# Patient Record
Sex: FEMALE | Race: WHITE | NOT HISPANIC OR LATINO | Employment: OTHER | URBAN - METROPOLITAN AREA
[De-identification: names, ages, dates, MRNs, and addresses within clinical notes are randomized per-mention and may not be internally consistent; named-entity substitution may affect disease eponyms.]

---

## 2017-01-10 ENCOUNTER — OFFICE VISIT (OUTPATIENT)
Dept: INTERNAL MEDICINE | Facility: CLINIC | Age: 37
End: 2017-01-10

## 2017-01-10 VITALS
BODY MASS INDEX: 32.47 KG/M2 | HEART RATE: 98 BPM | SYSTOLIC BLOOD PRESSURE: 102 MMHG | RESPIRATION RATE: 16 BRPM | DIASTOLIC BLOOD PRESSURE: 76 MMHG | WEIGHT: 172 LBS | HEIGHT: 61 IN

## 2017-01-10 DIAGNOSIS — L25.9 CONTACT DERMATITIS, UNSPECIFIED CONTACT DERMATITIS TYPE, UNSPECIFIED TRIGGER: Primary | ICD-10-CM

## 2017-01-10 PROCEDURE — 96372 THER/PROPH/DIAG INJ SC/IM: CPT | Performed by: PHYSICIAN ASSISTANT

## 2017-01-10 PROCEDURE — 99213 OFFICE O/P EST LOW 20 MIN: CPT | Performed by: PHYSICIAN ASSISTANT

## 2017-01-10 RX ORDER — METHYLPREDNISOLONE 4 MG/1
TABLET ORAL
Qty: 21 TABLET | Refills: 0 | Status: SHIPPED | OUTPATIENT
Start: 2017-01-10 | End: 2017-01-30 | Stop reason: SDUPTHER

## 2017-01-10 RX ORDER — METHYLPREDNISOLONE ACETATE 40 MG/ML
40 INJECTION, SUSPENSION INTRA-ARTICULAR; INTRALESIONAL; INTRAMUSCULAR; SOFT TISSUE ONCE
Status: DISCONTINUED | OUTPATIENT
Start: 2017-01-10 | End: 2017-12-07

## 2017-01-10 RX ORDER — MELOXICAM 7.5 MG/1
7.5 TABLET ORAL DAILY
Qty: 30 TABLET | Refills: 2 | Status: SHIPPED | OUTPATIENT
Start: 2017-01-10 | End: 2017-01-30 | Stop reason: SDUPTHER

## 2017-01-10 NOTE — MR AVS SNAPSHOT
Crystal A Day   1/10/2017 3:45 PM   Office Visit    Dept Phone:  [phone]   Encounter #:  [id number]    Provider:  DANELLE Polanco   Department:  [facility] INTERNAL MEDICINE AND ENDOCRINOLOGY [place]                Your Full Care Plan              Today's Medication Changes          These changes are accurate as of: 1/10/17  4:19 PM.  If you have any questions, ask your nurse or doctor.               New Medication(s)Ordered:     meloxicam 7.5 MG tablet   Commonly known as:  MOBIC   Take 1 tablet by mouth Daily.   Started by:  DANELLE Polanco       MethylPREDNISolone 4 MG tablet   Commonly known as:  MEDROL   follow package directions   Started by:  DANELLE Polanco            Where to Get Your Medications      These medications were sent to [facility] [address] [place] [address] - [facility] [address] AT [facility]  [phone] [facility] [phone] [facility] [address], [facility] [address]     Phone:  [phone]     meloxicam 7.5 MG tablet    MethylPREDNISolone 4 MG tablet                  Your Updated Medication List          This list is accurate as of: 1/10/17  4:19 PM.  Always use your most recent med list.                albuterol 108 (90 BASE) MCG/ACT inhaler   Commonly known as:  PROVENTIL HFA;VENTOLIN HFA       baclofen 10 MG tablet   Commonly known as:  LIORESAL   Take 1 tablet by mouth 3 (Three) Times a Day.       doxycycline 100 MG capsule   Commonly known as:  VIBRAMYCIN   Take 1 capsule by mouth 2 (Two) Times a Day.       DULoxetine 60 MG capsule   Commonly known as:  CYMBALTA       EPIPEN 2-JAREN 0.3 MG/0.3ML solution auto-injector injection   Generic drug:  EPINEPHrine       esomeprazole 40 MG capsule   Commonly known as:  nexIUM       estrogens (conjugated) 1.25 MG tablet   Commonly known as:  PREMARIN   Take 1 tablet by mouth daily. As directed       fexofenadine 180 MG tablet   Commonly known as:  ALLEGRA       fluticasone 50 MCG/ACT  nasal spray   Commonly known as:  FLONASE       ketotifen 0.025 % ophthalmic solution   Commonly known as:  ZADITOR       levoFLOXacin 500 MG tablet   Commonly known as:  LEVAQUIN   Take 1 tablet by mouth Daily.       LORazepam 0.5 MG tablet   Commonly known as:  ATIVAN       meloxicam 7.5 MG tablet   Commonly known as:  MOBIC   Take 1 tablet by mouth Daily.       MethylPREDNISolone 4 MG tablet   Commonly known as:  MEDROL   follow package directions       mometasone-formoterol 200-5 MCG/ACT inhaler   Commonly known as:  DULERA 200       mupirocin 2 % ointment   Commonly known as:  BACTROBAN   Apply  topically 3 (Three) Times a Day.       ondansetron 4 MG tablet   Commonly known as:  ZOFRAN   Take 2 tablets by mouth every 8 (eight) hours as needed for nausea or vomiting.       promethazine 25 MG tablet   Commonly known as:  PHENERGAN   Take 1 tablet by mouth every 6 (six) hours as needed for nausea or vomiting.       Pseudoeph-Chlorphen-DM 15-1-5 MG/5ML liquid   Take 10 mL by mouth 4 (Four) Times a Day.       topiramate 50 MG tablet   Commonly known as:  TOPAMAX       traMADol 50 MG tablet   Commonly known as:  ULTRAM   TAKE 2 TABLETS BY MOUTH TWICE DAILY       traZODone 150 MG tablet   Commonly known as:  DESYREL               You Were Diagnosed With        Codes Comments    Contact dermatitis, unspecified contact dermatitis type, unspecified trigger    -  Primary ICD-10-CM: L25.9  ICD-9-CM: 692.9       Medications to be Given to You by a Medical Professional     Due       Frequency    1/10/2017 methylPREDNISolone acetate (DEPO-medrol) injection 40 mg  Once      Instructions     None    Patient Instructions History      Upcoming Appointments     Visit Type Date Time Department    SAME DAY 1/10/2017  3:45 PM [facility]      MyChart Signup     Our records indicate that you have declined [facility] "[employer]hart signup. If you would like to sign up for [employer], please email [email or web address] or call  "[phone] to obtain an activation code.             Other Info from Your Visit           Allergies     Biaxin [Clarithromycin]  Nausea Only    Codeine      Latex      Penicillins      Sulfa Antibiotics      Sulfate      Ultram [Tramadol Hcl]        Reason for Visit     facial irritation pt c/o of burning, itching, and swelling in the facial area.      Vital Signs     Blood Pressure Pulse Respirations Height Weight Body Mass Index    102/76 98 16 61\" (154.9 cm) 172 lb (78 kg) 32.5 kg/m2    Smoking Status                   Former Smoker           Problems and Diagnoses Noted     Contact dermatitis    -  Primary        "

## 2017-01-10 NOTE — PROGRESS NOTES
Chief Complaint   Patient presents with   • facial irritation     pt c/o of burning, itching, and swelling in the facial area.       Subjective   Megan A Day is a 36 y.o. female.       History of Present Illness     Pt woke up yesterday morning with itching of her palms, then noticed that her face was burning and red. Does have some food allergies but cannot remember eating anything she is allergic to. Took some benadryl and put a wet washcloth on her face. Feels swollen and tingling.     Upon questioning, remembers a new cleaning solution that she is using to clean up dog's urine.    Current Outpatient Prescriptions:   •  albuterol (PROVENTIL HFA;VENTOLIN HFA) 108 (90 BASE) MCG/ACT inhaler, Ventolin  (90 Base) MCG/ACT Inhalation Aerosol Solution; Patient Sig: Ventolin  (90 Base) MCG/ACT Inhalation Aerosol Solution ; 18; 0; 03-Sep-2015; Active, Disp: , Rfl:   •  baclofen (LIORESAL) 10 MG tablet, Take 1 tablet by mouth 3 (Three) Times a Day., Disp: 90 tablet, Rfl: 0  •  doxycycline (VIBRAMYCIN) 100 MG capsule, Take 1 capsule by mouth 2 (Two) Times a Day., Disp: 20 capsule, Rfl: 0  •  DULoxetine (CYMBALTA) 60 MG capsule, Take  by mouth., Disp: , Rfl:   •  EPIPEN 2-JAREN 0.3 MG/0.3ML solution auto-injector injection, U UTD, Disp: , Rfl: 6  •  esomeprazole (NexIUM) 40 MG capsule, Take 1 capsule by mouth daily., Disp: , Rfl:   •  estrogens, conjugated, (PREMARIN) 1.25 MG tablet, Take 1 tablet by mouth daily. As directed, Disp: 30 tablet, Rfl: 5  •  fexofenadine (ALLEGRA) 180 MG tablet, TK 1 T PO D PRN, Disp: , Rfl: 6  •  fluticasone (FLONASE) 50 MCG/ACT nasal spray, 2 sprays into each nostril daily., Disp: , Rfl:   •  ketotifen (ZADITOR) 0.025 % ophthalmic solution, INT 2 GTS IN EACH EYE BID, Disp: , Rfl: 6  •  levoFLOXacin (LEVAQUIN) 500 MG tablet, Take 1 tablet by mouth Daily., Disp: 10 tablet, Rfl: 0  •  LORazepam (ATIVAN) 0.5 MG tablet, Take  by mouth., Disp: , Rfl:   •  mometasone-formoterol (DULERA  200) 200-5 MCG/ACT inhaler, Dulera 200-5 MCG/ACT Inhalation Aerosol; Patient Sig: Dulera 200-5 MCG/ACT Inhalation Aerosol ; 13; 0; 03-Sep-2015; Active, Disp: , Rfl:   •  mupirocin (BACTROBAN) 2 % ointment, Apply  topically 3 (Three) Times a Day., Disp: 30 g, Rfl: 0  •  ondansetron (ZOFRAN) 4 MG tablet, Take 2 tablets by mouth every 8 (eight) hours as needed for nausea or vomiting., Disp: 30 tablet, Rfl: 1  •  promethazine (PHENERGAN) 25 MG tablet, Take 1 tablet by mouth every 6 (six) hours as needed for nausea or vomiting., Disp: 20 tablet, Rfl: 0  •  Pseudoeph-Chlorphen-DM 15-1-5 MG/5ML liquid, Take 10 mL by mouth 4 (Four) Times a Day., Disp: 118 mL, Rfl: 0  •  topiramate (TOPAMAX) 50 MG tablet, Take 1 tablet by mouth daily., Disp: , Rfl:   •  traMADol (ULTRAM) 50 MG tablet, TAKE 2 TABLETS BY MOUTH TWICE DAILY, Disp: 60 tablet, Rfl: 0  •  traZODone (DESYREL) 150 MG tablet, Take 3 tablets by mouth 3 (Three) Times a Day As Needed., Disp: , Rfl: 1  •  meloxicam (MOBIC) 7.5 MG tablet, Take 1 tablet by mouth Daily., Disp: 30 tablet, Rfl: 2  •  MethylPREDNISolone (MEDROL) 4 MG tablet, follow package directions, Disp: 21 tablet, Rfl: 0    Current Facility-Administered Medications:   •  methylPREDNISolone acetate (DEPO-medrol) injection 40 mg, 40 mg, Intramuscular, Once, DANELLE Polanco     [facility]  The following portions of the patient's history were reviewed and updated as appropriate: allergies, current medications, past family history, past medical history, past social history, past surgical history and problem list.    Review of Systems   Constitutional: Negative for activity change, appetite change, fatigue, fever and unexpected weight change.   HENT: Negative for facial swelling, mouth sores and trouble swallowing.    Eyes: Negative for pain, discharge, itching and visual disturbance.   Respiratory: Negative for chest tightness, shortness of breath and wheezing.    Cardiovascular: Negative for chest pain and  "palpitations.   Gastrointestinal: Negative for abdominal pain, diarrhea, nausea and vomiting.   Endocrine: Negative.    Genitourinary: Negative.    Musculoskeletal: Negative for joint swelling.   Skin: Positive for rash.   Allergic/Immunologic: Negative.    Neurological: Negative for seizures and syncope.   Hematological: Does not bruise/bleed easily.   Psychiatric/Behavioral: Negative.        Objective   Visit Vitals   • /76   • Pulse 98   • Resp 16   • Ht 61\" (154.9 cm)   • Wt 172 lb (78 kg)   • BMI 32.5 kg/m2       Physical Exam   Constitutional: She is oriented to person, place, and time. She appears well-developed and well-nourished. No distress.   HENT:   Head: Normocephalic and atraumatic.   Right Ear: External ear normal.   Left Ear: External ear normal.   Eyes: Conjunctivae and EOM are normal. Pupils are equal, round, and reactive to light. Right eye exhibits no discharge. Left eye exhibits no discharge. No scleral icterus.   Neck: Normal range of motion. Neck supple.   Pulmonary/Chest: Effort normal.   Musculoskeletal: Normal range of motion.   Neurological: She is alert and oriented to person, place, and time. She exhibits normal muscle tone.   Skin: Skin is warm and dry. Rash (erythematous, edematous on bilateral cheeks and chin) noted. She is not diaphoretic.   Psychiatric: She has a normal mood and affect. Her behavior is normal. Judgment and thought content normal.   Nursing note and vitals reviewed.           ASSESSMENT/PLAN    Problem List Items Addressed This Visit     None      Visit Diagnoses     Contact dermatitis, unspecified contact dermatitis type, unspecified trigger    -  Primary    Relevant Medications    methylPREDNISolone acetate (DEPO-medrol) injection 40 mg    MethylPREDNISolone (MEDROL) 4 MG tablet    meloxicam (MOBIC) 7.5 MG tablet               Return if symptoms worsen or fail to improve.  "

## 2017-01-30 ENCOUNTER — OFFICE VISIT (OUTPATIENT)
Dept: INTERNAL MEDICINE | Facility: CLINIC | Age: 37
End: 2017-01-30

## 2017-01-30 VITALS
HEIGHT: 62 IN | OXYGEN SATURATION: 98 % | TEMPERATURE: 98.4 F | DIASTOLIC BLOOD PRESSURE: 78 MMHG | SYSTOLIC BLOOD PRESSURE: 122 MMHG | HEART RATE: 74 BPM

## 2017-01-30 DIAGNOSIS — L25.9 CONTACT DERMATITIS, UNSPECIFIED CONTACT DERMATITIS TYPE, UNSPECIFIED TRIGGER: ICD-10-CM

## 2017-01-30 DIAGNOSIS — J01.11 ACUTE RECURRENT FRONTAL SINUSITIS: ICD-10-CM

## 2017-01-30 PROCEDURE — 99213 OFFICE O/P EST LOW 20 MIN: CPT | Performed by: PHYSICIAN ASSISTANT

## 2017-01-30 RX ORDER — METHYLPREDNISOLONE 4 MG/1
TABLET ORAL
Qty: 21 TABLET | Refills: 0 | Status: SHIPPED | OUTPATIENT
Start: 2017-01-30 | End: 2017-02-07

## 2017-01-30 RX ORDER — DOXYCYCLINE HYCLATE 100 MG/1
100 CAPSULE ORAL 2 TIMES DAILY
Qty: 20 CAPSULE | Refills: 0 | Status: SHIPPED | OUTPATIENT
Start: 2017-01-30 | End: 2017-03-22

## 2017-01-30 RX ORDER — MELOXICAM 7.5 MG/1
TABLET ORAL
Qty: 90 TABLET | Refills: 2 | OUTPATIENT
Start: 2017-01-30 | End: 2017-11-03

## 2017-01-30 NOTE — MR AVS SNAPSHOT
Crystal A Day   1/30/2017 1:45 PM   Office Visit    Dept Phone:  [phone]   Encounter #:  [id number]    Provider:  DANELLE Polanco   Department:  [facility] INTERNAL MEDICINE AND ENDOCRINOLOGY [place]                Your Full Care Plan              Today's Medication Changes          These changes are accurate as of: 1/30/17  2:28 PM.  If you have any questions, ask your nurse or doctor.               Stop taking medication(s)listed here:     Pseudoeph-Chlorphen-DM 15-1-5 MG/5ML liquid   Stopped by:  DANELLE Polanco                Where to Get Your Medications      These medications were sent to [facility] [address] [place] [address] - [facility] [address] AT [facility] [address]  [phone] [facility] [phone] [facility] [address], [facility] [address]     Phone:  [phone]     doxycycline 100 MG capsule                  Your Updated Medication List          This list is accurate as of: 1/30/17  2:28 PM.  Always use your most recent med list.                albuterol 108 (90 BASE) MCG/ACT inhaler   Commonly known as:  PROVENTIL HFA;VENTOLIN HFA       baclofen 10 MG tablet   Commonly known as:  LIORESAL   Take 1 tablet by mouth 3 (Three) Times a Day.       doxycycline 100 MG capsule   Commonly known as:  VIBRAMYCIN   Take 1 capsule by mouth 2 (Two) Times a Day.       DULoxetine 60 MG capsule   Commonly known as:  CYMBALTA       EPIPEN 2-JAREN 0.3 MG/0.3ML solution auto-injector injection   Generic drug:  EPINEPHrine       esomeprazole 40 MG capsule   Commonly known as:  nexIUM       estrogens (conjugated) 1.25 MG tablet   Commonly known as:  PREMARIN   Take 1 tablet by mouth daily. As directed       fexofenadine 180 MG tablet   Commonly known as:  ALLEGRA       fluticasone 50 MCG/ACT nasal spray   Commonly known as:  FLONASE       ketotifen 0.025 % ophthalmic solution   Commonly known as:  ZADITOR       levoFLOXacin 500 MG tablet   Commonly known as:  LEVAQUIN      Take 1 tablet by mouth Daily.       LORazepam 0.5 MG tablet   Commonly known as:  ATIVAN       meloxicam 7.5 MG tablet   Commonly known as:  MOBIC   Take 1 tablet by mouth Daily.       MethylPREDNISolone 4 MG tablet   Commonly known as:  MEDROL   follow package directions       mometasone-formoterol 200-5 MCG/ACT inhaler   Commonly known as:  DULERA 200       mupirocin 2 % ointment   Commonly known as:  BACTROBAN   Apply  topically 3 (Three) Times a Day.       ondansetron 4 MG tablet   Commonly known as:  ZOFRAN   Take 2 tablets by mouth every 8 (eight) hours as needed for nausea or vomiting.       promethazine 25 MG tablet   Commonly known as:  PHENERGAN   Take 1 tablet by mouth every 6 (six) hours as needed for nausea or vomiting.       topiramate 50 MG tablet   Commonly known as:  TOPAMAX       traMADol 50 MG tablet   Commonly known as:  ULTRAM   TAKE 2 TABLETS BY MOUTH TWICE DAILY       traZODone 150 MG tablet   Commonly known as:  DESYREL               You Were Diagnosed With        Codes Comments    Acute recurrent frontal sinusitis     ICD-10-CM: J01.11  ICD-9-CM: 461.1       Medications to be Given to You by a Medical Professional     Due       Frequency    1/10/2017 methylPREDNISolone acetate (DEPO-medrol) injection 40 mg  Once      Instructions     None    Patient Instructions History      Upcoming Appointments     Visit Type Date Time Department    SAME DAY 1/30/2017  1:45 PM [facility]      [employer] Signup     Our records indicate that your [facility] account has been deactivated. If you would like to reactivate your account, please email "[email or web address] or call [phone] to talk to our [employer] staff.             Other Info from Your Visit           Allergies     Biaxin [Clarithromycin]  Nausea Only    Codeine      Latex      Penicillins      Sulfa Antibiotics      Sulfate      Ultram [Tramadol Hcl]        Reason for Visit     Fatigue C/o fatigue, sob, chills, tightness  "in chest, frequent headaches and productive cough x 9 days.      Vital Signs     Blood Pressure Pulse Temperature Height Oxygen Saturation Smoking Status    122/78 74 98.4 °F (36.9 °C) 62\" (157.5 cm) 98% Former Smoker      Problems and Diagnoses Noted     Acute frontal sinusitis        "

## 2017-01-30 NOTE — PROGRESS NOTES
Chief Complaint   Patient presents with   • Fatigue     C/o fatigue, sob, chills, tightness in chest, frequent headaches and productive cough x 9 days.       Subjective   Megan A Day is a 36 y.o. female.       History of Present Illness     Pt started with headache, sinus pressure in her frontal area. Has had some congestion. Some chest tightness with breathing. Chest hurts when she breaths- mid sternal. Increased cough at night or with moving around a lot. Cough is productive. Feels like she has mucus stuck in her throat. Tried mucinex. No fever.    Current Outpatient Prescriptions:   •  albuterol (PROVENTIL HFA;VENTOLIN HFA) 108 (90 BASE) MCG/ACT inhaler, Ventolin  (90 Base) MCG/ACT Inhalation Aerosol Solution; Patient Sig: Ventolin  (90 Base) MCG/ACT Inhalation Aerosol Solution ; 18; 0; 03-Sep-2015; Active, Disp: , Rfl:   •  baclofen (LIORESAL) 10 MG tablet, Take 1 tablet by mouth 3 (Three) Times a Day., Disp: 90 tablet, Rfl: 0  •  doxycycline (VIBRAMYCIN) 100 MG capsule, Take 1 capsule by mouth 2 (Two) Times a Day., Disp: 20 capsule, Rfl: 0  •  DULoxetine (CYMBALTA) 60 MG capsule, Take  by mouth., Disp: , Rfl:   •  EPIPEN 2-JAREN 0.3 MG/0.3ML solution auto-injector injection, U UTD, Disp: , Rfl: 6  •  esomeprazole (NexIUM) 40 MG capsule, Take 1 capsule by mouth daily., Disp: , Rfl:   •  estrogens, conjugated, (PREMARIN) 1.25 MG tablet, Take 1 tablet by mouth daily. As directed, Disp: 30 tablet, Rfl: 5  •  fexofenadine (ALLEGRA) 180 MG tablet, TK 1 T PO D PRN, Disp: , Rfl: 6  •  fluticasone (FLONASE) 50 MCG/ACT nasal spray, 2 sprays into each nostril daily., Disp: , Rfl:   •  ketotifen (ZADITOR) 0.025 % ophthalmic solution, INT 2 GTS IN EACH EYE BID, Disp: , Rfl: 6  •  levoFLOXacin (LEVAQUIN) 500 MG tablet, Take 1 tablet by mouth Daily., Disp: 10 tablet, Rfl: 0  •  LORazepam (ATIVAN) 0.5 MG tablet, Take  by mouth., Disp: , Rfl:   •  meloxicam (MOBIC) 7.5 MG tablet, Take 1 tablet by mouth Daily.,  Disp: 30 tablet, Rfl: 2  •  MethylPREDNISolone (MEDROL) 4 MG tablet, follow package directions, Disp: 21 tablet, Rfl: 0  •  mometasone-formoterol (DULERA 200) 200-5 MCG/ACT inhaler, Dulera 200-5 MCG/ACT Inhalation Aerosol; Patient Sig: Dulera 200-5 MCG/ACT Inhalation Aerosol ; 13; 0; 03-Sep-2015; Active, Disp: , Rfl:   •  mupirocin (BACTROBAN) 2 % ointment, Apply  topically 3 (Three) Times a Day., Disp: 30 g, Rfl: 0  •  ondansetron (ZOFRAN) 4 MG tablet, Take 2 tablets by mouth every 8 (eight) hours as needed for nausea or vomiting., Disp: 30 tablet, Rfl: 1  •  promethazine (PHENERGAN) 25 MG tablet, Take 1 tablet by mouth every 6 (six) hours as needed for nausea or vomiting., Disp: 20 tablet, Rfl: 0  •  topiramate (TOPAMAX) 50 MG tablet, Take 1 tablet by mouth daily., Disp: , Rfl:   •  traMADol (ULTRAM) 50 MG tablet, TAKE 2 TABLETS BY MOUTH TWICE DAILY, Disp: 60 tablet, Rfl: 0  •  traZODone (DESYREL) 150 MG tablet, Take 3 tablets by mouth 3 (Three) Times a Day As Needed., Disp: , Rfl: 1    Current Facility-Administered Medications:   •  methylPREDNISolone acetate (DEPO-medrol) injection 40 mg, 40 mg, Intramuscular, Once, DANELLE Polanco     [facility]  The following portions of the patient's history were reviewed and updated as appropriate: allergies, current medications, past family history, past medical history, past social history, past surgical history and problem list.    Review of Systems   Constitutional: Positive for fatigue. Negative for activity change and unexpected weight change.   HENT: Positive for congestion, postnasal drip and sore throat. Negative for ear pain.    Eyes: Negative for pain and discharge.   Respiratory: Positive for cough. Negative for chest tightness, shortness of breath and wheezing.    Cardiovascular: Negative for chest pain and palpitations.   Gastrointestinal: Negative for abdominal pain, diarrhea and vomiting.   Endocrine: Negative.    Genitourinary: Negative.    Musculoskeletal:  "Negative for joint swelling.   Skin: Negative for color change, rash and wound.   Allergic/Immunologic: Negative.    Neurological: Negative for seizures and syncope.   Psychiatric/Behavioral: Negative.        Objective   Visit Vitals   • /78   • Pulse 74   • Temp 98.4 °F (36.9 °C)   • Ht 62\" (157.5 cm)   • SpO2 98%       Physical Exam   Constitutional: She is oriented to person, place, and time. She appears well-developed and well-nourished.  Non-toxic appearance. No distress.   HENT:   Head: Normocephalic and atraumatic. Hair is normal.   Right Ear: External ear normal. No drainage, swelling or tenderness. Tympanic membrane is retracted.   Left Ear: External ear normal. No drainage, swelling or tenderness. Tympanic membrane is retracted.   Nose: Mucosal edema present. No epistaxis.   Mouth/Throat: Uvula is midline and mucous membranes are normal. No oral lesions. No uvula swelling. Posterior oropharyngeal erythema present. No oropharyngeal exudate.   Eyes: Conjunctivae and EOM are normal. Pupils are equal, round, and reactive to light. Right eye exhibits no discharge. Left eye exhibits no discharge. No scleral icterus.   Neck: Normal range of motion. Neck supple.   Cardiovascular: Normal rate, regular rhythm and normal heart sounds.  Exam reveals no gallop.    No murmur heard.  Pulmonary/Chest: Breath sounds normal. No stridor. No respiratory distress. She has no wheezes. She has no rales. She exhibits no tenderness.   Abdominal: Soft. There is no tenderness.   Lymphadenopathy:     She has cervical adenopathy.   Neurological: She is alert and oriented to person, place, and time. She exhibits normal muscle tone.   Skin: Skin is warm and dry. No rash noted. She is not diaphoretic.   Psychiatric: She has a normal mood and affect. Her behavior is normal. Judgment and thought content normal.   Nursing note and vitals reviewed.      Results for orders placed or performed in visit on 10/06/16   CBC Auto Differential "   Result Value Ref Range    WBC 7.16 3.50 - 10.80 10*3/mm3    RBC 4.94 3.89 - 5.14 10*6/mm3    Hemoglobin 13.8 11.5 - 15.5 g/dL    Hematocrit 42.7 34.5 - 44.0 %    MCV 86.4 80.0 - 99.0 fL    MCH 27.9 27.0 - 31.0 pg    MCHC 32.3 32.0 - 36.0 g/dL    RDW 13.2 11.3 - 14.5 %    RDW-SD 41.7 37.0 - 54.0 fl    MPV 10.7 6.0 - 12.0 fL    Platelets 282 150 - 450 10*3/mm3    Neutrophil % 61.0 41.0 - 71.0 %    Lymphocyte % 27.8 24.0 - 44.0 %    Monocyte % 5.6 0.0 - 12.0 %    Eosinophil % 5.4 (H) 0.0 - 3.0 %    Basophil % 0.1 0.0 - 1.0 %    Immature Grans % 0.1 0.0 - 0.6 %    Neutrophils, Absolute 4.36 1.50 - 8.30 10*3/mm3    Lymphocytes, Absolute 1.99 0.60 - 4.80 10*3/mm3    Monocytes, Absolute 0.40 0.00 - 1.00 10*3/mm3    Eosinophils, Absolute 0.39 (H) 0.10 - 0.30 10*3/mm3    Basophils, Absolute 0.01 0.00 - 0.20 10*3/mm3    Immature Grans, Absolute 0.01 0.00 - 0.03 10*3/mm3        ASSESSMENT/PLAN    Problem List Items Addressed This Visit        Respiratory    Acute recurrent frontal sinusitis     Pt has medrol dose pack at home that she can start, along with the doxycycline.         Relevant Medications    doxycycline (VIBRAMYCIN) 100 MG capsule               Return if symptoms worsen or fail to improve.

## 2017-01-30 NOTE — TELEPHONE ENCOUNTER
----- Message from Angely MOY MA sent at 1/30/2017  3:49 PM EST -----  See message below pls advise.     ----- Message -----     From: Shania Ram     Sent: 1/30/2017   3:45 PM       To: [facility] [place] [facility] [place]    THE PATIENT IS CALLING BACK TO LET OSVALDO OROZCO KNOW THAT SHE WAS NOT ABLE TO FIND HER CORTISONE CREAM AND WOULD LIKE FOR OSVALDO TO GO AHEAD AND CALL HER IN SOME MORE. THE PATIENT'S CALL BACK NUMBER [phone] IF THERE IS ANY QUESTIONS OR CONCERN

## 2017-02-07 ENCOUNTER — TELEPHONE (OUTPATIENT)
Dept: INTERNAL MEDICINE | Facility: CLINIC | Age: 37
End: 2017-02-07

## 2017-02-07 ENCOUNTER — HOSPITAL ENCOUNTER (OUTPATIENT)
Dept: GENERAL RADIOLOGY | Facility: HOSPITAL | Age: 37
Discharge: HOME | End: 2017-02-07
Admitting: PHYSICIAN ASSISTANT

## 2017-02-07 ENCOUNTER — OFFICE VISIT (OUTPATIENT)
Dept: INTERNAL MEDICINE | Facility: CLINIC | Age: 37
End: 2017-02-07

## 2017-02-07 VITALS
HEIGHT: 62 IN | TEMPERATURE: 99.5 F | SYSTOLIC BLOOD PRESSURE: 102 MMHG | HEART RATE: 86 BPM | OXYGEN SATURATION: 98 % | DIASTOLIC BLOOD PRESSURE: 64 MMHG

## 2017-02-07 DIAGNOSIS — R68.89 FLU-LIKE SYMPTOMS: ICD-10-CM

## 2017-02-07 DIAGNOSIS — J20.9 ACUTE BRONCHITIS, UNSPECIFIED ORGANISM: ICD-10-CM

## 2017-02-07 DIAGNOSIS — N76.0 ACUTE VAGINITIS: ICD-10-CM

## 2017-02-07 DIAGNOSIS — J20.9 ACUTE BRONCHITIS, UNSPECIFIED ORGANISM: Primary | ICD-10-CM

## 2017-02-07 LAB
EXPIRATION DATE: NORMAL
FLUAV AG NPH QL: NEGATIVE
FLUBV AG NPH QL: NEGATIVE
INTERNAL CONTROL: NORMAL
Lab: NORMAL

## 2017-02-07 PROCEDURE — 71020 HC CHEST PA AND LATERAL: CPT

## 2017-02-07 PROCEDURE — 87804 INFLUENZA ASSAY W/OPTIC: CPT | Performed by: PHYSICIAN ASSISTANT

## 2017-02-07 PROCEDURE — 99213 OFFICE O/P EST LOW 20 MIN: CPT | Performed by: PHYSICIAN ASSISTANT

## 2017-02-07 RX ORDER — LEVOFLOXACIN 500 MG/1
500 TABLET, FILM COATED ORAL DAILY
Qty: 10 TABLET | Refills: 0 | Status: SHIPPED | OUTPATIENT
Start: 2017-02-07 | End: 2017-03-22

## 2017-02-07 RX ORDER — BENZONATATE 200 MG/1
200 CAPSULE ORAL 3 TIMES DAILY PRN
Qty: 30 CAPSULE | Refills: 0 | Status: SHIPPED | OUTPATIENT
Start: 2017-02-07 | End: 2017-02-17

## 2017-02-07 RX ORDER — FLUCONAZOLE 150 MG/1
150 TABLET ORAL ONCE
Qty: 2 TABLET | Refills: 0 | Status: SHIPPED | OUTPATIENT
Start: 2017-02-07 | End: 2017-02-07

## 2017-02-07 NOTE — TELEPHONE ENCOUNTER
----- Message from DANELLE Polanco sent at 2/7/2017  4:05 PM EST -----  Contact: PATIENT   Per Dr Slaughter, please call in phenergan w/ codeine cough syrup, 5 ml po qhs prn #120 ml, 0RF. I sent in the diflucan. Thanks    ----- Message -----     From: Angely MOY MA     Sent: 2/7/2017   3:51 PM       To: DANELLE Polanco    Pls advise.     ----- Message -----     From: Dian Kaufman     Sent: 2/7/2017   3:37 PM       To: Angely MOY MA    PATIENT STATES THAT JJ CALL ARE NOT COVERED BY HER INSURANCE, SHE STATES THAT [facility] WAS NOT CALLED IN.    [facility] ON [address]    CALL BACK #[phone]

## 2017-02-07 NOTE — TELEPHONE ENCOUNTER
Received a call from pt, pt was last seen on 01/30, per pt she has completed her antibiotics that she was given and is still feeling sick, her cough is a lot deeper and pt still has a low grade fever, pt is afraid it might be pneumonia and requested an appt with Christie to be seen again, scheduled pt with Christie today at 01:45pm.

## 2017-02-07 NOTE — PROGRESS NOTES
Chief Complaint   Patient presents with   • Follow-up     ongoing fever, cough, chest pain, burning in chest when coughi LOV 01/30/17       Lizandro Guzman A Day is a 36 y.o. female.       History of Present Illness     Pt has been taking the doxycycline and completed the steroid pack. She is not feeling any better, has chest congestion and chest tightness. Chest hurts in the front. Cough is productive. Feels fatigued and feels like her memory is not as good. Has been feverish, tmax was 102.6 last night. No wheezing. Has deep painful cough. Feels short of breath. Taking some otc nyquil for cough.      Current Outpatient Prescriptions:   •  albuterol (PROVENTIL HFA;VENTOLIN HFA) 108 (90 BASE) MCG/ACT inhaler, Ventolin  (90 Base) MCG/ACT Inhalation Aerosol Solution; Patient Sig: Ventolin  (90 Base) MCG/ACT Inhalation Aerosol Solution ; 18; 0; 03-Sep-2015; Active, Disp: , Rfl:   •  baclofen (LIORESAL) 10 MG tablet, Take 1 tablet by mouth 3 (Three) Times a Day., Disp: 90 tablet, Rfl: 0  •  doxycycline (VIBRAMYCIN) 100 MG capsule, Take 1 capsule by mouth 2 (Two) Times a Day., Disp: 20 capsule, Rfl: 0  •  DULoxetine (CYMBALTA) 60 MG capsule, Take  by mouth., Disp: , Rfl:   •  EPIPEN 2-JAREN 0.3 MG/0.3ML solution auto-injector injection, U UTD, Disp: , Rfl: 6  •  esomeprazole (NexIUM) 40 MG capsule, Take 1 capsule by mouth daily., Disp: , Rfl:   •  estrogens, conjugated, (PREMARIN) 1.25 MG tablet, Take 1 tablet by mouth daily. As directed, Disp: 30 tablet, Rfl: 5  •  fexofenadine (ALLEGRA) 180 MG tablet, TK 1 T PO D PRN, Disp: , Rfl: 6  •  fluticasone (FLONASE) 50 MCG/ACT nasal spray, 2 sprays into each nostril daily., Disp: , Rfl:   •  ketotifen (ZADITOR) 0.025 % ophthalmic solution, INT 2 GTS IN EACH EYE BID, Disp: , Rfl: 6  •  LORazepam (ATIVAN) 0.5 MG tablet, Take  by mouth., Disp: , Rfl:   •  meloxicam (MOBIC) 7.5 MG tablet, TAKE 1 TABLET BY MOUTH DAILY, Disp: 90 tablet, Rfl: 2  •   mometasone-formoterol (DULERA 200) 200-5 MCG/ACT inhaler, Dulera 200-5 MCG/ACT Inhalation Aerosol; Patient Sig: Dulera 200-5 MCG/ACT Inhalation Aerosol ; 13; 0; 03-Sep-2015; Active, Disp: , Rfl:   •  mupirocin (BACTROBAN) 2 % ointment, Apply  topically 3 (Three) Times a Day., Disp: 30 g, Rfl: 0  •  ondansetron (ZOFRAN) 4 MG tablet, Take 2 tablets by mouth every 8 (eight) hours as needed for nausea or vomiting., Disp: 30 tablet, Rfl: 1  •  promethazine (PHENERGAN) 25 MG tablet, Take 1 tablet by mouth every 6 (six) hours as needed for nausea or vomiting., Disp: 20 tablet, Rfl: 0  •  topiramate (TOPAMAX) 50 MG tablet, Take 1 tablet by mouth daily., Disp: , Rfl:   •  traMADol (ULTRAM) 50 MG tablet, TAKE 2 TABLETS BY MOUTH TWICE DAILY, Disp: 60 tablet, Rfl: 0  •  traZODone (DESYREL) 150 MG tablet, Take 3 tablets by mouth 3 (Three) Times a Day As Needed., Disp: , Rfl: 1  •  benzonatate (TESSALON) 200 MG capsule, Take 1 capsule by mouth 3 (Three) Times a Day As Needed for cough for up to 10 days., Disp: 30 capsule, Rfl: 0  •  fluconazole (DIFLUCAN) 150 MG tablet, Take 1 tablet by mouth 1 (One) Time for 1 dose. Repeat in 2 days if still symptomatic, Disp: 2 tablet, Rfl: 0  •  levoFLOXacin (LEVAQUIN) 500 MG tablet, Take 1 tablet by mouth Daily., Disp: 10 tablet, Rfl: 0    Current Facility-Administered Medications:   •  methylPREDNISolone acetate (DEPO-medrol) injection 40 mg, 40 mg, Intramuscular, Once, DANELLE Polanco     [facility]  The following portions of the patient's history were reviewed and updated as appropriate: allergies, current medications, past family history, past medical history, past social history, past surgical history and problem list.    Review of Systems   Constitutional: Positive for fatigue and fever. Negative for activity change and unexpected weight change.   HENT: Positive for congestion, postnasal drip and sore throat. Negative for ear pain.    Eyes: Negative for pain and discharge.   Respiratory:  "Positive for cough. Negative for chest tightness, shortness of breath and wheezing.    Cardiovascular: Negative for chest pain and palpitations.   Gastrointestinal: Negative for abdominal pain, diarrhea and vomiting.   Endocrine: Negative.    Genitourinary: Negative.    Musculoskeletal: Negative for joint swelling.   Skin: Negative for color change, rash and wound.   Allergic/Immunologic: Negative.    Neurological: Positive for dizziness. Negative for seizures and syncope.   Psychiatric/Behavioral: Negative.        Objective   Visit Vitals   • /64   • Pulse 86   • Temp 99.5 °F (37.5 °C)   • Ht 62\" (157.5 cm)   • SpO2 98%       Physical Exam   Constitutional: She appears well-developed and well-nourished.   HENT:   Head: Normocephalic.   Right Ear: Hearing, tympanic membrane, external ear and ear canal normal.   Left Ear: Hearing, tympanic membrane, external ear and ear canal normal.   Nose: Nose normal.   Mouth/Throat: Oropharynx is clear and moist.   Eyes: Conjunctivae are normal. Pupils are equal, round, and reactive to light.   Neck: Normal range of motion.   Cardiovascular: Normal rate, regular rhythm and normal heart sounds.    Pulmonary/Chest: Effort normal and breath sounds normal. She has no decreased breath sounds. She has no wheezes. She has no rhonchi. She has no rales.   Musculoskeletal: Normal range of motion.   Neurological: She is alert.   Skin: Skin is warm and dry.   Psychiatric: She has a normal mood and affect. Her behavior is normal.   Nursing note and vitals reviewed.      Results for orders placed or performed in visit on 02/07/17   POC Influenza A / B   Result Value Ref Range    Rapid Influenza A Ag negative     Rapid Influenza B Ag negative     Internal Control Passed Passed    Lot Number [id number]     Expiration Date 06/01/2018         ASSESSMENT/PLAN    Problem List Items Addressed This Visit     None      Visit Diagnoses     Acute bronchitis, unspecified organism    -  Primary    " Relevant Medications    levoFLOXacin (LEVAQUIN) 500 MG tablet    benzonatate (TESSALON) 200 MG capsule    Other Relevant Orders    XR Chest PA & Lateral    Flu-like symptoms        Relevant Orders    POC Influenza A / B (Completed)    Acute vaginitis        Relevant Medications    fluconazole (DIFLUCAN) 150 MG tablet               Return if symptoms worsen or fail to improve.

## 2017-02-08 ENCOUNTER — TELEPHONE (OUTPATIENT)
Dept: INTERNAL MEDICINE | Facility: CLINIC | Age: 37
End: 2017-02-08

## 2017-02-08 NOTE — TELEPHONE ENCOUNTER
----- Message from Dian Kaufman sent at 2/8/2017  4:12 PM EST -----  Contact: PATIENT   RE: XRAY RESULTS    PATIENT WOULD LIKE A RETURN CALL REGARDING XRAY RESULTS FROM 2/7/2017, SHE STATES SHE IS STILL HAVING PRESSURE IN HER CHEST. C/O DIZZINESS, FEVER, SHORT OF BREATH.    CALL BACK #[phone]

## 2017-02-09 NOTE — TELEPHONE ENCOUNTER
Please let her know that her chest xray was clear. She should go ahead with the Levaquin that was prescribed and let us know if she is not feeling better next week.

## 2017-02-21 ENCOUNTER — OFFICE VISIT (OUTPATIENT)
Dept: INTERNAL MEDICINE | Facility: CLINIC | Age: 37
End: 2017-02-21

## 2017-02-21 VITALS
OXYGEN SATURATION: 99 % | TEMPERATURE: 99.6 F | HEART RATE: 85 BPM | SYSTOLIC BLOOD PRESSURE: 100 MMHG | HEIGHT: 62 IN | DIASTOLIC BLOOD PRESSURE: 68 MMHG

## 2017-02-21 DIAGNOSIS — R68.89 FLU-LIKE SYMPTOMS: ICD-10-CM

## 2017-02-21 DIAGNOSIS — R53.83 FATIGUE, UNSPECIFIED TYPE: Primary | ICD-10-CM

## 2017-02-21 DIAGNOSIS — R10.11 RIGHT UPPER QUADRANT PAIN: ICD-10-CM

## 2017-02-21 LAB
ALBUMIN SERPL-MCNC: 4.6 G/DL (ref 3.2–4.8)
ALBUMIN/GLOB SERPL: 1.6 G/DL (ref 1.5–2.5)
ALP SERPL-CCNC: 77 U/L (ref 25–100)
ALT SERPL W P-5'-P-CCNC: 31 U/L (ref 7–40)
ANION GAP SERPL CALCULATED.3IONS-SCNC: 6 MMOL/L (ref 3–11)
AST SERPL-CCNC: 26 U/L (ref 0–33)
BILIRUB SERPL-MCNC: 0.2 MG/DL (ref 0.3–1.2)
BUN BLD-MCNC: 19 MG/DL (ref 9–23)
BUN/CREAT SERPL: 23.8 (ref 7–25)
CALCIUM SPEC-SCNC: 9.9 MG/DL (ref 8.7–10.4)
CHLORIDE SERPL-SCNC: 108 MMOL/L (ref 99–109)
CO2 SERPL-SCNC: 26 MMOL/L (ref 20–31)
CREAT BLD-MCNC: 0.8 MG/DL (ref 0.6–1.3)
DEPRECATED RDW RBC AUTO: 42.9 FL (ref 37–54)
ERYTHROCYTE [DISTWIDTH] IN BLOOD BY AUTOMATED COUNT: 13.5 % (ref 11.3–14.5)
EXPIRATION DATE: NORMAL
FLUAV AG NPH QL: NEGATIVE
FLUBV AG NPH QL: NEGATIVE
GFR SERPL CREATININE-BSD FRML MDRD: 82 ML/MIN/1.73
GLOBULIN UR ELPH-MCNC: 2.9 GM/DL
GLUCOSE BLD-MCNC: 95 MG/DL (ref 70–100)
HCT VFR BLD AUTO: 41.7 % (ref 34.5–44)
HGB BLD-MCNC: 13.3 G/DL (ref 11.5–15.5)
INTERNAL CONTROL: NORMAL
Lab: NORMAL
MCH RBC QN AUTO: 27.7 PG (ref 27–31)
MCHC RBC AUTO-ENTMCNC: 31.9 G/DL (ref 32–36)
MCV RBC AUTO: 86.7 FL (ref 80–99)
PLATELET # BLD AUTO: 292 10*3/MM3 (ref 150–450)
PMV BLD AUTO: 10.5 FL (ref 6–12)
POTASSIUM BLD-SCNC: 4.3 MMOL/L (ref 3.5–5.5)
PROT SERPL-MCNC: 7.5 G/DL (ref 5.7–8.2)
RBC # BLD AUTO: 4.81 10*6/MM3 (ref 3.89–5.14)
SODIUM BLD-SCNC: 140 MMOL/L (ref 132–146)
TSH SERPL DL<=0.05 MIU/L-ACNC: 1.35 MIU/ML (ref 0.35–5.35)
VIT B12 BLD-MCNC: 308 PG/ML (ref 211–911)
WBC NRBC COR # BLD: 5.84 10*3/MM3 (ref 3.5–10.8)

## 2017-02-21 PROCEDURE — 87804 INFLUENZA ASSAY W/OPTIC: CPT | Performed by: PHYSICIAN ASSISTANT

## 2017-02-21 PROCEDURE — 99213 OFFICE O/P EST LOW 20 MIN: CPT | Performed by: PHYSICIAN ASSISTANT

## 2017-02-21 PROCEDURE — 84443 ASSAY THYROID STIM HORMONE: CPT | Performed by: PHYSICIAN ASSISTANT

## 2017-02-21 PROCEDURE — 80053 COMPREHEN METABOLIC PANEL: CPT | Performed by: PHYSICIAN ASSISTANT

## 2017-02-21 PROCEDURE — 82607 VITAMIN B-12: CPT | Performed by: PHYSICIAN ASSISTANT

## 2017-02-21 PROCEDURE — 85027 COMPLETE CBC AUTOMATED: CPT | Performed by: PHYSICIAN ASSISTANT

## 2017-02-21 RX ORDER — ALBUTEROL SULFATE 90 UG/1
2 AEROSOL, METERED RESPIRATORY (INHALATION) EVERY 6 HOURS PRN
Qty: 1 INHALER | Refills: 5 | Status: SHIPPED | OUTPATIENT
Start: 2017-02-21 | End: 2018-06-05 | Stop reason: ALTCHOICE

## 2017-02-21 RX ORDER — KETOTIFEN FUMARATE 0.35 MG/ML
1 SOLUTION/ DROPS OPHTHALMIC 2 TIMES DAILY
Qty: 10 ML | Refills: 6 | Status: SHIPPED | OUTPATIENT
Start: 2017-02-21 | End: 2017-06-08

## 2017-02-21 NOTE — PROGRESS NOTES
"Chief Complaint   Patient presents with   • Follow-up     Cough, chest pain, weakness       Subjective   Megan A Day is a 36 y.o. female.       History of Present Illness     Pt finished the course of Levaquin. The deepness of her cough has improved but still has cough. Still feels fatigued and has weak voice. Has terrible headaches with fever and chills. Tmax was 102.5, last night. Friend of son was at her house and had the flu 2-3 days ago. Pt did not have flu shot. Fever went away until recently. Still has \"film\" on her throat that has not gone away. Cancelled the allergy appt she had for today.    Just got over gastroenteritis with diarrhea about 3 days ago. Notes that she has had some intermittent pain in her right upper quadrant. Has upcoming appt with GI to discuss whether she needs a biopsy of her liver- not sure why.    Notes history of vitamin B12 deficiency.    Current Outpatient Prescriptions:   •  albuterol (PROVENTIL HFA;VENTOLIN HFA) 108 (90 BASE) MCG/ACT inhaler, Inhale 2 puffs Every 6 (Six) Hours As Needed for wheezing., Disp: 1 inhaler, Rfl: 5  •  baclofen (LIORESAL) 10 MG tablet, Take 1 tablet by mouth 3 (Three) Times a Day., Disp: 90 tablet, Rfl: 0  •  doxycycline (VIBRAMYCIN) 100 MG capsule, Take 1 capsule by mouth 2 (Two) Times a Day., Disp: 20 capsule, Rfl: 0  •  DULoxetine (CYMBALTA) 60 MG capsule, Take  by mouth., Disp: , Rfl:   •  EPIPEN 2-JAREN 0.3 MG/0.3ML solution auto-injector injection, U UTD, Disp: , Rfl: 6  •  esomeprazole (NexIUM) 40 MG capsule, Take 1 capsule by mouth daily., Disp: , Rfl:   •  estrogens, conjugated, (PREMARIN) 1.25 MG tablet, Take 1 tablet by mouth daily. As directed, Disp: 30 tablet, Rfl: 5  •  fexofenadine (ALLEGRA) 180 MG tablet, TK 1 T PO D PRN, Disp: , Rfl: 6  •  fluticasone (FLONASE) 50 MCG/ACT nasal spray, 2 sprays into each nostril daily., Disp: , Rfl:   •  ketotifen (ZADITOR) 0.025 % ophthalmic solution, Administer 1 drop to both eyes 2 (Two) Times a Day., " Disp: 10 mL, Rfl: 6  •  levoFLOXacin (LEVAQUIN) 500 MG tablet, Take 1 tablet by mouth Daily., Disp: 10 tablet, Rfl: 0  •  LORazepam (ATIVAN) 0.5 MG tablet, Take  by mouth., Disp: , Rfl:   •  meloxicam (MOBIC) 7.5 MG tablet, TAKE 1 TABLET BY MOUTH DAILY, Disp: 90 tablet, Rfl: 2  •  mometasone-formoterol (DULERA 200) 200-5 MCG/ACT inhaler, Dulera 200-5 MCG/ACT Inhalation Aerosol; Patient Sig: Dulera 200-5 MCG/ACT Inhalation Aerosol ; 13; 0; 03-Sep-2015; Active, Disp: , Rfl:   •  mupirocin (BACTROBAN) 2 % ointment, Apply  topically 3 (Three) Times a Day., Disp: 30 g, Rfl: 0  •  ondansetron (ZOFRAN) 4 MG tablet, Take 2 tablets by mouth every 8 (eight) hours as needed for nausea or vomiting., Disp: 30 tablet, Rfl: 1  •  promethazine (PHENERGAN) 25 MG tablet, Take 1 tablet by mouth every 6 (six) hours as needed for nausea or vomiting., Disp: 20 tablet, Rfl: 0  •  topiramate (TOPAMAX) 50 MG tablet, Take 1 tablet by mouth daily., Disp: , Rfl:   •  traMADol (ULTRAM) 50 MG tablet, TAKE 2 TABLETS BY MOUTH TWICE DAILY, Disp: 60 tablet, Rfl: 0  •  traZODone (DESYREL) 150 MG tablet, Take 3 tablets by mouth 3 (Three) Times a Day As Needed., Disp: , Rfl: 1    Current Facility-Administered Medications:   •  methylPREDNISolone acetate (DEPO-medrol) injection 40 mg, 40 mg, Intramuscular, Once, DANELLE Polanco     [facility]  The following portions of the patient's history were reviewed and updated as appropriate: allergies, current medications, past family history, past medical history, past social history, past surgical history and problem list.    Review of Systems   Constitutional: Positive for fatigue and fever. Negative for activity change and unexpected weight change.   HENT: Positive for congestion, postnasal drip and sore throat. Negative for ear pain.    Eyes: Negative for pain and discharge.   Respiratory: Positive for cough. Negative for chest tightness, shortness of breath and wheezing.    Cardiovascular: Negative for chest  "pain and palpitations.   Gastrointestinal: Negative for abdominal pain, diarrhea and vomiting.   Endocrine: Negative.    Genitourinary: Negative.    Musculoskeletal: Negative for joint swelling.   Skin: Negative for color change, rash and wound.   Allergic/Immunologic: Negative.    Neurological: Negative for seizures and syncope.   Psychiatric/Behavioral: Negative.        Objective   Visit Vitals   • /68   • Pulse 85   • Temp 99.6 °F (37.6 °C)   • Ht 62\" (157.5 cm)   • SpO2 99%       Physical Exam   Constitutional: She appears well-developed and well-nourished.   HENT:   Head: Normocephalic.   Right Ear: Hearing, tympanic membrane, external ear and ear canal normal.   Left Ear: Hearing, tympanic membrane, external ear and ear canal normal.   Nose: Nose normal.   Mouth/Throat: Oropharynx is clear and moist.   Eyes: Conjunctivae are normal. Pupils are equal, round, and reactive to light.   Neck: Normal range of motion.   Cardiovascular: Normal rate, regular rhythm and normal heart sounds.    Pulmonary/Chest: Effort normal and breath sounds normal. She has no decreased breath sounds. She has no wheezes. She has no rhonchi. She has no rales.   Musculoskeletal: Normal range of motion.   Neurological: She is alert.   Skin: Skin is warm and dry.   Psychiatric: She has a normal mood and affect. Her behavior is normal.   Nursing note and vitals reviewed.      Results for orders placed or performed in visit on 02/21/17   TSH   Result Value Ref Range    TSH 1.348 0.350 - 5.350 mIU/mL   Vitamin B12   Result Value Ref Range    Vitamin B-12 308 211 - 911 pg/mL   CBC (No Diff)   Result Value Ref Range    WBC 5.84 3.50 - 10.80 10*3/mm3    RBC 4.81 3.89 - 5.14 10*6/mm3    Hemoglobin 13.3 11.5 - 15.5 g/dL    Hematocrit 41.7 34.5 - 44.0 %    MCV 86.7 80.0 - 99.0 fL    MCH 27.7 27.0 - 31.0 pg    MCHC 31.9 (L) 32.0 - 36.0 g/dL    RDW 13.5 11.3 - 14.5 %    RDW-SD 42.9 37.0 - 54.0 fl    MPV 10.5 6.0 - 12.0 fL    Platelets 292 150 - " 450 10*3/mm3   Comprehensive Metabolic Panel   Result Value Ref Range    Glucose 95 70 - 100 mg/dL    BUN 19 9 - 23 mg/dL    Creatinine 0.80 0.60 - 1.30 mg/dL    Sodium 140 132 - 146 mmol/L    Potassium 4.3 3.5 - 5.5 mmol/L    Chloride 108 99 - 109 mmol/L    CO2 26.0 20.0 - 31.0 mmol/L    Calcium 9.9 8.7 - 10.4 mg/dL    Total Protein 7.5 5.7 - 8.2 g/dL    Albumin 4.60 3.20 - 4.80 g/dL    ALT (SGPT) 31 7 - 40 U/L    AST (SGOT) 26 0 - 33 U/L    Alkaline Phosphatase 77 25 - 100 U/L    Total Bilirubin 0.2 (L) 0.3 - 1.2 mg/dL    eGFR Non African Amer 82 >60 mL/min/1.73    Globulin 2.9 gm/dL    A/G Ratio 1.6 1.5 - 2.5 g/dL    BUN/Creatinine Ratio 23.8 7.0 - 25.0    Anion Gap 6.0 3.0 - 11.0 mmol/L   POCT Influenza A/B   Result Value Ref Range    Rapid Influenza A Ag negative     Rapid Influenza B Ag negative     Internal Control Passed Passed    Lot Number [id number]     Expiration Date 04/01/2017         ASSESSMENT/PLAN    Problem List Items Addressed This Visit        Nervous and Auditory    Right upper quadrant pain    Relevant Orders    CBC (No Diff) (Completed)    Comprehensive Metabolic Panel (Completed)      Other Visit Diagnoses     Fatigue, unspecified type    -  Primary    Relevant Orders    TSH (Completed)    Vitamin B12 (Completed)    Flu-like symptoms        Likely influenza in spite of negative test. Continue symptomatic care. RTC if worsening or no improvement.    Relevant Orders    POCT Influenza A/B (Completed)               Return if symptoms worsen or fail to improve, for Next scheduled follow up.

## 2017-02-23 DIAGNOSIS — R25.2 SPASM: ICD-10-CM

## 2017-02-23 DIAGNOSIS — L02.211 ABDOMINAL WALL ABSCESS: ICD-10-CM

## 2017-02-23 NOTE — PROGRESS NOTES
Please let her know that her labs were all normal. Her B12 is on the low end of normal so she could start an OTC supplement to boost her level some. She does not need B12 shots at this time.

## 2017-02-24 DIAGNOSIS — J20.9 ACUTE BRONCHITIS, UNSPECIFIED ORGANISM: ICD-10-CM

## 2017-02-24 RX ORDER — LEVOFLOXACIN 500 MG/1
TABLET, FILM COATED ORAL
Qty: 10 TABLET | Refills: 0 | OUTPATIENT
Start: 2017-02-24

## 2017-02-24 RX ORDER — BACLOFEN 10 MG/1
TABLET ORAL
Qty: 90 TABLET | Refills: 0 | OUTPATIENT
Start: 2017-02-24 | End: 2017-04-25

## 2017-03-05 ENCOUNTER — HOSPITAL ENCOUNTER (EMERGENCY)
Facility: HOSPITAL | Age: 37
Discharge: HOME | End: 2017-03-05
Attending: EMERGENCY MEDICINE | Admitting: EMERGENCY MEDICINE

## 2017-03-05 VITALS
HEART RATE: 98 BPM | DIASTOLIC BLOOD PRESSURE: 77 MMHG | OXYGEN SATURATION: 100 % | BODY MASS INDEX: 32.85 KG/M2 | HEIGHT: 61 IN | WEIGHT: 174 LBS | RESPIRATION RATE: 20 BRPM | TEMPERATURE: 98.1 F | SYSTOLIC BLOOD PRESSURE: 127 MMHG

## 2017-03-05 DIAGNOSIS — M54.42 ACUTE LEFT-SIDED LOW BACK PAIN WITH LEFT-SIDED SCIATICA: Primary | ICD-10-CM

## 2017-03-05 PROCEDURE — 99283 EMERGENCY DEPT VISIT LOW MDM: CPT

## 2017-03-05 PROCEDURE — 63710000001 PREDNISONE PER 5 MG: Performed by: EMERGENCY MEDICINE

## 2017-03-05 RX ORDER — PREDNISONE 20 MG/1
40 TABLET ORAL DAILY
Qty: 8 TABLET | Refills: 0 | Status: SHIPPED | OUTPATIENT
Start: 2017-03-05 | End: 2017-03-22 | Stop reason: SDUPTHER

## 2017-03-05 RX ORDER — HYDROCODONE BITARTRATE AND ACETAMINOPHEN 5; 325 MG/1; MG/1
1-2 TABLET ORAL EVERY 6 HOURS PRN
Qty: 15 TABLET | Refills: 0 | Status: SHIPPED | OUTPATIENT
Start: 2017-03-05 | End: 2017-08-19

## 2017-03-05 RX ORDER — PREDNISONE 20 MG/1
60 TABLET ORAL ONCE
Status: COMPLETED | OUTPATIENT
Start: 2017-03-05 | End: 2017-03-05

## 2017-03-05 RX ORDER — CYCLOBENZAPRINE HCL 10 MG
10 TABLET ORAL ONCE
Status: COMPLETED | OUTPATIENT
Start: 2017-03-05 | End: 2017-03-05

## 2017-03-05 RX ORDER — CYCLOBENZAPRINE HCL 10 MG
10 TABLET ORAL 3 TIMES DAILY PRN
Qty: 15 TABLET | Refills: 0 | Status: SHIPPED | OUTPATIENT
Start: 2017-03-05 | End: 2018-03-15

## 2017-03-05 RX ORDER — HYDROCODONE BITARTRATE AND ACETAMINOPHEN 7.5; 325 MG/1; MG/1
1 TABLET ORAL ONCE
Status: COMPLETED | OUTPATIENT
Start: 2017-03-05 | End: 2017-03-05

## 2017-03-05 RX ADMIN — PREDNISONE 60 MG: 20 TABLET ORAL at 02:51

## 2017-03-05 RX ADMIN — HYDROCODONE BITARTRATE AND ACETAMINOPHEN 1 TABLET: 7.5; 325 TABLET ORAL at 02:51

## 2017-03-05 RX ADMIN — CYCLOBENZAPRINE HYDROCHLORIDE 10 MG: 10 TABLET, FILM COATED ORAL at 02:52

## 2017-03-05 NOTE — ED PROVIDER NOTES
Subjective   Patient is a 36 y.o. female presenting with back pain.   Back Pain   Location:  Lumbar spine  Quality:  Burning  Radiates to:  L thigh  Pain severity:  Moderate  Pain is:  Same all the time  Onset quality:  Gradual  Timing:  Constant  Progression:  Waxing and waning  Chronicity:  New  Context: not recent injury    Relieved by:  Being still  Worsened by:  Movement  Associated symptoms: leg pain and tingling    Associated symptoms: no abdominal pain, no bladder incontinence, no bowel incontinence, no perianal numbness and no weakness        Review of Systems   Gastrointestinal: Negative for abdominal pain and bowel incontinence.   Genitourinary: Negative for bladder incontinence.   Musculoskeletal: Positive for back pain.   Neurological: Positive for tingling. Negative for weakness.   All other systems reviewed and are negative.      Past Medical History   Diagnosis Date   • Abdominal pain    • Abnormal breast exam    • Abnormal Pap smear of cervix    • Achilles tendinitis    • Acute sinusitis    • Anxiety    • Arthritis    • Asthma    • Tavera's syndrome    • Bipolar 1 disorder    • Bowel trouble    • Breast cyst    • Colon polyps    • Constipation    • Depression    • Diverticulitis    • Endometriosis    • Eustachian tube dysfunction    • Female pelvic pain    • Gastric ulcer    • H/O bladder infections    • History of rashes as a child    • Low back pain    • Menopausal symptoms    • Muscle weakness    • Ovarian cyst    • PID (pelvic inflammatory disease)    • Recurrent UTI    • Sexual problems        Allergies   Allergen Reactions   • Adhesive Tape    • Biaxin [Clarithromycin] Nausea Only   • Codeine    • Latex    • Penicillins    • Sulfa Antibiotics    • Sulfate    • Ultram [Tramadol Hcl]        Past Surgical History   Procedure Laterality Date   • Breast biopsy       abnormal   • Hysterectomy     • Other surgical history       Laparoscopy (diagnostic) gynecologic with biopsy   • Nasal endoscopy      • Oophorectomy Bilateral    • Shoulder surgery     •  section         Family History   Problem Relation Age of Onset   • Liver disease Mother    • Diabetes Mother    • Hyperlipidemia Mother    • Hypertension Mother    • Thyroid disease Mother    • Arthritis Father    • Mental illness Father    • Migraines Sister    • Stroke Other    • Diabetes Other    • Hyperlipidemia Other    • Hypertension Other    • Mental illness Other    • Migraines Other    • Tuberculosis Other        Social History     Social History   • Marital status: Single     Spouse name: N/A   • Number of children: N/A   • Years of education: N/A     Social History Main Topics   • Smoking status: Former Smoker   • Smokeless tobacco: Never Used   • Alcohol use No   • Drug use: No   • Sexual activity: Defer     Other Topics Concern   • None     Social History Narrative           Objective   Physical Exam   Constitutional: She is oriented to person, place, and time. She appears well-developed and well-nourished.   HENT:   Head: Normocephalic and atraumatic.   Right Ear: External ear normal.   Left Ear: External ear normal.   Nose: Nose normal.   Mouth/Throat: Oropharynx is clear and moist.   Eyes: Conjunctivae and EOM are normal. Pupils are equal, round, and reactive to light.   Neck: Normal range of motion. Neck supple.   Cardiovascular: Normal rate, regular rhythm, normal heart sounds and intact distal pulses.    Pulmonary/Chest: Effort normal and breath sounds normal.   Abdominal: Soft. Bowel sounds are normal.   Musculoskeletal: Normal range of motion.        Lumbar back: She exhibits tenderness, pain and spasm. She exhibits normal range of motion and normal pulse.   Neurological: She is alert and oriented to person, place, and time.   Skin: Skin is warm and dry.   Psychiatric: She has a normal mood and affect. Her behavior is normal. Judgment and thought content normal.       Procedures         ED Course  ED Course   Comment By Time   Well  aware of the ss of worsening condition. All thankful and agreeable. MAGGIE Hamm 03/05 0318                  [facility]    Final diagnoses:   Acute left-sided low back pain with left-sided sciatica            MAGGIE Hamm  03/05/17 0321

## 2017-03-22 ENCOUNTER — OFFICE VISIT (OUTPATIENT)
Dept: INTERNAL MEDICINE | Facility: CLINIC | Age: 37
End: 2017-03-22

## 2017-03-22 VITALS
HEIGHT: 61 IN | SYSTOLIC BLOOD PRESSURE: 112 MMHG | DIASTOLIC BLOOD PRESSURE: 64 MMHG | HEART RATE: 91 BPM | OXYGEN SATURATION: 98 %

## 2017-03-22 DIAGNOSIS — K21.9 GASTROESOPHAGEAL REFLUX DISEASE WITHOUT ESOPHAGITIS: ICD-10-CM

## 2017-03-22 DIAGNOSIS — G89.29 CHRONIC BILATERAL LOW BACK PAIN WITHOUT SCIATICA: ICD-10-CM

## 2017-03-22 DIAGNOSIS — H69.81 ETD (EUSTACHIAN TUBE DYSFUNCTION), RIGHT: Primary | ICD-10-CM

## 2017-03-22 DIAGNOSIS — M54.50 CHRONIC BILATERAL LOW BACK PAIN WITHOUT SCIATICA: ICD-10-CM

## 2017-03-22 PROCEDURE — 99213 OFFICE O/P EST LOW 20 MIN: CPT | Performed by: HOSPITALIST

## 2017-03-22 RX ORDER — PREDNISONE 20 MG/1
20 TABLET ORAL DAILY
Qty: 8 TABLET | Refills: 0 | Status: SHIPPED | OUTPATIENT
Start: 2017-03-22 | End: 2017-06-08

## 2017-03-22 RX ORDER — FLUTICASONE PROPIONATE 0.05 %
CREAM (GRAM) TOPICAL
Refills: 6 | COMMUNITY
Start: 2017-02-24 | End: 2018-02-16

## 2017-03-22 RX ORDER — PSEUDOEPHEDRINE HYDROCHLORIDE 60 MG/1
60 TABLET, FILM COATED ORAL 2 TIMES DAILY
Qty: 20 TABLET | Refills: 1 | Status: SHIPPED | OUTPATIENT
Start: 2017-03-22 | End: 2017-04-28

## 2017-03-22 RX ORDER — ESOMEPRAZOLE MAGNESIUM 40 MG/1
CAPSULE, DELAYED RELEASE ORAL
Qty: 90 CAPSULE | Refills: 1 | Status: SHIPPED | OUTPATIENT
Start: 2017-03-22 | End: 2017-06-08

## 2017-03-22 RX ORDER — OLOPATADINE HCL 0.2 %
DROPS OPHTHALMIC (EYE)
Refills: 3 | COMMUNITY
Start: 2017-03-03 | End: 2020-06-13

## 2017-03-22 RX ORDER — TRAMADOL HYDROCHLORIDE 50 MG/1
50 TABLET ORAL DAILY
Qty: 30 TABLET | Refills: 5 | Status: SHIPPED | OUTPATIENT
Start: 2017-03-22 | End: 2017-07-11 | Stop reason: DRUGHIGH

## 2017-03-22 RX ORDER — CETIRIZINE HYDROCHLORIDE 10 MG/1
1 TABLET ORAL DAILY
Refills: 1 | COMMUNITY
Start: 2017-03-01

## 2017-03-22 RX ORDER — ESOMEPRAZOLE MAGNESIUM 40 MG/1
40 CAPSULE, DELAYED RELEASE ORAL DAILY
Qty: 30 CAPSULE | Refills: 3 | Status: SHIPPED | OUTPATIENT
Start: 2017-03-22 | End: 2017-03-22 | Stop reason: SDUPTHER

## 2017-03-22 RX ORDER — TOPIRAMATE 100 MG/1
1 TABLET, FILM COATED ORAL DAILY
Refills: 3 | COMMUNITY
Start: 2017-03-16 | End: 2019-01-23

## 2017-03-22 NOTE — PROGRESS NOTES
Subjective   Megan A Day is a 37 y.o. female. Earache (right ear)         Earache    There is pain in the right ear. The current episode started more than 1 year ago. The problem has been resolved. Pertinent negatives include no abdominal pain or coughing. She has tried antibiotics for the symptoms.       The following portions of the patient's history were reviewed and updated as appropriate: allergies, current medications, past family history, past medical history, past social history, past surgical history and problem list.    Review of Systems   HENT: Positive for ear pain.    Respiratory: Negative for cough.    Gastrointestinal: Negative for abdominal pain.       Objective   Vitals:    03/22/17 0959   BP: 112/64   Pulse: 91   SpO2: 98%       Physical Exam   Constitutional: She appears well-developed and well-nourished.   HENT:   Head: Normocephalic and atraumatic.   Right Ear: External ear normal.   Left Ear: External ear normal.   Eyes: Conjunctivae and EOM are normal. Pupils are equal, round, and reactive to light.   Neck: Normal range of motion. Neck supple.   Cardiovascular: Normal rate, regular rhythm and normal heart sounds.    Pulmonary/Chest: Effort normal and breath sounds normal.       Assessment/Plan   Megan was seen today for earache.    Diagnoses and all orders for this visit:    ETD (eustachian tube dysfunction), right  -     pseudoephedrine (SUDAFED) 60 MG tablet; Take 1 tablet by mouth 2 (Two) Times a Day.  -     predniSONE (DELTASONE) 20 MG tablet; Take 1 tablet by mouth Daily.    Chronic bilateral low back pain without sciatica  -     traMADol (ULTRAM) 50 MG tablet; Take 1 tablet by mouth Daily.    Gastroesophageal reflux disease without esophagitis  -     Discontinue: esomeprazole (nexIUM) 40 MG capsule; Take 1 capsule by mouth Daily.      oif no improvement with this treatment will do an ENT referral  Results for orders placed or performed in visit on 02/21/17   TSH   Result Value Ref  Range    TSH 1.348 0.350 - 5.350 mIU/mL   Vitamin B12   Result Value Ref Range    Vitamin B-12 308 211 - 911 pg/mL   CBC (No Diff)   Result Value Ref Range    WBC 5.84 3.50 - 10.80 10*3/mm3    RBC 4.81 3.89 - 5.14 10*6/mm3    Hemoglobin 13.3 11.5 - 15.5 g/dL    Hematocrit 41.7 34.5 - 44.0 %    MCV 86.7 80.0 - 99.0 fL    MCH 27.7 27.0 - 31.0 pg    MCHC 31.9 (L) 32.0 - 36.0 g/dL    RDW 13.5 11.3 - 14.5 %    RDW-SD 42.9 37.0 - 54.0 fl    MPV 10.5 6.0 - 12.0 fL    Platelets 292 150 - 450 10*3/mm3   Comprehensive Metabolic Panel   Result Value Ref Range    Glucose 95 70 - 100 mg/dL    BUN 19 9 - 23 mg/dL    Creatinine 0.80 0.60 - 1.30 mg/dL    Sodium 140 132 - 146 mmol/L    Potassium 4.3 3.5 - 5.5 mmol/L    Chloride 108 99 - 109 mmol/L    CO2 26.0 20.0 - 31.0 mmol/L    Calcium 9.9 8.7 - 10.4 mg/dL    Total Protein 7.5 5.7 - 8.2 g/dL    Albumin 4.60 3.20 - 4.80 g/dL    ALT (SGPT) 31 7 - 40 U/L    AST (SGOT) 26 0 - 33 U/L    Alkaline Phosphatase 77 25 - 100 U/L    Total Bilirubin 0.2 (L) 0.3 - 1.2 mg/dL    eGFR Non African Amer 82 >60 mL/min/1.73    Globulin 2.9 gm/dL    A/G Ratio 1.6 1.5 - 2.5 g/dL    BUN/Creatinine Ratio 23.8 7.0 - 25.0    Anion Gap 6.0 3.0 - 11.0 mmol/L   POCT Influenza A/B   Result Value Ref Range    Rapid Influenza A Ag negative     Rapid Influenza B Ag negative     Internal Control Passed Passed    Lot Number [id number]     Expiration Date 04/01/2017

## 2017-03-24 ENCOUNTER — TELEPHONE (OUTPATIENT)
Dept: INTERNAL MEDICINE | Facility: CLINIC | Age: 37
End: 2017-03-24

## 2017-03-24 DIAGNOSIS — H69.83 ETD (EUSTACHIAN TUBE DYSFUNCTION), BILATERAL: Primary | ICD-10-CM

## 2017-03-24 NOTE — TELEPHONE ENCOUNTER
----- Message from Shania Ram sent at 3/24/2017  9:54 AM EDT -----  MELODY ACOSTA FROM [facility] IS WORKING WITH THE PATIENT ON HER SHORT TERM CASE MANAGEMENT AND HE STATES THAT OF DR FELIPE WOULD LIKE FOR THEM TO TOUCH BASE ON THERE END WITH THE PATIENT ABOUT ANYTHING? FEEL FREE TO CALL HIM. HE ALSO WOULD LIKE FOR DR FELIPE TO KNOW THAT SHE HAS REQUESTED A HANDICAP PARKING PASS AND HE HAS BEEN HELPING HER WITH THAT AS WELL. MELODY'S CALL BACK NUMBER [phone]

## 2017-03-29 ENCOUNTER — TELEPHONE (OUTPATIENT)
Dept: INTERNAL MEDICINE | Facility: CLINIC | Age: 37
End: 2017-03-29

## 2017-03-29 NOTE — TELEPHONE ENCOUNTER
----- Message from Aminta Slaughter MD sent at 3/28/2017  6:51 PM EDT -----  Contact: PATIENT  Referral placed  ----- Message -----     From: Sugey Ovalles MA     Sent: 3/23/2017   3:10 PM       To: Aminta Slaughter MD        ----- Message -----     From: Deanna Todd     Sent: 3/23/2017   2:58 PM       To: Nan Parker MA    PATIENT SAW DR. SLAUGHTER YESTERDAY FOR EAR ISSUES. PATIENT SAID DR. SLAUGHTER TOLD HER SHE WAS GOING TO SET UP A REFERRAL FOR PATIENT TO GO TO AN ENT DOCTOR. PATIENT CALLED THIS MORNING STATING THIS INFORMATION ABOUT REFERRAL WE NOTICED THERE IS NO REFERRAL IN HER CHART. [facility] SPEECH PHONE NUMBER [phone] TO SCHEDULE AN APPOINTMENT AND THEY TOLD HER SHE NEEDS A REFERRAL FROM HER PRIMARY CARE DOCTOR. SHE WANTS TO KNOW CAN WE GET HER A REFERRAL TO GO SEE AN ENT. IF [facility] HAS ENT DOCTOR SHE WOULD BE OK TO SEE A [facility] PROVIDER. IF [facility] DOES NOT SHE IS WILLING TO SEE DR. RUEDA. YOU CAN REACH PATIENT BACK [phone]. PATIENT HAS BAD PAIN IN HER EAR MAKING HER NAUSEOUS AND DIZZY AND WANTS THIS APPOINTMENT AS SOON AS POSSIBLE.

## 2017-04-24 ENCOUNTER — HOSPITAL ENCOUNTER (OUTPATIENT)
Dept: GENERAL RADIOLOGY | Facility: HOSPITAL | Age: 37
Discharge: HOME | End: 2017-04-24
Admitting: NURSE PRACTITIONER

## 2017-04-24 ENCOUNTER — TRANSCRIBE ORDERS (OUTPATIENT)
Dept: ADMINISTRATIVE | Facility: HOSPITAL | Age: 37
End: 2017-04-24

## 2017-04-24 DIAGNOSIS — J45.20 MILD INTERMITTENT ASTHMA IN ADULT WITHOUT COMPLICATION: Primary | ICD-10-CM

## 2017-04-24 DIAGNOSIS — R05.9 COUGH: ICD-10-CM

## 2017-04-24 PROCEDURE — 71020 HC CHEST PA AND LATERAL: CPT

## 2017-04-27 ENCOUNTER — TRANSCRIBE ORDERS (OUTPATIENT)
Dept: INTERNAL MEDICINE | Facility: CLINIC | Age: 37
End: 2017-04-27

## 2017-04-28 ENCOUNTER — OFFICE VISIT (OUTPATIENT)
Dept: INTERNAL MEDICINE | Facility: CLINIC | Age: 37
End: 2017-04-28

## 2017-04-28 VITALS
SYSTOLIC BLOOD PRESSURE: 110 MMHG | BODY MASS INDEX: 33.49 KG/M2 | HEIGHT: 61 IN | HEART RATE: 96 BPM | DIASTOLIC BLOOD PRESSURE: 72 MMHG | WEIGHT: 177.4 LBS | OXYGEN SATURATION: 98 %

## 2017-04-28 DIAGNOSIS — N76.0 VULVOVAGINITIS: ICD-10-CM

## 2017-04-28 DIAGNOSIS — N89.8 VAGINAL DISCHARGE: ICD-10-CM

## 2017-04-28 DIAGNOSIS — N89.8 VAGINAL ITCHING: Primary | ICD-10-CM

## 2017-04-28 PROCEDURE — 99213 OFFICE O/P EST LOW 20 MIN: CPT | Performed by: NURSE PRACTITIONER

## 2017-04-28 PROCEDURE — 87798 DETECT AGENT NOS DNA AMP: CPT | Performed by: NURSE PRACTITIONER

## 2017-04-28 PROCEDURE — 87661 TRICHOMONAS VAGINALIS AMPLIF: CPT | Performed by: NURSE PRACTITIONER

## 2017-04-28 PROCEDURE — 87591 N.GONORRHOEAE DNA AMP PROB: CPT | Performed by: NURSE PRACTITIONER

## 2017-04-28 PROCEDURE — 87481 CANDIDA DNA AMP PROBE: CPT | Performed by: NURSE PRACTITIONER

## 2017-04-28 PROCEDURE — 87491 CHLMYD TRACH DNA AMP PROBE: CPT | Performed by: NURSE PRACTITIONER

## 2017-04-28 RX ORDER — DEXAMETHASONE 4 MG/1
TABLET ORAL
Refills: 3 | COMMUNITY
Start: 2017-04-21 | End: 2017-05-01

## 2017-04-28 RX ORDER — IMIPRAMINE HYDROCHLORIDE 25 MG/1
TABLET ORAL
Refills: 1 | COMMUNITY
Start: 2017-04-21 | End: 2018-05-15

## 2017-04-28 RX ORDER — ALBUTEROL SULFATE 2.5 MG/3ML
SOLUTION RESPIRATORY (INHALATION)
Refills: 3 | COMMUNITY
Start: 2017-04-21 | End: 2018-06-29 | Stop reason: SDUPTHER

## 2017-04-28 RX ORDER — LIDOCAINE 50 MG/G
OINTMENT TOPICAL AS NEEDED
COMMUNITY
Start: 2017-03-30 | End: 2020-06-13

## 2017-04-28 RX ORDER — METRONIDAZOLE 7.5 MG/G
GEL VAGINAL
Refills: 0 | COMMUNITY
Start: 2017-04-02 | End: 2017-05-18

## 2017-04-28 NOTE — PROGRESS NOTES
"Subjective  Vaginitis (itching and discharge, has taken Diflucan but still persisting )      Crystal A Day is a 37 y.o. female.   Allergies   Allergen Reactions   • Adhesive Tape    • Biaxin [Clarithromycin] Nausea Only   • Codeine    • Latex    • Penicillins    • Sulfa Antibiotics    • Sulfate    • Ultram [Tramadol Hcl]      History of Present Illness      Discharge from vagina and itching , went to see NP and was given diflucan and told to use otc meds which she did and is no better, has been going on for a month and is getting worse, discharge is clear and more than normal   The following portions of the patient's history were reviewed and updated as appropriate: allergies, current medications, past family history, past medical history, past social history, past surgical history and problem list.    Review of Systems   Genitourinary: Positive for vaginal discharge.        Vaginal itching   All other systems reviewed and are negative.      Objective   Physical Exam   Constitutional: She is oriented to person, place, and time. She appears well-developed and well-nourished.   Genitourinary: Vaginal discharge found.   Genitourinary Comments: Scant amt white thin discharge   Neurological: She is alert and oriented to person, place, and time.   Skin: Skin is warm and dry.   Psychiatric: She has a normal mood and affect. Her behavior is normal. Judgment and thought content normal.   Nursing note and vitals reviewed.    /72  Pulse 96  Ht 61\" (154.9 cm)  Wt 177 lb 6.4 oz (80.5 kg)  SpO2 98%  BMI 33.52 kg/m2    Assessment/Plan     Problem List Items Addressed This Visit     None      Visit Diagnoses     Vaginal itching    -  Primary    Relevant Medications    lidocaine (XYLOCAINE) 5 % ointment    Other Relevant Orders    NuSwab VG+    Vaginal discharge        Relevant Orders    NuSwab VG+    Vulvovaginitis        Relevant Orders    NuSwab VG+          Will send nuswab      "

## 2017-05-01 ENCOUNTER — OFFICE VISIT (OUTPATIENT)
Dept: INTERNAL MEDICINE | Facility: CLINIC | Age: 37
End: 2017-05-01

## 2017-05-01 VITALS
HEART RATE: 112 BPM | SYSTOLIC BLOOD PRESSURE: 122 MMHG | WEIGHT: 177 LBS | OXYGEN SATURATION: 98 % | BODY MASS INDEX: 33.42 KG/M2 | HEIGHT: 61 IN | DIASTOLIC BLOOD PRESSURE: 80 MMHG

## 2017-05-01 DIAGNOSIS — J45.30 MILD PERSISTENT ASTHMA WITHOUT COMPLICATION: Primary | ICD-10-CM

## 2017-05-01 DIAGNOSIS — R05.9 COUGH: ICD-10-CM

## 2017-05-01 PROCEDURE — 99213 OFFICE O/P EST LOW 20 MIN: CPT | Performed by: NURSE PRACTITIONER

## 2017-05-01 RX ORDER — DEXTROMETHORPHAN HYDROBROMIDE AND PROMETHAZINE HYDROCHLORIDE 15; 6.25 MG/5ML; MG/5ML
SYRUP ORAL
Refills: 0 | COMMUNITY
Start: 2017-04-26 | End: 2017-05-18

## 2017-05-01 RX ORDER — AZITHROMYCIN 250 MG/1
TABLET, FILM COATED ORAL
Refills: 0 | COMMUNITY
Start: 2017-04-26 | End: 2017-05-18

## 2017-05-02 LAB
A VAGINAE DNA VAG QL NAA+PROBE: NORMAL SCORE
BVAB2 DNA VAG QL NAA+PROBE: NORMAL SCORE
C ALBICANS DNA VAG QL NAA+PROBE: NEGATIVE
C GLABRATA DNA VAG QL NAA+PROBE: NEGATIVE
C TRACH RRNA SPEC DONR QL NAA+PROBE: NEGATIVE
MEGASPHAERA 1: NORMAL SCORE
N GONORRHOEA DNA SPEC QL NAA+PROBE: NEGATIVE
T VAGINALIS RRNA GENITAL QL PROBE: NEGATIVE

## 2017-05-08 ENCOUNTER — HOSPITAL ENCOUNTER (EMERGENCY)
Facility: HOSPITAL | Age: 37
Discharge: HOME | End: 2017-05-09
Attending: EMERGENCY MEDICINE | Admitting: EMERGENCY MEDICINE

## 2017-05-08 ENCOUNTER — APPOINTMENT (OUTPATIENT)
Dept: GENERAL RADIOLOGY | Facility: HOSPITAL | Age: 37
End: 2017-05-08

## 2017-05-08 ENCOUNTER — APPOINTMENT (OUTPATIENT)
Dept: CT IMAGING | Facility: HOSPITAL | Age: 37
End: 2017-05-08

## 2017-05-08 DIAGNOSIS — R06.02 SHORTNESS OF BREATH: ICD-10-CM

## 2017-05-08 DIAGNOSIS — R07.89 ATYPICAL CHEST PAIN: Primary | ICD-10-CM

## 2017-05-08 LAB
ALBUMIN SERPL-MCNC: 4.4 G/DL (ref 3.2–4.8)
ALBUMIN/GLOB SERPL: 1.5 G/DL (ref 1.5–2.5)
ALP SERPL-CCNC: 84 U/L (ref 25–100)
ALT SERPL W P-5'-P-CCNC: 25 U/L (ref 7–40)
ANION GAP SERPL CALCULATED.3IONS-SCNC: 11 MMOL/L (ref 3–11)
AST SERPL-CCNC: 20 U/L (ref 0–33)
BASOPHILS # BLD AUTO: 0.03 10*3/MM3 (ref 0–0.2)
BASOPHILS NFR BLD AUTO: 0.4 % (ref 0–1)
BILIRUB SERPL-MCNC: 0.2 MG/DL (ref 0.3–1.2)
BNP SERPL-MCNC: <2 PG/ML (ref 0–100)
BUN BLD-MCNC: 12 MG/DL (ref 9–23)
BUN/CREAT SERPL: 15 (ref 7–25)
CALCIUM SPEC-SCNC: 9.8 MG/DL (ref 8.7–10.4)
CHLORIDE SERPL-SCNC: 106 MMOL/L (ref 99–109)
CO2 SERPL-SCNC: 22 MMOL/L (ref 20–31)
CREAT BLD-MCNC: 0.8 MG/DL (ref 0.6–1.3)
DEPRECATED RDW RBC AUTO: 44.5 FL (ref 37–54)
EOSINOPHIL # BLD AUTO: 0.28 10*3/MM3 (ref 0.1–0.3)
EOSINOPHIL NFR BLD AUTO: 3.4 % (ref 0–3)
ERYTHROCYTE [DISTWIDTH] IN BLOOD BY AUTOMATED COUNT: 13.6 % (ref 11.3–14.5)
GFR SERPL CREATININE-BSD FRML MDRD: 82 ML/MIN/1.73
GLOBULIN UR ELPH-MCNC: 3 GM/DL
GLUCOSE BLD-MCNC: 110 MG/DL (ref 70–100)
HCT VFR BLD AUTO: 42.3 % (ref 34.5–44)
HGB BLD-MCNC: 13.3 G/DL (ref 11.5–15.5)
HOLD SPECIMEN: NORMAL
HOLD SPECIMEN: NORMAL
IMM GRANULOCYTES # BLD: 0.01 10*3/MM3 (ref 0–0.03)
IMM GRANULOCYTES NFR BLD: 0.1 % (ref 0–0.6)
LIPASE SERPL-CCNC: 47 U/L (ref 6–51)
LYMPHOCYTES # BLD AUTO: 2.65 10*3/MM3 (ref 0.6–4.8)
LYMPHOCYTES NFR BLD AUTO: 32.1 % (ref 24–44)
MCH RBC QN AUTO: 27.9 PG (ref 27–31)
MCHC RBC AUTO-ENTMCNC: 31.4 G/DL (ref 32–36)
MCV RBC AUTO: 88.9 FL (ref 80–99)
MONOCYTES # BLD AUTO: 0.51 10*3/MM3 (ref 0–1)
MONOCYTES NFR BLD AUTO: 6.2 % (ref 0–12)
NEUTROPHILS # BLD AUTO: 4.78 10*3/MM3 (ref 1.5–8.3)
NEUTROPHILS NFR BLD AUTO: 57.8 % (ref 41–71)
PLATELET # BLD AUTO: 300 10*3/MM3 (ref 150–450)
PMV BLD AUTO: 10 FL (ref 6–12)
POTASSIUM BLD-SCNC: 3.3 MMOL/L (ref 3.5–5.5)
PROT SERPL-MCNC: 7.4 G/DL (ref 5.7–8.2)
RBC # BLD AUTO: 4.76 10*6/MM3 (ref 3.89–5.14)
SODIUM BLD-SCNC: 139 MMOL/L (ref 132–146)
TROPONIN I SERPL-MCNC: 0 NG/ML (ref 0–0.07)
TROPONIN I SERPL-MCNC: 0 NG/ML (ref 0–0.07)
WBC NRBC COR # BLD: 8.26 10*3/MM3 (ref 3.5–10.8)
WHOLE BLOOD HOLD SPECIMEN: NORMAL
WHOLE BLOOD HOLD SPECIMEN: NORMAL

## 2017-05-08 PROCEDURE — 83690 ASSAY OF LIPASE: CPT | Performed by: EMERGENCY MEDICINE

## 2017-05-08 PROCEDURE — 0 IOPAMIDOL PER 1 ML: Performed by: EMERGENCY MEDICINE

## 2017-05-08 PROCEDURE — 84484 ASSAY OF TROPONIN QUANT: CPT

## 2017-05-08 PROCEDURE — 93005 ELECTROCARDIOGRAM TRACING: CPT

## 2017-05-08 PROCEDURE — 80053 COMPREHEN METABOLIC PANEL: CPT | Performed by: EMERGENCY MEDICINE

## 2017-05-08 PROCEDURE — 93005 ELECTROCARDIOGRAM TRACING: CPT | Performed by: EMERGENCY MEDICINE

## 2017-05-08 PROCEDURE — 85025 COMPLETE CBC W/AUTO DIFF WBC: CPT | Performed by: EMERGENCY MEDICINE

## 2017-05-08 PROCEDURE — 71275 CT ANGIOGRAPHY CHEST: CPT

## 2017-05-08 PROCEDURE — 83880 ASSAY OF NATRIURETIC PEPTIDE: CPT | Performed by: EMERGENCY MEDICINE

## 2017-05-08 PROCEDURE — 99284 EMERGENCY DEPT VISIT MOD MDM: CPT

## 2017-05-08 RX ORDER — SODIUM CHLORIDE 0.9 % (FLUSH) 0.9 %
10 SYRINGE (ML) INJECTION AS NEEDED
Status: DISCONTINUED | OUTPATIENT
Start: 2017-05-08 | End: 2017-05-09 | Stop reason: HOSPADM

## 2017-05-08 RX ORDER — ASPIRIN 81 MG/1
324 TABLET, CHEWABLE ORAL ONCE
Status: COMPLETED | OUTPATIENT
Start: 2017-05-08 | End: 2017-05-08

## 2017-05-08 RX ADMIN — ASPIRIN 81 MG CHEWABLE TABLET 324 MG: 81 TABLET CHEWABLE at 22:36

## 2017-05-08 RX ADMIN — IOPAMIDOL 80 ML: 755 INJECTION, SOLUTION INTRAVENOUS at 23:54

## 2017-05-09 VITALS
OXYGEN SATURATION: 98 % | RESPIRATION RATE: 18 BRPM | WEIGHT: 175 LBS | DIASTOLIC BLOOD PRESSURE: 76 MMHG | HEIGHT: 61 IN | BODY MASS INDEX: 33.04 KG/M2 | TEMPERATURE: 98.7 F | SYSTOLIC BLOOD PRESSURE: 112 MMHG | HEART RATE: 108 BPM

## 2017-05-11 DIAGNOSIS — R91.8 ABNORMAL CT LUNG SCREENING: Primary | ICD-10-CM

## 2017-05-18 ENCOUNTER — OFFICE VISIT (OUTPATIENT)
Dept: INTERNAL MEDICINE | Facility: CLINIC | Age: 37
End: 2017-05-18

## 2017-05-18 ENCOUNTER — OFFICE VISIT (OUTPATIENT)
Dept: PULMONOLOGY | Facility: CLINIC | Age: 37
End: 2017-05-18

## 2017-05-18 VITALS
OXYGEN SATURATION: 98 % | WEIGHT: 179.6 LBS | DIASTOLIC BLOOD PRESSURE: 76 MMHG | BODY MASS INDEX: 33.91 KG/M2 | TEMPERATURE: 98.6 F | HEART RATE: 100 BPM | SYSTOLIC BLOOD PRESSURE: 118 MMHG | HEIGHT: 61 IN | RESPIRATION RATE: 18 BRPM

## 2017-05-18 VITALS — SYSTOLIC BLOOD PRESSURE: 115 MMHG | OXYGEN SATURATION: 99 % | DIASTOLIC BLOOD PRESSURE: 70 MMHG | HEART RATE: 102 BPM

## 2017-05-18 DIAGNOSIS — J30.1 SEASONAL ALLERGIC RHINITIS DUE TO POLLEN: ICD-10-CM

## 2017-05-18 DIAGNOSIS — R06.02 SHORTNESS OF BREATH: ICD-10-CM

## 2017-05-18 DIAGNOSIS — J45.909 UNCOMPLICATED ASTHMA, UNSPECIFIED ASTHMA SEVERITY: Primary | ICD-10-CM

## 2017-05-18 DIAGNOSIS — R91.8 LUNG NODULES: ICD-10-CM

## 2017-05-18 DIAGNOSIS — L30.9 DERMATITIS: Primary | ICD-10-CM

## 2017-05-18 DIAGNOSIS — R07.89 ATYPICAL CHEST PAIN: ICD-10-CM

## 2017-05-18 DIAGNOSIS — J98.11 ATELECTASIS: ICD-10-CM

## 2017-05-18 PROCEDURE — 99204 OFFICE O/P NEW MOD 45 MIN: CPT | Performed by: INTERNAL MEDICINE

## 2017-05-18 PROCEDURE — 99213 OFFICE O/P EST LOW 20 MIN: CPT | Performed by: NURSE PRACTITIONER

## 2017-05-18 PROCEDURE — 94726 PLETHYSMOGRAPHY LUNG VOLUMES: CPT | Performed by: INTERNAL MEDICINE

## 2017-05-18 PROCEDURE — 94729 DIFFUSING CAPACITY: CPT | Performed by: INTERNAL MEDICINE

## 2017-05-18 PROCEDURE — 94375 RESPIRATORY FLOW VOLUME LOOP: CPT | Performed by: INTERNAL MEDICINE

## 2017-05-18 RX ORDER — BETAMETHASONE DIPROPIONATE 0.5 MG/G
CREAM TOPICAL
Refills: 0 | COMMUNITY
Start: 2017-05-11 | End: 2018-02-16

## 2017-05-18 RX ORDER — CLOTRIMAZOLE 1 %
CREAM (GRAM) TOPICAL
Refills: 0 | COMMUNITY
Start: 2017-05-11 | End: 2018-02-16

## 2017-05-18 RX ORDER — CEPHALEXIN 500 MG/1
CAPSULE ORAL
Refills: 0 | COMMUNITY
Start: 2017-05-10 | End: 2017-06-08

## 2017-06-08 ENCOUNTER — PROCEDURE VISIT (OUTPATIENT)
Dept: INTERNAL MEDICINE | Facility: CLINIC | Age: 37
End: 2017-06-08

## 2017-06-08 VITALS
DIASTOLIC BLOOD PRESSURE: 76 MMHG | OXYGEN SATURATION: 99 % | WEIGHT: 178 LBS | BODY MASS INDEX: 33.61 KG/M2 | SYSTOLIC BLOOD PRESSURE: 118 MMHG | HEART RATE: 94 BPM | HEIGHT: 61 IN

## 2017-06-08 DIAGNOSIS — E87.6 HYPOKALEMIA: ICD-10-CM

## 2017-06-08 DIAGNOSIS — R73.9 HYPERGLYCEMIA: ICD-10-CM

## 2017-06-08 DIAGNOSIS — K21.9 GASTROESOPHAGEAL REFLUX DISEASE WITHOUT ESOPHAGITIS: Primary | ICD-10-CM

## 2017-06-08 DIAGNOSIS — N39.46 MIXED INCONTINENCE: ICD-10-CM

## 2017-06-08 LAB
ANION GAP SERPL CALCULATED.3IONS-SCNC: 5 MMOL/L (ref 3–11)
BILIRUB BLD-MCNC: NEGATIVE MG/DL
BUN BLD-MCNC: 16 MG/DL (ref 9–23)
BUN/CREAT SERPL: 22.9 (ref 7–25)
CALCIUM SPEC-SCNC: 10.6 MG/DL (ref 8.7–10.4)
CHLORIDE SERPL-SCNC: 106 MMOL/L (ref 99–109)
CLARITY, POC: CLEAR
CO2 SERPL-SCNC: 27 MMOL/L (ref 20–31)
COLOR UR: YELLOW
CREAT BLD-MCNC: 0.7 MG/DL (ref 0.6–1.3)
GFR SERPL CREATININE-BSD FRML MDRD: 95 ML/MIN/1.73
GLUCOSE BLD-MCNC: 81 MG/DL (ref 70–100)
GLUCOSE UR STRIP-MCNC: NEGATIVE MG/DL
HBA1C MFR BLD: 5.5 %
KETONES UR QL: NEGATIVE
LEUKOCYTE EST, POC: NEGATIVE
NITRITE UR-MCNC: NEGATIVE MG/ML
PH UR: 6 [PH] (ref 5–8)
POTASSIUM BLD-SCNC: 4.1 MMOL/L (ref 3.5–5.5)
PROT UR STRIP-MCNC: NEGATIVE MG/DL
RBC # UR STRIP: NEGATIVE /UL
SODIUM BLD-SCNC: 138 MMOL/L (ref 132–146)
SP GR UR: 1.02 (ref 1–1.03)
UROBILINOGEN UR QL: NORMAL

## 2017-06-08 PROCEDURE — 80048 BASIC METABOLIC PNL TOTAL CA: CPT | Performed by: HOSPITALIST

## 2017-06-08 PROCEDURE — 99213 OFFICE O/P EST LOW 20 MIN: CPT | Performed by: HOSPITALIST

## 2017-06-08 PROCEDURE — 99395 PREV VISIT EST AGE 18-39: CPT | Performed by: HOSPITALIST

## 2017-06-08 PROCEDURE — 83036 HEMOGLOBIN GLYCOSYLATED A1C: CPT | Performed by: HOSPITALIST

## 2017-06-08 PROCEDURE — 81003 URINALYSIS AUTO W/O SCOPE: CPT | Performed by: HOSPITALIST

## 2017-06-08 RX ORDER — DEXAMETHASONE 4 MG/1
TABLET ORAL
Refills: 6 | COMMUNITY
Start: 2017-05-25 | End: 2018-02-16 | Stop reason: SDUPTHER

## 2017-06-08 NOTE — PROGRESS NOTES
Lizandro Post is a 37 y.o. female. Annual Exam (physical exam); Gynecologic Exam (pap smear and pelvic exam); Bladder Prolapse; and Urinary Incontinence         HPI Comments: She reports that her generic Nexium is not effective for her GERD symptoms. She had been on Brand name and recently her insurance stopped covering it. Now that she has tried the generic she would like to see if they will cover her brand request. She is having daily heartburn and reflux on generic Nexium. She is having stress incontinence and would like to see about fixing the problem.      The following portions of the patient's history were reviewed and updated as appropriate: allergies, current medications, past family history, past medical history, past social history, past surgical history and problem list.    Review of Systems   Constitutional: Negative for activity change and appetite change.   HENT: Negative for congestion and ear discharge.    Eyes: Negative for discharge and itching.   Respiratory: Negative for apnea and chest tightness.    Cardiovascular: Negative for chest pain and leg swelling.   Endocrine: Negative for cold intolerance and heat intolerance.   Genitourinary: Negative for difficulty urinating and dyspareunia.       Objective   Vitals:    06/08/17 1347   BP: 118/76   Pulse: 94   SpO2: 99%   Body mass index is 33.63 kg/(m^2).    Physical Exam   Constitutional: She is oriented to person, place, and time. She appears well-developed and well-nourished.   HENT:   Head: Normocephalic and atraumatic.   Eyes: EOM are normal. Pupils are equal, round, and reactive to light.   Neck: Normal range of motion. Neck supple.   Cardiovascular: Normal rate and regular rhythm.    Pulmonary/Chest: Effort normal and breath sounds normal.   Abdominal: Soft. Bowel sounds are normal.   Genitourinary: Pelvic exam was performed with patient supine. There is no rash, tenderness or lesion on the right labia. There is no rash,  tenderness or lesion on the left labia. Cervix exhibits no motion tenderness, no discharge and no friability. Right adnexum displays no mass, no tenderness and no fullness. Left adnexum displays no mass, no tenderness and no fullness. No erythema, tenderness or bleeding in the vagina. No foreign body in the vagina. No signs of injury around the vagina. No vaginal discharge found.   Neurological: She is alert and oriented to person, place, and time. She has normal reflexes.   Skin: Skin is warm and dry.   Psychiatric: She has a normal mood and affect. Her behavior is normal. Judgment and thought content normal.   Pap smear obtained    Assessment/Plan   Megan was seen today for annual exam, gynecologic exam, bladder prolapse and urinary incontinence.    Diagnoses and all orders for this visit:    Gastroesophageal reflux disease without esophagitis  -     NEXIUM 40 MG capsule; Take 1 capsule by mouth Every Morning Before Breakfast.    Mixed incontinence  -     Ambulatory Referral to Urology  -     POCT urinalysis dipstick, automated    Hypokalemia  -     Basic metabolic panel    Hyperglycemia  -     POC Glycosylated Hemoglobin (Hb A1C)      She was advised to eat 5 servings of fruits and vegetables daily and to exercise 3-5 times a week. 20 lbs weight loss recommended. Avoid sweets and high fat foods.    Results for orders placed or performed in visit on 06/08/17   Basic metabolic panel   Result Value Ref Range    Glucose 81 70 - 100 mg/dL    BUN 16 9 - 23 mg/dL    Creatinine 0.70 0.60 - 1.30 mg/dL    Sodium 138 132 - 146 mmol/L    Potassium 4.1 3.5 - 5.5 mmol/L    Chloride 106 99 - 109 mmol/L    CO2 27.0 20.0 - 31.0 mmol/L    Calcium 10.6 (H) 8.7 - 10.4 mg/dL    eGFR Non African Amer 95 >60 mL/min/1.73    BUN/Creatinine Ratio 22.9 7.0 - 25.0    Anion Gap 5.0 3.0 - 11.0 mmol/L   POC Glycosylated Hemoglobin (Hb A1C)   Result Value Ref Range    Hemoglobin A1C 5.5 %   POCT urinalysis dipstick, automated   Result Value  Ref Range    Color Yellow Yellow, Straw, Dark Yellow, Janie    Clarity, UA Clear Clear    Glucose, UA Negative Negative, 1000 mg/dL (3+) mg/dL    Bilirubin Negative Negative    Ketones, UA Negative Negative    Specific Gravity  1.025 1.005 - 1.030    Blood, UA Negative Negative    pH, Urine 6.0 5.0 - 8.0    Protein, POC Negative Negative mg/dL    Urobilinogen, UA Normal Normal    Leukocytes Negative Negative    Nitrite, UA Negative Negative

## 2017-06-14 ENCOUNTER — TELEPHONE (OUTPATIENT)
Dept: ENDOCRINOLOGY | Facility: CLINIC | Age: 37
End: 2017-06-14

## 2017-06-14 DIAGNOSIS — E83.52 HYPERCALCEMIA: Primary | ICD-10-CM

## 2017-06-14 NOTE — TELEPHONE ENCOUNTER
Received Pa request for Nexium 40 mg caps, filled out Pa form given to prescriber for signing prior to faxing.     Also received PA request for Dulera, submitted PA via [email or web address], awaiting response.

## 2017-06-15 ENCOUNTER — TELEPHONE (OUTPATIENT)
Dept: INTERNAL MEDICINE | Facility: CLINIC | Age: 37
End: 2017-06-15

## 2017-06-16 ENCOUNTER — TELEPHONE (OUTPATIENT)
Dept: INTERNAL MEDICINE | Facility: CLINIC | Age: 37
End: 2017-06-16

## 2017-07-11 ENCOUNTER — OFFICE VISIT (OUTPATIENT)
Dept: INTERNAL MEDICINE | Facility: CLINIC | Age: 37
End: 2017-07-11

## 2017-07-11 VITALS
SYSTOLIC BLOOD PRESSURE: 110 MMHG | DIASTOLIC BLOOD PRESSURE: 80 MMHG | WEIGHT: 178.8 LBS | BODY MASS INDEX: 33.76 KG/M2 | HEIGHT: 61 IN | HEART RATE: 99 BPM | OXYGEN SATURATION: 99 %

## 2017-07-11 DIAGNOSIS — M51.26 HERNIATED LUMBAR INTERVERTEBRAL DISC: Primary | ICD-10-CM

## 2017-07-11 PROCEDURE — 99213 OFFICE O/P EST LOW 20 MIN: CPT | Performed by: NURSE PRACTITIONER

## 2017-07-11 RX ORDER — TRAMADOL HYDROCHLORIDE 50 MG/1
50 TABLET ORAL EVERY 8 HOURS PRN
Qty: 90 TABLET | Refills: 0 | Status: SHIPPED | OUTPATIENT
Start: 2017-07-11 | End: 2017-08-17 | Stop reason: SDUPTHER

## 2017-07-11 NOTE — PROGRESS NOTES
"Subjective  Back Pain (wanting to go to PT)      Crystal A Day is a 37 y.o. female.   Allergies   Allergen Reactions   • Adhesive Tape Hives   • Biaxin [Clarithromycin] Nausea Only   • Codeine Itching   • Latex Hives   • Penicillins Nausea Only   • Sulfa Antibiotics Hives   • Sulfate Hives     History of Present Illness      Wanting to go to PT for chronic back pain, would like to up her tramadol the 50 mg once a day , not working well on the back pain   The following portions of the patient's history were reviewed and updated as appropriate: allergies, current medications, past family history, past medical history, past social history, past surgical history and problem list.    Review of Systems   Musculoskeletal: Positive for back pain.   All other systems reviewed and are negative.      Objective   Physical Exam   Constitutional: She is oriented to person, place, and time. She appears well-developed and well-nourished.   HENT:   Head: Normocephalic and atraumatic.   Eyes: Conjunctivae are normal.   Cardiovascular: Normal rate.    Pulmonary/Chest: Effort normal.   Musculoskeletal: She exhibits tenderness.        Lumbar back: She exhibits tenderness, bony tenderness and pain.   Neurological: She is alert and oriented to person, place, and time.   Skin: Skin is warm and dry.   Psychiatric: She has a normal mood and affect. Her behavior is normal. Judgment and thought content normal.   Nursing note and vitals reviewed.    /80  Pulse 99  Ht 60.5\" (153.7 cm)  Wt 178 lb 12.8 oz (81.1 kg)  SpO2 99% Comment: ra  BMI 34.34 kg/m2    Assessment/Plan     Problem List Items Addressed This Visit        Musculoskeletal and Integument    Herniated lumbar intervertebral disc - Primary    Relevant Medications    traMADol (ULTRAM) 50 MG tablet          Order for PT given      "

## 2017-07-14 DIAGNOSIS — N95.9 POST MENOPAUSAL PROBLEMS: ICD-10-CM

## 2017-07-14 RX ORDER — ESTROGENS, CONJUGATED 1.25 MG
TABLET ORAL
Qty: 30 TABLET | Refills: 5 | Status: SHIPPED | OUTPATIENT
Start: 2017-07-14 | End: 2018-06-07 | Stop reason: SDUPTHER

## 2017-07-24 ENCOUNTER — OFFICE VISIT (OUTPATIENT)
Dept: INTERNAL MEDICINE | Facility: CLINIC | Age: 37
End: 2017-07-24

## 2017-07-24 VITALS
WEIGHT: 177.2 LBS | HEART RATE: 74 BPM | BODY MASS INDEX: 34.04 KG/M2 | DIASTOLIC BLOOD PRESSURE: 84 MMHG | SYSTOLIC BLOOD PRESSURE: 130 MMHG | OXYGEN SATURATION: 98 %

## 2017-07-24 DIAGNOSIS — R92.8 OTHER ABNORMAL AND INCONCLUSIVE FINDINGS ON DIAGNOSTIC IMAGING OF BREAST: ICD-10-CM

## 2017-07-24 DIAGNOSIS — Z87.898 HISTORY OF ABNORMAL MAMMOGRAM: ICD-10-CM

## 2017-07-24 DIAGNOSIS — R51.9 HEADACHE, UNSPECIFIED HEADACHE TYPE: Primary | ICD-10-CM

## 2017-07-24 PROCEDURE — 99213 OFFICE O/P EST LOW 20 MIN: CPT | Performed by: PHYSICIAN ASSISTANT

## 2017-07-24 PROCEDURE — 96372 THER/PROPH/DIAG INJ SC/IM: CPT | Performed by: PHYSICIAN ASSISTANT

## 2017-07-24 RX ORDER — ZOLPIDEM TARTRATE 5 MG/1
5 TABLET ORAL NIGHTLY PRN
COMMUNITY
End: 2018-02-16

## 2017-07-24 RX ORDER — KETOROLAC TROMETHAMINE 30 MG/ML
60 INJECTION, SOLUTION INTRAMUSCULAR; INTRAVENOUS ONCE
Status: COMPLETED | OUTPATIENT
Start: 2017-07-24 | End: 2017-07-24

## 2017-07-24 RX ADMIN — KETOROLAC TROMETHAMINE 60 MG: 30 INJECTION, SOLUTION INTRAMUSCULAR; INTRAVENOUS at 13:02

## 2017-07-24 NOTE — PROGRESS NOTES
Chief Complaint   Patient presents with   • Headache     for 1 week   • Earache     right    • Fatigue       Subjective   Crystal A Day is a 37 y.o. female.       History of Present Illness     Pt has had headache for about a week, progresses to right sided neck pain, thinks it may be radiating from her ear. She has tried excedrin, ibuprofen, phenergan. Headache came on gradually. No recent nasal congestion or allergy symptoms. Head hurts in the front and back of her head. Varying degrees of intensity, worse when she is up moving around. Has some photosensitivity.    Pt needs referral for diagnostic mammogram, was told she needed follow up for diagnostic mammogram after screening mamm in 2015.      Current Outpatient Prescriptions:   •  albuterol (PROVENTIL HFA;VENTOLIN HFA) 108 (90 BASE) MCG/ACT inhaler, Inhale 2 puffs Every 6 (Six) Hours As Needed for wheezing., Disp: 1 inhaler, Rfl: 5  •  albuterol (PROVENTIL) (2.5 MG/3ML) 0.083% nebulizer solution, USE 1 VIAL PER NEBULIZER Q 4-6 H PRN, Disp: , Rfl: 3  •  betamethasone dipropionate (DIPROLENE) 0.05 % cream, APPLY A THIN FILM AA BID WITH CLOTRIMAZOLE CREAM, Disp: , Rfl: 0  •  cetirizine (zyrTEC) 10 MG tablet, Take 1 tablet by mouth Daily., Disp: , Rfl: 1  •  clotrimazole (LOTRIMIN) 1 % cream, APPLY A THIN FILM AA BID WITH BETAMETHASONE CREAM, Disp: , Rfl: 0  •  cyclobenzaprine (FLEXERIL) 10 MG tablet, Take 1 tablet by mouth 3 (Three) Times a Day As Needed for muscle spasms., Disp: 15 tablet, Rfl: 0  •  DULoxetine (CYMBALTA) 60 MG capsule, Take  by mouth., Disp: , Rfl:   •  EPIPEN 2-JAREN 0.3 MG/0.3ML solution auto-injector injection, U UTD, Disp: , Rfl: 6  •  FLOVENT  MCG/ACT inhaler, INL 2 PUFFS PO BID.  RINSE MOUTH AFTER USE, Disp: , Rfl: 6  •  fluticasone (CUTIVATE) 0.05 % cream, , Disp: , Rfl: 6  •  fluticasone (FLONASE) 50 MCG/ACT nasal spray, 2 sprays into each nostril daily., Disp: , Rfl:   •  HYDROcodone-acetaminophen (NORCO) 5-325 MG per tablet, Take  1-2 tablets by mouth Every 6 (Six) Hours As Needed for severe pain (7-10)., Disp: 15 tablet, Rfl: 0  •  lidocaine (XYLOCAINE) 5 % ointment, , Disp: , Rfl:   •  LORazepam (ATIVAN) 0.5 MG tablet, Take  by mouth., Disp: , Rfl:   •  meloxicam (MOBIC) 7.5 MG tablet, TAKE 1 TABLET BY MOUTH DAILY, Disp: 90 tablet, Rfl: 2  •  mometasone-formoterol (DULERA 200) 200-5 MCG/ACT inhaler, Inhale 1 puff Daily., Disp: 13 g, Rfl: 2  •  mupirocin (BACTROBAN) 2 % ointment, APPLY EXTERNALLY TO THE AFFECTED AREA THREE TIMES DAILY, Disp: 22 g, Rfl: 0  •  NEXIUM 40 MG capsule, Take 1 capsule by mouth Every Morning Before Breakfast., Disp: 30 capsule, Rfl: 5  •  ondansetron (ZOFRAN) 4 MG tablet, Take 2 tablets by mouth every 8 (eight) hours as needed for nausea or vomiting., Disp: 30 tablet, Rfl: 1  •  PATADAY 0.2 % solution ophthalmic solution, USE 1 DROP AEY ONCE DAILY PRN, Disp: , Rfl: 3  •  PREMARIN 1.25 MG tablet, TAKE 1 TABLET BY MOUTH DAILY AS DIRECTED, Disp: 30 tablet, Rfl: 5  •  promethazine (PHENERGAN) 25 MG tablet, Take 1 tablet by mouth every 6 (six) hours as needed for nausea or vomiting., Disp: 20 tablet, Rfl: 0  •  Spacer/Aero-Holding Chambers (AEROCHAMBER PLUS ADIEL-VU) misc, USE WITH INHALER AS DIRECTED, Disp: , Rfl: 1  •  topiramate (TOPAMAX) 100 MG tablet, Take 1 tablet by mouth Daily., Disp: , Rfl: 3  •  traMADol (ULTRAM) 50 MG tablet, Take 1 tablet by mouth Every 8 (Eight) Hours As Needed for Moderate Pain (4-6)., Disp: 90 tablet, Rfl: 0  •  zolpidem (AMBIEN) 5 MG tablet, Take 5 mg by mouth At Night As Needed for Sleep., Disp: , Rfl:     Current Facility-Administered Medications:   •  methylPREDNISolone acetate (DEPO-medrol) injection 40 mg, 40 mg, Intramuscular, Once, DANELLE Polanco     PMFSH  The following portions of the patient's history were reviewed and updated as appropriate: allergies, current medications, past family history, past medical history, past social history, past surgical history and problem  list.    Review of Systems   Constitutional: Negative for activity change, fatigue and unexpected weight change.   HENT: Negative for dental problem, ear pain, nosebleeds and sore throat.    Eyes: Negative for pain and discharge.   Respiratory: Negative for chest tightness, shortness of breath and wheezing.    Gastrointestinal: Negative for abdominal pain and blood in stool.   Endocrine: Negative.    Genitourinary: Negative for difficulty urinating and hematuria.   Musculoskeletal: Negative for joint swelling.   Skin: Negative for color change, pallor, rash and wound.   Allergic/Immunologic: Negative.    Neurological: Positive for headaches. Negative for tremors, seizures, syncope, facial asymmetry, speech difficulty and numbness.   Hematological: Negative for adenopathy.   Psychiatric/Behavioral: Negative for agitation, confusion, sleep disturbance and suicidal ideas.       Objective   /84  Pulse 74  Wt 177 lb 3.2 oz (80.4 kg)  SpO2 98% Comment: ra  BMI 34.04 kg/m2    Physical Exam   Constitutional: She appears well-developed and well-nourished.   HENT:   Head: Normocephalic.   Right Ear: Hearing, tympanic membrane, external ear and ear canal normal.   Left Ear: Hearing, tympanic membrane, external ear and ear canal normal.   Nose: Nose normal.   Mouth/Throat: Oropharynx is clear and moist.   Eyes: Conjunctivae are normal. Pupils are equal, round, and reactive to light.   Neck: Normal range of motion.   Cardiovascular: Normal rate, regular rhythm and normal heart sounds.    Pulmonary/Chest: Effort normal and breath sounds normal. She has no decreased breath sounds. She has no wheezes. She has no rhonchi. She has no rales.   Musculoskeletal: Normal range of motion.   Neurological: She is alert.   Skin: Skin is warm and dry.   Psychiatric: She has a normal mood and affect. Her behavior is normal.   Nursing note and vitals reviewed.      ASSESSMENT/PLAN    Problem List Items Addressed This Visit     None       Visit Diagnoses     Headache, unspecified headache type    -  Primary    Relevant Medications    ketorolac (TORADOL) injection 60 mg (Completed)    History of abnormal mammogram        Relevant Orders    Mammo Diagnostic Bilateral With CAD    Other abnormal and inconclusive findings on diagnostic imaging of breast         Relevant Orders    Mammo Diagnostic Bilateral With CAD               Return if symptoms worsen or fail to improve, for Next scheduled follow up.

## 2017-07-27 ENCOUNTER — TRANSCRIBE ORDERS (OUTPATIENT)
Dept: ADMINISTRATIVE | Facility: HOSPITAL | Age: 37
End: 2017-07-27

## 2017-07-27 ENCOUNTER — TELEPHONE (OUTPATIENT)
Dept: INTERNAL MEDICINE | Facility: CLINIC | Age: 37
End: 2017-07-27

## 2017-07-27 DIAGNOSIS — N63.0 BREAST MASS: Primary | ICD-10-CM

## 2017-07-27 DIAGNOSIS — N63.0 BREAST LUMP: Primary | ICD-10-CM

## 2017-07-27 DIAGNOSIS — N64.4 BREAST PAIN: ICD-10-CM

## 2017-07-27 NOTE — TELEPHONE ENCOUNTER
Received call from Deanna with Mammography department stating that she needs a new order for the mammogram, the one that was ordered on 07/24/17 is not correct, pt informed them that she has a lump in her breast and it has been bothering her therefore an order has to be Diagnostic Bilateral Mammogram specifying size of the lump, which side it is on, o'clock position and distance from the nipple, Deanna isn't sure if Christie has evaluated the lump and if is able to change the order, Christie pls advise.    Deanna's call back number [phone].

## 2017-07-27 NOTE — TELEPHONE ENCOUNTER
Called and informed pt, pt expressed understanding and scheduled her with Christie tomorrow at 1:45pm.

## 2017-07-27 NOTE — TELEPHONE ENCOUNTER
Pls see if she can come back in so I can document the size and location of the lump. She and Dr Slaughter discussed it but I did not evaluate it at her last appointment. Sorry to make her come back in!

## 2017-07-28 ENCOUNTER — OFFICE VISIT (OUTPATIENT)
Dept: INTERNAL MEDICINE | Facility: CLINIC | Age: 37
End: 2017-07-28

## 2017-07-28 VITALS
WEIGHT: 177 LBS | DIASTOLIC BLOOD PRESSURE: 70 MMHG | HEART RATE: 86 BPM | BODY MASS INDEX: 34 KG/M2 | OXYGEN SATURATION: 100 % | SYSTOLIC BLOOD PRESSURE: 112 MMHG

## 2017-07-28 DIAGNOSIS — N64.4 BREAST TENDERNESS: Primary | ICD-10-CM

## 2017-07-28 DIAGNOSIS — Z87.898 HISTORY OF ABNORMAL MAMMOGRAM: ICD-10-CM

## 2017-07-28 PROCEDURE — 99213 OFFICE O/P EST LOW 20 MIN: CPT | Performed by: PHYSICIAN ASSISTANT

## 2017-07-28 RX ORDER — TOPIRAMATE 25 MG/1
2 TABLET ORAL DAILY
Refills: 3 | COMMUNITY
Start: 2017-07-21 | End: 2018-02-16

## 2017-07-28 NOTE — PROGRESS NOTES
Chief Complaint   Patient presents with   • Breast exam for mammogram order       Subjective   Megan A Day is a 37 y.o. female.       History of Present Illness     Pt is here to document breast exam for diagnostic mammogram. She was told by Dr Slaughter that she had a cyst in lower part of left breast. Has tenderness there, also has tenderness in right medial breast.          Current Outpatient Prescriptions:   •  albuterol (PROVENTIL HFA;VENTOLIN HFA) 108 (90 BASE) MCG/ACT inhaler, Inhale 2 puffs Every 6 (Six) Hours As Needed for wheezing., Disp: 1 inhaler, Rfl: 5  •  albuterol (PROVENTIL) (2.5 MG/3ML) 0.083% nebulizer solution, USE 1 VIAL PER NEBULIZER Q 4-6 H PRN, Disp: , Rfl: 3  •  betamethasone dipropionate (DIPROLENE) 0.05 % cream, APPLY A THIN FILM AA BID WITH CLOTRIMAZOLE CREAM, Disp: , Rfl: 0  •  cetirizine (zyrTEC) 10 MG tablet, Take 1 tablet by mouth Daily., Disp: , Rfl: 1  •  clotrimazole (LOTRIMIN) 1 % cream, APPLY A THIN FILM AA BID WITH BETAMETHASONE CREAM, Disp: , Rfl: 0  •  cyclobenzaprine (FLEXERIL) 10 MG tablet, Take 1 tablet by mouth 3 (Three) Times a Day As Needed for muscle spasms., Disp: 15 tablet, Rfl: 0  •  DULoxetine (CYMBALTA) 60 MG capsule, Take  by mouth., Disp: , Rfl:   •  EPIPEN 2-JAREN 0.3 MG/0.3ML solution auto-injector injection, U UTD, Disp: , Rfl: 6  •  FLOVENT  MCG/ACT inhaler, INL 2 PUFFS PO BID.  RINSE MOUTH AFTER USE, Disp: , Rfl: 6  •  fluticasone (CUTIVATE) 0.05 % cream, , Disp: , Rfl: 6  •  fluticasone (FLONASE) 50 MCG/ACT nasal spray, 2 sprays into each nostril daily., Disp: , Rfl:   •  HYDROcodone-acetaminophen (NORCO) 5-325 MG per tablet, Take 1-2 tablets by mouth Every 6 (Six) Hours As Needed for severe pain (7-10)., Disp: 15 tablet, Rfl: 0  •  lidocaine (XYLOCAINE) 5 % ointment, , Disp: , Rfl:   •  LORazepam (ATIVAN) 0.5 MG tablet, Take  by mouth., Disp: , Rfl:   •  meloxicam (MOBIC) 7.5 MG tablet, TAKE 1 TABLET BY MOUTH DAILY, Disp: 90 tablet, Rfl: 2  •   mometasone-formoterol (DULERA 200) 200-5 MCG/ACT inhaler, Inhale 1 puff Daily., Disp: 13 g, Rfl: 2  •  mupirocin (BACTROBAN) 2 % ointment, APPLY EXTERNALLY TO THE AFFECTED AREA THREE TIMES DAILY, Disp: 22 g, Rfl: 0  •  NEXIUM 40 MG capsule, Take 1 capsule by mouth Every Morning Before Breakfast., Disp: 30 capsule, Rfl: 5  •  ondansetron (ZOFRAN) 4 MG tablet, Take 2 tablets by mouth every 8 (eight) hours as needed for nausea or vomiting., Disp: 30 tablet, Rfl: 1  •  PATADAY 0.2 % solution ophthalmic solution, USE 1 DROP AEY ONCE DAILY PRN, Disp: , Rfl: 3  •  PREMARIN 1.25 MG tablet, TAKE 1 TABLET BY MOUTH DAILY AS DIRECTED, Disp: 30 tablet, Rfl: 5  •  promethazine (PHENERGAN) 25 MG tablet, Take 1 tablet by mouth every 6 (six) hours as needed for nausea or vomiting., Disp: 20 tablet, Rfl: 0  •  Spacer/Aero-Holding Chambers (AEROCHAMBER PLUS ADIEL-VU) [facility], USE WITH INHALER AS DIRECTED, Disp: , Rfl: 1  •  topiramate (TOPAMAX) 100 MG tablet, Take 1 tablet by mouth Daily., Disp: , Rfl: 3  •  topiramate (TOPAMAX) 25 MG tablet, Take 2 tablets by mouth Daily., Disp: , Rfl: 3  •  traMADol (ULTRAM) 50 MG tablet, Take 1 tablet by mouth Every 8 (Eight) Hours As Needed for Moderate Pain (4-6)., Disp: 90 tablet, Rfl: 0  •  zolpidem (AMBIEN) 5 MG tablet, Take 5 mg by mouth At Night As Needed for Sleep., Disp: , Rfl:     Current Facility-Administered Medications:   •  methylPREDNISolone acetate (DEPO-medrol) injection 40 mg, 40 mg, Intramuscular, Once, DANELLE Polanco  The following portions of the patient's history were reviewed and updated as appropriate: allergies, current medications, past family history, past medical history, past social history, past surgical history and problem list.    Review of Systems   Constitutional: Negative for activity change, appetite change, fatigue and unexpected weight change.   Respiratory: Negative for chest tightness, shortness of breath and wheezing.    Cardiovascular: Negative for  chest pain and palpitations.   Gastrointestinal: Negative.    Musculoskeletal: Negative for arthralgias and myalgias.   Neurological: Negative for dizziness, weakness and light-headedness.   Psychiatric/Behavioral: Negative.        Objective   /70  Pulse 86  Wt 177 lb (80.3 kg)  SpO2 100%  BMI 34 kg/m2    Physical Exam   Constitutional: She is oriented to person, place, and time. She appears well-developed and well-nourished.   HENT:   Head: Normocephalic and atraumatic.   Right Ear: External ear normal.   Left Ear: External ear normal.   Eyes: Conjunctivae are normal.   Neck: Normal range of motion.   Cardiovascular: Normal rate and regular rhythm.    Pulmonary/Chest: Effort normal. Right breast exhibits tenderness (1 o'clock position; 4 inches from nipple). Right breast exhibits no inverted nipple, no mass, no nipple discharge and no skin change. Left breast exhibits tenderness (7 o'clock position, 4 inches from nipple). Left breast exhibits no inverted nipple, no mass, no nipple discharge and no skin change.   Musculoskeletal: Normal range of motion.   Neurological: She is alert and oriented to person, place, and time.   Skin: Skin is warm and dry.            ASSESSMENT/PLAN    Problem List Items Addressed This Visit     None      Visit Diagnoses     Breast tenderness    -  Primary    Relevant Orders    Mammo Diagnostic Bilateral With CAD    History of abnormal mammogram        Relevant Orders    Mammo Diagnostic Bilateral With CAD               Return if symptoms worsen or fail to improve.

## 2017-08-04 ENCOUNTER — APPOINTMENT (OUTPATIENT)
Dept: MAMMOGRAPHY | Facility: HOSPITAL | Age: 37
End: 2017-08-04

## 2017-08-08 ENCOUNTER — APPOINTMENT (OUTPATIENT)
Dept: MAMMOGRAPHY | Facility: HOSPITAL | Age: 37
End: 2017-08-08

## 2017-08-14 ENCOUNTER — OFFICE VISIT (OUTPATIENT)
Dept: INTERNAL MEDICINE | Facility: CLINIC | Age: 37
End: 2017-08-14

## 2017-08-14 VITALS — HEART RATE: 97 BPM | OXYGEN SATURATION: 99 % | DIASTOLIC BLOOD PRESSURE: 64 MMHG | SYSTOLIC BLOOD PRESSURE: 102 MMHG

## 2017-08-14 DIAGNOSIS — M79.10 MYALGIA: Primary | ICD-10-CM

## 2017-08-14 PROCEDURE — 99213 OFFICE O/P EST LOW 20 MIN: CPT | Performed by: NURSE PRACTITIONER

## 2017-08-14 RX ORDER — DOXYCYCLINE HYCLATE 100 MG
1 TABLET ORAL 2 TIMES DAILY
Refills: 10 | COMMUNITY
Start: 2017-08-08 | End: 2018-01-04

## 2017-08-14 RX ORDER — TRAZODONE HYDROCHLORIDE 150 MG/1
1 TABLET ORAL NIGHTLY
Refills: 3 | COMMUNITY
Start: 2017-07-31

## 2017-08-14 NOTE — PROGRESS NOTES
Subjective  Back and groin pain x4 days      Crystal A Day is a 37 y.o. female.   Allergies   Allergen Reactions   • Adhesive Tape Hives   • Biaxin [Clarithromycin] Nausea Only   • Codeine Itching   • Latex Hives   • Penicillins Nausea Only   • Sulfa Antibiotics Hives   • Sulfate Hives     History of Present Illness      Had dry needling done 5 days ago , now feels electrifying pains in body, pains in groin and legs , different than normal, tightness in back, has used heat/ice/muscle relaxers, tramadol and nothing has helped   The following portions of the patient's history were reviewed and updated as appropriate: allergies, current medications, past family history, past medical history, past social history, past surgical history and problem list.    Review of Systems   Musculoskeletal: Positive for arthralgias, back pain and myalgias.   All other systems reviewed and are negative.      Objective   Physical Exam   Constitutional: She is oriented to person, place, and time. She appears well-developed.   HENT:   Head: Normocephalic.   Eyes: Conjunctivae are normal.   Cardiovascular: Normal rate.    Pulmonary/Chest: Effort normal.   Neurological: She is alert and oriented to person, place, and time.   Skin: Skin is warm and dry.   Psychiatric: She has a normal mood and affect. Her behavior is normal. Judgment and thought content normal.   Nursing note and vitals reviewed.    /64  Pulse 97  SpO2 99%    Assessment/Plan     Problem List Items Addressed This Visit     None      Visit Diagnoses     Myalgia    -  Primary        Ice/heat , watchful waiting      ibuprofen prn

## 2017-08-17 DIAGNOSIS — M51.26 HERNIATED LUMBAR INTERVERTEBRAL DISC: ICD-10-CM

## 2017-08-18 RX ORDER — TRAMADOL HYDROCHLORIDE 50 MG/1
TABLET ORAL
Qty: 90 TABLET | Refills: 0 | Status: SHIPPED | OUTPATIENT
Start: 2017-08-18 | End: 2017-11-02 | Stop reason: SDUPTHER

## 2017-08-18 NOTE — TELEPHONE ENCOUNTER
Rx signed and placed up front for . Pt aware, verbalized understanding, thanked me and we ended the call.

## 2017-08-19 ENCOUNTER — APPOINTMENT (OUTPATIENT)
Dept: MRI IMAGING | Facility: HOSPITAL | Age: 37
End: 2017-08-19

## 2017-08-19 ENCOUNTER — HOSPITAL ENCOUNTER (EMERGENCY)
Facility: HOSPITAL | Age: 37
Discharge: HOME | End: 2017-08-19
Attending: EMERGENCY MEDICINE | Admitting: EMERGENCY MEDICINE

## 2017-08-19 VITALS
WEIGHT: 175 LBS | HEART RATE: 94 BPM | DIASTOLIC BLOOD PRESSURE: 80 MMHG | RESPIRATION RATE: 18 BRPM | SYSTOLIC BLOOD PRESSURE: 122 MMHG | OXYGEN SATURATION: 98 % | HEIGHT: 61 IN | TEMPERATURE: 98.2 F | BODY MASS INDEX: 33.04 KG/M2

## 2017-08-19 DIAGNOSIS — R20.2 PARESTHESIA: ICD-10-CM

## 2017-08-19 DIAGNOSIS — M54.5 LOW BACK PAIN, UNSPECIFIED BACK PAIN LATERALITY, UNSPECIFIED CHRONICITY, WITH SCIATICA PRESENCE UNSPECIFIED: Primary | ICD-10-CM

## 2017-08-19 PROCEDURE — 99283 EMERGENCY DEPT VISIT LOW MDM: CPT

## 2017-08-19 PROCEDURE — 96376 TX/PRO/DX INJ SAME DRUG ADON: CPT

## 2017-08-19 PROCEDURE — 96375 TX/PRO/DX INJ NEW DRUG ADDON: CPT

## 2017-08-19 PROCEDURE — 72148 MRI LUMBAR SPINE W/O DYE: CPT

## 2017-08-19 PROCEDURE — 93005 ELECTROCARDIOGRAM TRACING: CPT | Performed by: EMERGENCY MEDICINE

## 2017-08-19 PROCEDURE — 25010000002 HYDROMORPHONE PER 4 MG: Performed by: EMERGENCY MEDICINE

## 2017-08-19 PROCEDURE — 25010000002 LORAZEPAM PER 2 MG: Performed by: EMERGENCY MEDICINE

## 2017-08-19 PROCEDURE — 25010000002 ONDANSETRON PER 1 MG: Performed by: EMERGENCY MEDICINE

## 2017-08-19 PROCEDURE — 96374 THER/PROPH/DIAG INJ IV PUSH: CPT

## 2017-08-19 RX ORDER — ONDANSETRON 2 MG/ML
4 INJECTION INTRAMUSCULAR; INTRAVENOUS ONCE
Status: COMPLETED | OUTPATIENT
Start: 2017-08-19 | End: 2017-08-19

## 2017-08-19 RX ORDER — LORAZEPAM 2 MG/ML
1 INJECTION INTRAMUSCULAR ONCE
Status: COMPLETED | OUTPATIENT
Start: 2017-08-19 | End: 2017-08-19

## 2017-08-19 RX ORDER — HYDROCODONE BITARTRATE AND ACETAMINOPHEN 5; 325 MG/1; MG/1
1-2 TABLET ORAL EVERY 6 HOURS PRN
Qty: 15 TABLET | Refills: 0 | Status: SHIPPED | OUTPATIENT
Start: 2017-08-19 | End: 2017-09-13

## 2017-08-19 RX ORDER — SODIUM CHLORIDE 0.9 % (FLUSH) 0.9 %
10 SYRINGE (ML) INJECTION AS NEEDED
Status: DISCONTINUED | OUTPATIENT
Start: 2017-08-19 | End: 2017-08-19 | Stop reason: HOSPADM

## 2017-08-19 RX ADMIN — LORAZEPAM 1 MG: 2 INJECTION INTRAMUSCULAR; INTRAVENOUS at 16:53

## 2017-08-19 RX ADMIN — HYDROMORPHONE HYDROCHLORIDE 1 MG: 1 INJECTION, SOLUTION INTRAMUSCULAR; INTRAVENOUS; SUBCUTANEOUS at 16:52

## 2017-08-19 RX ADMIN — ONDANSETRON 4 MG: 2 INJECTION INTRAMUSCULAR; INTRAVENOUS at 16:52

## 2017-08-19 RX ADMIN — HYDROMORPHONE HYDROCHLORIDE 1 MG: 1 INJECTION, SOLUTION INTRAMUSCULAR; INTRAVENOUS; SUBCUTANEOUS at 19:23

## 2017-08-19 NOTE — DISCHARGE INSTRUCTIONS
Immediately return to the ED with any concerns or worsening symptoms.    Follow up with Christopher Viveros on Monday for re-check of rapid heart beat, back pain, and numbness.    CONTROLLED SUBSTANCE(S) EDUCATION  Controlled Substances have been prescribed by your provider to treat your medical condition and associated symptoms. Although Controlled Substances can be effective in relieving your pain or other symptoms, they may also cause serious adverse effects. It is important that you understand how to safely and appropriately take these medications.  Proper Use  1. Carefully following instructions for use, including timing of doses, whether to take the  medication with or without food, and any foods or other medications to avoid while taking the medication;  2. If you have low or impaired vision you should wear glasses when taking the medication and not take the medication in the dark;  3. You should read the prescription container label each time to confirm the dosage;  4. You should never use the medication after the expiration date;  5. You must never share the medication with others;  6. You must not take the medication with alcohol or other sedatives;  7. You should not take the medication to help you sleep;  8. You should never break, crush or chew the medication;  9. If you have been prescribed a skin patch (transdermal), external heat, fever and exertion can increase the absorption of these products, leading to potentially fatal overdose;  10. You should immediately contact the physician?s office to report any adverse reaction and,  11. It is illegal to share, sell or give away Controlled Substances.  Driving and Work Safety  1. Controlled Substances may cause sleepiness, clouded thinking, decreased concentration, slower reflexes, or incoordination, all of which may create a danger to you and others when driving or operating certain type of machinery;  2. Avoid, if possible, driving or engaging in other  potentially dangerous work or other activities, for a specific period of time until the initial effects of the Controlled Substances no longer create such dangers; and,  3. Ingesting other substances, such as alcohol, benzodiazepines or some cold remedies, at the same time you are taking the Controlled Substances prescribed or dispensed may increase cognitive and motor impairment.  Pregnancy  If you are pregnant or nursing a baby, avoid using Controlled Substances, or use them on a minimal basis in strict accordance with your provider?s instructions.  Potential for Overdose and Response  1. The use of Controlled Substances creates a risk of respiratory depression, which may result in serious harm or death. You and others should be watchful for the following warning signs of overmedication:  ? intoxicated behavior, such as confusion, slurred speech, or stumbling;  ? feeling dizzy or faint;  ? acting very drowsy or groggy;  ? unusual snoring, gasping, or snorting during sleep;  ? and/or difficulty waking up from sleep or difficulty in staying awake.  2. Immediately call ?911? or an emergency service upon you or your caregivers observing or experiencing any of the following conditions:  ? you cannot be aroused or waken, or are unable to talk after being awakened;  ? you have shortness of breath, slow or light breathing, or stopped breathing;  ? gurgling noises coming from your mouth or throat;  ? your body is limp, seems lifeless;  ? your face is pale or clammy;  ? your fingernails or lips are turning purple or blue; and/or  ? your heartbeat is slow, unusual or stopped  Safe Storage of Controlled Substances  1. If your Controlled Substances are not stored in a safe manner there is a potential that  partners, family members or others may improperly obtain your Controlled Substances;  2. Always keep the Controlled Substances in the original container;  3. Store Controlled Substances in a locked cabinet or other secure  storage unit, that is cool, dry and out of direct sunlight, such as:  ? an existing safe;  ? a cut-proof travel bag;  ? a portable lock box designed for travel; or,  ? a locking medical box.  4. Do not store Controlled Substances in:  ? an unlocked medicine cabinet;  ? in your car; or,  ? in a refrigerator or freezer unless specifically recommended by the prescriber or  pharmacist; and  5. Immediately notify your provider if any Controlled Substances prescribed or dispensed by the provider are stolen or improperly taken by another individual.  Proper Disposal  1. It is important to safely and appropriately dispose of unused Controlled Substances that had been prescribed or dispensed by your provider;  2. Promptly dispose of unused Controlled Substances after the expiration date of the  prescription or after you no longer require the Controlled Substances to treat your medical condition;  3. In order to safely dispose of Controlled Substances, you should turn in the unused Controlled Substances as part of an approved governmental drug take-back program. The [place] Office of Drug Control Policy has a listing of [place] Permanent Drug Disposal Locations at http://www.odcp.[place].gov - click on the [place] Prescriptions Drug Drop Map and Location on the left side of the page.  4. You should not flush Controlled Substances down the toilet; and,  5. You should personally remove any identifying information, including the prescription number, from an empty Controlled Substance container and then properly dispose of the empty container.  CONSENT FOR TREATMENT WITH CONTROLLED SUBSTANCE(S)  (This is for an initial prescription)  1. Controlled Substances  Controlled Substances are prescribed to treat a variety of conditions, including the relief  of chronic pain, to provide stimulation, promote weight loss, and treat mood disorders.  Pain relief is an important medical reason to take Controlled Substances.  Controlled  Substances are drugs or chemical substances whose possession and use are  regulated under the Controlled Substances Act. The law requires that patient are informed of the risks, benefits, and alternatives of taking Controlled Substances.  2. Adverse Effects  As with any medication, there are risks and adverse effects associated with the use of  Controlled Substances. Common adverse effects of pain medicines could include, but are not limited to: sedation or sleepiness, nausea, vomiting, constipation, pruritus (itching), confusion, respiratory depression, and urinary retention. Some of these effects may make it unsafe for you to drive a vehicle, operate heavy machinery, or perform other tasks that require concentration and coordination. Excessive use of these Controlled Substances can lead to profound sedation, respiratory depression, coma, and/or death. Regarding stimulants, adverse effects could include, but are not limited to: drug dependency, neuropsychiatric symptoms such as psychosis and rosales, weight loss, cardiovascular events such as heart attack and stroke, insomnia, hypertension, and agitation. Any questions you have regarding the Controlled Substance(s) should be discussed with the prescribing provider.  3. Physical Dependence, Tolerance, and Addiction  Although uncommon when used for their clinical indications, both pain relievers and  stimulants can cause physical dependence, tolerance, and/or addiction when used for a  prolonged period. Maintenance therapy with these Controlled Substances can cause  physical dependence. This means that if these medications are abruptly stopped, or  decreased significantly over a short period of time, a patient may experience withdrawal  symptoms such as: nervousness, irritability, insomnia, sweating, abdominal cramping,  nausea, vomiting, and diarrhea. Tolerance occurs when the effects of these Controlled  Substances are decreased over a period of prolonged use making  it necessary to increase the dosage. Physical dependence and tolerance are different than addiction. Addiction is a complex disease characterized by compulsive craving or seeking and use of a substance despite its extreme negatives on a person. The risk of addiction may be increased in a patient with a history of alcoholism or other addiction.  4. Alternatives  Controlled Substances are routinely prescribed to treat moderate to severe pain or other  medical conditions. Other medicines are available to treat these conditions that are not  associated with tolerance or addiction, however, are associated with a lower level of pain  relief or stimulation. It may also be an alternative to not take any medicine to treat these  conditions, or to use alternative modalities, other than medicine to treat these conditions.  I voluntarily consent to the receipt of the above-named Controlled Substance(s) as prescribed by my provider. I have been informed of the benefits, risks, and alternatives to taking these medications. I acknowledge that I have read and understood all of the information above and I have had the opportunity to ask questions and have them answered to my satisfaction.

## 2017-08-19 NOTE — ED PROVIDER NOTES
Subjective   HPI Comments: eMgan Post is a 37 y.o.female who presents to the ED with c/o worsening back pain. She reports history of sciatica and DDD with associated chronic lower back pain radiating into her lower extremities. One month ago, she lifted a woman who is a heavy deadweight. The following day, she developed worsening lower back pain which has remained constant since. She describes the pain as a shooting sensation causing associated numbness and tingling in her lower extremities. She states the pain significantly worsens with movement and ambulation. She has been seen and evaluated by her PCP for this. She has attempted using muscle relaxers, ice, heat, and dry needling sessions with minimal relief. She also c/o a new onset of vaginal numbness which developed today prompting her to the ED for evaluation. She states she has not experienced this during previous flare ups of her back pain. She also c/o loose diarrhea over the course of the last 2 weeks and a HA today but denies bowel/bladder incontinence, abdominal pain, perianal numbness, chest pain, SOA, history of cardiac problems, previous back surgeries, or any other complaints at this time.     Patient is a 37 y.o. female presenting with back pain.   History provided by:  Patient  Back Pain   Location:  Lumbar spine  Quality:  Shooting  Radiates to: BLE.  Pain severity:  Moderate  Pain is:  Same all the time  Onset quality:  Sudden  Duration:  1 month  Timing:  Constant  Progression:  Worsening  Chronicity:  Chronic  Context: lifting heavy objects    Relieved by:  Nothing  Worsened by:  Ambulation and movement  Ineffective treatments:  Cold packs, heating pad and muscle relaxants (dry needling)  Associated symptoms: headaches, leg pain, numbness (vaginal) and tingling    Associated symptoms: no abdominal pain, no bladder incontinence, no bowel incontinence, no chest pain and no perianal numbness        Review of Systems   Respiratory: Negative for  shortness of breath.    Cardiovascular: Negative for chest pain.   Gastrointestinal: Positive for diarrhea. Negative for abdominal pain and bowel incontinence.   Genitourinary: Negative for bladder incontinence.        Negative for incontinence.   Musculoskeletal: Positive for back pain.   Neurological: Positive for tingling, numbness (vaginal) and headaches.   All other systems reviewed and are negative.      Past Medical History:   Diagnosis Date   • Abdominal pain    • Abnormal breast exam    • Abnormal Pap smear of cervix    • Achilles tendinitis    • Acute sinusitis    • Anxiety    • Arthritis    • Asthma    • Tavera's syndrome    • Bipolar 1 disorder    • Bowel trouble    • Breast cyst    • Colon polyps    • Constipation    • Depression    • Diverticulitis    • Endometriosis    • Eustachian tube dysfunction    • Female pelvic pain    • Gastric ulcer    • H/O bladder infections    • History of rashes as a child    • Low back pain    • Menopausal symptoms    • Muscle weakness    • Ovarian cyst    • PID (pelvic inflammatory disease)    • Recurrent UTI    • Sexual problems        Allergies   Allergen Reactions   • Adhesive Tape Hives   • Biaxin [Clarithromycin] Nausea Only   • Codeine Itching   • Latex Hives   • Penicillins Nausea Only   • Sulfa Antibiotics Hives   • Sulfate Hives       Past Surgical History:   Procedure Laterality Date   • BREAST BIOPSY      abnormal   •  SECTION     • HYSTERECTOMY     • NASAL ENDOSCOPY     • OOPHORECTOMY Bilateral    • OTHER SURGICAL HISTORY      Laparoscopy (diagnostic) gynecologic with biopsy   • SHOULDER SURGERY         Family History   Problem Relation Age of Onset   • Liver disease Mother    • Diabetes Mother    • Hyperlipidemia Mother    • Hypertension Mother    • Thyroid disease Mother    • Arthritis Father    • Mental illness Father    • Migraines Sister    • Stroke Other    • Diabetes Other    • Hyperlipidemia Other    • Hypertension Other    • Mental illness  Other    • Migraines Other    • Tuberculosis Other        Social History     Social History   • Marital status: Single     Spouse name: N/A   • Number of children: N/A   • Years of education: N/A     Social History Main Topics   • Smoking status: Former Smoker   • Smokeless tobacco: Never Used   • Alcohol use No   • Drug use: No   • Sexual activity: Defer     Other Topics Concern   • None     Social History Narrative         Objective   Physical Exam   Constitutional: She is oriented to person, place, and time. She appears well-developed and well-nourished. No distress.   HENT:   Head: Normocephalic and atraumatic.   Nose: Nose normal.   Airway patent.   Eyes: Conjunctivae are normal. No scleral icterus.   Neck: Normal range of motion. Neck supple.   Cardiovascular: Regular rhythm, normal heart sounds and intact distal pulses.    No murmur heard.  Tachycardic.    Pulmonary/Chest: Effort normal and breath sounds normal. No respiratory distress.   Musculoskeletal: Normal range of motion. She exhibits no edema.   No bruising, swelling, or signs of infections on her back. Posterior buttock tenderness bilaterally (right greater than left).   Neurological: She is alert and oriented to person, place, and time.   Knee DTRs are 2+, 1+ right achilles, 2+ left achilles. Negative straight leg raise bilaterally.   Skin: Skin is warm and dry.   Psychiatric: She has a normal mood and affect. Her behavior is normal.   Nursing note and vitals reviewed.      Procedures         ED Course  ED Course   Comment By Time   Patient refused rectal exam. Yady Gregory 08/19 1640   JOSELYN query unsuccessful secondary to prolonged retrieval time/inoperable JOSELYN system. Tato Tinajero MD 08/19 1949     No results found for this or any previous visit (from the past 24 hour(s)).  Note: In addition to lab results from this visit, the labs listed above may include labs taken at another facility or during a different encounter within the last  "24 hours. Please correlate lab times with ED admission and discharge times for further clarification of the services performed during this visit.    MRI Lumbar Spine Without Contrast   Final Result   Abnormal     Small central disc bulge L5-S1 without significant impingement on dural sac    or nerve roots. No interval change.      THIS DOCUMENT HAS BEEN ELECTRONICALLY SIGNED BY EDELMIRA LUCIANO MD        Vitals:    08/19/17 1549 08/19/17 2005   BP: 124/82 122/80   BP Location:  Right arm   Patient Position:  Lying   Pulse: (!) 142 94   Resp: 18 18   Temp: 98.3 °F (36.8 °C) 98.2 °F (36.8 °C)   TempSrc: Oral Oral   SpO2: 98% 98%   Weight: 175 lb (79.4 kg)    Height: 61\" (154.9 cm)      Medications   sodium chloride 0.9 % flush 10 mL (not administered)   HYDROmorphone (DILAUDID) injection 1 mg (1 mg Intravenous Given 8/19/17 1652)   ondansetron (ZOFRAN) injection 4 mg (4 mg Intravenous Given 8/19/17 1652)   LORazepam (ATIVAN) injection 1 mg (1 mg Intravenous Given 8/19/17 1653)   HYDROmorphone (DILAUDID) injection 1 mg (1 mg Intravenous Given 8/19/17 1923)     ECG/EMG Results (last 24 hours)     Procedure Component Value Units Date/Time    ECG 12 Lead [[id number]] Collected:  08/19/17 1825     Updated:  08/19/17 1836    Narrative:       Test Reason : tachycardia  Blood Pressure : **/** mmHG  Vent. Rate : 118 BPM     Atrial Rate : 118 BPM     P-R Int : 138 ms          QRS Dur : 074 ms      QT Int : 286 ms       P-R-T Axes : 037 020 097 degrees     QTc Int : 400 ms    Sinus tachycardia  Nonspecific T wave abnormality  Abnormal ECG  When compared with ECG of 08-MAY-2017 23:29,  Nonspecific T wave abnormality, worse in Lateral leads  Confirmed by SILVIA COSTA MD ([id number]) on 8/19/2017 6:36:26 PM    Referred By:  IGOR           Confirmed By:SILVIA COSTA MD                        [facility]    Final diagnoses:   Low back pain, unspecified back pain laterality, unspecified chronicity, with sciatica presence unspecified   Paresthesia "       Documentation assistance provided by yrn Gregory.  Information recorded by the yrn was done at my direction and has been verified and validated by me.     Yady Gregory  08/19/17 1725       Yady Gregory  08/19/17 1726       Yady Gregory  08/19/17 1928       Yady Gregory  08/19/17 1949       Yady Gregory  08/19/17 1952       Tato Tinajero MD  08/19/17 2112

## 2017-08-21 ENCOUNTER — APPOINTMENT (OUTPATIENT)
Dept: MAMMOGRAPHY | Facility: HOSPITAL | Age: 37
End: 2017-08-21

## 2017-09-13 ENCOUNTER — OFFICE VISIT (OUTPATIENT)
Dept: INTERNAL MEDICINE | Facility: CLINIC | Age: 37
End: 2017-09-13

## 2017-09-13 DIAGNOSIS — Z87.442 HISTORY OF KIDNEY STONES: Primary | ICD-10-CM

## 2017-09-13 DIAGNOSIS — R11.0 NAUSEA: ICD-10-CM

## 2017-09-13 PROCEDURE — 99213 OFFICE O/P EST LOW 20 MIN: CPT | Performed by: NURSE PRACTITIONER

## 2017-09-13 PROCEDURE — 81003 URINALYSIS AUTO W/O SCOPE: CPT | Performed by: NURSE PRACTITIONER

## 2017-09-13 RX ORDER — HYOSCYAMINE SULFATE 0.125 MG
TABLET ORAL
Qty: 360 TABLET | Refills: 0 | OUTPATIENT
Start: 2017-09-13

## 2017-09-13 RX ORDER — HYOSCYAMINE SULFATE 0.125 MG
0.12 TABLET ORAL EVERY 6 HOURS
Qty: 24 TABLET | Refills: 0 | Status: SHIPPED | OUTPATIENT
Start: 2017-09-13 | End: 2018-02-16

## 2017-09-13 RX ORDER — PROMETHAZINE HYDROCHLORIDE 25 MG/1
25 TABLET ORAL EVERY 6 HOURS PRN
Qty: 20 TABLET | Refills: 0 | Status: SHIPPED | OUTPATIENT
Start: 2017-09-13 | End: 2018-02-16

## 2017-09-14 VITALS — DIASTOLIC BLOOD PRESSURE: 86 MMHG | SYSTOLIC BLOOD PRESSURE: 128 MMHG | HEART RATE: 76 BPM | RESPIRATION RATE: 16 BRPM

## 2017-09-14 LAB
BILIRUB BLD-MCNC: NEGATIVE MG/DL
CLARITY, POC: CLEAR
COLOR UR: YELLOW
GLUCOSE UR STRIP-MCNC: NEGATIVE MG/DL
KETONES UR QL: NEGATIVE
LEUKOCYTE EST, POC: NEGATIVE
NITRITE UR-MCNC: NEGATIVE MG/ML
PH UR: 7 [PH] (ref 5–8)
PROT UR STRIP-MCNC: NEGATIVE MG/DL
RBC # UR STRIP: NEGATIVE /UL
SP GR UR: 1.01 (ref 1–1.03)
UROBILINOGEN UR QL: NORMAL

## 2017-09-14 NOTE — PROGRESS NOTES
Chief Complaint   Patient presents with   • Blood in Urine       HPI  Crystal A Day is a 37 y.o. female who presents for follow up about results of CT scan done by urologist that showed kidney stones. When they called her said it was all clear, but then when she read report had small stones and she sometimes sees blood in urine.      [facility]  The following portions of the patient's history were reviewed and updated as appropriate: allergies, current medications, past family history, past medical history, past social history, past surgical history and problem list.    Past Medical History:   Diagnosis Date   • Abdominal pain    • Abnormal breast exam    • Abnormal Pap smear of cervix    • Achilles tendinitis    • Acute sinusitis    • Anxiety    • Arthritis    • Asthma    • Tavera's syndrome    • Bipolar 1 disorder    • Bowel trouble    • Breast cyst    • Colon polyps    • Constipation    • Depression    • Diverticulitis    • Endometriosis    • Eustachian tube dysfunction    • Female pelvic pain    • Gastric ulcer    • H/O bladder infections    • History of rashes as a child    • Low back pain    • Menopausal symptoms    • Muscle weakness    • Ovarian cyst    • PID (pelvic inflammatory disease)    • Recurrent UTI    • Sexual problems      Past Surgical History:   Procedure Laterality Date   • BREAST BIOPSY      abnormal   •  SECTION     • HYSTERECTOMY     • NASAL ENDOSCOPY     • OOPHORECTOMY Bilateral    • OTHER SURGICAL HISTORY      Laparoscopy (diagnostic) gynecologic with biopsy   • SHOULDER SURGERY       Patient Active Problem List    Diagnosis   • Hypercalcemia [E83.52]   • Uncomplicated asthma [J45.909]   • Shortness of breath [R06.02]   • Atypical chest pain [R07.89]   • Lung nodules - calcified granulomas [R91.8]   • Atelectasis [J98.11]   • Right upper quadrant pain [R10.11]     Overview Note:     Impression: 2016 - May be secondary to constipation. Pt will try stool softener and probiotic to  help regulate her BM. 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   Gastrointestinal: Positive for nausea. Negative for constipation, diarrhea and vomiting.   Genitourinary: Positive for flank pain and hematuria.           Objective   Blood pressure 128/86, pulse 76, resp. rate 16, not currently breastfeeding.  There is no height or weight on file to calculate BMI.      Physical Exam   Constitutional: She is oriented to person, place, and time. She appears well-developed and well-nourished. She does not appear ill.   Eyes: Conjunctivae are normal.   Cardiovascular: Normal rate and regular rhythm.    Pulmonary/Chest: Effort normal.   Abdominal: Soft. Bowel sounds are normal. There is no CVA tenderness.   Neurological: She is alert and oriented to person, place, and time.   Skin: Skin is warm and dry.   Psychiatric: She has a normal mood and affect. Her behavior is normal.   Vitals reviewed.      Results for orders placed or performed in visit on 09/13/17   POC Urinalysis Dipstick, Automated   Result Value Ref Range    Color Yellow Yellow, Straw, Dark Yellow, Janie    Clarity, UA Clear Clear    Glucose, UA Negative Negative, 1000 mg/dL (3+) mg/dL    Bilirubin Negative Negative    Ketones, UA Negative Negative    Specific Gravity  1.010 1.005 - 1.030    Blood, UA Negative Negative    pH,  Urine 7.0 5.0 - 8.0    Protein, POC Negative Negative mg/dL    Urobilinogen, UA Normal Normal    Leukocytes Negative Negative    Nitrite, UA Negative Negative         ASSESSMENT/PLAN  1. Nausea    - promethazine (PHENERGAN) 25 MG tablet; Take 1 tablet by mouth Every 6 (Six) Hours As Needed for Nausea or Vomiting.  Dispense: 20 tablet; Refill: 0    2. History of kidney stones    - POC Urinalysis Dipstick, Automated  Discussed she may pass the stones unnoticed, if pain worsens or hematuria returns to follow up with urology.      Plan of care reviewed with patient at the conclusion of today's visit. Education was provided in regards to diagnosis, management and any prescribed or recommended OTC medications.  Patient verbalizes Understanding of and agreement with management plan.      FOLLOW-UP  Return if symptoms worsen or fail to improve.        Electronically signed by:  MAGGIE Brock  09/13/2017

## 2017-09-15 NOTE — TELEPHONE ENCOUNTER
THIS MEDICATION IS NOT COVERED BY HER INSURANCE HYOSCYAMINE  CAN WE CALL SOMETHING ELSE IN FOR HER   [address]

## 2017-09-15 NOTE — TELEPHONE ENCOUNTER
I sent a message to you earlier asking diagnosis code to submit the PA, would you rather change the med or continue with the PA?

## 2017-09-16 ENCOUNTER — TELEPHONE (OUTPATIENT)
Dept: INTERNAL MEDICINE | Facility: CLINIC | Age: 37
End: 2017-09-16

## 2017-09-16 RX ORDER — DICYCLOMINE HCL 20 MG
20 TABLET ORAL 4 TIMES DAILY
Qty: 28 TABLET | Refills: 1 | Status: SHIPPED | OUTPATIENT
Start: 2017-09-16 | End: 2018-02-16

## 2017-09-16 NOTE — TELEPHONE ENCOUNTER
Pt called stating that her medication needed a PA which I started 9/15 but needed the diagnosis code so it was not finished.   Talked to Jessica and she asked me to call in Dicyclomine 20 mg 4 timed daily quantity of 28 with 0 refills.   Med called In, per pharmacy this is covered by insurance. and patient notified.

## 2017-09-19 RX ORDER — HYOSCYAMINE SULFATE 0.125 MG
0.12 TABLET ORAL EVERY 6 HOURS
Qty: 24 TABLET | Refills: 0 | OUTPATIENT
Start: 2017-09-19

## 2017-10-04 ENCOUNTER — HOSPITAL ENCOUNTER (EMERGENCY)
Facility: HOSPITAL | Age: 37
Discharge: HOME | End: 2017-10-05
Attending: EMERGENCY MEDICINE | Admitting: EMERGENCY MEDICINE

## 2017-10-04 DIAGNOSIS — M94.0 COSTOCHONDRITIS: Primary | ICD-10-CM

## 2017-10-04 PROCEDURE — 36415 COLL VENOUS BLD VENIPUNCTURE: CPT

## 2017-10-04 PROCEDURE — 99284 EMERGENCY DEPT VISIT MOD MDM: CPT

## 2017-10-04 RX ORDER — SODIUM CHLORIDE 0.9 % (FLUSH) 0.9 %
10 SYRINGE (ML) INJECTION AS NEEDED
Status: DISCONTINUED | OUTPATIENT
Start: 2017-10-04 | End: 2017-10-05 | Stop reason: HOSPADM

## 2017-10-05 VITALS
TEMPERATURE: 98.5 F | OXYGEN SATURATION: 98 % | WEIGHT: 172 LBS | BODY MASS INDEX: 32.47 KG/M2 | SYSTOLIC BLOOD PRESSURE: 112 MMHG | HEART RATE: 92 BPM | DIASTOLIC BLOOD PRESSURE: 78 MMHG | RESPIRATION RATE: 18 BRPM | HEIGHT: 61 IN

## 2017-10-05 LAB
ALBUMIN SERPL-MCNC: 4.3 G/DL (ref 3.2–4.8)
ALBUMIN/GLOB SERPL: 1.5 G/DL (ref 1.5–2.5)
ALP SERPL-CCNC: 80 U/L (ref 25–100)
ALT SERPL W P-5'-P-CCNC: 37 U/L (ref 7–40)
ANION GAP SERPL CALCULATED.3IONS-SCNC: 11 MMOL/L (ref 3–11)
AST SERPL-CCNC: 19 U/L (ref 0–33)
BACTERIA UR QL AUTO: ABNORMAL /HPF
BASOPHILS # BLD AUTO: 0.04 10*3/MM3 (ref 0–0.2)
BASOPHILS NFR BLD AUTO: 0.4 % (ref 0–1)
BILIRUB SERPL-MCNC: 0.2 MG/DL (ref 0.3–1.2)
BILIRUB UR QL STRIP: NEGATIVE
BUN BLD-MCNC: 18 MG/DL (ref 9–23)
BUN/CREAT SERPL: 22.5 (ref 7–25)
CALCIUM SPEC-SCNC: 9.5 MG/DL (ref 8.7–10.4)
CHLORIDE SERPL-SCNC: 103 MMOL/L (ref 99–109)
CLARITY UR: ABNORMAL
CO2 SERPL-SCNC: 24 MMOL/L (ref 20–31)
COLOR UR: YELLOW
CREAT BLD-MCNC: 0.8 MG/DL (ref 0.6–1.3)
DEPRECATED RDW RBC AUTO: 42.7 FL (ref 37–54)
EOSINOPHIL # BLD AUTO: 0.33 10*3/MM3 (ref 0–0.3)
EOSINOPHIL NFR BLD AUTO: 3.7 % (ref 0–3)
ERYTHROCYTE [DISTWIDTH] IN BLOOD BY AUTOMATED COUNT: 13.7 % (ref 11.3–14.5)
GFR SERPL CREATININE-BSD FRML MDRD: 82 ML/MIN/1.73
GLOBULIN UR ELPH-MCNC: 2.9 GM/DL
GLUCOSE BLD-MCNC: 112 MG/DL (ref 70–100)
GLUCOSE UR STRIP-MCNC: NEGATIVE MG/DL
HCT VFR BLD AUTO: 39.9 % (ref 34.5–44)
HGB BLD-MCNC: 13 G/DL (ref 11.5–15.5)
HGB UR QL STRIP.AUTO: NEGATIVE
HOLD SPECIMEN: NORMAL
HOLD SPECIMEN: NORMAL
HYALINE CASTS UR QL AUTO: ABNORMAL /LPF
IMM GRANULOCYTES # BLD: 0.03 10*3/MM3 (ref 0–0.03)
IMM GRANULOCYTES NFR BLD: 0.3 % (ref 0–0.6)
KETONES UR QL STRIP: NEGATIVE
LEUKOCYTE ESTERASE UR QL STRIP.AUTO: NEGATIVE
LIPASE SERPL-CCNC: 40 U/L (ref 6–51)
LYMPHOCYTES # BLD AUTO: 2.57 10*3/MM3 (ref 0.6–4.8)
LYMPHOCYTES NFR BLD AUTO: 28.5 % (ref 24–44)
MCH RBC QN AUTO: 27.9 PG (ref 27–31)
MCHC RBC AUTO-ENTMCNC: 32.6 G/DL (ref 32–36)
MCV RBC AUTO: 85.6 FL (ref 80–99)
MONOCYTES # BLD AUTO: 0.54 10*3/MM3 (ref 0–1)
MONOCYTES NFR BLD AUTO: 6 % (ref 0–12)
NEUTROPHILS # BLD AUTO: 5.52 10*3/MM3 (ref 1.5–8.3)
NEUTROPHILS NFR BLD AUTO: 61.1 % (ref 41–71)
NITRITE UR QL STRIP: NEGATIVE
PH UR STRIP.AUTO: 7 [PH] (ref 5–8)
PLATELET # BLD AUTO: 314 10*3/MM3 (ref 150–450)
PMV BLD AUTO: 10.2 FL (ref 6–12)
POTASSIUM BLD-SCNC: 3.4 MMOL/L (ref 3.5–5.5)
PROT SERPL-MCNC: 7.2 G/DL (ref 5.7–8.2)
PROT UR QL STRIP: NEGATIVE
RBC # BLD AUTO: 4.66 10*6/MM3 (ref 3.89–5.14)
RBC # UR: ABNORMAL /HPF
REF LAB TEST METHOD: ABNORMAL
SODIUM BLD-SCNC: 138 MMOL/L (ref 132–146)
SP GR UR STRIP: 1.02 (ref 1–1.03)
SQUAMOUS #/AREA URNS HPF: ABNORMAL /HPF
UROBILINOGEN UR QL STRIP: ABNORMAL
WBC NRBC COR # BLD: 9.03 10*3/MM3 (ref 3.5–10.8)
WBC UR QL AUTO: ABNORMAL /HPF
WHOLE BLOOD HOLD SPECIMEN: NORMAL
WHOLE BLOOD HOLD SPECIMEN: NORMAL

## 2017-10-05 PROCEDURE — 36415 COLL VENOUS BLD VENIPUNCTURE: CPT

## 2017-10-05 PROCEDURE — 85025 COMPLETE CBC W/AUTO DIFF WBC: CPT | Performed by: EMERGENCY MEDICINE

## 2017-10-05 PROCEDURE — 81001 URINALYSIS AUTO W/SCOPE: CPT | Performed by: EMERGENCY MEDICINE

## 2017-10-05 PROCEDURE — 83690 ASSAY OF LIPASE: CPT | Performed by: EMERGENCY MEDICINE

## 2017-10-05 PROCEDURE — 80053 COMPREHEN METABOLIC PANEL: CPT | Performed by: EMERGENCY MEDICINE

## 2017-10-05 RX ORDER — INDOMETHACIN 25 MG/1
50 CAPSULE ORAL ONCE
Status: COMPLETED | OUTPATIENT
Start: 2017-10-05 | End: 2017-10-05

## 2017-10-05 RX ORDER — INDOMETHACIN 50 MG/1
50 CAPSULE ORAL
Qty: 15 CAPSULE | Refills: 0 | Status: SHIPPED | OUTPATIENT
Start: 2017-10-05 | End: 2017-10-10

## 2017-10-05 RX ADMIN — INDOMETHACIN 50 MG: 25 CAPSULE ORAL at 01:04

## 2017-10-05 NOTE — ED PROVIDER NOTES
Subjective   HPI Comments: Ms. Megan Post is a 37 y.o. female who presents to the ED with c/o abdominal pain. She locates intermittent RUQ pressure recurrently but for the past week her pain has worsened and is constant. She has associated nausea, and chills with this pain. She denies any fever, vomiting, diarrhea, blood in her stool, dysuria, atypical coughs, congestions, rashes, or any other acute sx at this time. She has a hx of fatty liver disease. She has a surgical history of cholecystectomy, hysterectomy, and .     Patient is a 37 y.o. female presenting with abdominal pain.   History provided by:  Patient  Abdominal Pain   Pain location:  RUQ  Pain quality: pressure    Pain radiates to:  Does not radiate  Pain severity:  Moderate  Onset quality:  Sudden  Duration:  1 week  Timing:  Constant  Progression:  Worsening  Chronicity:  Recurrent  Relieved by:  None tried  Worsened by:  Nothing  Ineffective treatments:  None tried  Associated symptoms: chills and nausea    Associated symptoms: no constipation, no cough, no diarrhea, no dysuria, no fever and no vomiting    Risk factors: multiple surgeries    Risk factors: not elderly        Review of Systems   Constitutional: Positive for chills. Negative for fever.   HENT: Negative for congestion.    Respiratory: Negative for cough.    Gastrointestinal: Positive for abdominal pain and nausea. Negative for blood in stool, constipation, diarrhea and vomiting.   Genitourinary: Negative for difficulty urinating and dysuria.   Skin: Negative for rash.   All other systems reviewed and are negative.      Past Medical History:   Diagnosis Date   • Abdominal pain    • Abnormal breast exam    • Abnormal Pap smear of cervix    • Achilles tendinitis    • Acute sinusitis    • Anxiety    • Arthritis    • Asthma    • Tavera's syndrome    • Bipolar 1 disorder    • Bowel trouble    • Breast cyst    • Colon polyps    • Constipation    • Depression    • Diverticulitis    •  Endometriosis    • Eustachian tube dysfunction    • Female pelvic pain    • Gastric ulcer    • H/O bladder infections    • History of rashes as a child    • Low back pain    • Menopausal symptoms    • Muscle weakness    • Ovarian cyst    • PID (pelvic inflammatory disease)    • Recurrent UTI    • Sexual problems        Allergies   Allergen Reactions   • Adhesive Tape Hives   • Biaxin [Clarithromycin] Nausea Only   • Codeine Itching   • Latex Hives   • Penicillins Nausea Only   • Sulfa Antibiotics Hives   • Sulfate Hives       Past Surgical History:   Procedure Laterality Date   • BREAST BIOPSY      abnormal   •  SECTION     • HYSTERECTOMY     • NASAL ENDOSCOPY     • OOPHORECTOMY Bilateral    • OTHER SURGICAL HISTORY      Laparoscopy (diagnostic) gynecologic with biopsy   • SHOULDER SURGERY         Family History   Problem Relation Age of Onset   • Liver disease Mother    • Diabetes Mother    • Hyperlipidemia Mother    • Hypertension Mother    • Thyroid disease Mother    • Arthritis Father    • Mental illness Father    • Migraines Sister    • Stroke Other    • Diabetes Other    • Hyperlipidemia Other    • Hypertension Other    • Mental illness Other    • Migraines Other    • Tuberculosis Other        Social History     Social History   • Marital status: Single     Spouse name: N/A   • Number of children: N/A   • Years of education: N/A     Social History Main Topics   • Smoking status: Current Some Day Smoker     Types: Cigarettes   • Smokeless tobacco: Never Used      Comment: social smoking   • Alcohol use No   • Drug use: No   • Sexual activity: Defer     Other Topics Concern   • Not on file     Social History Narrative   • No narrative on file         Objective   Physical Exam   Constitutional: She is oriented to person, place, and time. She appears well-developed and well-nourished. No distress.   HENT:   Head: Normocephalic and atraumatic.   Nose: Nose normal.   Eyes: Conjunctivae are normal. No scleral  icterus.   Neck: Normal range of motion. Neck supple.   Cardiovascular: Normal rate, regular rhythm, normal heart sounds and intact distal pulses.    No murmur heard.  Pulmonary/Chest: Effort normal and breath sounds normal. No respiratory distress. She exhibits tenderness.   Reproducible TTP over right anterior lower ribcage, no swelling, rash, or redness present there.   Abdominal: Soft. Bowel sounds are normal. There is no tenderness (RUQ nontender).   Musculoskeletal: Normal range of motion.   Neurological: She is alert and oriented to person, place, and time.   Skin: Skin is warm and dry. She is not diaphoretic.   Psychiatric: She has a normal mood and affect. Her behavior is normal.   Nursing note and vitals reviewed.      Procedures         ED Course  ED Course     Recent Results (from the past 24 hour(s))   Comprehensive Metabolic Panel    Collection Time: 10/05/17 12:07 AM   Result Value Ref Range    Glucose 112 (H) 70 - 100 mg/dL    BUN 18 9 - 23 mg/dL    Creatinine 0.80 0.60 - 1.30 mg/dL    Sodium 138 132 - 146 mmol/L    Potassium 3.4 (L) 3.5 - 5.5 mmol/L    Chloride 103 99 - 109 mmol/L    CO2 24.0 20.0 - 31.0 mmol/L    Calcium 9.5 8.7 - 10.4 mg/dL    Total Protein 7.2 5.7 - 8.2 g/dL    Albumin 4.30 3.20 - 4.80 g/dL    ALT (SGPT) 37 7 - 40 U/L    AST (SGOT) 19 0 - 33 U/L    Alkaline Phosphatase 80 25 - 100 U/L    Total Bilirubin 0.2 (L) 0.3 - 1.2 mg/dL    eGFR Non African Amer 82 >60 mL/min/1.73    Globulin 2.9 gm/dL    A/G Ratio 1.5 1.5 - 2.5 g/dL    BUN/Creatinine Ratio 22.5 7.0 - 25.0    Anion Gap 11.0 3.0 - 11.0 mmol/L   Lipase    Collection Time: 10/05/17 12:07 AM   Result Value Ref Range    Lipase 40 6 - 51 U/L   Light Blue Top    Collection Time: 10/05/17 12:07 AM   Result Value Ref Range    Extra Tube hold for add-on    Green Top (Gel)    Collection Time: 10/05/17 12:07 AM   Result Value Ref Range    Extra Tube Hold for add-ons.    Lavender Top    Collection Time: 10/05/17 12:07 AM   Result Value  Ref Range    Extra Tube hold for add-on    Gold Top - SST    Collection Time: 10/05/17 12:07 AM   Result Value Ref Range    Extra Tube Hold for add-ons.    CBC Auto Differential    Collection Time: 10/05/17 12:07 AM   Result Value Ref Range    WBC 9.03 3.50 - 10.80 10*3/mm3    RBC 4.66 3.89 - 5.14 10*6/mm3    Hemoglobin 13.0 11.5 - 15.5 g/dL    Hematocrit 39.9 34.5 - 44.0 %    MCV 85.6 80.0 - 99.0 fL    MCH 27.9 27.0 - 31.0 pg    MCHC 32.6 32.0 - 36.0 g/dL    RDW 13.7 11.3 - 14.5 %    RDW-SD 42.7 37.0 - 54.0 fl    MPV 10.2 6.0 - 12.0 fL    Platelets 314 150 - 450 10*3/mm3    Neutrophil % 61.1 41.0 - 71.0 %    Lymphocyte % 28.5 24.0 - 44.0 %    Monocyte % 6.0 0.0 - 12.0 %    Eosinophil % 3.7 (H) 0.0 - 3.0 %    Basophil % 0.4 0.0 - 1.0 %    Immature Grans % 0.3 0.0 - 0.6 %    Neutrophils, Absolute 5.52 1.50 - 8.30 10*3/mm3    Lymphocytes, Absolute 2.57 0.60 - 4.80 10*3/mm3    Monocytes, Absolute 0.54 0.00 - 1.00 10*3/mm3    Eosinophils, Absolute 0.33 (H) 0.00 - 0.30 10*3/mm3    Basophils, Absolute 0.04 0.00 - 0.20 10*3/mm3    Immature Grans, Absolute 0.03 0.00 - 0.03 10*3/mm3   Urinalysis With / Culture If Indicated - Urine, Clean Catch    Collection Time: 10/05/17 12:20 AM   Result Value Ref Range    Color, UA Yellow Yellow, Straw    Appearance, UA Cloudy (A) Clear    pH, UA 7.0 5.0 - 8.0    Specific Gravity, UA 1.022 1.001 - 1.030    Glucose, UA Negative Negative    Ketones, UA Negative Negative    Bilirubin, UA Negative Negative    Blood, UA Negative Negative    Protein, UA Negative Negative    Leuk Esterase, UA Negative Negative    Nitrite, UA Negative Negative    Urobilinogen, UA 0.2 E.U./dL 0.2 - 1.0 E.U./dL   Urinalysis, Microscopic Only - Urine, Clean Catch    Collection Time: 10/05/17 12:20 AM   Result Value Ref Range    RBC, UA 0-2 None Seen, 0-2 /HPF    WBC, UA 0-2 (A) None Seen /HPF    Bacteria, UA None Seen None Seen, Trace /HPF    Squamous Epithelial Cells, UA 3-6 (A) None Seen, 0-2 /HPF    Hyaline  "MAUREEN Kidd 13-20 0 - 6 /LPF    Methodology Manual Light Microscopy      Note: In addition to lab results from this visit, the labs listed above may include labs taken at another facility or during a different encounter within the last 24 hours. Please correlate lab times with ED admission and discharge times for further clarification of the services performed during this visit.    No orders to display     Vitals:    10/04/17 2341 10/05/17 0001   BP: 135/92 109/81   BP Location: Left arm    Patient Position: Sitting    Pulse: 107    Resp: 18    Temp: 98.5 °F (36.9 °C)    TempSrc: Oral    SpO2: 98% 97%   Weight: 172 lb (78 kg)    Height: 61\" (154.9 cm)      Medications   sodium chloride 0.9 % flush 10 mL (not administered)   indomethacin (INDOCIN) capsule 50 mg (50 mg Oral Given 10/5/17 0104)     ECG/EMG Results (last 24 hours)     ** No results found for the last 24 hours. **                          MDM  Number of Diagnoses or Management Options  Costochondritis: new and requires workup  Diagnosis management comments: No acute lab abnormalities.    Exam is consistent with costochondritis.    We'll give course of indomethacin.    Follow-up primary care physician for repeat evaluation in 1 week.       Amount and/or Complexity of Data Reviewed  Clinical lab tests: ordered and reviewed  Review and summarize past medical records: yes  Independent visualization of images, tracings, or specimens: yes    Patient Progress  Patient progress: stable      Final diagnoses:   Costochondritis       Documentation assistance provided by yrn CREWS.  Information recorded by the yrn was done at my direction and has been verified and validated by me.     Belinda Crews  10/05/17 0030       Bhakti Pandya MD  10/05/17 0142    "

## 2017-11-02 ENCOUNTER — HOSPITAL ENCOUNTER (OUTPATIENT)
Dept: GENERAL RADIOLOGY | Facility: HOSPITAL | Age: 37
Discharge: HOME | End: 2017-11-02
Admitting: NURSE PRACTITIONER

## 2017-11-02 ENCOUNTER — OFFICE VISIT (OUTPATIENT)
Dept: INTERNAL MEDICINE | Facility: CLINIC | Age: 37
End: 2017-11-02

## 2017-11-02 VITALS
WEIGHT: 181 LBS | HEART RATE: 108 BPM | SYSTOLIC BLOOD PRESSURE: 118 MMHG | OXYGEN SATURATION: 99 % | BODY MASS INDEX: 34.2 KG/M2 | DIASTOLIC BLOOD PRESSURE: 68 MMHG

## 2017-11-02 DIAGNOSIS — R07.81 RIB PAIN ON RIGHT SIDE: ICD-10-CM

## 2017-11-02 DIAGNOSIS — M51.26 HERNIATED LUMBAR INTERVERTEBRAL DISC: ICD-10-CM

## 2017-11-02 DIAGNOSIS — R07.81 RIB PAIN ON RIGHT SIDE: Primary | ICD-10-CM

## 2017-11-02 PROCEDURE — 99213 OFFICE O/P EST LOW 20 MIN: CPT | Performed by: NURSE PRACTITIONER

## 2017-11-02 PROCEDURE — 90686 IIV4 VACC NO PRSV 0.5 ML IM: CPT | Performed by: NURSE PRACTITIONER

## 2017-11-02 PROCEDURE — G0008 ADMIN INFLUENZA VIRUS VAC: HCPCS | Performed by: NURSE PRACTITIONER

## 2017-11-02 PROCEDURE — 71100 X-RAY EXAM RIBS UNI 2 VIEWS: CPT

## 2017-11-02 RX ORDER — LORATADINE AND PSEUDOEPHEDRINE SULFATE 5; 120 MG/1; MG/1
1 TABLET, EXTENDED RELEASE ORAL AS NEEDED
Refills: 3 | COMMUNITY
Start: 2017-10-17 | End: 2018-05-15

## 2017-11-02 RX ORDER — TRAMADOL HYDROCHLORIDE 50 MG/1
50 TABLET ORAL EVERY 8 HOURS PRN
Qty: 90 TABLET | Refills: 0 | Status: SHIPPED | OUTPATIENT
Start: 2017-11-02 | End: 2018-02-16 | Stop reason: SDUPTHER

## 2017-11-02 NOTE — PROGRESS NOTES
Subjective  Right side ribs pain      Crystal A Day is a 37 y.o. female.   Allergies   Allergen Reactions   • Adhesive Tape Hives   • Biaxin [Clarithromycin] Nausea Only   • Codeine Itching   • Latex Hives   • Penicillins Nausea Only   • Sulfa Antibiotics Hives   • Sulfate Hives     History of Present Illness      Went to [facility] ED for right side rib pain on 10/4/17 was told had costochondritis , still having pain in this same area, has tried tylenol, ibuprofen with minimal relief , the pain came on suddenly  The following portions of the patient's history were reviewed and updated as appropriate: allergies, past social history and problem list.    Review of Systems   Musculoskeletal: Positive for arthralgias.   All other systems reviewed and are negative.      Objective   Physical Exam   Constitutional: She is oriented to person, place, and time. She appears well-developed and well-nourished.   HENT:   Head: Normocephalic and atraumatic.   Eyes: Conjunctivae are normal.   Cardiovascular: Normal rate.    Pulmonary/Chest: Effort normal.   Musculoskeletal:        Arms:  Neurological: She is alert and oriented to person, place, and time.   Skin: Skin is warm and dry.   Psychiatric: She has a normal mood and affect. Her behavior is normal. Judgment and thought content normal.   Nursing note and vitals reviewed.    /68  Pulse 108  Wt 181 lb (82.1 kg)  SpO2 99%  BMI 34.2 kg/m2    Assessment/Plan     Problem List Items Addressed This Visit        Musculoskeletal and Integument    Herniated lumbar intervertebral disc    Relevant Medications    traMADol (ULTRAM) 50 MG tablet      Other Visit Diagnoses     Rib pain on right side    -  Primary    Relevant Orders    XR ribs 2 vw right          Pt to get xray, I will call her with results and poc

## 2017-11-03 ENCOUNTER — TELEPHONE (OUTPATIENT)
Dept: INTERNAL MEDICINE | Facility: CLINIC | Age: 37
End: 2017-11-03

## 2017-11-12 ENCOUNTER — APPOINTMENT (OUTPATIENT)
Dept: CT IMAGING | Facility: HOSPITAL | Age: 37
End: 2017-11-12

## 2017-11-12 ENCOUNTER — APPOINTMENT (OUTPATIENT)
Dept: GENERAL RADIOLOGY | Facility: HOSPITAL | Age: 37
End: 2017-11-12

## 2017-11-12 ENCOUNTER — HOSPITAL ENCOUNTER (EMERGENCY)
Facility: HOSPITAL | Age: 37
Discharge: HOME | End: 2017-11-12
Attending: EMERGENCY MEDICINE | Admitting: EMERGENCY MEDICINE

## 2017-11-12 VITALS
HEART RATE: 114 BPM | SYSTOLIC BLOOD PRESSURE: 105 MMHG | WEIGHT: 176 LBS | DIASTOLIC BLOOD PRESSURE: 76 MMHG | BODY MASS INDEX: 33.23 KG/M2 | HEIGHT: 61 IN | RESPIRATION RATE: 20 BRPM | TEMPERATURE: 98.5 F | OXYGEN SATURATION: 91 %

## 2017-11-12 DIAGNOSIS — R00.0 TACHYCARDIA: ICD-10-CM

## 2017-11-12 DIAGNOSIS — R07.89 RIGHT-SIDED CHEST WALL PAIN: Primary | ICD-10-CM

## 2017-11-12 DIAGNOSIS — H53.143 PHOTOPHOBIA OF BOTH EYES: ICD-10-CM

## 2017-11-12 DIAGNOSIS — R51.9 ACUTE NONINTRACTABLE HEADACHE, UNSPECIFIED HEADACHE TYPE: ICD-10-CM

## 2017-11-12 LAB
ALBUMIN SERPL-MCNC: 4.5 G/DL (ref 3.2–4.8)
ALBUMIN/GLOB SERPL: 1.6 G/DL (ref 1.5–2.5)
ALP SERPL-CCNC: 80 U/L (ref 25–100)
ALT SERPL W P-5'-P-CCNC: 25 U/L (ref 7–40)
AMPHET+METHAMPHET UR QL: NEGATIVE
AMPHETAMINES UR QL: NEGATIVE
ANION GAP SERPL CALCULATED.3IONS-SCNC: 6 MMOL/L (ref 3–11)
AST SERPL-CCNC: 16 U/L (ref 0–33)
BARBITURATES UR QL SCN: NEGATIVE
BASOPHILS # BLD AUTO: 0.03 10*3/MM3 (ref 0–0.2)
BASOPHILS NFR BLD AUTO: 0.4 % (ref 0–1)
BENZODIAZ UR QL SCN: NEGATIVE
BILIRUB SERPL-MCNC: 0.2 MG/DL (ref 0.3–1.2)
BILIRUB UR QL STRIP: NEGATIVE
BUN BLD-MCNC: 13 MG/DL (ref 9–23)
BUN/CREAT SERPL: 18.6 (ref 7–25)
BUPRENORPHINE SERPL-MCNC: NEGATIVE NG/ML
CALCIUM SPEC-SCNC: 8.9 MG/DL (ref 8.7–10.4)
CANNABINOIDS SERPL QL: NEGATIVE
CHLORIDE SERPL-SCNC: 110 MMOL/L (ref 99–109)
CLARITY UR: CLEAR
CO2 SERPL-SCNC: 22 MMOL/L (ref 20–31)
COCAINE UR QL: NEGATIVE
COLOR UR: YELLOW
CREAT BLD-MCNC: 0.7 MG/DL (ref 0.6–1.3)
D DIMER PPP FEU-MCNC: 0.39 MG/L (FEU) (ref 0–0.5)
DEPRECATED RDW RBC AUTO: 42.3 FL (ref 37–54)
EOSINOPHIL # BLD AUTO: 0.26 10*3/MM3 (ref 0–0.3)
EOSINOPHIL NFR BLD AUTO: 3.1 % (ref 0–3)
ERYTHROCYTE [DISTWIDTH] IN BLOOD BY AUTOMATED COUNT: 13.6 % (ref 11.3–14.5)
GFR SERPL CREATININE-BSD FRML MDRD: 95 ML/MIN/1.73
GLOBULIN UR ELPH-MCNC: 2.8 GM/DL
GLUCOSE BLD-MCNC: 133 MG/DL (ref 70–100)
GLUCOSE UR STRIP-MCNC: NEGATIVE MG/DL
HCT VFR BLD AUTO: 41.9 % (ref 34.5–44)
HGB BLD-MCNC: 14.3 G/DL (ref 11.5–15.5)
HGB UR QL STRIP.AUTO: NEGATIVE
HOLD SPECIMEN: NORMAL
HOLD SPECIMEN: NORMAL
IMM GRANULOCYTES # BLD: 0.02 10*3/MM3 (ref 0–0.03)
IMM GRANULOCYTES NFR BLD: 0.2 % (ref 0–0.6)
INR PPP: 0.96
KETONES UR QL STRIP: NEGATIVE
LEUKOCYTE ESTERASE UR QL STRIP.AUTO: NEGATIVE
LYMPHOCYTES # BLD AUTO: 2.75 10*3/MM3 (ref 0.6–4.8)
LYMPHOCYTES NFR BLD AUTO: 33.3 % (ref 24–44)
MCH RBC QN AUTO: 29.2 PG (ref 27–31)
MCHC RBC AUTO-ENTMCNC: 34.1 G/DL (ref 32–36)
MCV RBC AUTO: 85.7 FL (ref 80–99)
METHADONE UR QL SCN: NEGATIVE
MONOCYTES # BLD AUTO: 0.39 10*3/MM3 (ref 0–1)
MONOCYTES NFR BLD AUTO: 4.7 % (ref 0–12)
NEUTROPHILS # BLD AUTO: 4.82 10*3/MM3 (ref 1.5–8.3)
NEUTROPHILS NFR BLD AUTO: 58.3 % (ref 41–71)
NITRITE UR QL STRIP: NEGATIVE
OPIATES UR QL: NEGATIVE
OXYCODONE UR QL SCN: NEGATIVE
PCP UR QL SCN: NEGATIVE
PH UR STRIP.AUTO: 6 [PH] (ref 5–8)
PLATELET # BLD AUTO: 355 10*3/MM3 (ref 150–450)
PMV BLD AUTO: 10.1 FL (ref 6–12)
POTASSIUM BLD-SCNC: 3.7 MMOL/L (ref 3.5–5.5)
PROPOXYPH UR QL: NEGATIVE
PROT SERPL-MCNC: 7.3 G/DL (ref 5.7–8.2)
PROT UR QL STRIP: NEGATIVE
PROTHROMBIN TIME: 10.5 SECONDS (ref 9.6–11.5)
RBC # BLD AUTO: 4.89 10*6/MM3 (ref 3.89–5.14)
SODIUM BLD-SCNC: 138 MMOL/L (ref 132–146)
SP GR UR STRIP: 1.02 (ref 1–1.03)
TRICYCLICS UR QL SCN: NEGATIVE
TROPONIN I SERPL-MCNC: 0 NG/ML (ref 0–0.07)
UROBILINOGEN UR QL STRIP: NORMAL
WBC NRBC COR # BLD: 8.27 10*3/MM3 (ref 3.5–10.8)
WHOLE BLOOD HOLD SPECIMEN: NORMAL
WHOLE BLOOD HOLD SPECIMEN: NORMAL

## 2017-11-12 PROCEDURE — 25010000002 DIPHENHYDRAMINE PER 50 MG: Performed by: PHYSICIAN ASSISTANT

## 2017-11-12 PROCEDURE — 81003 URINALYSIS AUTO W/O SCOPE: CPT | Performed by: PHYSICIAN ASSISTANT

## 2017-11-12 PROCEDURE — 85379 FIBRIN DEGRADATION QUANT: CPT | Performed by: PHYSICIAN ASSISTANT

## 2017-11-12 PROCEDURE — 96374 THER/PROPH/DIAG INJ IV PUSH: CPT

## 2017-11-12 PROCEDURE — 84484 ASSAY OF TROPONIN QUANT: CPT

## 2017-11-12 PROCEDURE — 85610 PROTHROMBIN TIME: CPT | Performed by: PHYSICIAN ASSISTANT

## 2017-11-12 PROCEDURE — 96361 HYDRATE IV INFUSION ADD-ON: CPT

## 2017-11-12 PROCEDURE — 25010000002 METOCLOPRAMIDE PER 10 MG: Performed by: PHYSICIAN ASSISTANT

## 2017-11-12 PROCEDURE — 25010000002 KETOROLAC TROMETHAMINE PER 15 MG: Performed by: PHYSICIAN ASSISTANT

## 2017-11-12 PROCEDURE — 80306 DRUG TEST PRSMV INSTRMNT: CPT | Performed by: PHYSICIAN ASSISTANT

## 2017-11-12 PROCEDURE — 93005 ELECTROCARDIOGRAM TRACING: CPT | Performed by: EMERGENCY MEDICINE

## 2017-11-12 PROCEDURE — 70450 CT HEAD/BRAIN W/O DYE: CPT

## 2017-11-12 PROCEDURE — 25010000002 DEXAMETHASONE PER 1 MG: Performed by: PHYSICIAN ASSISTANT

## 2017-11-12 PROCEDURE — 99284 EMERGENCY DEPT VISIT MOD MDM: CPT

## 2017-11-12 PROCEDURE — 96375 TX/PRO/DX INJ NEW DRUG ADDON: CPT

## 2017-11-12 PROCEDURE — 80053 COMPREHEN METABOLIC PANEL: CPT | Performed by: PHYSICIAN ASSISTANT

## 2017-11-12 PROCEDURE — 85025 COMPLETE CBC W/AUTO DIFF WBC: CPT | Performed by: PHYSICIAN ASSISTANT

## 2017-11-12 PROCEDURE — 71010 HC CHEST PA OR AP: CPT

## 2017-11-12 RX ORDER — METOCLOPRAMIDE HYDROCHLORIDE 5 MG/ML
10 INJECTION INTRAMUSCULAR; INTRAVENOUS ONCE
Status: COMPLETED | OUTPATIENT
Start: 2017-11-12 | End: 2017-11-12

## 2017-11-12 RX ORDER — DIPHENHYDRAMINE HYDROCHLORIDE 50 MG/ML
25 INJECTION INTRAMUSCULAR; INTRAVENOUS ONCE
Status: COMPLETED | OUTPATIENT
Start: 2017-11-12 | End: 2017-11-12

## 2017-11-12 RX ORDER — SODIUM CHLORIDE 0.9 % (FLUSH) 0.9 %
10 SYRINGE (ML) INJECTION AS NEEDED
Status: DISCONTINUED | OUTPATIENT
Start: 2017-11-12 | End: 2017-11-12 | Stop reason: HOSPADM

## 2017-11-12 RX ORDER — RANITIDINE 150 MG/1
150 TABLET ORAL 2 TIMES DAILY
Qty: 60 TABLET | Refills: 0 | Status: SHIPPED | OUTPATIENT
Start: 2017-11-12 | End: 2018-02-16

## 2017-11-12 RX ORDER — DEXAMETHASONE SODIUM PHOSPHATE 4 MG/ML
8 INJECTION, SOLUTION INTRA-ARTICULAR; INTRALESIONAL; INTRAMUSCULAR; INTRAVENOUS; SOFT TISSUE ONCE
Status: COMPLETED | OUTPATIENT
Start: 2017-11-12 | End: 2017-11-12

## 2017-11-12 RX ORDER — KETOROLAC TROMETHAMINE 15 MG/ML
15 INJECTION, SOLUTION INTRAMUSCULAR; INTRAVENOUS ONCE
Status: COMPLETED | OUTPATIENT
Start: 2017-11-12 | End: 2017-11-12

## 2017-11-12 RX ADMIN — SODIUM CHLORIDE 1000 ML: 9 INJECTION, SOLUTION INTRAVENOUS at 01:31

## 2017-11-12 RX ADMIN — METOCLOPRAMIDE 10 MG: 5 INJECTION, SOLUTION INTRAMUSCULAR; INTRAVENOUS at 02:04

## 2017-11-12 RX ADMIN — KETOROLAC TROMETHAMINE 15 MG: 15 INJECTION, SOLUTION INTRAMUSCULAR; INTRAVENOUS at 02:03

## 2017-11-12 RX ADMIN — DIPHENHYDRAMINE HYDROCHLORIDE 25 MG: 50 INJECTION INTRAMUSCULAR; INTRAVENOUS at 02:03

## 2017-11-12 RX ADMIN — DEXAMETHASONE SODIUM PHOSPHATE 8 MG: 4 INJECTION, SOLUTION INTRAMUSCULAR; INTRAVENOUS at 02:01

## 2017-11-12 NOTE — DISCHARGE INSTRUCTIONS
Patient's CT of the brain without contrast showed no acute intracranial abnormality.  Chest x-ray showed no acute cardiopulmonary process and d-dimer and troponin were negative.  Tachycardia did improve with IV fluids and medications.  Headache also improved.  Recommend close follow-up with NOLAN Morelos on Monday for recheck.  Patient needs neurology evaluation secondary to frequent, persistent headaches.  Due to her frequent NSAID use, we will add Zantac 150 mg by mouth twice daily to her current medication list.  She is to return if any worsening symptoms.

## 2017-11-12 NOTE — ED PROVIDER NOTES
"Subjective   HPI Comments: This is a 37-year-old  female that presents to the ER with symptoms of chest pain for 4 weeks duration.  Patient states that about 4 weeks ago, she was diagnosed with costochondritis in our ER, which was on 10/4/2017.  She was advised to take anti-inflammatories, and she has been taking them on a daily basis for the last 4 weeks.  She is concerned about this because she also has history of peptic ulcer disease and a \"bleeding ulcer\".  She however denies any abdominal discomfort/pain.  Patient says the pain is along her right lower and lateral rib border.  She is very tender to touch.  She also has increased pain with movement and deep breathing.  She denies any significant cough or congestion.  She denies any fever or chills.  She does report palpitations, and upon arrival, her heart rate is in the 160s.  Patient denies taking anything over-the-counter or any drug use.  Patient also reports severe headache.  She describes headache to the top of her head, occipital region, as well as behind both eyes.  She describes it as sharp and stabbing.  She also reports pain to her right temporomandibular joint.  She denies any popping or clicking with opening or closing of the jaw.  She says that she has had headaches 3-4 days per week for the last 3 years.  Hyaley Viveros, her PCP, is aware of these headaches and she has only advised nonsteroidal anti-inflammatory agents.  Patient has never had a neurology evaluation and has no known diagnoses of migraine headaches.  She reports associated blurred vision, photophobia and phonophobia.  She also reports nausea but has not vomited.  She denies any frequent allergy or URI symptoms.  She denies any cough or congestion.    Patient is a 37 y.o. female presenting with chest pain.   History provided by:  Patient  Chest Pain   Pain location:  R chest  Pain quality: sharp and stabbing    Pain radiates to:  Does not radiate  Onset quality:  " Gradual  Duration:  4 weeks  Timing:  Constant  Progression:  Unchanged  Chronicity:  New  Context: breathing, movement and at rest    Context: not eating, not lifting and not raising an arm    Relieved by:  Nothing  Worsened by:  Deep breathing and movement  Ineffective treatments: NSAIDs.  Patient has been taking Ibuprofen for 4 weeks on a daily basis.  Associated symptoms: headache (severe HA to the top of head and occipital region.  Pt reports HAs 3-4 days a week for the past 3 years without dx of migraines or previous neurology evaluation.), nausea and palpitations    Associated symptoms: no abdominal pain, no anorexia, no anxiety, no back pain, no claudication, no dizziness, no fatigue, no fever, no near-syncope, no numbness, no shortness of breath, no syncope, no vomiting and no weakness        Review of Systems   Constitutional: Negative for appetite change, chills, fatigue and fever.   Eyes: Positive for photophobia and visual disturbance (blurred vision with headaches).   Respiratory: Negative for chest tightness and shortness of breath.    Cardiovascular: Positive for chest pain (sharp, stabbing pain to right lower rib border; worsened with deep breathing and movement.) and palpitations. Negative for claudication, leg swelling, syncope and near-syncope.   Gastrointestinal: Positive for nausea. Negative for abdominal pain, anorexia, constipation, diarrhea and vomiting.   Genitourinary: Negative.         S/p hysterectomy.   Musculoskeletal: Positive for myalgias (chronic fibromyalgia) and neck pain (chronic neck pain secondary to bulging discs.). Negative for back pain.   Skin: Negative.    Neurological: Positive for headaches (severe HA to the top of head and occipital region.  Pt reports HAs 3-4 days a week for the past 3 years without dx of migraines or previous neurology evaluation.). Negative for dizziness, syncope, weakness and numbness.   Psychiatric/Behavioral: Negative.        Past Medical History:    Diagnosis Date   • Abdominal pain    • Abnormal breast exam    • Abnormal Pap smear of cervix    • Achilles tendinitis    • Acute sinusitis    • Anxiety    • Arthritis    • Asthma    • Tavera's syndrome    • Bipolar 1 disorder    • Bowel trouble    • Breast cyst    • Colon polyps    • Constipation    • Depression    • Diverticulitis    • Endometriosis    • Eustachian tube dysfunction    • Female pelvic pain    • Gastric ulcer    • H/O bladder infections    • History of rashes as a child    • Low back pain    • Menopausal symptoms    • Muscle weakness    • Ovarian cyst    • PID (pelvic inflammatory disease)    • Recurrent UTI    • Sexual problems        Allergies   Allergen Reactions   • Adhesive Tape Hives   • Biaxin [Clarithromycin] Nausea Only   • Codeine Itching   • Latex Hives   • Penicillins Nausea Only   • Sulfa Antibiotics Hives   • Sulfate Hives       Past Surgical History:   Procedure Laterality Date   • BREAST BIOPSY      abnormal   •  SECTION     • HYSTERECTOMY     • NASAL ENDOSCOPY     • OOPHORECTOMY Bilateral    • OTHER SURGICAL HISTORY      Laparoscopy (diagnostic) gynecologic with biopsy   • SHOULDER SURGERY         Family History   Problem Relation Age of Onset   • Liver disease Mother    • Diabetes Mother    • Hyperlipidemia Mother    • Hypertension Mother    • Thyroid disease Mother    • Arthritis Father    • Mental illness Father    • Migraines Sister    • Stroke Other    • Diabetes Other    • Hyperlipidemia Other    • Hypertension Other    • Mental illness Other    • Migraines Other    • Tuberculosis Other        Social History     Social History   • Marital status: Single     Spouse name: N/A   • Number of children: N/A   • Years of education: N/A     Social History Main Topics   • Smoking status: Current Some Day Smoker     Types: Cigarettes   • Smokeless tobacco: Never Used      Comment: social smoking   • Alcohol use No   • Drug use: No   • Sexual activity: Defer     Other Topics  Concern   • None     Social History Narrative           Objective   Physical Exam   Constitutional: She is oriented to person, place, and time. She appears well-developed and well-nourished. No distress.   HENT:   Head: Normocephalic and atraumatic.   Right Ear: External ear normal.   Left Ear: External ear normal.   Nose: Nose normal.   Mouth/Throat: Oropharynx is clear and moist.   Eyes: Conjunctivae and EOM are normal. Pupils are equal, round, and reactive to light. No scleral icterus.   Negative nystagmus.  Positive photophobia.   Neck: Normal range of motion. Neck supple.   TTP to C-spine secondary to bulging discs.  Full ROM.  No meningeal signs.   Cardiovascular: Regular rhythm and normal heart sounds.  Tachycardia present.    Pulmonary/Chest: Effort normal and breath sounds normal. No respiratory distress.   Point tenderness to right lower rib border and lateral chest wall.  No palpable rib fracture or crepitus.  No skin lesions or rash.  No wheezes, rhonchi, or rales.   Abdominal: Soft. Bowel sounds are normal. She exhibits no distension. There is no tenderness.   Musculoskeletal: Normal range of motion. She exhibits no edema.   Lymphadenopathy:     She has no cervical adenopathy.   Neurological: She is alert and oriented to person, place, and time. She has normal strength. No cranial nerve deficit or sensory deficit.   Neuro intact and non-focal.   Skin: Skin is warm and dry. She is not diaphoretic.   Psychiatric: She has a normal mood and affect. Her behavior is normal.   Nursing note and vitals reviewed.      Procedures         ED Course  ED Course   Value Comment By Time   Heart Rate: (!) 166 Sinus tachycardia on EKG. Patient with ST on prior visits as well. Quique Stoddard MD 11/12 9417   Heart Rate: (!) 125 (Reviewed) Quique Stoddard MD 11/12 7572    Review previous EKG in Psychiatric and patient has History of Sinus Tachycardia from EKGs from 8/19/17 and 5/8/17.  Tachycardia did improve to 120's  and patient is asymptomatic. Berna Raphael PA-C 11/12 0319    All work is within normal limits.  Labs, urinalysis, chest x-ray, and CT of the head without contrast showed no acute abnormalities.  Recommend patient not to use NSAIDs as frequently as she has been due to history of peptic ulcer disease.  We will prescribe an H1 blocker twice daily.  Close follow-up with Dr. Viveros.  Also recommend neurology evaluation date of frequent, persistent headaches. Berna Raphael PA-C 11/12 0325    Discussed all results with patient.  She says that headache is completely resolved after IV fluids and medications.  Heart rate is 110 prior to discharge.  She inquired about a neurologist to follow-up with and I gave her information for Dr. Uribe.  Patient is stable for discharge to home at this time. Berna Raphael PA-C 11/12 0349      Recent Results (from the past 24 hour(s))   Light Blue Top    Collection Time: 11/12/17  1:26 AM   Result Value Ref Range    Extra Tube hold for add-on    Green Top (Gel)    Collection Time: 11/12/17  1:26 AM   Result Value Ref Range    Extra Tube Hold for add-ons.    Lavender Top    Collection Time: 11/12/17  1:26 AM   Result Value Ref Range    Extra Tube hold for add-on    Gold Top - SST    Collection Time: 11/12/17  1:26 AM   Result Value Ref Range    Extra Tube Hold for add-ons.    Comprehensive Metabolic Panel    Collection Time: 11/12/17  1:26 AM   Result Value Ref Range    Glucose 133 (H) 70 - 100 mg/dL    BUN 13 9 - 23 mg/dL    Creatinine 0.70 0.60 - 1.30 mg/dL    Sodium 138 132 - 146 mmol/L    Potassium 3.7 3.5 - 5.5 mmol/L    Chloride 110 (H) 99 - 109 mmol/L    CO2 22.0 20.0 - 31.0 mmol/L    Calcium 8.9 8.7 - 10.4 mg/dL    Total Protein 7.3 5.7 - 8.2 g/dL    Albumin 4.50 3.20 - 4.80 g/dL    ALT (SGPT) 25 7 - 40 U/L    AST (SGOT) 16 0 - 33 U/L    Alkaline Phosphatase 80 25 - 100 U/L    Total Bilirubin 0.2 (L) 0.3 - 1.2 mg/dL    eGFR Non African Amer 95 >60 mL/min/1.73    Globulin 2.8  gm/dL    A/G Ratio 1.6 1.5 - 2.5 g/dL    BUN/Creatinine Ratio 18.6 7.0 - 25.0    Anion Gap 6.0 3.0 - 11.0 mmol/L   Protime-INR    Collection Time: 11/12/17  1:26 AM   Result Value Ref Range    Protime 10.5 9.6 - 11.5 Seconds    INR 0.96    D-dimer, Quantitative    Collection Time: 11/12/17  1:26 AM   Result Value Ref Range    D-Dimer, Quantitative 0.39 0.00 - 0.50 mg/L (FEU)   CBC Auto Differential    Collection Time: 11/12/17  1:26 AM   Result Value Ref Range    WBC 8.27 3.50 - 10.80 10*3/mm3    RBC 4.89 3.89 - 5.14 10*6/mm3    Hemoglobin 14.3 11.5 - 15.5 g/dL    Hematocrit 41.9 34.5 - 44.0 %    MCV 85.7 80.0 - 99.0 fL    MCH 29.2 27.0 - 31.0 pg    MCHC 34.1 32.0 - 36.0 g/dL    RDW 13.6 11.3 - 14.5 %    RDW-SD 42.3 37.0 - 54.0 fl    MPV 10.1 6.0 - 12.0 fL    Platelets 355 150 - 450 10*3/mm3    Neutrophil % 58.3 41.0 - 71.0 %    Lymphocyte % 33.3 24.0 - 44.0 %    Monocyte % 4.7 0.0 - 12.0 %    Eosinophil % 3.1 (H) 0.0 - 3.0 %    Basophil % 0.4 0.0 - 1.0 %    Immature Grans % 0.2 0.0 - 0.6 %    Neutrophils, Absolute 4.82 1.50 - 8.30 10*3/mm3    Lymphocytes, Absolute 2.75 0.60 - 4.80 10*3/mm3    Monocytes, Absolute 0.39 0.00 - 1.00 10*3/mm3    Eosinophils, Absolute 0.26 0.00 - 0.30 10*3/mm3    Basophils, Absolute 0.03 0.00 - 0.20 10*3/mm3    Immature Grans, Absolute 0.02 0.00 - 0.03 10*3/mm3   POC Troponin, Rapid    Collection Time: 11/12/17  1:26 AM   Result Value Ref Range    Troponin I 0.00 0.00 - 0.07 ng/mL   Urine Drug Screen - Urine, Clean Catch    Collection Time: 11/12/17  2:07 AM   Result Value Ref Range    THC, Screen, Urine Negative Negative    Phencyclidine (PCP), Urine Negative Negative    Cocaine Screen, Urine Negative Negative    Methamphetamine, Urine Negative Negative    Opiate Screen Negative Negative    Amphetamine Screen, Urine Negative Negative    Benzodiazepine Screen, Urine Negative Negative    Tricyclic Antidepressants Screen Negative Negative    Methadone Screen, Urine Negative Negative     Barbiturates Screen, Urine Negative Negative    Oxycodone Screen, Urine Negative Negative    Propoxyphene Screen Negative Negative    Buprenorphine, Screen, Urine Negative Negative   Urinalysis With / Culture If Indicated - Urine, Clean Catch    Collection Time: 11/12/17  2:07 AM   Result Value Ref Range    Color, UA Yellow Yellow, Straw    Appearance, UA Clear Clear    pH, UA 6.0 5.0 - 8.0    Specific Gravity, UA 1.019 1.001 - 1.030    Glucose, UA Negative Negative    Ketones, UA Negative Negative    Bilirubin, UA Negative Negative    Blood, UA Negative Negative    Protein, UA Negative Negative    Leuk Esterase, UA Negative Negative    Nitrite, UA Negative Negative    Urobilinogen, UA 0.2 E.U./dL 0.2 - 1.0 E.U./dL     Note: In addition to lab results from this visit, the labs listed above may include labs taken at another facility or during a different encounter within the last 24 hours. Please correlate lab times with ED admission and discharge times for further clarification of the services performed during this visit.    CT Head Without Contrast   Final Result     No acute intracranial findings.      THIS DOCUMENT HAS BEEN ELECTRONICALLY SIGNED BY VIKASH WINTERS MD      XR Chest 1 View   Final Result     No acute findings.      THIS DOCUMENT HAS BEEN ELECTRONICALLY SIGNED BY VIKASH WINTERS MD        Vitals:    11/12/17 0152 11/12/17 0200 11/12/17 0329 11/12/17 0330   BP:  116/75  105/76   BP Location:       Patient Position:       Pulse: (!) 125 (!) 134 114 114   Resp:       Temp:       TempSrc:       SpO2: 97% 95% 94% 91%   Weight:       Height:         Medications   sodium chloride 0.9 % flush 10 mL (not administered)   sodium chloride 0.9 % bolus 1,000 mL (0 mL Intravenous Stopped 11/12/17 0207)   ketorolac (TORADOL) injection 15 mg (15 mg Intravenous Given 11/12/17 0203)   metoclopramide (REGLAN) injection 10 mg (10 mg Intravenous Given 11/12/17 0204)   diphenhydrAMINE (BENADRYL) injection 25 mg (25 mg Intravenous  Given 11/12/17 0203)   dexamethasone (DECADRON) injection 8 mg (8 mg Intravenous Given 11/12/17 0201)     ECG/EMG Results (last 24 hours)     Procedure Component Value Units Date/Time    ECG 12 Lead [[id number]] Collected:  11/12/17 0116     Updated:  11/12/17 0129    Narrative:       Test Reason : HIGH HR  Blood Pressure : **/** mmHG  Vent. Rate : 157 BPM     Atrial Rate : 157 BPM     P-R Int : 104 ms          QRS Dur : 070 ms      QT Int : 322 ms       P-R-T Axes : 000 028 072 degrees     QTc Int : 520 ms    Sinus tachycardia with short AK  Nonspecific ST and T wave abnormality  Abnormal ECG  When compared with ECG of 19-AUG-2017 18:25,  Nonspecific T wave abnormality, improved in Anterolateral leads  Confirmed by ARLENE HERNANDEZ MD ([id number]) on 11/12/2017 1:29:39 AM    Referred By:  EDMD           Confirmed By:ARLENE HERNANDEZ MD                    [facility]    Final diagnoses:   Right-sided chest wall pain   Acute nonintractable headache, unspecified headache type   Photophobia of both eyes   Tachycardia            Berna Raphael PA-C  11/12/17 1516

## 2017-11-29 ENCOUNTER — OFFICE VISIT (OUTPATIENT)
Dept: INTERNAL MEDICINE | Facility: CLINIC | Age: 37
End: 2017-11-29

## 2017-11-29 VITALS — DIASTOLIC BLOOD PRESSURE: 92 MMHG | SYSTOLIC BLOOD PRESSURE: 148 MMHG | OXYGEN SATURATION: 99 % | HEART RATE: 102 BPM

## 2017-11-29 DIAGNOSIS — R07.81 RIB PAIN ON RIGHT SIDE: Primary | ICD-10-CM

## 2017-11-29 DIAGNOSIS — M94.0 COSTOCHONDRITIS: ICD-10-CM

## 2017-11-29 PROCEDURE — 99213 OFFICE O/P EST LOW 20 MIN: CPT | Performed by: NURSE PRACTITIONER

## 2017-11-29 RX ORDER — PREDNISONE 10 MG/1
10 TABLET ORAL DAILY
Qty: 7 TABLET | Refills: 0 | Status: SHIPPED | OUTPATIENT
Start: 2017-11-29 | End: 2018-01-26

## 2017-12-01 ENCOUNTER — HOSPITAL ENCOUNTER (EMERGENCY)
Facility: HOSPITAL | Age: 37
Discharge: HOME | End: 2017-12-01
Attending: EMERGENCY MEDICINE | Admitting: EMERGENCY MEDICINE

## 2017-12-01 ENCOUNTER — APPOINTMENT (OUTPATIENT)
Dept: CT IMAGING | Facility: HOSPITAL | Age: 37
End: 2017-12-01

## 2017-12-01 ENCOUNTER — TELEPHONE (OUTPATIENT)
Dept: INTERNAL MEDICINE | Facility: CLINIC | Age: 37
End: 2017-12-01

## 2017-12-01 VITALS
BODY MASS INDEX: 33.23 KG/M2 | HEIGHT: 61 IN | WEIGHT: 176 LBS | OXYGEN SATURATION: 98 % | TEMPERATURE: 98.6 F | DIASTOLIC BLOOD PRESSURE: 84 MMHG | HEART RATE: 93 BPM | RESPIRATION RATE: 16 BRPM | SYSTOLIC BLOOD PRESSURE: 128 MMHG

## 2017-12-01 DIAGNOSIS — M94.0 COSTOCHONDRITIS: Primary | ICD-10-CM

## 2017-12-01 DIAGNOSIS — Z76.89 REFERRED BY PRIMARY CARE PHYSICIAN: ICD-10-CM

## 2017-12-01 PROCEDURE — 99282 EMERGENCY DEPT VISIT SF MDM: CPT

## 2017-12-01 PROCEDURE — 71250 CT THORAX DX C-: CPT

## 2017-12-01 PROCEDURE — 99283 EMERGENCY DEPT VISIT LOW MDM: CPT

## 2017-12-01 PROCEDURE — 96372 THER/PROPH/DIAG INJ SC/IM: CPT

## 2017-12-01 PROCEDURE — 25010000002 KETOROLAC TROMETHAMINE PER 15 MG: Performed by: PHYSICIAN ASSISTANT

## 2017-12-01 RX ORDER — KETOROLAC TROMETHAMINE 30 MG/ML
30 INJECTION, SOLUTION INTRAMUSCULAR; INTRAVENOUS ONCE
Status: COMPLETED | OUTPATIENT
Start: 2017-12-01 | End: 2017-12-01

## 2017-12-01 RX ORDER — DICLOFENAC POTASSIUM 50 MG/1
50 TABLET, FILM COATED ORAL 3 TIMES DAILY
Qty: 15 TABLET | Refills: 0 | Status: SHIPPED | OUTPATIENT
Start: 2017-12-01 | End: 2018-02-16

## 2017-12-01 RX ADMIN — KETOROLAC TROMETHAMINE 30 MG: 30 INJECTION, SOLUTION INTRAMUSCULAR at 18:19

## 2017-12-01 NOTE — TELEPHONE ENCOUNTER
PATIENT SAW TED THE OTHER DAY AND WAS TOLD WE WERE GOING TO GET HER A REFERRAL FOR A CAT SCAN OF HER RIBS. PATIENT IS CALLING TO CHECK ON THE STATUS OF THE REFERRAL TED WANTED PATIENT TO HAVE SCHEDULED. YOU CAN REACH PATIENT BACK [phone]

## 2017-12-01 NOTE — TELEPHONE ENCOUNTER
I spoke with Hayley, since she is her PCP, Hayley would like to give it a little more time to see if her pain/tenderness goes away before ordering any imaging.

## 2017-12-01 NOTE — TELEPHONE ENCOUNTER
Called and informed pt, per pt she is feeling worse now, she can barely sleep at night, the prednisone she is taking has started to kick in slowly, pt isn't sure how long she can wait with the pain she is having, she said she might end up going to the hospital.

## 2017-12-01 NOTE — ED PROVIDER NOTES
Subjective   HPI Comments: Patient was sent here by her primary care doctor to have CAT scan done and she is concerned about possible lesion on the rib itself.    Patient is a 37 y.o. female presenting with chest pain.   History provided by:  Patient and spouse   used: No    Chest Pain   Pain location:  R chest and R lateral chest  Pain quality: sharp and stabbing    Pain radiates to:  Does not radiate  Pain severity:  Moderate  Onset quality:  Sudden  Duration:  3 months  Timing:  Intermittent  Progression:  Waxing and waning  Chronicity:  Chronic  Context: movement and raising an arm    Relieved by:  Rest  Worsened by:  Coughing, movement and certain positions  Ineffective treatments:  None tried  Associated symptoms: no abdominal pain, no anorexia, no back pain, no cough, no diaphoresis, no fatigue, no fever, no headache, no heartburn, no lower extremity edema, no nausea, no orthopnea, no palpitations, no syncope and no vomiting    Risk factors: no aortic disease, no coronary artery disease, no diabetes mellitus, no high cholesterol, no hypertension and no smoking        Review of Systems   Constitutional: Negative for diaphoresis, fatigue and fever.   Respiratory: Negative for cough, chest tightness and wheezing.    Cardiovascular: Positive for chest pain. Negative for palpitations, orthopnea and syncope.   Gastrointestinal: Negative for abdominal pain, anorexia, blood in stool, constipation, heartburn, nausea and vomiting.   Genitourinary: Negative for dysuria, flank pain, frequency, hematuria and urgency.   Musculoskeletal: Negative for back pain and neck pain.   Skin: Negative for pallor and rash.   Neurological: Negative for headaches.   Psychiatric/Behavioral: Negative.    All other systems reviewed and are negative.      Past Medical History:   Diagnosis Date   • Abdominal pain    • Abnormal breast exam    • Abnormal Pap smear of cervix    • Achilles tendinitis    • Acute sinusitis    •  Anxiety    • Arthritis    • Asthma    • Tavera's syndrome    • Bipolar 1 disorder    • Bowel trouble    • Breast cyst    • Colon polyps    • Constipation    • Depression    • Diverticulitis    • Endometriosis    • Eustachian tube dysfunction    • Female pelvic pain    • Gastric ulcer    • H/O bladder infections    • History of rashes as a child    • Low back pain    • Menopausal symptoms    • Muscle weakness    • Ovarian cyst    • PID (pelvic inflammatory disease)    • Recurrent UTI    • Sexual problems        Allergies   Allergen Reactions   • Adhesive Tape Hives   • Biaxin [Clarithromycin] Nausea Only   • Codeine Itching   • Latex Hives   • Penicillins Nausea Only   • Sulfa Antibiotics Hives   • Sulfate Hives       Past Surgical History:   Procedure Laterality Date   • BREAST BIOPSY      abnormal   •  SECTION     • HYSTERECTOMY     • NASAL ENDOSCOPY     • OOPHORECTOMY Bilateral    • OTHER SURGICAL HISTORY      Laparoscopy (diagnostic) gynecologic with biopsy   • SHOULDER SURGERY         Family History   Problem Relation Age of Onset   • Liver disease Mother    • Diabetes Mother    • Hyperlipidemia Mother    • Hypertension Mother    • Thyroid disease Mother    • Arthritis Father    • Mental illness Father    • Migraines Sister    • Stroke Other    • Diabetes Other    • Hyperlipidemia Other    • Hypertension Other    • Mental illness Other    • Migraines Other    • Tuberculosis Other        Social History     Social History   • Marital status: Single     Spouse name: N/A   • Number of children: N/A   • Years of education: N/A     Social History Main Topics   • Smoking status: Current Some Day Smoker     Types: Cigarettes   • Smokeless tobacco: Never Used      Comment: social smoking   • Alcohol use No   • Drug use: No   • Sexual activity: Defer     Other Topics Concern   • None     Social History Narrative   • None           Objective   Physical Exam   Constitutional: She is oriented to person, place, and  time. She appears well-developed and well-nourished.   HENT:   Head: Normocephalic and atraumatic.   Right Ear: External ear normal.   Left Ear: External ear normal.   Nose: Nose normal.   Mouth/Throat: Oropharynx is clear and moist.   Eyes: Conjunctivae and EOM are normal. Pupils are equal, round, and reactive to light. No scleral icterus.   Neck: Normal range of motion. No thyromegaly present.   Cardiovascular: Normal rate, regular rhythm and normal heart sounds.    Pulmonary/Chest: Effort normal and breath sounds normal. No respiratory distress. She has no wheezes. She has no rales. She exhibits tenderness (point tender over the last rib.  The patient's that she can feel a mass but I do not palpate this.).   Abdominal: Soft. Bowel sounds are normal. She exhibits no distension. There is no tenderness.   Musculoskeletal: Normal range of motion.   Lymphadenopathy:     She has no cervical adenopathy.   Neurological: She is alert and oriented to person, place, and time. She has normal reflexes. She displays normal reflexes. No cranial nerve deficit. Coordination normal.   Skin: Skin is warm and dry.   Psychiatric: She has a normal mood and affect. Her behavior is normal. Judgment and thought content normal.   Nursing note and vitals reviewed.      Procedures         ED Course  ED Course                  MDM      Final diagnoses:   Costochondritis   Referred by primary care physician            DANELLE Gomes  12/03/17 6909       Quique Stoddard MD  12/08/17 7180

## 2017-12-07 ENCOUNTER — OFFICE VISIT (OUTPATIENT)
Dept: INTERNAL MEDICINE | Facility: CLINIC | Age: 37
End: 2017-12-07

## 2017-12-07 VITALS
HEART RATE: 112 BPM | SYSTOLIC BLOOD PRESSURE: 122 MMHG | OXYGEN SATURATION: 100 % | DIASTOLIC BLOOD PRESSURE: 80 MMHG | HEIGHT: 61 IN

## 2017-12-07 DIAGNOSIS — T14.8XXA MUSCULOSKELETAL STRAIN: Primary | ICD-10-CM

## 2017-12-07 PROCEDURE — 99213 OFFICE O/P EST LOW 20 MIN: CPT | Performed by: NURSE PRACTITIONER

## 2017-12-07 NOTE — PROGRESS NOTES
"Subjective  Rib Injury (rib pain right side under breast) and Follow-up (ED follow up )      Megan Post is a 37 y.o. female.   Allergies   Allergen Reactions   • Adhesive Tape Hives   • Biaxin [Clarithromycin] Nausea Only   • Codeine Itching   • Latex Hives   • Penicillins Nausea Only   • Sulfa Antibiotics Hives   • Sulfate Hives     History of Present Illness      PAIN  In right upper abd/rib  X several weeks, took cataflam w/o relief so she stopped, has been on prednisone a muscle relaxer and stopped it , has used heat/ice and stretching w/o relief , bending/twing makes it worse , does get hit with sharp pains at any time , ct normal  The following portions of the patient's history were reviewed and updated as appropriate: current medications, past family history, past social history and problem list.    Review of Systems   Musculoskeletal: Positive for myalgias.   All other systems reviewed and are negative.      Objective   Physical Exam   Constitutional: She is oriented to person, place, and time. She appears well-developed and well-nourished.   HENT:   Head: Normocephalic and atraumatic.   Cardiovascular: Normal rate.    Pulmonary/Chest: Effort normal.   Musculoskeletal:        Arms:  Neurological: She is alert and oriented to person, place, and time.   Skin: Skin is warm and dry.   Psychiatric: She has a normal mood and affect. Her behavior is normal. Judgment and thought content normal.   Nursing note and vitals reviewed.    /80  Pulse 112  Ht 154.9 cm (60.98\")  SpO2 100%    Assessment/Plan     Problem List Items Addressed This Visit     None      Visit Diagnoses     Musculoskeletal strain    -  Primary        I have advised pt I have reviewed her ct and all was normal , it can be 8-12 weeks for it to heal, continue heat/ice tylenol/ibuprofen       "

## 2017-12-28 ENCOUNTER — OFFICE VISIT (OUTPATIENT)
Dept: INTERNAL MEDICINE | Facility: CLINIC | Age: 37
End: 2017-12-28

## 2017-12-28 VITALS — OXYGEN SATURATION: 100 % | HEIGHT: 61 IN | HEART RATE: 97 BPM

## 2017-12-28 DIAGNOSIS — R68.89 FLU-LIKE SYMPTOMS: ICD-10-CM

## 2017-12-28 DIAGNOSIS — J40 BRONCHITIS: Primary | ICD-10-CM

## 2017-12-28 LAB
EXPIRATION DATE: NORMAL
FLUAV AG NPH QL: NORMAL
FLUBV AG NPH QL: NORMAL
INTERNAL CONTROL: NORMAL
Lab: NORMAL

## 2017-12-28 PROCEDURE — 87804 INFLUENZA ASSAY W/OPTIC: CPT | Performed by: NURSE PRACTITIONER

## 2017-12-28 PROCEDURE — 99213 OFFICE O/P EST LOW 20 MIN: CPT | Performed by: NURSE PRACTITIONER

## 2017-12-28 RX ORDER — AZITHROMYCIN 250 MG/1
TABLET, FILM COATED ORAL
Qty: 6 TABLET | Refills: 0 | Status: SHIPPED | OUTPATIENT
Start: 2017-12-28 | End: 2018-01-26

## 2017-12-28 RX ORDER — DEXTROMETHORPHAN HYDROBROMIDE AND PROMETHAZINE HYDROCHLORIDE 15; 6.25 MG/5ML; MG/5ML
5 SYRUP ORAL 4 TIMES DAILY PRN
Qty: 240 ML | Refills: 0 | Status: SHIPPED | OUTPATIENT
Start: 2017-12-28 | End: 2018-01-26

## 2017-12-28 NOTE — PROGRESS NOTES
Subjective  Sinus Problem (headache); Cough; Fever; Nasal Congestion; Diarrhea; and Generalized Body Aches      Crystal A Day is a 37 y.o. female.   Allergies   Allergen Reactions   • Adhesive Tape Hives   • Biaxin [Clarithromycin] Nausea Only   • Codeine Itching   • Latex Hives   • Penicillins Nausea Only   • Sulfa Antibiotics Hives   • Sulfate Hives     History of Present Illness      Deep cough, chest burning, diarrhea, sore throat, hoarse voice , fatigue , weakness , body aches , started 4 days ago, taking nyquil/dayquil w/o relief , son dx today with bronchitis   The following portions of the patient's history were reviewed and updated as appropriate: allergies, current medications, past family history, past medical history, past social history, past surgical history and problem list.    Review of Systems   Constitutional: Positive for chills, fatigue and fever.   HENT: Positive for sore throat and voice change.    Respiratory: Positive for cough.    Gastrointestinal: Positive for diarrhea.   Musculoskeletal: Positive for myalgias.   Neurological: Positive for weakness and headaches.   All other systems reviewed and are negative.      Objective   Physical Exam   Constitutional: She is oriented to person, place, and time. She appears well-developed and well-nourished.  Non-toxic appearance. No distress.   HENT:   Head: Normocephalic and atraumatic. Hair is normal.   Right Ear: External ear normal. No drainage, swelling or tenderness. Tympanic membrane is retracted.   Left Ear: External ear normal. No drainage, swelling or tenderness. Tympanic membrane is retracted.   Nose: Mucosal edema present. No epistaxis.   Mouth/Throat: Uvula is midline and mucous membranes are normal. No oral lesions. No uvula swelling. Posterior oropharyngeal erythema present. No oropharyngeal exudate.   Eyes: Conjunctivae are normal. Right eye exhibits no discharge. Left eye exhibits no discharge. No scleral icterus.   Cardiovascular:  "Normal rate and regular rhythm.  Exam reveals no gallop.    No murmur heard.  Pulmonary/Chest: Effort normal. No stridor. No respiratory distress. She has no wheezes. She has rhonchi in the right lower field and the left lower field. She has no rales. She exhibits no tenderness.   Abdominal: Soft. There is no tenderness.   Lymphadenopathy:     She has cervical adenopathy.   Neurological: She is alert and oriented to person, place, and time. She exhibits normal muscle tone.   Skin: Skin is warm and dry. No rash noted. She is not diaphoretic.   Psychiatric: She has a normal mood and affect. Her behavior is normal. Judgment and thought content normal.   Nursing note and vitals reviewed.    Pulse 97  Ht 154.9 cm (61\")  SpO2 100%    Assessment/Plan     Problem List Items Addressed This Visit     None      Visit Diagnoses     Bronchitis    -  Primary    Relevant Medications    azithromycin (ZITHROMAX Z-JAREN) 250 MG tablet    promethazine-dextromethorphan (PROMETHAZINE-DM) 6.25-15 MG/5ML syrup    Flu-like symptoms        Relevant Orders    POCT Influenza A/B (Completed)          Increase fluids. Tylenol/ibuprofen prn comfort, rtc 48h no improvement or worsening of sx.          Results for orders placed or performed in visit on 12/28/17   POCT Influenza A/B   Result Value Ref Range    Rapid Influenza A Ag NEG     Rapid Influenza B Ag NEG     Internal Control Passed Passed    Lot Number [id number]     Expiration Date 5/2019        "

## 2018-01-04 ENCOUNTER — OFFICE VISIT (OUTPATIENT)
Dept: INTERNAL MEDICINE | Facility: CLINIC | Age: 38
End: 2018-01-04

## 2018-01-04 VITALS
TEMPERATURE: 99.2 F | BODY MASS INDEX: 35.68 KG/M2 | WEIGHT: 189 LBS | OXYGEN SATURATION: 99 % | DIASTOLIC BLOOD PRESSURE: 82 MMHG | SYSTOLIC BLOOD PRESSURE: 120 MMHG | HEART RATE: 98 BPM | HEIGHT: 61 IN

## 2018-01-04 DIAGNOSIS — J01.00 ACUTE NON-RECURRENT MAXILLARY SINUSITIS: ICD-10-CM

## 2018-01-04 DIAGNOSIS — R50.9 FEVER, UNSPECIFIED FEVER CAUSE: Primary | ICD-10-CM

## 2018-01-04 PROCEDURE — 87804 INFLUENZA ASSAY W/OPTIC: CPT | Performed by: NURSE PRACTITIONER

## 2018-01-04 PROCEDURE — 99213 OFFICE O/P EST LOW 20 MIN: CPT | Performed by: NURSE PRACTITIONER

## 2018-01-04 RX ORDER — DOXYCYCLINE 100 MG/1
100 CAPSULE ORAL EVERY 12 HOURS SCHEDULED
Qty: 14 CAPSULE | Refills: 0 | Status: SHIPPED | OUTPATIENT
Start: 2018-01-04 | End: 2018-01-11

## 2018-01-26 ENCOUNTER — OFFICE VISIT (OUTPATIENT)
Dept: INTERNAL MEDICINE | Facility: CLINIC | Age: 38
End: 2018-01-26

## 2018-01-26 VITALS — HEART RATE: 121 BPM | SYSTOLIC BLOOD PRESSURE: 130 MMHG | OXYGEN SATURATION: 99 % | DIASTOLIC BLOOD PRESSURE: 72 MMHG

## 2018-01-26 DIAGNOSIS — R10.11 ABDOMINAL WALL PAIN IN RIGHT UPPER QUADRANT: Primary | ICD-10-CM

## 2018-01-26 PROCEDURE — 99213 OFFICE O/P EST LOW 20 MIN: CPT | Performed by: PHYSICIAN ASSISTANT

## 2018-01-26 RX ORDER — DULOXETIN HYDROCHLORIDE 30 MG/1
1 CAPSULE, DELAYED RELEASE ORAL DAILY
Refills: 3 | COMMUNITY
Start: 2018-01-11 | End: 2019-01-23

## 2018-01-26 NOTE — PROGRESS NOTES
Chief Complaint   Patient presents with   • Right upper quadrant pain       Subjective   Crystal A Day is a 37 y.o. female.       History of Present Illness     Pt has had right sided lower rib and upper abdominal pain for several months. Has been going on for several months.  She has been told that it is costochondritis but she feels like it is deeper. She describes it as pressure and feels like something is pushing on the area. Exacerbated by twisting and bending movements. Feels like fatty and greasy foods make it worse- puts more pressure on the area. She does not have a gallbladder. She had IBS constipation and diarrhea. Not sure if it's related to either diarrhea or constipation.        Current Outpatient Prescriptions:   •  albuterol (PROVENTIL HFA;VENTOLIN HFA) 108 (90 BASE) MCG/ACT inhaler, Inhale 2 puffs Every 6 (Six) Hours As Needed for wheezing., Disp: 1 inhaler, Rfl: 5  •  albuterol (PROVENTIL) (2.5 MG/3ML) 0.083% nebulizer solution, USE 1 VIAL PER NEBULIZER Q 4-6 H PRN, Disp: , Rfl: 3  •  betamethasone dipropionate (DIPROLENE) 0.05 % cream, APPLY A THIN FILM AA BID WITH CLOTRIMAZOLE CREAM, Disp: , Rfl: 0  •  cetirizine (zyrTEC) 10 MG tablet, Take 1 tablet by mouth Daily., Disp: , Rfl: 1  •  CLARITIN-D 12 HOUR 5-120 MG per 12 hr tablet, Take 1 tablet by mouth As Needed., Disp: , Rfl: 3  •  clotrimazole (LOTRIMIN) 1 % cream, APPLY A THIN FILM AA BID WITH BETAMETHASONE CREAM, Disp: , Rfl: 0  •  cyclobenzaprine (FLEXERIL) 10 MG tablet, Take 1 tablet by mouth 3 (Three) Times a Day As Needed for muscle spasms., Disp: 15 tablet, Rfl: 0  •  diclofenac (CATAFLAM) 50 MG tablet, Take 1 tablet by mouth 3 (Three) Times a Day., Disp: 15 tablet, Rfl: 0  •  dicyclomine (BENTYL) 20 MG tablet, Take 1 tablet by mouth 4 (Four) Times a Day., Disp: 28 tablet, Rfl: 1  •  DULoxetine (CYMBALTA) 30 MG capsule, Take 1 capsule by mouth Daily., Disp: , Rfl: 3  •  EPIPEN 2-JAREN 0.3 MG/0.3ML solution auto-injector injection, U UTD,  Disp: , Rfl: 6  •  FLOVENT  MCG/ACT inhaler, INL 2 PUFFS PO BID.  RINSE MOUTH AFTER USE, Disp: , Rfl: 6  •  fluticasone (CUTIVATE) 0.05 % cream, , Disp: , Rfl: 6  •  fluticasone (FLONASE) 50 MCG/ACT nasal spray, 2 sprays into each nostril daily., Disp: , Rfl:   •  hyoscyamine (ANASPAZ,LEVSIN) 0.125 MG tablet, Take 1 tablet by mouth Every 6 (Six) Hours., Disp: 24 tablet, Rfl: 0  •  lidocaine (XYLOCAINE) 5 % ointment, , Disp: , Rfl:   •  LORazepam (ATIVAN) 0.5 MG tablet, Take  by mouth., Disp: , Rfl:   •  mometasone-formoterol (DULERA 200) 200-5 MCG/ACT inhaler, Inhale 1 puff Daily., Disp: 13 g, Rfl: 2  •  mupirocin (BACTROBAN) 2 % ointment, APPLY EXTERNALLY TO THE AFFECTED AREA THREE TIMES DAILY, Disp: 22 g, Rfl: 0  •  NEXIUM 40 MG capsule, Take 1 capsule by mouth Every Morning Before Breakfast., Disp: 30 capsule, Rfl: 5  •  ondansetron (ZOFRAN) 4 MG tablet, Take 2 tablets by mouth every 8 (eight) hours as needed for nausea or vomiting., Disp: 30 tablet, Rfl: 1  •  PATADAY 0.2 % solution ophthalmic solution, USE 1 DROP AEY ONCE DAILY PRN, Disp: , Rfl: 3  •  PREMARIN 1.25 MG tablet, TAKE 1 TABLET BY MOUTH DAILY AS DIRECTED, Disp: 30 tablet, Rfl: 5  •  promethazine (PHENERGAN) 25 MG tablet, Take 1 tablet by mouth Every 6 (Six) Hours As Needed for Nausea or Vomiting., Disp: 20 tablet, Rfl: 0  •  raNITIdine (ZANTAC) 150 MG tablet, Take 1 tablet by mouth 2 (Two) Times a Day., Disp: 60 tablet, Rfl: 0  •  Spacer/Aero-Holding Chambers (AEROCHAMBER PLUS ADIEL-VU) misc, USE WITH INHALER AS DIRECTED, Disp: , Rfl: 1  •  topiramate (TOPAMAX) 100 MG tablet, Take 1 tablet by mouth Daily., Disp: , Rfl: 3  •  topiramate (TOPAMAX) 25 MG tablet, Take 2 tablets by mouth Daily., Disp: , Rfl: 3  •  traMADol (ULTRAM) 50 MG tablet, Take 1 tablet by mouth Every 8 (Eight) Hours As Needed for Moderate Pain ., Disp: 90 tablet, Rfl: 0  •  traZODone (DESYREL) 150 MG tablet, Take 1 tablet by mouth As Needed., Disp: , Rfl: 3  •  zolpidem  (AMBIEN) 5 MG tablet, Take 5 mg by mouth At Night As Needed for Sleep., Disp: , Rfl:      PMFSH  The following portions of the patient's history were reviewed and updated as appropriate: allergies, current medications, past family history, past medical history, past social history, past surgical history and problem list.    Review of Systems   Constitutional: Negative for activity change, appetite change, chills, diaphoresis and fatigue.   HENT: Negative.    Respiratory: Negative for chest tightness, shortness of breath and wheezing.    Cardiovascular: Negative for chest pain.   Gastrointestinal: Positive for abdominal pain, constipation and diarrhea. Negative for abdominal distention, blood in stool, nausea and vomiting.   Genitourinary: Negative for dysuria.   Musculoskeletal: Positive for myalgias. Negative for arthralgias.   Neurological: Negative for dizziness, syncope, weakness and light-headedness.   Psychiatric/Behavioral: Negative.        Objective   /72  Pulse (!) 121  SpO2 99%    Physical Exam   Constitutional: She appears well-developed and well-nourished.   HENT:   Head: Normocephalic.   Right Ear: Hearing, tympanic membrane, external ear and ear canal normal.   Left Ear: Hearing, tympanic membrane, external ear and ear canal normal.   Nose: Nose normal.   Mouth/Throat: Oropharynx is clear and moist.   Eyes: Conjunctivae are normal. Pupils are equal, round, and reactive to light.   Neck: Normal range of motion.   Cardiovascular: Normal rate, regular rhythm and normal heart sounds.    Pulmonary/Chest: Effort normal and breath sounds normal. She has no decreased breath sounds. She has no wheezes. She has no rhonchi. She has no rales.   Abdominal: Normal appearance and bowel sounds are normal. There is tenderness in the right upper quadrant. There is no rigidity, no rebound, no guarding and no CVA tenderness.   Musculoskeletal: Normal range of motion.   Neurological: She is alert.   Skin: Skin is  warm and dry.   Psychiatric: She has a normal mood and affect. Her behavior is normal.   Nursing note and vitals reviewed.      ASSESSMENT/PLAN    Problem List Items Addressed This Visit     None      Visit Diagnoses     Abdominal wall pain in right upper quadrant    -  Primary    Trial of physical therapy to help with musculoskeletal discomfort. If no improvement, discussed further imaging.    Relevant Orders    Ambulatory Referral to Physical Therapy Evaluate and treat               Return for Recheck with Hayley.

## 2018-02-09 ENCOUNTER — APPOINTMENT (OUTPATIENT)
Dept: PHYSICAL THERAPY | Facility: HOSPITAL | Age: 38
End: 2018-02-09

## 2018-02-16 ENCOUNTER — TELEPHONE (OUTPATIENT)
Dept: INTERNAL MEDICINE | Facility: CLINIC | Age: 38
End: 2018-02-16

## 2018-02-16 ENCOUNTER — OFFICE VISIT (OUTPATIENT)
Dept: INTERNAL MEDICINE | Facility: CLINIC | Age: 38
End: 2018-02-16

## 2018-02-16 VITALS
HEIGHT: 61 IN | DIASTOLIC BLOOD PRESSURE: 82 MMHG | OXYGEN SATURATION: 99 % | SYSTOLIC BLOOD PRESSURE: 128 MMHG | WEIGHT: 180 LBS | HEART RATE: 109 BPM | TEMPERATURE: 98.8 F | BODY MASS INDEX: 33.99 KG/M2

## 2018-02-16 DIAGNOSIS — J01.01 ACUTE RECURRENT MAXILLARY SINUSITIS: ICD-10-CM

## 2018-02-16 DIAGNOSIS — N89.8 VAGINAL DISCHARGE: ICD-10-CM

## 2018-02-16 DIAGNOSIS — E66.9 OBESITY (BMI 30-39.9): ICD-10-CM

## 2018-02-16 DIAGNOSIS — M54.16 LUMBAR RADICULOPATHY: ICD-10-CM

## 2018-02-16 DIAGNOSIS — R10.9 RIGHT SIDED ABDOMINAL PAIN: ICD-10-CM

## 2018-02-16 DIAGNOSIS — R68.89 FLU-LIKE SYMPTOMS: Primary | ICD-10-CM

## 2018-02-16 DIAGNOSIS — R73.9 HYPERGLYCEMIA: ICD-10-CM

## 2018-02-16 DIAGNOSIS — M51.26 HERNIATED LUMBAR INTERVERTEBRAL DISC: ICD-10-CM

## 2018-02-16 DIAGNOSIS — R50.9 FEVER, UNSPECIFIED FEVER CAUSE: ICD-10-CM

## 2018-02-16 DIAGNOSIS — E66.01 MORBID OBESITY WITH BMI OF 40.0-44.9, ADULT (HCC): ICD-10-CM

## 2018-02-16 DIAGNOSIS — N89.8 VAGINAL ODOR: ICD-10-CM

## 2018-02-16 LAB
EXPIRATION DATE: NORMAL
FLUAV AG NPH QL: NEGATIVE
FLUBV AG NPH QL: NEGATIVE
HBA1C MFR BLD: 5.6 %
INTERNAL CONTROL: NORMAL
Lab: NORMAL

## 2018-02-16 PROCEDURE — 87491 CHLMYD TRACH DNA AMP PROBE: CPT | Performed by: NURSE PRACTITIONER

## 2018-02-16 PROCEDURE — 87661 TRICHOMONAS VAGINALIS AMPLIF: CPT | Performed by: NURSE PRACTITIONER

## 2018-02-16 PROCEDURE — 99214 OFFICE O/P EST MOD 30 MIN: CPT | Performed by: NURSE PRACTITIONER

## 2018-02-16 PROCEDURE — 87481 CANDIDA DNA AMP PROBE: CPT | Performed by: NURSE PRACTITIONER

## 2018-02-16 PROCEDURE — 83036 HEMOGLOBIN GLYCOSYLATED A1C: CPT | Performed by: NURSE PRACTITIONER

## 2018-02-16 PROCEDURE — 87798 DETECT AGENT NOS DNA AMP: CPT | Performed by: NURSE PRACTITIONER

## 2018-02-16 PROCEDURE — 87804 INFLUENZA ASSAY W/OPTIC: CPT | Performed by: NURSE PRACTITIONER

## 2018-02-16 PROCEDURE — 87591 N.GONORRHOEAE DNA AMP PROB: CPT | Performed by: NURSE PRACTITIONER

## 2018-02-16 RX ORDER — FLUTICASONE PROPIONATE 110 UG/1
1 AEROSOL, METERED RESPIRATORY (INHALATION) 2 TIMES DAILY PRN
Qty: 1 INHALER | Refills: 6 | Status: SHIPPED | OUTPATIENT
Start: 2018-02-16 | End: 2018-06-05 | Stop reason: SDUPTHER

## 2018-02-16 RX ORDER — TIZANIDINE HYDROCHLORIDE 4 MG/1
4 CAPSULE, GELATIN COATED ORAL 3 TIMES DAILY
Qty: 24 CAPSULE | Refills: 1 | Status: SHIPPED | OUTPATIENT
Start: 2018-02-16 | End: 2018-02-16

## 2018-02-16 RX ORDER — TIZANIDINE 4 MG/1
TABLET ORAL
Qty: 270 TABLET | Refills: 1 | OUTPATIENT
Start: 2018-02-16

## 2018-02-16 RX ORDER — TRAMADOL HYDROCHLORIDE 50 MG/1
50 TABLET ORAL EVERY 8 HOURS PRN
Qty: 90 TABLET | Refills: 0 | Status: SHIPPED | OUTPATIENT
Start: 2018-02-16 | End: 2018-07-03 | Stop reason: SDUPTHER

## 2018-02-16 RX ORDER — TIZANIDINE HYDROCHLORIDE 4 MG/1
CAPSULE, GELATIN COATED ORAL
Qty: 270 CAPSULE | Refills: 1 | OUTPATIENT
Start: 2018-02-16

## 2018-02-16 RX ORDER — TIZANIDINE 4 MG/1
4 TABLET ORAL 3 TIMES DAILY
Qty: 24 TABLET | Refills: 1 | Status: SHIPPED | OUTPATIENT
Start: 2018-02-16 | End: 2018-03-20

## 2018-02-16 NOTE — TELEPHONE ENCOUNTER
THE PHARM HASN'T RECEIVED THE FLOVENT OR THE ZANAFLEX. PLEASE RESEND. PT WAS ALSO SUPPOSE TO GET AN ANTIBIOTIC CALLED IN BUT IT ISN'T ON HER LIST

## 2018-02-16 NOTE — TELEPHONE ENCOUNTER
maryse has called to inform you that the tizanidine capsules are not covered but the tablets are. Is it okay to switch

## 2018-02-16 NOTE — PROGRESS NOTES
Subjective  Fatigue; Weight Loss; Diabetes (possible); Back Pain; Fever; Cough; Nasal Congestion; and Generalized Body Aches      Crystal A Day is a 37 y.o. female.   Allergies   Allergen Reactions   • Adhesive Tape Hives   • Biaxin [Clarithromycin] Nausea Only   • Codeine Itching   • Latex Hives   • Penicillins Nausea Only   • Sulfa Antibiotics Hives   • Sulfate Hives     History of Present Illness      1) chronic upper and lower back pain, when has bm , feels like a different pain down the legs than what usually does, when wipes it puts pain down left leg  X 1 month     2) pt has hx high glucose in the past, her liver doctor told her prediabetic     3) saw gastro for pain in right lower chronic , was told to do PT, PT did not think appropriate, they suggested scar tissue from so many surgeries , pain is intermittent , feels like pressure  X 1 year    4) weight mg't- needs a program states she can't do on her own     5) thinks has vaginosis, has bad smell, discharge , has had at least since 1/26/18    6) body aches and chills x 1 week, intermittent diarrhea, took nyquil/dayquil w/o relief , temp last night was 101  The following portions of the patient's history were reviewed and updated as appropriate: current medications, past medical history, past surgical history and problem list.    Review of Systems   Constitutional: Positive for chills, fatigue and fever.        Obesity   Gastrointestinal: Positive for abdominal pain.   Genitourinary: Positive for vaginal discharge.   Musculoskeletal: Positive for arthralgias, back pain and myalgias.       Objective   Physical Exam   Constitutional: She is oriented to person, place, and time. She appears well-developed and well-nourished.   HENT:   Head: Normocephalic and atraumatic.   Mouth/Throat: Oropharynx is clear and moist.   Eyes: Conjunctivae are normal.   Pulmonary/Chest: Effort normal and breath sounds normal.   Abdominal: Soft. Bowel sounds are normal. There is  "tenderness in the right lower quadrant. There is no rigidity, no guarding and negative Church's sign.   Genitourinary: Vagina normal. No vaginal discharge found.   Neurological: She is oriented to person, place, and time.   Skin: Skin is warm and dry.   Psychiatric: She has a normal mood and affect. Her behavior is normal. Judgment and thought content normal.   Nursing note and vitals reviewed.    /82  Pulse 109  Temp 98.8 °F (37.1 °C) (Oral)   Ht 154.9 cm (61\")  Wt 81.6 kg (180 lb)  SpO2 99%  BMI 34.01 kg/m2    Assessment/Plan     Problem List Items Addressed This Visit        Nervous and Auditory    Lumbar radiculopathy    Relevant Orders    Ambulatory Referral to Pain Management       Musculoskeletal and Integument    Herniated lumbar intervertebral disc    Relevant Medications    traMADol (ULTRAM) 50 MG tablet    Other Relevant Orders    Ambulatory Referral to Pain Management      Other Visit Diagnoses     Flu-like symptoms    -  Primary    Relevant Orders    POCT Influenza A/B (Completed)    Morbid obesity with BMI of 40.0-44.9, adult        Obesity (BMI 30-39.9)        Relevant Orders    Ambulatory Referral to Nutrition Services    Vaginal discharge        Relevant Orders    NuSwab VG+ - Swab, Vagina    Vaginal odor        Relevant Orders    NuSwab VG+ - Swab, Vagina    Fever, unspecified fever cause        Relevant Orders    NuSwab VG+ - Swab, Vagina    Hyperglycemia        Relevant Orders    POC Glycosylated Hemoglobin (Hb A1C) (Completed)    Right sided abdominal pain        Relevant Medications    TiZANidine (ZANAFLEX) 4 MG capsule    Acute recurrent maxillary sinusitis        Relevant Medications    fluticasone (FLOVENT HFA) 110 MCG/ACT inhaler        Results for orders placed or performed in visit on 02/16/18   POCT Influenza A/B   Result Value Ref Range    Rapid Influenza A Ag negative     Rapid Influenza B Ag negative     Internal Control Passed Passed    Lot Number [id number]     Expiration " Date 03/07/2020    POC Glycosylated Hemoglobin (Hb A1C)   Result Value Ref Range    Hemoglobin A1C 5.6 %     Orders as above

## 2018-02-17 RX ORDER — AZITHROMYCIN 250 MG/1
TABLET, FILM COATED ORAL
Qty: 6 TABLET | Refills: 0 | Status: SHIPPED | OUTPATIENT
Start: 2018-02-17 | End: 2018-03-15

## 2018-02-17 RX ORDER — FLUCONAZOLE 150 MG/1
150 TABLET ORAL ONCE
Qty: 1 TABLET | Refills: 0 | Status: SHIPPED | OUTPATIENT
Start: 2018-02-17 | End: 2018-02-17

## 2018-02-19 ENCOUNTER — TELEPHONE (OUTPATIENT)
Dept: INTERNAL MEDICINE | Facility: CLINIC | Age: 38
End: 2018-02-19

## 2018-02-19 NOTE — TELEPHONE ENCOUNTER
Received call from pharmacy wanting to clarify if you meant to send in Flovent, pt is thinks you were supposed to send in Flonase, pls clarify.

## 2018-02-21 RX ORDER — FLUTICASONE PROPIONATE 50 MCG
2 SPRAY, SUSPENSION (ML) NASAL DAILY
Qty: 1 BOTTLE | Refills: 3 | Status: SHIPPED | OUTPATIENT
Start: 2018-02-21 | End: 2018-07-11 | Stop reason: SDUPTHER

## 2018-02-28 ENCOUNTER — TELEPHONE (OUTPATIENT)
Dept: PAIN MEDICINE | Facility: CLINIC | Age: 38
End: 2018-02-28

## 2018-03-08 ENCOUNTER — HOSPITAL ENCOUNTER (EMERGENCY)
Facility: HOSPITAL | Age: 38
Discharge: HOME | End: 2018-03-08
Attending: EMERGENCY MEDICINE | Admitting: EMERGENCY MEDICINE

## 2018-03-08 ENCOUNTER — APPOINTMENT (OUTPATIENT)
Dept: GENERAL RADIOLOGY | Facility: HOSPITAL | Age: 38
End: 2018-03-08

## 2018-03-08 VITALS
HEIGHT: 61 IN | RESPIRATION RATE: 16 BRPM | HEART RATE: 98 BPM | TEMPERATURE: 97.4 F | WEIGHT: 180 LBS | DIASTOLIC BLOOD PRESSURE: 79 MMHG | BODY MASS INDEX: 33.99 KG/M2 | SYSTOLIC BLOOD PRESSURE: 128 MMHG | OXYGEN SATURATION: 98 %

## 2018-03-08 DIAGNOSIS — J40 BRONCHITIS: Primary | ICD-10-CM

## 2018-03-08 LAB
ALBUMIN SERPL-MCNC: 4.4 G/DL (ref 3.2–4.8)
ALBUMIN/GLOB SERPL: 1.5 G/DL (ref 1.5–2.5)
ALP SERPL-CCNC: 71 U/L (ref 25–100)
ALT SERPL W P-5'-P-CCNC: 24 U/L (ref 7–40)
ANION GAP SERPL CALCULATED.3IONS-SCNC: 10 MMOL/L (ref 3–11)
AST SERPL-CCNC: 17 U/L (ref 0–33)
BASOPHILS # BLD AUTO: 0.02 10*3/MM3 (ref 0–0.2)
BASOPHILS NFR BLD AUTO: 0.2 % (ref 0–1)
BILIRUB SERPL-MCNC: 0.2 MG/DL (ref 0.3–1.2)
BUN BLD-MCNC: 16 MG/DL (ref 9–23)
BUN/CREAT SERPL: 20 (ref 7–25)
CALCIUM SPEC-SCNC: 9 MG/DL (ref 8.7–10.4)
CHLORIDE SERPL-SCNC: 108 MMOL/L (ref 99–109)
CO2 SERPL-SCNC: 21 MMOL/L (ref 20–31)
CREAT BLD-MCNC: 0.8 MG/DL (ref 0.6–1.3)
DEPRECATED RDW RBC AUTO: 43.1 FL (ref 37–54)
EOSINOPHIL # BLD AUTO: 0.27 10*3/MM3 (ref 0–0.3)
EOSINOPHIL NFR BLD AUTO: 2.9 % (ref 0–3)
ERYTHROCYTE [DISTWIDTH] IN BLOOD BY AUTOMATED COUNT: 13.6 % (ref 11.3–14.5)
FLUAV AG NPH QL: NEGATIVE
FLUBV AG NPH QL IA: NEGATIVE
GFR SERPL CREATININE-BSD FRML MDRD: 82 ML/MIN/1.73
GLOBULIN UR ELPH-MCNC: 3 GM/DL
GLUCOSE BLD-MCNC: 94 MG/DL (ref 70–100)
HCT VFR BLD AUTO: 41.8 % (ref 34.5–44)
HGB BLD-MCNC: 13.3 G/DL (ref 11.5–15.5)
IMM GRANULOCYTES # BLD: 0.03 10*3/MM3 (ref 0–0.03)
IMM GRANULOCYTES NFR BLD: 0.3 % (ref 0–0.6)
LYMPHOCYTES # BLD AUTO: 2.96 10*3/MM3 (ref 0.6–4.8)
LYMPHOCYTES NFR BLD AUTO: 31.4 % (ref 24–44)
MCH RBC QN AUTO: 27.7 PG (ref 27–31)
MCHC RBC AUTO-ENTMCNC: 31.8 G/DL (ref 32–36)
MCV RBC AUTO: 87.1 FL (ref 80–99)
MONOCYTES # BLD AUTO: 0.41 10*3/MM3 (ref 0–1)
MONOCYTES NFR BLD AUTO: 4.3 % (ref 0–12)
NEUTROPHILS # BLD AUTO: 5.74 10*3/MM3 (ref 1.5–8.3)
NEUTROPHILS NFR BLD AUTO: 60.9 % (ref 41–71)
PLATELET # BLD AUTO: 292 10*3/MM3 (ref 150–450)
PMV BLD AUTO: 10.8 FL (ref 6–12)
POTASSIUM BLD-SCNC: 3.5 MMOL/L (ref 3.5–5.5)
PROT SERPL-MCNC: 7.4 G/DL (ref 5.7–8.2)
RBC # BLD AUTO: 4.8 10*6/MM3 (ref 3.89–5.14)
SODIUM BLD-SCNC: 139 MMOL/L (ref 132–146)
WBC NRBC COR # BLD: 9.43 10*3/MM3 (ref 3.5–10.8)

## 2018-03-08 PROCEDURE — 99283 EMERGENCY DEPT VISIT LOW MDM: CPT

## 2018-03-08 PROCEDURE — 80053 COMPREHEN METABOLIC PANEL: CPT | Performed by: PHYSICIAN ASSISTANT

## 2018-03-08 PROCEDURE — 71046 X-RAY EXAM CHEST 2 VIEWS: CPT

## 2018-03-08 PROCEDURE — 93005 ELECTROCARDIOGRAM TRACING: CPT | Performed by: EMERGENCY MEDICINE

## 2018-03-08 PROCEDURE — 87804 INFLUENZA ASSAY W/OPTIC: CPT | Performed by: PHYSICIAN ASSISTANT

## 2018-03-08 PROCEDURE — 85025 COMPLETE CBC W/AUTO DIFF WBC: CPT | Performed by: PHYSICIAN ASSISTANT

## 2018-03-08 RX ORDER — SODIUM CHLORIDE 0.9 % (FLUSH) 0.9 %
10 SYRINGE (ML) INJECTION AS NEEDED
Status: DISCONTINUED | OUTPATIENT
Start: 2018-03-08 | End: 2018-03-08 | Stop reason: HOSPADM

## 2018-03-08 RX ADMIN — SODIUM CHLORIDE 1000 ML: 9 INJECTION, SOLUTION INTRAVENOUS at 18:44

## 2018-03-09 NOTE — ED PROVIDER NOTES
"Subjective   HPI Comments: Pt is a 38 yo female presenting to ED with cough, SOB and fatigue. She explains she has been sick with nasal congestion and cough for about a week. She states her cough has been productive but unable to describe sputum. She went to allergist today and was sent to ED for \"low pulmonary testing\". She was was given steroid and antibiotic shot yesterday at the [facility] She does smoke occasionally. Pt has had some chest pressure and dizziness with coughing spells. She denies cardiac hx. Pt with significant hx for Bipolar, Arthritis, Asthma, Depression, Endometriosis, Ovarian cysts, and PID.     Patient is a 37 y.o. female presenting with cough.   History provided by:  Patient  Cough   Cough characteristics:  Productive  Sputum characteristics:  Unable to specify  Duration:  1 week  Smoker: yes (intermittent )    Ineffective treatments: neb, steroid shot and antibiotic shot yesterday   Associated symptoms: shortness of breath and sinus congestion    Associated symptoms: no chest pain, no chills, no ear pain, no fever, no headaches, no myalgias, no rash, no sore throat and no wheezing        Review of Systems   Constitutional: Positive for fatigue. Negative for chills and fever.   HENT: Negative for congestion, ear pain, sore throat and trouble swallowing.    Eyes: Negative for pain, redness and visual disturbance.   Respiratory: Positive for cough and shortness of breath. Negative for chest tightness and wheezing.    Cardiovascular: Negative for chest pain and leg swelling.   Gastrointestinal: Negative for abdominal pain, constipation, diarrhea, nausea and vomiting.   Genitourinary: Negative for difficulty urinating, dysuria, flank pain, hematuria and vaginal bleeding.   Musculoskeletal: Negative for arthralgias, back pain, joint swelling and myalgias.   Skin: Negative for rash and wound.   Neurological: Negative for dizziness, syncope, speech difficulty, weakness, " numbness and headaches.   Psychiatric/Behavioral: Negative for confusion.   All other systems reviewed and are negative.      Past Medical History:   Diagnosis Date   • Abdominal pain    • Abnormal breast exam    • Abnormal Pap smear of cervix    • Achilles tendinitis    • Acute sinusitis    • Anxiety    • Arthritis    • Asthma    • Tavera's syndrome    • Bipolar 1 disorder    • Bowel trouble    • Breast cyst    • Colon polyps    • Constipation    • Depression    • Diverticulitis    • Endometriosis    • Eustachian tube dysfunction    • Female pelvic pain    • Gastric ulcer    • H/O bladder infections    • History of rashes as a child    • Low back pain    • Menopausal symptoms    • Muscle weakness    • Ovarian cyst    • PID (pelvic inflammatory disease)    • Recurrent UTI    • Sexual problems        Allergies   Allergen Reactions   • Adhesive Tape Hives   • Biaxin [Clarithromycin] Nausea Only   • Codeine Itching   • Latex Hives   • Penicillins Nausea Only   • Sulfa Antibiotics Hives   • Sulfate Hives       Past Surgical History:   Procedure Laterality Date   • BREAST BIOPSY      abnormal   •  SECTION     • HYSTERECTOMY     • NASAL ENDOSCOPY     • OOPHORECTOMY Bilateral    • OTHER SURGICAL HISTORY      Laparoscopy (diagnostic) gynecologic with biopsy   • SHOULDER SURGERY         Family History   Problem Relation Age of Onset   • Liver disease Mother    • Diabetes Mother    • Hyperlipidemia Mother    • Hypertension Mother    • Thyroid disease Mother    • Arthritis Father    • Mental illness Father    • Migraines Sister    • Stroke Other    • Diabetes Other    • Hyperlipidemia Other    • Hypertension Other    • Mental illness Other    • Migraines Other    • Tuberculosis Other        Social History     Social History   • Marital status: Single     Social History Main Topics   • Smoking status: Current Some Day Smoker     Types: Cigarettes   • Smokeless tobacco: Never Used      Comment: social smoking   •  Alcohol use No   • Drug use: No   • Sexual activity: Defer           Objective   Physical Exam   Constitutional: She is oriented to person, place, and time. Vital signs are normal. She appears well-developed.   HENT:   Head: Atraumatic.   Nose: Nose normal.   Mouth/Throat: Mucous membranes are normal.   Eyes: Conjunctivae, EOM and lids are normal. Pupils are equal, round, and reactive to light.   Neck: Normal range of motion. Neck supple.   Cardiovascular: Normal rate, regular rhythm and normal heart sounds.    Pulmonary/Chest: Effort normal and breath sounds normal. She has no wheezes.   Abdominal: Soft. She exhibits no distension. There is no tenderness. There is no rebound and no guarding.   Musculoskeletal: Normal range of motion. She exhibits no edema or tenderness.   Neurological: She is alert and oriented to person, place, and time. No sensory deficit.   Skin: Skin is warm and dry. No rash noted. No erythema.   Psychiatric: She has a normal mood and affect. Her speech is normal and behavior is normal.   Nursing note and vitals reviewed.      Procedures         ED Course  ED Course      Re-examined patient in ED. No respiratory distress, vitals stable. Discussed results and with patient and she is agreeable with plan of care. She will f/u with PCP. She will return to ED if new or worse sx.     Recent Results (from the past 24 hour(s))   Comprehensive Metabolic Panel    Collection Time: 03/08/18  6:43 PM   Result Value Ref Range    Glucose 94 70 - 100 mg/dL    BUN 16 9 - 23 mg/dL    Creatinine 0.80 0.60 - 1.30 mg/dL    Sodium 139 132 - 146 mmol/L    Potassium 3.5 3.5 - 5.5 mmol/L    Chloride 108 99 - 109 mmol/L    CO2 21.0 20.0 - 31.0 mmol/L    Calcium 9.0 8.7 - 10.4 mg/dL    Total Protein 7.4 5.7 - 8.2 g/dL    Albumin 4.40 3.20 - 4.80 g/dL    ALT (SGPT) 24 7 - 40 U/L    AST (SGOT) 17 0 - 33 U/L    Alkaline Phosphatase 71 25 - 100 U/L    Total Bilirubin 0.2 (L) 0.3 - 1.2 mg/dL    eGFR Non African Amer 82 >60  "mL/min/1.73    Globulin 3.0 gm/dL    A/G Ratio 1.5 1.5 - 2.5 g/dL    BUN/Creatinine Ratio 20.0 7.0 - 25.0    Anion Gap 10.0 3.0 - 11.0 mmol/L   CBC Auto Differential    Collection Time: 03/08/18  6:43 PM   Result Value Ref Range    WBC 9.43 3.50 - 10.80 10*3/mm3    RBC 4.80 3.89 - 5.14 10*6/mm3    Hemoglobin 13.3 11.5 - 15.5 g/dL    Hematocrit 41.8 34.5 - 44.0 %    MCV 87.1 80.0 - 99.0 fL    MCH 27.7 27.0 - 31.0 pg    MCHC 31.8 (L) 32.0 - 36.0 g/dL    RDW 13.6 11.3 - 14.5 %    RDW-SD 43.1 37.0 - 54.0 fl    MPV 10.8 6.0 - 12.0 fL    Platelets 292 150 - 450 10*3/mm3    Neutrophil % 60.9 41.0 - 71.0 %    Lymphocyte % 31.4 24.0 - 44.0 %    Monocyte % 4.3 0.0 - 12.0 %    Eosinophil % 2.9 0.0 - 3.0 %    Basophil % 0.2 0.0 - 1.0 %    Immature Grans % 0.3 0.0 - 0.6 %    Neutrophils, Absolute 5.74 1.50 - 8.30 10*3/mm3    Lymphocytes, Absolute 2.96 0.60 - 4.80 10*3/mm3    Monocytes, Absolute 0.41 0.00 - 1.00 10*3/mm3    Eosinophils, Absolute 0.27 0.00 - 0.30 10*3/mm3    Basophils, Absolute 0.02 0.00 - 0.20 10*3/mm3    Immature Grans, Absolute 0.03 0.00 - 0.03 10*3/mm3   Influenza Antigen, Rapid - Swab, Nasopharynx    Collection Time: 03/08/18  6:48 PM   Result Value Ref Range    Influenza A Ag, EIA Negative Negative    Influenza B Ag, EIA Negative Negative     Note: In addition to lab results from this visit, the labs listed above may include labs taken at another facility or during a different encounter within the last 24 hours. Please correlate lab times with ED admission and discharge times for further clarification of the services performed during this visit.    XR Chest 2 View   Final Result     No acute findings.      THIS DOCUMENT HAS BEEN ELECTRONICALLY SIGNED BY YANA WHIPPLE MD        Vitals:    03/08/18 1728   BP: 132/76   BP Location: Left arm   Patient Position: Sitting   Pulse: 101   Resp: 18   Temp: 98 °F (36.7 °C)   TempSrc: Oral   SpO2: 99%   Weight: 81.6 kg (180 lb)   Height: 154.9 cm (61\")     Medications "   sodium chloride 0.9 % flush 10 mL (not administered)   sodium chloride 0.9 % bolus 1,000 mL (1,000 mL Intravenous New Bag 3/8/18 1844)     ECG/EMG Results (last 24 hours)     Procedure Component Value Units Date/Time    ECG 12 Lead [[id number]] Collected:  03/08/18 1750     Updated:  03/08/18 1812    Narrative:       Test Reason : SOA  Blood Pressure : **/** mmHG  Vent. Rate : 116 BPM     Atrial Rate : 116 BPM     P-R Int : 128 ms          QRS Dur : 076 ms      QT Int : 288 ms       P-R-T Axes : 028 000 126 degrees     QTc Int : 400 ms    Sinus tachycardia  Cannot rule out Anterior infarct , age undetermined  Abnormal ECG  When compared with ECG of 12-NOV-2017 01:16,  Nonspecific T wave abnormality, worse in Anterolateral leads  Confirmed by SILVIA COSTA MD ([id number]) on 3/8/2018 6:12:02 PM    Referred By:  DEMETRIA LOU           Confirmed By:SILVIA COSTA MD                    [facility]    Final diagnoses:   Bronchitis            DANELLE Roca  03/08/18 6088

## 2018-03-13 ENCOUNTER — HOSPITAL ENCOUNTER (EMERGENCY)
Facility: HOSPITAL | Age: 38
Discharge: HOME | End: 2018-03-13
Attending: EMERGENCY MEDICINE | Admitting: EMERGENCY MEDICINE

## 2018-03-13 ENCOUNTER — APPOINTMENT (OUTPATIENT)
Dept: GENERAL RADIOLOGY | Facility: HOSPITAL | Age: 38
End: 2018-03-13

## 2018-03-13 VITALS
BODY MASS INDEX: 34.17 KG/M2 | TEMPERATURE: 98.2 F | SYSTOLIC BLOOD PRESSURE: 110 MMHG | OXYGEN SATURATION: 99 % | WEIGHT: 181 LBS | HEART RATE: 82 BPM | HEIGHT: 61 IN | RESPIRATION RATE: 16 BRPM | DIASTOLIC BLOOD PRESSURE: 72 MMHG

## 2018-03-13 DIAGNOSIS — J20.8 ACUTE VIRAL BRONCHITIS: Primary | ICD-10-CM

## 2018-03-13 PROCEDURE — 93005 ELECTROCARDIOGRAM TRACING: CPT | Performed by: EMERGENCY MEDICINE

## 2018-03-13 PROCEDURE — 99283 EMERGENCY DEPT VISIT LOW MDM: CPT

## 2018-03-13 PROCEDURE — 93005 ELECTROCARDIOGRAM TRACING: CPT

## 2018-03-13 PROCEDURE — 71046 X-RAY EXAM CHEST 2 VIEWS: CPT

## 2018-03-13 PROCEDURE — 94640 AIRWAY INHALATION TREATMENT: CPT

## 2018-03-13 RX ORDER — METHYLPREDNISOLONE 4 MG/1
TABLET ORAL
Qty: 21 EACH | Refills: 0 | Status: SHIPPED | OUTPATIENT
Start: 2018-03-13 | End: 2018-03-15

## 2018-03-13 RX ORDER — IPRATROPIUM BROMIDE AND ALBUTEROL SULFATE 2.5; .5 MG/3ML; MG/3ML
3 SOLUTION RESPIRATORY (INHALATION) ONCE
Status: COMPLETED | OUTPATIENT
Start: 2018-03-13 | End: 2018-03-13

## 2018-03-13 RX ORDER — ONDANSETRON 4 MG/1
4 TABLET, ORALLY DISINTEGRATING ORAL ONCE
Status: COMPLETED | OUTPATIENT
Start: 2018-03-13 | End: 2018-03-13

## 2018-03-13 RX ORDER — ACETAMINOPHEN 500 MG
1000 TABLET ORAL ONCE
Status: COMPLETED | OUTPATIENT
Start: 2018-03-13 | End: 2018-03-13

## 2018-03-13 RX ORDER — BENZONATATE 200 MG/1
200 CAPSULE ORAL 3 TIMES DAILY PRN
Qty: 20 CAPSULE | Refills: 0 | Status: SHIPPED | OUTPATIENT
Start: 2018-03-13 | End: 2018-03-15

## 2018-03-13 RX ADMIN — ACETAMINOPHEN 1000 MG: 500 TABLET ORAL at 07:11

## 2018-03-13 RX ADMIN — ONDANSETRON 4 MG: 4 TABLET, ORALLY DISINTEGRATING ORAL at 07:11

## 2018-03-13 RX ADMIN — IPRATROPIUM BROMIDE AND ALBUTEROL SULFATE 3 ML: 2.5; .5 SOLUTION RESPIRATORY (INHALATION) at 07:01

## 2018-03-13 NOTE — ED PROVIDER NOTES
"Subjective     Pt complains of dyspnea and congestion for several days.  Last week she began with cough, congestion, chest tightness, dyspnea.  She was seen in an office and dx with bronchitis and given \"an antibiotic shot and a steroid shot\" as well as prescriptions for both by mouth.  She presented to the ED where the diagnosed was confirmed.  She says she has not improved and last night couldn't sleep due to coughing and congestion and so she came in.    She has pulled up her CXR report from previous visit and has Googled \"plate-like atelectasis\" and is upset that more attention was not paid to this particular incidental finding, as her Googling told her that this is in fact \"a really big deal.\"        History provided by:  Patient  Cough   Cough characteristics:  Harsh  Severity:  Severe  Onset quality:  Gradual  Timing:  Constant  Progression:  Unchanged  Chronicity:  New  Context: upper respiratory infection    Relieved by:  Nothing  Ineffective treatments:  Beta-agonist inhaler and steroid inhaler  Associated symptoms: fever, rhinorrhea, shortness of breath, sinus congestion and wheezing        Review of Systems   Constitutional: Positive for fever.   HENT: Positive for rhinorrhea.    Respiratory: Positive for cough, shortness of breath and wheezing.    All other systems reviewed and are negative.      Past Medical History:   Diagnosis Date   • Abdominal pain    • Abnormal breast exam    • Abnormal Pap smear of cervix    • Achilles tendinitis    • Acute sinusitis    • Anxiety    • Arthritis    • Asthma    • Tavera's syndrome    • Bipolar 1 disorder    • Bowel trouble    • Breast cyst    • Colon polyps    • Constipation    • Depression    • Diverticulitis    • Endometriosis    • Eustachian tube dysfunction    • Female pelvic pain    • Gastric ulcer    • H/O bladder infections    • History of rashes as a child    • Low back pain    • Menopausal symptoms    • Muscle weakness    • Ovarian cyst    • PID (pelvic " inflammatory disease)    • Recurrent UTI    • Sexual problems        Allergies   Allergen Reactions   • Adhesive Tape Hives   • Biaxin [Clarithromycin] Nausea Only   • Codeine Itching   • Latex Hives   • Penicillins Nausea Only   • Sulfa Antibiotics Hives   • Sulfate Hives       Past Surgical History:   Procedure Laterality Date   • BREAST BIOPSY      abnormal   •  SECTION     • HYSTERECTOMY     • NASAL ENDOSCOPY     • OOPHORECTOMY Bilateral    • OTHER SURGICAL HISTORY      Laparoscopy (diagnostic) gynecologic with biopsy   • SHOULDER SURGERY         Family History   Problem Relation Age of Onset   • Liver disease Mother    • Diabetes Mother    • Hyperlipidemia Mother    • Hypertension Mother    • Thyroid disease Mother    • Arthritis Father    • Mental illness Father    • Migraines Sister    • Stroke Other    • Diabetes Other    • Hyperlipidemia Other    • Hypertension Other    • Mental illness Other    • Migraines Other    • Tuberculosis Other        Social History     Social History   • Marital status: Single     Social History Main Topics   • Smoking status: Current Some Day Smoker     Types: Cigarettes   • Smokeless tobacco: Never Used      Comment: social smoking   • Alcohol use No   • Drug use: No   • Sexual activity: Defer     Other Topics Concern   • Not on file           Objective   Physical Exam   Constitutional: She appears well-developed and well-nourished. She is cooperative.  Non-toxic appearance. No distress.   HENT:   Head: Normocephalic and atraumatic.   Mouth/Throat: Oropharynx is clear and moist and mucous membranes are normal. No oropharyngeal exudate.   Eyes: Conjunctivae and EOM are normal. No scleral icterus.   Neck: Normal range of motion. Neck supple.   Cardiovascular: Regular rhythm and normal heart sounds.  Tachycardia present.    No murmur heard.  Pulmonary/Chest: Effort normal and breath sounds normal. No respiratory distress. She has no wheezes. She has no rhonchi. She has no  rales.   Abdominal: Soft. Bowel sounds are normal. There is no tenderness. There is no rebound and no guarding.   Musculoskeletal: Normal range of motion. She exhibits no edema.   Lymphadenopathy:     She has no cervical adenopathy.   Neurological: She is alert. She has normal strength.   Skin: Skin is warm and dry. No rash noted.   Psychiatric: She has a normal mood and affect. Her speech is normal and behavior is normal.   Nursing note and vitals reviewed.      Procedures         ED Course  ED Course      EKG ST.  CXR from last visit reviewed, stable RML granulomatous disease and mild LLL atelectasis noted, NAP.  CXR today is negative.  Reassurance provided.  Pt feels better after neb.            MDM  Number of Diagnoses or Management Options     Amount and/or Complexity of Data Reviewed  Tests in the radiology section of CPT®: ordered and reviewed  Decide to obtain previous medical records or to obtain history from someone other than the patient: yes  Review and summarize past medical records: yes  Independent visualization of images, tracings, or specimens: yes        Final diagnoses:   Acute viral bronchitis            Lukas Giles MD  03/13/18 1596

## 2018-03-14 PROBLEM — M51.26 LUMBAR DISCOGENIC PAIN SYNDROME: Status: ACTIVE | Noted: 2018-03-14

## 2018-03-14 PROBLEM — M51.27 DISPLACEMENT OF INTERVERTEBRAL DISC OF LUMBOSACRAL REGION: Status: ACTIVE | Noted: 2018-03-14

## 2018-03-14 PROBLEM — M47.816 SPONDYLOSIS OF LUMBAR REGION WITHOUT MYELOPATHY OR RADICULOPATHY: Status: ACTIVE | Noted: 2018-03-14

## 2018-03-14 PROBLEM — M51.360 LUMBAR DISCOGENIC PAIN SYNDROME: Status: ACTIVE | Noted: 2018-03-14

## 2018-03-14 NOTE — PROGRESS NOTES
"Chief Complaint: \"Pain in my lower back.\"     History of Present Illness:   Patient: Ms. Megan Post, 37 y.o. female   Referring physician: MAGGIE Weir   Reason for referral: Consultation for intractable chronic lower back pain.   Pain history: Patient reports a 10-year history of pain, which began without incident. Pain has progressed in intensity over the past 6 months.   Pain description: constant lower back pain with intermittent exacerbation, described as sharp sensation.   Radiation of pain: The pain radiates globally without any particular radicular or peripheral nerve distribution into her legs.  Pain intensity today: 8/10  Average pain intensity last week: 8/10  Pain intensity ranges from: 5/10 to 10/10  Aggravating factors: Pain increases with bending, standing longer than 5 minutes and ambulating more than 5 minutes.  Alleviating factors: Pain decreases with lying and analgesics.   Associated symptoms:   Patient denies numbness or weakness in the lower extremities.   Patient denies any new bladder or bowel problems.   Patient denies difficulties with her balance or recent falls.      Review of previous therapies and additional medical records:  Megan Post has already failed the following measures, including:   Conservative measures: Oral analgesics, physical therapy, ice, heat and chiropractic therapy   Interventional measures: [facility]: meds/no procedures  Surgical measures: No history of lumbar spine surgery  Megan Post underwent surgical or neurosurgical consultation about 3 years ago and was found not to be a surgical candidate.  Megan Post presents with significant comorbidities including anxiety, depression, bipolar disorder (one hospitalization several years ago), obesity.  In terms of current analgesics, Megan Post takes: tramadol, tizanidine, Topamax, trazodone, Cymbalta   I have reviewed [facility] Report #[id number] consistent to medication reconciliation.    Global " Pain Scale Score   Pain 19   Feelings 6   Clinical outcomes 15   Activities 20   GPS Total: 60      Review of Diagnostic Studies:    MR Lumbar Spine Without Intravenous Contrast 08/19/2017. FINDINGS: Vertebrae:  Unremarkable. No acute fracture. Spinal cord: Unremarkable. Normal signal. Soft tissues:  Unremarkable.  DISCS/SPINAL CANAL/NEURAL FORAMINA:  L1-L2: Unremarkable. No significant disc disease. No stenosis.  L2-L3: Unremarkable. No significant disc disease. No stenosis.  L3-L4: Unremarkable. No significant disc disease. No stenosis.  L4-L5: Unremarkable. No significant disc disease. No stenosis.  L5-S1: Small central disc bulge L5-S1 without significant impingement on dural sac or nerve roots. No interval change.  No stenosis.    Review of Systems   Constitutional: Positive for activity change.   HENT: Positive for postnasal drip.    Respiratory: Positive for cough and shortness of breath.    Cardiovascular: Positive for chest pain.   Musculoskeletal: Positive for arthralgias, back pain, myalgias and neck pain.   Allergic/Immunologic: Positive for environmental allergies and food allergies.   Neurological: Positive for dizziness, light-headedness and headaches.   Psychiatric/Behavioral: The patient is nervous/anxious (depression. Bipolar disorder).    All other systems reviewed and are negative.     Patient Active Problem List   Diagnosis   • Allergic rhinitis   • Tavera's esophagus   • Bipolar disorder   • Depression   • Fibromyalgia   • Gastroesophageal reflux disease   • Insomnia   • Abdominal wall abscess   • Acute recurrent frontal sinusitis   • Spasm   • Right upper quadrant pain   • Uncomplicated asthma   • Shortness of breath   • Atypical chest pain   • Lung nodules - calcified granulomas   • Atelectasis   • Hypercalcemia   • Displacement of intervertebral disc of lumbosacral region   • Lumbar discogenic pain syndrome   • Spondylosis of lumbar region without myelopathy or radiculopathy       Past  Medical History:   Diagnosis Date   • Abdominal pain    • Abnormal breast exam    • Abnormal Pap smear of cervix    • Achilles tendinitis    • Acute sinusitis    • Anxiety    • Arthritis    • Asthma    • Tavera's syndrome    • Bipolar 1 disorder    • Bowel trouble    • Breast cyst    • Colon polyps    • Constipation    • Depression    • Diverticulitis    • Endometriosis    • Eustachian tube dysfunction    • Extremity pain    • Female pelvic pain    • Gastric ulcer    • H/O bladder infections    • History of rashes as a child    • Low back pain    • Menopausal symptoms    • Muscle weakness    • Ovarian cyst    • PID (pelvic inflammatory disease)    • Recurrent UTI    • Sexual problems          Past Surgical History:   Procedure Laterality Date   • BREAST BIOPSY      abnormal   •  SECTION     • HYSTERECTOMY     • NASAL ENDOSCOPY     • OOPHORECTOMY Bilateral    • OTHER SURGICAL HISTORY      Laparoscopy (diagnostic) gynecologic with biopsy   • SHOULDER SURGERY           Family History   Problem Relation Age of Onset   • Liver disease Mother    • Diabetes Mother    • Hyperlipidemia Mother    • Hypertension Mother    • Thyroid disease Mother    • Arthritis Father    • Mental illness Father    • Migraines Sister    • Stroke Other    • Diabetes Other    • Hyperlipidemia Other    • Hypertension Other    • Mental illness Other    • Migraines Other    • Tuberculosis Other          Social History     Social History   • Marital status: Single     Social History Main Topics   • Smoking status: Current Some Day Smoker     Types: Cigarettes   • Smokeless tobacco: Never Used      Comment: social smoking   • Alcohol use No   • Drug use: No   • Sexual activity: Defer     Other Topics Concern   • Not on file           Current Outpatient Prescriptions:   •  albuterol (PROVENTIL HFA;VENTOLIN HFA) 108 (90 BASE) MCG/ACT inhaler, Inhale 2 puffs Every 6 (Six) Hours As Needed for wheezing., Disp: 1 inhaler, Rfl: 5  •  albuterol  (PROVENTIL) (2.5 MG/3ML) 0.083% nebulizer solution, USE 1 VIAL PER NEBULIZER Q 4-6 H PRN, Disp: , Rfl: 3  •  azithromycin (ZITHROMAX Z-JAREN) 250 MG tablet, Take 2 tablets the first day, then 1 tablet daily for 4 days., Disp: 6 tablet, Rfl: 0  •  benzonatate (TESSALON) 200 MG capsule, Take 1 capsule by mouth 3 (Three) Times a Day As Needed for Cough., Disp: 20 capsule, Rfl: 0  •  cetirizine (zyrTEC) 10 MG tablet, Take 1 tablet by mouth Daily., Disp: , Rfl: 1  •  CLARITIN-D 12 HOUR 5-120 MG per 12 hr tablet, Take 1 tablet by mouth As Needed., Disp: , Rfl: 3  •  DULoxetine (CYMBALTA) 30 MG capsule, Take 1 capsule by mouth Daily., Disp: , Rfl: 3  •  EPIPEN 2-JAREN 0.3 MG/0.3ML solution auto-injector injection, U UTD, Disp: , Rfl: 6  •  fluticasone (FLONASE) 50 MCG/ACT nasal spray, 2 sprays into each nostril Daily., Disp: 1 bottle, Rfl: 3  •  fluticasone (FLOVENT HFA) 110 MCG/ACT inhaler, Inhale 1 puff 2 (Two) Times a Day As Needed (sinuspain)., Disp: 1 inhaler, Rfl: 6  •  lidocaine (XYLOCAINE) 5 % ointment, , Disp: , Rfl:   •  LORazepam (ATIVAN) 0.5 MG tablet, Take  by mouth., Disp: , Rfl:   •  mometasone-formoterol (DULERA 200) 200-5 MCG/ACT inhaler, Inhale 1 puff Daily., Disp: 13 g, Rfl: 2  •  mupirocin (BACTROBAN) 2 % ointment, APPLY EXTERNALLY TO THE AFFECTED AREA THREE TIMES DAILY, Disp: 22 g, Rfl: 0  •  NEXIUM 40 MG capsule, Take 1 capsule by mouth Every Morning Before Breakfast., Disp: 30 capsule, Rfl: 5  •  PATADAY 0.2 % solution ophthalmic solution, USE 1 DROP AEY ONCE DAILY PRN, Disp: , Rfl: 3  •  PAZEO 0.7 % solution, INSTILL 1 GTT  AEY ONCE DAILY PRN, Disp: , Rfl: 6  •  PREMARIN 1.25 MG tablet, TAKE 1 TABLET BY MOUTH DAILY AS DIRECTED, Disp: 30 tablet, Rfl: 5  •  Spacer/Aero-Holding Chambers (AEROCHAMBER PLUS ADIEL-VU) misc, USE WITH INHALER AS DIRECTED, Disp: , Rfl: 1  •  tiZANidine (ZANAFLEX) 4 MG tablet, Take 1 tablet by mouth 3 (Three) Times a Day., Disp: 24 tablet, Rfl: 1  •  topiramate (TOPAMAX) 100 MG  "tablet, Take 1 tablet by mouth Daily., Disp: , Rfl: 3  •  traMADol (ULTRAM) 50 MG tablet, Take 1 tablet by mouth Every 8 (Eight) Hours As Needed for Moderate Pain ., Disp: 90 tablet, Rfl: 0  •  traZODone (DESYREL) 150 MG tablet, Take 1 tablet by mouth As Needed., Disp: , Rfl: 3      Allergies   Allergen Reactions   • Adhesive Tape Hives   • Biaxin [Clarithromycin] Nausea Only   • Codeine Itching   • Latex Hives   • Penicillins Nausea Only   • Sulfa Antibiotics Hives   • Sulfate Hives         /96 (BP Location: Right arm, Patient Position: Sitting)   Pulse 101   Temp 97.8 °F (36.6 °C) (Temporal Artery )   Resp 20   Ht 154.9 cm (61\")   Wt 82.1 kg (181 lb)   SpO2 95%   BMI 34.20 kg/m²       Physical Exam:  Constitutional: Patient is oriented to person, place, and time. Vital signs are normal. Patient appears well-developed and well-nourished.   HEENT: Head: Normocephalic and atraumatic. Eyes: Conjunctivae and lids are normal. Pupils: Equal, round, reactive to light.   Neck: Trachea normal. Neck supple. No JVD present.   Pulmonary Respiratory effort: No increased work of breathing or signs of respiratory distress. Auscultation of lungs: Clear to auscultation.   Cardiovascular Auscultation of heart: Normal rate and rhythm, normal S1 and S2, no murmurs.   Musculoskeletal   Gait and station: Gait evaluation demonstrated a normal gait   Lumbar spine: The range of motion of the lumbar spine is limited secondary to pain. Extension, lateral flexion, rotation of the lumbar spine increased and reproduced pain. Lumbar facet joint loading maneuvers are positive.  Andre and Gaenslen's tests are negative   Piriformis maneuvers are negative   Palpation of the bilateral ischial tuberosities, unrevealing   Palpation of the bilateral greater trochanter, unrevealing   Examination of the Iliotibial band: unrevealing   Hip joints: The range of motion of the hip joints is full and without pain   Neurological: Patient is alert " and oriented to person, place, and time. Speech: speech is normal. Cortical function: Normal mental status.   Cranial nerves: Cranial nerves 2-12 intact.   Reflex Scores:  Right patellar: 2+  Left patellar: 2+  Right achilles: 2+  Left achilles: 2+  Motor strength: 5/5  Motor Tone: normal tone.   Involuntary movements: none.   Superficial/Primitive Reflexes: primitive reflexes were absent.   Right Garduno: absent  Left Garduno: absent  Right ankle clonus: absent  Left ankle clonus: absent   Negative long tract signs. Straight leg raising test is negative. Femoral stretch sign is negative.   Sensation: No sensory loss. Sensory exam: intact to light touch, intact pain and temperature sensation, intact vibration sensation and normal proprioception.   Coordination: Normal finger to nose and heel to shin. Normal balance and negative. Romberg's sign negative.   Skin and subcutaneous tissue: Skin is warm and intact. No rash noted. No cyanosis.   Psychiatric: Judgment and insight: Normal. Orientation to person, place and time: Normal. Recent and remote memory: Intact. Mood and affect: Normal.     ASSESSMENT:   1. Spondylosis of lumbar region without myelopathy or radiculopathy    2. Displacement of intervertebral disc of lumbosacral region    3. Lumbar discogenic pain syndrome    4. Fibromyalgia    5. Insomnia, unspecified type    6. Bipolar affective disorder, remission status unspecified    7. Depression, unspecified depression type      PLAN/MEDICAL DECISION MAKING:  I had a lengthy conversation with Ms. Guzman A Day regarding her chronic pain condition and potential therapeutic options including risks, benefits, alternative therapies, to name a few. Patient has failed to obtain pain relief with conservative measures, as referenced above. Patient has a history of intractable chronic lower back pain. MR of the lumbar spine revealed lumbar facet arthropathy at L3-L4, L4-L5 and L5-S1. There is some lateral recess stenosis  at L4-L5 and L5-S1. L5-S1: Small central disc bulge L5-S1 without significant impingement on dural sac or nerve roots. No stenosis. I have reviewed all available patient's medical records as well as previous therapies as referenced above.  Therefore, I have proposed the following plan:  1. Diagnostic studies: EMG/NCV of the bilateral lower extremities. Patient reports lower extremity paresthesia and some claudication without much correlation with her most recent MRI  2. Pharmacological measures: Reviewed. Discussed. Patient takes tramadol, tizanidine, Topamax, trazodone, Cymbalta  3. Interventional pain management measures: Patient will be scheduled for diagnostic bilateral lumbar medial branch blocks at L3, L4, L5; for bilateral lumbar facet joints at L4-L5, L5-S1 to clarify the origin of chronic refractory mechanical lower back pain. If patient experiences more than 80% relief along with significant improvement the range of motion of the lumbar spine, then, patient will be scheduled for a second set of diagnostic bilateral lumbar medial branch blocks, to then, proceed with bilateral lumbar medial branch rhizotomies. Otherwise, we may proceed with caudal epidural steroid injection.   4. Long-term rehabilitation efforts:  A. The patient does not have a history of falls. I did complete a risk assessment for falls.   B. Patient will start a comprehensive physical therapy program for water therapy, therapeutic exercise, core strengthening, gait and balance training, neurodynamics, myofascial release, cupping and dry needling once pain is under control  C. Start an exercise program such as yoga, water therapy and daily walks for fitness  D. Trial with TENS unit  E. Contrast therapy: Apply ice-packs for 15-20 minutes, followed by heating pads for 15-20 minutes to affected area   F. Patient attends [facility] for her bipolar disorder, which has been stable  G. Referral to [facility]  5.   The patient has been instructed to contact my office with any questions or difficulties. The patient understands the plan and agrees to proceed accordingly.       Patient Care Team:  MAGGIE Staples as PCP - General (Internal Medicine)  Flavia Moran DO as Consulting Physician (Obstetrics and Gynecology)  Paramjit Leahy MD as Consulting Physician (Otolaryngology)  Jasiel Sanchez MD as Consulting Physician (Pain Medicine)  Trudy Vela RD as Dietitian (Nutrition)  DANELLE Polanco as Physician Assistant (Internal Medicine)     New Medications Ordered This Visit   Medications   • PAZEO 0.7 % solution     Sig: INSTILL 1 GTT  AEY ONCE DAILY PRN     Refill:  6         Future Appointments  Date Time Provider Department Center   4/4/2018 12:15 PM Trudy Vela RD [facility]         Jasiel Sanchez MD     EMR Dragon/Transcription disclaimer:  Much of this encounter note is an electronic transcription of spoken language to printed text. Electronic transcription of spoken language may permit erroneous, or at times, nonsensical words or phrases to be inadvertently transcribed. Although I have reviewed the note for such errors, some may still exist.

## 2018-03-15 ENCOUNTER — APPOINTMENT (OUTPATIENT)
Dept: GENERAL RADIOLOGY | Facility: HOSPITAL | Age: 38
End: 2018-03-15

## 2018-03-15 ENCOUNTER — APPOINTMENT (OUTPATIENT)
Dept: CT IMAGING | Facility: HOSPITAL | Age: 38
End: 2018-03-15

## 2018-03-15 ENCOUNTER — OFFICE VISIT (OUTPATIENT)
Dept: PAIN MEDICINE | Facility: CLINIC | Age: 38
End: 2018-03-15

## 2018-03-15 ENCOUNTER — OFFICE VISIT (OUTPATIENT)
Dept: INTERNAL MEDICINE | Facility: CLINIC | Age: 38
End: 2018-03-15

## 2018-03-15 ENCOUNTER — HOSPITAL ENCOUNTER (EMERGENCY)
Facility: HOSPITAL | Age: 38
Discharge: HOME | End: 2018-03-15
Attending: EMERGENCY MEDICINE | Admitting: EMERGENCY MEDICINE

## 2018-03-15 VITALS
SYSTOLIC BLOOD PRESSURE: 135 MMHG | BODY MASS INDEX: 34.17 KG/M2 | OXYGEN SATURATION: 95 % | HEART RATE: 101 BPM | TEMPERATURE: 97.8 F | RESPIRATION RATE: 20 BRPM | DIASTOLIC BLOOD PRESSURE: 96 MMHG | WEIGHT: 181 LBS | HEIGHT: 61 IN

## 2018-03-15 VITALS
SYSTOLIC BLOOD PRESSURE: 126 MMHG | OXYGEN SATURATION: 98 % | HEIGHT: 61 IN | BODY MASS INDEX: 34.2 KG/M2 | HEART RATE: 116 BPM | DIASTOLIC BLOOD PRESSURE: 74 MMHG

## 2018-03-15 VITALS
WEIGHT: 180 LBS | HEART RATE: 108 BPM | TEMPERATURE: 99.2 F | RESPIRATION RATE: 18 BRPM | BODY MASS INDEX: 33.99 KG/M2 | DIASTOLIC BLOOD PRESSURE: 61 MMHG | SYSTOLIC BLOOD PRESSURE: 108 MMHG | OXYGEN SATURATION: 97 % | HEIGHT: 61 IN

## 2018-03-15 DIAGNOSIS — M51.26 LUMBAR DISCOGENIC PAIN SYNDROME: ICD-10-CM

## 2018-03-15 DIAGNOSIS — M47.816 SPONDYLOSIS OF LUMBAR REGION WITHOUT MYELOPATHY OR RADICULOPATHY: ICD-10-CM

## 2018-03-15 DIAGNOSIS — S46.912A LEFT SHOULDER STRAIN, INITIAL ENCOUNTER: ICD-10-CM

## 2018-03-15 DIAGNOSIS — F32.A DEPRESSION, UNSPECIFIED DEPRESSION TYPE: ICD-10-CM

## 2018-03-15 DIAGNOSIS — M51.27 DISPLACEMENT OF INTERVERTEBRAL DISC OF LUMBOSACRAL REGION: ICD-10-CM

## 2018-03-15 DIAGNOSIS — M79.7 FIBROMYALGIA: ICD-10-CM

## 2018-03-15 DIAGNOSIS — S16.1XXA ACUTE STRAIN OF NECK MUSCLE, INITIAL ENCOUNTER: ICD-10-CM

## 2018-03-15 DIAGNOSIS — F31.9 BIPOLAR AFFECTIVE DISORDER, REMISSION STATUS UNSPECIFIED (HCC): ICD-10-CM
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
## 2018-03-15 DIAGNOSIS — G47.00 INSOMNIA, UNSPECIFIED TYPE: ICD-10-CM

## 2018-03-15 DIAGNOSIS — E66.9 MILD OBESITY: Primary | ICD-10-CM

## 2018-03-15 DIAGNOSIS — M54.2 NECK PAIN, ACUTE: Primary | ICD-10-CM

## 2018-03-15 PROCEDURE — 99283 EMERGENCY DEPT VISIT LOW MDM: CPT

## 2018-03-15 PROCEDURE — 99203 OFFICE O/P NEW LOW 30 MIN: CPT | Performed by: ANESTHESIOLOGY

## 2018-03-15 PROCEDURE — 99213 OFFICE O/P EST LOW 20 MIN: CPT | Performed by: NURSE PRACTITIONER

## 2018-03-15 PROCEDURE — 73030 X-RAY EXAM OF SHOULDER: CPT

## 2018-03-15 PROCEDURE — 72125 CT NECK SPINE W/O DYE: CPT

## 2018-03-15 RX ORDER — OLOPATADINE HYDROCHLORIDE 7 MG/ML
SOLUTION OPHTHALMIC
Refills: 6 | COMMUNITY
Start: 2018-03-08 | End: 2019-01-23

## 2018-03-15 RX ORDER — METAXALONE 800 MG/1
800 TABLET ORAL 3 TIMES DAILY PRN
Qty: 21 TABLET | Refills: 0 | Status: SHIPPED | OUTPATIENT
Start: 2018-03-15 | End: 2018-03-20

## 2018-03-15 RX ORDER — LORAZEPAM 1 MG/1
1 TABLET ORAL ONCE
Status: COMPLETED | OUTPATIENT
Start: 2018-03-15 | End: 2018-03-15

## 2018-03-15 RX ORDER — METAXALONE 800 MG/1
800 TABLET ORAL ONCE
Status: COMPLETED | OUTPATIENT
Start: 2018-03-15 | End: 2018-03-15

## 2018-03-15 RX ORDER — ONDANSETRON 4 MG/1
4 TABLET, ORALLY DISINTEGRATING ORAL ONCE
Status: COMPLETED | OUTPATIENT
Start: 2018-03-15 | End: 2018-03-15

## 2018-03-15 RX ORDER — IBUPROFEN 600 MG/1
600 TABLET ORAL ONCE
Status: COMPLETED | OUTPATIENT
Start: 2018-03-15 | End: 2018-03-15

## 2018-03-15 RX ADMIN — ONDANSETRON 4 MG: 4 TABLET, ORALLY DISINTEGRATING ORAL at 21:35

## 2018-03-15 RX ADMIN — METAXALONE 800 MG: 800 TABLET ORAL at 21:35

## 2018-03-15 RX ADMIN — IBUPROFEN 600 MG: 600 TABLET, FILM COATED ORAL at 21:35

## 2018-03-15 RX ADMIN — LORAZEPAM 1 MG: 1 TABLET ORAL at 21:37

## 2018-03-15 NOTE — PROGRESS NOTES
Lizandro Guzman A Day is a 38 y.o. female.   Chief Complaint   Patient presents with   • Neck Pain     Whip lash; dog had drug patient all on the ground to go after another dog. Sx started 30 minutes ago.       History of Present Illness   Points to back of head to lower neck as source of pain.  Reports hit neck during the encounter.  No LOC, Numbness, tingling in finger.  Taking usual meds without relief.  Reports the event has made her very tired    The following portions of the patient's history were reviewed and updated as appropriate: allergies, current medications, past family history, past medical history, past social history, past surgical history and problem list.    Current Outpatient Prescriptions:   •  albuterol (PROVENTIL HFA;VENTOLIN HFA) 108 (90 BASE) MCG/ACT inhaler, Inhale 2 puffs Every 6 (Six) Hours As Needed for wheezing., Disp: 1 inhaler, Rfl: 5  •  albuterol (PROVENTIL) (2.5 MG/3ML) 0.083% nebulizer solution, USE 1 VIAL PER NEBULIZER Q 4-6 H PRN, Disp: , Rfl: 3  •  cetirizine (zyrTEC) 10 MG tablet, Take 1 tablet by mouth Daily., Disp: , Rfl: 1  •  CLARITIN-D 12 HOUR 5-120 MG per 12 hr tablet, Take 1 tablet by mouth As Needed., Disp: , Rfl: 3  •  DULoxetine (CYMBALTA) 30 MG capsule, Take 1 capsule by mouth Daily., Disp: , Rfl: 3  •  EPIPEN 2-JAREN 0.3 MG/0.3ML solution auto-injector injection, U UTD, Disp: , Rfl: 6  •  fluticasone (FLONASE) 50 MCG/ACT nasal spray, 2 sprays into each nostril Daily., Disp: 1 bottle, Rfl: 3  •  fluticasone (FLOVENT HFA) 110 MCG/ACT inhaler, Inhale 1 puff 2 (Two) Times a Day As Needed (sinuspain)., Disp: 1 inhaler, Rfl: 6  •  lidocaine (XYLOCAINE) 5 % ointment, , Disp: , Rfl:   •  LORazepam (ATIVAN) 0.5 MG tablet, Take  by mouth., Disp: , Rfl:   •  mometasone-formoterol (DULERA 200) 200-5 MCG/ACT inhaler, Inhale 1 puff Daily., Disp: 13 g, Rfl: 2  •  mupirocin (BACTROBAN) 2 % ointment, APPLY EXTERNALLY TO THE AFFECTED AREA THREE TIMES DAILY, Disp: 22 g, Rfl: 0  •   "NEXIUM 40 MG capsule, Take 1 capsule by mouth Every Morning Before Breakfast., Disp: 30 capsule, Rfl: 5  •  PATADAY 0.2 % solution ophthalmic solution, USE 1 DROP AEY ONCE DAILY PRN, Disp: , Rfl: 3  •  PAZEO 0.7 % solution, INSTILL 1 GTT  AEY ONCE DAILY PRN, Disp: , Rfl: 6  •  PREMARIN 1.25 MG tablet, TAKE 1 TABLET BY MOUTH DAILY AS DIRECTED, Disp: 30 tablet, Rfl: 5  •  Spacer/Aero-Holding Chambers (AEROCHAMBER PLUS ADIEL-VU) misc, USE WITH INHALER AS DIRECTED, Disp: , Rfl: 1  •  topiramate (TOPAMAX) 100 MG tablet, Take 1 tablet by mouth Daily., Disp: , Rfl: 3  •  traMADol (ULTRAM) 50 MG tablet, Take 1 tablet by mouth Every 8 (Eight) Hours As Needed for Moderate Pain ., Disp: 90 tablet, Rfl: 0  •  traZODone (DESYREL) 150 MG tablet, Take 1 tablet by mouth As Needed., Disp: , Rfl: 3  •  diclofenac (VOLTAREN) 1 % gel gel, Apply 4 g topically 4 (Four) Times a Day As Needed (as needed for pain)., Disp: 1 tube, Rfl: 0  •  meloxicam (MOBIC) 15 MG tablet, Take 1 tablet by mouth Daily., Disp: 30 tablet, Rfl: 0  •  methocarbamol (ROBAXIN) 500 MG tablet, Take 1 tablet by mouth 3 (Three) Times a Day for 30 days. Take this one during the day for less sleepiness, Disp: 90 tablet, Rfl: 0    Review of Systems   Constitutional: Positive for fatigue. Negative for activity change and appetite change.   HENT: Negative for ear pain, sinus pain and sore throat.    Eyes: Negative.    Respiratory: Negative for cough.    Cardiovascular: Negative for chest pain.   Musculoskeletal: Positive for neck pain.   Skin: Negative.      /74   Pulse 116   Ht 154.9 cm (61\")   SpO2 98%   BMI 34.20 kg/m²     Objective   Allergies   Allergen Reactions   • Adhesive Tape Hives   • Biaxin [Clarithromycin] Nausea Only   • Codeine Itching   • Latex Hives   • Penicillins Nausea Only   • Sulfa Antibiotics Hives   • Sulfate Hives       Physical Exam   Constitutional: She is oriented to person, place, and time. She appears well-developed and well-nourished. "   HENT:   Tender to the neck, hurts to rotate   Neurological: She is alert and oriented to person, place, and time.   Skin: Skin is warm and dry. Capillary refill takes less than 2 seconds.       Procedures    Assessment/Plan   Megan was seen today for neck pain.    Diagnoses and all orders for this visit:    Neck pain, acute          Go Directly to ED for further EValuaion    Lamar Chavira, MAGGIE

## 2018-03-16 NOTE — DISCHARGE INSTRUCTIONS
All radiologic studies showed no acute abnormalities.  We will give patient sprain/strain instructions.  Rx for diclofenac 50 mg by mouth 3 times a day as needed for pain/inflammation and Skelaxin 800 mg by mouth 3 times a day as needed for muscle spasm.  Patient may alternate heat and ice to her neck to help with muscle spasms.  Recommend close follow-up with Hayley Viveros her PCP.  Return if worsening symptoms.
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
## 2018-03-20 ENCOUNTER — PRIOR AUTHORIZATION (OUTPATIENT)
Dept: INTERNAL MEDICINE | Facility: CLINIC | Age: 38
End: 2018-03-20

## 2018-03-20 ENCOUNTER — OFFICE VISIT (OUTPATIENT)
Dept: INTERNAL MEDICINE | Facility: CLINIC | Age: 38
End: 2018-03-20

## 2018-03-20 VITALS
WEIGHT: 180 LBS | HEIGHT: 61 IN | OXYGEN SATURATION: 98 % | SYSTOLIC BLOOD PRESSURE: 124 MMHG | BODY MASS INDEX: 33.99 KG/M2 | DIASTOLIC BLOOD PRESSURE: 68 MMHG | HEART RATE: 126 BPM

## 2018-03-20 DIAGNOSIS — F07.81 POST CONCUSSION SYNDROME: ICD-10-CM

## 2018-03-20 DIAGNOSIS — S16.1XXA CERVICAL MUSCLE STRAIN, INITIAL ENCOUNTER: Primary | ICD-10-CM

## 2018-03-20 DIAGNOSIS — S16.1XXA CERVICAL MUSCLE STRAIN, INITIAL ENCOUNTER: ICD-10-CM

## 2018-03-20 PROCEDURE — 99214 OFFICE O/P EST MOD 30 MIN: CPT | Performed by: NURSE PRACTITIONER

## 2018-03-20 RX ORDER — MELOXICAM 15 MG/1
15 TABLET ORAL DAILY
Qty: 30 TABLET | Refills: 0 | Status: SHIPPED | OUTPATIENT
Start: 2018-03-20 | End: 2018-04-19

## 2018-03-20 RX ORDER — METHOCARBAMOL 500 MG/1
500 TABLET, FILM COATED ORAL 3 TIMES DAILY
Qty: 90 TABLET | Refills: 0 | Status: SHIPPED | OUTPATIENT
Start: 2018-03-20 | End: 2018-03-21 | Stop reason: SDUPTHER

## 2018-03-20 RX ORDER — METHYLPREDNISOLONE ACETATE 40 MG/ML
40 INJECTION, SUSPENSION INTRA-ARTICULAR; INTRALESIONAL; INTRAMUSCULAR; SOFT TISSUE ONCE
Status: DISCONTINUED | OUTPATIENT
Start: 2018-03-20 | End: 2018-03-20

## 2018-03-20 RX ORDER — TIZANIDINE 2 MG/1
TABLET ORAL
Refills: 0 | COMMUNITY
Start: 2018-03-16 | End: 2018-03-20

## 2018-03-20 NOTE — PATIENT INSTRUCTIONS
Cervical Strain and Sprain Rehab  Ask your health care provider which exercises are safe for you. Do exercises exactly as told by your health care provider and adjust them as directed. It is normal to feel mild stretching, pulling, tightness, or discomfort as you do these exercises, but you should stop right away if you feel sudden pain or your pain gets worse. Do not begin these exercises until told by your health care provider.  Stretching and range of motion exercises  These exercises warm up your muscles and joints and improve the movement and flexibility of your neck. These exercises also help to relieve pain, numbness, and tingling.  Exercise A: Cervical side bend     1. Using good posture, sit on a stable chair or stand up.  2. Without moving your shoulders, slowly tilt your left / right ear to your shoulder until you feel a stretch in your neck muscles. You should be looking straight ahead.  3. Hold for __________ seconds.  4. Repeat with the other side of your neck.  Repeat __________ times. Complete this exercise __________ times a day.  Exercise B: Cervical rotation     1. Using good posture, sit on a stable chair or stand up.  2. Slowly turn your head to the side as if you are looking over your left / right shoulder.  ¨ Keep your eyes level with the ground.  ¨ Stop when you feel a stretch along the side and the back of your neck.  3. Hold for __________ seconds.  4. Repeat this by turning to your other side.  Repeat __________ times. Complete this exercise __________ times a day.  Exercise C: Thoracic extension and pectoral stretch   1. Roll a towel or a small blanket so it is about 4 inches (10 cm) in diameter.  2. Lie down on your back on a firm surface.  3. Put the towel lengthwise, under your spine in the middle of your back. It should not be not under your shoulder blades. The towel should line up with your spine from your middle back to your lower back.  4. Put your hands behind your head and let  your elbows fall out to your sides.  5. Hold for __________ seconds.  Repeat __________ times. Complete this exercise __________ times a day.  Strengthening exercises  These exercises build strength and endurance in your neck. Endurance is the ability to use your muscles for a long time, even after your muscles get tired.  Exercise D: Upper cervical flexion, isometric   1. Lie on your back with a thin pillow behind your head and a small rolled-up towel under your neck.  2. Gently tuck your chin toward your chest and nod your head down to look toward your feet. Do not lift your head off the pillow.  3. Hold for __________ seconds.  4. Release the tension slowly. Relax your neck muscles completely before you repeat this exercise.  Repeat __________ times. Complete this exercise __________ times a day.  Exercise E: Cervical extension, isometric     1. Stand about 6 inches (15 cm) away from a wall, with your back facing the wall.  2. Place a soft object, about 6-8 inches (15-20 cm) in diameter, between the back of your head and the wall. A soft object could be a small pillow, a ball, or a folded towel.  3. Gently tilt your head back and press into the soft object. Keep your jaw and forehead relaxed.  4. Hold for __________ seconds.  5. Release the tension slowly. Relax your neck muscles completely before you repeat this exercise.  Repeat __________ times. Complete this exercise __________ times a day.  Posture and body mechanics     Body mechanics refers to the movements and positions of your body while you do your daily activities. Posture is part of body mechanics. Good posture and healthy body mechanics can help to relieve stress in your body's tissues and joints. Good posture means that your spine is in its natural S-curve position (your spine is neutral), your shoulders are pulled back slightly, and your head is not tipped forward. The following are general guidelines for applying improved posture and body  mechanics to your everyday activities.  Standing   · When standing, keep your spine neutral and keep your feet about hip-width apart. Keep a slight bend in your knees. Your ears, shoulders, and hips should line up.  · When you do a task in which you  one place for a long time, place one foot up on a stable object that is 2-4 inches (5-10 cm) high, such as a footstool. This helps keep your spine neutral.  Sitting     · When sitting, keep your spine neutral and your keep feet flat on the floor. Use a footrest, if necessary, and keep your thighs parallel to the floor. Avoid rounding your shoulders, and avoid tilting your head forward.  · When working at a desk or a computer, keep your desk at a height where your hands are slightly lower than your elbows. Slide your chair under your desk so you are close enough to maintain good posture.  · When working at a computer, place your monitor at a height where you are looking straight ahead and you do not have to tilt your head forward or downward to look at the screen.  Resting   When lying down and resting, avoid positions that are most painful for you. Try to support your neck in a neutral position. You can use a contour pillow or a small rolled-up towel. Your pillow should support your neck but not push on it.  This information is not intended to replace advice given to you by your health care provider. Make sure you discuss any questions you have with your health care provider.  Document Released: 12/18/2006 Document Revised: 08/24/2017 Document Reviewed: 11/23/2016  [employer] Patient Education © 2017 [employer]    Neck roll for sleep, proper posture.

## 2018-03-20 NOTE — PROGRESS NOTES
"  Chief Complaint   Patient presents with   • Neck Pain     Last seen 3/15/18 for whiplash, due to dog pulling patient down.        HPI  Megan MAIN Day is a 37 y.o. female who presents with a whiplash due to fall from dog jerking her down and dragging her 5 days ago. She reports hitting her head on the ground in the front. -LOC. She was seen in the ER that day, CT scan, x-ray left shoulder unremarkable. Muscle relaxer makes to sleepy and diclofenac did not help. She reports symptoms are worsening with increased headaches, nausea,  dizziness, visual triggers and sensitivites increase headaches, decreased concentration, tingling in face both sides -affect on function and facial movement. +Mental rest, without TV, phone, the first few days. Tenderness in neck and head. Decreased ROM.  HPI     PMSFH  The following portions of the patient's history were reviewed and updated as appropriate: allergies, current medications, past family history, past medical history, past social history, past surgical history and problem list.      Review of Systems   Musculoskeletal: Positive for arthralgias, neck pain and neck stiffness. Negative for joint swelling.   Skin: Negative for wound.   Neurological: Positive for dizziness, numbness and headaches. Negative for seizures, syncope, facial asymmetry, speech difficulty and weakness.   Psychiatric/Behavioral: Negative for confusion and decreased concentration.   All other systems reviewed and are negative.          Objective   Blood pressure 124/68, pulse (!) 126, height 154.9 cm (61\"), weight 81.6 kg (180 lb), SpO2 98 %, not currently breastfeeding.  Body mass index is 34.01 kg/m².      Physical Exam   Constitutional: She is oriented to person, place, and time. She appears well-developed and well-nourished. No distress.   HENT:   Head: Normocephalic and atraumatic.   Right Ear: Hearing, tympanic membrane, external ear and ear canal normal.   Left Ear: Hearing, tympanic membrane, " external ear and ear canal normal.   Nose: Nose normal.   Mouth/Throat: Uvula is midline, oropharynx is clear and moist and mucous membranes are normal.   Eyes: Conjunctivae and EOM are normal. Pupils are equal, round, and reactive to light.   Neck: Trachea normal and phonation normal. Neck supple. Muscular tenderness present. No spinous process tenderness present. No no neck rigidity. Decreased range of motion present. No edema and no erythema present. No thyroid mass present.   ROM decreased laterally left and right, extension and flexion also decreased with pain. Patient apprehensive to move head. ROM Without crepitus noted. Increased tenderness to palpation over bilateral occipital region extending onto neck. No step off or deformities noted. No radiating pain, numbness to arms.    Cardiovascular: Normal rate, regular rhythm and normal heart sounds.    Pulmonary/Chest: Effort normal and breath sounds normal.   Abdominal: There is no hepatosplenomegaly.   Musculoskeletal:        Cervical back: She exhibits decreased range of motion, tenderness, pain and spasm. She exhibits no bony tenderness, no swelling, no deformity and no laceration.   Lymphadenopathy:        Head (left side): No occipital adenopathy present.     She has no cervical adenopathy.        Right: No supraclavicular adenopathy present.        Left: No supraclavicular adenopathy present.   Neurological: She is alert and oriented to person, place, and time. She is not disoriented. No cranial nerve deficit or sensory deficit.   Reflex Scores:       Bicep reflexes are 2+ on the right side and 2+ on the left side.  Skin: Skin is warm and dry.   Psychiatric: She has a normal mood and affect. Her speech is normal and behavior is normal. Judgment and thought content normal. Cognition and memory are normal.   Patient needs reassurance and plan of care. Seems to have wondering questions of diagnosis and when symptoms will resolve.   Nursing note and vitals  reviewed.        Patient is to stop the metaxalone and tizanidine due to increased sleepiness. Start methocarbamol while not driving or working to see if side effects are more tolerable. Stop Voltorin PO due to her feeling it is not helping and start mobic with voltorin topical gel.  Steriod IM/PO declined due to her stated inability to tolerate them. She may request at a later time.    ASSESSMENT/PLAN  Megan was seen today for neck pain.    Diagnoses and all orders for this visit:    Cervical muscle strain, initial encounter  -     diclofenac (VOLTAREN) 1 % gel gel; Apply 4 g topically 4 (Four) Times a Day As Needed (as needed for pain).  -     Ambulatory Referral to Physical Therapy Evaluate and treat  -     Discontinue: methylPREDNISolone acetate (DEPO-medrol) injection 40 mg; Inject 1 mL into the shoulder, thigh, or buttocks 1 (One) Time.  -     meloxicam (MOBIC) 15 MG tablet; Take 1 tablet by mouth Daily.  -     methocarbamol (ROBAXIN) 500 MG tablet; Take 1 tablet by mouth 3 (Three) Times a Day for 30 days. Take this one during the day for less sleepiness    Post concussion syndrome  -     Ambulatory Referral to Physical Therapy Evaluate and treat       Reassurance given with encouragement for plan of care to resolve symptoms over time, patient seems more relaxed and was able to demonstrate proper posturing.  Discussed importance of PT and option of vestibular therapy for headaches and dizziness if unresolved with PT, proper posture, Marguerite roll for sleep and limiting neck stiffness, increasing core strength over time and excersize with a healthy diet as all good care for back and neck.      Plan of care reviewed with patient at the conclusion of today's visit. Education was provided in regards to diagnosis, management and any prescribed or recommended OTC medications.  Patient verbalizes Understanding of and agreement with management plan.      FOLLOW-UP  Return if symptoms worsen or fail to improve, for  Next scheduled follow up.      Future Appointments  Date Time Provider Department Center   3/26/2018 9:00 AM Jasiel Sanchez MD [facility] None   4/4/2018 12:15 PM Trudy Vela RD [facility]         Electronically signed by:  MAGGIE Mondragon  03/20/2018

## 2018-03-21 RX ORDER — MELOXICAM 15 MG/1
15 TABLET ORAL DAILY
Qty: 90 TABLET | Refills: 0 | OUTPATIENT
Start: 2018-03-21

## 2018-03-21 RX ORDER — METHOCARBAMOL 500 MG/1
500 TABLET, FILM COATED ORAL 3 TIMES DAILY
Qty: 90 TABLET | Refills: 0 | Status: SHIPPED | OUTPATIENT
Start: 2018-03-21 | End: 2018-04-19

## 2018-03-26 ENCOUNTER — OUTSIDE FACILITY SERVICE (OUTPATIENT)
Dept: PAIN MEDICINE | Facility: CLINIC | Age: 38
End: 2018-03-26

## 2018-04-04 ENCOUNTER — APPOINTMENT (OUTPATIENT)
Dept: NUTRITION | Facility: HOSPITAL | Age: 38
End: 2018-04-04

## 2018-04-11 ENCOUNTER — OUTSIDE FACILITY SERVICE (OUTPATIENT)
Dept: PAIN MEDICINE | Facility: CLINIC | Age: 38
End: 2018-04-11

## 2018-04-11 PROCEDURE — 64494 INJ PARAVERT F JNT L/S 2 LEV: CPT | Performed by: ANESTHESIOLOGY

## 2018-04-11 PROCEDURE — 99152 MOD SED SAME PHYS/QHP 5/>YRS: CPT | Performed by: ANESTHESIOLOGY

## 2018-04-11 PROCEDURE — 64493 INJ PARAVERT F JNT L/S 1 LEV: CPT | Performed by: ANESTHESIOLOGY

## 2018-04-14 ENCOUNTER — OFFICE VISIT (OUTPATIENT)
Dept: FAMILY MEDICINE CLINIC | Facility: CLINIC | Age: 38
End: 2018-04-14

## 2018-04-14 VITALS
TEMPERATURE: 99.6 F | OXYGEN SATURATION: 97 % | BODY MASS INDEX: 34.01 KG/M2 | WEIGHT: 180 LBS | HEART RATE: 110 BPM | RESPIRATION RATE: 24 BRPM | SYSTOLIC BLOOD PRESSURE: 118 MMHG | DIASTOLIC BLOOD PRESSURE: 80 MMHG

## 2018-04-14 DIAGNOSIS — Z72.51 HIGH RISK SEXUAL BEHAVIOR: ICD-10-CM

## 2018-04-14 DIAGNOSIS — R39.15 URINARY URGENCY: ICD-10-CM

## 2018-04-14 DIAGNOSIS — Z20.2 STD EXPOSURE: Primary | ICD-10-CM

## 2018-04-14 DIAGNOSIS — R53.83 OTHER FATIGUE: ICD-10-CM

## 2018-04-14 DIAGNOSIS — Z20.5 EXPOSURE TO HEPATITIS: ICD-10-CM

## 2018-04-14 LAB
BILIRUB BLD-MCNC: NEGATIVE MG/DL
CLARITY, POC: CLEAR
COLOR UR: YELLOW
GLUCOSE UR STRIP-MCNC: NEGATIVE MG/DL
HAV IGM SERPL QL IA: NORMAL
HBV CORE IGM SERPL QL IA: NORMAL
HBV SURFACE AG SERPL QL IA: NORMAL
HCV AB SER DONR QL: NORMAL
HCV AB SER DONR QL: NORMAL
HIV1+2 AB SER QL: NORMAL
KETONES UR QL: NEGATIVE
LEUKOCYTE EST, POC: NEGATIVE
NITRITE UR-MCNC: NEGATIVE MG/ML
PH UR: 7.5 [PH] (ref 5–8)
PROT UR STRIP-MCNC: NEGATIVE MG/DL
RBC # UR STRIP: NEGATIVE /UL
SP GR UR: 1.02 (ref 1–1.03)
UROBILINOGEN UR QL: NORMAL

## 2018-04-14 PROCEDURE — G0432 EIA HIV-1/HIV-2 SCREEN: HCPCS | Performed by: NURSE PRACTITIONER

## 2018-04-14 PROCEDURE — 86696 HERPES SIMPLEX TYPE 2 TEST: CPT | Performed by: NURSE PRACTITIONER

## 2018-04-14 PROCEDURE — 86592 SYPHILIS TEST NON-TREP QUAL: CPT | Performed by: NURSE PRACTITIONER

## 2018-04-14 PROCEDURE — 87661 TRICHOMONAS VAGINALIS AMPLIF: CPT | Performed by: NURSE PRACTITIONER

## 2018-04-14 PROCEDURE — 87591 N.GONORRHOEAE DNA AMP PROB: CPT | Performed by: NURSE PRACTITIONER

## 2018-04-14 PROCEDURE — 86695 HERPES SIMPLEX TYPE 1 TEST: CPT | Performed by: NURSE PRACTITIONER

## 2018-04-14 PROCEDURE — 99213 OFFICE O/P EST LOW 20 MIN: CPT | Performed by: NURSE PRACTITIONER

## 2018-04-14 PROCEDURE — 87491 CHLMYD TRACH DNA AMP PROBE: CPT | Performed by: NURSE PRACTITIONER

## 2018-04-14 PROCEDURE — 86803 HEPATITIS C AB TEST: CPT | Performed by: NURSE PRACTITIONER

## 2018-04-14 PROCEDURE — 80074 ACUTE HEPATITIS PANEL: CPT | Performed by: NURSE PRACTITIONER

## 2018-04-14 PROCEDURE — 81003 URINALYSIS AUTO W/O SCOPE: CPT | Performed by: NURSE PRACTITIONER

## 2018-04-14 NOTE — PATIENT INSTRUCTIONS
Safe Sex  Practicing safe sex means taking steps before and during sex to reduce your risk of:  · Getting an STD (sexually transmitted disease).  · Giving your partner an STD.  · Unwanted pregnancy.  How can I practice safe sex?     To practice safe sex:  · Limit your sexual partners to only one partner who is having sex with only you.  · Avoid using alcohol and recreational drugs before having sex. These substances can affect your judgment.  · Before having sex with a new partner:  ¨ Talk to your partner about past partners, past STDs, and drug use.  ¨ You and your partner should be screened for STDs and discuss the results with each other.  · Check your body regularly for sores, blisters, rashes, or unusual discharge. If you notice any of these problems, visit your health care provider.  · If you have symptoms of an infection or you are being treated for an STD, avoid sexual contact.  · While having sex, use a condom. Make sure to:  ¨ Use a condom every time you have vaginal, oral, or anal sex. Both females and males should wear condoms during oral sex.  ¨ Keep condoms in place from the beginning to the end of sexual activity.  ¨ Use a latex condom, if possible. Latex condoms offer the best protection.  ¨ Use only water-based lubricants or oils to lubricate a condom. Using petroleum-based lubricants or oils will weaken the condom and increase the chance that it will break.  · See your health care provider for regular screenings, exams, and tests for STDs.  · Talk with your health care provider about the form of birth control (contraception) that is best for you.  · Get vaccinated against hepatitis B and human papillomavirus (HPV).  · If you are at risk of being infected with HIV (human immunodeficiency virus), talk with your health care provider about taking a prescription medicine to prevent HIV infection. You are considered at risk for HIV if:  ¨ You are a man who has sex with other men.  ¨ You are a  heterosexual man or woman who is sexually active with more than one partner.  ¨ You take drugs by injection.  ¨ You are sexually active with a partner who has HIV.  This information is not intended to replace advice given to you by your health care provider. Make sure you discuss any questions you have with your health care provider.  Document Released: 01/25/2006 Document Revised: 05/03/2017 Document Reviewed: 11/06/2016  [employer] Interactive Patient Education © 2017 [employer]  Sexually Transmitted Disease  A sexually transmitted disease (STD) is a disease or infection that may be passed (transmitted) from person to person, usually during sexual activity. This may happen by way of saliva, semen, blood, vaginal mucus, or urine. Common STDs include:  · Gonorrhea.  · Chlamydia.  · Syphilis.  · HIV and AIDS.  · Genital herpes.  · Hepatitis B and C.  · Trichomonas.  · Human papillomavirus (HPV).  · Pubic lice.  · Scabies.  · Mites.  · Bacterial vaginosis.  What are the causes?  An STD may be caused by bacteria, a virus, or parasites. STDs are often transmitted during sexual activity if one person is infected. However, they may also be transmitted through nonsexual means. STDs may be transmitted after:  · Sexual intercourse with an infected person.  · Sharing sex toys with an infected person.  · Sharing needles with an infected person or using unclean piercing or tattoo needles.  · Having intimate contact with the genitals, mouth, or rectal areas of an infected person.  · Exposure to infected fluids during birth.  What are the signs or symptoms?  Different STDs have different symptoms. Some people may not have any symptoms. If symptoms are present, they may include:  · Painful or bloody urination.  · Pain in the pelvis, abdomen, vagina, anus, throat, or eyes.  · A skin rash, itching, or irritation.  · Growths, ulcerations, blisters, or sores in the genital and anal areas.  · Abnormal vaginal discharge with or without  bad odor.  · Penile discharge in men.  · Fever.  · Pain or bleeding during sexual intercourse.  · Swollen glands in the groin area.  · Yellow skin and eyes (jaundice). This is seen with hepatitis.  · Swollen testicles.  · Infertility.  · Sores and blisters in the mouth.  How is this diagnosed?  To make a diagnosis, your health care provider may:  · Take a medical history.  · Perform a physical exam.  · Take a sample of any discharge to examine.  · Swab the throat, cervix, opening to the penis, rectum, or vagina for testing.  · Test a sample of your first morning urine.  · Perform blood tests.  · Perform a Pap test, if this applies.  · Perform a colposcopy.  · Perform a laparoscopy.  How is this treated?  Treatment depends on the STD. Some STDs may be treated but not cured.  · Chlamydia, gonorrhea, trichomonas, and syphilis can be cured with antibiotic medicine.  · Genital herpes, hepatitis, and HIV can be treated, but not cured, with prescribed medicines. The medicines lessen symptoms.  · Genital warts from HPV can be treated with medicine or by freezing, burning (electrocautery), or surgery. Warts may come back.  · HPV cannot be cured with medicine or surgery. However, abnormal areas may be removed from the cervix, vagina, or vulva.  · If your diagnosis is confirmed, your recent sexual partners need treatment. This is true even if they are symptom-free or have a negative culture or evaluation. They should not have sex until their health care providers say it is okay.  · Your health care provider may test you for infection again 3 months after treatment.  How is this prevented?  Take these steps to reduce your risk of getting an STD:  · Use latex condoms, dental dams, and water-soluble lubricants during sexual activity. Do not use petroleum jelly or oils.  · Avoid having multiple sex partners.  · Do not have sex with someone who has other sex partners.  · Do not have sex with anyone you do not know or who is at high  risk for an STD.  · Avoid risky sex practices that can break your skin.  · Do not have sex if you have open sores on your mouth or skin.  · Avoid drinking too much alcohol or taking illegal drugs. Alcohol and drugs can affect your judgment and put you in a vulnerable position.  · Avoid engaging in oral and anal sex acts.  · Get vaccinated for HPV and hepatitis. If you have not received these vaccines in the past, talk to your health care provider about whether one or both might be right for you.  · If you are at risk of being infected with HIV, it is recommended that you take a prescription medicine daily to prevent HIV infection. This is called pre-exposure prophylaxis (PrEP). You are considered at risk if:  ¨ You are a man who has sex with other men (MSM).  ¨ You are a heterosexual man or woman and are sexually active with more than one partner.  ¨ You take drugs by injection.  ¨ You are sexually active with a partner who has HIV.  · Talk with your health care provider about whether you are at high risk of being infected with HIV. If you choose to begin PrEP, you should first be tested for HIV. You should then be tested every 3 months for as long as you are taking PrEP.  Contact a health care provider if:  · See your health care provider.  · Tell your sexual partner(s). They should be tested and treated for any STDs.  · Do not have sex until your health care provider says it is okay.  Get help right away if:  Contact your health care provider right away if:  · You have severe abdominal pain.  · You are a man and notice swelling or pain in your testicles.  · You are a woman and notice swelling or pain in your vagina.  This information is not intended to replace advice given to you by your health care provider. Make sure you discuss any questions you have with your health care provider.  Document Released: 03/09/2004 Document Revised: 07/07/2017 Document Reviewed: 07/08/2014  [employer] Interactive Patient Education ©  2017 [employer]

## 2018-04-14 NOTE — PROGRESS NOTES
Lizandro Post is a 38 y.o. female.   Chief Complaint   Patient presents with   • Exposure to STD    Same Day     History of Present Illness   Patient is requesting STD testing. Had an exposure 2 weeks ago - possible HIV. Having an increase in vaginal discharge and urine is odiferous. She is also reporting she had a steroid injection medical block to her back on Wednesday this week and [place] with her [place] and experienced an episode of stool incontinence earlier today. Reports she has been taking anti inflammatories and muscle relaxer's.   She walked into the office unassisted. She reports her numbness and tingling has not changed at all.       The following portions of the patient's history were reviewed and updated as appropriate: allergies, current medications, past family history, past medical history, past social history, past surgical history and problem list.    Review of Systems   Constitutional: Negative for fatigue and fever.   HENT: Negative.    Eyes: Negative.    Respiratory: Negative.    Cardiovascular: Negative.    Gastrointestinal: Negative.    Genitourinary: Positive for pelvic pain, urgency and vaginal discharge.   Musculoskeletal: Positive for back pain.   Skin: Negative.    Neurological: Positive for numbness. Negative for headaches.        No change in the numbness or tingling in legs.      Psychiatric/Behavioral: The patient is not nervous/anxious.        Objective   Allergies   Allergen Reactions   • Adhesive Tape Hives   • Biaxin [Clarithromycin] Nausea Only   • Codeine Itching   • Latex Hives   • Penicillins Nausea Only   • Sulfa Antibiotics Hives   • Sulfate Hives     Visit Vitals  /80   Pulse 110   Temp 99.6 °F (37.6 °C) (Oral)   Resp 24   Wt 81.6 kg (180 lb)   SpO2 97%   BMI 34.01 kg/m²       Physical Exam   Constitutional: She is oriented to person, place, and time. She appears well-developed and well-nourished.   Cardiovascular: Regular  rhythm and normal heart sounds.    Pulmonary/Chest: Effort normal and breath sounds normal.   Genitourinary:   Genitourinary Comments: Patient declined a vaginal swab for bacterial vaginosis.      Neurological: She is alert and oriented to person, place, and time.   Skin: Skin is warm, dry and intact. Capillary refill takes less than 2 seconds.   Psychiatric: She has a normal mood and affect. Her behavior is normal.   Vitals reviewed.      Assessment/Plan   Megan was seen today for exposure to std.    Diagnoses and all orders for this visit:    STD exposure  -     RPR  -     HSV 1 & 2 IgM, Antibodies, Indirect  -     HSV 1 & 2 - Specific Antibody, IgG  -     Chlamydia trachomatis, Neisseria gonorrhoeae, PCR - Urine, Urine, Clean Catch  -     Trichomonas vaginalis, PCR - Urine, Urine, Clean Catch  -     Hepatitis C Antibody    Urinary urgency  -     POCT urinalysis dipstick, automated    High risk sexual behavior  -     HIV-1 / O / 2 Ag / Antibody 4th Generation    Exposure to hepatitis  -     Hepatitis Panel, Acute; Future  -     Hepatitis Panel, Acute    Other fatigue   -     Hepatitis Panel, Acute; Future  -     Hepatitis Panel, Acute    Other orders  -     Cancel: HIV-1 & HIV-2 Antibodies      Patient has been instructed to contact her neurosurgeon group/anesthesiologist or go to ER for her stool incontinence problem due to possible nerve involvement in her back. She is agreeable. Unable to do any imaging at this primary care office. All outpatient diagnostic centers are currently closed.   Will obtain urine and blood for STD exposure testing.   Follow up with your PCP as needed.   See patient instructions for safe sex and STD exposure.          Faye Muñoz, MAGGIE

## 2018-04-16 ENCOUNTER — HOSPITAL ENCOUNTER (OUTPATIENT)
Dept: PHYSICAL THERAPY | Facility: HOSPITAL | Age: 38
Setting detail: THERAPIES SERIES
Discharge: HOME | End: 2018-04-16

## 2018-04-16 DIAGNOSIS — M54.2 NECK PAIN: Primary | ICD-10-CM

## 2018-04-16 LAB — RPR SER QL: NORMAL

## 2018-04-16 PROCEDURE — G8979 MOBILITY GOAL STATUS: HCPCS

## 2018-04-16 PROCEDURE — G8978 MOBILITY CURRENT STATUS: HCPCS

## 2018-04-16 PROCEDURE — 97161 PT EVAL LOW COMPLEX 20 MIN: CPT | Performed by: PHYSICAL THERAPIST

## 2018-04-16 NOTE — PROGRESS NOTES
"Chief Complaint: \"Pain in my lower back.\"    History of Present Illness: Ms. Megan Post, 38 y.o. Female, originally referred by MAGGIE Weir,   In consultation for intractable chronic lower back pain.   Pain history: Patient reports a 10-year history of pain, which began without incident. Pain has progressed in intensity over the past 6 months.   Patient underwent a first set of diagnostic bilateral lumbar medial branch blocks on 04/11/2018, and experienced 100% pain relief and functional improvement that lasted for 4-5 days   Pain description: constant lower back pain with intermittent exacerbation, described as sharp sensation.   Radiation of pain: The pain radiates globally without any particular radicular or peripheral nerve distribution into her legs.  Pain intensity today: 4/10  Average pain intensity last week: 0/10  Pain intensity ranges from: 4/10 to 6/10 (down from 10/10 at initial visit)  Aggravating factors: Pain increases with bending, standing longer than 5 minutes and ambulating more than 5 minutes.  Alleviating factors: Pain decreases with lying and analgesics.   Associated symptoms:   Patient denies numbness or weakness in the lower extremities.   Patient denies any new bladder or bowel problems.   Patient denies difficulties with her balance or recent falls.      Review of previous therapies and additional medical records:  Megan Post has already failed the following measures, including:   Conservative measures: Oral analgesics, physical therapy, ice, heat and chiropractic therapy   Interventional measures: [facility]: meds/no procedures  Surgical measures: No history of lumbar spine surgery  Megan Post underwent surgical or neurosurgical consultation about 3 years ago and was found not to be a surgical candidate.  Megan Post presents with significant comorbidities including anxiety and depression, bipolar disorder (one hospitalization several years ago), obesity.  In " terms of current analgesics, Crystal A Day takes: tramadol, tizanidine, Topamax, trazodone, Cymbalta   I have reviewed [facility] Report #[id number] consistent to medication reconciliation.    Global Pain Scale Score  3-15     4-17   Pain 19                      13   Feelings 6                          6   Clinical outcomes 15                        2   Activities 20                        2   GPS Total: 60                      23      Diagnostic Studies:    MR Lumbar Spine Without Intravenous Contrast 08/19/2017. FINDINGS: Vertebrae:  Unremarkable. No acute fracture. Spinal cord: Unremarkable. Normal signal. Soft tissues:  Unremarkable.  DISCS/SPINAL CANAL/NEURAL FORAMINA:  L1-L2: Unremarkable. No significant disc disease. No stenosis.  L2-L3: Unremarkable. No significant disc disease. No stenosis.  L3-L4: Unremarkable. No significant disc disease. No stenosis.  L4-L5: Unremarkable. No significant disc disease. No stenosis.  L5-S1: Small central disc bulge L5-S1 without significant impingement on dural sac or nerve roots. No interval change.  No stenosis.    Review of Systems   HENT: Positive for postnasal drip.    Eyes: Positive for pain.   Cardiovascular: Positive for chest pain.   Endocrine: Positive for polyuria.   Musculoskeletal: Positive for arthralgias, back pain, myalgias and neck pain.   Allergic/Immunologic: Positive for environmental allergies and food allergies.   Neurological: Positive for dizziness, light-headedness and headaches.   Psychiatric/Behavioral: The patient is nervous/anxious (depression. Bipolar disorder).    All other systems reviewed and are negative.     Patient Active Problem List   Diagnosis   • Allergic rhinitis   • Tavera's esophagus   • Bipolar disorder   • Fibromyalgia   • Gastroesophageal reflux disease   • Insomnia   • Abdominal wall abscess   • Acute recurrent frontal sinusitis   • Spasm   • Right upper quadrant pain   • Uncomplicated asthma   • Shortness of breath   • Atypical  chest pain   • Lung nodules - calcified granulomas   • Atelectasis   • Hypercalcemia   • Displacement of intervertebral disc of lumbosacral region   • Lumbar discogenic pain syndrome   • Spondylosis of lumbar region without myelopathy or radiculopathy       Past Medical History:   Diagnosis Date   • Abdominal pain    • Abnormal breast exam    • Abnormal Pap smear of cervix    • Achilles tendinitis    • Acute sinusitis    • Anxiety    • Arthritis    • Asthma    • Tavera's syndrome    • Bipolar 1 disorder    • Bowel trouble    • Breast cyst    • Colon polyps    • Constipation    • Depression    • Diverticulitis    • Endometriosis    • Eustachian tube dysfunction    • Extremity pain    • Female pelvic pain    • Gastric ulcer    • H/O bladder infections    • History of rashes as a child    • Low back pain    • Menopausal symptoms    • Muscle weakness    • Ovarian cyst    • PID (pelvic inflammatory disease)    • Recurrent UTI    • Sexual problems          Past Surgical History:   Procedure Laterality Date   • BREAST BIOPSY      abnormal   •  SECTION     • HYSTERECTOMY     • NASAL ENDOSCOPY     • OOPHORECTOMY Bilateral    • OTHER SURGICAL HISTORY      Laparoscopy (diagnostic) gynecologic with biopsy   • SHOULDER SURGERY           Family History   Problem Relation Age of Onset   • Liver disease Mother    • Diabetes Mother    • Hyperlipidemia Mother    • Hypertension Mother    • Thyroid disease Mother    • Arthritis Father    • Mental illness Father    • Migraines Sister    • Stroke Other    • Diabetes Other    • Hyperlipidemia Other    • Hypertension Other    • Mental illness Other    • Migraines Other    • Tuberculosis Other          Social History     Social History   • Marital status: Single     Social History Main Topics   • Smoking status: Former Smoker     Types: Cigarettes   • Smokeless tobacco: Never Used      Comment: social smoking   • Alcohol use No   • Drug use: No   • Sexual activity: Defer     Other  Topics Concern   • Not on file        Current Outpatient Prescriptions:   •  albuterol (PROVENTIL HFA;VENTOLIN HFA) 108 (90 BASE) MCG/ACT inhaler, Inhale 2 puffs Every 6 (Six) Hours As Needed for wheezing., Disp: 1 inhaler, Rfl: 5  •  albuterol (PROVENTIL) (2.5 MG/3ML) 0.083% nebulizer solution, USE 1 VIAL PER NEBULIZER Q 4-6 H PRN, Disp: , Rfl: 3  •  cetirizine (zyrTEC) 10 MG tablet, Take 1 tablet by mouth Daily., Disp: , Rfl: 1  •  CLARITIN-D 12 HOUR 5-120 MG per 12 hr tablet, Take 1 tablet by mouth As Needed., Disp: , Rfl: 3  •  diclofenac (VOLTAREN) 1 % gel gel, Apply 4 g topically 4 (Four) Times a Day As Needed (as needed for pain)., Disp: 1 tube, Rfl: 0  •  DULoxetine (CYMBALTA) 30 MG capsule, Take 1 capsule by mouth Daily., Disp: , Rfl: 3  •  EPIPEN 2-JAREN 0.3 MG/0.3ML solution auto-injector injection, U UTD, Disp: , Rfl: 6  •  fluticasone (FLONASE) 50 MCG/ACT nasal spray, 2 sprays into each nostril Daily., Disp: 1 bottle, Rfl: 3  •  fluticasone (FLOVENT HFA) 110 MCG/ACT inhaler, Inhale 1 puff 2 (Two) Times a Day As Needed (sinuspain)., Disp: 1 inhaler, Rfl: 6  •  lidocaine (XYLOCAINE) 5 % ointment, , Disp: , Rfl:   •  LORazepam (ATIVAN) 0.5 MG tablet, Take  by mouth., Disp: , Rfl:   •  meloxicam (MOBIC) 15 MG tablet, Take 1 tablet by mouth Daily., Disp: 30 tablet, Rfl: 0  •  methocarbamol (ROBAXIN) 500 MG tablet, Take 1 tablet by mouth 3 (Three) Times a Day for 30 days. Take this one during the day for less sleepiness, Disp: 90 tablet, Rfl: 0  •  mometasone-formoterol (DULERA 200) 200-5 MCG/ACT inhaler, Inhale 1 puff Daily., Disp: 13 g, Rfl: 2  •  mupirocin (BACTROBAN) 2 % ointment, APPLY EXTERNALLY TO THE AFFECTED AREA THREE TIMES DAILY, Disp: 22 g, Rfl: 0  •  NEXIUM 40 MG capsule, Take 1 capsule by mouth Every Morning Before Breakfast., Disp: 30 capsule, Rfl: 5  •  PATADAY 0.2 % solution ophthalmic solution, USE 1 DROP AEY ONCE DAILY PRN, Disp: , Rfl: 3  •  PAZEO 0.7 % solution, INSTILL 1 GTT  AEY ONCE  "DAILY PRN, Disp: , Rfl: 6  •  PREMARIN 1.25 MG tablet, TAKE 1 TABLET BY MOUTH DAILY AS DIRECTED, Disp: 30 tablet, Rfl: 5  •  Spacer/Aero-Holding Chambers (AEROCHAMBER PLUS ADIEL-VU) misc, USE WITH INHALER AS DIRECTED, Disp: , Rfl: 1  •  topiramate (TOPAMAX) 100 MG tablet, Take 1 tablet by mouth Daily., Disp: , Rfl: 3  •  traMADol (ULTRAM) 50 MG tablet, Take 1 tablet by mouth Every 8 (Eight) Hours As Needed for Moderate Pain ., Disp: 90 tablet, Rfl: 0  •  traZODone (DESYREL) 150 MG tablet, Take 1 tablet by mouth As Needed., Disp: , Rfl: 3      Allergies   Allergen Reactions   • Adhesive Tape Hives   • Biaxin [Clarithromycin] Nausea Only   • Codeine Itching   • Latex Hives   • Penicillins Nausea Only   • Sulfa Antibiotics Hives   • Sulfate Hives      /75 (BP Location: Right arm, Patient Position: Sitting)   Pulse 75   Temp 97.8 °F (36.6 °C) (Temporal Artery )   Resp 18   Ht 154.9 cm (61\")   Wt 82.1 kg (181 lb)   SpO2 98%   BMI 34.20 kg/m²       Physical Exam:  Constitutional: Patient is oriented to person, place, and time. Vital signs are normal. Patient appears well-developed and well-nourished.   HEENT: Head: Normocephalic and atraumatic. Eyes: Conjunctivae and lids are normal. Pupils: Equal, round, reactive to light.   Neck: Trachea normal. Neck supple. No JVD present.   Pulmonary Respiratory effort: No increased work of breathing or signs of respiratory distress. Auscultation of lungs: Clear to auscultation.   Cardiovascular Auscultation of heart: Normal rate and rhythm, normal S1 and S2, no murmurs.   Musculoskeletal   Gait and station: Gait evaluation demonstrated a normal gait   Lumbar spine: The range of motion of the lumbar spine is limited secondary to pain. Extension, lateral flexion, rotation of the lumbar spine increased and reproduced pain. Lumbar facet joint loading maneuvers are positive.  Andre and Gaenslen's tests are negative   Piriformis maneuvers are negative   Palpation of the " bilateral ischial tuberosities, unrevealing   Palpation of the bilateral greater trochanter, unrevealing   Examination of the Iliotibial band: unrevealing   Hip joints: The range of motion of the hip joints is full and without pain   Neurological: Patient is alert and oriented to person, place, and time. Speech: speech is normal. Cortical function: Normal mental status.   Cranial nerves: Cranial nerves 2-12 intact.   Reflex Scores:  Right patellar: 2+  Left patellar: 2+  Right achilles: 2+  Left achilles: 2+  Motor strength: 5/5  Motor Tone: normal tone.   Involuntary movements: none.   Superficial/Primitive Reflexes: primitive reflexes were absent.   Right Garduno: absent  Left Garduno: absent  Right ankle clonus: absent  Left ankle clonus: absent   Negative long tract signs. Straight leg raising test is negative. Femoral stretch sign is negative.   Sensation: No sensory loss. Sensory exam: intact to light touch, intact pain and temperature sensation, intact vibration sensation and normal proprioception.   Coordination: Normal finger to nose and heel to shin. Normal balance and negative. Romberg's sign negative.   Skin and subcutaneous tissue: Skin is warm and intact. No rash noted. No cyanosis.   Psychiatric: Judgment and insight: Normal. Orientation to person, place and time: Normal. Recent and remote memory: Intact. Mood and affect: Normal.     ASSESSMENT:   1. Spondylosis of lumbar region without myelopathy or radiculopathy    2. Displacement of intervertebral disc of lumbosacral region    3. Lumbar discogenic pain syndrome    4. Fibromyalgia    5. Bipolar affective disorder, remission status unspecified    6. Insomnia, unspecified type       PLAN/MEDICAL DECISION MAKING: I had a lengthy conversation with Ms. Guzman A Day regarding her chronic pain condition and potential therapeutic options including risks, benefits, alternative therapies, to name a few. Patient has failed to obtain pain relief with  conservative measures, as referenced above. Patient has a history of intractable chronic lower back pain. MRI of the lumbar spine revealed lumbar facet arthropathy at L3-L4, L4-L5 and L5-S1. There is some lateral recess stenosis at L4-L5 and L5-S1. L5-S1: Small central disc bulge L5-S1 without significant impingement on dural sac or nerve roots. No stenosis. Patient underwent a first set of diagnostic bilateral lumbar medial branch blocks on 04/11/2018, and experienced 100% pain relief and functional improvement that lasted for about 4-5 days. I have reviewed all available patient's medical records as well as previous therapies as referenced above. Therefore, I have proposed the following plan:  1. Interventional pain management measures: Patient will be scheduled for a second set of diagnostic bilateral lumbar medial branch blocks at L3, L4, L5; for bilateral lumbar facet joints at L4-L5, L5-S1 to confirm the origin of her refractory mechanical lower back pain. If patient experiences more than 80% relief along with significant improvement the range of motion of the lumbar spine, then, patient will be scheduled for bilateral lumbar medial branch rhizotomies. Otherwise, we may proceed with caudal epidural steroid injection.   2. Diagnostic studies: EMG/NCV of the bilateral lower extremities. Patient reports lower extremity paresthesia and some claudication without much correlation with her most recent MRI  3. Pharmacological measures: Reviewed. Discussed. Patient takes tramadol, tizanidine, Topamax, trazodone, Cymbalta  4. Long-term rehabilitation efforts:  A. The patient does not have a history of falls. I did complete a risk assessment for falls.   B. Patient will start a comprehensive physical therapy program for water therapy, therapeutic exercise, core strengthening, gait and balance training, neurodynamics, myofascial release, cupping and dry needling once pain is under control  C. Start an exercise program  such as yoga, water therapy and daily walks for fitness  D. Trial with TENS unit  E. Contrast therapy: Apply ice-packs for 15-20 minutes, followed by heating pads for 15-20 minutes to affected area   F. Patient attends [facility] for her bipolar disorder, which has been stable  G. Referral to [facility]  5. The patient has been instructed to contact my office with any questions or difficulties. The patient understands the plan and agrees to proceed accordingly.       Patient Care Team:  MAGGIE Staples as PCP - General (Internal Medicine)  Flavia Moran DO as Consulting Physician (Obstetrics and Gynecology)  Paramjit Leahy MD as Consulting Physician (Otolaryngology)  Jasiel Sanchez MD as Consulting Physician (Pain Medicine)  Trudy Vela RD as Dietitian (Nutrition)  DANELLE Polanco as Physician Assistant (Internal Medicine)  MAGGIE Mondragon as Nurse Practitioner (Nurse Practitioner)  MAGGIE Shelton as Nurse Practitioner (Pulmonary Disease)  Rafaela Chavira PT as Physical Therapist (Physical Therapy)  MAGGIE Orosco as Nurse Practitioner (Nurse Practitioner)     No orders of the defined types were placed in this encounter.        Future Appointments  Date Time Provider Department Center   4/19/2018 1:30 PM [facility] None   4/19/2018 2:00 PM MAGGIE Shelton [facility] None   5/3/2018 1:00 PM Rafaela Chavira PT [facility] None   5/9/2018 1:30 PM Trudy Vela RD [facility]   5/10/2018 1:00 PM Rafaela Chavira PT [facility] None   5/17/2018 1:00 PM Rafaela Chavira PT [facility] None   5/22/2018 1:00 PM Rafaela Chavira PT [facility] None   5/31/2018 1:00 PM Rafaela Chavira, PT [facility] None         Jasiel Sanchez MD     EMR Dragon/Transcription disclaimer:  Much of this encounter note is an electronic transcription of spoken language to printed text. Electronic transcription of spoken language may permit  erroneous, or at times, nonsensical words or phrases to be inadvertently transcribed. Although I have reviewed the note for such errors, some may still exist.

## 2018-04-16 NOTE — THERAPY EVALUATION
Outpatient Physical Therapy Ortho Initial Evaluation   Rich     Patient Name: Megan Post  : 1980  MRN: [id number]  Today's Date: 2018      Visit Date: 2018    Patient Active Problem List   Diagnosis   • Allergic rhinitis   • Tavera's esophagus   • Bipolar disorder   • Depression   • Fibromyalgia   • Gastroesophageal reflux disease   • Insomnia   • Abdominal wall abscess   • Acute recurrent frontal sinusitis   • Spasm   • Right upper quadrant pain   • Uncomplicated asthma   • Shortness of breath   • Atypical chest pain   • Lung nodules - calcified granulomas   • Atelectasis   • Hypercalcemia   • Displacement of intervertebral disc of lumbosacral region   • Lumbar discogenic pain syndrome   • Spondylosis of lumbar region without myelopathy or radiculopathy        Past Medical History:   Diagnosis Date   • Abdominal pain    • Abnormal breast exam    • Abnormal Pap smear of cervix    • Achilles tendinitis    • Acute sinusitis    • Anxiety    • Arthritis    • Asthma    • Tavera's syndrome    • Bipolar 1 disorder    • Bowel trouble    • Breast cyst    • Colon polyps    • Constipation    • Depression    • Diverticulitis    • Endometriosis    • Eustachian tube dysfunction    • Extremity pain    • Female pelvic pain    • Gastric ulcer    • H/O bladder infections    • History of rashes as a child    • Low back pain    • Menopausal symptoms    • Muscle weakness    • Ovarian cyst    • PID (pelvic inflammatory disease)    • Recurrent UTI    • Sexual problems         Past Surgical History:   Procedure Laterality Date   • BREAST BIOPSY      abnormal   •  SECTION     • HYSTERECTOMY     • NASAL ENDOSCOPY     • OOPHORECTOMY Bilateral    • OTHER SURGICAL HISTORY      Laparoscopy (diagnostic) gynecologic with biopsy   • SHOULDER SURGERY         Visit Dx:     ICD-10-CM ICD-9-CM   1. Neck pain M54.2 723.1             Patient History     Row Name 18 1400             History    Chief  Complaint Difficulty with daily activities;Joint stiffness;Pain  -RB      Type of Pain Neck pain  -RB      Brief Description of Current Complaint Pt presents w/ complaint of severe bilateral neck pn.  A couple of weeks ago was holding onto her dog when he took off running after another dog and jerked her down to the ground, dragging her several feet. Developed severe neck pn that night and the next day. Was diagnosed w/ whiplash. Had XR performed which indicated muscle spasm/loss of lordosis. Reports pn on both sides of neck, intermittent. Pn increases w/ most activity, specifically when cleaning, moving her head in any direction, reading. Improved some w/ laying down. Denies relief w/ ice or heat. Returns to [facility] after a few weeks of PT.  -RB      Patient/Caregiver Goals Relieve pain;Return to prior level of function;Improve mobility  -RB      Current Tobacco Use None  -RB      Patient's Rating of General Health Fair  -RB      Hand Dominance right-handed  -RB      Occupation/sports/leisure activities unemployed  -RB      Patient seeing anyone else for problem(s)? yes  -RB      What clinical tests have you had for this problem? X-ray  -RB      Results of Clinical Tests loss of lordosis, otherwise unremarkable  -RB         Pain     Pain Location Neck  -RB      Pain at Present 6  -RB      Pain at Best 4  -RB      Pain at Worst 8  -RB      Pain Frequency Intermittent  -RB      Pain Description Tightness;Sharp  -RB         Fall Risk Assessment    Any falls in the past year: Yes  -RB      Number of falls reported in the last 12 months 1  -RB      Factors that contributed to the fall: Other (comment)   dog  -RB      Does patient have a fear of falling No  -RB         Daily Activities    Primary Language English  -RB      Are you able to read Yes  -RB      Are you able to write Yes  -RB      How does patient learn best? Demonstration;Reading  -RB      Teaching needs identified Home Exercise Program;Management of Condition   -RB      Patient is concerned about/has problems with Difficulty with self care (i.e. bathing, dressing, toileting:;Flexibility;Grasping objects lifting;Performing home management (household chores, shopping, care of dependents)  -RB      Does patient have problems with the following? Depression;Anxiety;Panic Attack  -RB      Barriers to learning None  -RB      Pt Participated in POC and Goals Yes  -RB         Safety    Are you being hurt, hit, or frightened by anyone at home or in your life? No  -RB      Are you being neglected by a caregiver No  -RB        User Key  (r) = Recorded By, (t) = Taken By, (c) = Cosigned By    Initials Name Provider Type    RB Rafaela Chavira, PT Physical Therapist                PT Ortho     Row Name 04/16/18 1500       Posture/Observations    Posture/Observations Comments forward head/shoulder posture, minimal head movement during conversation, compensates w/ trunk movement  -RB       Special Tests/Palpation    Special Tests/Palpation Cervical/Thoracic  -RB       Cervical Palpation    Cervical Palpation- Location? Cervical facets;Upper traps  -RB    Cervical Facets Left:;Tender;Guarded/taut   C3-4  -RB    Upper Traps Bilateral:;Tender;Guarded/taut  -RB       Cervical/Thoracic Special Tests    Spurlings (Foraminal Compression) Negative   pn L C3-4 facet  -RB    Cervical Compression (Forarminal Compression vs. Facet Pain) Negative   local pn only  -RB    Cervical Distraction (Foraminal Compression vs. Facet Pain) Negative  -RB       General ROM    Head/Neck/Trunk Neck Extension;Neck Flexion;Neck Lt Lateral Flexion;Neck Rt Lateral Flexion;Neck Lt Rotation;Neck Rt Rotation  -RB    GENERAL ROM COMMENTS BUE AROM WNL  -RB       Head/Neck/Trunk    Neck Extension AROM 30   predominantly lower cervical  -RB    Neck Flexion AROM 50   pn mid lower cervical at end range  -RB    Neck Lt Lateral Flexion AROM 27   pn L mid cervical  -RB    Neck Rt Lateral Flexion AROM 40   w/ L UT pn/tightness  -RB     Neck Lt Rotation AROM 40  -RB    Neck Rt Rotation AROM 32  -RB       MMT (Manual Muscle Testing)    Additional Documentation General Assessment (Manual Muscle Testing) (Group)  -RB       General Assessment (Manual Muscle Testing)    General Manual Muscle Testing (MMT) Assessment upper extremity strength deficits identified  -RB       Upper Extremity (Manual Muscle Testing)    Comment, MMT: Upper Extremity B shoulder MMT grossly 4+/5 all planes, limited by cervical pn  -RB       Sensation    Sensation WNL? WNL  -RB      User Key  (r) = Recorded By, (t) = Taken By, (c) = Cosigned By    Initials Name Provider Type    RB Rafaela Chavira, PT Physical Therapist                      Therapy Education  Education Details: instructed in cervical self mobilization in all planes, supine chin tuck/retraction combo isometric  Given: HEP  Program: New  How Provided: Verbal, Demonstration, Written  Provided to: Patient  Level of Understanding: Teach back education performed           PT OP Goals     Row Name 04/16/18 1646 04/16/18 1600       PT Short Term Goals    STG Date to Achieve  -- 05/07/18  -RB    STG 1  -- Pt to be compliant w/ initial HEP for self mobilization, symptom mgmt  -RB    STG 1 Progress  -- New  -RB    STG 2  -- Pt to report at least a 25% reduction in symptoms  -RB    STG 2 Progress  -- New  -RB    STG 3  -- Pt to improve B cervical rotation to at least 45 deg B.  -RB    STG 3 Progress  -- New  -RB       Long Term Goals    LTG Date to Achieve  -- 05/28/18  -RB    LTG 1  -- Pt to be independent w/ HEP For flexibility and strengthening  -RB    LTG 1 Progress  -- New  -RB    LTG 2  -- Pt to report at least a 50% reduction in symptoms  -RB    LTG 2 Progress  -- New  -RB    LTG 3  -- Pt to improve cervical ROM to at least 40 deg extn, 40 deg B SB, 55 deg B rotation  -RB    LTG 3 Progress  -- New  -RB       Time Calculation    PT Goal Re-Cert Due Date 07/15/18  -RB 07/15/18  -RB      User Key  (r) = Recorded By, (t) =  Taken By, (c) = Cosigned By    Initials Name Provider Type    DELFINO Chavira, PT Physical Therapist                PT Assessment/Plan     Row Name 04/16/18 1293          PT Assessment    Functional Limitations Limitations in community activities;Limitation in home management;Limitations in functional capacity and performance;Performance in leisure activities;Performance in self-care ADL  -RB     Impairments Joint mobility;Pain;Range of motion;Posture;Muscle strength;Impaired flexibility  -RB     Assessment Comments Pt presents w/ complaint of acute B neck pn. Findings consistent w/ L C3-5 facet joint dysfunction. She demonstrates impaired segmental/gross cervical mobility, DNF strength/endurance, and flexibility limiting tolerance to daily activity. She would benefit from skilled PT services to address deficits and restore function. She responded very well to MWM techniques today in clinic.  -RB     Please refer to paper survey for additional self-reported information Yes  -RB     Rehab Potential Good  -RB     Patient/caregiver participated in establishment of treatment plan and goals Yes  -RB     Patient would benefit from skilled therapy intervention Yes  -RB        PT Plan    PT Frequency 1x/week  -RB     Predicted Duration of Therapy Intervention (OT Eval) 8-10 visits  -RB     Planned CPT's? PT EVAL LOW COMPLEXITY: 06031;PT RE-EVAL: 80803;PT THER PROC EA 15 MIN: 89228;PT MANUAL THERAPY EA 15 MIN: 61660;PT NEUROMUSC RE-EDUCATION EA 15 MIN: 31686;PT TRACTION CERVICAL: 04895;PT HOT/COLD PACK WC NONMCARE: 60728  -RB     PT Plan Comments PT POC to include DNF strengthening, postural strengthening, cervical stretching, and manual therapy including MWM techniques and manual cervical traction  -RB       User Key  (r) = Recorded By, (t) = Taken By, (c) = Cosigned By    Initials Name Provider Type    DELFINO Chavira PT Physical Therapist                                        Time Calculation:   Start Time: 1500      Therapy Charges for Today     Code Description Service Date Service Provider Modifiers Qty    23636167703 HC PT EVAL LOW COMPLEXITY 3 4/16/2018 Rafaela Chavira, PT GP 1                    Rafaela Chavira, PT  4/16/2018

## 2018-04-17 ENCOUNTER — OFFICE VISIT (OUTPATIENT)
Dept: PAIN MEDICINE | Facility: CLINIC | Age: 38
End: 2018-04-17

## 2018-04-17 VITALS
TEMPERATURE: 97.8 F | RESPIRATION RATE: 18 BRPM | SYSTOLIC BLOOD PRESSURE: 110 MMHG | BODY MASS INDEX: 34.17 KG/M2 | HEIGHT: 61 IN | DIASTOLIC BLOOD PRESSURE: 75 MMHG | WEIGHT: 181 LBS | OXYGEN SATURATION: 98 % | HEART RATE: 75 BPM

## 2018-04-17 DIAGNOSIS — M51.27 DISPLACEMENT OF INTERVERTEBRAL DISC OF LUMBOSACRAL REGION: ICD-10-CM

## 2018-04-17 DIAGNOSIS — M47.816 SPONDYLOSIS OF LUMBAR REGION WITHOUT MYELOPATHY OR RADICULOPATHY: ICD-10-CM

## 2018-04-17 DIAGNOSIS — M51.26 LUMBAR DISCOGENIC PAIN SYNDROME: ICD-10-CM

## 2018-04-17 DIAGNOSIS — F31.9 BIPOLAR AFFECTIVE DISORDER, REMISSION STATUS UNSPECIFIED (HCC): ICD-10-CM

## 2018-04-17 DIAGNOSIS — M79.7 FIBROMYALGIA: ICD-10-CM

## 2018-04-17 DIAGNOSIS — G47.00 INSOMNIA, UNSPECIFIED TYPE: ICD-10-CM

## 2018-04-17 LAB
HSV1 IGG SER IA-ACNC: <0.91 INDEX (ref 0–0.9)
HSV1 IGM TITR SER IF: NORMAL TITER
HSV2 IGG SER IA-ACNC: 10.6 INDEX (ref 0–0.9)
HSV2 IGM TITR SER IF: NORMAL TITER
T VAGINALIS RRNA GENITAL QL PROBE: NEGATIVE

## 2018-04-17 PROCEDURE — 99213 OFFICE O/P EST LOW 20 MIN: CPT | Performed by: ANESTHESIOLOGY

## 2018-04-18 LAB
C TRACH RRNA SPEC DONR QL NAA+PROBE: NEGATIVE
N GONORRHOEA DNA SPEC QL NAA+PROBE: NEGATIVE

## 2018-04-19 ENCOUNTER — OFFICE VISIT (OUTPATIENT)
Dept: INTERNAL MEDICINE | Facility: CLINIC | Age: 38
End: 2018-04-19

## 2018-04-19 VITALS
HEART RATE: 109 BPM | HEIGHT: 61 IN | TEMPERATURE: 98.4 F | BODY MASS INDEX: 34.17 KG/M2 | DIASTOLIC BLOOD PRESSURE: 78 MMHG | OXYGEN SATURATION: 100 % | SYSTOLIC BLOOD PRESSURE: 122 MMHG | WEIGHT: 181 LBS

## 2018-04-19 DIAGNOSIS — M54.2 NECK PAIN, ACUTE: Primary | ICD-10-CM

## 2018-04-19 PROCEDURE — 96372 THER/PROPH/DIAG INJ SC/IM: CPT | Performed by: NURSE PRACTITIONER

## 2018-04-19 PROCEDURE — 99213 OFFICE O/P EST LOW 20 MIN: CPT | Performed by: NURSE PRACTITIONER

## 2018-04-19 RX ORDER — CYCLOBENZAPRINE HCL 10 MG
10 TABLET ORAL 3 TIMES DAILY PRN
Qty: 15 TABLET | Refills: 0 | Status: SHIPPED | OUTPATIENT
Start: 2018-04-19 | End: 2018-12-03

## 2018-04-19 RX ORDER — KETOROLAC TROMETHAMINE 30 MG/ML
60 INJECTION, SOLUTION INTRAMUSCULAR; INTRAVENOUS ONCE
Status: COMPLETED | OUTPATIENT
Start: 2018-04-19 | End: 2018-04-19

## 2018-04-19 RX ADMIN — KETOROLAC TROMETHAMINE 60 MG: 30 INJECTION, SOLUTION INTRAMUSCULAR; INTRAVENOUS at 19:34

## 2018-04-19 NOTE — PROGRESS NOTES
CHIEF COMPLAINT  Chief Complaint   Patient presents with   • Neck Pain   • Sinusitis     voice going out, watery itchy eyes, cough, tired. pt states that this all started about a week ago.        HPI  Megan A Day is a 38 y.o. female  presents with complaint of cervical neck pain after riding carnival rides with her son on 4/14; severe pain 9/10; unable to move neck side-to-side or forward/backward.  She has been taking anti-inflammatories and muscle relaxers w/o any relief.    Sees. Dr. Sanchez for pain management of chronic back pain.    Past Medical History:   Diagnosis Date   • Abdominal pain    • Abnormal breast exam    • Abnormal Pap smear of cervix    • Achilles tendinitis    • Acute sinusitis    • Anxiety    • Arthritis    • Asthma    • Tavera's syndrome    • Bipolar 1 disorder    • Bowel trouble    • Breast cyst    • Colon polyps    • Constipation    • Depression    • Diverticulitis    • Endometriosis    • Eustachian tube dysfunction    • Extremity pain    • Female pelvic pain    • Gastric ulcer    • H/O bladder infections    • History of rashes as a child    • Low back pain    • Menopausal symptoms    • Muscle weakness    • Ovarian cyst    • PID (pelvic inflammatory disease)    • Recurrent UTI    • Sexual problems        Family History   Problem Relation Age of Onset   • Liver disease Mother    • Diabetes Mother    • Hyperlipidemia Mother    • Hypertension Mother    • Thyroid disease Mother    • Arthritis Father    • Mental illness Father    • Migraines Sister    • Stroke Other    • Diabetes Other    • Hyperlipidemia Other    • Hypertension Other    • Mental illness Other    • Migraines Other    • Tuberculosis Other        Social History     Social History   • Marital status: Single     Spouse name: N/A   • Number of children: N/A   • Years of education: N/A     Occupational History   • Not on file.     Social History Main Topics   • Smoking status: Former Smoker     Types: Cigarettes   • Smokeless  "tobacco: Never Used      Comment: social smoking   • Alcohol use No   • Drug use: No   • Sexual activity: Defer     Other Topics Concern   • Not on file     Social History Narrative   • No narrative on file       ROS  Review of Systems   Musculoskeletal: Positive for neck pain.   All other systems reviewed and are negative.      /78   Pulse 109   Temp 98.4 °F (36.9 °C) (Oral)   Ht 154.9 cm (61\")   Wt 82.1 kg (181 lb)   SpO2 100%   BMI 34.20 kg/m²     PHYSICAL EXAM  Physical Exam   Constitutional: She is oriented to person, place, and time. She appears well-developed and well-nourished.   HENT:   Head: Normocephalic and atraumatic.   Musculoskeletal:        Cervical back: She exhibits decreased range of motion, tenderness, pain and spasm.   Neurological: She is alert and oriented to person, place, and time. She has normal strength. No sensory deficit.   Skin: Skin is warm, dry and intact.   Vitals reviewed.       ASSESSMENT/PLAN  1. Neck pain, acute  **in office injection  - ketorolac (TORADOL) injection 60 mg; Inject 60 mg into the shoulder, thigh, or buttocks 1 (One) Time.  - cyclobenzaprine (FLEXERIL) 10 MG tablet; Take 1 tablet by mouth 3 (Three) Times a Day As Needed for Muscle Spasms.  Dispense: 15 tablet; Refill: 0  -apply heat/ice 15 min QID prn pain  -continue anti-inflammatories for pain/inflammation      FOLLOW-UP  Next scheduled f/u with MAGGIE Morelos    RTC sooner as needed.    Patient verbalizes understanding of medication dosage, comfort measures, instructions for treatment and follow-up.    MAGGIE Gómez  04/19/2018        "

## 2018-04-26 DIAGNOSIS — N95.9 POST MENOPAUSAL PROBLEMS: ICD-10-CM

## 2018-04-26 RX ORDER — ESTROGENS, CONJUGATED 1.25 MG
TABLET ORAL
Qty: 30 TABLET | Refills: 0 | OUTPATIENT
Start: 2018-04-26

## 2018-05-07 ENCOUNTER — OUTSIDE FACILITY SERVICE (OUTPATIENT)
Dept: PAIN MEDICINE | Facility: CLINIC | Age: 38
End: 2018-05-07

## 2018-05-07 PROCEDURE — 64493 INJ PARAVERT F JNT L/S 1 LEV: CPT | Performed by: ANESTHESIOLOGY

## 2018-05-07 PROCEDURE — 99152 MOD SED SAME PHYS/QHP 5/>YRS: CPT | Performed by: ANESTHESIOLOGY

## 2018-05-07 PROCEDURE — 64494 INJ PARAVERT F JNT L/S 2 LEV: CPT | Performed by: ANESTHESIOLOGY

## 2018-05-08 PROCEDURE — 99283 EMERGENCY DEPT VISIT LOW MDM: CPT

## 2018-05-09 ENCOUNTER — HOSPITAL ENCOUNTER (EMERGENCY)
Facility: HOSPITAL | Age: 38
Discharge: HOME | End: 2018-05-09
Attending: EMERGENCY MEDICINE | Admitting: EMERGENCY MEDICINE

## 2018-05-09 VITALS
WEIGHT: 182 LBS | TEMPERATURE: 98.2 F | SYSTOLIC BLOOD PRESSURE: 105 MMHG | HEIGHT: 61 IN | HEART RATE: 96 BPM | DIASTOLIC BLOOD PRESSURE: 70 MMHG | OXYGEN SATURATION: 96 % | RESPIRATION RATE: 16 BRPM | BODY MASS INDEX: 34.36 KG/M2

## 2018-05-09 DIAGNOSIS — M47.816 SPONDYLOSIS OF LUMBAR REGION WITHOUT MYELOPATHY OR RADICULOPATHY: ICD-10-CM

## 2018-05-09 DIAGNOSIS — G89.18 POST PROCEDURE DISCOMFORT: Primary | ICD-10-CM

## 2018-05-09 RX ORDER — HYDROCODONE BITARTRATE AND ACETAMINOPHEN 7.5; 325 MG/1; MG/1
1 TABLET ORAL ONCE
Status: COMPLETED | OUTPATIENT
Start: 2018-05-09 | End: 2018-05-09

## 2018-05-09 RX ORDER — HYDROCODONE BITARTRATE AND ACETAMINOPHEN 5; 325 MG/1; MG/1
1 TABLET ORAL EVERY 6 HOURS PRN
Qty: 8 TABLET | Refills: 0 | Status: SHIPPED | OUTPATIENT
Start: 2018-05-09 | End: 2018-05-15

## 2018-05-09 RX ADMIN — HYDROCODONE BITARTRATE AND ACETAMINOPHEN 1 TABLET: 7.5; 325 TABLET ORAL at 02:30

## 2018-05-09 NOTE — ED PROVIDER NOTES
"Subjective   Patient presents to the emergency department with complaint of lumbar region pain at the site of recent injections that she received 3 days ago on Monday, May 7 by her pain management provider for \"nerve block injections\" for management for chronic back pain.  Patient states she tolerated the procedure well at that time.  Her pain has since exacerbated.  She has no neurosensory complaints or focal weakness.  No saddle anesthesia.  She denies any fever or vomiting.        Back Pain   Location:  Lumbar spine  Quality:  Aching  Radiates to:  Does not radiate  Pain severity:  Moderate  Pain is:  Unable to specify  Onset quality:  Gradual  Duration:  2 days  Timing:  Constant  Progression:  Unchanged  Chronicity:  Recurrent  Context: emotional stress and MCA    Context: not jumping from heights, not lifting heavy objects, not occupational injury, not pedestrian accident, not physical stress, not recent illness, not recent injury and not twisting    Relieved by:  Nothing  Worsened by:  Movement  Ineffective treatments:  None tried (patient states she was not given pain meds post procedural )  Associated symptoms: no abdominal pain, no chest pain, no dysuria, no fever, no headaches, no leg pain, no numbness, no paresthesias, no pelvic pain, no tingling and no weakness    Risk factors: obesity and recent surgery        Review of Systems   Constitutional: Negative.  Negative for diaphoresis and fever.   HENT: Negative for sore throat.    Eyes: Negative.  Negative for pain and visual disturbance.   Respiratory: Negative for cough, shortness of breath, wheezing and stridor.    Cardiovascular: Negative.  Negative for chest pain.   Gastrointestinal: Negative.  Negative for abdominal pain, diarrhea, nausea and vomiting.   Endocrine: Negative.    Genitourinary: Negative.  Negative for dysuria and pelvic pain.   Musculoskeletal: Positive for back pain. Negative for neck pain.   Skin: Negative.  Negative for pallor and " rash.   Allergic/Immunologic: Negative.    Neurological: Negative.  Negative for tingling, syncope, weakness, numbness, headaches and paresthesias.   Hematological: Negative.    Psychiatric/Behavioral: Negative.  Negative for agitation.   All other systems reviewed and are negative.      Past Medical History:   Diagnosis Date   • Abdominal pain    • Abnormal breast exam    • Abnormal Pap smear of cervix    • Achilles tendinitis    • Acute sinusitis    • Anxiety    • Arthritis    • Asthma    • Tavera's syndrome    • Bipolar 1 disorder    • Bowel trouble    • Breast cyst    • Colon polyps    • Constipation    • Depression    • Diverticulitis    • Endometriosis    • Eustachian tube dysfunction    • Extremity pain    • Female pelvic pain    • Gastric ulcer    • H/O bladder infections    • History of rashes as a child    • Low back pain    • Menopausal symptoms    • Muscle weakness    • Ovarian cyst    • PID (pelvic inflammatory disease)    • Recurrent UTI    • Sexual problems        Allergies   Allergen Reactions   • Adhesive Tape Hives   • Biaxin [Clarithromycin] Nausea Only   • Codeine Itching   • Latex Hives   • Penicillins Nausea Only   • Sulfa Antibiotics Hives   • Sulfate Hives       Past Surgical History:   Procedure Laterality Date   • BREAST BIOPSY      abnormal   •  SECTION     • HYSTERECTOMY     • NASAL ENDOSCOPY     • OOPHORECTOMY Bilateral    • OTHER SURGICAL HISTORY      Laparoscopy (diagnostic) gynecologic with biopsy   • SHOULDER SURGERY         Family History   Problem Relation Age of Onset   • Liver disease Mother    • Diabetes Mother    • Hyperlipidemia Mother    • Hypertension Mother    • Thyroid disease Mother    • Arthritis Father    • Mental illness Father    • Migraines Sister    • Stroke Other    • Diabetes Other    • Hyperlipidemia Other    • Hypertension Other    • Mental illness Other    • Migraines Other    • Tuberculosis Other        Social History     Social History   • Marital  status: Single     Social History Main Topics   • Smoking status: Former Smoker     Types: Cigarettes   • Smokeless tobacco: Never Used      Comment: social smoking   • Alcohol use No   • Drug use: No   • Sexual activity: Defer     Other Topics Concern   • Not on file           Objective   Physical Exam   Constitutional: She is oriented to person, place, and time. She appears well-developed and well-nourished.   HENT:   Head: Normocephalic and atraumatic.   Mouth/Throat: Oropharynx is clear and moist.   Eyes: Conjunctivae and EOM are normal. Pupils are equal, round, and reactive to light.   Neck: Normal range of motion. Neck supple.   Cardiovascular: Normal rate and regular rhythm.    Pulmonary/Chest: Effort normal and breath sounds normal. She has no wheezes. She has no rales.   Abdominal: Soft. Bowel sounds are normal. She exhibits no distension. There is no rebound and no guarding.   Musculoskeletal: Normal range of motion. She exhibits no edema or tenderness.   Inspection of the patient's back reveals sites of the 2 previous punctures are well approximated, clean and without soft tissue swelling or redness.  Patient has generalized tenderness to this region that she recognizes as similar in presentation in intensity to frequent occasions of sacroiliac pain    SLR is negative     Neurological: She is alert and oriented to person, place, and time. She exhibits normal muscle tone. Coordination normal.   Skin: Skin is warm and dry. Capillary refill takes less than 2 seconds. No rash noted. No erythema. No pallor.   Psychiatric: She has a normal mood and affect. Her behavior is normal.   Nursing note and vitals reviewed.      Procedures           ED Course  ED Course      No results found for this or any previous visit (from the past 24 hour(s)).  Note: In addition to lab results from this visit, the labs listed above may include labs taken at another facility or during a different encounter within the last 24 hours.  Please correlate lab times with ED admission and discharge times for further clarification of the services performed during this visit.    No orders to display     Vitals:    05/09/18 0040 05/09/18 0100 05/09/18 0112 05/09/18 0230   BP:  99/59  105/70   BP Location:       Patient Position:       Pulse:   91 96   Resp:   16 16   Temp:       TempSrc:       SpO2: 96% 96%  96%   Weight:       Height:         Medications   HYDROcodone-acetaminophen (NORCO) 7.5-325 MG per tablet 1 tablet (1 tablet Oral Given 5/9/18 0230)     ECG/EMG Results (last 24 hours)     ** No results found for the last 24 hours. **                    [facility]      Final diagnoses:   Post procedure discomfort   Spondylosis of lumbar region without myelopathy or radiculopathy            Becca Hardin, APRN  05/09/18 0427

## 2018-05-10 ENCOUNTER — TELEPHONE (OUTPATIENT)
Dept: PULMONOLOGY | Facility: CLINIC | Age: 38
End: 2018-05-10

## 2018-05-10 NOTE — TELEPHONE ENCOUNTER
----- Message from Debby Edwards sent at 5/8/2018  1:26 PM EDT -----  Regarding: long term  This patient needs a long term scheduled before she can come in to be seen!

## 2018-05-11 NOTE — TELEPHONE ENCOUNTER
I spoke with Laina PENNINGTON About this [facility]. She has not been seen here since 5/18/2017. We would like to have her see the DrCrystal Or the nurse practitioner before we do the testing. I asked Francisca to cancel this test and give her appointment to see practitioner.

## 2018-05-14 ENCOUNTER — TELEPHONE (OUTPATIENT)
Dept: PAIN MEDICINE | Facility: CLINIC | Age: 38
End: 2018-05-14

## 2018-05-14 NOTE — PROGRESS NOTES
"Chief Complaint: \"Pain in my lower back.\"    History of Present Illness: Ms. Megan Post, 38 y.o. Female, originally referred by MAGGIE Weir, In consultation for intractable chronic lower back pain.   Pain history: Patient reports a 10-year history of pain, which began without incident. Pain has progressed in intensity over the past 6 months.   Patient underwent a second set of diagnostic bilateral lumbar medial branch blocks on 05/07/2018, and experienced 100% pain relief and functional improvement that lasted for 1 day   Pain description: constant lower back pain with intermittent exacerbation, described as sharp sensation.   Radiation of pain: The pain radiates globally without any particular radicular or peripheral nerve distribution into her legs.  Pain intensity today: 8/10  Average pain intensity last week: 8/10  Pain intensity ranges from: 6/10 to 8/10 (down from 10/10 at initial visit)  Aggravating factors: Pain increases with bending, standing longer than 5 minutes and ambulating more than 5 minutes.  Alleviating factors: Pain decreases with lying and analgesics.   Associated symptoms:   Patient denies numbness or weakness in the lower extremities.   Patient denies any new bladder or bowel problems.   Patient denies difficulties with her balance or recent falls.      Review of previous therapies and additional medical records:  Megan Post has already failed the following measures, including:   Conservative measures: Oral analgesics, physical therapy, ice, heat and chiropractic therapy   Interventional measures: [facility]: meds/no procedures  Surgical measures: No history of lumbar spine surgery  Megan Post underwent surgical or neurosurgical consultation about 3 years ago and was found not to be a surgical candidate.  Megan Post presents with significant comorbidities including anxiety and depression, bipolar disorder (one hospitalization several years ago), obesity.  In terms " of current analgesics, Crystal A Day takes: tramadol, tizanidine, Topamax, trazodone, Cymbalta   I have reviewed [facility] Report #[id number] consistent to medication reconciliation.     Global Pain Scale 3-15-18 4-17-18 5-15-18        Pain 19 13 21        Feelings 6 6 22        Clinical outcomes 15 2 19        Activities 20 2 7        GPS Total: 60 23 69          Diagnostic Studies:    MR Lumbar Spine Without Intravenous Contrast 08/19/2017. FINDINGS: Vertebrae:  Unremarkable. No acute fracture. Spinal cord: Unremarkable. Normal signal. Soft tissues: Unremarkable. DISCS/SPINAL CANAL/NEURAL FORAMINA:  L1-L2: Unremarkable. No significant disc disease. No stenosis.  L2-L3: Unremarkable. No significant disc disease. No stenosis.  L3-L4: Unremarkable. No significant disc disease. No stenosis.  L4-L5: Unremarkable. No significant disc disease. No stenosis.  L5-S1: Small central disc bulge L5-S1 without significant impingement on dural sac or nerve roots. No interval change.  No stenosis.    Review of Systems   HENT: Positive for congestion, ear pain, postnasal drip and sneezing.    Eyes: Positive for itching.   Cardiovascular: Positive for chest pain.   Gastrointestinal: Positive for abdominal pain.   Endocrine: Positive for polyuria.   Musculoskeletal: Positive for arthralgias, back pain, myalgias, neck pain and neck stiffness.   Allergic/Immunologic: Positive for environmental allergies and food allergies.   Neurological: Positive for dizziness, light-headedness and headaches.   Psychiatric/Behavioral: Positive for confusion and decreased concentration. The patient is nervous/anxious (depression. Bipolar disorder).    All other systems reviewed and are negative.     Patient Active Problem List   Diagnosis   • Allergic rhinitis   • Tavera's esophagus   • Bipolar disorder   • Fibromyalgia   • Gastroesophageal reflux disease   • Insomnia   • Abdominal wall abscess   • Acute recurrent frontal sinusitis   • Spasm   • Right  upper quadrant pain   • Uncomplicated asthma   • Shortness of breath   • Atypical chest pain   • Lung nodules - calcified granulomas   • Atelectasis   • Hypercalcemia   • Displacement of intervertebral disc of lumbosacral region   • Lumbar discogenic pain syndrome   • Spondylosis of lumbar region without myelopathy or radiculopathy       Past Medical History:   Diagnosis Date   • Abdominal pain    • Abnormal breast exam    • Abnormal Pap smear of cervix    • Achilles tendinitis    • Acute sinusitis    • Anxiety    • Arthritis    • Asthma    • Tavera's syndrome    • Bipolar 1 disorder    • Bowel trouble    • Breast cyst    • Colon polyps    • Constipation    • Depression    • Diverticulitis    • Endometriosis    • Eustachian tube dysfunction    • Extremity pain    • Female pelvic pain    • Gastric ulcer    • H/O bladder infections    • History of rashes as a child    • Low back pain    • Menopausal symptoms    • Muscle weakness    • Ovarian cyst    • PID (pelvic inflammatory disease)    • Recurrent UTI    • Sexual problems          Past Surgical History:   Procedure Laterality Date   • BREAST BIOPSY      abnormal   •  SECTION     • HYSTERECTOMY     • NASAL ENDOSCOPY     • OOPHORECTOMY Bilateral    • OTHER SURGICAL HISTORY      Laparoscopy (diagnostic) gynecologic with biopsy   • SHOULDER SURGERY           Family History   Problem Relation Age of Onset   • Liver disease Mother    • Diabetes Mother    • Hyperlipidemia Mother    • Hypertension Mother    • Thyroid disease Mother    • Arthritis Father    • Mental illness Father    • Migraines Sister    • Stroke Other    • Diabetes Other    • Hyperlipidemia Other    • Hypertension Other    • Mental illness Other    • Migraines Other    • Tuberculosis Other          Social History     Social History   • Marital status: Single     Social History Main Topics   • Smoking status: Former Smoker     Types: Cigarettes   • Smokeless tobacco: Never Used      Comment: social  smoking   • Alcohol use No   • Drug use: No   • Sexual activity: Defer     Other Topics Concern   • Not on file        Current Outpatient Prescriptions:   •  albuterol (PROVENTIL HFA;VENTOLIN HFA) 108 (90 BASE) MCG/ACT inhaler, Inhale 2 puffs Every 6 (Six) Hours As Needed for wheezing., Disp: 1 inhaler, Rfl: 5  •  albuterol (PROVENTIL) (2.5 MG/3ML) 0.083% nebulizer solution, USE 1 VIAL PER NEBULIZER Q 4-6 H PRN, Disp: , Rfl: 3  •  beclomethasone (QVAR) 40 MCG/ACT inhaler, Inhale 2 puffs 2 (Two) Times a Day., Disp: , Rfl:   •  cetirizine (zyrTEC) 10 MG tablet, Take 1 tablet by mouth Daily., Disp: , Rfl: 1  •  cyclobenzaprine (FLEXERIL) 10 MG tablet, Take 1 tablet by mouth 3 (Three) Times a Day As Needed for Muscle Spasms., Disp: 15 tablet, Rfl: 0  •  diclofenac (VOLTAREN) 1 % gel gel, Apply 4 g topically 4 (Four) Times a Day As Needed (as needed for pain)., Disp: 1 tube, Rfl: 0  •  DULoxetine (CYMBALTA) 30 MG capsule, Take 1 capsule by mouth Daily., Disp: , Rfl: 3  •  EPIPEN 2-JAREN 0.3 MG/0.3ML solution auto-injector injection, U UTD, Disp: , Rfl: 6  •  fluticasone (FLONASE) 50 MCG/ACT nasal spray, 2 sprays into each nostril Daily., Disp: 1 bottle, Rfl: 3  •  fluticasone (FLOVENT HFA) 110 MCG/ACT inhaler, Inhale 1 puff 2 (Two) Times a Day As Needed (sinuspain)., Disp: 1 inhaler, Rfl: 6  •  lidocaine (XYLOCAINE) 5 % ointment, , Disp: , Rfl:   •  LORazepam (ATIVAN) 0.5 MG tablet, Take  by mouth., Disp: , Rfl:   •  mupirocin (BACTROBAN) 2 % ointment, APPLY EXTERNALLY TO THE AFFECTED AREA THREE TIMES DAILY, Disp: 22 g, Rfl: 0  •  NEXIUM 40 MG capsule, Take 1 capsule by mouth Every Morning Before Breakfast., Disp: 30 capsule, Rfl: 5  •  PATADAY 0.2 % solution ophthalmic solution, USE 1 DROP AEY ONCE DAILY PRN, Disp: , Rfl: 3  •  PAZEO 0.7 % solution, INSTILL 1 GTT  AEY ONCE DAILY PRN, Disp: , Rfl: 6  •  PREMARIN 1.25 MG tablet, TAKE 1 TABLET BY MOUTH DAILY AS DIRECTED, Disp: 30 tablet, Rfl: 5  •  topiramate (TOPAMAX) 100  "MG tablet, Take 1 tablet by mouth Daily., Disp: , Rfl: 3  •  traMADol (ULTRAM) 50 MG tablet, Take 1 tablet by mouth Every 8 (Eight) Hours As Needed for Moderate Pain ., Disp: 90 tablet, Rfl: 0  •  traZODone (DESYREL) 150 MG tablet, Take 1 tablet by mouth As Needed., Disp: , Rfl: 3      Allergies   Allergen Reactions   • Adhesive Tape Hives   • Latex Hives   • Sulfa Antibiotics Hives   • Sulfate Hives   • Biaxin [Clarithromycin] Nausea Only   • Codeine Itching   • Penicillins Nausea Only      /80 (BP Location: Right arm)   Pulse 111   Temp 98.3 °F (36.8 °C) (Temporal Artery )   Resp 18   Ht 154.9 cm (61\")   Wt 84.5 kg (186 lb 3.2 oz)   SpO2 98%   BMI 35.18 kg/m²       Physical Exam:  Constitutional: Patient is oriented to person, place, and time. Vital signs are normal. Patient appears well-developed and well-nourished.   HEENT: Head: Normocephalic and atraumatic. Eyes: Conjunctivae and lids are normal. Pupils: Equal, round, reactive to light.   Neck: Trachea normal. Neck supple. No JVD present.   Pulmonary Respiratory effort: No increased work of breathing or signs of respiratory distress. Auscultation of lungs: Clear to auscultation.   Cardiovascular Auscultation of heart: Normal rate and rhythm, normal S1 and S2, no murmurs.   Musculoskeletal   Gait and station: Gait evaluation demonstrated a normal gait   Lumbar spine: The range of motion of the lumbar spine is limited secondary to pain. Extension, lateral flexion, rotation of the lumbar spine increased and reproduced pain. Lumbar facet joint loading maneuvers are positive.  Andre and Gaenslen's tests are negative   Piriformis maneuvers are negative   Palpation of the bilateral ischial tuberosities, unrevealing   Palpation of the bilateral greater trochanter, unrevealing   Examination of the Iliotibial band: unrevealing   Hip joints: The range of motion of the hip joints is full and without pain   Neurological: Patient is alert and oriented to " person, place, and time. Speech: speech is normal. Cortical function: Normal mental status.   Cranial nerves: Cranial nerves 2-12 intact.   Reflex Scores: Right patellar: 2+ Left patellar: 2+ Right achilles: 2+ Left achilles: 2+  Motor strength: 5/5  Motor Tone: normal tone.   Negative long tract signs. Straight leg raising test is negative. Femoral stretch sign is negative.   Sensation: No sensory loss. Sensory exam: intact to light touch, intact pain and temperature sensation, intact vibration sensation and normal proprioception.   Coordination: Normal finger to nose and heel to shin. Normal balance and negative. Romberg's sign negative.   Skin and subcutaneous tissue: Skin is warm and intact. No rash noted. No cyanosis.   Psychiatric: Judgment and insight: Normal. Orientation to person, place and time: Normal. Recent and remote memory: Intact. Mood and affect: Normal.     ASSESSMENT:   1. Spondylosis of lumbar region without myelopathy or radiculopathy    2. Displacement of intervertebral disc of lumbosacral region    3. Lumbar discogenic pain syndrome    4. Fibromyalgia    5. Bipolar affective disorder, remission status unspecified    6. Insomnia, unspecified type       PLAN/MEDICAL DECISION MAKING: I had a lengthy conversation with Ms. Guzman A Day regarding her chronic pain condition and potential therapeutic options including risks, benefits, alternative therapies, to name a few. Patient has failed to obtain pain relief with conservative measures, as referenced above. Patient has a history of intractable chronic lower back pain. MRI of the lumbar spine revealed lumbar facet arthropathy at L3-L4, L4-L5 and L5-S1. There is some lateral recess stenosis at L4-L5 and L5-S1. L5-S1: Small central disc bulge L5-S1 without significant impingement on dural sac or nerve roots. No stenosis. Patient underwent a two sets of diagnostic bilateral lumbar medial branch blocks and experienced 100% pain relief and functional  improvement that lasted for several days on each occasion. I have reviewed all available patient's medical records as well as previous therapies as referenced above. Therefore, I have proposed the following plan:  1. Interventional pain management measures: Patient will be scheduled for bilateral lumbar medial branch rhizotomies at L3, L4, L5; for bilateral lumbar facet joints at L4-L5, L5-S1. Otherwise, we may proceed with caudal epidural steroid injection.   2. Diagnostic studies: EMG/NCV of the bilateral lower extremities. Patient reports lower extremity paresthesia and some claudication without much correlation with her most recent MRI  3. Pharmacological measures: Reviewed. Discussed. Patient takes tramadol, tizanidine, Topamax, trazodone, Cymbalta  4. Long-term rehabilitation efforts:  A. The patient does not have a history of falls. I did complete a risk assessment for falls.   B. Patient will start a comprehensive physical therapy program for water therapy, therapeutic exercise, core strengthening, gait and balance training, neurodynamics, myofascial release, cupping and dry needling once pain is under control  C. Start an exercise program such as yoga, water therapy and daily walks for fitness  D. Trial with TENS unit  E. Contrast therapy: Apply ice-packs for 15-20 minutes, followed by heating pads for 15-20 minutes to affected area   F. Patient attends [facility] for her bipolar disorder, which has been stable  G. Referral to [facility]  5. The patient has been instructed to contact my office with any questions or difficulties. The patient understands the plan and agrees to proceed accordingly.       Patient Care Team:  MAGGIE Staples as PCP - General (Internal Medicine)  Flavia Moran DO as Consulting Physician (Obstetrics and Gynecology)  Paramjit Leahy MD as Consulting Physician (Otolaryngology)  Jasiel Sanchez MD as Consulting Physician (Pain  Medicine)  Trudy Vela RD as Dietitian (Nutrition)  DANELLE Polanco as Physician Assistant (Internal Medicine)  MAGGIE Mondragon as Nurse Practitioner (Nurse Practitioner)  MAGGIE Shelton as Nurse Practitioner (Pulmonary Disease)  Rafaela Chavira PT as Physical Therapist (Physical Therapy)  MAGGIE Orosco as Nurse Practitioner (Nurse Practitioner)     No orders of the defined types were placed in this encounter.        Future Appointments  Date Time Provider Department Center   5/17/2018 1:00 PM Rafaela Chavira PT [facility] None   5/22/2018 1:00 PM Rafaela Chavira PT [facility] None   5/29/2018 9:15 AM Trudy Vela RD [facility]   5/31/2018 1:00 PM Rafaela Chavira PT [facility] None   6/5/2018 12:00 PM [facility] None   6/5/2018 12:30 PM MAGGIE Shelton [facility] None         Jasiel Sanchez MD     EMR Dragon/Transcription disclaimer:  Much of this encounter note is an electronic transcription of spoken language to printed text. Electronic transcription of spoken language may permit erroneous, or at times, nonsensical words or phrases to be inadvertently transcribed. Although I have reviewed the note for such errors, some may still exist.

## 2018-05-15 ENCOUNTER — OFFICE VISIT (OUTPATIENT)
Dept: PAIN MEDICINE | Facility: CLINIC | Age: 38
End: 2018-05-15

## 2018-05-15 VITALS
BODY MASS INDEX: 35.16 KG/M2 | SYSTOLIC BLOOD PRESSURE: 120 MMHG | RESPIRATION RATE: 18 BRPM | HEART RATE: 111 BPM | TEMPERATURE: 98.3 F | DIASTOLIC BLOOD PRESSURE: 80 MMHG | OXYGEN SATURATION: 98 % | HEIGHT: 61 IN | WEIGHT: 186.2 LBS

## 2018-05-15 DIAGNOSIS — M47.816 SPONDYLOSIS OF LUMBAR REGION WITHOUT MYELOPATHY OR RADICULOPATHY: ICD-10-CM

## 2018-05-15 DIAGNOSIS — M51.26 LUMBAR DISCOGENIC PAIN SYNDROME: ICD-10-CM

## 2018-05-15 DIAGNOSIS — M79.7 FIBROMYALGIA: ICD-10-CM

## 2018-05-15 DIAGNOSIS — M51.27 DISPLACEMENT OF INTERVERTEBRAL DISC OF LUMBOSACRAL REGION: ICD-10-CM

## 2018-05-15 DIAGNOSIS — G47.00 INSOMNIA, UNSPECIFIED TYPE: ICD-10-CM

## 2018-05-15 DIAGNOSIS — F31.9 BIPOLAR AFFECTIVE DISORDER, REMISSION STATUS UNSPECIFIED (HCC): ICD-10-CM

## 2018-05-15 PROCEDURE — 99213 OFFICE O/P EST LOW 20 MIN: CPT | Performed by: ANESTHESIOLOGY

## 2018-05-25 ENCOUNTER — OFFICE VISIT (OUTPATIENT)
Dept: INTERNAL MEDICINE | Facility: CLINIC | Age: 38
End: 2018-05-25

## 2018-05-25 VITALS
WEIGHT: 187.8 LBS | HEART RATE: 98 BPM | BODY MASS INDEX: 35.48 KG/M2 | DIASTOLIC BLOOD PRESSURE: 70 MMHG | OXYGEN SATURATION: 99 % | SYSTOLIC BLOOD PRESSURE: 108 MMHG

## 2018-05-25 DIAGNOSIS — R63.5 WEIGHT GAIN: ICD-10-CM

## 2018-05-25 DIAGNOSIS — R53.83 OTHER FATIGUE: ICD-10-CM

## 2018-05-25 DIAGNOSIS — M79.7 FIBROMYALGIA: ICD-10-CM

## 2018-05-25 DIAGNOSIS — R41.3 MEMORY LOSS: Primary | ICD-10-CM

## 2018-05-25 DIAGNOSIS — J01.00 ACUTE NON-RECURRENT MAXILLARY SINUSITIS: ICD-10-CM

## 2018-05-25 LAB
BASOPHILS # BLD AUTO: 0.02 10*3/MM3 (ref 0–0.2)
BASOPHILS NFR BLD AUTO: 0.4 % (ref 0–1)
DEPRECATED RDW RBC AUTO: 42.6 FL (ref 37–54)
EOSINOPHIL # BLD AUTO: 0.22 10*3/MM3 (ref 0–0.3)
EOSINOPHIL NFR BLD AUTO: 4 % (ref 0–3)
ERYTHROCYTE [DISTWIDTH] IN BLOOD BY AUTOMATED COUNT: 13.3 % (ref 11.3–14.5)
HCT VFR BLD AUTO: 40 % (ref 34.5–44)
HGB BLD-MCNC: 12.5 G/DL (ref 11.5–15.5)
IMM GRANULOCYTES # BLD: 0.01 10*3/MM3 (ref 0–0.03)
IMM GRANULOCYTES NFR BLD: 0.2 % (ref 0–0.6)
LYMPHOCYTES # BLD AUTO: 1.27 10*3/MM3 (ref 0.6–4.8)
LYMPHOCYTES NFR BLD AUTO: 23.3 % (ref 24–44)
MCH RBC QN AUTO: 27.4 PG (ref 27–31)
MCHC RBC AUTO-ENTMCNC: 31.3 G/DL (ref 32–36)
MCV RBC AUTO: 87.7 FL (ref 80–99)
MONOCYTES # BLD AUTO: 0.29 10*3/MM3 (ref 0–1)
MONOCYTES NFR BLD AUTO: 5.3 % (ref 0–12)
NEUTROPHILS # BLD AUTO: 3.65 10*3/MM3 (ref 1.5–8.3)
NEUTROPHILS NFR BLD AUTO: 66.8 % (ref 41–71)
PLATELET # BLD AUTO: 286 10*3/MM3 (ref 150–450)
PMV BLD AUTO: 10.7 FL (ref 6–12)
RBC # BLD AUTO: 4.56 10*6/MM3 (ref 3.89–5.14)
T4 FREE SERPL-MCNC: 0.95 NG/DL (ref 0.89–1.76)
TSH SERPL DL<=0.05 MIU/L-ACNC: 0.8 MIU/ML (ref 0.35–5.35)
WBC NRBC COR # BLD: 5.46 10*3/MM3 (ref 3.5–10.8)

## 2018-05-25 PROCEDURE — 85025 COMPLETE CBC W/AUTO DIFF WBC: CPT | Performed by: NURSE PRACTITIONER

## 2018-05-25 PROCEDURE — 99214 OFFICE O/P EST MOD 30 MIN: CPT | Performed by: NURSE PRACTITIONER

## 2018-05-25 PROCEDURE — 84443 ASSAY THYROID STIM HORMONE: CPT | Performed by: NURSE PRACTITIONER

## 2018-05-25 PROCEDURE — 84481 FREE ASSAY (FT-3): CPT | Performed by: NURSE PRACTITIONER

## 2018-05-25 PROCEDURE — 84439 ASSAY OF FREE THYROXINE: CPT | Performed by: NURSE PRACTITIONER

## 2018-05-25 RX ORDER — AZITHROMYCIN 250 MG/1
TABLET, FILM COATED ORAL
Qty: 6 TABLET | Refills: 0 | Status: SHIPPED | OUTPATIENT
Start: 2018-05-25 | End: 2018-06-05

## 2018-05-25 NOTE — PROGRESS NOTES
"Subjective  Fatigue, Stomach upset (Office Visit)      Megan A Day is a 38 y.o. female.   Allergies   Allergen Reactions   • Adhesive Tape Hives   • Latex Hives   • Sulfa Antibiotics Hives   • Sulfate Hives   • Biaxin [Clarithromycin] Nausea Only   • Codeine Itching   • Penicillins Nausea Only     History of Present Illness      Gilmer tired x 2 weeks , takes sleeping pill at hs. Does not feel rested, does not know if she snores, had sleep study done more than 5 years ago was told neg , states memory is worse , feels her memory is\" slipping\"  Does not know if pills causing or her anxiety , has been 2 weeks also , was going thru all medical records at [facility] that said she needed to be checked for hypothyroidism  Pt states she is gaining weight, not eating any more than normal, does not exercise much due to fibromyalgia , therapist does not want her to weigh  Pt has had sinus pain and pressure with itchy throat x 1 week, seems to be worsening , has tried her nasal spray w/o relief   Pt does have fibromyalgia and had a rheum but missed too many apptsa and was d/c from the practice, would like a new referral to someone else   The following portions of the patient's history were reviewed and updated as appropriate: allergies, current medications, past social history and problem list.    Review of Systems   Constitutional: Positive for fatigue and unexpected weight change.   HENT: Positive for sinus pain, sinus pressure and sore throat.    Musculoskeletal: Positive for myalgias.   Neurological:        Memory difficulties   All other systems reviewed and are negative.      Objective   Physical Exam   Constitutional: She is oriented to person, place, and time. She appears well-developed and well-nourished.   overweight   HENT:   Head: Normocephalic and atraumatic.   Right Ear: External ear normal.   Left Ear: External ear normal.   Nose: Right sinus exhibits maxillary sinus tenderness. Left sinus exhibits " maxillary sinus tenderness.   Mouth/Throat: Oropharynx is clear and moist.   Eyes: Conjunctivae are normal.   Neck: Neck supple.   Cardiovascular: Normal rate and regular rhythm.    Pulmonary/Chest: Effort normal and breath sounds normal.   Neurological: She is alert and oriented to person, place, and time.   Skin: Skin is warm and dry.   Psychiatric: She has a normal mood and affect. Her behavior is normal. Judgment and thought content normal.   Nursing note and vitals reviewed.    /70   Pulse 98   Wt 85.2 kg (187 lb 12.8 oz)   SpO2 99%   Breastfeeding? No   BMI 35.48 kg/m²     Assessment/Plan     Problem List Items Addressed This Visit        Other    Fibromyalgia    Relevant Orders    Ambulatory Referral to Rheumatology      Other Visit Diagnoses     Memory loss    -  Primary    Relevant Orders    Ambulatory Referral to Neurology    TSH    T3, free    T4, free    Other fatigue        Relevant Orders    TSH    T3, free    T4, free    CBC & Differential    Weight gain        Relevant Orders    TSH    T3, free    T4, free    Acute non-recurrent maxillary sinusitis        Relevant Medications    azithromycin (ZITHROMAX Z-JAREN) 250 MG tablet          Will review labs, if normal I would encourage sleep study, other orders as above.

## 2018-05-26 LAB — T3FREE SERPL-MCNC: 2.6 PG/ML (ref 2–4.4)

## 2018-05-30 ENCOUNTER — TELEPHONE (OUTPATIENT)
Dept: INTERNAL MEDICINE | Facility: CLINIC | Age: 38
End: 2018-05-30

## 2018-06-04 ENCOUNTER — DOCUMENTATION (OUTPATIENT)
Dept: PHYSICAL THERAPY | Facility: HOSPITAL | Age: 38
End: 2018-06-04

## 2018-06-04 DIAGNOSIS — M54.2 NECK PAIN: Primary | ICD-10-CM

## 2018-06-04 NOTE — THERAPY DISCHARGE NOTE
Outpatient Physical Therapy Discharge Summary         Patient Name: Megan Post  : 1980  MRN: [id number]    Today's Date: 2018    Visit Dx:    ICD-10-CM ICD-9-CM   1. Neck pain M54.2 723.1             PT OP Goals     Row Name 18 1000          PT Short Term Goals    STG Date to Achieve 18  -RB     STG 1 Pt to be compliant w/ initial HEP for self mobilization, symptom mgmt  -RB     STG 1 Progress Not Met  -RB     STG 2 Pt to report at least a 25% reduction in symptoms  -RB     STG 2 Progress Not Met  -RB     STG 3 Pt to improve B cervical rotation to at least 45 deg B.  -RB     STG 3 Progress Not Met  -RB        Long Term Goals    LTG Date to Achieve 18  -RB     LTG 1 Pt to be independent w/ HEP For flexibility and strengthening  -RB     LTG 1 Progress Not Met  -RB     LTG 2 Pt to report at least a 50% reduction in symptoms  -RB     LTG 2 Progress Not Met  -RB     LTG 3 Pt to improve cervical ROM to at least 40 deg extn, 40 deg B SB, 55 deg B rotation  -RB     LTG 3 Progress Not Met  -RB       User Key  (r) = Recorded By, (t) = Taken By, (c) = Cosigned By    Initials Name Provider Type    RB Rafaela Chavira, PT Physical Therapist          OP PT Discharge Summary  Date of Discharge: 18  Reason for Discharge: Non-compliant  Outcomes Achieved: Unable to make functional progress toward goals at this time  Discharge Destination: Home without follow-up  Discharge Instructions/Additional Comments: Pt failed to attend scheduled follow up visits and was d/c due to noncompliance. Unable to formally assess goals.      Time Calculation:                    Rafaela Chavira, PT  2018

## 2018-06-05 ENCOUNTER — OFFICE VISIT (OUTPATIENT)
Dept: PULMONOLOGY | Facility: CLINIC | Age: 38
End: 2018-06-05

## 2018-06-05 VITALS
WEIGHT: 184 LBS | DIASTOLIC BLOOD PRESSURE: 76 MMHG | BODY MASS INDEX: 34.74 KG/M2 | OXYGEN SATURATION: 98 % | HEIGHT: 61 IN | SYSTOLIC BLOOD PRESSURE: 120 MMHG | HEART RATE: 82 BPM

## 2018-06-05 DIAGNOSIS — J01.11 ACUTE RECURRENT FRONTAL SINUSITIS: Primary | ICD-10-CM

## 2018-06-05 DIAGNOSIS — J01.01 ACUTE RECURRENT MAXILLARY SINUSITIS: ICD-10-CM

## 2018-06-05 DIAGNOSIS — K21.9 GASTROESOPHAGEAL REFLUX DISEASE, ESOPHAGITIS PRESENCE NOT SPECIFIED: ICD-10-CM

## 2018-06-05 DIAGNOSIS — J45.20 INTERMITTENT ASTHMA WITHOUT COMPLICATION, UNSPECIFIED ASTHMA SEVERITY: ICD-10-CM

## 2018-06-05 DIAGNOSIS — R91.8 LUNG NODULES: ICD-10-CM

## 2018-06-05 DIAGNOSIS — R06.02 SHORTNESS OF BREATH: ICD-10-CM

## 2018-06-05 PROCEDURE — 94729 DIFFUSING CAPACITY: CPT | Performed by: NURSE PRACTITIONER

## 2018-06-05 PROCEDURE — 94375 RESPIRATORY FLOW VOLUME LOOP: CPT | Performed by: NURSE PRACTITIONER

## 2018-06-05 PROCEDURE — 94726 PLETHYSMOGRAPHY LUNG VOLUMES: CPT | Performed by: NURSE PRACTITIONER

## 2018-06-05 PROCEDURE — 99214 OFFICE O/P EST MOD 30 MIN: CPT | Performed by: NURSE PRACTITIONER

## 2018-06-05 RX ORDER — FLUTICASONE PROPIONATE 110 UG/1
1 AEROSOL, METERED RESPIRATORY (INHALATION) 2 TIMES DAILY PRN
Qty: 1 INHALER | Refills: 11 | Status: SHIPPED | OUTPATIENT
Start: 2018-06-05 | End: 2018-06-29

## 2018-06-05 NOTE — PROGRESS NOTES
[facility] Pulmonary Follow up    CHIEF COMPLAINT    Dyspnea    HISTORY OF PRESENT ILLNESS    Megan oPst is a 38 y.o.female here today for follow-up.  She saw Dr. Pichardo for initial pulmonary consultation for worsening dyspnea and to follow-up on the CT scan of the chest in May 2017.  At that time she was having complaints of worsening dyspnea and chest tightness, she was scheduled for a methacholine challenge test but did not have the study done..  She wanted to follow-up on the CT scan as well, that showed some chronic granulomatous changes and some mild atelectasis.  At that time they did discuss her episodic dyspnea and CT scan.    Today she comes in for follow-up.  She still continues to complain of some chest tightness and shortness of air.  She has no wheezing, no chest pain.  She does complain of a cough related to allergies and postnasal drainage.  She has a nonproductive cough.  She does use her pro-air rescue inhaler 2-3 times a day.  She has been on Briel as well as Flovent in the past, not currently.  She did not like the powder inhaler.    She does have chronic reflux and is on a PPI.  She states her symptoms are well-controlled.  She has no nocturnal cough, she sleeps on an adjustable bed.    She also follows up with asthma and allergy with chronic allergic rhinitis.  She is on allergy injections as well as Flonase and Zyrtec.    She does have a history of bipolar disorder, she does not feel like these episodes are anxiety related.      Patient Active Problem List   Diagnosis   • Allergic rhinitis   • Tavera's esophagus   • Bipolar disorder   • Fibromyalgia   • Gastroesophageal reflux disease   • Insomnia   • Abdominal wall abscess   • Acute recurrent frontal sinusitis   • Spasm   • Right upper quadrant pain   • Uncomplicated asthma   • Shortness of breath   • Atypical chest pain   • Lung nodules - calcified granulomas   • Atelectasis   • Hypercalcemia   • Displacement of intervertebral disc of  lumbosacral region   • Lumbar discogenic pain syndrome   • Spondylosis of lumbar region without myelopathy or radiculopathy       Allergies   Allergen Reactions   • Adhesive Tape Hives   • Latex Hives   • Sulfa Antibiotics Hives   • Sulfate Hives   • Biaxin [Clarithromycin] Nausea Only   • Codeine Itching   • Penicillins Nausea Only       Current Outpatient Prescriptions:   •  albuterol (PROVENTIL) (2.5 MG/3ML) 0.083% nebulizer solution, USE 1 VIAL PER NEBULIZER Q 4-6 H PRN, Disp: , Rfl: 3  •  beclomethasone (QVAR) 40 MCG/ACT inhaler, Inhale 2 puffs 2 (Two) Times a Day., Disp: , Rfl:   •  cetirizine (zyrTEC) 10 MG tablet, Take 1 tablet by mouth Daily., Disp: , Rfl: 1  •  cyclobenzaprine (FLEXERIL) 10 MG tablet, Take 1 tablet by mouth 3 (Three) Times a Day As Needed for Muscle Spasms., Disp: 15 tablet, Rfl: 0  •  diclofenac (VOLTAREN) 1 % gel gel, Apply 4 g topically 4 (Four) Times a Day As Needed (as needed for pain)., Disp: 1 tube, Rfl: 0  •  DULoxetine (CYMBALTA) 30 MG capsule, Take 1 capsule by mouth Daily., Disp: , Rfl: 3  •  EPIPEN 2-JAREN 0.3 MG/0.3ML solution auto-injector injection, U UTD, Disp: , Rfl: 6  •  fluticasone (FLONASE) 50 MCG/ACT nasal spray, 2 sprays into each nostril Daily., Disp: 1 bottle, Rfl: 3  •  fluticasone (FLOVENT HFA) 110 MCG/ACT inhaler, Inhale 1 puff 2 (Two) Times a Day As Needed (sinuspain)., Disp: 1 inhaler, Rfl: 11  •  lidocaine (XYLOCAINE) 5 % ointment, , Disp: , Rfl:   •  LORazepam (ATIVAN) 0.5 MG tablet, Take  by mouth., Disp: , Rfl:   •  NEXIUM 40 MG capsule, Take 1 capsule by mouth Every Morning Before Breakfast., Disp: 30 capsule, Rfl: 5  •  PATADAY 0.2 % solution ophthalmic solution, USE 1 DROP AEY ONCE DAILY PRN, Disp: , Rfl: 3  •  PAZEO 0.7 % solution, INSTILL 1 GTT  AEY ONCE DAILY PRN, Disp: , Rfl: 6  •  PREMARIN 1.25 MG tablet, TAKE 1 TABLET BY MOUTH DAILY AS DIRECTED, Disp: 30 tablet, Rfl: 5  •  topiramate (TOPAMAX) 100 MG tablet, Take 1 tablet by mouth Daily., Disp: ,  "Rfl: 3  •  traMADol (ULTRAM) 50 MG tablet, Take 1 tablet by mouth Every 8 (Eight) Hours As Needed for Moderate Pain ., Disp: 90 tablet, Rfl: 0  •  traZODone (DESYREL) 150 MG tablet, Take 1 tablet by mouth As Needed., Disp: , Rfl: 3  •  albuterol (PROAIR RESPICLICK) 108 (90 Base) MCG/ACT inhaler, Inhale 1 puff Every 4 (Four) Hours As Needed for Wheezing or Shortness of Air. 2 puffs, Disp: 1 inhaler, Rfl: 6  MEDICATION LIST AND ALLERGIES REVIEWED.    Social History   Substance Use Topics   • Smoking status: Former Smoker     Types: Cigarettes   • Smokeless tobacco: Never Used      Comment: social smoking   • Alcohol use No       FAMILY AND SOCIAL HISTORY REVIEWED.    Review of Systems   Constitutional: Negative for chills, fatigue, fever and unexpected weight change.   HENT: Positive for congestion and postnasal drip. Negative for nosebleeds, rhinorrhea, sinus pressure and trouble swallowing.    Respiratory: Positive for cough, chest tightness and shortness of breath. Negative for wheezing.    Cardiovascular: Negative for chest pain and leg swelling.   Gastrointestinal: Negative for abdominal pain, constipation, diarrhea, nausea and vomiting.   Genitourinary: Negative for dysuria, frequency, hematuria and urgency.   Musculoskeletal: Negative for myalgias.   Neurological: Negative for dizziness, weakness, numbness and headaches.   Psychiatric/Behavioral: The patient is nervous/anxious.    All other systems reviewed and are negative.  .    /76 (BP Location: Right arm, Patient Position: Sitting, Cuff Size: Adult)   Pulse 82   Ht 154.9 cm (61\")   Wt 83.5 kg (184 lb)   SpO2 98%   BMI 34.77 kg/m²     Immunization History   Administered Date(s) Administered   • Flu Vaccine Quad PF >36MO 11/02/2017       Physical Exam   Constitutional: She is oriented to person, place, and time. She appears well-developed and well-nourished.   HENT:   Head: Normocephalic and atraumatic.   Eyes: EOM are normal. Pupils are equal, " round, and reactive to light.   Neck: Normal range of motion. Neck supple.   Cardiovascular: Normal rate and regular rhythm.    No murmur heard.  Pulmonary/Chest: Effort normal and breath sounds normal. No respiratory distress. She has no wheezes. She has no rales.   Abdominal: Soft. Bowel sounds are normal. She exhibits no distension.   Musculoskeletal: Normal range of motion. She exhibits no edema.   Neurological: She is alert and oriented to person, place, and time.   Skin: Skin is warm and dry. No erythema.   Psychiatric: She has a normal mood and affect. Her behavior is normal.   Vitals reviewed.        RESULTS    PFTS in the office today, read by me:   No obstruction or restriction, suboptimal effort.    CXR, ED 3/13/18  FINDINGS:     Cardiomediastinal silhouette contour is within normal limits. Two dense   nodules, each in the 1 cm range, in the right mid lung. These are consistent   with granulomata and are similar to prior study. No airspace consolidation   identified. No visible pneumothorax or pleural effusion. Pulmonary vasculature   within normal limits.     IMPRESSION:  1. No radiographic findings of acute cardiopulmonary disease.    CT Chest 12/2017  FINDINGS:   1. The thoracic inlet and superior mediastinum are clear. Aortopulmonary  window is clear. Heart size is normal. There is no pericardial effusion.     2. Images into the upper abdomen demonstrate changes of cholecystectomy.  Gallbladder fossa is clear. Liver is intrinsically normal. The spleen,  adrenal glands and pancreas are unremarkable. 1 mm stone is seen right  mid kidney which is nonobstructing. Acute upper abdominal findings are  not identified.     3. The axillae are clear and negative for adenopathy. There is no  musculoskeletal mass around the right chest.     Extended window datasets of the lungs demonstrate 2 calcified granulomas  in the right lung, benign and stable from previous studies. New nodule  is not identified and there is  no active disease, consolidation or free  fluid.     4. Lastly, extended window datasets of the osseous structures of the  chest are negative for acute rib lesion, fracture, expansile rib  abnormality, rib destruction, abnormality of the scapular structures or  shoulders. Costochondral cartilage is normal. The manubrium, sternum and  dorsal spine are unremarkable.     IMPRESSION:  1. Negative CT data set of the chest, mediastinum and rib cage. Acute  focal abnormality is not identified.  2. Granulomas on the right are stable.     If this patient has persistent pain in the right rib cage, radionuclide  bone imaging should be performed.    PROBLEM LIST    Problem List Items Addressed This Visit        Respiratory    Acute recurrent frontal sinusitis - Primary    Uncomplicated asthma    Relevant Medications    fluticasone (FLOVENT HFA) 110 MCG/ACT inhaler    albuterol (PROAIR RESPICLICK) 108 (90 Base) MCG/ACT inhaler    Other Relevant Orders    Pulmonary Function Test (Completed)    Shortness of breath    Lung nodules - calcified granulomas       Digestive    Gastroesophageal reflux disease      Other Visit Diagnoses     Acute recurrent maxillary sinusitis        Relevant Medications    fluticasone (FLOVENT HFA) 110 MCG/ACT inhaler            DISCUSSION    We discussed her dyspnea and asthma-like symptoms today.  Her pony function tests are normal, her chest x-ray and CT scan are normal.  I did offer follow-up testing with methacholine challenge or CPX, she defers at this time.    I started her if she does continue to have worsening chest pressure and shortness of air to go ahead and resume her Flovent daily.  We did discuss that this is a maintenance medication and not a rescue medication.  She also has a Ventolin and pro-air respiclick at home, we again went over that she is to use one or the other that they are the same inhaler.  We will and over proper inhaler use, including the use of the spacer with her  Flovent.    Continued follow-up with allergy and asthma for allergy injections, and chronic maintenance therapy.    I spent 25 minutes with the patient. I spent > 50% percent of this time counseling and discussing diagnostic testing, evaluation, current status and management.    MAGGIE Hassan  06/05/201811:55 AM  Electronically signed     Please note that portions of this note were completed with a voice recognition program. Efforts were made to edit the dictations, but occasionally words are mistranscribed.      CC: MAGGIE Stein

## 2018-06-06 DIAGNOSIS — J45.20 INTERMITTENT ASTHMA WITHOUT COMPLICATION, UNSPECIFIED ASTHMA SEVERITY: Primary | ICD-10-CM

## 2018-06-06 DIAGNOSIS — R06.02 SHORTNESS OF BREATH: ICD-10-CM

## 2018-06-06 RX ORDER — ALBUTEROL SULFATE 90 UG/1
2 AEROSOL, METERED RESPIRATORY (INHALATION) EVERY 4 HOURS PRN
Qty: 18 G | Refills: 11 | Status: SHIPPED | OUTPATIENT
Start: 2018-06-06 | End: 2018-12-06 | Stop reason: SDUPTHER

## 2018-06-07 ENCOUNTER — TELEPHONE (OUTPATIENT)
Dept: PULMONOLOGY | Facility: CLINIC | Age: 38
End: 2018-06-07

## 2018-06-07 DIAGNOSIS — N95.9 POST MENOPAUSAL PROBLEMS: ICD-10-CM

## 2018-06-07 NOTE — TELEPHONE ENCOUNTER
Called pharmacy to verify Faxed Rx form we received.  Pharmacy stated patient does not want the  Ventolin rescue inhaler. She only wants the ProAir inhaler.  Pharmacist proceeded to advise that ProAir isn't covered. I advised pharmacist we were notified of non-coverage for ProAir on 6/6/18 that's when I e-scribed the Rx for the Ventolin inhaler.  Pharmacist stated he would advise patient that her rescue will need to be the Ventolin as a PA would probably not be approved for ProAir since it's the same category as the Ventolin.  Pharmacist said he would  fill the Ventolin if she chooses to get it.  No further questions.

## 2018-06-08 ENCOUNTER — OFFICE VISIT (OUTPATIENT)
Dept: INTERNAL MEDICINE | Facility: CLINIC | Age: 38
End: 2018-06-08

## 2018-06-08 VITALS
OXYGEN SATURATION: 99 % | DIASTOLIC BLOOD PRESSURE: 72 MMHG | HEIGHT: 61 IN | HEART RATE: 87 BPM | SYSTOLIC BLOOD PRESSURE: 112 MMHG

## 2018-06-08 DIAGNOSIS — Z79.890 HORMONE REPLACEMENT THERAPY (HRT): ICD-10-CM

## 2018-06-08 DIAGNOSIS — N64.4 BREAST PAIN IN FEMALE: Primary | ICD-10-CM

## 2018-06-08 DIAGNOSIS — N95.9 POST MENOPAUSAL PROBLEMS: ICD-10-CM

## 2018-06-08 PROCEDURE — 99214 OFFICE O/P EST MOD 30 MIN: CPT | Performed by: NURSE PRACTITIONER

## 2018-06-08 RX ORDER — ESTROGENS, CONJUGATED 1.25 MG
TABLET ORAL
Qty: 30 TABLET | Refills: 0 | Status: SHIPPED | OUTPATIENT
Start: 2018-06-08 | End: 2018-06-08 | Stop reason: SDUPTHER

## 2018-06-08 NOTE — PROGRESS NOTES
CHIEF COMPLAINT  Breast Pain      HPI  Crystal A Day is a 38 y.o. female is here today for breast pain and HRT    C/o pain in bilat breasts; felt lump in middle of right axilla a few days ago; also reports left breast is swollen and larger than right; she denies fever, redness, or warmth in breasts; no injury; she does have hx of cystic breasts all benign.    PSH--ABDELRAHMAN w/BSO in late 20s; currently taking Premarin 1.25 mg daily; reports increased hot flashes and is not sure she is on correct medication or dose for her HRT; does not have GYN      Past Medical History:   Diagnosis Date   • Abdominal pain    • Abnormal breast exam    • Abnormal Pap smear of cervix    • Achilles tendinitis    • Acute sinusitis    • Anxiety    • Arthritis    • Asthma    • Tavera's syndrome    • Bipolar 1 disorder (CMS/HCC)    • Bowel trouble    • Breast cyst    • Colon polyps    • Constipation    • Depression    • Diverticulitis    • Endometriosis    • Eustachian tube dysfunction    • Extremity pain    • Female pelvic pain    • Gastric ulcer    • H/O bladder infections    • History of rashes as a child    • Low back pain    • Menopausal symptoms    • Muscle weakness    • Ovarian cyst    • PID (pelvic inflammatory disease)    • Recurrent UTI    • Sexual problems        Past Surgical History:   Procedure Laterality Date   •  SECTION     • HYSTERECTOMY     • NASAL ENDOSCOPY     • OOPHORECTOMY Bilateral    • OTHER SURGICAL HISTORY      Laparoscopy (diagnostic) gynecologic with biopsy   • SHOULDER SURGERY         Family History   Problem Relation Age of Onset   • Liver disease Mother    • Diabetes Mother    • Hyperlipidemia Mother    • Hypertension Mother    • Thyroid disease Mother    • Arthritis Father    • Mental illness Father    • Migraines Sister    • Stroke Other    • Diabetes Other    • Hyperlipidemia Other    • Hypertension Other    • Mental illness Other    • Migraines Other    • Tuberculosis Other    • Breast cancer Neg Hx   "  • Endometrial cancer Neg Hx    • Ovarian cancer Neg Hx        Social History     Social History   • Marital status: Single     Spouse name: N/A   • Number of children: N/A   • Years of education: N/A     Occupational History   • Not on file.     Social History Main Topics   • Smoking status: Former Smoker     Types: Cigarettes   • Smokeless tobacco: Never Used      Comment: social smoking   • Alcohol use No   • Drug use: No   • Sexual activity: Defer     Other Topics Concern   • Not on file     Social History Narrative   • No narrative on file       The following portions of the patient's history were reviewed and updated as appropriate: allergies, current medications, past family history, past medical history, past social history, past surgical history and problem list.    ROS  Review of Systems   Constitutional: Negative for fever.   Genitourinary:        Hot flashes; pain in bilat breasts; lump in right axilla   Skin:        Breast tenderness   All other systems reviewed and are negative.      /72   Pulse 87   Ht 154.9 cm (61\")   SpO2 99%   Breastfeeding? No     PHYSICAL EXAM  Physical Exam   Constitutional: She is oriented to person, place, and time. She appears well-developed and well-nourished.   HENT:   Head: Normocephalic and atraumatic.   Pulmonary/Chest: She exhibits tenderness. Right breast exhibits tenderness. Left breast exhibits tenderness.   bilat breasts are tender to palpation; small tender nodule, approx size of dime located mid right axilla; no other nodules/lumps found on exam; no change in breast size--equal bilaterally; no skin changes noted; no discharge or irregularity of nipples   Lymphadenopathy:     She has axillary adenopathy.   Right axilla, mildly tender   Neurological: She is alert and oriented to person, place, and time.   Skin: Skin is warm, dry and intact.   Vitals reviewed.      ASSESSMENT/PLAN    1. Post menopausal problems  -continue Premarin at current dose  - " estrogens, conjugated, (PREMARIN) 1.25 MG tablet; Take 1 tablet by mouth Daily. as directed  Dispense: 30 tablet; Refill: 5  -referral to GYN for management of menopausal symptoms    2. Breast pain in female  -re-evaluate after mammogram  - Mammo Diagnostic Digital Tomosynthesis Bilateral With CAD; Future    3. Hormone replacement therapy (HRT)  -see above  - Ambulatory Referral to Gynecology      Plan of care reviewed with patient at the conclusion of today's visit. Education was provided in regards to diagnosis, management and any prescribed or recommended OTC medications.  Patient verbalizes Understanding of and agreement with management plan.    FOLLOW-UP  6 month(s) recheck or after mammogram results    RTC as needed      iTna Wiley, MAGGIE  06/08/2018

## 2018-06-13 ENCOUNTER — OUTSIDE FACILITY SERVICE (OUTPATIENT)
Dept: PAIN MEDICINE | Facility: CLINIC | Age: 38
End: 2018-06-13

## 2018-06-13 DIAGNOSIS — E66.9 MILD OBESITY: Primary | ICD-10-CM

## 2018-06-13 PROCEDURE — 64635 DESTROY LUMB/SAC FACET JNT: CPT | Performed by: ANESTHESIOLOGY

## 2018-06-13 PROCEDURE — 64636 DESTROY L/S FACET JNT ADDL: CPT | Performed by: ANESTHESIOLOGY

## 2018-06-13 PROCEDURE — 99152 MOD SED SAME PHYS/QHP 5/>YRS: CPT | Performed by: ANESTHESIOLOGY

## 2018-06-14 ENCOUNTER — TELEPHONE (OUTPATIENT)
Dept: PAIN MEDICINE | Facility: CLINIC | Age: 38
End: 2018-06-14

## 2018-06-14 NOTE — TELEPHONE ENCOUNTER
Patient had bilateral lumbar RFTC on 06/14/2018. Called to f/u with patient post-op and she stated she has already spoke with someone and wouldn't give more info

## 2018-06-21 ENCOUNTER — TELEPHONE (OUTPATIENT)
Dept: PAIN MEDICINE | Facility: CLINIC | Age: 38
End: 2018-06-21

## 2018-06-21 RX ORDER — EMOLLIENT COMBINATION NO.32
1 EMULSION, EXTENDED RELEASE TOPICAL 2 TIMES DAILY
Qty: 225 G | Refills: 0 | Status: SHIPPED | OUTPATIENT
Start: 2018-06-21 | End: 2018-08-22

## 2018-06-21 RX ORDER — LIDOCAINE 40 MG/G
CREAM TOPICAL 4 TIMES DAILY PRN
Qty: 30 G | Refills: 3 | Status: SHIPPED | OUTPATIENT
Start: 2018-06-21 | End: 2018-06-29 | Stop reason: SDUPTHER

## 2018-06-22 ENCOUNTER — APPOINTMENT (OUTPATIENT)
Dept: MAMMOGRAPHY | Facility: HOSPITAL | Age: 38
End: 2018-06-22

## 2018-06-25 DIAGNOSIS — M51.26 HERNIATED LUMBAR INTERVERTEBRAL DISC: ICD-10-CM

## 2018-06-25 DIAGNOSIS — K21.9 GASTROESOPHAGEAL REFLUX DISEASE WITHOUT ESOPHAGITIS: ICD-10-CM

## 2018-06-25 RX ORDER — TRAMADOL HYDROCHLORIDE 50 MG/1
50 TABLET ORAL EVERY 8 HOURS PRN
Qty: 90 TABLET | Refills: 0 | OUTPATIENT
Start: 2018-06-25

## 2018-06-25 NOTE — TELEPHONE ENCOUNTER
Ms. Post MUST come in to sign controlled substance contract with me--none in chart--JOSELYN report MUST be ran, and she needs a UDS BEFORE I will refill her Tramadol.

## 2018-06-25 NOTE — TELEPHONE ENCOUNTER
LVM for pt to return call, office number given. She needs to set up appt with Tina for refill of tramidol.

## 2018-06-26 ENCOUNTER — HOSPITAL ENCOUNTER (EMERGENCY)
Facility: HOSPITAL | Age: 38
Discharge: HOME | End: 2018-06-27
Attending: EMERGENCY MEDICINE | Admitting: EMERGENCY MEDICINE

## 2018-06-26 DIAGNOSIS — R51.9 NONINTRACTABLE HEADACHE, UNSPECIFIED CHRONICITY PATTERN, UNSPECIFIED HEADACHE TYPE: ICD-10-CM

## 2018-06-26 DIAGNOSIS — G89.29 CHRONIC MIDLINE LOW BACK PAIN WITHOUT SCIATICA: Primary | ICD-10-CM

## 2018-06-26 DIAGNOSIS — M54.50 CHRONIC MIDLINE LOW BACK PAIN WITHOUT SCIATICA: Primary | ICD-10-CM

## 2018-06-26 PROCEDURE — 25010000002 METHYLPREDNISOLONE PER 40 MG: Performed by: EMERGENCY MEDICINE

## 2018-06-26 PROCEDURE — 96374 THER/PROPH/DIAG INJ IV PUSH: CPT

## 2018-06-26 PROCEDURE — 96361 HYDRATE IV INFUSION ADD-ON: CPT

## 2018-06-26 PROCEDURE — 25010000002 HYDROMORPHONE PER 4 MG: Performed by: EMERGENCY MEDICINE

## 2018-06-26 PROCEDURE — 96376 TX/PRO/DX INJ SAME DRUG ADON: CPT

## 2018-06-26 PROCEDURE — 25010000002 KETOROLAC TROMETHAMINE PER 15 MG: Performed by: EMERGENCY MEDICINE

## 2018-06-26 PROCEDURE — 25010000002 ONDANSETRON PER 1 MG: Performed by: EMERGENCY MEDICINE

## 2018-06-26 PROCEDURE — 99284 EMERGENCY DEPT VISIT MOD MDM: CPT

## 2018-06-26 PROCEDURE — 96375 TX/PRO/DX INJ NEW DRUG ADDON: CPT

## 2018-06-26 RX ORDER — METHYLPREDNISOLONE SODIUM SUCCINATE 40 MG/ML
80 INJECTION, POWDER, LYOPHILIZED, FOR SOLUTION INTRAMUSCULAR; INTRAVENOUS ONCE
Status: COMPLETED | OUTPATIENT
Start: 2018-06-26 | End: 2018-06-26

## 2018-06-26 RX ORDER — ONDANSETRON 2 MG/ML
4 INJECTION INTRAMUSCULAR; INTRAVENOUS ONCE
Status: COMPLETED | OUTPATIENT
Start: 2018-06-26 | End: 2018-06-26

## 2018-06-26 RX ORDER — SODIUM CHLORIDE 9 MG/ML
125 INJECTION, SOLUTION INTRAVENOUS CONTINUOUS
Status: DISCONTINUED | OUTPATIENT
Start: 2018-06-26 | End: 2018-06-27 | Stop reason: HOSPADM

## 2018-06-26 RX ORDER — KETOROLAC TROMETHAMINE 15 MG/ML
15 INJECTION, SOLUTION INTRAMUSCULAR; INTRAVENOUS ONCE
Status: COMPLETED | OUTPATIENT
Start: 2018-06-26 | End: 2018-06-26

## 2018-06-26 RX ADMIN — HYDROMORPHONE HYDROCHLORIDE 1 MG: 1 INJECTION, SOLUTION INTRAMUSCULAR; INTRAVENOUS; SUBCUTANEOUS at 23:22

## 2018-06-26 RX ADMIN — HYDROMORPHONE HYDROCHLORIDE 1 MG: 1 INJECTION, SOLUTION INTRAMUSCULAR; INTRAVENOUS; SUBCUTANEOUS at 22:34

## 2018-06-26 RX ADMIN — SODIUM CHLORIDE 1000 ML: 9 INJECTION, SOLUTION INTRAVENOUS at 22:35

## 2018-06-26 RX ADMIN — METHYLPREDNISOLONE SODIUM SUCCINATE 80 MG: 40 INJECTION, POWDER, FOR SOLUTION INTRAMUSCULAR; INTRAVENOUS at 23:22

## 2018-06-26 RX ADMIN — ONDANSETRON 4 MG: 2 INJECTION INTRAMUSCULAR; INTRAVENOUS at 22:34

## 2018-06-26 RX ADMIN — KETOROLAC TROMETHAMINE 15 MG: 15 INJECTION, SOLUTION INTRAMUSCULAR; INTRAVENOUS at 22:34

## 2018-06-27 ENCOUNTER — TELEPHONE (OUTPATIENT)
Dept: PAIN MEDICINE | Facility: CLINIC | Age: 38
End: 2018-06-27

## 2018-06-27 VITALS
HEART RATE: 107 BPM | OXYGEN SATURATION: 94 % | DIASTOLIC BLOOD PRESSURE: 76 MMHG | WEIGHT: 184 LBS | SYSTOLIC BLOOD PRESSURE: 123 MMHG | BODY MASS INDEX: 34.74 KG/M2 | TEMPERATURE: 97.9 F | RESPIRATION RATE: 16 BRPM | HEIGHT: 61 IN

## 2018-06-29 ENCOUNTER — LAB (OUTPATIENT)
Dept: LAB | Facility: HOSPITAL | Age: 38
End: 2018-06-29

## 2018-06-29 ENCOUNTER — OFFICE VISIT (OUTPATIENT)
Dept: NEUROLOGY | Facility: CLINIC | Age: 38
End: 2018-06-29

## 2018-06-29 VITALS
HEIGHT: 61 IN | SYSTOLIC BLOOD PRESSURE: 114 MMHG | DIASTOLIC BLOOD PRESSURE: 60 MMHG | BODY MASS INDEX: 34.74 KG/M2 | WEIGHT: 184 LBS

## 2018-06-29 DIAGNOSIS — R41.3 MEMORY LOSS: Primary | ICD-10-CM

## 2018-06-29 DIAGNOSIS — R41.3 MEMORY LOSS: ICD-10-CM

## 2018-06-29 LAB
ALBUMIN SERPL-MCNC: 4.41 G/DL (ref 3.2–4.8)
ALBUMIN/GLOB SERPL: 1.5 G/DL (ref 1.5–2.5)
ALP SERPL-CCNC: 91 U/L (ref 25–100)
ALT SERPL W P-5'-P-CCNC: 60 U/L (ref 7–40)
AMMONIA BLD-SCNC: 41 UMOL/L (ref 19–60)
ANION GAP SERPL CALCULATED.3IONS-SCNC: 11 MMOL/L (ref 3–11)
AST SERPL-CCNC: 18 U/L (ref 0–33)
BASOPHILS # BLD AUTO: 0.03 10*3/MM3 (ref 0–0.2)
BASOPHILS NFR BLD AUTO: 0.3 % (ref 0–1)
BILIRUB SERPL-MCNC: 0.2 MG/DL (ref 0.3–1.2)
BUN BLD-MCNC: 18 MG/DL (ref 9–23)
BUN/CREAT SERPL: 21.4 (ref 7–25)
CALCIUM SPEC-SCNC: 9.3 MG/DL (ref 8.7–10.4)
CHLORIDE SERPL-SCNC: 105 MMOL/L (ref 99–109)
CO2 SERPL-SCNC: 25 MMOL/L (ref 20–31)
CREAT BLD-MCNC: 0.84 MG/DL (ref 0.6–1.3)
DEPRECATED RDW RBC AUTO: 43.3 FL (ref 37–54)
EOSINOPHIL # BLD AUTO: 0.24 10*3/MM3 (ref 0–0.3)
EOSINOPHIL NFR BLD AUTO: 2.2 % (ref 0–3)
ERYTHROCYTE [DISTWIDTH] IN BLOOD BY AUTOMATED COUNT: 13.6 % (ref 11.3–14.5)
FOLATE SERPL-MCNC: 9.79 NG/ML (ref 3.2–20)
GFR SERPL CREATININE-BSD FRML MDRD: 77 ML/MIN/1.73
GLOBULIN UR ELPH-MCNC: 2.9 GM/DL
GLUCOSE BLD-MCNC: 101 MG/DL (ref 70–100)
HCT VFR BLD AUTO: 44 % (ref 34.5–44)
HGB BLD-MCNC: 13.9 G/DL (ref 11.5–15.5)
IMM GRANULOCYTES # BLD: 0.03 10*3/MM3 (ref 0–0.03)
IMM GRANULOCYTES NFR BLD: 0.3 % (ref 0–0.6)
LYMPHOCYTES # BLD AUTO: 2.26 10*3/MM3 (ref 0.6–4.8)
LYMPHOCYTES NFR BLD AUTO: 21 % (ref 24–44)
MCH RBC QN AUTO: 28 PG (ref 27–31)
MCHC RBC AUTO-ENTMCNC: 31.6 G/DL (ref 32–36)
MCV RBC AUTO: 88.7 FL (ref 80–99)
MONOCYTES # BLD AUTO: 0.41 10*3/MM3 (ref 0–1)
MONOCYTES NFR BLD AUTO: 3.8 % (ref 0–12)
NEUTROPHILS # BLD AUTO: 7.79 10*3/MM3 (ref 1.5–8.3)
NEUTROPHILS NFR BLD AUTO: 72.4 % (ref 41–71)
PLATELET # BLD AUTO: 373 10*3/MM3 (ref 150–450)
PMV BLD AUTO: 10.7 FL (ref 6–12)
POTASSIUM BLD-SCNC: 4.2 MMOL/L (ref 3.5–5.5)
PROT SERPL-MCNC: 7.3 G/DL (ref 5.7–8.2)
RBC # BLD AUTO: 4.96 10*6/MM3 (ref 3.89–5.14)
SODIUM BLD-SCNC: 141 MMOL/L (ref 132–146)
TSH SERPL DL<=0.05 MIU/L-ACNC: 0.56 MIU/ML (ref 0.35–5.35)
VIT B12 BLD-MCNC: 581 PG/ML (ref 211–911)
WBC NRBC COR # BLD: 10.76 10*3/MM3 (ref 3.5–10.8)

## 2018-06-29 PROCEDURE — 99204 OFFICE O/P NEW MOD 45 MIN: CPT | Performed by: PSYCHIATRY & NEUROLOGY

## 2018-06-29 PROCEDURE — 82607 VITAMIN B-12: CPT | Performed by: PSYCHIATRY & NEUROLOGY

## 2018-06-29 PROCEDURE — 84443 ASSAY THYROID STIM HORMONE: CPT | Performed by: PSYCHIATRY & NEUROLOGY

## 2018-06-29 PROCEDURE — 36415 COLL VENOUS BLD VENIPUNCTURE: CPT | Performed by: PSYCHIATRY & NEUROLOGY

## 2018-06-29 PROCEDURE — 85025 COMPLETE CBC W/AUTO DIFF WBC: CPT

## 2018-06-29 PROCEDURE — 82140 ASSAY OF AMMONIA: CPT

## 2018-06-29 PROCEDURE — 80053 COMPREHEN METABOLIC PANEL: CPT | Performed by: PSYCHIATRY & NEUROLOGY

## 2018-06-29 PROCEDURE — 82746 ASSAY OF FOLIC ACID SERUM: CPT | Performed by: PSYCHIATRY & NEUROLOGY

## 2018-06-29 PROCEDURE — 86592 SYPHILIS TEST NON-TREP QUAL: CPT | Performed by: PSYCHIATRY & NEUROLOGY

## 2018-06-29 NOTE — PROGRESS NOTES
"Subjective     CC: Memory Loss (NP)      History of Present Illness   Crystal A Day is a 38 y.o. female who comes to clinic today for evaluation of memory impairment. She has noted symptoms over the past month. She has more trouble with memory and recall, with associated impairments in focus and concentration. She may also substitute words in conversation. Her symptoms do fluctuate, though there is no pattern, and there are no identified modifying factors.    She has also noted worsening back pain over the past month. She feels her BPAD is reasonably well controlled. She is also treated for fibromyalgia.    Prior evaluation has included normal thyroid function studies.    I have reviewed and confirmed the past family, social and medical history as accurate on 6/29/18.     Review of Systems   Constitutional: Positive for fatigue.   Respiratory: Positive for shortness of breath.    Cardiovascular: Negative.    Gastrointestinal: Positive for nausea.   Genitourinary: Negative.    Musculoskeletal: Positive for back pain.   Psychiatric/Behavioral: Positive for dysphoric mood.   All other systems reviewed and are negative.      Objective   General appearance today is normal.   Peripheral pulses were present and symmetric.   The ophthalmoscopic exam today is unremarkable. The discs and posterior elements are unremarkable.    /60   Ht 154.9 cm (61\")   Wt 83.5 kg (184 lb)   BMI 34.77 kg/m²     Physical Exam   Constitutional: She is oriented to person, place, and time.   Neurological: She is oriented to person, place, and time. She has normal strength. She has a normal Finger-Nose-Finger Test. Gait normal.   Reflex Scores:       Tricep reflexes are 1+ on the right side and 1+ on the left side.       Bicep reflexes are 1+ on the right side and 1+ on the left side.       Brachioradialis reflexes are 1+ on the right side and 1+ on the left side.       Patellar reflexes are 1+ on the right side and 1+ on the left side.    "    Achilles reflexes are 1+ on the right side and 1+ on the left side.  Psychiatric: Her speech is normal.        Neurologic Exam     Mental Status   Oriented to person, place, and time.   Registration: recalls 3 of 3 objects. Recall at 5 minutes: recalls 1 of 3 objects. Follows 3 step commands.   Attention: normal.   Speech: speech is normal   Level of consciousness: alert  Knowledge: good.   Able to name object. Able to read. Able to repeat. Able to write. Normal comprehension.     Cranial Nerves   Cranial nerves II through XII intact.     Motor Exam   Muscle bulk: normal  Overall muscle tone: normal    Strength   Strength 5/5 throughout.     Sensory Exam   Light touch normal.     Gait, Coordination, and Reflexes     Gait  Gait: normal    Coordination   Finger to nose coordination: normal    Reflexes   Right brachioradialis: 1+  Left brachioradialis: 1+  Right biceps: 1+  Left biceps: 1+  Right triceps: 1+  Left triceps: 1+  Right patellar: 1+  Left patellar: 1+  Right achilles: 1+  Left achilles: 1+        Assessment/Plan   Megan was seen today for memory loss.    Diagnoses and all orders for this visit:    Memory loss  -     Ammonia; Future  -     Comprehensive Metabolic Panel  -     Vitamin B12  -     TSH  -     RPR  -     Folate  -     CBC & Differential; Future          DISCUSSION/SUMMARY    Megan A Day comes to clinic today for evaluation of cognitive impairment. Her history and examination, including bedside cognitive testing are most consistent with an attentional disorder related to multiple underlying issues including polypharmacy, chronic pain and affective disease, which I discussed. I also discussed further testing and she ultimately chose to continue with additional screening labs but to forgo repeat imaging, which I think is reasonable. I reviewed a head CT from 2017 and this was normal.       As part of this visit I reviewed prior lab results, reviewed radiology results and reviewed radiology  images. Please see above for additional details.

## 2018-07-02 LAB — RPR SER QL: NORMAL

## 2018-07-03 ENCOUNTER — HOSPITAL ENCOUNTER (OUTPATIENT)
Dept: ULTRASOUND IMAGING | Facility: HOSPITAL | Age: 38
Discharge: HOME | End: 2018-07-03

## 2018-07-03 ENCOUNTER — HOSPITAL ENCOUNTER (OUTPATIENT)
Dept: MAMMOGRAPHY | Facility: HOSPITAL | Age: 38
Discharge: HOME | End: 2018-07-03
Admitting: NURSE PRACTITIONER

## 2018-07-03 ENCOUNTER — HOSPITAL ENCOUNTER (OUTPATIENT)
Dept: NUTRITION | Facility: HOSPITAL | Age: 38
Setting detail: RECURRING SERIES
Discharge: HOME | End: 2018-07-03
Attending: ANESTHESIOLOGY

## 2018-07-03 ENCOUNTER — OFFICE VISIT (OUTPATIENT)
Dept: INTERNAL MEDICINE | Facility: CLINIC | Age: 38
End: 2018-07-03

## 2018-07-03 VITALS
DIASTOLIC BLOOD PRESSURE: 76 MMHG | HEART RATE: 98 BPM | WEIGHT: 186 LBS | OXYGEN SATURATION: 96 % | BODY MASS INDEX: 35.12 KG/M2 | HEIGHT: 61 IN | SYSTOLIC BLOOD PRESSURE: 110 MMHG

## 2018-07-03 VITALS — HEIGHT: 61 IN | WEIGHT: 186 LBS | BODY MASS INDEX: 35.12 KG/M2

## 2018-07-03 DIAGNOSIS — N64.4 BREAST PAIN IN FEMALE: ICD-10-CM

## 2018-07-03 DIAGNOSIS — M51.26 HERNIATED LUMBAR INTERVERTEBRAL DISC: ICD-10-CM

## 2018-07-03 DIAGNOSIS — M51.26 LUMBAR DISCOGENIC PAIN SYNDROME: Primary | ICD-10-CM

## 2018-07-03 DIAGNOSIS — M47.816 SPONDYLOSIS OF LUMBAR REGION WITHOUT MYELOPATHY OR RADICULOPATHY: ICD-10-CM

## 2018-07-03 DIAGNOSIS — Z79.899 HIGH RISK MEDICATION USE: ICD-10-CM

## 2018-07-03 PROCEDURE — 76642 ULTRASOUND BREAST LIMITED: CPT | Performed by: RADIOLOGY

## 2018-07-03 PROCEDURE — 77066 DX MAMMO INCL CAD BI: CPT | Performed by: RADIOLOGY

## 2018-07-03 PROCEDURE — 99214 OFFICE O/P EST MOD 30 MIN: CPT | Performed by: NURSE PRACTITIONER

## 2018-07-03 PROCEDURE — G0279 TOMOSYNTHESIS, MAMMO: HCPCS

## 2018-07-03 PROCEDURE — 77066 DX MAMMO INCL CAD BI: CPT

## 2018-07-03 PROCEDURE — 76642 ULTRASOUND BREAST LIMITED: CPT

## 2018-07-03 PROCEDURE — G0279 TOMOSYNTHESIS, MAMMO: HCPCS | Performed by: RADIOLOGY

## 2018-07-03 PROCEDURE — 96372 THER/PROPH/DIAG INJ SC/IM: CPT | Performed by: NURSE PRACTITIONER

## 2018-07-03 PROCEDURE — 97802 MEDICAL NUTRITION INDIV IN: CPT | Performed by: DIETITIAN, REGISTERED

## 2018-07-03 RX ORDER — KETOROLAC TROMETHAMINE 30 MG/ML
60 INJECTION, SOLUTION INTRAMUSCULAR; INTRAVENOUS ONCE
Status: COMPLETED | OUTPATIENT
Start: 2018-07-03 | End: 2018-07-03

## 2018-07-03 RX ORDER — TRAMADOL HYDROCHLORIDE 50 MG/1
50 TABLET ORAL EVERY 8 HOURS PRN
Qty: 60 TABLET | Refills: 0 | Status: SHIPPED | OUTPATIENT
Start: 2018-07-03 | End: 2018-10-19 | Stop reason: SDUPTHER

## 2018-07-03 RX ADMIN — KETOROLAC TROMETHAMINE 60 MG: 30 INJECTION, SOLUTION INTRAMUSCULAR; INTRAVENOUS at 17:59

## 2018-07-03 NOTE — PROGRESS NOTES
CHIEF COMPLAINT  Chief Complaint   Patient presents with   • Back Pain     Lower       HPI  Crystal A Day is a 38 y.o. female  presents with complaint of severe pain after ablation of lumbar spinal nerves; unable to perform PT due to pain; sara won't see her until 40 wks        Past Medical History:   Diagnosis Date   • Abdominal pain    • Abnormal breast exam    • Abnormal Pap smear of cervix    • Achilles tendinitis    • Acute sinusitis    • Anxiety    • Arthritis    • Asthma    • Tavera's syndrome    • Bipolar 1 disorder (CMS/HCC)    • Bowel trouble    • Breast cyst    • Colon polyps    • Constipation    • Depression    • Diverticulitis    • Endometriosis    • Eustachian tube dysfunction    • Extremity pain    • Female pelvic pain    • Gastric ulcer    • H/O bladder infections    • History of rashes as a child    • Low back pain    • Menopausal symptoms    • Muscle weakness    • Ovarian cyst    • PID (pelvic inflammatory disease)    • Recurrent UTI    • Sexual problems        Family History   Problem Relation Age of Onset   • Liver disease Mother    • Diabetes Mother    • Hyperlipidemia Mother    • Hypertension Mother    • Thyroid disease Mother    • Arthritis Father    • Mental illness Father    • Migraines Sister    • Stroke Other    • Diabetes Other    • Hyperlipidemia Other    • Hypertension Other    • Mental illness Other    • Migraines Other    • Tuberculosis Other    • Breast cancer Neg Hx    • Endometrial cancer Neg Hx    • Ovarian cancer Neg Hx        Social History     Social History   • Marital status: Single     Spouse name: N/A   • Number of children: N/A   • Years of education: N/A     Occupational History   • Not on file.     Social History Main Topics   • Smoking status: Former Smoker     Types: Cigarettes   • Smokeless tobacco: Never Used      Comment: social smoking   • Alcohol use No   • Drug use: No   • Sexual activity: Defer     Other Topics Concern   • Not on file     Social History  "Narrative   • No narrative on file       ROS  Review of Systems   Constitutional: Positive for activity change.   Musculoskeletal: Positive for back pain.   All other systems reviewed and are negative.      /76   Pulse 98   Ht 154.9 cm (61\")   Wt 84.4 kg (186 lb)   SpO2 96%   BMI 35.14 kg/m²     PHYSICAL EXAM  Physical Exam   Constitutional: She appears well-developed and well-nourished.   HENT:   Head: Normocephalic and atraumatic.   Musculoskeletal:        Lumbar back: She exhibits decreased range of motion, pain and spasm. She exhibits no swelling and no edema.   Vitals reviewed.        ASSESSMENT/PLAN  1. Herniated lumbar intervertebral disc    - traMADol (ULTRAM) 50 MG tablet; Take 1 tablet by mouth Every 8 (Eight) Hours As Needed for Moderate Pain .  Dispense: 60 tablet; Refill: 0    2. Lumbar discogenic pain syndrome    - traMADol (ULTRAM) 50 MG tablet; Take 1 tablet by mouth Every 8 (Eight) Hours As Needed for Moderate Pain .  Dispense: 60 tablet; Refill: 0  - ketorolac (TORADOL) injection 60 mg; Inject 60 mg into the shoulder, thigh, or buttocks 1 (One) Time.    3. Spondylosis of lumbar region without myelopathy or radiculopathy    - traMADol (ULTRAM) 50 MG tablet; Take 1 tablet by mouth Every 8 (Eight) Hours As Needed for Moderate Pain .  Dispense: 60 tablet; Refill: 0  - ketorolac (TORADOL) injection 60 mg; Inject 60 mg into the shoulder, thigh, or buttocks 1 (One) Time.    4. High risk medication use    - Urine Drug Screen - Urine, Clean Catch; Future  - Urine Drug Screen - Urine, Clean Catch; Future  - Urine Drug Screen - Urine, Clean Catch      FOLLOW-UP  1 week(s) recheck    RTC sooner as needed.    Patient verbalizes understanding of medication dosage, comfort measures, instructions for treatment and follow-up.    Tina Wiley, APRN  07/03/2018        "

## 2018-07-05 ENCOUNTER — TELEPHONE (OUTPATIENT)
Dept: INTERNAL MEDICINE | Facility: CLINIC | Age: 38
End: 2018-07-05

## 2018-07-05 NOTE — TELEPHONE ENCOUNTER
----- Message from MAGGIE Shah sent at 7/5/2018  5:52 PM EDT -----  Regarding: check on patient  I saw Megan Tuesday after she had lumbar nerve ablation and she was in severe pain.    Can you call her to see if she has improved at all?    Just concerned    Tina WINKLER

## 2018-07-05 NOTE — TELEPHONE ENCOUNTER
Spoke with patient and asked if she was feeling any better. The patient stated that she is still very much in pain. I explained I would let Tina SERRANO know this information and if she has any further concerns or questions to call us back. Pt verbalized understanding and was appreciative for the call.

## 2018-07-05 NOTE — TELEPHONE ENCOUNTER
I am concerned about her; she needs a f/u appt with me for her pain; and appt for physical in future.  Will have Megan call her tomorrow.

## 2018-07-06 NOTE — TELEPHONE ENCOUNTER
LVM for pt to call back to set up follow up appt with Tina as soon as she can come in. Pt will also need to set up a Physical in future.

## 2018-07-11 ENCOUNTER — OFFICE VISIT (OUTPATIENT)
Dept: INTERNAL MEDICINE | Facility: CLINIC | Age: 38
End: 2018-07-11

## 2018-07-11 VITALS
BODY MASS INDEX: 35.12 KG/M2 | OXYGEN SATURATION: 98 % | HEIGHT: 61 IN | SYSTOLIC BLOOD PRESSURE: 112 MMHG | HEART RATE: 121 BPM | WEIGHT: 186 LBS | DIASTOLIC BLOOD PRESSURE: 74 MMHG

## 2018-07-11 DIAGNOSIS — M47.816 SPONDYLOSIS OF LUMBAR REGION WITHOUT MYELOPATHY OR RADICULOPATHY: Primary | ICD-10-CM

## 2018-07-11 DIAGNOSIS — J01.00 ACUTE MAXILLARY SINUSITIS, RECURRENCE NOT SPECIFIED: ICD-10-CM

## 2018-07-11 DIAGNOSIS — M51.26 LUMBAR DISCOGENIC PAIN SYNDROME: ICD-10-CM

## 2018-07-11 PROCEDURE — 99214 OFFICE O/P EST MOD 30 MIN: CPT | Performed by: NURSE PRACTITIONER

## 2018-07-11 RX ORDER — DOXYCYCLINE HYCLATE 100 MG/1
100 CAPSULE ORAL 2 TIMES DAILY
Qty: 20 CAPSULE | Refills: 0 | Status: SHIPPED | OUTPATIENT
Start: 2018-07-11 | End: 2018-07-21

## 2018-07-11 RX ORDER — FLUTICASONE PROPIONATE 50 MCG
2 SPRAY, SUSPENSION (ML) NASAL DAILY
Qty: 1 BOTTLE | Refills: 3 | Status: SHIPPED | OUTPATIENT
Start: 2018-07-11 | End: 2020-01-22

## 2018-07-11 RX ORDER — GABAPENTIN 300 MG/1
300 CAPSULE ORAL NIGHTLY
Qty: 30 CAPSULE | Refills: 0 | Status: SHIPPED | OUTPATIENT
Start: 2018-07-11 | End: 2018-08-22 | Stop reason: SDUPTHER

## 2018-07-11 RX ORDER — FLUCONAZOLE 150 MG/1
TABLET ORAL
Qty: 2 TABLET | Refills: 0 | Status: SHIPPED | OUTPATIENT
Start: 2018-07-11 | End: 2018-08-22

## 2018-07-11 NOTE — PROGRESS NOTES
CHIEF COMPLAINT  Sinusitis and Back Pain (Follow up)      SHELLI Guzman A Day is a 38 y.o. female is here today for follow-up    Ablation of lumbar nerves--severe pain--asking about gabapentin; will not see Dr. Sanchez until 40 wks after procedure; pain radiates down bilat. LE;     1wk hx--nasal congestion; right facial pain, cough, irritated throat      Past Medical History:   Diagnosis Date   • Abdominal pain    • Abnormal breast exam    • Abnormal Pap smear of cervix    • Achilles tendinitis    • Acute sinusitis    • Anxiety    • Arthritis    • Asthma    • Tavera's syndrome    • Bipolar 1 disorder (CMS/HCC)    • Bowel trouble    • Breast cyst    • Colon polyps    • Constipation    • Depression    • Diverticulitis    • Endometriosis    • Eustachian tube dysfunction    • Extremity pain    • Female pelvic pain    • Gastric ulcer    • H/O bladder infections    • History of rashes as a child    • Low back pain    • Menopausal symptoms    • Muscle weakness    • Ovarian cyst    • PID (pelvic inflammatory disease)    • Recurrent UTI    • Sexual problems        Past Surgical History:   Procedure Laterality Date   •  SECTION     • HYSTERECTOMY     • NASAL ENDOSCOPY     • OOPHORECTOMY Bilateral    • OTHER SURGICAL HISTORY      Laparoscopy (diagnostic) gynecologic with biopsy   • SHOULDER SURGERY         Family History   Problem Relation Age of Onset   • Liver disease Mother    • Diabetes Mother    • Hyperlipidemia Mother    • Hypertension Mother    • Thyroid disease Mother    • Arthritis Father    • Mental illness Father    • Migraines Sister    • Stroke Other    • Diabetes Other    • Hyperlipidemia Other    • Hypertension Other    • Mental illness Other    • Migraines Other    • Tuberculosis Other    • Breast cancer Neg Hx    • Endometrial cancer Neg Hx    • Ovarian cancer Neg Hx        Social History     Social History   • Marital status: Single     Spouse name: N/A   • Number of children: N/A   • Years of  "education: N/A     Occupational History   • Not on file.     Social History Main Topics   • Smoking status: Former Smoker     Types: Cigarettes   • Smokeless tobacco: Never Used      Comment: social smoking   • Alcohol use No   • Drug use: No   • Sexual activity: Defer     Other Topics Concern   • Not on file     Social History Narrative   • No narrative on file       The following portions of the patient's history were reviewed and updated as appropriate: allergies, current medications, past family history, past medical history, past social history, past surgical history and problem list.    ROS  Review of Systems   Constitutional: Positive for fatigue. Negative for activity change, appetite change and fever.   HENT: Positive for congestion, postnasal drip, sinus pain and sinus pressure. Negative for ear pain and sore throat.    Respiratory: Positive for cough. Negative for chest tightness, shortness of breath and wheezing.    Neurological: Positive for headaches. Negative for dizziness.   All other systems reviewed and are negative.      /74   Pulse (!) 121   Ht 154.9 cm (61\")   Wt 84.4 kg (186 lb)   SpO2 98%   BMI 35.14 kg/m²     PHYSICAL EXAM  Physical Exam   Constitutional: She is oriented to person, place, and time. She appears well-developed and well-nourished.   HENT:   Head: Normocephalic and atraumatic.   Eyes: Conjunctivae are normal.   Neck: Trachea normal and normal range of motion. Neck supple. No JVD present. No thyromegaly present.   Cardiovascular: Normal rate, regular rhythm and normal heart sounds.    No murmur heard.  No swelling in BLE   Pulmonary/Chest: Effort normal and breath sounds normal.   Neurological: She is alert and oriented to person, place, and time.   Skin: Skin is warm, dry and intact.   Vitals reviewed.      ASSESSMENT/PLAN    1. Spondylosis of lumbar region without myelopathy or radiculopathy    - gabapentin (NEURONTIN) 300 MG capsule; Take 1 capsule by mouth Every " Night.  Dispense: 30 capsule; Refill: 0    2. Lumbar discogenic pain syndrome    - gabapentin (NEURONTIN) 300 MG capsule; Take 1 capsule by mouth Every Night.  Dispense: 30 capsule; Refill: 0    3. Acute maxillary sinusitis, recurrence not specified    - fluticasone (FLONASE) 50 MCG/ACT nasal spray; 2 sprays into each nostril Daily.  Dispense: 1 bottle; Refill: 3  - doxycycline (VIBRAMYCIN) 100 MG capsule; Take 1 capsule by mouth 2 (Two) Times a Day for 10 days.  Dispense: 20 capsule; Refill: 0  - fluconazole (DIFLUCAN) 150 MG tablet; 1 tablet now, 1 tablet in 5 days  Dispense: 2 tablet; Refill: 0      Plan of care reviewed with patient at the conclusion of today's visit. Education was provided in regards to diagnosis, management and any prescribed or recommended OTC medications.  Patient verbalizes Understanding of and agreement with management plan.    FOLLOW-UP  Next scheduled f/u    RTC as needed      MAGGIE Gómez  07/11/2018

## 2018-07-26 ENCOUNTER — TELEPHONE (OUTPATIENT)
Dept: INTERNAL MEDICINE | Facility: CLINIC | Age: 38
End: 2018-07-26

## 2018-07-26 RX ORDER — DOXYCYCLINE 100 MG/1
100 CAPSULE ORAL 2 TIMES DAILY
Qty: 20 CAPSULE | Refills: 0 | Status: SHIPPED | OUTPATIENT
Start: 2018-07-26 | End: 2018-08-05

## 2018-08-13 ENCOUNTER — TELEPHONE (OUTPATIENT)
Dept: NUTRITION | Facility: HOSPITAL | Age: 38
End: 2018-08-13

## 2018-08-13 NOTE — PROGRESS NOTES
Patient did not show for nutrition follow up appointment on 8/8/2018.  Called patient to complete the follow up over the phone with patient's permission.   Goal completion is as follows:    -Track kcalories using My Fitness Pal.  Aim for 1300 kcalories/day: 100%  -Continue to attend physical therapy: 0%  -Lose 0.5-1.0# of body weight per week: 100%    Patient states that her current weight is 81.8 kg (180 lbs) which is a 2.7 kg (6 lb) weight loss from the initial nutrition appointment.  Congratulated patient on her efforts.  She is tracking her kcalories using the  [employer] apolinar successfully.  States that when she eats 1200 kcalories instead of 1300 kcalories, she loses weight.  Encouraged patient to aim for a range between 2256-3699 kcalories but to not eat below 1200 kcalories as this may slow her weight loss progress.  Has not started physical therapy yet, but plans to soon.  Patient declined rescheduling the follow up appointment.  Will call back to reschedule when she is ready.  Thank you again for this referral.

## 2018-08-16 DIAGNOSIS — M51.26 LUMBAR DISCOGENIC PAIN SYNDROME: ICD-10-CM

## 2018-08-16 DIAGNOSIS — M47.816 SPONDYLOSIS OF LUMBAR REGION WITHOUT MYELOPATHY OR RADICULOPATHY: ICD-10-CM

## 2018-08-19 RX ORDER — GABAPENTIN 300 MG/1
300 CAPSULE ORAL NIGHTLY
Qty: 30 CAPSULE | Refills: 0 | OUTPATIENT
Start: 2018-08-19

## 2018-08-19 NOTE — TELEPHONE ENCOUNTER
Will not refill at this time--did not return (no-show)on 7/26/18 for scheduled visit or 8/14/18--(no-show)--must come in for regular f/u to receive controlled substances--ALSO NEED UDS

## 2018-08-22 ENCOUNTER — HOSPITAL ENCOUNTER (OUTPATIENT)
Dept: GENERAL RADIOLOGY | Facility: HOSPITAL | Age: 38
Discharge: HOME | End: 2018-08-22
Admitting: NURSE PRACTITIONER

## 2018-08-22 ENCOUNTER — LAB (OUTPATIENT)
Dept: LAB | Facility: HOSPITAL | Age: 38
End: 2018-08-22

## 2018-08-22 ENCOUNTER — OFFICE VISIT (OUTPATIENT)
Dept: INTERNAL MEDICINE | Facility: CLINIC | Age: 38
End: 2018-08-22

## 2018-08-22 VITALS — HEART RATE: 115 BPM | DIASTOLIC BLOOD PRESSURE: 72 MMHG | OXYGEN SATURATION: 99 % | SYSTOLIC BLOOD PRESSURE: 118 MMHG

## 2018-08-22 DIAGNOSIS — M47.816 SPONDYLOSIS OF LUMBAR REGION WITHOUT MYELOPATHY OR RADICULOPATHY: ICD-10-CM

## 2018-08-22 DIAGNOSIS — S99.922A FOOT INJURY, LEFT, INITIAL ENCOUNTER: ICD-10-CM

## 2018-08-22 DIAGNOSIS — K75.81 STEATOHEPATITIS, NON-ALCOHOLIC: ICD-10-CM

## 2018-08-22 DIAGNOSIS — M51.26 LUMBAR DISCOGENIC PAIN SYNDROME: ICD-10-CM

## 2018-08-22 DIAGNOSIS — S99.922A FOOT INJURY, LEFT, INITIAL ENCOUNTER: Primary | ICD-10-CM

## 2018-08-22 PROCEDURE — 73630 X-RAY EXAM OF FOOT: CPT

## 2018-08-22 PROCEDURE — 99214 OFFICE O/P EST MOD 30 MIN: CPT | Performed by: NURSE PRACTITIONER

## 2018-08-22 RX ORDER — BRIMONIDINE TARTRATE 5 MG/G
GEL TOPICAL
COMMUNITY
Start: 2018-08-21 | End: 2018-11-15

## 2018-08-22 RX ORDER — ESTRADIOL 2 MG/1
2 TABLET ORAL DAILY
Refills: 0 | COMMUNITY
Start: 2018-07-23 | End: 2018-09-11 | Stop reason: SDUPTHER

## 2018-08-22 RX ORDER — TRIAMCINOLONE ACETONIDE 1 MG/G
CREAM TOPICAL
COMMUNITY
Start: 2018-08-21 | End: 2019-03-19

## 2018-08-22 RX ORDER — LORAZEPAM 1 MG/1
1 TABLET ORAL DAILY PRN
Refills: 0 | COMMUNITY
Start: 2018-08-07 | End: 2021-10-26

## 2018-08-22 RX ORDER — GABAPENTIN 300 MG/1
300 CAPSULE ORAL 2 TIMES DAILY
Qty: 30 CAPSULE | Refills: 2 | Status: SHIPPED | OUTPATIENT
Start: 2018-08-22 | End: 2018-09-05 | Stop reason: SDUPTHER

## 2018-08-23 ENCOUNTER — TELEPHONE (OUTPATIENT)
Dept: INTERNAL MEDICINE | Facility: CLINIC | Age: 38
End: 2018-08-23

## 2018-09-05 RX ORDER — GABAPENTIN 300 MG/1
CAPSULE ORAL
Qty: 30 CAPSULE | Refills: 2 | Status: SHIPPED | OUTPATIENT
Start: 2018-09-05 | End: 2018-12-03 | Stop reason: SDUPTHER

## 2018-09-11 ENCOUNTER — OFFICE VISIT (OUTPATIENT)
Dept: INTERNAL MEDICINE | Facility: CLINIC | Age: 38
End: 2018-09-11

## 2018-09-11 VITALS
HEIGHT: 61 IN | HEART RATE: 105 BPM | SYSTOLIC BLOOD PRESSURE: 110 MMHG | WEIGHT: 186 LBS | TEMPERATURE: 98.7 F | OXYGEN SATURATION: 98 % | DIASTOLIC BLOOD PRESSURE: 72 MMHG | BODY MASS INDEX: 35.12 KG/M2

## 2018-09-11 DIAGNOSIS — R05.3 PERSISTENT COUGH: ICD-10-CM

## 2018-09-11 DIAGNOSIS — E55.9 VITAMIN D DEFICIENCY: ICD-10-CM

## 2018-09-11 DIAGNOSIS — R74.8 ELEVATED LIVER ENZYMES: ICD-10-CM

## 2018-09-11 DIAGNOSIS — M79.10 MYALGIA: ICD-10-CM

## 2018-09-11 DIAGNOSIS — J01.00 ACUTE MAXILLARY SINUSITIS, RECURRENCE NOT SPECIFIED: Primary | ICD-10-CM

## 2018-09-11 DIAGNOSIS — L65.9 HAIR LOSS: ICD-10-CM

## 2018-09-11 DIAGNOSIS — Z79.890 HORMONE REPLACEMENT THERAPY (HRT): ICD-10-CM

## 2018-09-11 LAB
25(OH)D3 SERPL-MCNC: 27.8 NG/ML
ALBUMIN SERPL-MCNC: 4.38 G/DL (ref 3.2–4.8)
ALP SERPL-CCNC: 77 U/L (ref 25–100)
ALT SERPL W P-5'-P-CCNC: 35 U/L (ref 7–40)
AST SERPL-CCNC: 20 U/L (ref 0–33)
BILIRUB CONJ SERPL-MCNC: 0.1 MG/DL (ref 0–0.2)
BILIRUB INDIRECT SERPL-MCNC: 0.2 MG/DL (ref 0.1–1.1)
BILIRUB SERPL-MCNC: 0.3 MG/DL (ref 0.3–1.2)
EXPIRATION DATE: NORMAL
FLUAV AG NPH QL: NEGATIVE
FLUBV AG NPH QL: NEGATIVE
INTERNAL CONTROL: NORMAL
Lab: NORMAL
PROT SERPL-MCNC: 6.6 G/DL (ref 5.7–8.2)
TSH SERPL DL<=0.05 MIU/L-ACNC: 0.72 MIU/ML (ref 0.35–5.35)

## 2018-09-11 PROCEDURE — 80076 HEPATIC FUNCTION PANEL: CPT | Performed by: NURSE PRACTITIONER

## 2018-09-11 PROCEDURE — 99214 OFFICE O/P EST MOD 30 MIN: CPT | Performed by: NURSE PRACTITIONER

## 2018-09-11 PROCEDURE — 96372 THER/PROPH/DIAG INJ SC/IM: CPT | Performed by: NURSE PRACTITIONER

## 2018-09-11 PROCEDURE — 82306 VITAMIN D 25 HYDROXY: CPT | Performed by: NURSE PRACTITIONER

## 2018-09-11 PROCEDURE — 87804 INFLUENZA ASSAY W/OPTIC: CPT | Performed by: NURSE PRACTITIONER

## 2018-09-11 PROCEDURE — 84443 ASSAY THYROID STIM HORMONE: CPT | Performed by: NURSE PRACTITIONER

## 2018-09-11 RX ORDER — DEXTROMETHORPHAN HYDROBROMIDE AND PROMETHAZINE HYDROCHLORIDE 15; 6.25 MG/5ML; MG/5ML
5 SYRUP ORAL NIGHTLY
Qty: 75 ML | Refills: 0 | Status: SHIPPED | OUTPATIENT
Start: 2018-09-11 | End: 2019-01-23

## 2018-09-11 RX ORDER — FLUCONAZOLE 150 MG/1
TABLET ORAL
Qty: 3 TABLET | Refills: 0 | Status: SHIPPED | OUTPATIENT
Start: 2018-09-11 | End: 2018-10-19

## 2018-09-11 RX ORDER — METHYLPREDNISOLONE ACETATE 80 MG/ML
80 INJECTION, SUSPENSION INTRA-ARTICULAR; INTRALESIONAL; INTRAMUSCULAR; SOFT TISSUE ONCE
Status: COMPLETED | OUTPATIENT
Start: 2018-09-11 | End: 2018-09-11

## 2018-09-11 RX ORDER — DOXYCYCLINE 100 MG/1
100 CAPSULE ORAL EVERY 12 HOURS SCHEDULED
Qty: 20 CAPSULE | Refills: 0 | Status: SHIPPED | OUTPATIENT
Start: 2018-09-11 | End: 2018-10-19

## 2018-09-11 RX ORDER — ESTRADIOL 2 MG/1
2 TABLET ORAL DAILY
Qty: 30 TABLET | Refills: 5 | Status: SHIPPED | OUTPATIENT
Start: 2018-09-11 | End: 2019-10-18 | Stop reason: SDUPTHER

## 2018-09-11 RX ADMIN — METHYLPREDNISOLONE ACETATE 80 MG: 80 INJECTION, SUSPENSION INTRA-ARTICULAR; INTRALESIONAL; INTRAMUSCULAR; SOFT TISSUE at 16:15

## 2018-09-11 NOTE — PROGRESS NOTES
CHIEF COMPLAINT  Chief Complaint   Patient presents with   • Sinus Problem     Given Abx from dentist, not any better   • Alopecia       HPI  Crystal A Day is a 38 y.o. female  presents c/o URI, hair loss, elevated LFT, myalgias, HRT--needs refills    2 wks hx of pain in cheeks, visit to dentist last week thinking had a bad tooth, but xray shows sinus infection; dentist rx'd Zpack; has had no improvement in sx; today c/o--Chills, h/a, pain/pressure in face, nasal congestion with clear d/c, sore throat, PND, cough with prod clear sputum; mild shortness of breath, sneezing; aching; neg for dizziness, wheezing    Has been losing hair at an increased rate--worried thyroid hormone is abnormal; no hx of thyroid disease; unsure of past hx of vit d deficiency    Fatty liver and elevated LFTs--no imaging of liver since 2016; needs repeat LFTs    HRT--currently taking Estradiol 2 mg daily; s/p ABDELRAHMAN BSO--d/t endometriosis      Past Medical History:   Diagnosis Date   • Abdominal pain    • Abnormal breast exam    • Abnormal Pap smear of cervix    • Achilles tendinitis    • Acute sinusitis    • Anxiety    • Arthritis    • Asthma    • Tavera's syndrome    • Bipolar 1 disorder (CMS/HCC)    • Bowel trouble    • Breast cyst    • Colon polyps    • Constipation    • Depression    • Diverticulitis    • Endometriosis    • Eustachian tube dysfunction    • Extremity pain    • Female pelvic pain    • Gastric ulcer    • H/O bladder infections    • History of rashes as a child    • Low back pain    • Menopausal symptoms    • Muscle weakness    • Ovarian cyst    • PID (pelvic inflammatory disease)    • Recurrent UTI    • Sexual problems        Family History   Problem Relation Age of Onset   • Liver disease Mother    • Diabetes Mother    • Hyperlipidemia Mother    • Hypertension Mother    • Thyroid disease Mother    • Arthritis Father    • Mental illness Father    • Migraines Sister    • Stroke Other    • Diabetes Other    • Hyperlipidemia  "Other    • Hypertension Other    • Mental illness Other    • Migraines Other    • Tuberculosis Other    • Breast cancer Neg Hx    • Endometrial cancer Neg Hx    • Ovarian cancer Neg Hx        Social History     Social History   • Marital status: Single     Spouse name: N/A   • Number of children: N/A   • Years of education: N/A     Occupational History   • Not on file.     Social History Main Topics   • Smoking status: Former Smoker     Types: Cigarettes   • Smokeless tobacco: Never Used      Comment: social smoking   • Alcohol use No   • Drug use: No   • Sexual activity: Defer     Other Topics Concern   • Not on file     Social History Narrative   • No narrative on file       ROS  Review of Systems   Constitutional: Positive for activity change, appetite change and fatigue.   HENT: Positive for congestion, ear pain, postnasal drip, sinus pain, sinus pressure and sore throat.    Respiratory: Positive for cough and shortness of breath. Negative for chest tightness and wheezing.    Gastrointestinal: Negative for abdominal distention and abdominal pain.   Endocrine: Negative for cold intolerance and heat intolerance.   Musculoskeletal: Positive for myalgias.   Neurological: Positive for headaches. Negative for dizziness.   All other systems reviewed and are negative.      /72   Pulse 105   Temp 98.7 °F (37.1 °C)   Ht 154.9 cm (61\")   Wt 84.4 kg (186 lb)   SpO2 98%   Breastfeeding? No   BMI 35.14 kg/m²     PHYSICAL EXAM  Physical Exam   Constitutional: She is oriented to person, place, and time. She appears well-developed and well-nourished.   HENT:   Head: Normocephalic and atraumatic.   HENT--bilat TMs are bulging, cloudy dull effusion, no erythema; nasal mucosa is erythematous and swollen; bilat severe maxillary sinus tenderness; oropharynx is erythematous with large amt of PND and cobblestoning   Eyes: Conjunctivae are normal.   Neck: Trachea normal and normal range of motion. Neck supple. No JVD " present. No thyromegaly present.   Cardiovascular: Normal rate, regular rhythm and normal heart sounds.    No murmur heard.  No swelling in BLE   Pulmonary/Chest:   NAD, CTA in all lung fields   Lymphadenopathy:   bilat ant cervical adenopathy w/tenderness   Neurological: She is alert and oriented to person, place, and time.   Skin: Skin is warm, dry and intact.   No noticeable patches of hair loss   Vitals reviewed.    Results for orders placed or performed in visit on 09/11/18   Hepatic Function Panel   Result Value Ref Range    Total Protein 6.6 5.7 - 8.2 g/dL    Albumin 4.38 3.20 - 4.80 g/dL    ALT (SGPT) 35 7 - 40 U/L    AST (SGOT) 20 0 - 33 U/L    Alkaline Phosphatase 77 25 - 100 U/L    Total Bilirubin 0.3 0.3 - 1.2 mg/dL    Bilirubin, Direct 0.1 0.0 - 0.2 mg/dL    Bilirubin, Indirect 0.2 0.1 - 1.1 mg/dL   TSH   Result Value Ref Range    TSH 0.720 0.350 - 5.350 mIU/mL   Vitamin D 25 Hydroxy   Result Value Ref Range    25 Hydroxy, Vitamin D 27.8 ng/ml   POCT Influenza A/B   Result Value Ref Range    Rapid Influenza A Ag negative     Rapid Influenza B Ag negative     Internal Control Passed Passed    Lot Number [id number]     Expiration Date 8/1/2020          ASSESSMENT/PLAN  1. Acute maxillary sinusitis, recurrence not specified  - doxycycline (MONODOX) 100 MG capsule; Take 1 capsule by mouth Every 12 (Twelve) Hours.  Dispense: 20 capsule; Refill: 0  - fluconazole (DIFLUCAN) 150 MG tablet; Take 1 tablet every 3 days x 3 doses  Dispense: 3 tablet; Refill: 0  **in office IM injection  - methylPREDNISolone acetate (DEPO-medrol) injection 80 mg; Inject 1 mL into the appropriate muscle as directed by prescriber 1 (One) Time.    2. Persistent cough  - promethazine-dextromethorphan (PROMETHAZINE-DM) 6.25-15 MG/5ML syrup; Take 5 mL by mouth Every Night.  Dispense: 75 mL; Refill: 0    3. Hair loss  -will check thyroid hormone level to r/o for cause  - TSH    4. Elevated liver enzymes  -recheck liver fxn  - Hepatic  Function Panel    5. Myalgia  -neg for influenza  - POCT Influenza A/B    6. Vitamin D deficiency  -supplement accordingly  - Vitamin D 25 Hydroxy    7. Hormone replacement therapy (HRT)  -continue at current dosage  - estradiol (ESTRACE) 2 MG tablet; Take 1 tablet by mouth Daily.  Dispense: 30 tablet; Refill: 5      FOLLOW-UP  1 month(s) Medicare Wellness visit with fasting labs    RTC sooner as needed.    Patient verbalizes understanding of medication dosage, comfort measures, instructions for treatment and follow-up.    Tina Wiley, APRN  09/11/2018

## 2018-09-12 ENCOUNTER — TELEPHONE (OUTPATIENT)
Dept: INTERNAL MEDICINE | Facility: CLINIC | Age: 38
End: 2018-09-12

## 2018-09-12 DIAGNOSIS — Z20.828 EXPOSURE TO INFLUENZA: Primary | ICD-10-CM

## 2018-09-12 RX ORDER — OSELTAMIVIR PHOSPHATE 75 MG/1
75 CAPSULE ORAL DAILY
Qty: 10 CAPSULE | Refills: 0 | Status: SHIPPED | OUTPATIENT
Start: 2018-09-12 | End: 2018-09-22

## 2018-09-12 NOTE — TELEPHONE ENCOUNTER
PT CALLED TO SAY THAT HER SISTER HAS THE FLU AND SHE HAS BEEN AROUND HER  DOES SHE NEED TO BE ON ANYTHING ELSE?  PT USES [address]    PTS NUMBER [phone]

## 2018-09-14 ENCOUNTER — TELEPHONE (OUTPATIENT)
Dept: INTERNAL MEDICINE | Facility: CLINIC | Age: 38
End: 2018-09-14

## 2018-09-14 NOTE — TELEPHONE ENCOUNTER
PT WOULD LIKE TO GET HER RESULTS ON HER VITAMIN D TEST SHE HAD DONE AND WOULD LIKE TO GET A CALL BACK -[phone]

## 2018-10-03 PROCEDURE — 99284 EMERGENCY DEPT VISIT MOD MDM: CPT

## 2018-10-03 PROCEDURE — 96374 THER/PROPH/DIAG INJ IV PUSH: CPT

## 2018-10-03 PROCEDURE — 93005 ELECTROCARDIOGRAM TRACING: CPT | Performed by: EMERGENCY MEDICINE

## 2018-10-03 PROCEDURE — 96375 TX/PRO/DX INJ NEW DRUG ADDON: CPT

## 2018-10-04 ENCOUNTER — HOSPITAL ENCOUNTER (EMERGENCY)
Facility: HOSPITAL | Age: 38
Discharge: HOME | End: 2018-10-04
Attending: EMERGENCY MEDICINE | Admitting: EMERGENCY MEDICINE

## 2018-10-04 ENCOUNTER — APPOINTMENT (OUTPATIENT)
Dept: GENERAL RADIOLOGY | Facility: HOSPITAL | Age: 38
End: 2018-10-04

## 2018-10-04 VITALS
TEMPERATURE: 97.9 F | WEIGHT: 154 LBS | OXYGEN SATURATION: 99 % | BODY MASS INDEX: 29.07 KG/M2 | HEIGHT: 61 IN | HEART RATE: 80 BPM | DIASTOLIC BLOOD PRESSURE: 72 MMHG | SYSTOLIC BLOOD PRESSURE: 109 MMHG | RESPIRATION RATE: 16 BRPM

## 2018-10-04 DIAGNOSIS — R51.9 HEADACHE, UNSPECIFIED HEADACHE TYPE: ICD-10-CM

## 2018-10-04 DIAGNOSIS — R07.9 CHEST PAIN, UNSPECIFIED TYPE: Primary | ICD-10-CM

## 2018-10-04 LAB
ALBUMIN SERPL-MCNC: 4.07 G/DL (ref 3.2–4.8)
ALBUMIN/GLOB SERPL: 1.7 G/DL (ref 1.5–2.5)
ALP SERPL-CCNC: 69 U/L (ref 25–100)
ALT SERPL W P-5'-P-CCNC: 25 U/L (ref 7–40)
ANION GAP SERPL CALCULATED.3IONS-SCNC: 8 MMOL/L (ref 3–11)
AST SERPL-CCNC: 17 U/L (ref 0–33)
B-HCG UR QL: NEGATIVE
BASOPHILS # BLD AUTO: 0.02 10*3/MM3 (ref 0–0.2)
BASOPHILS NFR BLD AUTO: 0.2 % (ref 0–1)
BILIRUB SERPL-MCNC: 0.2 MG/DL (ref 0.3–1.2)
BILIRUB UR QL STRIP: NEGATIVE
BNP SERPL-MCNC: 5 PG/ML (ref 0–100)
BUN BLD-MCNC: 14 MG/DL (ref 9–23)
BUN/CREAT SERPL: 19.4 (ref 7–25)
CALCIUM SPEC-SCNC: 8.7 MG/DL (ref 8.7–10.4)
CHLORIDE SERPL-SCNC: 109 MMOL/L (ref 99–109)
CLARITY UR: CLEAR
CO2 SERPL-SCNC: 22 MMOL/L (ref 20–31)
COLOR UR: YELLOW
CREAT BLD-MCNC: 0.72 MG/DL (ref 0.6–1.3)
D DIMER PPP FEU-MCNC: 0.38 MG/L (FEU) (ref 0–0.5)
DEPRECATED RDW RBC AUTO: 43.8 FL (ref 37–54)
EOSINOPHIL # BLD AUTO: 0.34 10*3/MM3 (ref 0–0.3)
EOSINOPHIL NFR BLD AUTO: 3.6 % (ref 0–3)
ERYTHROCYTE [DISTWIDTH] IN BLOOD BY AUTOMATED COUNT: 13.7 % (ref 11.3–14.5)
GFR SERPL CREATININE-BSD FRML MDRD: 92 ML/MIN/1.73
GLOBULIN UR ELPH-MCNC: 2.4 GM/DL
GLUCOSE BLD-MCNC: 112 MG/DL (ref 70–100)
GLUCOSE UR STRIP-MCNC: NEGATIVE MG/DL
HCT VFR BLD AUTO: 39.8 % (ref 34.5–44)
HGB BLD-MCNC: 12.7 G/DL (ref 11.5–15.5)
HGB UR QL STRIP.AUTO: NEGATIVE
IMM GRANULOCYTES # BLD: 0.02 10*3/MM3 (ref 0–0.03)
IMM GRANULOCYTES NFR BLD: 0.2 % (ref 0–0.6)
KETONES UR QL STRIP: NEGATIVE
LEUKOCYTE ESTERASE UR QL STRIP.AUTO: NEGATIVE
LYMPHOCYTES # BLD AUTO: 2.61 10*3/MM3 (ref 0.6–4.8)
LYMPHOCYTES NFR BLD AUTO: 27.9 % (ref 24–44)
MCH RBC QN AUTO: 28 PG (ref 27–31)
MCHC RBC AUTO-ENTMCNC: 31.9 G/DL (ref 32–36)
MCV RBC AUTO: 87.9 FL (ref 80–99)
MONOCYTES # BLD AUTO: 0.49 10*3/MM3 (ref 0–1)
MONOCYTES NFR BLD AUTO: 5.2 % (ref 0–12)
NEUTROPHILS # BLD AUTO: 5.9 10*3/MM3 (ref 1.5–8.3)
NEUTROPHILS NFR BLD AUTO: 63.1 % (ref 41–71)
NITRITE UR QL STRIP: NEGATIVE
PH UR STRIP.AUTO: 6.5 [PH] (ref 5–8)
PLATELET # BLD AUTO: 279 10*3/MM3 (ref 150–450)
PMV BLD AUTO: 10.4 FL (ref 6–12)
POTASSIUM BLD-SCNC: 3.7 MMOL/L (ref 3.5–5.5)
PROT SERPL-MCNC: 6.5 G/DL (ref 5.7–8.2)
PROT UR QL STRIP: NEGATIVE
RBC # BLD AUTO: 4.53 10*6/MM3 (ref 3.89–5.14)
SODIUM BLD-SCNC: 139 MMOL/L (ref 132–146)
SP GR UR STRIP: 1.02 (ref 1–1.03)
TROPONIN I SERPL-MCNC: <0.006 NG/ML
UROBILINOGEN UR QL STRIP: NORMAL
WBC NRBC COR # BLD: 9.36 10*3/MM3 (ref 3.5–10.8)

## 2018-10-04 PROCEDURE — 81025 URINE PREGNANCY TEST: CPT | Performed by: EMERGENCY MEDICINE

## 2018-10-04 PROCEDURE — 25010000002 METOCLOPRAMIDE PER 10 MG: Performed by: EMERGENCY MEDICINE

## 2018-10-04 PROCEDURE — 96374 THER/PROPH/DIAG INJ IV PUSH: CPT

## 2018-10-04 PROCEDURE — 84484 ASSAY OF TROPONIN QUANT: CPT | Performed by: EMERGENCY MEDICINE

## 2018-10-04 PROCEDURE — 85025 COMPLETE CBC W/AUTO DIFF WBC: CPT | Performed by: EMERGENCY MEDICINE

## 2018-10-04 PROCEDURE — 25010000002 DIPHENHYDRAMINE PER 50 MG: Performed by: EMERGENCY MEDICINE

## 2018-10-04 PROCEDURE — 80053 COMPREHEN METABOLIC PANEL: CPT | Performed by: EMERGENCY MEDICINE

## 2018-10-04 PROCEDURE — 83880 ASSAY OF NATRIURETIC PEPTIDE: CPT | Performed by: EMERGENCY MEDICINE

## 2018-10-04 PROCEDURE — 93005 ELECTROCARDIOGRAM TRACING: CPT | Performed by: EMERGENCY MEDICINE

## 2018-10-04 PROCEDURE — 81003 URINALYSIS AUTO W/O SCOPE: CPT | Performed by: EMERGENCY MEDICINE

## 2018-10-04 PROCEDURE — 25010000002 KETOROLAC TROMETHAMINE PER 15 MG: Performed by: EMERGENCY MEDICINE

## 2018-10-04 PROCEDURE — 85379 FIBRIN DEGRADATION QUANT: CPT | Performed by: EMERGENCY MEDICINE

## 2018-10-04 PROCEDURE — 71045 X-RAY EXAM CHEST 1 VIEW: CPT

## 2018-10-04 PROCEDURE — 96375 TX/PRO/DX INJ NEW DRUG ADDON: CPT

## 2018-10-04 RX ORDER — SODIUM CHLORIDE 0.9 % (FLUSH) 0.9 %
10 SYRINGE (ML) INJECTION AS NEEDED
Status: DISCONTINUED | OUTPATIENT
Start: 2018-10-04 | End: 2018-10-04

## 2018-10-04 RX ORDER — KETOROLAC TROMETHAMINE 15 MG/ML
15 INJECTION, SOLUTION INTRAMUSCULAR; INTRAVENOUS ONCE
Status: COMPLETED | OUTPATIENT
Start: 2018-10-04 | End: 2018-10-04

## 2018-10-04 RX ORDER — DIPHENHYDRAMINE HYDROCHLORIDE 50 MG/ML
25 INJECTION INTRAMUSCULAR; INTRAVENOUS ONCE
Status: COMPLETED | OUTPATIENT
Start: 2018-10-04 | End: 2018-10-04

## 2018-10-04 RX ORDER — METOCLOPRAMIDE HYDROCHLORIDE 5 MG/ML
10 INJECTION INTRAMUSCULAR; INTRAVENOUS ONCE
Status: COMPLETED | OUTPATIENT
Start: 2018-10-04 | End: 2018-10-04

## 2018-10-04 RX ADMIN — DIPHENHYDRAMINE HYDROCHLORIDE 25 MG: 50 INJECTION, SOLUTION INTRAMUSCULAR; INTRAVENOUS at 01:29

## 2018-10-04 RX ADMIN — KETOROLAC TROMETHAMINE 15 MG: 15 INJECTION, SOLUTION INTRAMUSCULAR; INTRAVENOUS at 01:27

## 2018-10-04 RX ADMIN — METOCLOPRAMIDE 10 MG: 5 INJECTION, SOLUTION INTRAMUSCULAR; INTRAVENOUS at 01:30

## 2018-10-04 RX ADMIN — SODIUM CHLORIDE 1000 ML: 9 INJECTION, SOLUTION INTRAVENOUS at 01:26

## 2018-10-19 ENCOUNTER — OFFICE VISIT (OUTPATIENT)
Dept: INTERNAL MEDICINE | Facility: CLINIC | Age: 38
End: 2018-10-19

## 2018-10-19 VITALS
SYSTOLIC BLOOD PRESSURE: 118 MMHG | DIASTOLIC BLOOD PRESSURE: 80 MMHG | TEMPERATURE: 99 F | HEART RATE: 91 BPM | OXYGEN SATURATION: 98 %

## 2018-10-19 DIAGNOSIS — R13.10 DYSPHAGIA, UNSPECIFIED TYPE: ICD-10-CM

## 2018-10-19 DIAGNOSIS — R51.9 ACUTE INTRACTABLE HEADACHE, UNSPECIFIED HEADACHE TYPE: Primary | ICD-10-CM

## 2018-10-19 DIAGNOSIS — Z87.19 HISTORY OF BARRETT'S ESOPHAGUS: ICD-10-CM

## 2018-10-19 DIAGNOSIS — M51.26 LUMBAR DISCOGENIC PAIN SYNDROME: ICD-10-CM

## 2018-10-19 DIAGNOSIS — R22.1 NECK NODULE: ICD-10-CM

## 2018-10-19 DIAGNOSIS — R11.0 NAUSEA: ICD-10-CM

## 2018-10-19 DIAGNOSIS — R05.9 COUGH: ICD-10-CM

## 2018-10-19 DIAGNOSIS — R93.89 ABNORMAL CHEST X-RAY: ICD-10-CM

## 2018-10-19 DIAGNOSIS — Z87.891 H/O TOBACCO USE, PRESENTING HAZARDS TO HEALTH: ICD-10-CM

## 2018-10-19 DIAGNOSIS — M51.26 HERNIATED LUMBAR INTERVERTEBRAL DISC: ICD-10-CM

## 2018-10-19 DIAGNOSIS — R07.9 CHEST PAIN, UNSPECIFIED TYPE: ICD-10-CM

## 2018-10-19 DIAGNOSIS — M47.816 SPONDYLOSIS OF LUMBAR REGION WITHOUT MYELOPATHY OR RADICULOPATHY: ICD-10-CM

## 2018-10-19 PROCEDURE — 93000 ELECTROCARDIOGRAM COMPLETE: CPT | Performed by: NURSE PRACTITIONER

## 2018-10-19 PROCEDURE — 99214 OFFICE O/P EST MOD 30 MIN: CPT | Performed by: NURSE PRACTITIONER

## 2018-10-19 PROCEDURE — 96372 THER/PROPH/DIAG INJ SC/IM: CPT | Performed by: NURSE PRACTITIONER

## 2018-10-19 RX ORDER — ONDANSETRON 4 MG/1
4 TABLET, FILM COATED ORAL EVERY 8 HOURS PRN
Qty: 30 TABLET | Refills: 0 | Status: SHIPPED | OUTPATIENT
Start: 2018-10-19 | End: 2019-01-29

## 2018-10-19 RX ORDER — PROMETHAZINE HYDROCHLORIDE 25 MG/ML
12.5 INJECTION, SOLUTION INTRAMUSCULAR; INTRAVENOUS ONCE
Status: COMPLETED | OUTPATIENT
Start: 2018-10-19 | End: 2018-10-19

## 2018-10-19 RX ORDER — KETOROLAC TROMETHAMINE 30 MG/ML
30 INJECTION, SOLUTION INTRAMUSCULAR; INTRAVENOUS ONCE
Status: COMPLETED | OUTPATIENT
Start: 2018-10-19 | End: 2018-10-19

## 2018-10-19 RX ORDER — METHYLPREDNISOLONE ACETATE 40 MG/ML
40 INJECTION, SUSPENSION INTRA-ARTICULAR; INTRALESIONAL; INTRAMUSCULAR; SOFT TISSUE ONCE
Status: COMPLETED | OUTPATIENT
Start: 2018-10-19 | End: 2018-10-19

## 2018-10-19 RX ORDER — TRAMADOL HYDROCHLORIDE 50 MG/1
50 TABLET ORAL EVERY 8 HOURS PRN
Qty: 60 TABLET | Refills: 0 | Status: SHIPPED | OUTPATIENT
Start: 2018-10-19 | End: 2019-01-23 | Stop reason: SDUPTHER

## 2018-10-19 RX ADMIN — METHYLPREDNISOLONE ACETATE 40 MG: 40 INJECTION, SUSPENSION INTRA-ARTICULAR; INTRALESIONAL; INTRAMUSCULAR; SOFT TISSUE at 17:44

## 2018-10-19 RX ADMIN — KETOROLAC TROMETHAMINE 30 MG: 30 INJECTION, SOLUTION INTRAMUSCULAR; INTRAVENOUS at 17:44

## 2018-10-19 RX ADMIN — PROMETHAZINE HYDROCHLORIDE 12.5 MG: 25 INJECTION, SOLUTION INTRAMUSCULAR; INTRAVENOUS at 17:43

## 2018-10-19 NOTE — TELEPHONE ENCOUNTER
I will send in 60 tablets to her pharmacy    [facility] is appropriate--receiving lorazepam from psych--Rosey Vasquez at [facility]; gabapentin rx by me

## 2018-10-19 NOTE — PROGRESS NOTES
Chief Complaint   Patient presents with   • Chest Pain   • Headache       History of Present Illness  38 y.o.female presents for chest pain and headache.    Chest pain substernal off and on for months; worse with deep breathing and then sometimes just when sitting and not doing anything. Not sure if radiates to neck or arm cause already has pain there; some short of breath and nausea.  Treated in ED 10-4 same symptoms and has not went away. No palpitations or extremity edema.  Pt does not think pain related to any foods; not worse with eating or lying down; just always there.  Expresses concern that something does not feel right in her chest; pain is in the middle but doesn' feel right all over chest.  Occasional cough; Hx of tob 10cigs a day.    Hx of GERD with tom's esophagus.  Throat feels numb; intermittent hoarseness and choking sensation. No hematochezia, melena or hematemesis.  Wants referral to [facility] specialist.  Last EGD about a year ago.    Headache for 4 days located frontal radiating to back with nausea; no vision changes; positive dizziness and photophabia.  Has tried ibuprofen, tyelnol XS, excedrin migraine.  Remote hx migraines but nothing recent.    C/o small nodule at base of anterior neck; thought was supposed to have an xray of neck to further assess. Pt concerned about reoccurance of cancer with hx of churchill's esophagus.  Repeatedly states I feel like I have cancer in my chest, neck and head.    Review of Systems   Constitutional: Negative for appetite change, chills, fatigue, fever and unexpected weight loss.   HENT: Positive for trouble swallowing. Negative for congestion, rhinorrhea, sinus pressure, sneezing and sore throat.    Eyes: Positive for photophobia and visual disturbance. Negative for pain, discharge and itching.   Respiratory: Positive for cough, choking, chest tightness and shortness of breath. Negative for wheezing and stridor.    Cardiovascular: Positive for chest pain.  Negative for palpitations and leg swelling.   Gastrointestinal: Positive for GERD. Negative for abdominal pain, blood in stool, constipation, diarrhea, nausea and vomiting.   Neurological: Positive for dizziness, numbness and headache. Negative for weakness.         [facility]  The following portions of the patient's history were reviewed and updated as appropriate: allergies, current medications, past family history, past medical history, past social history, past surgical history and problem list.     Past Medical History:   Diagnosis Date   • Abdominal pain    • Abnormal breast exam    • Abnormal Pap smear of cervix    • Achilles tendinitis    • Acute sinusitis    • Anxiety    • Arthritis    • Asthma    • Tavera's syndrome    • Bipolar 1 disorder (CMS/HCC)    • Bowel trouble    • Breast cyst    • Colon polyps    • Constipation    • Depression    • Diverticulitis    • Endometriosis    • Eustachian tube dysfunction    • Extremity pain    • Female pelvic pain    • Gastric ulcer    • H/O bladder infections    • History of rashes as a child    • Low back pain    • Menopausal symptoms    • Muscle weakness    • Ovarian cyst    • PID (pelvic inflammatory disease)    • Recurrent UTI    • Sexual problems       Past Surgical History:   Procedure Laterality Date   •  SECTION     • HYSTERECTOMY     • NASAL ENDOSCOPY     • OOPHORECTOMY Bilateral    • OTHER SURGICAL HISTORY      Laparoscopy (diagnostic) gynecologic with biopsy   • SHOULDER SURGERY        Allergies   Allergen Reactions   • Adhesive Tape Hives   • Latex Hives   • Sulfa Antibiotics Hives   • Sulfate Hives   • Biaxin [Clarithromycin] Nausea Only   • Codeine Itching   • Penicillins Nausea Only      Family History   Problem Relation Age of Onset   • Liver disease Mother    • Diabetes Mother    • Hyperlipidemia Mother    • Hypertension Mother    • Thyroid disease Mother    • Arthritis Father    • Mental illness Father    • Migraines Sister    • Stroke Other    •  Diabetes Other    • Hyperlipidemia Other    • Hypertension Other    • Mental illness Other    • Migraines Other    • Tuberculosis Other    • Breast cancer Neg Hx    • Endometrial cancer Neg Hx    • Ovarian cancer Neg Hx       Social History     Social History   • Marital status: Single     Spouse name: N/A   • Number of children: N/A   • Years of education: N/A     Occupational History   • Not on file.     Social History Main Topics   • Smoking status: Current Every Day Smoker     Types: Cigarettes   • Smokeless tobacco: Never Used      Comment: social smoking   • Alcohol use No   • Drug use: No   • Sexual activity: Defer     Other Topics Concern   • Not on file     Social History Narrative   • No narrative on file         Current Outpatient Prescriptions:   •  albuterol (PROVENTIL HFA;VENTOLIN HFA) 108 (90 Base) MCG/ACT inhaler, Inhale 2 puffs Every 4 (Four) Hours As Needed for Wheezing., Disp: 18 g, Rfl: 11  •  beclomethasone (QVAR) 40 MCG/ACT inhaler, Inhale 2 puffs 2 (Two) Times a Day., Disp: , Rfl:   •  cetirizine (zyrTEC) 10 MG tablet, Take 1 tablet by mouth Daily., Disp: , Rfl: 1  •  cyclobenzaprine (FLEXERIL) 10 MG tablet, Take 1 tablet by mouth 3 (Three) Times a Day As Needed for Muscle Spasms., Disp: 15 tablet, Rfl: 0  •  diclofenac (VOLTAREN) 1 % gel gel, Apply 4 g topically 4 (Four) Times a Day As Needed (as needed for pain)., Disp: 1 tube, Rfl: 0  •  DULoxetine (CYMBALTA) 30 MG capsule, Take 1 capsule by mouth Daily., Disp: , Rfl: 3  •  EPIPEN 2-JAREN 0.3 MG/0.3ML solution auto-injector injection, U UTD, Disp: , Rfl: 6  •  estradiol (ESTRACE) 2 MG tablet, Take 1 tablet by mouth Daily., Disp: 30 tablet, Rfl: 5  •  fluticasone (FLONASE) 50 MCG/ACT nasal spray, 2 sprays into each nostril Daily., Disp: 1 bottle, Rfl: 3  •  gabapentin (NEURONTIN) 300 MG capsule, 1 capsule by mouth every night, Disp: 30 capsule, Rfl: 2  •  lidocaine (XYLOCAINE) 5 % ointment, , Disp: , Rfl:   •  LORazepam (ATIVAN) 1 MG tablet,  TK 1 T PO D PRN, Disp: , Rfl: 0  •  MIRVASO 0.33 % gel, , Disp: , Rfl:   •  NEXIUM 40 MG capsule, Take 1 capsule by mouth Every Morning Before Breakfast., Disp: 30 capsule, Rfl: 5  •  PATADAY 0.2 % solution ophthalmic solution, USE 1 DROP AEY ONCE DAILY PRN, Disp: , Rfl: 3  •  PAZEO 0.7 % solution, INSTILL 1 GTT  AEY ONCE DAILY PRN, Disp: , Rfl: 6  •  promethazine-dextromethorphan (PROMETHAZINE-DM) 6.25-15 MG/5ML syrup, Take 5 mL by mouth Every Night., Disp: 75 mL, Rfl: 0  •  topiramate (TOPAMAX) 100 MG tablet, Take 1 tablet by mouth Daily., Disp: , Rfl: 3  •  traMADol (ULTRAM) 50 MG tablet, Take 1 tablet by mouth Every 8 (Eight) Hours As Needed for Moderate Pain ., Disp: 60 tablet, Rfl: 0  •  traZODone (DESYREL) 150 MG tablet, Take 1 tablet by mouth As Needed., Disp: , Rfl: 3  •  triamcinolone (KENALOG) 0.1 % cream, , Disp: , Rfl:     VITALS:  /80   Pulse 91   Temp 99 °F (37.2 °C)   SpO2 98%     Physical Exam   Constitutional: She is oriented to person, place, and time. She appears well-developed and well-nourished. No distress.   HENT:   Head: Normocephalic.   Right Ear: External ear normal.   Left Ear: External ear normal.   Nose: Nose normal.   Mouth/Throat: Oropharynx is clear and moist.   Eyes: Pupils are equal, round, and reactive to light. Conjunctivae and EOM are normal.   Neck: Trachea normal, normal range of motion and phonation normal. No neck rigidity. No edema and no erythema present. No thyromegaly present.       Small pea sized nodule at base of anterior neck just above suprasternal notch; round, mobile, nontender.   Cardiovascular: Normal rate, regular rhythm and normal heart sounds.    Pulmonary/Chest: Effort normal and breath sounds normal. No stridor. No respiratory distress.   No chest wall tenderness with palpation   Abdominal: Soft. Bowel sounds are normal. There is no tenderness.   Musculoskeletal: Normal range of motion.   Normal ROM all major joints   Lymphadenopathy:        Head  (right side): No submental, no submandibular and no tonsillar adenopathy present.        Head (left side): No submental, no submandibular and no tonsillar adenopathy present.     She has no cervical adenopathy.   Neurological: She is alert and oriented to person, place, and time.   Skin: Skin is warm and dry. Capillary refill takes less than 2 seconds. No rash noted.   Psychiatric: She has a normal mood and affect. Her behavior is normal.         ECG 12 Lead  Date/Time: 10/19/2018 5:11 PM  Performed by: MARGARET SOOD  Authorized by: MARGARET SOOD   Comparison: compared with previous ECG   Similar to previous ECG  Rhythm: sinus rhythm  Rate: normal  Conduction: conduction normal  ST Segments: ST segments normal  T Waves: T waves normal  QRS axis: normal  Clinical impression: normal ECG          LABS  Results for orders placed or performed during the hospital encounter of 10/04/18   Comprehensive Metabolic Panel   Result Value Ref Range    Glucose 112 (H) 70 - 100 mg/dL    BUN 14 9 - 23 mg/dL    Creatinine 0.72 0.60 - 1.30 mg/dL    Sodium 139 132 - 146 mmol/L    Potassium 3.7 3.5 - 5.5 mmol/L    Chloride 109 99 - 109 mmol/L    CO2 22.0 20.0 - 31.0 mmol/L    Calcium 8.7 8.7 - 10.4 mg/dL    Total Protein 6.5 5.7 - 8.2 g/dL    Albumin 4.07 3.20 - 4.80 g/dL    ALT (SGPT) 25 7 - 40 U/L    AST (SGOT) 17 0 - 33 U/L    Alkaline Phosphatase 69 25 - 100 U/L    Total Bilirubin 0.2 (L) 0.3 - 1.2 mg/dL    eGFR Non African Amer 92 >60 mL/min/1.73    Globulin 2.4 gm/dL    A/G Ratio 1.7 1.5 - 2.5 g/dL    BUN/Creatinine Ratio 19.4 7.0 - 25.0    Anion Gap 8.0 3.0 - 11.0 mmol/L   Urinalysis With Microscopic If Indicated (No Culture) - Urine, Clean Catch   Result Value Ref Range    Color, UA Yellow Yellow, Straw    Appearance, UA Clear Clear    pH, UA 6.5 5.0 - 8.0    Specific Gravity, UA 1.024 1.001 - 1.030    Glucose, UA Negative Negative    Ketones, UA Negative Negative    Bilirubin, UA Negative Negative    Blood, UA  Negative Negative    Protein, UA Negative Negative    Leuk Esterase, UA Negative Negative    Nitrite, UA Negative Negative    Urobilinogen, UA 1.0 E.U./dL 0.2 - 1.0 E.U./dL   D-dimer, Quantitative   Result Value Ref Range    D-Dimer, Quantitative 0.38 0.00 - 0.50 mg/L (FEU)   BNP   Result Value Ref Range    BNP 5.0 0.0 - 100.0 pg/mL   CBC Auto Differential   Result Value Ref Range    WBC 9.36 3.50 - 10.80 10*3/mm3    RBC 4.53 3.89 - 5.14 10*6/mm3    Hemoglobin 12.7 11.5 - 15.5 g/dL    Hematocrit 39.8 34.5 - 44.0 %    MCV 87.9 80.0 - 99.0 fL    MCH 28.0 27.0 - 31.0 pg    MCHC 31.9 (L) 32.0 - 36.0 g/dL    RDW 13.7 11.3 - 14.5 %    RDW-SD 43.8 37.0 - 54.0 fl    MPV 10.4 6.0 - 12.0 fL    Platelets 279 150 - 450 10*3/mm3    Neutrophil % 63.1 41.0 - 71.0 %    Lymphocyte % 27.9 24.0 - 44.0 %    Monocyte % 5.2 0.0 - 12.0 %    Eosinophil % 3.6 (H) 0.0 - 3.0 %    Basophil % 0.2 0.0 - 1.0 %    Immature Grans % 0.2 0.0 - 0.6 %    Neutrophils, Absolute 5.90 1.50 - 8.30 10*3/mm3    Lymphocytes, Absolute 2.61 0.60 - 4.80 10*3/mm3    Monocytes, Absolute 0.49 0.00 - 1.00 10*3/mm3    Eosinophils, Absolute 0.34 (H) 0.00 - 0.30 10*3/mm3    Basophils, Absolute 0.02 0.00 - 0.20 10*3/mm3    Immature Grans, Absolute 0.02 0.00 - 0.03 10*3/mm3   Troponin   Result Value Ref Range    Troponin I <0.006 <=0.039 ng/mL   Pregnancy, Urine - Urine, Clean Catch   Result Value Ref Range    HCG, Urine QL Negative Negative       ASSESSMENT/PLAN  Crystal was seen today for chest pain and headache.    Diagnoses and all orders for this visit:    Acute intractable headache, unspecified headache type  Comments:  Can continue to try tylenol or excedrin or ibuprofen prn; no ibuprofen for today since had toradol injection.  Orders:  -     ketorolac (TORADOL) injection 30 mg; Inject 1 mL into the appropriate muscle as directed by prescriber 1 (One) Time.  -     methylPREDNISolone acetate (DEPO-medrol) injection 40 mg; Inject 1 mL into the appropriate muscle as  directed by prescriber 1 (One) Time.    History of Churchill's esophagus  Comments:  Continue nexium 40 mg daily; avoid eating 3-4 hours before bedtime or laying flat; sleep HOB elevated.    Orders:  -     Ambulatory Referral to Gastroenterology    Chest pain, unspecified type  Comments:  low cardiac risk factors; also seen at ED for eval.    Orders:  -     ECG 12 Lead  -     MRI chest wo contrast; Future    Abnormal chest x-ray  Comments:  arthritic erosion right lung noted  Orders:  -     MRI chest wo contrast; Future    Cough  -     MRI chest wo contrast; Future    H/O tobacco use, presenting hazards to health    Neck nodule  -     XR neck soft tissue; Future    Dysphagia, unspecified type  -     XR neck soft tissue; Future  -     FL esophagram complete; Future    Nausea  -     ondansetron (ZOFRAN) 4 MG tablet; Take 1 tablet by mouth Every 8 (Eight) Hours As Needed for Nausea or Vomiting.  -     promethazine (PHENERGAN) injection 12.5 mg; Inject 0.5 mL into the appropriate muscle as directed by prescriber 1 (One) Time.    If headache does not improve, she is to contact me.  Has a hx of migraines but has been years without any problems.  Would consider triptans and then imaging if no improvement.    With hx churchill's esophagus/GERD, if she has any hematochezia, melena, or hematemesis she needs to contact our office for eval or seek emergency care.    Chest pain continues to be a continuous problem for several weeks to months.  Could be related to reoccurrence of worsening GERD and churchill's; also having feelings of choking and dysphagia.  Also with small nodule base of neck.  Will get a soft tissue xray neck, schedule for swallowing study; needs updated EGD.  CXR in ED was normal except notes a stable arthritic erosion right lung.  Pt repeatedly expresses concern that could be a cancer and history of smoking.  Will obtain chest MRI for further eval of erosions.    I discussed the patients findings and my  recommendations with patient.     Patient was encouraged to keep me informed of any acute changes, lack of improvement, or any new concerning symptoms.    Patient voiced understanding of all instructions and denied further questions.      FOLLOW-UP  Return if symptoms worsen or fail to improve.    Electronically signed by:    MAGGIE Chacon  10/19/2018

## 2018-10-22 ENCOUNTER — TELEPHONE (OUTPATIENT)
Dept: INTERNAL MEDICINE | Facility: CLINIC | Age: 38
End: 2018-10-22

## 2018-10-22 NOTE — TELEPHONE ENCOUNTER
PATIENT CALLED CONCERNING THIS MESSAGE.  INFORMED HER OF THE MESSAGE BUT SHE WOULD LIKE TO DISCUSS THE MEDICATION THAT SHE WAS PRESCRIBED. SHE  HAS BEEN TAKING THE ZOFRAN AS PRESCRIBED BUT HAS YET TO FEEL ANY RELIEF. SHE WOULD LIKE TO DISCUSS THIS FURTHER AS WELL.

## 2018-10-22 NOTE — TELEPHONE ENCOUNTER
----- Message from MAGGIE Patel sent at 10/20/2018  2:57 PM EDT -----  Please let pt know I also ordered a swallowing study for her since she was having feeling of choking sensation.  We will call her to schedule.  She can go get her regular neck xray at [facility]  and [address] [place].  I ordered the CT chest as discussed and we will call her to schedule that as well.

## 2018-10-23 ENCOUNTER — HOSPITAL ENCOUNTER (OUTPATIENT)
Dept: GENERAL RADIOLOGY | Facility: HOSPITAL | Age: 38
Discharge: HOME | End: 2018-10-23
Admitting: NURSE PRACTITIONER

## 2018-10-23 DIAGNOSIS — R22.1 NECK NODULE: ICD-10-CM

## 2018-10-23 DIAGNOSIS — R13.10 DYSPHAGIA, UNSPECIFIED TYPE: ICD-10-CM

## 2018-10-23 PROCEDURE — 70360 X-RAY EXAM OF NECK: CPT

## 2018-10-25 ENCOUNTER — TELEPHONE (OUTPATIENT)
Dept: INTERNAL MEDICINE | Facility: CLINIC | Age: 38
End: 2018-10-25

## 2018-10-25 DIAGNOSIS — R22.1 NECK NODULE: Primary | ICD-10-CM

## 2018-10-25 DIAGNOSIS — R93.89 ABNORMAL CHEST X-RAY: Primary | ICD-10-CM

## 2018-11-03 ENCOUNTER — OFFICE VISIT (OUTPATIENT)
Dept: INTERNAL MEDICINE | Facility: CLINIC | Age: 38
End: 2018-11-03

## 2018-11-03 VITALS
DIASTOLIC BLOOD PRESSURE: 84 MMHG | BODY MASS INDEX: 29.83 KG/M2 | HEIGHT: 61 IN | WEIGHT: 158 LBS | HEART RATE: 125 BPM | OXYGEN SATURATION: 98 % | SYSTOLIC BLOOD PRESSURE: 122 MMHG

## 2018-11-03 DIAGNOSIS — R93.89 ABNORMAL CHEST X-RAY: ICD-10-CM

## 2018-11-03 DIAGNOSIS — R13.10 DYSPHAGIA, UNSPECIFIED TYPE: ICD-10-CM

## 2018-11-03 DIAGNOSIS — G43.009 MIGRAINE WITHOUT AURA AND WITHOUT STATUS MIGRAINOSUS, NOT INTRACTABLE: Primary | ICD-10-CM

## 2018-11-03 DIAGNOSIS — R07.9 CHEST PAIN, UNSPECIFIED TYPE: ICD-10-CM

## 2018-11-03 DIAGNOSIS — K21.00 GASTROESOPHAGEAL REFLUX DISEASE WITH ESOPHAGITIS: ICD-10-CM

## 2018-11-03 PROCEDURE — 93000 ELECTROCARDIOGRAM COMPLETE: CPT | Performed by: NURSE PRACTITIONER

## 2018-11-03 PROCEDURE — 99214 OFFICE O/P EST MOD 30 MIN: CPT | Performed by: NURSE PRACTITIONER

## 2018-11-03 RX ORDER — SUMATRIPTAN 25 MG/1
TABLET, FILM COATED ORAL
Qty: 16 TABLET | Refills: 2 | Status: SHIPPED | OUTPATIENT
Start: 2018-11-03 | End: 2020-01-22

## 2018-11-03 NOTE — PROGRESS NOTES
Chief Complaint   Patient presents with   • Headache   • Chest Pain     x 2 weeks        History of Present Illness  38 y.o.female presents for follow up headaches and chest pain.    Headaches have continued; described as horrible pounding with associated nausea; aproximately 5 HA over last month; tried excedrin, can't take NSAIDS due GI problems; also takes topamax 150 for mood swings but does not seem to help her headaches.  Has seen neuro Dr Polanco in past for memory changes     Still has intermittent chest pain substernal with hx of GERD and tom's esophagus.  Also had a recent chest xray with an arthritic erosion noted.  No short of air or dyspnea on exertion; cough has been better.  Takes Nexium once per day; no hematemesis, hematochezia or melena.  Has seen cardiology in the past Dr. Griffin but pt stopped going; no known history of coronary disease.      Pt was recently seen ED 10-4-18 for headaches and chest pain; unremarkable exam, normal ekg, low risk wells d-dimer negative, chest xray negative.    Currently awaiting GI eval; was seen thru  for prior EGD but patient does not want to go back to that provider.      Still has feelings of dysphagia chocking sensation; neck ultrasound and esphologram pending.    Denies any changes in psych no increased anxiety; states has been told before psych may be causing symptoms but states this is not the problem.      Review of Systems   Constitutional: Negative for chills and fever.   HENT: Positive for trouble swallowing. Negative for voice change.    Respiratory: Negative for cough and shortness of breath.    Cardiovascular: Positive for chest pain. Negative for palpitations and leg swelling.   Gastrointestinal: Positive for nausea and GERD. Negative for abdominal pain, diarrhea and vomiting.   Neurological: Positive for dizziness and headache.         PMSFH  The following portions of the patient's history were reviewed and updated as appropriate: allergies, current  medications, past family history, past medical history, past social history, past surgical history and problem list.     Past Medical History:   Diagnosis Date   • Abdominal pain    • Abnormal breast exam    • Abnormal Pap smear of cervix    • Achilles tendinitis    • Acute sinusitis    • Anxiety    • Arthritis    • Asthma    • Tavera's syndrome    • Bipolar 1 disorder (CMS/HCC)    • Bowel trouble    • Breast cyst    • Colon polyps    • Constipation    • Depression    • Diverticulitis    • Endometriosis    • Eustachian tube dysfunction    • Extremity pain    • Female pelvic pain    • Gastric ulcer    • H/O bladder infections    • History of rashes as a child    • Low back pain    • Menopausal symptoms    • Muscle weakness    • Ovarian cyst    • PID (pelvic inflammatory disease)    • Recurrent UTI    • Sexual problems       Past Surgical History:   Procedure Laterality Date   •  SECTION     • HYSTERECTOMY     • NASAL ENDOSCOPY     • OOPHORECTOMY Bilateral    • OTHER SURGICAL HISTORY      Laparoscopy (diagnostic) gynecologic with biopsy   • SHOULDER SURGERY        Allergies   Allergen Reactions   • Adhesive Tape Hives   • Latex Hives   • Sulfa Antibiotics Hives   • Sulfate Hives   • Biaxin [Clarithromycin] Nausea Only   • Codeine Itching   • Penicillins Nausea Only      Family History   Problem Relation Age of Onset   • Liver disease Mother    • Diabetes Mother    • Hyperlipidemia Mother    • Hypertension Mother    • Thyroid disease Mother    • Arthritis Father    • Mental illness Father    • Migraines Sister    • Stroke Other    • Diabetes Other    • Hyperlipidemia Other    • Hypertension Other    • Mental illness Other    • Migraines Other    • Tuberculosis Other    • Breast cancer Neg Hx    • Endometrial cancer Neg Hx    • Ovarian cancer Neg Hx       Social History     Social History   • Marital status: Single     Spouse name: N/A   • Number of children: N/A   • Years of education: N/A     Occupational  History   • Not on file.     Social History Main Topics   • Smoking status: Current Every Day Smoker     Types: Cigarettes   • Smokeless tobacco: Never Used      Comment: social smoking   • Alcohol use No   • Drug use: No   • Sexual activity: Defer     Other Topics Concern   • Not on file     Social History Narrative   • No narrative on file         Current Outpatient Prescriptions:   •  albuterol (PROVENTIL HFA;VENTOLIN HFA) 108 (90 Base) MCG/ACT inhaler, Inhale 2 puffs Every 4 (Four) Hours As Needed for Wheezing., Disp: 18 g, Rfl: 11  •  beclomethasone (QVAR) 40 MCG/ACT inhaler, Inhale 2 puffs 2 (Two) Times a Day., Disp: , Rfl:   •  cetirizine (zyrTEC) 10 MG tablet, Take 1 tablet by mouth Daily., Disp: , Rfl: 1  •  cyclobenzaprine (FLEXERIL) 10 MG tablet, Take 1 tablet by mouth 3 (Three) Times a Day As Needed for Muscle Spasms., Disp: 15 tablet, Rfl: 0  •  diclofenac (VOLTAREN) 1 % gel gel, Apply 4 g topically 4 (Four) Times a Day As Needed (as needed for pain)., Disp: 1 tube, Rfl: 0  •  DULoxetine (CYMBALTA) 30 MG capsule, Take 1 capsule by mouth Daily., Disp: , Rfl: 3  •  EPIPEN 2-JAREN 0.3 MG/0.3ML solution auto-injector injection, U UTD, Disp: , Rfl: 6  •  estradiol (ESTRACE) 2 MG tablet, Take 1 tablet by mouth Daily., Disp: 30 tablet, Rfl: 5  •  fluticasone (FLONASE) 50 MCG/ACT nasal spray, 2 sprays into each nostril Daily., Disp: 1 bottle, Rfl: 3  •  gabapentin (NEURONTIN) 300 MG capsule, 1 capsule by mouth every night, Disp: 30 capsule, Rfl: 2  •  lidocaine (XYLOCAINE) 5 % ointment, , Disp: , Rfl:   •  LORazepam (ATIVAN) 1 MG tablet, TK 1 T PO D PRN, Disp: , Rfl: 0  •  MIRVASO 0.33 % gel, , Disp: , Rfl:   •  NEXIUM 40 MG capsule, Take 1 capsule by mouth Every Morning Before Breakfast., Disp: 30 capsule, Rfl: 5  •  ondansetron (ZOFRAN) 4 MG tablet, Take 1 tablet by mouth Every 8 (Eight) Hours As Needed for Nausea or Vomiting., Disp: 30 tablet, Rfl: 0  •  PATADAY 0.2 % solution ophthalmic solution, USE 1 DROP  "AEY ONCE DAILY PRN, Disp: , Rfl: 3  •  PAZEO 0.7 % solution, INSTILL 1 GTT  AEY ONCE DAILY PRN, Disp: , Rfl: 6  •  promethazine-dextromethorphan (PROMETHAZINE-DM) 6.25-15 MG/5ML syrup, Take 5 mL by mouth Every Night., Disp: 75 mL, Rfl: 0  •  topiramate (TOPAMAX) 100 MG tablet, Take 1 tablet by mouth Daily., Disp: , Rfl: 3  •  traMADol (ULTRAM) 50 MG tablet, Take 1 tablet by mouth Every 8 (Eight) Hours As Needed for Moderate Pain ., Disp: 60 tablet, Rfl: 0  •  traZODone (DESYREL) 150 MG tablet, Take 1 tablet by mouth As Needed., Disp: , Rfl: 3  •  triamcinolone (KENALOG) 0.1 % cream, , Disp: , Rfl:     VITALS:  /84   Pulse (!) 125   Ht 154.9 cm (61\")   Wt 71.7 kg (158 lb)   SpO2 98%   BMI 29.85 kg/m²     Physical Exam   Constitutional: She is oriented to person, place, and time. She appears well-developed and well-nourished. No distress.   HENT:   Head: Normocephalic.   Mouth/Throat: Oropharynx is clear and moist.   Eyes: Pupils are equal, round, and reactive to light. EOM are normal.   Neck: Neck supple.   Cardiovascular: Regular rhythm and normal heart sounds.  Tachycardia present.    Pulmonary/Chest: Effort normal and breath sounds normal. No respiratory distress.   Abdominal: Soft. Bowel sounds are normal. There is no tenderness.   Neurological: She is alert and oriented to person, place, and time.   Skin: Skin is warm and dry. Capillary refill takes less than 2 seconds. No rash noted.   Psychiatric: Her mood appears anxious.   Vitals reviewed.      ECG 12 Lead  Date/Time: 11/3/2018 2:45 PM  Performed by: MARGARET SOOD  Authorized by: MARGARET SOOD   Rhythm: sinus tachycardia  Rate: tachycardic  Conduction: conduction normal  ST Segments: ST segments normal  T Waves: T waves normal  QRS axis: normal  Clinical impression comment: borderline ekg            LABS reviewed  Results for orders placed or performed during the hospital encounter of 10/04/18   Comprehensive Metabolic Panel   Result " Value Ref Range    Glucose 112 (H) 70 - 100 mg/dL    BUN 14 9 - 23 mg/dL    Creatinine 0.72 0.60 - 1.30 mg/dL    Sodium 139 132 - 146 mmol/L    Potassium 3.7 3.5 - 5.5 mmol/L    Chloride 109 99 - 109 mmol/L    CO2 22.0 20.0 - 31.0 mmol/L    Calcium 8.7 8.7 - 10.4 mg/dL    Total Protein 6.5 5.7 - 8.2 g/dL    Albumin 4.07 3.20 - 4.80 g/dL    ALT (SGPT) 25 7 - 40 U/L    AST (SGOT) 17 0 - 33 U/L    Alkaline Phosphatase 69 25 - 100 U/L    Total Bilirubin 0.2 (L) 0.3 - 1.2 mg/dL    eGFR Non African Amer 92 >60 mL/min/1.73    Globulin 2.4 gm/dL    A/G Ratio 1.7 1.5 - 2.5 g/dL    BUN/Creatinine Ratio 19.4 7.0 - 25.0    Anion Gap 8.0 3.0 - 11.0 mmol/L   Urinalysis With Microscopic If Indicated (No Culture) - Urine, Clean Catch   Result Value Ref Range    Color, UA Yellow Yellow, Straw    Appearance, UA Clear Clear    pH, UA 6.5 5.0 - 8.0    Specific Gravity, UA 1.024 1.001 - 1.030    Glucose, UA Negative Negative    Ketones, UA Negative Negative    Bilirubin, UA Negative Negative    Blood, UA Negative Negative    Protein, UA Negative Negative    Leuk Esterase, UA Negative Negative    Nitrite, UA Negative Negative    Urobilinogen, UA 1.0 E.U./dL 0.2 - 1.0 E.U./dL   D-dimer, Quantitative   Result Value Ref Range    D-Dimer, Quantitative 0.38 0.00 - 0.50 mg/L (FEU)   BNP   Result Value Ref Range    BNP 5.0 0.0 - 100.0 pg/mL   CBC Auto Differential   Result Value Ref Range    WBC 9.36 3.50 - 10.80 10*3/mm3    RBC 4.53 3.89 - 5.14 10*6/mm3    Hemoglobin 12.7 11.5 - 15.5 g/dL    Hematocrit 39.8 34.5 - 44.0 %    MCV 87.9 80.0 - 99.0 fL    MCH 28.0 27.0 - 31.0 pg    MCHC 31.9 (L) 32.0 - 36.0 g/dL    RDW 13.7 11.3 - 14.5 %    RDW-SD 43.8 37.0 - 54.0 fl    MPV 10.4 6.0 - 12.0 fL    Platelets 279 150 - 450 10*3/mm3    Neutrophil % 63.1 41.0 - 71.0 %    Lymphocyte % 27.9 24.0 - 44.0 %    Monocyte % 5.2 0.0 - 12.0 %    Eosinophil % 3.6 (H) 0.0 - 3.0 %    Basophil % 0.2 0.0 - 1.0 %    Immature Grans % 0.2 0.0 - 0.6 %    Neutrophils,  Absolute 5.90 1.50 - 8.30 10*3/mm3    Lymphocytes, Absolute 2.61 0.60 - 4.80 10*3/mm3    Monocytes, Absolute 0.49 0.00 - 1.00 10*3/mm3    Eosinophils, Absolute 0.34 (H) 0.00 - 0.30 10*3/mm3    Basophils, Absolute 0.02 0.00 - 0.20 10*3/mm3    Immature Grans, Absolute 0.02 0.00 - 0.03 10*3/mm3   Troponin   Result Value Ref Range    Troponin I <0.006 <=0.039 ng/mL   Pregnancy, Urine - Urine, Clean Catch   Result Value Ref Range    HCG, Urine QL Negative Negative       ASSESSMENT/PLAN  Crystal was seen today for headache and chest pain.    Diagnoses and all orders for this visit:    Migraine without aura and without status migrainosus, not intractable  -     SUMAtriptan (IMITREX) 25 MG tablet; Take one tablet at onset of headache. May repeat dose one time in 2 hours if headache not relieved.    Gastroesophageal reflux disease with esophagitis  Comments:  follow up on GI referral  Orders:  -     NEXIUM 40 MG capsule; Take 1 capsule by mouth Every Morning Before Breakfast. Increase to 2 times per day prn chest pain indigestion    Chest pain, unspecified type  -     NEXIUM 40 MG capsule; Take 1 capsule by mouth Every Morning Before Breakfast. Increase to 2 times per day prn chest pain indigestion  -     Ambulatory Referral to Cardiology    Abnormal chest x-ray  Comments:  CT chest denied per insurance; awaiting pulm referral.  chest xray with arthritic erosion noted; unsure of any significance.    Dysphagia, unspecified type  Comments:  keep appt for ultrasound and espalogram; neck xray negative    If no improvement in headaches, will consider neurology referral.  Has seen Dr. Polanco in past.  Patient advised that Imitrex should not be taken for more than 2 headache episodes per week.  Can also use zofran for nausea.    While awaiting GI referral, will try increased nexium to see if helps her c/o chest pain.  Even though low cardiac risk factors and her EKG remains unchanged, will also have her see cardiology to make sure  no other cardiovascular concerns.    Patient's anxiety may be playing a factor with symptoms.    I discussed the patients findings and my recommendations with patient.     Patient was encouraged to keep me informed of any acute changes, lack of improvement, or any new concerning symptoms.    Patient voiced understanding of all instructions and denied further questions.      FOLLOW-UP  Return if symptoms worsen or fail to improve.    Electronically signed by:    MAGGIE Chacon  11/03/2018

## 2018-11-06 ENCOUNTER — APPOINTMENT (OUTPATIENT)
Dept: ULTRASOUND IMAGING | Facility: HOSPITAL | Age: 38
End: 2018-11-06

## 2018-11-12 ENCOUNTER — HOSPITAL ENCOUNTER (OUTPATIENT)
Dept: ULTRASOUND IMAGING | Facility: HOSPITAL | Age: 38
Discharge: HOME | End: 2018-11-12
Admitting: NURSE PRACTITIONER

## 2018-11-12 DIAGNOSIS — R22.1 NECK NODULE: ICD-10-CM

## 2018-11-12 PROCEDURE — 76536 US EXAM OF HEAD AND NECK: CPT

## 2018-11-15 ENCOUNTER — OFFICE VISIT (OUTPATIENT)
Dept: PULMONOLOGY | Facility: CLINIC | Age: 38
End: 2018-11-15

## 2018-11-15 VITALS
OXYGEN SATURATION: 96 % | SYSTOLIC BLOOD PRESSURE: 120 MMHG | RESPIRATION RATE: 16 BRPM | DIASTOLIC BLOOD PRESSURE: 80 MMHG | WEIGHT: 191.2 LBS | TEMPERATURE: 98.1 F | HEART RATE: 103 BPM | BODY MASS INDEX: 36.1 KG/M2 | HEIGHT: 61 IN

## 2018-11-15 DIAGNOSIS — K21.00 GASTROESOPHAGEAL REFLUX DISEASE WITH ESOPHAGITIS: ICD-10-CM

## 2018-11-15 DIAGNOSIS — M79.7 FIBROMYALGIA: ICD-10-CM

## 2018-11-15 DIAGNOSIS — R06.02 SHORTNESS OF BREATH: ICD-10-CM

## 2018-11-15 DIAGNOSIS — R91.8 LUNG NODULES: ICD-10-CM

## 2018-11-15 DIAGNOSIS — F31.9 BIPOLAR AFFECTIVE DISORDER, REMISSION STATUS UNSPECIFIED (HCC): ICD-10-CM

## 2018-11-15 DIAGNOSIS — R06.02 SOB (SHORTNESS OF BREATH): Primary | ICD-10-CM

## 2018-11-15 PROCEDURE — 94726 PLETHYSMOGRAPHY LUNG VOLUMES: CPT | Performed by: NURSE PRACTITIONER

## 2018-11-15 PROCEDURE — 94729 DIFFUSING CAPACITY: CPT | Performed by: NURSE PRACTITIONER

## 2018-11-15 PROCEDURE — 99214 OFFICE O/P EST MOD 30 MIN: CPT | Performed by: NURSE PRACTITIONER

## 2018-11-15 PROCEDURE — 94375 RESPIRATORY FLOW VOLUME LOOP: CPT | Performed by: NURSE PRACTITIONER

## 2018-11-15 NOTE — PROGRESS NOTES
"[facility] Pulmonary Follow up    CHIEF COMPLAINT    Dyspnea    HISTORY OF PRESENT ILLNESS    Megan Post is a 38 y.o.female here today for dyspnea and chest pain.    Initially she was seen by Dr. Pichardo in May 2017 for atypical chest pain and episodic dyspnea.  She does have a history of asthma but denied any wheezing.  She had been on Dulera, breo, flovent in the past.  At that time he recommended a methacholine challenge test to evaluate her asthma, she has declined.  I followed up with her in June 2018 for continued worsening dyspnea.  At that time she was having some worsening chest pressure that we felt possibly related to her chronic reflux.  And she was to follow-up with gastroenterology.    She has also had an evaluation with cardiology which was negative.  She does follow-up next week for preoperative evaluation for her EGD with a stress test.    Today she comes in for her worsening dyspnea again.  She continues to have several episodes where she does \"cannot breathe\" her albuterol rescue inhaler does not resolve the symptoms.  She does continue to complain of atypical chest pain including substernal pain and more progressive on the right.  She denies any cough or sputum production.  She does complain of some hoarseness.  She does continue to use her Qvar twice daily.    She does have a history of an CT scan in May 2017 that showed some calcified granulomas and mild atelectasis.  No acute changes nodules or masses.    Patient Active Problem List   Diagnosis   • Allergic rhinitis   • Tavera's esophagus   • Bipolar disorder (CMS/[facility])   • Fibromyalgia   • Gastroesophageal reflux disease   • Insomnia   • Abdominal wall abscess   • Acute recurrent frontal sinusitis   • Spasm   • Right upper quadrant pain   • Uncomplicated asthma   • Shortness of breath   • Atypical chest pain   • Lung nodules - calcified granulomas   • Atelectasis   • Hypercalcemia   • Displacement of intervertebral disc of lumbosacral region "   • Lumbar discogenic pain syndrome   • Spondylosis of lumbar region without myelopathy or radiculopathy   • Memory loss       Allergies   Allergen Reactions   • Adhesive Tape Hives   • Latex Hives   • Sulfa Antibiotics Hives   • Sulfate Hives   • Biaxin [Clarithromycin] Nausea Only   • Codeine Itching   • Penicillins Nausea Only       Current Outpatient Medications:   •  albuterol (PROVENTIL HFA;VENTOLIN HFA) 108 (90 Base) MCG/ACT inhaler, Inhale 2 puffs Every 4 (Four) Hours As Needed for Wheezing., Disp: 18 g, Rfl: 11  •  cetirizine (zyrTEC) 10 MG tablet, Take 1 tablet by mouth Daily., Disp: , Rfl: 1  •  cyclobenzaprine (FLEXERIL) 10 MG tablet, Take 1 tablet by mouth 3 (Three) Times a Day As Needed for Muscle Spasms., Disp: 15 tablet, Rfl: 0  •  diclofenac (VOLTAREN) 1 % gel gel, Apply 4 g topically 4 (Four) Times a Day As Needed (as needed for pain)., Disp: 1 tube, Rfl: 0  •  DULoxetine (CYMBALTA) 30 MG capsule, Take 1 capsule by mouth Daily., Disp: , Rfl: 3  •  EPIPEN 2-JAREN 0.3 MG/0.3ML solution auto-injector injection, U UTD, Disp: , Rfl: 6  •  estradiol (ESTRACE) 2 MG tablet, Take 1 tablet by mouth Daily., Disp: 30 tablet, Rfl: 5  •  fluticasone (FLONASE) 50 MCG/ACT nasal spray, 2 sprays into each nostril Daily., Disp: 1 bottle, Rfl: 3  •  gabapentin (NEURONTIN) 300 MG capsule, 1 capsule by mouth every night, Disp: 30 capsule, Rfl: 2  •  lidocaine (XYLOCAINE) 5 % ointment, , Disp: , Rfl:   •  LORazepam (ATIVAN) 1 MG tablet, TK 1 T PO D PRN, Disp: , Rfl: 0  •  NEXIUM 40 MG capsule, Take 1 capsule by mouth Every Morning Before Breakfast. Increase to 2 times per day prn chest pain indigestion, Disp: 60 capsule, Rfl: 5  •  ondansetron (ZOFRAN) 4 MG tablet, Take 1 tablet by mouth Every 8 (Eight) Hours As Needed for Nausea or Vomiting., Disp: 30 tablet, Rfl: 0  •  PATADAY 0.2 % solution ophthalmic solution, USE 1 DROP AEY ONCE DAILY PRN, Disp: , Rfl: 3  •  PAZEO 0.7 % solution, INSTILL 1 GTT  AEY ONCE DAILY PRN,  "Disp: , Rfl: 6  •  promethazine-dextromethorphan (PROMETHAZINE-DM) 6.25-15 MG/5ML syrup, Take 5 mL by mouth Every Night., Disp: 75 mL, Rfl: 0  •  SUMAtriptan (IMITREX) 25 MG tablet, Take one tablet at onset of headache. May repeat dose one time in 2 hours if headache not relieved., Disp: 16 tablet, Rfl: 2  •  topiramate (TOPAMAX) 100 MG tablet, Take 1 tablet by mouth Daily., Disp: , Rfl: 3  •  traMADol (ULTRAM) 50 MG tablet, Take 1 tablet by mouth Every 8 (Eight) Hours As Needed for Moderate Pain ., Disp: 60 tablet, Rfl: 0  •  traZODone (DESYREL) 150 MG tablet, Take 1 tablet by mouth As Needed., Disp: , Rfl: 3  •  triamcinolone (KENALOG) 0.1 % cream, , Disp: , Rfl:   MEDICATION LIST AND ALLERGIES REVIEWED.    Social History     Tobacco Use   • Smoking status: Current Every Day Smoker     Types: Cigarettes   • Smokeless tobacco: Never Used   • Tobacco comment: social smoking   Substance Use Topics   • Alcohol use: No   • Drug use: No       FAMILY AND SOCIAL HISTORY REVIEWED.    Review of Systems   Constitutional: Positive for fatigue. Negative for chills, fever and unexpected weight change.   HENT: Positive for voice change. Negative for congestion, nosebleeds, postnasal drip, rhinorrhea, sinus pressure and trouble swallowing.    Respiratory: Positive for chest tightness and shortness of breath. Negative for cough and wheezing.    Cardiovascular: Positive for chest pain. Negative for leg swelling.   Gastrointestinal: Negative for abdominal pain, constipation, diarrhea, nausea and vomiting.   Genitourinary: Negative for dysuria, frequency, hematuria and urgency.   Musculoskeletal: Positive for arthralgias and myalgias.   Neurological: Negative for dizziness, weakness, numbness and headaches.   All other systems reviewed and are negative.  .    /80   Pulse 103   Temp 98.1 °F (36.7 °C)   Resp 16   Ht 154.9 cm (61\")   Wt 86.7 kg (191 lb 3.2 oz)   SpO2 96% Comment: RA  BMI 36.13 kg/m²     Immunization History "   Administered Date(s) Administered   • Flu Vaccine Quad PF >36MO 11/02/2017       Physical Exam   Constitutional: She is oriented to person, place, and time. She appears well-developed and well-nourished.   HENT:   Head: Normocephalic and atraumatic.   Eyes: EOM are normal. Pupils are equal, round, and reactive to light.   Neck: Normal range of motion. Neck supple.   Cardiovascular: Normal rate and regular rhythm.   No murmur heard.  Pulmonary/Chest: Effort normal and breath sounds normal. No respiratory distress. She has no wheezes. She has no rales.   Abdominal: Soft. Bowel sounds are normal. She exhibits no distension.   Musculoskeletal: Normal range of motion. She exhibits no edema.   Neurological: She is alert and oriented to person, place, and time.   Skin: Skin is warm and dry. No erythema.   Psychiatric: She has a normal mood and affect. Her behavior is normal.   Vitals reviewed.        RESULTS    PFTS in the office today, read by me:  No obstruction or restriction, normal PFTs    CXR 10/4/18  FINDINGS:    Unchanged arthritic erosion the RIGHT lung. Otherwise unremarkable appearing   lungs and mediastinum. No effusion or pneumothorax. Cardiomediastinal   silhouette is normal.     IMPRESSION:    Unremarkable study without an active lung lesion.    PROBLEM LIST    Problem List Items Addressed This Visit        Respiratory    Shortness of breath    Lung nodules - calcified granulomas       Digestive    Gastroesophageal reflux disease       Nervous and Auditory    Fibromyalgia       Other    Bipolar disorder (CMS/HCC)      Other Visit Diagnoses     SOB (shortness of breath)    -  Primary    Relevant Orders    Pulmonary Function Test (Completed)    CT Chest Without Contrast            DISCUSSION    At this time do not identify any pulmonary cause of her chest pain.  Her dyspnea and episodic shortness of air is not alleviated by albuterol.  She has no evidence of restrictive airway disease on her PFTs.  She's  been offered methacholine challenge or CPX in the past to evaluate any evidence of asthma and has deferred.  She is concerned over the chest x-ray that was done on October 4, as noted above, we reviewed her chest x-ray in the office today.  She has some evidence of old granulomatous disease in the right, otherwise no findings or changes.  I assured her that there was actually no erosion in her lung, I'll have radiology follow-up for correction of this dictation.  She would like to follow-up on a CT scan to assure that there is no changes.    Continue follow-up with gastric neurology to rule out a GI source of her chest pain.    Continued follow-up with cardiology.    I spent 25 minutes with the patient and family. I spent > 50% percent of this time counseling and discussing diagnostic testing, evaluation, current status, treatment options and management.    MAGGIE Hassan  11/15/98964:01 PM  Electronically signed     Please note that portions of this note were completed with a voice recognition program. Efforts were made to edit the dictations, but occasionally words are mistranscribed.      CC: Tina Wiley APRN

## 2018-11-16 ENCOUNTER — TELEPHONE (OUTPATIENT)
Dept: INTERNAL MEDICINE | Facility: CLINIC | Age: 38
End: 2018-11-16

## 2018-11-17 DIAGNOSIS — R22.1 NECK NODULE: Primary | ICD-10-CM

## 2018-11-20 ENCOUNTER — OFFICE VISIT (OUTPATIENT)
Dept: INTERNAL MEDICINE | Facility: CLINIC | Age: 38
End: 2018-11-20

## 2018-11-20 VITALS
TEMPERATURE: 99.1 F | BODY MASS INDEX: 36.06 KG/M2 | SYSTOLIC BLOOD PRESSURE: 116 MMHG | HEART RATE: 124 BPM | HEIGHT: 61 IN | WEIGHT: 191 LBS | OXYGEN SATURATION: 96 % | DIASTOLIC BLOOD PRESSURE: 78 MMHG

## 2018-11-20 DIAGNOSIS — J01.00 ACUTE MAXILLARY SINUSITIS, RECURRENCE NOT SPECIFIED: Primary | ICD-10-CM

## 2018-11-20 DIAGNOSIS — R68.89 FLU-LIKE SYMPTOMS: ICD-10-CM

## 2018-11-20 DIAGNOSIS — F17.200 TOBACCO DEPENDENCE: ICD-10-CM

## 2018-11-20 PROCEDURE — 99214 OFFICE O/P EST MOD 30 MIN: CPT | Performed by: NURSE PRACTITIONER

## 2018-11-20 PROCEDURE — 87804 INFLUENZA ASSAY W/OPTIC: CPT | Performed by: NURSE PRACTITIONER

## 2018-11-20 RX ORDER — FLUCONAZOLE 150 MG/1
TABLET ORAL
Qty: 2 TABLET | Refills: 0 | Status: SHIPPED | OUTPATIENT
Start: 2018-11-20 | End: 2019-01-23

## 2018-11-20 RX ORDER — NICOTINE 21 MG/24HR
1 PATCH, TRANSDERMAL 24 HOURS TRANSDERMAL EVERY 24 HOURS
Qty: 28 EACH | Refills: 11 | Status: SHIPPED | OUTPATIENT
Start: 2018-11-20 | End: 2019-10-15 | Stop reason: DRUGHIGH

## 2018-11-20 RX ORDER — DOXYCYCLINE 100 MG/1
100 CAPSULE ORAL 2 TIMES DAILY
Qty: 20 CAPSULE | Refills: 0 | Status: SHIPPED | OUTPATIENT
Start: 2018-11-20 | End: 2018-11-30

## 2018-11-20 NOTE — PROGRESS NOTES
CHIEF COMPLAINT  Chief Complaint   Patient presents with   • Sinusitis   • Ear Drainage   • Cough       HPI  Crystal A Day is a 38 y.o. female  presents with complaint of    1 week history of sinus pressure/pain, nasal congestion w/o d/c, ear fullness, PND, mild cough, irritated throat, fatigue, decreased appetite, feels feverish, chills, mild body aches    Would also like to try nicoderm patches to help her stop smoking    Review of Systems   Denies chest tightness, wheezing, SOA, n/v/d, abdominal  pain, urinary symptoms    Pertinent ROS noted in HPI    Past Medical History:   Diagnosis Date   • Abdominal pain    • Abnormal breast exam    • Abnormal Pap smear of cervix    • Achilles tendinitis    • Acute sinusitis    • Anxiety    • Arthritis    • Asthma    • Tavera's syndrome    • Bipolar 1 disorder (CMS/HCC)    • Bowel trouble    • Breast cyst    • Colon polyps    • Constipation    • Depression    • Diverticulitis    • Endometriosis    • Eustachian tube dysfunction    • Extremity pain    • Female pelvic pain    • Gastric ulcer    • H/O bladder infections    • History of rashes as a child    • Low back pain    • Menopausal symptoms    • Muscle weakness    • Ovarian cyst    • PID (pelvic inflammatory disease)    • Recurrent UTI    • Sexual problems        Family History   Problem Relation Age of Onset   • Liver disease Mother    • Diabetes Mother    • Hyperlipidemia Mother    • Hypertension Mother    • Thyroid disease Mother    • Arthritis Father    • Mental illness Father    • Migraines Sister    • Stroke Other    • Diabetes Other    • Hyperlipidemia Other    • Hypertension Other    • Mental illness Other    • Migraines Other    • Tuberculosis Other    • Breast cancer Neg Hx    • Endometrial cancer Neg Hx    • Ovarian cancer Neg Hx        Social History     Socioeconomic History   • Marital status: Single     Spouse name: Not on file   • Number of children: Not on file   • Years of education: Not on file   •  "Highest education level: Not on file   Social Needs   • Financial resource strain: Not on file   • Food insecurity - worry: Not on file   • Food insecurity - inability: Not on file   • Transportation needs - medical: Not on file   • Transportation needs - non-medical: Not on file   Occupational History   • Not on file   Tobacco Use   • Smoking status: Current Every Day Smoker     Types: Cigarettes   • Smokeless tobacco: Never Used   • Tobacco comment: nicotine patches for smoking cessation   Substance and Sexual Activity   • Alcohol use: No   • Drug use: No   • Sexual activity: Defer   Other Topics Concern   • Not on file   Social History Narrative   • Not on file         /78   Pulse (!) 124   Temp 99.1 °F (37.3 °C)   Ht 154.9 cm (61\")   Wt 86.6 kg (191 lb)   SpO2 96%   Breastfeeding? No   BMI 36.09 kg/m²     PHYSICAL EXAM  Physical Exam   Constitutional: She is oriented to person, place, and time. She appears well-developed and well-nourished.   HENT:   Head: Normocephalic and atraumatic.   HENT-  -bilat TMs are flat, dull, cloudy, no erythema  -nasal mucosa is erythematous and swollne  -bilateral maxillary sinus tenderness  -oropharynx is mildly erythematous with large amt of clear PND, cobblestoning, no exudate   Eyes: Conjunctivae are normal.   Neck: Trachea normal and normal range of motion. Neck supple. No JVD present. No thyromegaly present.   Cardiovascular: Normal rate, regular rhythm and normal heart sounds.   No murmur heard.  No swelling in BLE   Pulmonary/Chest:   NAD, CTA in all lobes   Lymphadenopathy:   Bilateral ant cervical adenopathy w/tenderness   Neurological: She is alert and oriented to person, place, and time.   Skin: Skin is warm, dry and intact.   Vitals reviewed.      Results for orders placed or performed in visit on 11/20/18   POCT Influenza A/B   Result Value Ref Range    Rapid Influenza A Ag neg Negative    Rapid Influenza B Ag neg Negative    Internal Control Passed " Passed    Lot Number [id number]     Expiration Date 11/8/20        ASSESSMENT/PLAN  1. Acute maxillary sinusitis, recurrence not specified  - doxycycline (MONODOX) 100 MG capsule; Take 1 capsule by mouth 2 (Two) Times a Day for 10 days.  Dispense: 20 capsule; Refill: 0  - fluconazole (DIFLUCAN) 150 MG tablet; 1 tablet now and 1 tablet in 5 days  Dispense: 2 tablet; Refill: 0    2. Tobacco dependence  - nicotine (NICODERM CQ) 21 MG/24HR patch; Place 1 patch on the skin as directed by provider Daily.  Dispense: 28 each; Refill: 11    3. Flu-like symptoms  - POCT Influenza A/B    Plan: abx for sinus infection; she is to continue flonase and zyrtec, increase water intake; tylenol/ibuprofen for fever/pain; warm salt water gargles, rest; discussed willingness to stop smoking and how to apply/change nicotine patch daily--do not smoke while wearing nicotine patch; instructed to f/u if no improvement of URI symptoms in 5-7 days    FOLLOW-UP  Next scheduled f/u; sooner as needed if no improvement or worsening of symptoms    Patient verbalizes understanding of medication dosage, comfort measures, instructions for treatment and follow-up.    Tina Wiley, MAGGIE  11/20/2018  6:37 PM

## 2018-11-21 ENCOUNTER — TELEPHONE (OUTPATIENT)
Dept: INTERNAL MEDICINE | Facility: CLINIC | Age: 38
End: 2018-11-21

## 2018-11-21 NOTE — TELEPHONE ENCOUNTER
PT WANTS YOU TO CALL IN [employer] FOR HER, HER INSURANCE WOULD NOT PAY FOR THE PATCHES  PT USES [address]    PT NUMBER IF YOU HAVE ANY QUESTIONS [phone]

## 2018-12-03 ENCOUNTER — OFFICE VISIT (OUTPATIENT)
Dept: INTERNAL MEDICINE | Facility: CLINIC | Age: 38
End: 2018-12-03

## 2018-12-03 VITALS
OXYGEN SATURATION: 99 % | WEIGHT: 191 LBS | DIASTOLIC BLOOD PRESSURE: 78 MMHG | SYSTOLIC BLOOD PRESSURE: 126 MMHG | BODY MASS INDEX: 36.06 KG/M2 | HEART RATE: 109 BPM | HEIGHT: 61 IN

## 2018-12-03 DIAGNOSIS — M51.26 LUMBAR DISCOGENIC PAIN SYNDROME: ICD-10-CM

## 2018-12-03 DIAGNOSIS — M26.622 ARTHRALGIA OF LEFT TEMPOROMANDIBULAR JOINT: Primary | ICD-10-CM

## 2018-12-03 DIAGNOSIS — R59.9 SWOLLEN LYMPH NODES: ICD-10-CM

## 2018-12-03 DIAGNOSIS — M47.816 SPONDYLOSIS OF LUMBAR REGION WITHOUT MYELOPATHY OR RADICULOPATHY: ICD-10-CM

## 2018-12-03 PROCEDURE — 99214 OFFICE O/P EST MOD 30 MIN: CPT | Performed by: NURSE PRACTITIONER

## 2018-12-03 RX ORDER — GABAPENTIN 300 MG/1
CAPSULE ORAL
Qty: 30 CAPSULE | Refills: 2 | Status: SHIPPED | OUTPATIENT
Start: 2018-12-03 | End: 2019-01-23 | Stop reason: SDUPTHER

## 2018-12-03 RX ORDER — CYCLOBENZAPRINE HCL 5 MG
5 TABLET ORAL 3 TIMES DAILY PRN
Qty: 21 TABLET | Refills: 1 | Status: SHIPPED | OUTPATIENT
Start: 2018-12-03 | End: 2019-01-23

## 2018-12-03 NOTE — PROGRESS NOTES
CHIEF COMPLAINT  Chief Complaint   Patient presents with   • Jaw Pain     Swollen knot.    • Med Refill     gabapentin       HPI  Crystal A Day is a 38 y.o. female  presents with complaint of pain in jaw    7-10 day hx of left TM joint, she noticed knot on the same jaw today; neg for pain with chewing or headache, no recent dental procedures or other dental pain; needs refill of gabapentin today for spondylosis of lumbar myelopathy or radiculopathy    Review of Systems   Denies fever, chills, fatigue, decreased appetite, h/a, congestion, sinus  pressure/pain, ear pain/fullness, sore throat, PND, cough, chest tightness, wheezing,  SOA, body aches, n/v/d, abdominal  pain, urinary symptoms    Pertinent ROS noted in HPI    Past Medical History:   Diagnosis Date   • Abdominal pain    • Abnormal breast exam    • Abnormal Pap smear of cervix    • Achilles tendinitis    • Acute sinusitis    • Anxiety    • Arthritis    • Asthma    • Tavera's syndrome    • Bipolar 1 disorder (CMS/HCC)    • Bowel trouble    • Breast cyst    • Colon polyps    • Constipation    • Depression    • Diverticulitis    • Endometriosis    • Eustachian tube dysfunction    • Extremity pain    • Female pelvic pain    • Gastric ulcer    • H/O bladder infections    • History of rashes as a child    • Low back pain    • Menopausal symptoms    • Muscle weakness    • Ovarian cyst    • PID (pelvic inflammatory disease)    • Recurrent UTI    • Sexual problems        Family History   Problem Relation Age of Onset   • Liver disease Mother    • Diabetes Mother    • Hyperlipidemia Mother    • Hypertension Mother    • Thyroid disease Mother    • Arthritis Father    • Mental illness Father    • Migraines Sister    • Stroke Other    • Diabetes Other    • Hyperlipidemia Other    • Hypertension Other    • Mental illness Other    • Migraines Other    • Tuberculosis Other    • Breast cancer Neg Hx    • Endometrial cancer Neg Hx    • Ovarian cancer Neg Hx        Social  "History     Socioeconomic History   • Marital status: Single     Spouse name: Not on file   • Number of children: Not on file   • Years of education: Not on file   • Highest education level: Not on file   Social Needs   • Financial resource strain: Not on file   • Food insecurity - worry: Not on file   • Food insecurity - inability: Not on file   • Transportation needs - medical: Not on file   • Transportation needs - non-medical: Not on file   Occupational History   • Not on file   Tobacco Use   • Smoking status: Current Every Day Smoker     Types: Cigarettes   • Smokeless tobacco: Never Used   • Tobacco comment: nicotine patches for smoking cessation   Substance and Sexual Activity   • Alcohol use: No   • Drug use: No   • Sexual activity: Defer   Other Topics Concern   • Not on file   Social History Narrative   • Not on file         /78   Pulse 109   Ht 154.9 cm (61\")   Wt 86.6 kg (191 lb)   SpO2 99%   BMI 36.09 kg/m²     PHYSICAL EXAM  Physical Exam   Constitutional: She is oriented to person, place, and time. She appears well-developed and well-nourished.   HENT:   Head: Normocephalic and atraumatic.       Eyes: Conjunctivae are normal.   Neck: Trachea normal and normal range of motion. Neck supple. No JVD present. No thyromegaly present.   Cardiovascular: Normal rate, regular rhythm and normal heart sounds.   No murmur heard.  No swelling in BLE   Pulmonary/Chest: Effort normal and breath sounds normal.   Musculoskeletal:        Lumbar back: She exhibits decreased range of motion and pain. She exhibits no swelling.   Neurological: She is alert and oriented to person, place, and time.   Skin: Skin is warm, dry and intact.   Vitals reviewed.      ASSESSMENT/PLAN  1. Arthralgia of left temporomandibular joint  - cyclobenzaprine (FLEXERIL) 5 MG tablet; Take 1 tablet by mouth 3 (Three) Times a Day As Needed for Muscle Spasms.  Dispense: 21 tablet; Refill: 1    2. Swollen lymph nodes  - CBC & " Differential    3. Spondylosis of lumbar region without myelopathy or radiculopathy  - gabapentin (NEURONTIN) 300 MG capsule; 1 capsule by mouth every night  Dispense: 30 capsule; Refill: 2    4. Lumbar discogenic pain syndrome  - gabapentin (NEURONTIN) 300 MG capsule; 1 capsule by mouth every night  Dispense: 30 capsule; Refill: 2    Judicious and appropriate use of gabapentin.  JOSELYN was reviewed and appropriate.  The patient has read and signed the [facility] Controlled Substance Contract 8/22/18. Patient has been counseled and is aware of side effects, risks, potential for addiction/tolerance, interactions, and how to take medication correctly.  Urine drug  screen obtained today  Patient verbalizes understanding of contract and the   importance of adhering to the contract signed today.      Plan: muscle relaxer for TM joint pain, ibuprofen as directed for pain; refill of gabapentin given today      FOLLOW-UP  Next scheduled f/u; sooner as needed if no improvement or worsening of symptoms    Patient verbalizes understanding of medication dosage, comfort measures, instructions for treatment and follow-up.    MAGGIE Gómez  12/03/2018  5:09 PM

## 2018-12-06 ENCOUNTER — OFFICE VISIT (OUTPATIENT)
Dept: INTERNAL MEDICINE | Facility: CLINIC | Age: 38
End: 2018-12-06

## 2018-12-06 VITALS
HEART RATE: 98 BPM | TEMPERATURE: 99.8 F | HEIGHT: 61 IN | DIASTOLIC BLOOD PRESSURE: 80 MMHG | WEIGHT: 191 LBS | OXYGEN SATURATION: 98 % | SYSTOLIC BLOOD PRESSURE: 124 MMHG | BODY MASS INDEX: 36.06 KG/M2

## 2018-12-06 DIAGNOSIS — J06.9 UPPER RESPIRATORY TRACT INFECTION, UNSPECIFIED TYPE: Primary | ICD-10-CM

## 2018-12-06 PROCEDURE — 99214 OFFICE O/P EST MOD 30 MIN: CPT | Performed by: NURSE PRACTITIONER

## 2018-12-06 PROCEDURE — 87804 INFLUENZA ASSAY W/OPTIC: CPT | Performed by: NURSE PRACTITIONER

## 2018-12-06 RX ORDER — BROMPHENIRAMINE MALEATE, PSEUDOEPHEDRINE HYDROCHLORIDE, AND DEXTROMETHORPHAN HYDROBROMIDE 2; 30; 10 MG/5ML; MG/5ML; MG/5ML
10 SYRUP ORAL 4 TIMES DAILY PRN
Qty: 118 ML | Refills: 0 | Status: SHIPPED | OUTPATIENT
Start: 2018-12-06 | End: 2019-01-23

## 2018-12-06 RX ORDER — ALBUTEROL SULFATE 90 UG/1
2 AEROSOL, METERED RESPIRATORY (INHALATION) EVERY 4 HOURS PRN
Qty: 18 G | Refills: 11 | Status: SHIPPED | OUTPATIENT
Start: 2018-12-06 | End: 2020-06-13

## 2018-12-06 NOTE — PROGRESS NOTES
Chief Complaint   Patient presents with   • Nasal Congestion     with cough       History of Present Illness  38 y.o.female presents for URI symptoms    C/o rhinorrhea sneezing cough since Tuesdaywith fever; not getting better with home care.  No shortness of air.  Positive sick contacts with grandbaby rsv.     Review of Systems   Constitutional: Positive for fever. Negative for chills.   HENT: Positive for congestion, postnasal drip, rhinorrhea, sinus pressure, sneezing and sore throat.    Respiratory: Positive for cough. Negative for shortness of breath.          PMSFH  The following portions of the patient's history were reviewed and updated as appropriate: allergies, current medications, past family history, past medical history, past social history, past surgical history and problem list.     Past Medical History:   Diagnosis Date   • Abdominal pain    • Abnormal breast exam    • Abnormal Pap smear of cervix    • Achilles tendinitis    • Acute sinusitis    • Anxiety    • Arthritis    • Asthma    • Tavera's syndrome    • Bipolar 1 disorder (CMS/HCC)    • Bowel trouble    • Breast cyst    • Colon polyps    • Constipation    • Depression    • Diverticulitis    • Endometriosis    • Eustachian tube dysfunction    • Extremity pain    • Female pelvic pain    • Gastric ulcer    • H/O bladder infections    • History of rashes as a child    • Low back pain    • Menopausal symptoms    • Muscle weakness    • Ovarian cyst    • PID (pelvic inflammatory disease)    • Recurrent UTI    • Sexual problems       Past Surgical History:   Procedure Laterality Date   •  SECTION     • HYSTERECTOMY     • NASAL ENDOSCOPY     • OOPHORECTOMY Bilateral    • OTHER SURGICAL HISTORY      Laparoscopy (diagnostic) gynecologic with biopsy   • SHOULDER SURGERY        Allergies   Allergen Reactions   • Adhesive Tape Hives   • Latex Hives   • Sulfa Antibiotics Hives   • Sulfate Hives   • Biaxin [Clarithromycin] Nausea Only   • Codeine  Itching   • Penicillins Nausea Only      Family History   Problem Relation Age of Onset   • Liver disease Mother    • Diabetes Mother    • Hyperlipidemia Mother    • Hypertension Mother    • Thyroid disease Mother    • Arthritis Father    • Mental illness Father    • Migraines Sister    • Stroke Other    • Diabetes Other    • Hyperlipidemia Other    • Hypertension Other    • Mental illness Other    • Migraines Other    • Tuberculosis Other    • Breast cancer Neg Hx    • Endometrial cancer Neg Hx    • Ovarian cancer Neg Hx       Social History     Socioeconomic History   • Marital status: Single     Spouse name: Not on file   • Number of children: Not on file   • Years of education: Not on file   • Highest education level: Not on file   Social Needs   • Financial resource strain: Not on file   • Food insecurity - worry: Not on file   • Food insecurity - inability: Not on file   • Transportation needs - medical: Not on file   • Transportation needs - non-medical: Not on file   Occupational History   • Not on file   Tobacco Use   • Smoking status: Current Every Day Smoker     Types: Cigarettes   • Smokeless tobacco: Never Used   • Tobacco comment: nicotine patches for smoking cessation   Substance and Sexual Activity   • Alcohol use: No   • Drug use: No   • Sexual activity: Defer   Other Topics Concern   • Not on file   Social History Narrative   • Not on file         Current Outpatient Medications:   •  albuterol (PROVENTIL HFA;VENTOLIN HFA) 108 (90 Base) MCG/ACT inhaler, Inhale 2 puffs Every 4 (Four) Hours As Needed for Wheezing., Disp: 18 g, Rfl: 11  •  cetirizine (zyrTEC) 10 MG tablet, Take 1 tablet by mouth Daily., Disp: , Rfl: 1  •  cyclobenzaprine (FLEXERIL) 5 MG tablet, Take 1 tablet by mouth 3 (Three) Times a Day As Needed for Muscle Spasms., Disp: 21 tablet, Rfl: 1  •  diclofenac (VOLTAREN) 1 % gel gel, Apply 4 g topically 4 (Four) Times a Day As Needed (as needed for pain)., Disp: 1 tube, Rfl: 0  •   DULoxetine (CYMBALTA) 30 MG capsule, Take 1 capsule by mouth Daily., Disp: , Rfl: 3  •  EPIPEN 2-JAREN 0.3 MG/0.3ML solution auto-injector injection, U UTD, Disp: , Rfl: 6  •  estradiol (ESTRACE) 2 MG tablet, Take 1 tablet by mouth Daily., Disp: 30 tablet, Rfl: 5  •  fluconazole (DIFLUCAN) 150 MG tablet, 1 tablet now and 1 tablet in 5 days, Disp: 2 tablet, Rfl: 0  •  fluticasone (FLONASE) 50 MCG/ACT nasal spray, 2 sprays into each nostril Daily., Disp: 1 bottle, Rfl: 3  •  gabapentin (NEURONTIN) 300 MG capsule, 1 capsule by mouth every night, Disp: 30 capsule, Rfl: 2  •  lidocaine (XYLOCAINE) 5 % ointment, , Disp: , Rfl:   •  LORazepam (ATIVAN) 1 MG tablet, TK 1 T PO D PRN, Disp: , Rfl: 0  •  NEXIUM 40 MG capsule, Take 1 capsule by mouth Every Morning Before Breakfast. Increase to 2 times per day prn chest pain indigestion, Disp: 60 capsule, Rfl: 5  •  nicotine (NICODERM CQ) 21 MG/24HR patch, Place 1 patch on the skin as directed by provider Daily., Disp: 28 each, Rfl: 11  •  ondansetron (ZOFRAN) 4 MG tablet, Take 1 tablet by mouth Every 8 (Eight) Hours As Needed for Nausea or Vomiting., Disp: 30 tablet, Rfl: 0  •  PATADAY 0.2 % solution ophthalmic solution, USE 1 DROP AEY ONCE DAILY PRN, Disp: , Rfl: 3  •  PAZEO 0.7 % solution, INSTILL 1 GTT  AEY ONCE DAILY PRN, Disp: , Rfl: 6  •  promethazine-dextromethorphan (PROMETHAZINE-DM) 6.25-15 MG/5ML syrup, Take 5 mL by mouth Every Night., Disp: 75 mL, Rfl: 0  •  SUMAtriptan (IMITREX) 25 MG tablet, Take one tablet at onset of headache. May repeat dose one time in 2 hours if headache not relieved., Disp: 16 tablet, Rfl: 2  •  topiramate (TOPAMAX) 100 MG tablet, Take 1 tablet by mouth Daily., Disp: , Rfl: 3  •  traMADol (ULTRAM) 50 MG tablet, Take 1 tablet by mouth Every 8 (Eight) Hours As Needed for Moderate Pain ., Disp: 60 tablet, Rfl: 0  •  traZODone (DESYREL) 150 MG tablet, Take 1 tablet by mouth As Needed., Disp: , Rfl: 3  •  triamcinolone (KENALOG) 0.1 % cream, , Disp:  ", Rfl:     VITALS:  /80   Pulse 98   Temp 99.8 °F (37.7 °C)   Ht 154.9 cm (61\")   Wt 86.6 kg (191 lb)   SpO2 98%   BMI 36.09 kg/m²     Physical Exam   Constitutional: She is oriented to person, place, and time. She appears well-developed and well-nourished. No distress.   HENT:   Head: Normocephalic and atraumatic.   Right Ear: Tympanic membrane, external ear and ear canal normal. Tympanic membrane is not erythematous.   Left Ear: Tympanic membrane, external ear and ear canal normal. Tympanic membrane is not erythematous.   Nose: Mucosal edema, rhinorrhea and congestion present. Right sinus exhibits no maxillary sinus tenderness and no frontal sinus tenderness. Left sinus exhibits no maxillary sinus tenderness and no frontal sinus tenderness.   Mouth/Throat: Mucous membranes are normal. Posterior oropharyngeal erythema present. No oropharyngeal exudate, posterior oropharyngeal edema or tonsillar abscesses. No tonsillar exudate.   Eyes: Conjunctivae are normal. Right eye exhibits no discharge. Left eye exhibits no discharge.   Neck: Normal range of motion. Neck supple.   Cardiovascular: Normal rate, regular rhythm and normal heart sounds.   Pulmonary/Chest: Effort normal and breath sounds normal. No respiratory distress. She has no wheezes. She has no rhonchi. She has no rales.   Lymphadenopathy:        Head (right side): No submental, no submandibular and no tonsillar adenopathy present.        Head (left side): No submental, no submandibular and no tonsillar adenopathy present.     She has no cervical adenopathy.   Neurological: She is alert and oriented to person, place, and time.   Skin: Skin is warm and dry. She is not diaphoretic.   Nursing note and vitals reviewed.      LABS  POCT Influenza A/B   Order: [id number]   Status:  Final result   Visible to patient:  No (Not Released)   Dx:  Flu-like symptoms    Ref Range & Units 2d ago   Rapid Influenza A Ag Negative NEG    Rapid Influenza B Ag Negative " neg    Internal Control Passed Passed    Lot Number  [id number]    Expiration Date  11-    Resulting Agency  [facility]         Specimen Collected: 12/06/18 17:08   Last Resulted: 12/06/18 17:08               ASSESSMENT/PLAN  Megan was seen today for nasal congestion.    Diagnoses and all orders for this visit:    Upper respiratory tract infection, unspecified type  -     POCT Influenza A/B  -     albuterol 108 (90 Base) MCG/ACT inhaler; Inhale 2 puffs Every 4 (Four) Hours As Needed for Wheezing.  -     brompheniramine-pseudoephedrine-DM 30-2-10 MG/5ML syrup; Take 10 mL by mouth 4 (Four) Times a Day As Needed for Congestion or Cough.      Likely viral clear nasal secretions with recent exposure rsv.  Tx with symptom control.  If worsening of symptoms or no improvement in symptoms or fever > 101.5 patient should contact our office for further evaluation treatment or seek urgent care.    I discussed the patients findings and my recommendations with patient.     Patient was encouraged to keep me informed of any acute changes, lack of improvement, or any new concerning symptoms.    Patient voiced understanding of all instructions and denied further questions.      FOLLOW-UP  Return if symptoms worsen or fail to improve.    Electronically signed by:    MAGGIE Chacon  12/06/2018

## 2019-01-09 ENCOUNTER — LAB REQUISITION (OUTPATIENT)
Dept: LAB | Facility: HOSPITAL | Age: 39
End: 2019-01-09

## 2019-01-09 ENCOUNTER — OUTSIDE FACILITY SERVICE (OUTPATIENT)
Dept: GASTROENTEROLOGY | Facility: CLINIC | Age: 39
End: 2019-01-09

## 2019-01-09 DIAGNOSIS — R10.84 GENERALIZED ABDOMINAL PAIN: ICD-10-CM

## 2019-01-09 DIAGNOSIS — R10.10 UPPER ABDOMINAL PAIN: ICD-10-CM

## 2019-01-09 PROCEDURE — 43239 EGD BIOPSY SINGLE/MULTIPLE: CPT | Performed by: INTERNAL MEDICINE

## 2019-01-09 PROCEDURE — 88305 TISSUE EXAM BY PATHOLOGIST: CPT | Performed by: INTERNAL MEDICINE

## 2019-01-10 LAB
CYTO UR: NORMAL
LAB AP CASE REPORT: NORMAL
LAB AP CLINICAL INFORMATION: NORMAL
PATH REPORT.FINAL DX SPEC: NORMAL
PATH REPORT.GROSS SPEC: NORMAL

## 2019-01-18 DIAGNOSIS — M51.26 HERNIATED LUMBAR INTERVERTEBRAL DISC: ICD-10-CM

## 2019-01-18 DIAGNOSIS — M51.26 LUMBAR DISCOGENIC PAIN SYNDROME: ICD-10-CM

## 2019-01-18 DIAGNOSIS — M47.816 SPONDYLOSIS OF LUMBAR REGION WITHOUT MYELOPATHY OR RADICULOPATHY: ICD-10-CM

## 2019-01-18 RX ORDER — TRAMADOL HYDROCHLORIDE 50 MG/1
TABLET ORAL
Qty: 60 TABLET | Refills: 0 | OUTPATIENT
Start: 2019-01-18

## 2019-01-20 RX ORDER — TRAMADOL HYDROCHLORIDE 50 MG/1
50 TABLET ORAL EVERY 8 HOURS PRN
Qty: 60 TABLET | Refills: 0 | OUTPATIENT
Start: 2019-01-20

## 2019-01-22 ENCOUNTER — OFFICE VISIT (OUTPATIENT)
Dept: GASTROENTEROLOGY | Facility: CLINIC | Age: 39
End: 2019-01-22

## 2019-01-22 VITALS
WEIGHT: 193 LBS | DIASTOLIC BLOOD PRESSURE: 78 MMHG | HEIGHT: 61 IN | HEART RATE: 105 BPM | OXYGEN SATURATION: 99 % | RESPIRATION RATE: 18 BRPM | BODY MASS INDEX: 36.44 KG/M2 | TEMPERATURE: 97.1 F | SYSTOLIC BLOOD PRESSURE: 118 MMHG

## 2019-01-22 DIAGNOSIS — K76.0 FATTY LIVER: Primary | ICD-10-CM

## 2019-01-22 PROCEDURE — 99213 OFFICE O/P EST LOW 20 MIN: CPT | Performed by: INTERNAL MEDICINE

## 2019-01-22 NOTE — PROGRESS NOTES
"PCP: Tina Wiley APRN    Chief Complaint   Patient presents with   • Follow-up     LIVER PAIN   • Constipation   • Diarrhea   • Abdominal Pain       History of Present Illness:   Megan Post is a 38 y.o. female who presents to GI clinic as a follow up from endoscopy for abdominal pain. She previously followed with  and Dr. Bita Conley. She reports a history of IBS, fatty liver, and churchill's esophagus.  She reports having her esophagus \"burned\" due to Churchill's by Dr. Conley.  Recent egd within 4 weeks without residual churchill's.  Experiences ruq sharp achy constant abdominal pain. + bloat.      Past Medical History:   Diagnosis Date   • Abdominal pain    • Abnormal breast exam    • Abnormal Pap smear of cervix    • Achilles tendinitis    • Acute sinusitis    • Anxiety    • Arthritis    • Asthma    • Churchill's syndrome    • Bipolar 1 disorder (CMS/HCC)    • Bowel trouble    • Breast cyst    • Colon polyps    • Constipation    • Depression    • Diverticulitis    • Endometriosis    • Eustachian tube dysfunction    • Extremity pain    • Fatty liver    • Female pelvic pain    • Gastric ulcer    • H/O bladder infections    • History of rashes as a child    • Low back pain    • Menopausal symptoms    • Muscle weakness    • Ovarian cyst    • PID (pelvic inflammatory disease)    • Recurrent UTI    • Sexual problems        Past Surgical History:   Procedure Laterality Date   •  SECTION     • CHOLECYSTECTOMY     • COLONOSCOPY     • HYSTERECTOMY     • NASAL ENDOSCOPY     • OOPHORECTOMY Bilateral    • OTHER SURGICAL HISTORY      Laparoscopy (diagnostic) gynecologic with biopsy   • SHOULDER SURGERY     • UPPER GASTROINTESTINAL ENDOSCOPY  2019         Current Outpatient Medications:   •  albuterol 108 (90 Base) MCG/ACT inhaler, Inhale 2 puffs Every 4 (Four) Hours As Needed for Wheezing., Disp: 18 g, Rfl: 11  •  cetirizine (zyrTEC) 10 MG tablet, Take 1 tablet by mouth Daily., Disp: , Rfl: 1  •  " cyclobenzaprine (FLEXERIL) 5 MG tablet, Take 1 tablet by mouth 3 (Three) Times a Day As Needed for Muscle Spasms., Disp: 21 tablet, Rfl: 1  •  diclofenac (VOLTAREN) 1 % gel gel, Apply 4 g topically 4 (Four) Times a Day As Needed (as needed for pain)., Disp: 1 tube, Rfl: 0  •  EPIPEN 2-JAREN 0.3 MG/0.3ML solution auto-injector injection, U UTD, Disp: , Rfl: 6  •  estradiol (ESTRACE) 2 MG tablet, Take 1 tablet by mouth Daily., Disp: 30 tablet, Rfl: 5  •  fluconazole (DIFLUCAN) 150 MG tablet, 1 tablet now and 1 tablet in 5 days, Disp: 2 tablet, Rfl: 0  •  fluticasone (FLONASE) 50 MCG/ACT nasal spray, 2 sprays into each nostril Daily., Disp: 1 bottle, Rfl: 3  •  gabapentin (NEURONTIN) 300 MG capsule, 1 capsule by mouth every night, Disp: 30 capsule, Rfl: 2  •  lidocaine (XYLOCAINE) 5 % ointment, , Disp: , Rfl:   •  LORazepam (ATIVAN) 1 MG tablet, TK 1 T PO D PRN, Disp: , Rfl: 0  •  NEXIUM 40 MG capsule, Take 1 capsule by mouth Every Morning Before Breakfast. Increase to 2 times per day prn chest pain indigestion, Disp: 60 capsule, Rfl: 5  •  nicotine (NICODERM CQ) 21 MG/24HR patch, Place 1 patch on the skin as directed by provider Daily., Disp: 28 each, Rfl: 11  •  ondansetron (ZOFRAN) 4 MG tablet, Take 1 tablet by mouth Every 8 (Eight) Hours As Needed for Nausea or Vomiting., Disp: 30 tablet, Rfl: 0  •  PATADAY 0.2 % solution ophthalmic solution, USE 1 DROP AEY ONCE DAILY PRN, Disp: , Rfl: 3  •  PAZEO 0.7 % solution, INSTILL 1 GTT  AEY ONCE DAILY PRN, Disp: , Rfl: 6  •  promethazine-dextromethorphan (PROMETHAZINE-DM) 6.25-15 MG/5ML syrup, Take 5 mL by mouth Every Night., Disp: 75 mL, Rfl: 0  •  SUMAtriptan (IMITREX) 25 MG tablet, Take one tablet at onset of headache. May repeat dose one time in 2 hours if headache not relieved., Disp: 16 tablet, Rfl: 2  •  traMADol (ULTRAM) 50 MG tablet, Take 1 tablet by mouth Every 8 (Eight) Hours As Needed for Moderate Pain ., Disp: 60 tablet, Rfl: 0  •  traZODone (DESYREL) 150 MG  tablet, Take 1 tablet by mouth As Needed., Disp: , Rfl: 3  •  brompheniramine-pseudoephedrine-DM 30-2-10 MG/5ML syrup, Take 10 mL by mouth 4 (Four) Times a Day As Needed for Congestion or Cough., Disp: 118 mL, Rfl: 0  •  DULoxetine (CYMBALTA) 30 MG capsule, Take 1 capsule by mouth Daily., Disp: , Rfl: 3  •  topiramate (TOPAMAX) 100 MG tablet, Take 1 tablet by mouth Daily., Disp: , Rfl: 3  •  triamcinolone (KENALOG) 0.1 % cream, , Disp: , Rfl:     Allergies   Allergen Reactions   • Adhesive Tape Hives   • Latex Hives   • Sulfa Antibiotics Hives   • Sulfate Hives   • Biaxin [Clarithromycin] Nausea Only   • Codeine Itching   • Penicillins Nausea Only       Family History   Problem Relation Age of Onset   • Liver disease Mother    • Diabetes Mother    • Hyperlipidemia Mother    • Hypertension Mother    • Thyroid disease Mother    • Arthritis Father    • Mental illness Father    • Migraines Sister    • Stroke Other    • Diabetes Other    • Hyperlipidemia Other    • Hypertension Other    • Mental illness Other    • Migraines Other    • Tuberculosis Other    • Breast cancer Neg Hx    • Endometrial cancer Neg Hx    • Ovarian cancer Neg Hx        Social History     Socioeconomic History   • Marital status: Single     Spouse name: Not on file   • Number of children: Not on file   • Years of education: Not on file   • Highest education level: Not on file   Social Needs   • Financial resource strain: Not on file   • Food insecurity - worry: Not on file   • Food insecurity - inability: Not on file   • Transportation needs - medical: Not on file   • Transportation needs - non-medical: Not on file   Occupational History   • Not on file   Tobacco Use   • Smoking status: Current Every Day Smoker     Types: Cigarettes   • Smokeless tobacco: Never Used   • Tobacco comment: nicotine patches for smoking cessation   Substance and Sexual Activity   • Alcohol use: No   • Drug use: No   • Sexual activity: Defer   Other Topics Concern   •  Not on file   Social History Narrative   • Not on file       Review of Systems   Constitutional: Positive for activity change and fatigue.   Eyes: Negative.    Respiratory: Negative.    Cardiovascular: Positive for palpitations.   Gastrointestinal: Positive for abdominal distention, abdominal pain, constipation and diarrhea.   Endocrine: Negative.    Genitourinary: Negative.    Musculoskeletal: Negative.    Skin: Negative.    Allergic/Immunologic: Negative.    Neurological: Positive for headaches.   Hematological: Negative.    Psychiatric/Behavioral: Negative.          Vitals:    01/22/19 1043   BP: 118/78   Pulse: 105   Resp: 18   Temp: 97.1 °F (36.2 °C)   SpO2: 99%       Physical Exam  .General Appearance:  Vitals as above. no acute distress  obese  Head/face:  Normocephalic, atraumatic  Eyes:   EOMI, no conjunctivitis or icterus   Nose/Sinuses:  Nares patent bilaterally without discharge or lesions  Mouth/Throat:  Posterior pharynx is pink without drainage or exudates;  dentition is in good condition and repair  Neck:  trachea is midline, no thyromegaly  Lungs:  Clear to auscultation bilaterally  Heart:  Regular rate and rhythm without murmur, gallop or rub  Abdomen:  Soft, non-tender to palpation, no obvious masses, bowel sounds positive in four quadrants; no abdominal bruits; no guarding or rebound tenderness  Neurologic:  Alert; no focal deficits; age appropriate behavior and speech  Psychiatric: mood and affect are congruent  Vascular: extremities without edema  Skin: no rash or cyanosis.      Assessment/Plan  1.) Fatty liver  Educated on 10% wt loss within a year.    2.) Chronic abdominal pain, suspect IBS, functional  S/p egd which showed gastritis. Experiences a few days without a bm in a week and notices pain worse on those days.  Gave samples of linzess and educated that she may experience diarrhea but to push through a week of therapy.    3.) Reported history of churchill's esophagus with [facility]  therapy  4.) Multiple GI providers    S/p recent egd without churchill's esophagus.  Acquire records    rtc in 3 months.      Valeriano Hankins MD  1/22/2019

## 2019-01-23 ENCOUNTER — OFFICE VISIT (OUTPATIENT)
Dept: INTERNAL MEDICINE | Facility: CLINIC | Age: 39
End: 2019-01-23

## 2019-01-23 VITALS
BODY MASS INDEX: 36.35 KG/M2 | SYSTOLIC BLOOD PRESSURE: 124 MMHG | OXYGEN SATURATION: 98 % | WEIGHT: 192.4 LBS | HEART RATE: 103 BPM | DIASTOLIC BLOOD PRESSURE: 90 MMHG

## 2019-01-23 DIAGNOSIS — M51.26 HERNIATED LUMBAR INTERVERTEBRAL DISC: ICD-10-CM

## 2019-01-23 DIAGNOSIS — M47.816 SPONDYLOSIS OF LUMBAR REGION WITHOUT MYELOPATHY OR RADICULOPATHY: ICD-10-CM

## 2019-01-23 DIAGNOSIS — M51.26 LUMBAR DISCOGENIC PAIN SYNDROME: ICD-10-CM

## 2019-01-23 PROCEDURE — 99214 OFFICE O/P EST MOD 30 MIN: CPT | Performed by: NURSE PRACTITIONER

## 2019-01-23 RX ORDER — TRAMADOL HYDROCHLORIDE 50 MG/1
50 TABLET ORAL EVERY 8 HOURS PRN
Qty: 60 TABLET | Refills: 0 | Status: CANCELLED | OUTPATIENT
Start: 2019-01-23

## 2019-01-23 RX ORDER — TRAMADOL HYDROCHLORIDE 50 MG/1
50 TABLET ORAL EVERY 8 HOURS PRN
Qty: 60 TABLET | Refills: 0 | Status: SHIPPED | OUTPATIENT
Start: 2019-01-23 | End: 2019-10-15 | Stop reason: SDUPTHER

## 2019-01-23 RX ORDER — GABAPENTIN 300 MG/1
CAPSULE ORAL
Qty: 30 CAPSULE | Refills: 2 | Status: SHIPPED | OUTPATIENT
Start: 2019-01-23 | End: 2020-01-22

## 2019-01-23 RX ORDER — GABAPENTIN 300 MG/1
CAPSULE ORAL
Qty: 30 CAPSULE | Refills: 2 | Status: CANCELLED | OUTPATIENT
Start: 2019-01-23

## 2019-01-23 RX ORDER — CITALOPRAM 10 MG/1
20 TABLET ORAL DAILY
COMMUNITY
End: 2019-06-05
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
## 2019-01-24 NOTE — PATIENT INSTRUCTIONS
"Med per MAR.  Uses tramadol sparingly.  Make Tylenol \"your go to\" pain medicine [facility] [id number] is on chart and appropriate.  Turn to the clinic in 3 months sooner as needed.  Pt verbalizes understanding and agreement with plan of care.   "

## 2019-01-29 ENCOUNTER — OFFICE VISIT (OUTPATIENT)
Dept: INTERNAL MEDICINE | Facility: CLINIC | Age: 39
End: 2019-01-29

## 2019-01-29 VITALS
HEART RATE: 101 BPM | HEIGHT: 61 IN | BODY MASS INDEX: 36.25 KG/M2 | OXYGEN SATURATION: 96 % | WEIGHT: 192 LBS | TEMPERATURE: 98.7 F

## 2019-01-29 DIAGNOSIS — J06.9 URI WITH COUGH AND CONGESTION: Primary | ICD-10-CM

## 2019-01-29 DIAGNOSIS — J02.9 SORE THROAT: ICD-10-CM

## 2019-01-29 PROCEDURE — 87804 INFLUENZA ASSAY W/OPTIC: CPT | Performed by: NURSE PRACTITIONER

## 2019-01-29 PROCEDURE — 99214 OFFICE O/P EST MOD 30 MIN: CPT | Performed by: NURSE PRACTITIONER

## 2019-01-29 RX ORDER — BENZONATATE 200 MG/1
200 CAPSULE ORAL 3 TIMES DAILY PRN
Qty: 20 CAPSULE | Refills: 0 | Status: SHIPPED | OUTPATIENT
Start: 2019-01-29 | End: 2020-03-10

## 2019-01-29 RX ORDER — ONDANSETRON 4 MG/1
4 TABLET, ORALLY DISINTEGRATING ORAL EVERY 8 HOURS PRN
Qty: 8 TABLET | Refills: 0 | Status: SHIPPED | OUTPATIENT
Start: 2019-01-29 | End: 2019-03-19 | Stop reason: SDUPTHER

## 2019-01-29 RX ORDER — DEXTROMETHORPHAN HYDROBROMIDE AND PROMETHAZINE HYDROCHLORIDE 15; 6.25 MG/5ML; MG/5ML
5 SYRUP ORAL 4 TIMES DAILY PRN
Qty: 240 ML | Refills: 0 | Status: SHIPPED | OUTPATIENT
Start: 2019-01-29 | End: 2019-03-19

## 2019-01-29 RX ORDER — CEFDINIR 300 MG/1
300 CAPSULE ORAL 2 TIMES DAILY
Qty: 20 CAPSULE | Refills: 0 | Status: SHIPPED | OUTPATIENT
Start: 2019-01-29 | End: 2019-03-19

## 2019-01-29 NOTE — PROGRESS NOTES
Lizandro MAIN Day is a 38 y.o. female.     URI    This is a new problem. The current episode started 1 to 4 weeks ago. The problem has been unchanged. There has been no fever. Associated symptoms include congestion, coughing, diarrhea, ear pain, headaches, nausea, rhinorrhea and a sore throat. Pertinent negatives include no chest pain, rash, sinus pain, swollen glands, vomiting or wheezing. She has tried decongestant for the symptoms. The treatment provided no relief.           Review of Systems   Constitutional: Positive for fatigue. Negative for chills and fever.   HENT: Positive for congestion, ear pain, postnasal drip, rhinorrhea and sore throat. Negative for sinus pressure and sinus pain.    Respiratory: Positive for cough. Negative for wheezing.    Cardiovascular: Negative for chest pain.   Gastrointestinal: Positive for diarrhea and nausea. Negative for vomiting.   Musculoskeletal: Positive for myalgias.   Skin: Negative for rash.   Allergic/Immunologic: Positive for environmental allergies. Negative for immunocompromised state.   Neurological: Positive for headaches.   Hematological: Negative for adenopathy.       Objective   Physical Exam   Constitutional: She is oriented to person, place, and time. She appears well-developed and well-nourished. No distress.   HENT:   Head: Normocephalic and atraumatic.   Right Ear: Tympanic membrane is bulging. A middle ear effusion is present.   Left Ear: Tympanic membrane is bulging. A middle ear effusion is present.   Nose: Mucosal edema and rhinorrhea present. Right sinus exhibits no maxillary sinus tenderness and no frontal sinus tenderness. Left sinus exhibits no maxillary sinus tenderness and no frontal sinus tenderness.   Mouth/Throat: No oropharyngeal exudate or posterior oropharyngeal erythema.   Eyes: EOM are normal. Pupils are equal, round, and reactive to light.   Neck: Normal range of motion. Neck supple.   Cardiovascular: Normal rate, regular rhythm  and normal heart sounds. Exam reveals no gallop and no friction rub.   No murmur heard.  Pulmonary/Chest: Effort normal and breath sounds normal.   cough   Musculoskeletal: Normal range of motion.   Neurological: She is alert and oriented to person, place, and time.   Skin: Skin is warm and dry. Capillary refill takes less than 2 seconds. No rash noted. She is not diaphoretic.   Psychiatric: She has a normal mood and affect. Her behavior is normal. Judgment and thought content normal.   Nursing note and vitals reviewed.      Assessment/Plan   Megan was seen today for sore throat, fatigue, diarrhea, earache, cough and headache.    Diagnoses and all orders for this visit:    URI with cough and congestion    Sore throat  -     POCT Influenza A/B    Other orders  -     cefdinir (OMNICEF) 300 MG capsule; Take 1 capsule by mouth 2 (Two) Times a Day.  -     benzonatate (TESSALON) 200 MG capsule; Take 1 capsule by mouth 3 (Three) Times a Day As Needed for Cough.  -     promethazine-dextromethorphan (PROMETHAZINE-DM) 6.25-15 MG/5ML syrup; Take 5 mL by mouth 4 (Four) Times a Day As Needed for Cough.  -     ondansetron ODT (ZOFRAN-ODT) 4 MG disintegrating tablet; Take 1 tablet by mouth Every 8 (Eight) Hours As Needed for Nausea or Vomiting.

## 2019-02-06 ENCOUNTER — OFFICE VISIT (OUTPATIENT)
Dept: INTERNAL MEDICINE | Facility: CLINIC | Age: 39
End: 2019-02-06

## 2019-02-06 VITALS
BODY MASS INDEX: 36.25 KG/M2 | HEIGHT: 61 IN | DIASTOLIC BLOOD PRESSURE: 80 MMHG | TEMPERATURE: 99 F | WEIGHT: 192 LBS | SYSTOLIC BLOOD PRESSURE: 124 MMHG

## 2019-02-06 DIAGNOSIS — J40 BRONCHITIS: Primary | ICD-10-CM

## 2019-02-06 PROCEDURE — 99213 OFFICE O/P EST LOW 20 MIN: CPT | Performed by: NURSE PRACTITIONER

## 2019-02-06 NOTE — PROGRESS NOTES
"Chief Complaint   Patient presents with   • URI     was tested for the flu it was neg       History of Present Illness  38 y.o.female presents for URI.    Has not felt well for couple weeks with congestion, rhinorrhea, cough, fever.  Seen Sunday 1-29 at [facility] shakes high fever; flu negative. had xray to rule out pneumonia and was told just bronchitis. \"can't see how not pneumonia with as bad as feels\"  Was given a \"shot\" not sure if abx or steroid; added abx.  Still low grade temp 100.  Sore throat, fatigue; cough is mostly dry chest hurts from coughing.  Taking Cefdinir and thinks a 2nd antibiotic but does not remember the name of med; using Albuterol and qvar.  Short of air with coughing.    Review of Systems   Constitutional: Positive for fatigue and fever. Negative for chills.   HENT: Positive for congestion, postnasal drip, rhinorrhea, sinus pressure and sore throat. Negative for ear pain and sneezing.    Respiratory: Positive for cough and shortness of breath.    Musculoskeletal: Negative for myalgias.   Neurological: Negative for headache.       [facility]  The following portions of the patient's history were reviewed and updated as appropriate: allergies, current medications, past family history, past medical history, past social history, past surgical history and problem list.       Past Medical History:   Diagnosis Date   • Abdominal pain    • Abnormal breast exam    • Abnormal Pap smear of cervix    • Achilles tendinitis    • Acute sinusitis    • Anxiety    • Arthritis    • Asthma    • Tavera's syndrome    • Bipolar 1 disorder (CMS/HCC)    • Bowel trouble    • Breast cyst    • Colon polyps    • Constipation    • Depression    • Diverticulitis    • Diverticulitis of colon    • Endometriosis    • Eustachian tube dysfunction    • Extremity pain    • Fatty liver    • Female pelvic pain    • Gastric ulcer    • H/O bladder infections    • History of rashes as a child    • Irritable bowel syndrome    • Low back pain  "   • Menopausal symptoms    • Muscle weakness    • Ovarian cyst    • PID (pelvic inflammatory disease)    • Recurrent UTI    • Sexual problems       Past Surgical History:   Procedure Laterality Date   •  SECTION     • CHOLECYSTECTOMY     • COLONOSCOPY  2017   • HYSTERECTOMY     • NASAL ENDOSCOPY     • OOPHORECTOMY Bilateral    • OTHER SURGICAL HISTORY      Laparoscopy (diagnostic) gynecologic with biopsy   • SHOULDER SURGERY     • UPPER GASTROINTESTINAL ENDOSCOPY  2019      Allergies   Allergen Reactions   • Adhesive Tape Hives   • Latex Hives   • Sulfa Antibiotics Hives   • Sulfate Hives   • Biaxin [Clarithromycin] Nausea Only   • Codeine Itching   • Penicillins Nausea Only      Family History   Problem Relation Age of Onset   • Liver disease Mother    • Diabetes Mother    • Hyperlipidemia Mother    • Hypertension Mother    • Thyroid disease Mother    • Arthritis Father    • Mental illness Father    • Migraines Sister    • Stroke Other    • Diabetes Other    • Hyperlipidemia Other    • Hypertension Other    • Mental illness Other    • Migraines Other    • Tuberculosis Other    • Breast cancer Neg Hx    • Endometrial cancer Neg Hx    • Ovarian cancer Neg Hx             Current Outpatient Medications:   •  albuterol 108 (90 Base) MCG/ACT inhaler, Inhale 2 puffs Every 4 (Four) Hours As Needed for Wheezing., Disp: 18 g, Rfl: 11  •  benzonatate (TESSALON) 200 MG capsule, Take 1 capsule by mouth 3 (Three) Times a Day As Needed for Cough., Disp: 20 capsule, Rfl: 0  •  cefdinir (OMNICEF) 300 MG capsule, Take 1 capsule by mouth 2 (Two) Times a Day., Disp: 20 capsule, Rfl: 0  •  cetirizine (zyrTEC) 10 MG tablet, Take 1 tablet by mouth Daily., Disp: , Rfl: 1  •  citalopram (CeleXA) 10 MG tablet, Take 20 mg by mouth Daily., Disp: , Rfl:   •  diclofenac (VOLTAREN) 1 % gel gel, Apply 4 g topically 4 (Four) Times a Day As Needed (as needed for pain)., Disp: 1 tube, Rfl: 0  •  EPIPEN 2-JAREN 0.3 MG/0.3ML solution  "auto-injector injection, U UTD, Disp: , Rfl: 6  •  estradiol (ESTRACE) 2 MG tablet, Take 1 tablet by mouth Daily., Disp: 30 tablet, Rfl: 5  •  fluticasone (FLONASE) 50 MCG/ACT nasal spray, 2 sprays into each nostril Daily., Disp: 1 bottle, Rfl: 3  •  gabapentin (NEURONTIN) 300 MG capsule, 1 capsule by mouth every night, Disp: 30 capsule, Rfl: 2  •  lidocaine (XYLOCAINE) 5 % ointment, , Disp: , Rfl:   •  LORazepam (ATIVAN) 1 MG tablet, TK 1 T PO D PRN, Disp: , Rfl: 0  •  NEXIUM 40 MG capsule, Take 1 capsule by mouth Every Morning Before Breakfast. Increase to 2 times per day prn chest pain indigestion, Disp: 60 capsule, Rfl: 5  •  nicotine (NICODERM CQ) 21 MG/24HR patch, Place 1 patch on the skin as directed by provider Daily., Disp: 28 each, Rfl: 11  •  ondansetron ODT (ZOFRAN-ODT) 4 MG disintegrating tablet, Take 1 tablet by mouth Every 8 (Eight) Hours As Needed for Nausea or Vomiting., Disp: 8 tablet, Rfl: 0  •  PATADAY 0.2 % solution ophthalmic solution, USE 1 DROP AEY ONCE DAILY PRN, Disp: , Rfl: 3  •  promethazine-dextromethorphan (PROMETHAZINE-DM) 6.25-15 MG/5ML syrup, Take 5 mL by mouth 4 (Four) Times a Day As Needed for Cough., Disp: 240 mL, Rfl: 0  •  SUMAtriptan (IMITREX) 25 MG tablet, Take one tablet at onset of headache. May repeat dose one time in 2 hours if headache not relieved., Disp: 16 tablet, Rfl: 2  •  traMADol (ULTRAM) 50 MG tablet, Take 1 tablet by mouth Every 8 (Eight) Hours As Needed for Moderate Pain ., Disp: 60 tablet, Rfl: 0  •  traZODone (DESYREL) 150 MG tablet, Take 1 tablet by mouth As Needed., Disp: , Rfl: 3  •  triamcinolone (KENALOG) 0.1 % cream, , Disp: , Rfl:     VITALS:  /80   Temp 99 °F (37.2 °C)   Ht 154.9 cm (61\")   Wt 87.1 kg (192 lb)   BMI 36.28 kg/m²   O2 sat 96%  Physical Exam   Constitutional: She appears well-developed and well-nourished. No distress.   HENT:   Head: Normocephalic and atraumatic.   Right Ear: Tympanic membrane, external ear and ear canal normal. "   Left Ear: Tympanic membrane, external ear and ear canal normal.   Nose: Mucosal edema and rhinorrhea present. Right sinus exhibits no maxillary sinus tenderness and no frontal sinus tenderness. Left sinus exhibits no maxillary sinus tenderness and no frontal sinus tenderness.   Mouth/Throat: Oropharynx is clear and moist and mucous membranes are normal.   Neck: Neck supple. No JVD present.   Cardiovascular: Normal rate, regular rhythm and normal heart sounds.   No murmur heard.  Pulmonary/Chest: Effort normal. No stridor. No respiratory distress. She has no wheezes. She exhibits no tenderness.   Frequent coughing   Musculoskeletal: She exhibits no edema.   Lymphadenopathy:     She has no cervical adenopathy.   Skin: She is not diaphoretic.   Nursing note and vitals reviewed.      LABS  Results for orders placed or performed in visit on 01/29/19   POCT Influenza A/B   Result Value Ref Range    Rapid Influenza A Ag Negative Negative    Rapid Influenza B Ag Negative Negative    Internal Control Passed Passed    Lot Number [id number]     Expiration Date 11-8-20        ASSESSMENT/PLAN  Crystal was seen today for uri.    Diagnoses and all orders for this visit:    Bronchitis    continue zyrted, flonase, tessalon pearls, cefinir, promethazine DM.  Already has inhalers including albuterol and qvar.  Pt also thinks is taking a second antibiotic but doesn't remember name.  I recommended steroids and pt reports that steroids make her psychiatric problems worse and cannot take.  I explained cough with bronchitis can take several weeks sometimes to improve and most bronchitis do not require antibiotics.  She is going to continue above treatment and return for fu in 1 week; if no better and still with fever will repeat chest xray.  I asked her to call back with the name of the 2nd abx she was given.    If worsening of symptoms or no improvement in symptoms or fever > 101.5 patient should contact our office for further evaluation  treatment or seek urgent care.    I discussed the patients findings and my recommendations with patient.  Patient was encouraged to keep me informed of any acute changes, lack of improvement, or any new concerning symptoms.    Patient voiced understanding of all instructions and denied further questions.      FOLLOW-UP  Return in about 1 week (around 2/13/2019), or if symptoms worsen or fail to improve.    Electronically signed by:    MAGGIE Chacon  02/06/2019

## 2019-02-08 PROBLEM — J40 BRONCHITIS: Status: ACTIVE | Noted: 2019-02-08

## 2019-02-15 ENCOUNTER — APPOINTMENT (OUTPATIENT)
Dept: CT IMAGING | Facility: HOSPITAL | Age: 39
End: 2019-02-15

## 2019-02-19 ENCOUNTER — TELEPHONE (OUTPATIENT)
Dept: GASTROENTEROLOGY | Facility: CLINIC | Age: 39
End: 2019-02-19

## 2019-02-19 DIAGNOSIS — R10.9 ABDOMINAL PAIN, UNSPECIFIED ABDOMINAL LOCATION: Primary | ICD-10-CM

## 2019-02-19 NOTE — TELEPHONE ENCOUNTER
Dr Hankins,   Patient called and left voice message stating that she is having some abdominal pain. She is requesting ultrasound.

## 2019-02-20 DIAGNOSIS — R10.9 ABDOMINAL PAIN, UNSPECIFIED ABDOMINAL LOCATION: Primary | ICD-10-CM

## 2019-02-20 NOTE — TELEPHONE ENCOUNTER
Called patient to get a better idea of where the pain is; patient states she is having pain around her liver area.  Advised patient I would route a message to Dr. Hankins to see if he would agree to order ultrasound or what he advises.  Patient voiced understanding.

## 2019-02-21 ENCOUNTER — HOSPITAL ENCOUNTER (OUTPATIENT)
Dept: CT IMAGING | Facility: HOSPITAL | Age: 39
Discharge: HOME | End: 2019-02-21
Admitting: NURSE PRACTITIONER

## 2019-02-21 DIAGNOSIS — R06.02 SOB (SHORTNESS OF BREATH): ICD-10-CM

## 2019-02-21 PROCEDURE — 71250 CT THORAX DX C-: CPT

## 2019-02-27 ENCOUNTER — OFFICE VISIT (OUTPATIENT)
Dept: INTERNAL MEDICINE | Facility: CLINIC | Age: 39
End: 2019-02-27

## 2019-02-27 VITALS
OXYGEN SATURATION: 97 % | BODY MASS INDEX: 36.44 KG/M2 | WEIGHT: 193 LBS | SYSTOLIC BLOOD PRESSURE: 112 MMHG | HEART RATE: 105 BPM | RESPIRATION RATE: 18 BRPM | HEIGHT: 61 IN | DIASTOLIC BLOOD PRESSURE: 72 MMHG

## 2019-02-27 DIAGNOSIS — E55.9 VITAMIN D INSUFFICIENCY: ICD-10-CM

## 2019-02-27 DIAGNOSIS — Z71.6 ENCOUNTER FOR TOBACCO USE CESSATION COUNSELING: ICD-10-CM

## 2019-02-27 DIAGNOSIS — S80.02XA CONTUSION OF LEFT KNEE, INITIAL ENCOUNTER: ICD-10-CM

## 2019-02-27 DIAGNOSIS — J40 BRONCHITIS: ICD-10-CM

## 2019-02-27 DIAGNOSIS — Z00.00 MEDICARE WELCOME VISIT: Primary | ICD-10-CM

## 2019-02-27 LAB
25(OH)D3 SERPL-MCNC: 47.6 NG/ML
ALBUMIN SERPL-MCNC: 4.6 G/DL (ref 3.2–4.8)
ALBUMIN/GLOB SERPL: 1.9 G/DL (ref 1.5–2.5)
ALP SERPL-CCNC: 73 U/L (ref 25–100)
ALT SERPL W P-5'-P-CCNC: 75 U/L (ref 7–40)
ANION GAP SERPL CALCULATED.3IONS-SCNC: 9 MMOL/L (ref 3–11)
AST SERPL-CCNC: 31 U/L (ref 0–33)
BASOPHILS # BLD AUTO: 0.02 10*3/MM3 (ref 0–0.2)
BASOPHILS NFR BLD AUTO: 0.3 % (ref 0–1)
BILIRUB BLD-MCNC: NEGATIVE MG/DL
BILIRUB SERPL-MCNC: 0.2 MG/DL (ref 0.3–1.2)
BUN BLD-MCNC: 15 MG/DL (ref 9–23)
BUN/CREAT SERPL: 20.5 (ref 7–25)
CALCIUM SPEC-SCNC: 9.9 MG/DL (ref 8.7–10.4)
CHLORIDE SERPL-SCNC: 105 MMOL/L (ref 99–109)
CLARITY, POC: CLEAR
CO2 SERPL-SCNC: 28 MMOL/L (ref 20–31)
COLOR UR: YELLOW
CREAT BLD-MCNC: 0.73 MG/DL (ref 0.6–1.3)
DEPRECATED RDW RBC AUTO: 45.8 FL (ref 37–54)
EOSINOPHIL # BLD AUTO: 0.4 10*3/MM3 (ref 0–0.3)
EOSINOPHIL NFR BLD AUTO: 5.2 % (ref 0–3)
ERYTHROCYTE [DISTWIDTH] IN BLOOD BY AUTOMATED COUNT: 14.1 % (ref 11.3–14.5)
GFR SERPL CREATININE-BSD FRML MDRD: 90 ML/MIN/1.73
GLOBULIN UR ELPH-MCNC: 2.4 GM/DL
GLUCOSE BLD-MCNC: 84 MG/DL (ref 70–100)
GLUCOSE UR STRIP-MCNC: NEGATIVE MG/DL
HBA1C MFR BLD: 5.2 % (ref 4.8–5.6)
HCT VFR BLD AUTO: 43.5 % (ref 34.5–44)
HGB BLD-MCNC: 13.7 G/DL (ref 11.5–15.5)
IMM GRANULOCYTES # BLD AUTO: 0.02 10*3/MM3 (ref 0–0.05)
IMM GRANULOCYTES NFR BLD AUTO: 0.3 % (ref 0–0.6)
KETONES UR QL: NEGATIVE
LEUKOCYTE EST, POC: NEGATIVE
LYMPHOCYTES # BLD AUTO: 1.91 10*3/MM3 (ref 0.6–4.8)
LYMPHOCYTES NFR BLD AUTO: 25 % (ref 24–44)
MCH RBC QN AUTO: 28.1 PG (ref 27–31)
MCHC RBC AUTO-ENTMCNC: 31.5 G/DL (ref 32–36)
MCV RBC AUTO: 89.1 FL (ref 80–99)
MONOCYTES # BLD AUTO: 0.49 10*3/MM3 (ref 0–1)
MONOCYTES NFR BLD AUTO: 6.4 % (ref 0–12)
NEUTROPHILS # BLD AUTO: 4.8 10*3/MM3 (ref 1.5–8.3)
NEUTROPHILS NFR BLD AUTO: 62.8 % (ref 41–71)
NITRITE UR-MCNC: NEGATIVE MG/ML
PH UR: 7 [PH] (ref 5–8)
PLATELET # BLD AUTO: 316 10*3/MM3 (ref 150–450)
PMV BLD AUTO: 11.2 FL (ref 6–12)
POTASSIUM BLD-SCNC: 4.7 MMOL/L (ref 3.5–5.5)
PROT SERPL-MCNC: 7 G/DL (ref 5.7–8.2)
PROT UR STRIP-MCNC: NEGATIVE MG/DL
RBC # BLD AUTO: 4.88 10*6/MM3 (ref 3.89–5.14)
RBC # UR STRIP: ABNORMAL /UL
SODIUM BLD-SCNC: 142 MMOL/L (ref 132–146)
SP GR UR: 1.01 (ref 1–1.03)
TSH SERPL DL<=0.05 MIU/L-ACNC: 0.94 MIU/ML (ref 0.35–5.35)
UROBILINOGEN UR QL: NORMAL
WBC NRBC COR # BLD: 7.64 10*3/MM3 (ref 3.5–10.8)

## 2019-02-27 PROCEDURE — 99406 BEHAV CHNG SMOKING 3-10 MIN: CPT | Performed by: NURSE PRACTITIONER

## 2019-02-27 PROCEDURE — 80053 COMPREHEN METABOLIC PANEL: CPT | Performed by: NURSE PRACTITIONER

## 2019-02-27 PROCEDURE — 83036 HEMOGLOBIN GLYCOSYLATED A1C: CPT | Performed by: NURSE PRACTITIONER

## 2019-02-27 PROCEDURE — 81003 URINALYSIS AUTO W/O SCOPE: CPT | Performed by: NURSE PRACTITIONER

## 2019-02-27 PROCEDURE — 85025 COMPLETE CBC W/AUTO DIFF WBC: CPT | Performed by: NURSE PRACTITIONER

## 2019-02-27 PROCEDURE — G0438 PPPS, INITIAL VISIT: HCPCS | Performed by: NURSE PRACTITIONER

## 2019-02-27 PROCEDURE — 82607 VITAMIN B-12: CPT | Performed by: NURSE PRACTITIONER

## 2019-02-27 PROCEDURE — 82306 VITAMIN D 25 HYDROXY: CPT | Performed by: NURSE PRACTITIONER

## 2019-02-27 PROCEDURE — 84443 ASSAY THYROID STIM HORMONE: CPT | Performed by: NURSE PRACTITIONER

## 2019-02-27 RX ORDER — TOPIRAMATE 50 MG/1
TABLET, FILM COATED ORAL
COMMUNITY
Start: 2018-12-10 | End: 2019-03-19

## 2019-02-27 RX ORDER — IPRATROPIUM BROMIDE AND ALBUTEROL SULFATE 2.5; .5 MG/3ML; MG/3ML
3 SOLUTION RESPIRATORY (INHALATION) EVERY 4 HOURS PRN
Qty: 360 ML | Refills: 11 | Status: SHIPPED | OUTPATIENT
Start: 2019-02-27 | End: 2020-06-13

## 2019-02-27 NOTE — PATIENT INSTRUCTIONS
Cryotherapy  WHAT IS CRYOTHERAPY?  Cryotherapy, or cold therapy, is a treatment that uses cold temperatures to treat an injury or medical condition. It includes using cold packs or ice packs to reduce pain and swelling.  WHO SHOULD NOT USE CRYOTHERAPY?  Cryotherapy is not safe for people who cannot tell you if they are in pain, such as small children and people who have dementia. Cryotherapy is also not safe for people with certain conditions, such as:  · Raynaud phenomenon.  · Cold hypersensitivity.  · Numbness or loss of feeling in the area being iced.    Cryotherapy may or may not be safe for people with certain other conditions. Do not use cryotherapy without your health care provider's approval if you have:  · A heart condition.  · High blood pressure.  · Open or healing wounds.  · An infection.  · Rheumatoid arthritis.  · Poor circulation.  · Diabetes.  · Certain skin conditions.    HOW DO I USE CRYOTHERAPY?  To use cryotherapy at home to reduce pain and swelling:  · Place a towel between the cold source and your skin.  · Apply the cold source for no more than 20 minutes at a time.  · Check your skin after 5 minutes to make sure there are no signs of a poor response to cold or skin damage. Check for:  ? White spots on your skin. Your skin may look blotchy or mottled.  ? Skin that looks blue or pale.  ? Skin that feels waxy or hard.  · Repeat these steps as many times each day as told by your health care provider.    HOW CAN I MAKE A COLD PACK?  When using a cold pack at home to reduce pain and swelling, you can use:  · A silica gel cold pack that has been left in the freezer. You can buy this online or in stores.  · A plastic bag of frozen vegetables.  · A sealable plastic bag that has been filled with crushed ice.    Always wrap the pack in a dry or damp towel to avoid direct contact with your skin.  WHEN SHOULD I CALL MY HEALTH CARE PROVIDER?  Call your health care provider if:  · You develop white spots on  your skin. This may give your skin a blotchy or mottled look.  · Your skin turns blue or pale.  · Your skin becomes waxy or hard.  · Your swelling gets worse.    This information is not intended to replace advice given to you by your health care provider. Make sure you discuss any questions you have with your health care provider.  Document Released: 08/13/2012 Document Revised: 05/25/2017 Document Reviewed: 08/31/2016  [employer] Interactive Patient Education © 2017 [employer]    Elastic Bandage and RICE  What does an elastic bandage do?  Elastic bandages come in different shapes and sizes. They generally provide support to your injury and reduce swelling while you are healing, but they can perform different functions. Your health care provider will help you to decide what is best for your protection, recovery, or rehabilitation following an injury.  What are some general tips for using an elastic bandage?  · Use the bandage as directed by the maker of the bandage that you are using.  · Do not wrap the bandage too tightly. This may cut off the circulation in the arm or leg in the area below the bandage.  ? If part of your body beyond the bandage becomes blue, numb, cold, swollen, or is more painful, your bandage is most likely too tight. If this occurs, remove your bandage and reapply it more loosely.  · See your health care provider if the bandage seems to be making your problems worse rather than better.  · An elastic bandage should be removed and reapplied every 3-4 hours or as directed by your health care provider.  What is RICE?  The routine care of many injuries includes rest, ice, compression, and elevation (RICE therapy).  Rest  Rest is required to allow your body to heal. Generally, you can resume your routine activities when you are comfortable and have been given permission by your health care provider.  Ice  Icing your injury helps to keep the swelling down and it reduces pain. Do not apply ice  directly to your skin.  · Put ice in a plastic bag.  · Place a towel between your skin and the bag.  · Leave the ice on for 20 minutes, 2-3 times per day.    Do this for as long as you are directed by your health care provider.  Compression  Compression helps to keep swelling down, gives support, and helps with discomfort. Compression may be done with an elastic bandage.  Elevation  Elevation helps to reduce swelling and it decreases pain. If possible, your injured area should be placed at or above the level of your heart or the center of your chest.  When should I seek medical care?  You should seek medical care if:  · You have persistent pain and swelling.  · Your symptoms are getting worse rather than improving.    These symptoms may indicate that further evaluation or further X-rays are needed. Sometimes, X-rays may not show a small broken bone (fracture) until a number of days later. Make a follow-up appointment with your health care provider. Ask when your X-ray results will be ready. Make sure that you get your X-ray results.  When should I seek immediate medical care?  You should seek immediate medical care if:  · You have a sudden onset of severe pain at or below the area of your injury.  · You develop redness or increased swelling around your injury.  · You have tingling or numbness at or below the area of your injury that does not improve after you remove the elastic bandage.    This information is not intended to replace advice given to you by your health care provider. Make sure you discuss any questions you have with your health care provider.  Document Released: 06/09/2003 Document Revised: 11/13/2017 Document Reviewed: 08/03/2015  [employer] Interactive Patient Education © 2018 [employer]

## 2019-02-27 NOTE — PROGRESS NOTES
QUICK REFERENCE INFORMATION:  The ABCs of the Annual Wellness Visit    Welcome to Medicare Visit    HEALTH RISK ASSESSMENT    1980    Recent Hospitalizations:  No hospitalization(s) within the last year..      Current Medical Providers:  Patient Care Team:  Sugey Ovalles APRN as PCP - General (Internal Medicine)  Flavia Moran DO as Consulting Physician (Obstetrics and Gynecology)  Paramjit Leahy MD as Consulting Physician (Otolaryngology)  Jasiel Sanchez MD as Consulting Physician (Pain Medicine)  Trudy Vela RD as Dietitian (Nutrition)  Christie Ludwig PA as Physician Assistant (Internal Medicine)  Dian Ricks APRN as Nurse Practitioner (Nurse Practitioner)  Millicent Pantoja APRN as Nurse Practitioner (Pulmonary Disease)  Rafaela Chavira PT as Physical Therapist (Physical Therapy)  Lamar Chavira APRN as Nurse Practitioner (Nurse Practitioner)      Smoking Status:  Social History     Tobacco Use   Smoking Status Current Every Day Smoker   • Types: Cigarettes   Smokeless Tobacco Never Used   Tobacco Comment    nicotine patches for smoking cessation       Alcohol Consumption:  Social History     Substance and Sexual Activity   Alcohol Use No       Depression Screen:   PHQ-2/PHQ-9 Depression Screening 2/27/2019   Little interest or pleasure in doing things 1   Feeling down, depressed, or hopeless 1   Trouble falling or staying asleep, or sleeping too much 2   Feeling tired or having little energy 3   Poor appetite or overeating 3   Feeling bad about yourself - or that you are a failure or have let yourself or your family down 2   Trouble concentrating on things, such as reading the newspaper or watching television 3   Moving or speaking so slowly that other people could have noticed. Or the opposite - being so fidgety or restless that you have been moving around a lot more than usual 2   Thoughts that you would be better off dead, or of hurting yourself in some way 0   Total Score 17    If you checked off any problems, how difficult have these problems made it for you to do your work, take care of things at home, or get along with other people? Very difficult       Health Habits and Functional and Cognitive Screening:  Functional & Cognitive Status 2/27/2019   Do you have difficulty preparing food and eating? No   Do you have difficulty bathing yourself, getting dressed or grooming yourself? No   Do you have difficulty using the toilet? No   Do you have difficulty moving around from place to place? Yes   Do you have trouble with steps or getting out of a bed or a chair? Yes   In the past year have you fallen or experienced a near fall? Yes   Current Diet Limited Junk Food   Dental Exam Not up to date   Eye Exam Not up to date   Exercise (times per week) 1 times per week   Current Exercise Activities Include Swimming   Do you need help using the phone?  No   Are you deaf or do you have serious difficulty hearing?  No   Do you need help with transportation? Yes   Do you need help shopping? Yes   Do you need help preparing meals?  Yes   Do you need help with housework?  Yes   Do you need help with laundry? Yes   Do you need help taking your medications? Yes   Do you need help managing money? Yes   Do you ever drive or ride in a car without wearing a seat belt? No   Have you felt unusual stress, anger or loneliness in the last month? Yes   Who do you live with? Child   If you need help, do you have trouble finding someone available to you? No   Have you been bothered in the last four weeks by sexual problems? No   Do you have difficulty concentrating, remembering or making decisions? Yes           Does the patient have evidence of cognitive impairment? No    Aspirin use counseling? Does not need ASA (and currently is not on it)      Recent Lab Results:  CMP:  Lab Results   Component Value Date    BUN 15 02/27/2019    CREATININE 0.73 02/27/2019    EGFRIFNONA 90 02/27/2019    BCR 20.5 02/27/2019    NA  142 02/27/2019    K 4.7 02/27/2019    CO2 28.0 02/27/2019    CALCIUM 9.9 02/27/2019    ALBUMIN 4.60 02/27/2019    BILITOT 0.2 (L) 02/27/2019    ALKPHOS 73 02/27/2019    AST 31 02/27/2019    ALT 75 (H) 02/27/2019   Contains abnormal data POC Urinalysis Dipstick, Automated   Order: [id number]   Status:  Final result   Visible to patient:  No (Not Released)   Dx:  Medicare welcome visit    Ref Range & Units 1d ago   Color Yellow, Straw, Dark Yellow, Janie Yellow    Clarity, UA Clear Clear    Specific Gravity  1.005 - 1.030 1.010    pH, Urine 5.0 - 8.0 7.0    Leukocytes Negative Negative    Nitrite, UA Negative Negative    Protein, POC Negative mg/dL Negative    Glucose, UA Negative, 1000 mg/dL (3+) mg/dL Negative    Ketones, UA Negative Negative    Urobilinogen, UA Normal Normal    Bilirubin Negative Negative    Blood, UA Negative 50 Jaswinder/ul Abnormal     Resulting Agency  [facility]         Specimen Collected: 02/27/19 15:17 Last Resulted: 02/27/19 15:17           Vitamin D 25 Hydroxy   Order: [id number]   Status:  Final result   Visible to patient:  No (Not Released)   Dx:  Medicare welcome visit; Vitamin D ins...    Ref Range & Units 1d ago   25 Hydroxy, Vitamin D ng/ml 47.6    Resulting Agency   [facility]      Narrative   Performed by: [facility]   Reference Ranges for Total Vitamin D 25(OH)    Deficiency      <20.0 ng/ml  Insufficiency   20-30 ng/ml  Sufficiency     ng/ml  Toxicity         >100 ng/ml      Specimen Collected: 02/27/19 15:13 Last Resulted: 02/27/19 22:13           Contains abnormal data CBC Auto Differential   Order: [id number]   Status:  Final result   Visible to patient:  No (Not Released)   Dx:  Medicare welcome visit    Ref Range & Units 1d ago   WBC 3.50 - 10.80 10*3/mm3 7.64    RBC 3.89 - 5.14 10*6/mm3 4.88    Hemoglobin 11.5 - 15.5 g/dL 13.7    Hematocrit 34.5 - 44.0 % 43.5    MCV 80.0 - 99.0 fL 89.1    MCH 27.0 - 31.0 pg 28.1    MCHC 32.0 - 36.0 g/dL 31.5 Abnormally  low     RDW 11.3 - 14.5 % 14.1    RDW-SD 37.0 - 54.0 fl 45.8    MPV 6.0 - 12.0 fL 11.2    Platelets 150 - 450 10*3/mm3 316    Neutrophil % 41.0 - 71.0 % 62.8    Lymphocyte % 24.0 - 44.0 % 25.0    Monocyte % 0.0 - 12.0 % 6.4    Eosinophil % 0.0 - 3.0 % 5.2 Abnormally high     Basophil % 0.0 - 1.0 % 0.3    Immature Grans % 0.0 - 0.6 % 0.3    Neutrophils, Absolute 1.50 - 8.30 10*3/mm3 4.80    Lymphocytes, Absolute 0.60 - 4.80 10*3/mm3 1.91    Monocytes, Absolute 0.00 - 1.00 10*3/mm3 0.49    Eosinophils, Absolute 0.00 - 0.30 10*3/mm3 0.40 Abnormally high     Basophils, Absolute 0.00 - 0.20 10*3/mm3 0.02    Immature Grans, Absolute 0.00 - 0.05 10*3/mm3 0.02    Resulting Agency  [facility]         Specimen Collected: 02/27/19 15:13 Last Resulted: 02/27/19 20:39           HbA1c:  Lab Results   Component Value Date    HGBA1C 5.20 02/27/2019       Visual Acuity:  No exam data present    Age-appropriate Screening Schedule:  Refer to the list below for future screening recommendations based on patient's age, sex and/or medical conditions. Orders for these recommended tests are listed in the plan section. The patient has been provided with a written plan.    Health Maintenance   Topic Date Due   • PNEUMOCOCCAL VACCINE (19-64 MEDIUM RISK) (1 of 1 - PPSV23) 06/21/2019 (Originally 3/23/2001)   • TDAP/TD VACCINES (1 - Tdap) 08/21/2020 (Originally 3/23/2001)   • PAP SMEAR  06/08/2020   • INFLUENZA VACCINE  Completed        Subjective   History of Present Illness    Crystal A Day is a 38 y.o. female an established patient presenting for a Welcome to Medicare Visit. History of chronic depression, bipolar disorder, chronic pain, GERD without barrets esophagus, chronic abd pain suspected IBS, fatty liver followed by GI Dr Hankins.  Still having some cough from recent bronchitis. Reviewed recent ct chest no pneumonia.  No fever or shortness of air.  Fell down steps earlier today tripped. Hit left knee with some pain swelling left knee  nonradiating. History vit D Deficiency.    The following portions of the patient's history were reviewed and updated as appropriate: allergies, current medications, past family history, past medical history, past social history, past surgical history and problem list.    Outpatient Medications Prior to Visit   Medication Sig Dispense Refill   • albuterol 108 (90 Base) MCG/ACT inhaler Inhale 2 puffs Every 4 (Four) Hours As Needed for Wheezing. 18 g 11   • benzonatate (TESSALON) 200 MG capsule Take 1 capsule by mouth 3 (Three) Times a Day As Needed for Cough. 20 capsule 0   • cefdinir (OMNICEF) 300 MG capsule Take 1 capsule by mouth 2 (Two) Times a Day. 20 capsule 0   • cetirizine (zyrTEC) 10 MG tablet Take 1 tablet by mouth Daily.  1   • citalopram (CeleXA) 10 MG tablet Take 20 mg by mouth Daily.     • diclofenac (VOLTAREN) 1 % gel gel Apply 4 g topically 4 (Four) Times a Day As Needed (as needed for pain). 1 tube 0   • EPIPEN 2-JAREN 0.3 MG/0.3ML solution auto-injector injection U UTD  6   • estradiol (ESTRACE) 2 MG tablet Take 1 tablet by mouth Daily. 30 tablet 5   • fluticasone (FLONASE) 50 MCG/ACT nasal spray 2 sprays into each nostril Daily. 1 bottle 3   • gabapentin (NEURONTIN) 300 MG capsule 1 capsule by mouth every night 30 capsule 2   • lidocaine (XYLOCAINE) 5 % ointment      • LORazepam (ATIVAN) 1 MG tablet TK 1 T PO D PRN  0   • NEXIUM 40 MG capsule Take 1 capsule by mouth Every Morning Before Breakfast. Increase to 2 times per day prn chest pain indigestion 60 capsule 5   • nicotine (NICODERM CQ) 21 MG/24HR patch Place 1 patch on the skin as directed by provider Daily. 28 each 11   • ondansetron ODT (ZOFRAN-ODT) 4 MG disintegrating tablet Take 1 tablet by mouth Every 8 (Eight) Hours As Needed for Nausea or Vomiting. 8 tablet 0   • PATADAY 0.2 % solution ophthalmic solution USE 1 DROP AEY ONCE DAILY PRN  3   • promethazine-dextromethorphan (PROMETHAZINE-DM) 6.25-15 MG/5ML syrup Take 5 mL by mouth 4 (Four)  Times a Day As Needed for Cough. 240 mL 0   • SUMAtriptan (IMITREX) 25 MG tablet Take one tablet at onset of headache. May repeat dose one time in 2 hours if headache not relieved. 16 tablet 2   • topiramate (TOPAMAX) 50 MG tablet      • traMADol (ULTRAM) 50 MG tablet Take 1 tablet by mouth Every 8 (Eight) Hours As Needed for Moderate Pain . 60 tablet 0   • traZODone (DESYREL) 150 MG tablet Take 1 tablet by mouth As Needed.  3   • triamcinolone (KENALOG) 0.1 % cream        No facility-administered medications prior to visit.        Patient Active Problem List   Diagnosis   • Allergic rhinitis   • Tavera's esophagus   • Bipolar disorder (CMS/HCC)   • Fibromyalgia   • Gastroesophageal reflux disease   • Insomnia   • Abdominal wall abscess   • Acute recurrent frontal sinusitis   • Spasm   • Right upper quadrant pain   • Uncomplicated asthma   • Shortness of breath   • Atypical chest pain   • Lung nodules - calcified granulomas   • Atelectasis   • Hypercalcemia   • Displacement of intervertebral disc of lumbosacral region   • Lumbar discogenic pain syndrome   • Spondylosis of lumbar region without myelopathy or radiculopathy   • Memory loss   • Bronchitis       Advance Care Planning:  has NO advance directive - information provided to the patient today    Identification of Risk Factors:  Risk factors include: weight , inactivity, tobacco use, chronic pain, depression, financial stress, incontinence and polypharmacy.    Review of Systems   Constitutional: Negative for chills, fatigue and fever.   HENT: Positive for congestion. Negative for postnasal drip, rhinorrhea, sinus pressure, sinus pain and sneezing.    Respiratory: Positive for cough. Negative for shortness of breath and wheezing.    Cardiovascular: Negative for chest pain, palpitations and leg swelling.   Gastrointestinal: Negative for abdominal pain, diarrhea, nausea and vomiting.   Genitourinary: Negative for dysuria.        Occasional urinary incontinence  "  Musculoskeletal: Positive for arthralgias.   Skin: Negative for rash.   Neurological: Negative for weakness and headaches.   Psychiatric/Behavioral: Positive for dysphoric mood. The patient is nervous/anxious.        Compared to one year ago, the patient feels her physical health is worse.  Compared to one year ago, the patient feels her mental health is the same.    Objective    Physical Exam   Constitutional: She is oriented to person, place, and time. She appears well-developed and well-nourished. No distress.   HENT:   Head: Normocephalic.   Right Ear: External ear normal.   Left Ear: External ear normal.   Nose: Nose normal.   Mouth/Throat: Oropharynx is clear and moist.   Eyes: Conjunctivae and EOM are normal. Pupils are equal, round, and reactive to light.   Neck: Neck supple. No thyromegaly present.   Cardiovascular: Normal rate, regular rhythm, normal heart sounds and intact distal pulses.   Pulmonary/Chest: Effort normal and breath sounds normal. No respiratory distress.   Abdominal: Soft. Bowel sounds are normal. She exhibits no distension. There is no tenderness.   Musculoskeletal:        Left knee: She exhibits decreased range of motion and swelling. She exhibits no effusion, no erythema, normal alignment, no LCL laxity, normal patellar mobility, no bony tenderness, normal meniscus and no MCL laxity.   Normal ROM all major joints, except as noted above.   Lymphadenopathy:     She has no cervical adenopathy.   Neurological: She is alert and oriented to person, place, and time.   Skin: Skin is warm and dry. Capillary refill takes less than 2 seconds.   Psychiatric: She has a normal mood and affect. Her behavior is normal.       Vitals:    02/27/19 1402   BP: 112/72   Pulse: 105   Resp: 18   SpO2: 97%   Weight: 87.5 kg (193 lb)   Height: 154.9 cm (61\")   PainSc: 10-Worst pain ever       Patient's Body mass index is 36.47 kg/m². BMI is above normal parameters. Recommendations include: educational " material, exercise counseling and nutrition counseling.       Assessment/Plan   Patient Self-Management and Personalized Health Advice  The patient has been provided with information about: diet, exercise, weight management, the relationship between weight and GERD, tobacco cessation, fall prevention, designing advance directives and mental health concerns and preventive services including:   · Advance directive, Diabetes screening, see lab orders, Smoking cessation counseling completed.    Visit Diagnoses:    ICD-10-CM ICD-9-CM   1. Medicare welcome visit Z00.00 V70.0   2. Bronchitis J40 490   3. Vitamin D insufficiency E55.9 268.9   4. Contusion of left knee, initial encounter S80.02XA 924.11   5. Encounter for tobacco use cessation counseling Z71.6 V65.42       Orders Placed This Encounter   Procedures   • TSH   • Comprehensive Metabolic Panel   • CBC Auto Differential   • Vitamin B12   • Vitamin D 25 Hydroxy   • Hemoglobin A1c   • POC Urinalysis Dipstick, Automated     Bronchitis: continue albuterol, tessalon, and prn phenergan DM  Vit D def: stable no supplement needed at this time.  Contusion left knee: ice therapy, ace wrap, rest elevation if no improvement to let me know.  Tobacco cessation:   Risks/benefits and potential side effects of various smoking cessation treatment options reviewed with patient.  Smoking cessation support options also reviewed with patient. .  Patient voiced understanding and wishes to cont to try nicotine patch.  A total of 3 minutes dedicated to smoking cessation counseling during this visit.  Tobacco cessation advised.  Pt voiced understanding of health risks of cont'd tobacco use.    Nutrition and activity goals reviewed including: mainly water to drink, limit white flour/processed sugar; increase high protein, high fiber carbs, good breakfast, working toward 150 mins cardio per week, resistance training 2x/week.      Outpatient Encounter Medications as of 2/27/2019   Medication  Sig Dispense Refill   • albuterol 108 (90 Base) MCG/ACT inhaler Inhale 2 puffs Every 4 (Four) Hours As Needed for Wheezing. 18 g 11   • benzonatate (TESSALON) 200 MG capsule Take 1 capsule by mouth 3 (Three) Times a Day As Needed for Cough. 20 capsule 0   • cefdinir (OMNICEF) 300 MG capsule Take 1 capsule by mouth 2 (Two) Times a Day. 20 capsule 0   • cetirizine (zyrTEC) 10 MG tablet Take 1 tablet by mouth Daily.  1   • citalopram (CeleXA) 10 MG tablet Take 20 mg by mouth Daily.     • diclofenac (VOLTAREN) 1 % gel gel Apply 4 g topically 4 (Four) Times a Day As Needed (as needed for pain). 1 tube 0   • EPIPEN 2-JAREN 0.3 MG/0.3ML solution auto-injector injection U UTD  6   • estradiol (ESTRACE) 2 MG tablet Take 1 tablet by mouth Daily. 30 tablet 5   • fluticasone (FLONASE) 50 MCG/ACT nasal spray 2 sprays into each nostril Daily. 1 bottle 3   • gabapentin (NEURONTIN) 300 MG capsule 1 capsule by mouth every night 30 capsule 2   • lidocaine (XYLOCAINE) 5 % ointment      • LORazepam (ATIVAN) 1 MG tablet TK 1 T PO D PRN  0   • NEXIUM 40 MG capsule Take 1 capsule by mouth Every Morning Before Breakfast. Increase to 2 times per day prn chest pain indigestion 60 capsule 5   • nicotine (NICODERM CQ) 21 MG/24HR patch Place 1 patch on the skin as directed by provider Daily. 28 each 11   • ondansetron ODT (ZOFRAN-ODT) 4 MG disintegrating tablet Take 1 tablet by mouth Every 8 (Eight) Hours As Needed for Nausea or Vomiting. 8 tablet 0   • PATADAY 0.2 % solution ophthalmic solution USE 1 DROP AEY ONCE DAILY PRN  3   • promethazine-dextromethorphan (PROMETHAZINE-DM) 6.25-15 MG/5ML syrup Take 5 mL by mouth 4 (Four) Times a Day As Needed for Cough. 240 mL 0   • SUMAtriptan (IMITREX) 25 MG tablet Take one tablet at onset of headache. May repeat dose one time in 2 hours if headache not relieved. 16 tablet 2   • topiramate (TOPAMAX) 50 MG tablet      • traMADol (ULTRAM) 50 MG tablet Take 1 tablet by mouth Every 8 (Eight) Hours As Needed  for Moderate Pain . 60 tablet 0   • traZODone (DESYREL) 150 MG tablet Take 1 tablet by mouth As Needed.  3   • triamcinolone (KENALOG) 0.1 % cream        No facility-administered encounter medications on file as of 2/27/2019.        Reviewed use of high risk medication in the elderly: not applicable  Reviewed for potential of harmful drug interactions in the elderly: not applicable    Follow Up:  Return in about 1 year (around 2/27/2020), or if symptoms worsen or fail to improve, for Annual.     An After Visit Summary and PPPS with all of these plans were given to the patient.

## 2019-02-28 ENCOUNTER — APPOINTMENT (OUTPATIENT)
Dept: ULTRASOUND IMAGING | Facility: HOSPITAL | Age: 39
End: 2019-02-28

## 2019-02-28 LAB — VIT B12 BLD-MCNC: 478 PG/ML (ref 211–911)

## 2019-03-07 ENCOUNTER — TELEPHONE (OUTPATIENT)
Dept: GASTROENTEROLOGY | Facility: CLINIC | Age: 39
End: 2019-03-07

## 2019-03-12 ENCOUNTER — HOSPITAL ENCOUNTER (OUTPATIENT)
Dept: ULTRASOUND IMAGING | Facility: HOSPITAL | Age: 39
Discharge: HOME | End: 2019-03-12
Admitting: INTERNAL MEDICINE

## 2019-03-12 DIAGNOSIS — R10.9 ABDOMINAL PAIN, UNSPECIFIED ABDOMINAL LOCATION: ICD-10-CM

## 2019-03-12 PROCEDURE — 76700 US EXAM ABDOM COMPLETE: CPT

## 2019-03-13 ENCOUNTER — DOCUMENTATION (OUTPATIENT)
Dept: PULMONOLOGY | Facility: CLINIC | Age: 39
End: 2019-03-13

## 2019-03-13 ENCOUNTER — TELEPHONE (OUTPATIENT)
Dept: PULMONOLOGY | Facility: CLINIC | Age: 39
End: 2019-03-13

## 2019-03-13 NOTE — TELEPHONE ENCOUNTER
Patient called wanting to discuss CT scan results and has some questions about findings could you please call Thank you

## 2019-03-13 NOTE — PROGRESS NOTES
Called and discussed CT results with her.  Calcified granulomas are stable.  Mild atelectasis likely related to a recent PNA or chronic reflux.

## 2019-03-19 ENCOUNTER — OFFICE VISIT (OUTPATIENT)
Dept: INTERNAL MEDICINE | Facility: CLINIC | Age: 39
End: 2019-03-19

## 2019-03-19 VITALS
WEIGHT: 194 LBS | HEIGHT: 67 IN | DIASTOLIC BLOOD PRESSURE: 78 MMHG | HEART RATE: 107 BPM | TEMPERATURE: 99.5 F | OXYGEN SATURATION: 98 % | BODY MASS INDEX: 30.45 KG/M2 | SYSTOLIC BLOOD PRESSURE: 138 MMHG

## 2019-03-19 DIAGNOSIS — M54.42 CHRONIC MIDLINE LOW BACK PAIN WITH BILATERAL SCIATICA: ICD-10-CM

## 2019-03-19 DIAGNOSIS — R11.0 NAUSEA: ICD-10-CM

## 2019-03-19 DIAGNOSIS — M47.816 SPONDYLOSIS OF LUMBAR REGION WITHOUT MYELOPATHY OR RADICULOPATHY: ICD-10-CM

## 2019-03-19 DIAGNOSIS — M51.26 LUMBAR DISCOGENIC PAIN SYNDROME: ICD-10-CM

## 2019-03-19 DIAGNOSIS — G89.29 CHRONIC MIDLINE LOW BACK PAIN WITH BILATERAL SCIATICA: ICD-10-CM

## 2019-03-19 DIAGNOSIS — M54.41 CHRONIC MIDLINE LOW BACK PAIN WITH BILATERAL SCIATICA: ICD-10-CM

## 2019-03-19 DIAGNOSIS — M51.26 HERNIATED LUMBAR INTERVERTEBRAL DISC: ICD-10-CM

## 2019-03-19 DIAGNOSIS — R19.7 DIARRHEA, UNSPECIFIED TYPE: Primary | ICD-10-CM

## 2019-03-19 PROCEDURE — 99213 OFFICE O/P EST LOW 20 MIN: CPT | Performed by: NURSE PRACTITIONER

## 2019-03-19 RX ORDER — BACLOFEN 10 MG/1
10 TABLET ORAL 3 TIMES DAILY PRN
Qty: 90 TABLET | Refills: 2 | Status: SHIPPED | OUTPATIENT
Start: 2019-03-19 | End: 2019-10-15 | Stop reason: SDUPTHER

## 2019-03-19 RX ORDER — ONDANSETRON 4 MG/1
4 TABLET, ORALLY DISINTEGRATING ORAL EVERY 8 HOURS PRN
Qty: 60 TABLET | Refills: 2 | Status: SHIPPED | OUTPATIENT
Start: 2019-03-19 | End: 2019-09-26

## 2019-03-19 RX ORDER — TRAMADOL HYDROCHLORIDE 50 MG/1
50 TABLET ORAL EVERY 8 HOURS PRN
Qty: 60 TABLET | Refills: 0 | Status: CANCELLED | OUTPATIENT
Start: 2019-03-19

## 2019-03-19 RX ORDER — GABAPENTIN 300 MG/1
CAPSULE ORAL
Qty: 30 CAPSULE | Refills: 2 | Status: CANCELLED | OUTPATIENT
Start: 2019-03-19

## 2019-03-19 NOTE — PROGRESS NOTES
Chief Complaint   Patient presents with   • Diarrhea     x4 days nephew had e-coli        History of Present Illness  38 y.o.female presents for diarrhea and chronic back pain.    Complains of watery diarrhea for 4 days; has lost weight approximately 4 pounds.  Every time she eats it goes straight through her with diarrhea.  Positive nausea no vomiting.  She does have some low mid abdominal pain cramping symptoms.  Feels like a low-grade fever sometimes.  She is concerned because her nephew had recently stated her house and he was diagnosed with some E. coli in the stool.  She is on azithromycin for recent sinus infection that she was treated for at [facility].      Chronic back pain fibromyalgia; needs refill prn tramadol does not take that often about 2-3 times per week..  Gabapentin still has one refill left but she would like to increase dose because does not feel the once a day is helping much.  Needs refill on baclofen.  He reports good relief of pain with this combination.  Prior history noted to have herniated lumbar disc and lumbar discogenic pain syndrome with spondylosis of lumbar region.  She reports having epidural injections in the past but had a bad experience and refuses to have further.  Pain is low back midline to bilateral with radiation of pain bilateral legs.    Review of Systems   Constitutional: Positive for appetite change, chills, fatigue, fever and unexpected weight loss.   HENT: Positive for congestion, postnasal drip, rhinorrhea and sinus pressure. Negative for ear pain, sneezing and sore throat.    Respiratory: Negative for cough and shortness of breath.    Gastrointestinal: Positive for abdominal pain and diarrhea. Negative for blood in stool, nausea and vomiting.   Genitourinary: Negative for difficulty urinating.   Musculoskeletal: Positive for arthralgias, back pain and myalgias.       PMSFH  The following portions of the patient's history were reviewed and updated as appropriate:  allergies, current medications, past family history, past medical history, past social history, past surgical history and problem list.       Past Medical History:   Diagnosis Date   • Abdominal pain    • Abnormal breast exam    • Abnormal Pap smear of cervix    • Achilles tendinitis    • Acute sinusitis    • Anxiety    • Arthritis    • Asthma    • Tavera's syndrome    • Bipolar 1 disorder (CMS/HCC)    • Bowel trouble    • Breast cyst    • Colon polyps    • Constipation    • Depression    • Diverticulitis    • Diverticulitis of colon    • Endometriosis    • Eustachian tube dysfunction    • Extremity pain    • Fatty liver    • Female pelvic pain    • Gastric ulcer    • H/O bladder infections    • History of rashes as a child    • Irritable bowel syndrome    • Low back pain    • Menopausal symptoms    • Muscle weakness    • Ovarian cyst    • PID (pelvic inflammatory disease)    • Recurrent UTI    • Sexual problems       Past Surgical History:   Procedure Laterality Date   •  SECTION     • CHOLECYSTECTOMY     • COLONOSCOPY  2017   • HYSTERECTOMY     • NASAL ENDOSCOPY     • OOPHORECTOMY Bilateral    • OTHER SURGICAL HISTORY      Laparoscopy (diagnostic) gynecologic with biopsy   • SHOULDER SURGERY     • UPPER GASTROINTESTINAL ENDOSCOPY  2019      Allergies   Allergen Reactions   • Adhesive Tape Hives   • Latex Hives   • Sulfa Antibiotics Hives   • Sulfate Hives   • Biaxin [Clarithromycin] Nausea Only   • Codeine Itching   • Penicillins Nausea Only      Family History   Problem Relation Age of Onset   • Liver disease Mother    • Diabetes Mother    • Hyperlipidemia Mother    • Hypertension Mother    • Thyroid disease Mother    • Arthritis Father    • Mental illness Father    • Migraines Sister    • Stroke Other    • Diabetes Other    • Hyperlipidemia Other    • Hypertension Other    • Mental illness Other    • Migraines Other    • Tuberculosis Other    • Breast cancer Neg Hx    • Endometrial cancer Neg Hx    •  Ovarian cancer Neg Hx             Current Outpatient Medications:   •  albuterol 108 (90 Base) MCG/ACT inhaler, Inhale 2 puffs Every 4 (Four) Hours As Needed for Wheezing., Disp: 18 g, Rfl: 11  •  benzonatate (TESSALON) 200 MG capsule, Take 1 capsule by mouth 3 (Three) Times a Day As Needed for Cough., Disp: 20 capsule, Rfl: 0  •  cefdinir (OMNICEF) 300 MG capsule, Take 1 capsule by mouth 2 (Two) Times a Day., Disp: 20 capsule, Rfl: 0  •  cetirizine (zyrTEC) 10 MG tablet, Take 1 tablet by mouth Daily., Disp: , Rfl: 1  •  citalopram (CeleXA) 10 MG tablet, Take 20 mg by mouth Daily., Disp: , Rfl:   •  diclofenac (VOLTAREN) 1 % gel gel, Apply 4 g topically 4 (Four) Times a Day As Needed (as needed for pain)., Disp: 1 tube, Rfl: 0  •  EPIPEN 2-JAREN 0.3 MG/0.3ML solution auto-injector injection, U UTD, Disp: , Rfl: 6  •  estradiol (ESTRACE) 2 MG tablet, Take 1 tablet by mouth Daily., Disp: 30 tablet, Rfl: 5  •  fluticasone (FLONASE) 50 MCG/ACT nasal spray, 2 sprays into each nostril Daily., Disp: 1 bottle, Rfl: 3  •  gabapentin (NEURONTIN) 300 MG capsule, 1 capsule by mouth every night, Disp: 30 capsule, Rfl: 2  •  ipratropium-albuterol (DUO-NEB) 0.5-2.5 mg/3 ml nebulizer, Take 3 mL by nebulization Every 4 (Four) Hours As Needed for Wheezing or Shortness of Air., Disp: 360 mL, Rfl: 11  •  lidocaine (XYLOCAINE) 5 % ointment, , Disp: , Rfl:   •  LORazepam (ATIVAN) 1 MG tablet, TK 1 T PO D PRN, Disp: , Rfl: 0  •  NEXIUM 40 MG capsule, Take 1 capsule by mouth Every Morning Before Breakfast. Increase to 2 times per day prn chest pain indigestion, Disp: 60 capsule, Rfl: 5  •  nicotine (NICODERM CQ) 21 MG/24HR patch, Place 1 patch on the skin as directed by provider Daily., Disp: 28 each, Rfl: 11  •  ondansetron ODT (ZOFRAN-ODT) 4 MG disintegrating tablet, Take 1 tablet by mouth Every 8 (Eight) Hours As Needed for Nausea or Vomiting., Disp: 8 tablet, Rfl: 0  •  PATADAY 0.2 % solution ophthalmic solution, USE 1 DROP AEY ONCE  "DAILY PRN, Disp: , Rfl: 3  •  SUMAtriptan (IMITREX) 25 MG tablet, Take one tablet at onset of headache. May repeat dose one time in 2 hours if headache not relieved., Disp: 16 tablet, Rfl: 2  •  traMADol (ULTRAM) 50 MG tablet, Take 1 tablet by mouth Every 8 (Eight) Hours As Needed for Moderate Pain ., Disp: 60 tablet, Rfl: 0  •  traZODone (DESYREL) 150 MG tablet, Take 1 tablet by mouth As Needed., Disp: , Rfl: 3    VITALS:  /78   Pulse 107   Temp 99.5 °F (37.5 °C)   Ht 170.2 cm (67\")   Wt 88 kg (194 lb)   SpO2 98%   BMI 30.38 kg/m²     Physical Exam   Constitutional: She is oriented to person, place, and time. She appears well-developed and well-nourished. No distress.   HENT:   Head: Normocephalic.   Mouth/Throat: Oropharynx is clear and moist.   Eyes: Pupils are equal, round, and reactive to light.   Cardiovascular: Normal rate, regular rhythm and normal heart sounds.   Pulmonary/Chest: Effort normal and breath sounds normal.   Abdominal: Soft. Bowel sounds are normal. She exhibits no distension and no mass. There is no tenderness. There is no rebound and no guarding.   Musculoskeletal:        Lumbar back: She exhibits normal range of motion, no tenderness, no bony tenderness and no spasm.   Neurological: She is alert and oriented to person, place, and time.   Skin: Skin is warm and dry. Capillary refill takes less than 2 seconds. No rash noted.   Psychiatric: She has a normal mood and affect. Her behavior is normal.   Vitals reviewed.      LABS  Fecal leukocytes negative  Stool culture negative salmonella, shigella, campylobacter, Ecoli.    ASSESSMENT/PLAN  Megan was seen today for diarrhea.    Diagnoses and all orders for this visit:    Diarrhea, unspecified type  -     Stool Culture - Stool, Per Rectum  -     Fecal Leukocytes - Stool, Per Rectum    Nausea  -     ondansetron ODT (ZOFRAN-ODT) 4 MG disintegrating tablet; Take 1 tablet by mouth Every 8 (Eight) Hours As Needed for Nausea or " Vomiting.    Herniated lumbar intervertebral disc  -     baclofen (LIORESAL) 10 MG tablet; Take 1 tablet by mouth 3 (Three) Times a Day As Needed for Muscle Spasms.    Lumbar discogenic pain syndrome  -     baclofen (LIORESAL) 10 MG tablet; Take 1 tablet by mouth 3 (Three) Times a Day As Needed for Muscle Spasms.    Spondylosis of lumbar region without myelopathy or radiculopathy  -     baclofen (LIORESAL) 10 MG tablet; Take 1 tablet by mouth 3 (Three) Times a Day As Needed for Muscle Spasms.    Chronic midline low back pain with bilateral sciatica  -     baclofen (LIORESAL) 10 MG tablet; Take 1 tablet by mouth 3 (Three) Times a Day As Needed for Muscle Spasms.    Other orders  -     traMADol (ULTRAM) 50 MG tablet; Take 1 tablet by mouth Every 8 (Eight) Hours As Needed for Moderate Pain  (per Dr. Schafer)  -     gabapentin (NEURONTIN) 300 MG capsule; 1 capsule by mouth every night (per Dr. Schafer)    needs refill gabapentin and tramadol.  This had been being prescribed per prior primary care Tina Obregon.  I will need to discuss with my collaborating physician for controlled substances.    JOSELYN reviewed.  Rx refill authorized per Dr. Schafer for ultram and gabapentin.  Pt to keep routine f/u apt.    Regarding diarrhea and GI symptoms, could be something viral; could also be related to recent antibiotics for her sinus infection; and then she is also had a possible E. coli exposure. allergic to Bactrim.  I am hesitant to start her on a fluoroquinolone if not E. coli.  Would like to get stool cultures first and further assess.  I do want her to start on a probiotic and make sure that she is eating yogurt and drinking plenty of water.  If she is not able to hold anything down with the nausea or worsening she needs to let me know or seek emergency care.      I discussed the patients findings and my recommendations with patient.  Patient was encouraged to keep me informed of any acute changes, lack of improvement, or  any new concerning symptoms.    Patient voiced understanding of all instructions and denied further questions.      FOLLOW-UP  Return if symptoms worsen or fail to improve.    Electronically signed by:    MAGGIE Chacon  03/19/2019

## 2019-03-20 LAB — WBC STL QL MICRO: NORMAL

## 2019-03-20 PROCEDURE — 87205 SMEAR GRAM STAIN: CPT | Performed by: NURSE PRACTITIONER

## 2019-03-20 PROCEDURE — 87046 STOOL CULTR AEROBIC BACT EA: CPT | Performed by: NURSE PRACTITIONER

## 2019-03-20 PROCEDURE — 87045 FECES CULTURE AEROBIC BACT: CPT | Performed by: NURSE PRACTITIONER

## 2019-03-21 DIAGNOSIS — M47.816 SPONDYLOSIS OF LUMBAR REGION WITHOUT MYELOPATHY OR RADICULOPATHY: ICD-10-CM

## 2019-03-21 DIAGNOSIS — M51.26 LUMBAR DISCOGENIC PAIN SYNDROME: ICD-10-CM

## 2019-03-21 DIAGNOSIS — M51.26 HERNIATED LUMBAR INTERVERTEBRAL DISC: ICD-10-CM

## 2019-03-22 ENCOUNTER — TELEPHONE (OUTPATIENT)
Dept: GASTROENTEROLOGY | Facility: CLINIC | Age: 39
End: 2019-03-22

## 2019-03-22 NOTE — TELEPHONE ENCOUNTER
PT CALLED LVM REGARDING ABD ISSUES; UNABLE TO KEEP FOOD IN SPECIFIED LOTS OF DIARRHEA AND RAPID WEIGHT LOSS.  CALL PT, NO ANSWER, LVM   ADVISING PT IF THIS WERE TO BECOME URGENT ISSUE TO SEEK MEDICAL ATTENTION AT THE LOCAL ER.

## 2019-03-23 ENCOUNTER — TELEPHONE (OUTPATIENT)
Dept: INTERNAL MEDICINE | Facility: CLINIC | Age: 39
End: 2019-03-23

## 2019-03-23 LAB — BACTERIA SPEC AEROBE CULT: NORMAL

## 2019-03-23 NOTE — TELEPHONE ENCOUNTER
----- Message from MAGGIE Patel sent at 3/23/2019  2:29 PM EDT -----  Please let pt know stool negative for ecoli.

## 2019-03-25 ENCOUNTER — TELEPHONE (OUTPATIENT)
Dept: INTERNAL MEDICINE | Facility: CLINIC | Age: 39
End: 2019-03-25

## 2019-03-25 NOTE — TELEPHONE ENCOUNTER
PATIENT STATES THAT SHE HAS NOT HAD ANY CHANGES SINCE HER LAST VISIT ON 03/19/2019 AND WOULD LIKE TO GET A CALL BACK -[phone].

## 2019-03-25 NOTE — TELEPHONE ENCOUNTER
PT CALLED BACK REGARDING ABD PAIN, DIARRHEA AND STILL UNABLE TO KEEP FOODS IN.  ADVISED PT I WOULD SENT A CONSULT TO DR. FIERRO  PT VOICED UNDERSTANDING

## 2019-03-25 NOTE — TELEPHONE ENCOUNTER
Pt was calling in to say that she still has diarrhea and very bad abdominal cramps.     Please advise on what to do?

## 2019-03-26 ENCOUNTER — APPOINTMENT (OUTPATIENT)
Dept: ULTRASOUND IMAGING | Facility: HOSPITAL | Age: 39
End: 2019-03-26

## 2019-03-26 DIAGNOSIS — R10.9 ABDOMINAL CRAMPING: Primary | ICD-10-CM

## 2019-03-26 DIAGNOSIS — R19.7 DIARRHEA, UNSPECIFIED TYPE: ICD-10-CM

## 2019-03-26 RX ORDER — DICYCLOMINE HYDROCHLORIDE 10 MG/1
10 CAPSULE ORAL 4 TIMES DAILY PRN
Qty: 30 CAPSULE | Refills: 0 | OUTPATIENT
Start: 2019-03-26 | End: 2019-03-27

## 2019-03-26 RX ORDER — TRAMADOL HYDROCHLORIDE 50 MG/1
TABLET ORAL
Qty: 60 TABLET | Refills: 0 | OUTPATIENT
Start: 2019-03-26

## 2019-03-26 NOTE — TELEPHONE ENCOUNTER
Let pt know of message. Pt verbalized that she thinks that she needs the CT with contrast because it shows more. Please advise.

## 2019-03-26 NOTE — TELEPHONE ENCOUNTER
Please let pt know, I have ordered CT abd to rule out colitis or inflammation of colon.  Cultures were negative.  I sent an RX for bentyl which should help some with abd cramping.  If nothing on CT will send her to see GI specialist.

## 2019-03-27 ENCOUNTER — APPOINTMENT (OUTPATIENT)
Dept: CT IMAGING | Facility: HOSPITAL | Age: 39
End: 2019-03-27

## 2019-03-27 ENCOUNTER — HOSPITAL ENCOUNTER (EMERGENCY)
Facility: HOSPITAL | Age: 39
Discharge: HOME | End: 2019-03-27
Attending: EMERGENCY MEDICINE | Admitting: EMERGENCY MEDICINE

## 2019-03-27 VITALS
BODY MASS INDEX: 36.44 KG/M2 | RESPIRATION RATE: 18 BRPM | OXYGEN SATURATION: 95 % | WEIGHT: 193 LBS | DIASTOLIC BLOOD PRESSURE: 73 MMHG | SYSTOLIC BLOOD PRESSURE: 113 MMHG | HEIGHT: 61 IN | TEMPERATURE: 98.7 F | HEART RATE: 107 BPM

## 2019-03-27 DIAGNOSIS — K76.0 FATTY LIVER: ICD-10-CM

## 2019-03-27 DIAGNOSIS — R19.7 DIARRHEA OF PRESUMED INFECTIOUS ORIGIN: ICD-10-CM

## 2019-03-27 DIAGNOSIS — R10.30 LOWER ABDOMINAL PAIN: Primary | ICD-10-CM

## 2019-03-27 LAB
ALBUMIN SERPL-MCNC: 4.58 G/DL (ref 3.2–4.8)
ALBUMIN/GLOB SERPL: 1.7 G/DL (ref 1.5–2.5)
ALP SERPL-CCNC: 72 U/L (ref 25–100)
ALT SERPL W P-5'-P-CCNC: 40 U/L (ref 7–40)
ANION GAP SERPL CALCULATED.3IONS-SCNC: 12 MMOL/L (ref 3–11)
AST SERPL-CCNC: 25 U/L (ref 0–33)
BASOPHILS # BLD AUTO: 0.02 10*3/MM3 (ref 0–0.2)
BASOPHILS NFR BLD AUTO: 0.2 % (ref 0–1)
BILIRUB SERPL-MCNC: 0.3 MG/DL (ref 0.3–1.2)
BILIRUB UR QL STRIP: NEGATIVE
BUN BLD-MCNC: 16 MG/DL (ref 9–23)
BUN/CREAT SERPL: 20.8 (ref 7–25)
CALCIUM SPEC-SCNC: 9.9 MG/DL (ref 8.7–10.4)
CHLORIDE SERPL-SCNC: 105 MMOL/L (ref 99–109)
CLARITY UR: CLEAR
CO2 SERPL-SCNC: 21 MMOL/L (ref 20–31)
COLOR UR: YELLOW
CREAT BLD-MCNC: 0.77 MG/DL (ref 0.6–1.3)
DEPRECATED RDW RBC AUTO: 46.3 FL (ref 37–54)
EOSINOPHIL # BLD AUTO: 0.29 10*3/MM3 (ref 0–0.3)
EOSINOPHIL NFR BLD AUTO: 2.8 % (ref 0–3)
ERYTHROCYTE [DISTWIDTH] IN BLOOD BY AUTOMATED COUNT: 14.2 % (ref 11.3–14.5)
GFR SERPL CREATININE-BSD FRML MDRD: 84 ML/MIN/1.73
GLOBULIN UR ELPH-MCNC: 2.6 GM/DL
GLUCOSE BLD-MCNC: 94 MG/DL (ref 70–100)
GLUCOSE UR STRIP-MCNC: NEGATIVE MG/DL
HCT VFR BLD AUTO: 42 % (ref 34.5–44)
HGB BLD-MCNC: 13.5 G/DL (ref 11.5–15.5)
HGB UR QL STRIP.AUTO: NEGATIVE
HOLD SPECIMEN: NORMAL
HOLD SPECIMEN: NORMAL
IMM GRANULOCYTES # BLD AUTO: 0.02 10*3/MM3 (ref 0–0.05)
IMM GRANULOCYTES NFR BLD AUTO: 0.2 % (ref 0–0.6)
KETONES UR QL STRIP: NEGATIVE
LEUKOCYTE ESTERASE UR QL STRIP.AUTO: NEGATIVE
LIPASE SERPL-CCNC: 37 U/L (ref 6–51)
LYMPHOCYTES # BLD AUTO: 2.61 10*3/MM3 (ref 0.6–4.8)
LYMPHOCYTES NFR BLD AUTO: 25.5 % (ref 24–44)
MCH RBC QN AUTO: 28.6 PG (ref 27–31)
MCHC RBC AUTO-ENTMCNC: 32.1 G/DL (ref 32–36)
MCV RBC AUTO: 89 FL (ref 80–99)
MONOCYTES # BLD AUTO: 0.5 10*3/MM3 (ref 0–1)
MONOCYTES NFR BLD AUTO: 4.9 % (ref 0–12)
NEUTROPHILS # BLD AUTO: 6.83 10*3/MM3 (ref 1.5–8.3)
NEUTROPHILS NFR BLD AUTO: 66.6 % (ref 41–71)
NITRITE UR QL STRIP: NEGATIVE
PH UR STRIP.AUTO: <=5 [PH] (ref 5–8)
PLATELET # BLD AUTO: 277 10*3/MM3 (ref 150–450)
PMV BLD AUTO: 10.7 FL (ref 6–12)
POTASSIUM BLD-SCNC: 3.5 MMOL/L (ref 3.5–5.5)
PROT SERPL-MCNC: 7.2 G/DL (ref 5.7–8.2)
PROT UR QL STRIP: NEGATIVE
RBC # BLD AUTO: 4.72 10*6/MM3 (ref 3.89–5.14)
SODIUM BLD-SCNC: 138 MMOL/L (ref 132–146)
SP GR UR STRIP: 1.03 (ref 1–1.03)
UROBILINOGEN UR QL STRIP: ABNORMAL
WBC NRBC COR # BLD: 10.25 10*3/MM3 (ref 3.5–10.8)
WHOLE BLOOD HOLD SPECIMEN: NORMAL
WHOLE BLOOD HOLD SPECIMEN: NORMAL

## 2019-03-27 PROCEDURE — 83690 ASSAY OF LIPASE: CPT | Performed by: EMERGENCY MEDICINE

## 2019-03-27 PROCEDURE — 25010000002 KETOROLAC TROMETHAMINE PER 15 MG: Performed by: EMERGENCY MEDICINE

## 2019-03-27 PROCEDURE — 74176 CT ABD & PELVIS W/O CONTRAST: CPT

## 2019-03-27 PROCEDURE — 81003 URINALYSIS AUTO W/O SCOPE: CPT | Performed by: EMERGENCY MEDICINE

## 2019-03-27 PROCEDURE — 85025 COMPLETE CBC W/AUTO DIFF WBC: CPT | Performed by: EMERGENCY MEDICINE

## 2019-03-27 PROCEDURE — 96374 THER/PROPH/DIAG INJ IV PUSH: CPT

## 2019-03-27 PROCEDURE — 80053 COMPREHEN METABOLIC PANEL: CPT | Performed by: EMERGENCY MEDICINE

## 2019-03-27 PROCEDURE — 99284 EMERGENCY DEPT VISIT MOD MDM: CPT

## 2019-03-27 RX ORDER — SACCHAROMYCES BOULARDII 250 MG
250 CAPSULE ORAL 2 TIMES DAILY
Qty: 14 CAPSULE | Refills: 0 | Status: SHIPPED | OUTPATIENT
Start: 2019-03-27 | End: 2020-06-16

## 2019-03-27 RX ORDER — SACCHAROMYCES BOULARDII 250 MG
500 CAPSULE ORAL ONCE
Status: COMPLETED | OUTPATIENT
Start: 2019-03-27 | End: 2019-03-27

## 2019-03-27 RX ORDER — DICYCLOMINE HCL 20 MG
20 TABLET ORAL EVERY 8 HOURS PRN
Qty: 21 TABLET | Refills: 0 | Status: SHIPPED | OUTPATIENT
Start: 2019-03-27 | End: 2019-06-12 | Stop reason: SDUPTHER

## 2019-03-27 RX ORDER — SODIUM CHLORIDE 0.9 % (FLUSH) 0.9 %
10 SYRINGE (ML) INJECTION AS NEEDED
Status: DISCONTINUED | OUTPATIENT
Start: 2019-03-27 | End: 2019-03-27 | Stop reason: HOSPADM

## 2019-03-27 RX ORDER — DICYCLOMINE HYDROCHLORIDE 10 MG/1
20 CAPSULE ORAL ONCE
Status: COMPLETED | OUTPATIENT
Start: 2019-03-27 | End: 2019-03-27

## 2019-03-27 RX ORDER — KETOROLAC TROMETHAMINE 15 MG/ML
10 INJECTION, SOLUTION INTRAMUSCULAR; INTRAVENOUS ONCE
Status: COMPLETED | OUTPATIENT
Start: 2019-03-27 | End: 2019-03-27

## 2019-03-27 RX ADMIN — DICYCLOMINE HYDROCHLORIDE 20 MG: 10 CAPSULE ORAL at 18:10

## 2019-03-27 RX ADMIN — SODIUM CHLORIDE, POTASSIUM CHLORIDE, SODIUM LACTATE AND CALCIUM CHLORIDE 1000 ML: 600; 310; 30; 20 INJECTION, SOLUTION INTRAVENOUS at 18:14

## 2019-03-27 RX ADMIN — Medication 500 MG: at 18:10

## 2019-03-27 RX ADMIN — KETOROLAC TROMETHAMINE 10 MG: 15 INJECTION, SOLUTION INTRAMUSCULAR; INTRAVENOUS at 18:12

## 2019-03-28 ENCOUNTER — TELEPHONE (OUTPATIENT)
Dept: GASTROENTEROLOGY | Facility: CLINIC | Age: 39
End: 2019-03-28

## 2019-03-28 NOTE — TELEPHONE ENCOUNTER
RETURNED PT CALL REGARDING RESULT LETTER.  PT STATES SHE IS CONFUSED ON HOW A CT SCAN CAN RULE OUT CANCER OF HER LIVER.   PT STATES SHE IS STILL HAVING ABD. PAIN AND WAS RECENTLY SEEN AT ER.  PT STATES SHE SEEKS FOR ANSWERS BECAUSE SHE IS STILL IN PAIN, SWOLLEN AND IS TIRED OF BEING SICK AND IN PAIN.  ADVISED PT I WILL CONSULT WITH DR. FIERRO.   PT VOICED UNDERSTANDING.

## 2019-03-28 NOTE — TELEPHONE ENCOUNTER
CONSULTED WITH DR. FIERRO REGARDING ISSUES FROM WHAT PT STATES.   DR. FIERRO  DOES NOT SEE ANY ISSUES WITH HER CT SCAN REGARDING HER LIVER AND UNEXPLAINABLE PAIN AROUND LIVER AREA AS STATED BY PATIENT.  DR. FIERRO IS AWARE OF PATIENT AND WILL AGREE IF SHE SEEKS A SECOND OPINION.     CALLED PT TO LET HER, PT SEEMS FRUSTRATED THAT SHE ISN'T GETTING ANSWERS TO HER ISSUES. PT WANTED TO CHECK TO MAKE SURE HER MEDICAL RECORDS WERE SENT FROM DR. ACOSTA AND  .  WILL CHECK M.R. AND RETURN HER CALL.  PT VOICED UNDERSTANDING.

## 2019-03-28 NOTE — TELEPHONE ENCOUNTER
ELLEN,  PATIENT CALLED AND LEFT VOICE MAIL STATING THAT SHE DON'T UNDERSTAND HER RESULTS LETTER THAT WAS MAILED TO HER. PLEASE CALL HER BACK. THANKS

## 2019-04-09 ENCOUNTER — OFFICE VISIT (OUTPATIENT)
Dept: GASTROENTEROLOGY | Facility: CLINIC | Age: 39
End: 2019-04-09

## 2019-04-09 VITALS
OXYGEN SATURATION: 99 % | RESPIRATION RATE: 16 BRPM | BODY MASS INDEX: 37.19 KG/M2 | DIASTOLIC BLOOD PRESSURE: 76 MMHG | HEART RATE: 103 BPM | TEMPERATURE: 97.9 F | WEIGHT: 196.8 LBS | SYSTOLIC BLOOD PRESSURE: 122 MMHG

## 2019-04-09 DIAGNOSIS — R19.7 DIARRHEA, UNSPECIFIED TYPE: Primary | ICD-10-CM

## 2019-04-09 PROCEDURE — 99213 OFFICE O/P EST LOW 20 MIN: CPT | Performed by: INTERNAL MEDICINE

## 2019-04-09 NOTE — PROGRESS NOTES
PCP: Sugey Ovalles APRN    Chief Complaint   Patient presents with   • Follow-up       History of Present Illness:   Megan Post is a 39 y.o. female who presents to GI clinic as a follow up for abdominal pain and diarrhea.  She was placed on linzess due to probability of overflow constipation and she was noted to have increased diarrhea. She complains of bloat, hypogastric abdominal aching, constant with possible streaks of blood. 4 liquid stools a day without night time awakening. + wt gain but she is scared to eat. Bloats with bread.    Past Medical History:   Diagnosis Date   • Abdominal pain    • Abnormal breast exam    • Abnormal Pap smear of cervix    • Achilles tendinitis    • Acute sinusitis    • Anxiety    • Arthritis    • Asthma    • Tavera's syndrome    • Bipolar 1 disorder (CMS/HCC)    • Bowel trouble    • Breast cyst    • Colon polyps    • Constipation    • Depression    • Diverticulitis    • Diverticulitis of colon    • Endometriosis    • Eustachian tube dysfunction    • Extremity pain    • Fatty liver    • Female pelvic pain    • Gastric ulcer    • H/O bladder infections    • History of rashes as a child    • Irritable bowel syndrome    • Low back pain    • Menopausal symptoms    • Muscle weakness    • Ovarian cyst    • PID (pelvic inflammatory disease)    • Recurrent UTI    • Sexual problems        Past Surgical History:   Procedure Laterality Date   •  SECTION     • CHOLECYSTECTOMY     • COLONOSCOPY     • HYSTERECTOMY     • NASAL ENDOSCOPY     • OOPHORECTOMY Bilateral    • OTHER SURGICAL HISTORY      Laparoscopy (diagnostic) gynecologic with biopsy   • SHOULDER SURGERY     • UPPER GASTROINTESTINAL ENDOSCOPY  2019         Current Outpatient Medications:   •  albuterol 108 (90 Base) MCG/ACT inhaler, Inhale 2 puffs Every 4 (Four) Hours As Needed for Wheezing., Disp: 18 g, Rfl: 11  •  baclofen (LIORESAL) 10 MG tablet, Take 1 tablet by mouth 3 (Three) Times a Day As Needed for  Muscle Spasms., Disp: 90 tablet, Rfl: 2  •  benzonatate (TESSALON) 200 MG capsule, Take 1 capsule by mouth 3 (Three) Times a Day As Needed for Cough., Disp: 20 capsule, Rfl: 0  •  cetirizine (zyrTEC) 10 MG tablet, Take 1 tablet by mouth Daily., Disp: , Rfl: 1  •  citalopram (CeleXA) 10 MG tablet, Take 20 mg by mouth Daily., Disp: , Rfl:   •  diclofenac (VOLTAREN) 1 % gel gel, Apply 4 g topically 4 (Four) Times a Day As Needed (as needed for pain)., Disp: 1 tube, Rfl: 0  •  dicyclomine (BENTYL) 20 MG tablet, Take 1 tablet by mouth Every 8 (Eight) Hours As Needed (abdominal pain/cramping/diarrhea)., Disp: 21 tablet, Rfl: 0  •  EPIPEN 2-JAREN 0.3 MG/0.3ML solution auto-injector injection, U UTD, Disp: , Rfl: 6  •  estradiol (ESTRACE) 2 MG tablet, Take 1 tablet by mouth Daily., Disp: 30 tablet, Rfl: 5  •  fluticasone (FLONASE) 50 MCG/ACT nasal spray, 2 sprays into each nostril Daily., Disp: 1 bottle, Rfl: 3  •  gabapentin (NEURONTIN) 300 MG capsule, 1 capsule by mouth every night, Disp: 30 capsule, Rfl: 2  •  ipratropium-albuterol (DUO-NEB) 0.5-2.5 mg/3 ml nebulizer, Take 3 mL by nebulization Every 4 (Four) Hours As Needed for Wheezing or Shortness of Air., Disp: 360 mL, Rfl: 11  •  lidocaine (XYLOCAINE) 5 % ointment, As Needed., Disp: , Rfl:   •  LORazepam (ATIVAN) 1 MG tablet, TK 1 T PO D PRN, Disp: , Rfl: 0  •  NEXIUM 40 MG capsule, Take 1 capsule by mouth Every Morning Before Breakfast. Increase to 2 times per day prn chest pain indigestion, Disp: 60 capsule, Rfl: 5  •  nicotine (NICODERM CQ) 21 MG/24HR patch, Place 1 patch on the skin as directed by provider Daily., Disp: 28 each, Rfl: 11  •  ondansetron ODT (ZOFRAN-ODT) 4 MG disintegrating tablet, Take 1 tablet by mouth Every 8 (Eight) Hours As Needed for Nausea or Vomiting., Disp: 60 tablet, Rfl: 2  •  PATADAY 0.2 % solution ophthalmic solution, USE 1 DROP AEY ONCE DAILY PRN, Disp: , Rfl: 3  •  saccharomyces boulardii (FLORASTOR) 250 MG capsule, Take 1 capsule by  mouth 2 (Two) Times a Day., Disp: 14 capsule, Rfl: 0  •  SUMAtriptan (IMITREX) 25 MG tablet, Take one tablet at onset of headache. May repeat dose one time in 2 hours if headache not relieved., Disp: 16 tablet, Rfl: 2  •  traMADol (ULTRAM) 50 MG tablet, Take 1 tablet by mouth Every 8 (Eight) Hours As Needed for Moderate Pain ., Disp: 60 tablet, Rfl: 0  •  traZODone (DESYREL) 150 MG tablet, Take 1 tablet by mouth As Needed., Disp: , Rfl: 3    Allergies   Allergen Reactions   • Adhesive Tape Hives   • Latex Hives   • Sulfa Antibiotics Hives   • Sulfate Hives   • Biaxin [Clarithromycin] Nausea Only   • Codeine Itching   • Penicillins Nausea Only       Family History   Problem Relation Age of Onset   • Liver disease Mother    • Diabetes Mother    • Hyperlipidemia Mother    • Hypertension Mother    • Thyroid disease Mother    • Arthritis Father    • Mental illness Father    • Migraines Sister    • Stroke Other    • Diabetes Other    • Hyperlipidemia Other    • Hypertension Other    • Mental illness Other    • Migraines Other    • Tuberculosis Other    • Breast cancer Neg Hx    • Endometrial cancer Neg Hx    • Ovarian cancer Neg Hx        Social History     Socioeconomic History   • Marital status: Single     Spouse name: Not on file   • Number of children: Not on file   • Years of education: Not on file   • Highest education level: Not on file   Tobacco Use   • Smoking status: Current Every Day Smoker     Types: Cigarettes   • Smokeless tobacco: Never Used   • Tobacco comment: nicotine patches for smoking cessation   Substance and Sexual Activity   • Alcohol use: No   • Drug use: No   • Sexual activity: Defer       Review of Systems   Constitutional: Positive for appetite change (DOESNT HAVE TASTE FOR FOOD DUE TO DIARRHEA. ) and unexpected weight change (DUE TO HAVING BM FREQUENTLY. ).   HENT: Negative.    Eyes: Negative.    Respiratory: Negative.    Cardiovascular: Negative.    Gastrointestinal: Positive for abdominal  pain, blood in stool and diarrhea.   Endocrine: Negative.    Genitourinary: Negative.    Musculoskeletal: Negative.    Skin: Negative.    Allergic/Immunologic: Negative.    Neurological: Positive for dizziness and light-headedness.   Hematological: Negative.    Psychiatric/Behavioral: Negative.          There were no vitals filed for this visit.    Physical Exam  General Appearance:  Vitals as above. no acute distress  Head/face:  Normocephalic, atraumatic  Eyes:   EOMI, no conjunctivitis or icterus   Nose/Sinuses:  Nares patent bilaterally without discharge or lesions  Mouth/Throat:  Posterior pharynx is pink without drainage or exudates;  dentition is in good condition and repair  Neck:  trachea is midline, no thyromegaly  Neurologic:  Alert; no focal deficits; age appropriate behavior and speech  Psychiatric: mood and affect are congruent  Skin: no rash or cyanosis.  Abdomen: obese and soft/nt      Assessment/Plan  1.) Chronic diarrhea, suspect IBS-M  Provided information on low fodmap diet.  Her serologic data that she had obtained via another provider was suggestive for crohn's. I typically do not routinely order this test for patient's without other indications for IBD.  Will proceed with colonoscopy.     rtc in 6 months      Valeriano Hankins MD  4/9/2019

## 2019-04-12 DIAGNOSIS — Z12.11 SCREENING FOR COLON CANCER: Primary | ICD-10-CM

## 2019-04-18 ENCOUNTER — LAB REQUISITION (OUTPATIENT)
Dept: LAB | Facility: HOSPITAL | Age: 39
End: 2019-04-18

## 2019-04-18 ENCOUNTER — OUTSIDE FACILITY SERVICE (OUTPATIENT)
Dept: GASTROENTEROLOGY | Facility: CLINIC | Age: 39
End: 2019-04-18

## 2019-04-18 DIAGNOSIS — R19.7 DIARRHEA: ICD-10-CM

## 2019-04-18 PROCEDURE — 88305 TISSUE EXAM BY PATHOLOGIST: CPT | Performed by: INTERNAL MEDICINE

## 2019-04-18 PROCEDURE — 45380 COLONOSCOPY AND BIOPSY: CPT | Performed by: INTERNAL MEDICINE

## 2019-04-19 NOTE — PROGRESS NOTES
"Chief Complaint: \"Low back pain.\"    History of Present Illness: Ms. Megan Post, 38 y.o. Female, with a 10-year history of lower back pain, which began without incident.  She returns today for post-procedure follow-up and re-evaluation.  Patient was last seen on 06/13/2018 for bilateral lumbar medial branch rhizotomies and is not sure if she experienced any relief.  Patient doesn't remember if she participated in Physical Therapy.  She wants a new MRI of her lumbar spine.  Patient is a poor historian.   Pain description: constant lower back pain with intermittent exacerbation, described as sharp sensation.   Radiation of pain: The pain radiates globally, without any particular radicular or peripheral nerve distribution, into her legs.  Pain intensity today: 6/10  Average pain intensity last week: 8/10  Pain intensity ranges from: 5/10 to 10/10   Aggravating factors: Pain increases with bending, standing longer than 5 minutes and ambulating more than 5 minutes.  Alleviating factors: Pain decreases with lying and analgesics.   Associated symptoms:   Patient denies numbness or weakness in the lower extremities.   Patient denies any new bladder or bowel problems.   Patient denies difficulties with her balance or recent falls.      Review of previous therapies and additional medical records:  Megan Post has already failed the following measures, including:   Conservative measures: Oral analgesics, physical therapy, ice, heat and chiropractic therapy   Interventional measures: As referenced above.  Bilateral lumbar MBB's on 05/07/2018 and 04/11/2018.  [facility] for medication; no procedures.  Surgical measures: No history of lumbar spine surgery  Megan Post underwent surgical or neurosurgical consultation about 3 years ago and was found not to be a surgical candidate.  Megan Post presents with significant comorbidities including anxiety and depression, bipolar disorder (one hospitalization several " years ago), obesity.  In terms of current analgesics, Crystal A Day takes: tramadol, Topamax, trazodone   I have reviewed [facility] Report #[id number] consistent to medication reconciliation.     Global Pain Scale 3-15-18 4-17-18 5-15-18 04-22-19       Pain 19 13 21 19       Feelings 6 6 22 9       Clinical outcomes 15 2 19 10       Activities 20 2 7 18       GPS Total: 60 23 69 56         Diagnostic Studies:    MR Lumbar Spine Without Intravenous Contrast- 08/19/2017:  L1-L2: Unremarkable. No significant disc disease. No stenosis.  L2-L3: Unremarkable. No significant disc disease. No stenosis.  L3-L4: Unremarkable. No significant disc disease. No stenosis.  L4-L5: Unremarkable. No significant disc disease. No stenosis.  L5-S1: Small central disc bulge L5-S1 without significant impingement on dural sac or nerve roots. No interval change.  No stenosis.    Review of Systems   Constitutional: Positive for fatigue.   Eyes: Positive for itching.   Respiratory: Positive for chest tightness.    Cardiovascular: Positive for chest pain and palpitations.   Gastrointestinal: Positive for abdominal pain, constipation and diarrhea.   Musculoskeletal: Positive for arthralgias, back pain, neck pain and neck stiffness.   Allergic/Immunologic: Positive for environmental allergies and food allergies.   Neurological: Positive for dizziness and light-headedness.   Psychiatric/Behavioral: The patient is nervous/anxious (depression. Bipolar disorder).    All other systems reviewed and are negative.     Patient Active Problem List   Diagnosis   • Allergic rhinitis   • Tavera's esophagus   • Bipolar disorder (CMS/HCC)   • Fibromyalgia   • Gastroesophageal reflux disease   • Insomnia   • Abdominal wall abscess   • Acute recurrent frontal sinusitis   • Spasm   • Right upper quadrant pain   • Uncomplicated asthma   • Shortness of breath   • Atypical chest pain   • Lung nodules - calcified granulomas   • Atelectasis   • Hypercalcemia   •  Displacement of intervertebral disc of lumbosacral region   • Lumbar discogenic pain syndrome   • Spondylosis of lumbar region without myelopathy or radiculopathy   • Memory loss   • Bronchitis       Past Medical History:   Diagnosis Date   • Abdominal pain    • Abnormal breast exam    • Abnormal Pap smear of cervix    • Achilles tendinitis    • Acute sinusitis    • Anxiety    • Arthritis    • Asthma    • Tavera's syndrome    • Bipolar 1 disorder (CMS/HCC)    • Bowel trouble    • Breast cyst    • Colon polyps    • Constipation    • Depression    • Diverticulitis    • Diverticulitis of colon    • Endometriosis    • Eustachian tube dysfunction    • Extremity pain    • Fatty liver    • Female pelvic pain    • Gastric ulcer    • H/O bladder infections    • History of rashes as a child    • Irritable bowel syndrome    • Low back pain    • Menopausal symptoms    • Muscle weakness    • Ovarian cyst    • PID (pelvic inflammatory disease)    • Recurrent UTI    • Sexual problems          Past Surgical History:   Procedure Laterality Date   •  SECTION     • CHOLECYSTECTOMY     • COLONOSCOPY     • HYSTERECTOMY     • NASAL ENDOSCOPY     • OOPHORECTOMY Bilateral    • OTHER SURGICAL HISTORY      Laparoscopy (diagnostic) gynecologic with biopsy   • SHOULDER SURGERY     • UPPER GASTROINTESTINAL ENDOSCOPY  2019         Family History   Problem Relation Age of Onset   • Liver disease Mother    • Diabetes Mother    • Hyperlipidemia Mother    • Hypertension Mother    • Thyroid disease Mother    • Arthritis Father    • Mental illness Father    • Migraines Sister    • Stroke Other    • Diabetes Other    • Hyperlipidemia Other    • Hypertension Other    • Mental illness Other    • Migraines Other    • Tuberculosis Other    • Breast cancer Neg Hx    • Endometrial cancer Neg Hx    • Ovarian cancer Neg Hx          Social History     Socioeconomic History   • Marital status: Single     Spouse name: Not on file   • Number of  children: Not on file   • Years of education: Not on file   • Highest education level: Not on file   Tobacco Use   • Smoking status: Current Every Day Smoker     Types: Cigarettes   • Smokeless tobacco: Never Used   • Tobacco comment: nicotine patches for smoking cessation   Substance and Sexual Activity   • Alcohol use: No   • Drug use: No   • Sexual activity: Defer        Current Outpatient Medications:   •  albuterol 108 (90 Base) MCG/ACT inhaler, Inhale 2 puffs Every 4 (Four) Hours As Needed for Wheezing., Disp: 18 g, Rfl: 11  •  baclofen (LIORESAL) 10 MG tablet, Take 1 tablet by mouth 3 (Three) Times a Day As Needed for Muscle Spasms., Disp: 90 tablet, Rfl: 2  •  benzonatate (TESSALON) 200 MG capsule, Take 1 capsule by mouth 3 (Three) Times a Day As Needed for Cough., Disp: 20 capsule, Rfl: 0  •  cetirizine (zyrTEC) 10 MG tablet, Take 1 tablet by mouth Daily., Disp: , Rfl: 1  •  citalopram (CeleXA) 10 MG tablet, Take 20 mg by mouth Daily., Disp: , Rfl:   •  dicyclomine (BENTYL) 20 MG tablet, Take 1 tablet by mouth Every 8 (Eight) Hours As Needed (abdominal pain/cramping/diarrhea)., Disp: 21 tablet, Rfl: 0  •  EPIPEN 2-JAREN 0.3 MG/0.3ML solution auto-injector injection, U UTD, Disp: , Rfl: 6  •  estradiol (ESTRACE) 2 MG tablet, Take 1 tablet by mouth Daily., Disp: 30 tablet, Rfl: 5  •  fluticasone (FLONASE) 50 MCG/ACT nasal spray, 2 sprays into each nostril Daily., Disp: 1 bottle, Rfl: 3  •  gabapentin (NEURONTIN) 300 MG capsule, 1 capsule by mouth every night, Disp: 30 capsule, Rfl: 2  •  ipratropium-albuterol (DUO-NEB) 0.5-2.5 mg/3 ml nebulizer, Take 3 mL by nebulization Every 4 (Four) Hours As Needed for Wheezing or Shortness of Air., Disp: 360 mL, Rfl: 11  •  lidocaine (XYLOCAINE) 5 % ointment, As Needed., Disp: , Rfl:   •  LORazepam (ATIVAN) 1 MG tablet, TK 1 T PO D PRN, Disp: , Rfl: 0  •  NEXIUM 40 MG capsule, Take 1 capsule by mouth Every Morning Before Breakfast. Increase to 2 times per day prn chest pain  "indigestion, Disp: 60 capsule, Rfl: 5  •  nicotine (NICODERM CQ) 21 MG/24HR patch, Place 1 patch on the skin as directed by provider Daily., Disp: 28 each, Rfl: 11  •  ondansetron ODT (ZOFRAN-ODT) 4 MG disintegrating tablet, Take 1 tablet by mouth Every 8 (Eight) Hours As Needed for Nausea or Vomiting., Disp: 60 tablet, Rfl: 2  •  PATADAY 0.2 % solution ophthalmic solution, USE 1 DROP AEY ONCE DAILY PRN, Disp: , Rfl: 3  •  saccharomyces boulardii (FLORASTOR) 250 MG capsule, Take 1 capsule by mouth 2 (Two) Times a Day., Disp: 14 capsule, Rfl: 0  •  SUMAtriptan (IMITREX) 25 MG tablet, Take one tablet at onset of headache. May repeat dose one time in 2 hours if headache not relieved., Disp: 16 tablet, Rfl: 2  •  traMADol (ULTRAM) 50 MG tablet, Take 1 tablet by mouth Every 8 (Eight) Hours As Needed for Moderate Pain ., Disp: 60 tablet, Rfl: 0  •  traZODone (DESYREL) 150 MG tablet, Take 1 tablet by mouth As Needed., Disp: , Rfl: 3      Allergies   Allergen Reactions   • Adhesive Tape Hives   • Latex Hives   • Sulfa Antibiotics Hives   • Sulfate Hives   • Biaxin [Clarithromycin] Nausea Only   • Codeine Itching   • Penicillins Nausea Only      /70   Pulse 104   Temp 98.4 °F (36.9 °C) (Temporal)   Resp 18   Ht 154.9 cm (61\")   Wt 89.1 kg (196 lb 6.4 oz)   SpO2 97%   BMI 37.11 kg/m²       Physical Exam:  Constitutional: Patient is oriented to person, place, and time.  Appears well-developed and well-nourished.  Obese.   Head: Normocephalic and atraumatic. Eyes: Conjunctivae and lids are normal. Pupils: Equal, round, and reactive to light.   Neck: Trachea normal. Neck supple. No JVD present.   Pulmonary: No increased work of breathing or signs of respiratory distress.  Clear to auscultation bilaterally.   Cardiovascular: Normal rate and rhythm, normal S1 and S2, no murmurs.   Musculoskeletal   Gait and station: Normal   Lumbar spine: The range of motion of the lumbar spine is limited secondary to pain. Extension, " lateral flexion, rotation of the lumbar spine increased and reproduced pain.  Lumbar facet joint loading maneuvers are positive.  Andre and Gaenslen's tests are negative.   Hip joints: The range of motion of the hip joints is full and without pain.   Neurological: Patient is alert and oriented to person, place, and time. Speech: speech is normal. Cortical function: Normal mental status.   Cranial nerves: Cranial nerves 2-12 intact.   Reflex Scores: patellar: 2+ bilaterally.  Achilles: 2+ bilaterally  Motor strength: 5/5  Motor Tone: normal tone.   Negative long tract signs. Straight leg raising test is negative.    Sensation: No sensory loss. Sensory exam: intact to light touch, intact pain and temperature sensation, intact vibration sensation and normal proprioception.   Coordination: Normal finger to nose and heel to shin. Normal balance and negative. Romberg's sign negative.   Skin and subcutaneous tissue: Skin is warm and intact. No rash noted. No cyanosis.   Psychiatric: Judgment and insight: Normal. Orientation to person, place and time: Normal. Recent and remote memory: Intact. Mood and affect: Normal.     ASSESSMENT:   1. Spondylosis of lumbar region without myelopathy or radiculopathy    2. Displacement of intervertebral disc of lumbosacral region    3. Lumbar discogenic pain syndrome    4. Fibromyalgia    5. Insomnia, unspecified type    6. Bipolar affective disorder, remission status unspecified (CMS/[facility])       PLAN/MEDICAL DECISION MAKING: I have reviewed all available medical records as well as previous therapies as referenced above, and discussed the patient with Dr. Sanchez.  Patient is requesting a new MRI of the lumbar spine.  She has agreed to proceed with an EMG/NCS of her bilateral lower extremities, which was previously order by Dr. Sanchez.    1. Interventional pain management measures: None scheduled.  Patient can follow-up after diagnostics.   2. Diagnostic studies:   A. EMG/NCS of the  bilateral lower extremities. Patient reports lower extremity paresthesia and some claudication without much correlation with her most recent MRI.  B. MRI lumbar spine without contrast  3. Pharmacological measures: Reviewed and discussed.   4. Long-term rehabilitation efforts:  A. Patient will start a comprehensive physical therapy program for water therapy, therapeutic exercise, core strengthening, gait and balance training, neurodynamics, myofascial release, cupping and dry needling once pain is under control  B. Start an exercise program such as yoga, water therapy and daily walks for fitness  C. Trial with TENS unit  D. Contrast therapy: Apply ice-packs for 15-20 minutes, followed by heating pads for 15-20 minutes to affected area   E. Patient attends [facility] for her bipolar disorder, which has reportedly been stable  5. The patient has been instructed to contact my office with any questions or difficulties. The patient understands the plan and agrees to proceed accordingly.       Patient Care Team:  Sugey Ovalles APRN as PCP - General (Internal Medicine)  Flavia Moran DO as Consulting Physician (Obstetrics and Gynecology)  Paramjit Leahy MD as Consulting Physician (Otolaryngology)  Jasiel Sanchez MD as Consulting Physician (Pain Medicine)  Trudy eVla RD as Dietitian (Nutrition)  Christie Ludwig PA as Physician Assistant (Internal Medicine)  Dian Ricks APRN as Nurse Practitioner (Nurse Practitioner)  Millicent Pantoja APRN as Nurse Practitioner (Pulmonary Disease)  Rafaela Chavira PT as Physical Therapist (Physical Therapy)  Lamar Chavira APRN as Nurse Practitioner (Nurse Practitioner)     No orders of the defined types were placed in this encounter.        Future Appointments   Date Time Provider Department Center   10/15/2019  2:30 PM Valeriano Hankins MD [facility] None         Howard Raymond PA-C     EMR Dragon/Transcription disclaimer:  Much of this encounter note is an  electronic transcription of spoken language to printed text. Electronic transcription of spoken language may permit erroneous, or at times, nonsensical words or phrases to be inadvertently transcribed. Although I have reviewed the note for such errors, some may still exist.

## 2019-04-22 ENCOUNTER — OFFICE VISIT (OUTPATIENT)
Dept: PAIN MEDICINE | Facility: CLINIC | Age: 39
End: 2019-04-22

## 2019-04-22 VITALS
TEMPERATURE: 98.4 F | DIASTOLIC BLOOD PRESSURE: 70 MMHG | HEIGHT: 61 IN | BODY MASS INDEX: 37.08 KG/M2 | OXYGEN SATURATION: 97 % | WEIGHT: 196.4 LBS | SYSTOLIC BLOOD PRESSURE: 124 MMHG | RESPIRATION RATE: 18 BRPM | HEART RATE: 104 BPM

## 2019-04-22 DIAGNOSIS — F31.9 BIPOLAR AFFECTIVE DISORDER, REMISSION STATUS UNSPECIFIED (HCC): ICD-10-CM

## 2019-04-22 DIAGNOSIS — M51.26 LUMBAR DISCOGENIC PAIN SYNDROME: ICD-10-CM

## 2019-04-22 DIAGNOSIS — M79.7 FIBROMYALGIA: ICD-10-CM

## 2019-04-22 DIAGNOSIS — M51.27 DISPLACEMENT OF INTERVERTEBRAL DISC OF LUMBOSACRAL REGION: ICD-10-CM

## 2019-04-22 DIAGNOSIS — G47.00 INSOMNIA, UNSPECIFIED TYPE: ICD-10-CM

## 2019-04-22 DIAGNOSIS — M47.816 SPONDYLOSIS OF LUMBAR REGION WITHOUT MYELOPATHY OR RADICULOPATHY: Primary | ICD-10-CM

## 2019-04-22 PROCEDURE — 99214 OFFICE O/P EST MOD 30 MIN: CPT | Performed by: PHYSICIAN ASSISTANT

## 2019-05-02 ENCOUNTER — TELEPHONE (OUTPATIENT)
Dept: GASTROENTEROLOGY | Facility: CLINIC | Age: 39
End: 2019-05-02

## 2019-05-02 NOTE — TELEPHONE ENCOUNTER
Dr Hankins,  Patient called back today. She still complains of diarrhea and abdominal pain. She has some questions to ask you about her pathology report; that I can't answer from a MA standpoint. Can you please give this patient a call back? Thanks.

## 2019-05-30 NOTE — TELEPHONE ENCOUNTER
JOSELYN REPORT # [id number] SCANNED INTO MEDIA   Breathing spontaneous and unlabored. Breath sounds clear and equal bilaterally with regular rhythm.

## 2019-06-05 ENCOUNTER — OFFICE VISIT (OUTPATIENT)
Dept: INTERNAL MEDICINE | Facility: CLINIC | Age: 39
End: 2019-06-05

## 2019-06-05 VITALS
HEIGHT: 61 IN | HEART RATE: 130 BPM | BODY MASS INDEX: 36.06 KG/M2 | WEIGHT: 191 LBS | DIASTOLIC BLOOD PRESSURE: 76 MMHG | TEMPERATURE: 98.8 F | OXYGEN SATURATION: 96 % | SYSTOLIC BLOOD PRESSURE: 112 MMHG

## 2019-06-05 DIAGNOSIS — R07.9 CHEST PAIN, UNSPECIFIED TYPE: ICD-10-CM

## 2019-06-05 DIAGNOSIS — M54.41 CHRONIC MIDLINE LOW BACK PAIN WITH BILATERAL SCIATICA: ICD-10-CM

## 2019-06-05 DIAGNOSIS — R19.7 DIARRHEA, UNSPECIFIED TYPE: ICD-10-CM

## 2019-06-05 DIAGNOSIS — M54.42 CHRONIC MIDLINE LOW BACK PAIN WITH BILATERAL SCIATICA: ICD-10-CM

## 2019-06-05 DIAGNOSIS — R10.84 GENERALIZED ABDOMINAL PAIN: Primary | ICD-10-CM

## 2019-06-05 DIAGNOSIS — R00.2 PALPITATIONS: ICD-10-CM

## 2019-06-05 DIAGNOSIS — G89.29 CHRONIC MIDLINE LOW BACK PAIN WITH BILATERAL SCIATICA: ICD-10-CM

## 2019-06-05 PROCEDURE — 99214 OFFICE O/P EST MOD 30 MIN: CPT | Performed by: NURSE PRACTITIONER

## 2019-06-05 RX ORDER — DULOXETIN HYDROCHLORIDE 30 MG/1
30 CAPSULE, DELAYED RELEASE ORAL DAILY
COMMUNITY
End: 2020-06-16

## 2019-06-05 NOTE — PROGRESS NOTES
Chief Complaint   Patient presents with   • Back Pain     Follow Up       History of Present Illness  39 y.o.female presents for back pain follow up.  Patient with complaints of chronic low back pain onset several years worsening over last few months. she went back to pain management; taking baclofen, Cymbalta, tramadol, and Neurontin.  No saddle anesthesia or incontinence.  Pain is located in lower back does radiate and to bilateral buttocks lower legs at times.  Describes as constant lower back with intermittent exacerbation describes a sharp sensation.  Pain increases with bending, standing longer than 5 minutes and ambulating more than 5 minutes.  No numbness or  weakness in lower extremities.  Starting a trial of a TENS unit, use an ice therapy.  Interested in trying some physical therapy to see if improves if not would like to get MRI updated.    Continues with complaints of generalized abdominal pain and diarrhea.  She was seen by GI Dr. Hankins who notes suspect IBS; low FODMAP diet recommended colonoscopy normal.  Patient is requesting a second opinion.  She would like to see Dr. Villa through [facility].    History of palpitations and chest pain; needs new cardiology referral.  Patient describes that she missed some appointments there and needs to find a new provider.  No current complaints.    Review of Systems   Constitutional: Negative for chills, fatigue and fever.   Eyes: Negative for blurred vision and visual disturbance.   Respiratory: Negative for shortness of breath.    Cardiovascular: Positive for chest pain and palpitations. Negative for leg swelling.   Gastrointestinal: Positive for abdominal pain and diarrhea. Negative for constipation, nausea and vomiting.   Genitourinary: Negative for difficulty urinating.   Musculoskeletal: Positive for back pain.   Neurological: Negative for dizziness, light-headedness and headache.         PMSFH  The following portions of the patient's history were reviewed  and updated as appropriate: allergies, current medications, past family history, past medical history, past social history, past surgical history and problem list.       Past Medical History:   Diagnosis Date   • Abdominal pain    • Abnormal breast exam    • Abnormal Pap smear of cervix    • Achilles tendinitis    • Acute sinusitis    • Anxiety    • Arthritis    • Asthma    • Tavera's syndrome    • Bipolar 1 disorder (CMS/HCC)    • Bowel trouble    • Breast cyst    • Colon polyps    • Constipation    • Depression    • Diverticulitis    • Diverticulitis of colon    • Endometriosis    • Eustachian tube dysfunction    • Extremity pain    • Fatty liver    • Female pelvic pain    • Gastric ulcer    • H/O bladder infections    • History of rashes as a child    • Irritable bowel syndrome    • Low back pain    • Menopausal symptoms    • Muscle weakness    • Ovarian cyst    • PID (pelvic inflammatory disease)    • Recurrent UTI    • Sexual problems       Past Surgical History:   Procedure Laterality Date   •  SECTION     • CHOLECYSTECTOMY     • COLONOSCOPY     • HYSTERECTOMY     • NASAL ENDOSCOPY     • OOPHORECTOMY Bilateral    • OTHER SURGICAL HISTORY      Laparoscopy (diagnostic) gynecologic with biopsy   • SHOULDER SURGERY     • UPPER GASTROINTESTINAL ENDOSCOPY  2019      Allergies   Allergen Reactions   • Adhesive Tape Hives   • Latex Hives   • Sulfa Antibiotics Hives   • Sulfate Hives   • Biaxin [Clarithromycin] Nausea Only   • Codeine Itching   • Penicillins Nausea Only      Family History   Problem Relation Age of Onset   • Liver disease Mother    • Diabetes Mother    • Hyperlipidemia Mother    • Hypertension Mother    • Thyroid disease Mother    • Arthritis Father    • Mental illness Father    • Migraines Sister    • Stroke Other    • Diabetes Other    • Hyperlipidemia Other    • Hypertension Other    • Mental illness Other    • Migraines Other    • Tuberculosis Other    • Breast cancer Neg Hx    •  Endometrial cancer Neg Hx    • Ovarian cancer Neg Hx             Current Outpatient Medications:   •  albuterol 108 (90 Base) MCG/ACT inhaler, Inhale 2 puffs Every 4 (Four) Hours As Needed for Wheezing., Disp: 18 g, Rfl: 11  •  baclofen (LIORESAL) 10 MG tablet, Take 1 tablet by mouth 3 (Three) Times a Day As Needed for Muscle Spasms., Disp: 90 tablet, Rfl: 2  •  benzonatate (TESSALON) 200 MG capsule, Take 1 capsule by mouth 3 (Three) Times a Day As Needed for Cough., Disp: 20 capsule, Rfl: 0  •  cetirizine (zyrTEC) 10 MG tablet, Take 1 tablet by mouth Daily., Disp: , Rfl: 1  •  dicyclomine (BENTYL) 20 MG tablet, Take 1 tablet by mouth Every 8 (Eight) Hours As Needed (abdominal pain/cramping/diarrhea)., Disp: 21 tablet, Rfl: 0  •  DULoxetine (CYMBALTA) 30 MG capsule, Take 30 mg by mouth Daily., Disp: , Rfl:   •  EPIPEN 2-JAREN 0.3 MG/0.3ML solution auto-injector injection, U UTD, Disp: , Rfl: 6  •  estradiol (ESTRACE) 2 MG tablet, Take 1 tablet by mouth Daily., Disp: 30 tablet, Rfl: 5  •  fluticasone (FLONASE) 50 MCG/ACT nasal spray, 2 sprays into each nostril Daily., Disp: 1 bottle, Rfl: 3  •  gabapentin (NEURONTIN) 300 MG capsule, 1 capsule by mouth every night, Disp: 30 capsule, Rfl: 2  •  ipratropium-albuterol (DUO-NEB) 0.5-2.5 mg/3 ml nebulizer, Take 3 mL by nebulization Every 4 (Four) Hours As Needed for Wheezing or Shortness of Air., Disp: 360 mL, Rfl: 11  •  lidocaine (XYLOCAINE) 5 % ointment, As Needed., Disp: , Rfl:   •  LORazepam (ATIVAN) 1 MG tablet, TK 1 T PO D PRN, Disp: , Rfl: 0  •  NEXIUM 40 MG capsule, Take 1 capsule by mouth Every Morning Before Breakfast. Increase to 2 times per day prn chest pain indigestion, Disp: 60 capsule, Rfl: 5  •  nicotine (NICODERM CQ) 21 MG/24HR patch, Place 1 patch on the skin as directed by provider Daily., Disp: 28 each, Rfl: 11  •  ondansetron ODT (ZOFRAN-ODT) 4 MG disintegrating tablet, Take 1 tablet by mouth Every 8 (Eight) Hours As Needed for Nausea or Vomiting.,  "Disp: 60 tablet, Rfl: 2  •  PATADAY 0.2 % solution ophthalmic solution, USE 1 DROP AEY ONCE DAILY PRN, Disp: , Rfl: 3  •  saccharomyces boulardii (FLORASTOR) 250 MG capsule, Take 1 capsule by mouth 2 (Two) Times a Day., Disp: 14 capsule, Rfl: 0  •  SUMAtriptan (IMITREX) 25 MG tablet, Take one tablet at onset of headache. May repeat dose one time in 2 hours if headache not relieved., Disp: 16 tablet, Rfl: 2  •  traMADol (ULTRAM) 50 MG tablet, Take 1 tablet by mouth Every 8 (Eight) Hours As Needed for Moderate Pain ., Disp: 60 tablet, Rfl: 0  •  traZODone (DESYREL) 150 MG tablet, Take 1 tablet by mouth As Needed., Disp: , Rfl: 3    VITALS:  /76   Pulse (!) 130   Temp 98.8 °F (37.1 °C)   Ht 154.9 cm (61\")   Wt 86.6 kg (191 lb)   SpO2 96%   Breastfeeding? No   BMI 36.09 kg/m²     Physical Exam   Constitutional: She is oriented to person, place, and time. She appears well-developed and well-nourished. No distress.   HENT:   Head: Normocephalic.   Right Ear: External ear normal.   Left Ear: External ear normal.   Nose: Nose normal.   Mouth/Throat: Oropharynx is clear and moist.   Eyes: EOM are normal. Pupils are equal, round, and reactive to light.   Neck: Normal range of motion. Neck supple.   Cardiovascular: Normal rate, regular rhythm, normal heart sounds and intact distal pulses.   Pulmonary/Chest: Effort normal and breath sounds normal. No respiratory distress.   Abdominal: Soft. Bowel sounds are normal. There is no tenderness.   Musculoskeletal: Normal range of motion.   Normal ROM all major joints   Lymphadenopathy:     She has no cervical adenopathy.   Neurological: She is alert and oriented to person, place, and time.   Skin: Skin is warm and dry. Capillary refill takes less than 2 seconds. No rash noted.   Psychiatric: She has a normal mood and affect. Her behavior is normal.       LABS  No new labs    ASSESSMENT/PLAN  Megan was seen today for back pain.    Diagnoses and all orders for this " visit:    Generalized abdominal pain  -     Ambulatory Referral to Gastroenterology    Diarrhea, unspecified type  -     Ambulatory Referral to Gastroenterology    Chronic midline low back pain with bilateral sciatica  -     Ambulatory Referral to Physical Therapy Evaluate and treat (mid to low back pain)    Chest pain, unspecified type  -     Ambulatory Referral to Cardiology    Palpitations  -     Ambulatory Referral to Cardiology    Placed referral for GI consult second opinion per patient request.  Her exam is nontoxic today stable.  We will have her start some physical therapy to see if it helps with her back pain some if no improvement after 4 to 6 weeks will consider updating her MRI.  Placed a another referral for cardiac services since she is evidently not able to get further appointments with her previous cardiology with missed appointments.  No current complaints today of chest pain or palpitations just needs to have further follow-up and continue her work-up there.  Today I have spent a total of 25 minutes face to face with Crystal A Day.  During this time, a total of 15 minutes was spent counseling on the nature of the diagnosis including risks and benefits of treatment, complications, implications, management, safe and proper use of medications.  We discussed the work up and specialist referral process if needed.   Patient education is provided today concerning related diagnosis.  I encouraged compliance with follow up appointments and specialists referrals.      I discussed the patients findings and my recommendations with patient.  Patient was encouraged to keep me informed of any acute changes, lack of improvement, or any new concerning symptoms.    Patient voiced understanding of all instructions and denied further questions.      FOLLOW-UP  Return in about 6 months (around 12/5/2019), or if symptoms worsen or fail to improve.    Electronically signed by:    Sugey Burrows  APRN  06/05/2019

## 2019-06-11 ENCOUNTER — OFFICE VISIT (OUTPATIENT)
Dept: INTERNAL MEDICINE | Facility: CLINIC | Age: 39
End: 2019-06-11

## 2019-06-11 ENCOUNTER — TELEPHONE (OUTPATIENT)
Dept: INTERNAL MEDICINE | Facility: CLINIC | Age: 39
End: 2019-06-11

## 2019-06-11 VITALS
OXYGEN SATURATION: 99 % | SYSTOLIC BLOOD PRESSURE: 120 MMHG | DIASTOLIC BLOOD PRESSURE: 78 MMHG | HEART RATE: 92 BPM | WEIGHT: 190 LBS | HEIGHT: 61 IN | BODY MASS INDEX: 35.87 KG/M2 | TEMPERATURE: 98 F

## 2019-06-11 DIAGNOSIS — J06.9 UPPER RESPIRATORY TRACT INFECTION, UNSPECIFIED TYPE: Primary | ICD-10-CM

## 2019-06-11 PROCEDURE — 99213 OFFICE O/P EST LOW 20 MIN: CPT | Performed by: NURSE PRACTITIONER

## 2019-06-11 NOTE — PROGRESS NOTES
Chief Complaint   Patient presents with   • Sinusitis     x1 week       History of Present Illness  39 y.o.female presents for sinusitis.  With sinus congestion and right ear pain congested.  Onset 1 week. No much nasal purulence. No fever. No soa.  Positive cough feels deep nonproductive.      Review of Systems   Constitutional: Negative for chills and fever.   HENT: Positive for congestion, ear pain and sinus pressure. Negative for postnasal drip, rhinorrhea, sneezing and sore throat.    Respiratory: Positive for cough. Negative for shortness of breath and wheezing.    Musculoskeletal: Negative for myalgias.   Neurological: Negative for headache.       [facility]  The following portions of the patient's history were reviewed and updated as appropriate: allergies, current medications, past family history, past medical history, past social history, past surgical history and problem list.       Past Medical History:   Diagnosis Date   • Abdominal pain    • Abnormal breast exam    • Abnormal Pap smear of cervix    • Achilles tendinitis    • Acute sinusitis    • Anxiety    • Arthritis    • Asthma    • Tavera's syndrome    • Bipolar 1 disorder (CMS/HCC)    • Bowel trouble    • Breast cyst    • Colon polyps    • Constipation    • Depression    • Diverticulitis    • Diverticulitis of colon    • Endometriosis    • Eustachian tube dysfunction    • Extremity pain    • Fatty liver    • Female pelvic pain    • Gastric ulcer    • H/O bladder infections    • History of rashes as a child    • Irritable bowel syndrome    • Low back pain    • Menopausal symptoms    • Muscle weakness    • Ovarian cyst    • PID (pelvic inflammatory disease)    • Recurrent UTI    • Sexual problems       Past Surgical History:   Procedure Laterality Date   •  SECTION     • CHOLECYSTECTOMY     • COLONOSCOPY  2017   • HYSTERECTOMY     • NASAL ENDOSCOPY     • OOPHORECTOMY Bilateral    • OTHER SURGICAL HISTORY      Laparoscopy (diagnostic) gynecologic  with biopsy   • SHOULDER SURGERY     • UPPER GASTROINTESTINAL ENDOSCOPY  01/2019      Allergies   Allergen Reactions   • Adhesive Tape Hives   • Latex Hives   • Sulfa Antibiotics Hives   • Sulfate Hives   • Biaxin [Clarithromycin] Nausea Only   • Codeine Itching   • Penicillins Nausea Only      Family History   Problem Relation Age of Onset   • Liver disease Mother    • Diabetes Mother    • Hyperlipidemia Mother    • Hypertension Mother    • Thyroid disease Mother    • Arthritis Father    • Mental illness Father    • Migraines Sister    • Stroke Other    • Diabetes Other    • Hyperlipidemia Other    • Hypertension Other    • Mental illness Other    • Migraines Other    • Tuberculosis Other    • Breast cancer Neg Hx    • Endometrial cancer Neg Hx    • Ovarian cancer Neg Hx             Current Outpatient Medications:   •  albuterol 108 (90 Base) MCG/ACT inhaler, Inhale 2 puffs Every 4 (Four) Hours As Needed for Wheezing., Disp: 18 g, Rfl: 11  •  baclofen (LIORESAL) 10 MG tablet, Take 1 tablet by mouth 3 (Three) Times a Day As Needed for Muscle Spasms., Disp: 90 tablet, Rfl: 2  •  benzonatate (TESSALON) 200 MG capsule, Take 1 capsule by mouth 3 (Three) Times a Day As Needed for Cough., Disp: 20 capsule, Rfl: 0  •  cetirizine (zyrTEC) 10 MG tablet, Take 1 tablet by mouth Daily., Disp: , Rfl: 1  •  dicyclomine (BENTYL) 20 MG tablet, Take 1 tablet by mouth Every 8 (Eight) Hours As Needed (abdominal pain/cramping/diarrhea)., Disp: 21 tablet, Rfl: 0  •  DULoxetine (CYMBALTA) 30 MG capsule, Take 30 mg by mouth Daily., Disp: , Rfl:   •  EPIPEN 2-JAREN 0.3 MG/0.3ML solution auto-injector injection, U UTD, Disp: , Rfl: 6  •  estradiol (ESTRACE) 2 MG tablet, Take 1 tablet by mouth Daily., Disp: 30 tablet, Rfl: 5  •  fluticasone (FLONASE) 50 MCG/ACT nasal spray, 2 sprays into each nostril Daily., Disp: 1 bottle, Rfl: 3  •  gabapentin (NEURONTIN) 300 MG capsule, 1 capsule by mouth every night, Disp: 30 capsule, Rfl: 2  •   "ipratropium-albuterol (DUO-NEB) 0.5-2.5 mg/3 ml nebulizer, Take 3 mL by nebulization Every 4 (Four) Hours As Needed for Wheezing or Shortness of Air., Disp: 360 mL, Rfl: 11  •  lidocaine (XYLOCAINE) 5 % ointment, As Needed., Disp: , Rfl:   •  LORazepam (ATIVAN) 1 MG tablet, TK 1 T PO D PRN, Disp: , Rfl: 0  •  NEXIUM 40 MG capsule, Take 1 capsule by mouth Every Morning Before Breakfast. Increase to 2 times per day prn chest pain indigestion, Disp: 60 capsule, Rfl: 5  •  nicotine (NICODERM CQ) 21 MG/24HR patch, Place 1 patch on the skin as directed by provider Daily., Disp: 28 each, Rfl: 11  •  ondansetron ODT (ZOFRAN-ODT) 4 MG disintegrating tablet, Take 1 tablet by mouth Every 8 (Eight) Hours As Needed for Nausea or Vomiting., Disp: 60 tablet, Rfl: 2  •  PATADAY 0.2 % solution ophthalmic solution, USE 1 DROP AEY ONCE DAILY PRN, Disp: , Rfl: 3  •  saccharomyces boulardii (FLORASTOR) 250 MG capsule, Take 1 capsule by mouth 2 (Two) Times a Day., Disp: 14 capsule, Rfl: 0  •  SUMAtriptan (IMITREX) 25 MG tablet, Take one tablet at onset of headache. May repeat dose one time in 2 hours if headache not relieved., Disp: 16 tablet, Rfl: 2  •  traMADol (ULTRAM) 50 MG tablet, Take 1 tablet by mouth Every 8 (Eight) Hours As Needed for Moderate Pain ., Disp: 60 tablet, Rfl: 0  •  traZODone (DESYREL) 150 MG tablet, Take 1 tablet by mouth As Needed., Disp: , Rfl: 3    VITALS:  /78   Pulse 92   Temp 98 °F (36.7 °C)   Ht 154.9 cm (61\")   Wt 86.2 kg (190 lb)   SpO2 99%   BMI 35.90 kg/m²     Physical Exam   Constitutional: She is oriented to person, place, and time. She appears well-developed and well-nourished. No distress.   HENT:   Right Ear: Tympanic membrane, external ear and ear canal normal.   Left Ear: Tympanic membrane, external ear and ear canal normal.   Nose: Mucosal edema present. No rhinorrhea. Right sinus exhibits no maxillary sinus tenderness and no frontal sinus tenderness. Left sinus exhibits no maxillary " sinus tenderness and no frontal sinus tenderness.   Mouth/Throat: Oropharynx is clear and moist and mucous membranes are normal.   Cardiovascular: Normal rate, regular rhythm and normal heart sounds.   Pulmonary/Chest: Effort normal and breath sounds normal. No respiratory distress. She has no wheezes.   No coughing   Lymphadenopathy:        Head (right side): No submental, no submandibular, no tonsillar, no preauricular and no posterior auricular adenopathy present.        Head (left side): No submental, no submandibular and no tonsillar adenopathy present.     She has no cervical adenopathy.   Neurological: She is alert and oriented to person, place, and time.   Skin: Skin is warm and dry.   Psychiatric: She has a normal mood and affect.       LABS  No new labs    ASSESSMENT/PLAN  Crystal was seen today for sinusitis.    Diagnoses and all orders for this visit:    Upper respiratory tract infection, unspecified type  Viral uri vs allergy symptoms.  cont benzonatate if needed, flonase.  Stay hydrated.  If sinusitis not much purulence and would do watchful waiting at this time. I explained most sinus infections clear withing 10 days if viral. If continues past 10 days.     I discussed the patients findings and my recommendations with patient.  Patient was encouraged to keep me informed of any acute changes, lack of improvement, or any new concerning symptoms.    Patient voiced understanding of all instructions and denied further questions.      FOLLOW-UP  Return if symptoms worsen or fail to improve.    Electronically signed by:    MAGGIE Chacon  06/11/2019

## 2019-06-11 NOTE — TELEPHONE ENCOUNTER
Pt came in today for her appt very upset,i diffused the situation by explaining to her that the person may be new/ or a float that I wasn't sure who Mariza was up front is but she goes on to state that twice her apts that were made by her was wrong and she was counted for a no show yesterday 06/10/19 when on her card it said she was to come back 06/11/19 but was put down for yesterday and not today, she was understanding by the end of the conversation but she wanted me to make you aware of the situation.   call back is [phone]

## 2019-06-12 ENCOUNTER — TELEPHONE (OUTPATIENT)
Dept: INTERNAL MEDICINE | Facility: CLINIC | Age: 39
End: 2019-06-12

## 2019-06-12 RX ORDER — DICYCLOMINE HCL 20 MG
20 TABLET ORAL EVERY 8 HOURS PRN
Qty: 21 TABLET | Refills: 0 | Status: SHIPPED | OUTPATIENT
Start: 2019-06-12 | End: 2019-06-12 | Stop reason: SDUPTHER

## 2019-06-12 RX ORDER — DICYCLOMINE HCL 20 MG
20 TABLET ORAL EVERY 8 HOURS PRN
Qty: 21 TABLET | Refills: 0 | Status: SHIPPED | OUTPATIENT
Start: 2019-06-12 | End: 2020-01-22

## 2019-06-14 ENCOUNTER — PRIOR AUTHORIZATION (OUTPATIENT)
Dept: INTERNAL MEDICINE | Facility: CLINIC | Age: 39
End: 2019-06-14

## 2019-06-14 ENCOUNTER — TELEPHONE (OUTPATIENT)
Dept: INTERNAL MEDICINE | Facility: CLINIC | Age: 39
End: 2019-06-14

## 2019-06-14 DIAGNOSIS — J01.00 ACUTE NON-RECURRENT MAXILLARY SINUSITIS: Primary | ICD-10-CM

## 2019-06-14 DIAGNOSIS — K21.9 GASTROESOPHAGEAL REFLUX DISEASE WITHOUT ESOPHAGITIS: ICD-10-CM

## 2019-06-14 DIAGNOSIS — K21.00 GASTROESOPHAGEAL REFLUX DISEASE WITH ESOPHAGITIS: ICD-10-CM

## 2019-06-14 DIAGNOSIS — N76.0 ACUTE VAGINITIS: ICD-10-CM

## 2019-06-14 DIAGNOSIS — R07.9 CHEST PAIN, UNSPECIFIED TYPE: ICD-10-CM

## 2019-06-14 RX ORDER — ESOMEPRAZOLE MAGNESIUM 20 MG/1
20 TABLET, DELAYED RELEASE ORAL DAILY
Qty: 30 TABLET | Refills: 5 | Status: SHIPPED | OUTPATIENT
Start: 2019-06-14 | End: 2019-07-26 | Stop reason: SDUPTHER

## 2019-06-14 RX ORDER — FLUCONAZOLE 150 MG/1
150 TABLET ORAL ONCE
Qty: 1 TABLET | Refills: 0 | Status: SHIPPED | OUTPATIENT
Start: 2019-06-14 | End: 2019-06-14

## 2019-06-14 RX ORDER — CEFDINIR 300 MG/1
300 CAPSULE ORAL 2 TIMES DAILY
Qty: 20 CAPSULE | Refills: 0 | Status: SHIPPED | OUTPATIENT
Start: 2019-06-14 | End: 2019-06-24

## 2019-06-14 NOTE — TELEPHONE ENCOUNTER
Pt returned call she states she also does not feel better that she needs an antibiotic and If you sent her in one she will need diflucan,

## 2019-06-14 NOTE — TELEPHONE ENCOUNTER
PHARMACY CALLED AND STATED THAT PA FOR THE GENERIC OF NEXIUM (ESOMEPRAZOLE) WAS APPROVED; BUT PT. WILL ONLY TAKE NEXIUM; PHARMACY STATED THAT PT. WOULD LIKE A PA STARTED FOR NEXUM

## 2019-06-15 ENCOUNTER — TELEPHONE (OUTPATIENT)
Dept: INTERNAL MEDICINE | Facility: CLINIC | Age: 39
End: 2019-06-15

## 2019-06-15 NOTE — TELEPHONE ENCOUNTER
POLA FROM [employer] IS CALLING WANTING TO KNOW IF PATIENT CAN HAVE GENERIC NEXIUM 40MG, PA WAS APPROVED FOR GENERIC, BUT NOT BRAND NAME. PLEASE CALL AND LET THEM KNOW. [phone]

## 2019-06-19 ENCOUNTER — TRANSCRIBE ORDERS (OUTPATIENT)
Dept: NUTRITION | Facility: HOSPITAL | Age: 39
End: 2019-06-19

## 2019-06-19 DIAGNOSIS — K58.9 IRRITABLE BOWEL SYNDROME, UNSPECIFIED TYPE: Primary | ICD-10-CM

## 2019-06-26 ENCOUNTER — APPOINTMENT (OUTPATIENT)
Dept: PHYSICAL THERAPY | Facility: HOSPITAL | Age: 39
End: 2019-06-26

## 2019-06-27 ENCOUNTER — HOSPITAL ENCOUNTER (OUTPATIENT)
Dept: NUTRITION | Facility: HOSPITAL | Age: 39
Setting detail: RECURRING SERIES
Discharge: HOME | End: 2019-06-27

## 2019-06-27 VITALS — HEIGHT: 61 IN | BODY MASS INDEX: 35.87 KG/M2 | WEIGHT: 190 LBS

## 2019-06-27 PROCEDURE — 97802 MEDICAL NUTRITION INDIV IN: CPT | Performed by: DIETITIAN, REGISTERED

## 2019-07-08 ENCOUNTER — APPOINTMENT (OUTPATIENT)
Dept: PHYSICAL THERAPY | Facility: HOSPITAL | Age: 39
End: 2019-07-08

## 2019-07-08 NOTE — ED PROVIDER NOTES
Complete history and physical was completed today.  Complete and thorough medication reconciliation was performed.  Discussed risks and benefits of medications.  Advised patient on orders and health maintenance.  We discussed old records and old labs if available.  Will request any records not available through epic.  Continue current medications listed on your summary sheet.     Subjective   38-year-old female with extensive past medical history presents complaining of both headache and chest pain.  She states that she was going to be evaluated at urgent care earlier this evening but left before being seen as she was dissatisfied with the wait there.  She states that throughout the day today she has been experiencing a frontal headache that is nonradiating in nature.  She states that the headache seems to be worse with bright lights and loud noises.  She endorses nausea but no vomiting.  No thunderclap onset.  No neck pain or stiffness.  No vision changes.  Additionally, she states that she has been experiencing central, nonradiating chest pain this evening as well.  Her only cardiac risk factor is smoking.  She denies any diaphoresis.  No shortness of breath.  Her pain is currently 8 out of 10 in severity.        History provided by:  Patient  Headache   Pain location:  Frontal  Radiates to:  Does not radiate  Onset quality:  Sudden  Timing:  Constant  Progression:  Unchanged  Chronicity:  New  Relieved by:  None tried  Worsened by:  Light and sound  Ineffective treatments:  None tried  Associated symptoms: photophobia    Associated symptoms: no vomiting        Review of Systems   Eyes: Positive for photophobia.   Respiratory: Negative for shortness of breath.    Cardiovascular: Positive for chest pain.   Gastrointestinal: Negative for vomiting.   Neurological: Positive for headaches.   All other systems reviewed and are negative.      Past Medical History:   Diagnosis Date   • Abdominal pain    • Abnormal breast exam    • Abnormal Pap smear of cervix    • Achilles tendinitis    • Acute sinusitis    • Anxiety    • Arthritis    • Asthma    • Tavera's syndrome    • Bipolar 1 disorder (CMS/HCC)    • Bowel trouble    • Breast cyst    • Colon polyps    • Constipation    • Depression    • Diverticulitis    • Endometriosis    • Eustachian tube dysfunction    • Extremity pain    • Female pelvic  pain    • Gastric ulcer    • H/O bladder infections    • History of rashes as a child    • Low back pain    • Menopausal symptoms    • Muscle weakness    • Ovarian cyst    • PID (pelvic inflammatory disease)    • Recurrent UTI    • Sexual problems        Allergies   Allergen Reactions   • Adhesive Tape Hives   • Latex Hives   • Sulfa Antibiotics Hives   • Sulfate Hives   • Biaxin [Clarithromycin] Nausea Only   • Codeine Itching   • Penicillins Nausea Only       Past Surgical History:   Procedure Laterality Date   •  SECTION     • HYSTERECTOMY     • NASAL ENDOSCOPY     • OOPHORECTOMY Bilateral    • OTHER SURGICAL HISTORY      Laparoscopy (diagnostic) gynecologic with biopsy   • SHOULDER SURGERY         Family History   Problem Relation Age of Onset   • Liver disease Mother    • Diabetes Mother    • Hyperlipidemia Mother    • Hypertension Mother    • Thyroid disease Mother    • Arthritis Father    • Mental illness Father    • Migraines Sister    • Stroke Other    • Diabetes Other    • Hyperlipidemia Other    • Hypertension Other    • Mental illness Other    • Migraines Other    • Tuberculosis Other    • Breast cancer Neg Hx    • Endometrial cancer Neg Hx    • Ovarian cancer Neg Hx        Social History     Social History   • Marital status: Single     Social History Main Topics   • Smoking status: Former Smoker     Types: Cigarettes   • Smokeless tobacco: Never Used      Comment: social smoking   • Alcohol use No   • Drug use: No   • Sexual activity: Defer     Other Topics Concern   • Not on file         Objective   Physical Exam   Constitutional: She is oriented to person, place, and time. She appears well-developed and well-nourished. No distress.   Well-appearing female in no acute distress   HENT:   Head: Normocephalic and atraumatic.   Mouth/Throat: Oropharynx is clear and moist.   Eyes: Pupils are equal, round, and reactive to light. EOM are normal.   Visual acuity at baseline subjectively   Neck: Normal  range of motion. Neck supple.   No meningeal signs, no nuchal rigidity   Cardiovascular: Normal heart sounds.  Exam reveals no gallop and no friction rub.    No murmur heard.  Pulmonary/Chest: Effort normal and breath sounds normal. No respiratory distress. She has no wheezes. She has no rales.   Abdominal: Soft. Bowel sounds are normal. She exhibits no distension and no mass. There is no tenderness. There is no rebound and no guarding.   Musculoskeletal: Normal range of motion.   Neurological: She is alert and oriented to person, place, and time. No cranial nerve deficit or sensory deficit. Coordination normal.   Skin: Skin is warm and dry. No rash noted. She is not diaphoretic. No erythema.   Psychiatric: She has a normal mood and affect. Judgment and thought content normal.   Nursing note and vitals reviewed.      Procedures         ED Course  ED Course as of Oct 04 0400   Thu Oct 04, 2018   0300 38-year-old female presents for evaluation of headache and chest pain.  [DD]   0301 The patient's only cardiac risk factor is being a former smoker.  She states that throughout the day today she has been experiencing headache and chest pain.  On arrival to the ED, patient very well-appearing with unremarkable exam.  No red flag headache signs, symptoms, or history necessitating neuro imaging at this time.  EKG unrevealing.  Labs unrevealing.  Low risk well's and d-dimer negative.  Chest x-ray negative.  Upon reevaluation, patient much improved.  Doubt ACS, PE, dissection, or emergent cardiothoracic process at this time based on exam, history, clinical presentation, and gestalt, and risk stratification.  The patient will follow-up with her primary care physician within one week.  Agreeable with plan and given appropriate return precautions.  [DD]      ED Course User Index  [DD] Checo Escobar MD     No results found for this or any previous visit (from the past 24 hour(s)).  Note: In addition to lab results from  "this visit, the labs listed above may include labs taken at another facility or during a different encounter within the last 24 hours. Please correlate lab times with ED admission and discharge times for further clarification of the services performed during this visit.    XR Chest 1 View    (Results Pending)     Vitals:    10/03/18 2219   BP: 110/82   Pulse: (!) 125   Resp: 16   Temp: 97.9 °F (36.6 °C)   TempSrc: Oral   SpO2: 99%   Weight: 69.9 kg (154 lb)   Height: 154.9 cm (61\")     Medications   sodium chloride 0.9 % flush 10 mL (not administered)   sodium chloride 0.9 % bolus 1,000 mL (not administered)   ketorolac (TORADOL) injection 15 mg (not administered)   metoclopramide (REGLAN) injection 10 mg (not administered)   diphenhydrAMINE (BENADRYL) injection 25 mg (not administered)     ECG/EMG Results (last 24 hours)     Procedure Component Value Units Date/Time    ECG 12 Lead [[id number]] Collected:  10/03/18 2305     Updated:  10/03/18 2306                    Recent Results (from the past 24 hour(s))   Comprehensive Metabolic Panel    Collection Time: 10/04/18  1:23 AM   Result Value Ref Range    Glucose 112 (H) 70 - 100 mg/dL    BUN 14 9 - 23 mg/dL    Creatinine 0.72 0.60 - 1.30 mg/dL    Sodium 139 132 - 146 mmol/L    Potassium 3.7 3.5 - 5.5 mmol/L    Chloride 109 99 - 109 mmol/L    CO2 22.0 20.0 - 31.0 mmol/L    Calcium 8.7 8.7 - 10.4 mg/dL    Total Protein 6.5 5.7 - 8.2 g/dL    Albumin 4.07 3.20 - 4.80 g/dL    ALT (SGPT) 25 7 - 40 U/L    AST (SGOT) 17 0 - 33 U/L    Alkaline Phosphatase 69 25 - 100 U/L    Total Bilirubin 0.2 (L) 0.3 - 1.2 mg/dL    eGFR Non African Amer 92 >60 mL/min/1.73    Globulin 2.4 gm/dL    A/G Ratio 1.7 1.5 - 2.5 g/dL    BUN/Creatinine Ratio 19.4 7.0 - 25.0    Anion Gap 8.0 3.0 - 11.0 mmol/L   D-dimer, Quantitative    Collection Time: 10/04/18  1:23 AM   Result Value Ref Range    D-Dimer, Quantitative 0.38 0.00 - 0.50 mg/L (FEU)   BNP    Collection Time: 10/04/18  1:23 AM   Result " Value Ref Range    BNP 5.0 0.0 - 100.0 pg/mL   CBC Auto Differential    Collection Time: 10/04/18  1:23 AM   Result Value Ref Range    WBC 9.36 3.50 - 10.80 10*3/mm3    RBC 4.53 3.89 - 5.14 10*6/mm3    Hemoglobin 12.7 11.5 - 15.5 g/dL    Hematocrit 39.8 34.5 - 44.0 %    MCV 87.9 80.0 - 99.0 fL    MCH 28.0 27.0 - 31.0 pg    MCHC 31.9 (L) 32.0 - 36.0 g/dL    RDW 13.7 11.3 - 14.5 %    RDW-SD 43.8 37.0 - 54.0 fl    MPV 10.4 6.0 - 12.0 fL    Platelets 279 150 - 450 10*3/mm3    Neutrophil % 63.1 41.0 - 71.0 %    Lymphocyte % 27.9 24.0 - 44.0 %    Monocyte % 5.2 0.0 - 12.0 %    Eosinophil % 3.6 (H) 0.0 - 3.0 %    Basophil % 0.2 0.0 - 1.0 %    Immature Grans % 0.2 0.0 - 0.6 %    Neutrophils, Absolute 5.90 1.50 - 8.30 10*3/mm3    Lymphocytes, Absolute 2.61 0.60 - 4.80 10*3/mm3    Monocytes, Absolute 0.49 0.00 - 1.00 10*3/mm3    Eosinophils, Absolute 0.34 (H) 0.00 - 0.30 10*3/mm3    Basophils, Absolute 0.02 0.00 - 0.20 10*3/mm3    Immature Grans, Absolute 0.02 0.00 - 0.03 10*3/mm3   Troponin    Collection Time: 10/04/18  1:23 AM   Result Value Ref Range    Troponin I <0.006 <=0.039 ng/mL   Urinalysis With Microscopic If Indicated (No Culture) - Urine, Clean Catch    Collection Time: 10/04/18  2:45 AM   Result Value Ref Range    Color, UA Yellow Yellow, Straw    Appearance, UA Clear Clear    pH, UA 6.5 5.0 - 8.0    Specific Gravity, UA 1.024 1.001 - 1.030    Glucose, UA Negative Negative    Ketones, UA Negative Negative    Bilirubin, UA Negative Negative    Blood, UA Negative Negative    Protein, UA Negative Negative    Leuk Esterase, UA Negative Negative    Nitrite, UA Negative Negative    Urobilinogen, UA 1.0 E.U./dL 0.2 - 1.0 E.U./dL   Pregnancy, Urine - Urine, Clean Catch    Collection Time: 10/04/18  2:45 AM   Result Value Ref Range    HCG, Urine QL Negative Negative     Note: In addition to lab results from this visit, the labs listed above may include labs taken at another facility or during a different encounter within  the last 24 hours. Please correlate lab times with ED admission and discharge times for further clarification of the services performed during this visit.    XR Chest 1 View   Final Result     Unremarkable study without an active lung lesion.         THIS DOCUMENT HAS BEEN ELECTRONICALLY SIGNED BY ROSIE SHEN MD        Vitals:    10/04/18 0256 10/04/18 0257 10/04/18 0258 10/04/18 0259   BP:       Pulse: 81 85 83 80   Resp:       Temp:       TempSrc:       SpO2:       Weight:       Height:         Medications   sodium chloride 0.9 % bolus 1,000 mL (0 mL Intravenous Stopped 10/4/18 0222)   ketorolac (TORADOL) injection 15 mg (15 mg Intravenous Given 10/4/18 0127)   metoclopramide (REGLAN) injection 10 mg (10 mg Intravenous Given 10/4/18 0130)   diphenhydrAMINE (BENADRYL) injection 25 mg (25 mg Intravenous Given 10/4/18 0129)     ECG/EMG Results (last 24 hours)     Procedure Component Value Units Date/Time    ECG 12 Lead [[id number]] Collected:  10/03/18 2305     Updated:  10/03/18 2306            [facility]    Final diagnoses:   Chest pain, unspecified type   Headache, unspecified headache type       Documentation assistance provided by yrn Rocha.  Information recorded by the scribe was done at my direction and has been verified and validated by me.     Nathan Rocha  10/04/18 0054       Checo Escobar MD  10/04/18 3242

## 2019-07-17 ENCOUNTER — APPOINTMENT (OUTPATIENT)
Dept: NUTRITION | Facility: HOSPITAL | Age: 39
End: 2019-07-17

## 2019-07-26 ENCOUNTER — TRANSCRIBE ORDERS (OUTPATIENT)
Dept: LAB | Facility: HOSPITAL | Age: 39
End: 2019-07-26

## 2019-07-26 ENCOUNTER — LAB (OUTPATIENT)
Dept: LAB | Facility: HOSPITAL | Age: 39
End: 2019-07-26

## 2019-07-26 ENCOUNTER — OFFICE VISIT (OUTPATIENT)
Dept: INTERNAL MEDICINE | Facility: CLINIC | Age: 39
End: 2019-07-26

## 2019-07-26 VITALS
HEART RATE: 119 BPM | OXYGEN SATURATION: 98 % | WEIGHT: 187.25 LBS | DIASTOLIC BLOOD PRESSURE: 80 MMHG | BODY MASS INDEX: 35.38 KG/M2 | SYSTOLIC BLOOD PRESSURE: 120 MMHG

## 2019-07-26 DIAGNOSIS — K21.9 GASTROESOPHAGEAL REFLUX DISEASE WITHOUT ESOPHAGITIS: ICD-10-CM

## 2019-07-26 DIAGNOSIS — J30.2 SEASONAL ALLERGIES: Primary | ICD-10-CM

## 2019-07-26 DIAGNOSIS — Z91.018 ALLERGY TO OTHER FOODS: Primary | ICD-10-CM

## 2019-07-26 DIAGNOSIS — Z91.018 ALLERGY TO OTHER FOODS: ICD-10-CM

## 2019-07-26 PROCEDURE — 82784 ASSAY IGA/IGD/IGG/IGM EACH: CPT

## 2019-07-26 PROCEDURE — 36415 COLL VENOUS BLD VENIPUNCTURE: CPT

## 2019-07-26 PROCEDURE — 86255 FLUORESCENT ANTIBODY SCREEN: CPT

## 2019-07-26 PROCEDURE — 83516 IMMUNOASSAY NONANTIBODY: CPT

## 2019-07-26 PROCEDURE — 96372 THER/PROPH/DIAG INJ SC/IM: CPT | Performed by: NURSE PRACTITIONER

## 2019-07-26 PROCEDURE — 86003 ALLG SPEC IGE CRUDE XTRC EA: CPT

## 2019-07-26 PROCEDURE — 99213 OFFICE O/P EST LOW 20 MIN: CPT | Performed by: NURSE PRACTITIONER

## 2019-07-26 RX ORDER — ESOMEPRAZOLE MAGNESIUM 20 MG/1
20 TABLET, DELAYED RELEASE ORAL 2 TIMES DAILY
Qty: 60 TABLET | Refills: 5 | Status: SHIPPED | OUTPATIENT
Start: 2019-07-26 | End: 2019-07-27 | Stop reason: CLARIF

## 2019-07-26 RX ORDER — METHYLPREDNISOLONE ACETATE 40 MG/ML
40 INJECTION, SUSPENSION INTRA-ARTICULAR; INTRALESIONAL; INTRAMUSCULAR; SOFT TISSUE ONCE
Status: COMPLETED | OUTPATIENT
Start: 2019-07-26 | End: 2019-07-26

## 2019-07-26 RX ADMIN — METHYLPREDNISOLONE ACETATE 40 MG: 40 INJECTION, SUSPENSION INTRA-ARTICULAR; INTRALESIONAL; INTRAMUSCULAR; SOFT TISSUE at 15:40

## 2019-07-26 NOTE — PROGRESS NOTES
Chief Complaint   Patient presents with   • Allergies       History of Present Illness  39 y.o.female presents for allergies.  Complains of rhinorrhea clear secretions, sore throat, ears itching, feels like allergies are just out of control.  Itchy watery eyes.  Takes Flonase, Zyrtec, Singulair.  Followed by allergist Dr. Aide reich.  When she gets like this steroids have helped in the past.  No recent sick contacts.    History of GERD.  Takes Nexium 24-hour control but reports last time filled was only ordered once a day and she takes this 2 times a day as recommended prior by GI services.  No melena, Hematemesis, or hematochezia.    Review of Systems   Constitutional: Negative for chills.   HENT: Positive for congestion, postnasal drip, rhinorrhea and sore throat. Negative for ear pain.    Respiratory: Positive for cough. Negative for shortness of breath.    Gastrointestinal: Positive for GERD. Negative for abdominal pain.   Musculoskeletal: Negative for myalgias.   Neurological: Negative for headache.         [facility]  The following portions of the patient's history were reviewed and updated as appropriate: allergies, current medications, past family history, past medical history, past social history, past surgical history and problem list.       Past Medical History:   Diagnosis Date   • Abdominal pain    • Abnormal breast exam    • Abnormal Pap smear of cervix    • Achilles tendinitis    • Acute sinusitis    • Anxiety    • Arthritis    • Asthma    • Tavera's syndrome    • Bipolar 1 disorder (CMS/HCC)    • Bowel trouble    • Breast cyst    • Colon polyps    • Constipation    • Depression    • Diverticulitis    • Diverticulitis of colon    • Endometriosis    • Eustachian tube dysfunction    • Extremity pain    • Fatty liver    • Female pelvic pain    • Gastric ulcer    • H/O bladder infections    • History of rashes as a child    • Irritable bowel syndrome    • Low back pain    • Menopausal symptoms    • Muscle  weakness    • Ovarian cyst    • PID (pelvic inflammatory disease)    • Recurrent UTI    • Sexual problems       Past Surgical History:   Procedure Laterality Date   •  SECTION     • CHOLECYSTECTOMY     • COLONOSCOPY  2017   • HYSTERECTOMY     • NASAL ENDOSCOPY     • OOPHORECTOMY Bilateral    • OTHER SURGICAL HISTORY      Laparoscopy (diagnostic) gynecologic with biopsy   • SHOULDER SURGERY     • UPPER GASTROINTESTINAL ENDOSCOPY  2019      Allergies   Allergen Reactions   • Adhesive Tape Hives   • Latex Hives   • Sulfa Antibiotics Hives   • Sulfate Hives   • Biaxin [Clarithromycin] Nausea Only   • Codeine Itching   • Penicillins Nausea Only      Family History   Problem Relation Age of Onset   • Liver disease Mother    • Diabetes Mother    • Hyperlipidemia Mother    • Hypertension Mother    • Thyroid disease Mother    • Arthritis Father    • Mental illness Father    • Migraines Sister    • Stroke Other    • Diabetes Other    • Hyperlipidemia Other    • Hypertension Other    • Mental illness Other    • Migraines Other    • Tuberculosis Other    • Breast cancer Neg Hx    • Endometrial cancer Neg Hx    • Ovarian cancer Neg Hx             Current Outpatient Medications:   •  albuterol 108 (90 Base) MCG/ACT inhaler, Inhale 2 puffs Every 4 (Four) Hours As Needed for Wheezing., Disp: 18 g, Rfl: 11  •  baclofen (LIORESAL) 10 MG tablet, Take 1 tablet by mouth 3 (Three) Times a Day As Needed for Muscle Spasms., Disp: 90 tablet, Rfl: 2  •  benzonatate (TESSALON) 200 MG capsule, Take 1 capsule by mouth 3 (Three) Times a Day As Needed for Cough., Disp: 20 capsule, Rfl: 0  •  cetirizine (zyrTEC) 10 MG tablet, Take 1 tablet by mouth Daily., Disp: , Rfl: 1  •  dicyclomine (BENTYL) 20 MG tablet, Take 1 tablet by mouth Every 8 (Eight) Hours As Needed (abdominal pain/cramping/diarrhea)., Disp: 21 tablet, Rfl: 0  •  DULoxetine (CYMBALTA) 30 MG capsule, Take 30 mg by mouth Daily., Disp: , Rfl:   •  EPIPEN 2-JAREN 0.3 MG/0.3ML  solution auto-injector injection, U UTD, Disp: , Rfl: 6  •  Esomeprazole Magnesium (NEXIUM 24HR) 20 MG tablet delayed-release, Take 20 mg by mouth Daily., Disp: 30 tablet, Rfl: 5  •  estradiol (ESTRACE) 2 MG tablet, Take 1 tablet by mouth Daily., Disp: 30 tablet, Rfl: 5  •  fluticasone (FLONASE) 50 MCG/ACT nasal spray, 2 sprays into each nostril Daily., Disp: 1 bottle, Rfl: 3  •  gabapentin (NEURONTIN) 300 MG capsule, 1 capsule by mouth every night, Disp: 30 capsule, Rfl: 2  •  ipratropium-albuterol (DUO-NEB) 0.5-2.5 mg/3 ml nebulizer, Take 3 mL by nebulization Every 4 (Four) Hours As Needed for Wheezing or Shortness of Air., Disp: 360 mL, Rfl: 11  •  lidocaine (XYLOCAINE) 5 % ointment, As Needed., Disp: , Rfl:   •  LORazepam (ATIVAN) 1 MG tablet, TK 1 T PO D PRN, Disp: , Rfl: 0  •  nicotine (NICODERM CQ) 21 MG/24HR patch, Place 1 patch on the skin as directed by provider Daily., Disp: 28 each, Rfl: 11  •  ondansetron ODT (ZOFRAN-ODT) 4 MG disintegrating tablet, Take 1 tablet by mouth Every 8 (Eight) Hours As Needed for Nausea or Vomiting., Disp: 60 tablet, Rfl: 2  •  PATADAY 0.2 % solution ophthalmic solution, USE 1 DROP AEY ONCE DAILY PRN, Disp: , Rfl: 3  •  saccharomyces boulardii (FLORASTOR) 250 MG capsule, Take 1 capsule by mouth 2 (Two) Times a Day., Disp: 14 capsule, Rfl: 0  •  SUMAtriptan (IMITREX) 25 MG tablet, Take one tablet at onset of headache. May repeat dose one time in 2 hours if headache not relieved., Disp: 16 tablet, Rfl: 2  •  traMADol (ULTRAM) 50 MG tablet, Take 1 tablet by mouth Every 8 (Eight) Hours As Needed for Moderate Pain ., Disp: 60 tablet, Rfl: 0  •  traZODone (DESYREL) 150 MG tablet, Take 1 tablet by mouth As Needed., Disp: , Rfl: 3    VITALS:  /80   Pulse 119   Wt 84.9 kg (187 lb 4 oz)   SpO2 98%   BMI 35.38 kg/m²     Physical Exam   Constitutional: She appears well-developed and well-nourished. No distress.   HENT:   Right Ear: Tympanic membrane, external ear and ear canal  normal.   Left Ear: Tympanic membrane and external ear normal. Tympanic membrane is not injected, not perforated and not bulging.   Nose: Mucosal edema and rhinorrhea present. No sinus tenderness. Right sinus exhibits no maxillary sinus tenderness and no frontal sinus tenderness. Left sinus exhibits no maxillary sinus tenderness and no frontal sinus tenderness.   Mouth/Throat: Oropharynx is clear and moist and mucous membranes are normal.   Clear nasal secretions. Small amt dried blood in left ear canal; no erythema swelling or tenderness   Eyes: Conjunctivae and lids are normal. Right eye exhibits no discharge. Left eye exhibits no discharge.   Cardiovascular: Normal rate, regular rhythm and normal heart sounds.   Pulmonary/Chest: Effort normal and breath sounds normal. No respiratory distress.   Abdominal: There is no tenderness.   Neurological: She is alert.   Skin: Skin is warm and dry.   Vitals reviewed.      LABS  No new labs.    ASSESSMENT/PLAN  Megan was seen today for allergies.    Diagnoses and all orders for this visit:    Seasonal allergies  Comments:    Continue flonase, Zyrtec, Singulair.   Orders:  -     methylPREDNISolone acetate (DEPO-medrol) injection 40 mg    Gastroesophageal reflux disease without esophagitis  -     Esomeprazole Magnesium (NEXIUM 24HR) 20 MG tablet delayed-release; Take 20 mg by mouth 2 (Two) Times a Day.    recommended 1/2 peroxide 1/2 water to left ear canal to get dried blood out.  Do not use qtips.    If worsening of symptoms or no improvement in symptoms or fever > 101.5 patient should contact our office for further evaluation treatment or seek urgent care.    I discussed the patients findings and my recommendations with patient.  Patient was encouraged to keep me informed of any acute changes, lack of improvement, or any new concerning symptoms.    Patient voiced understanding of all instructions and denied further questions.      FOLLOW-UP  Return if symptoms worsen or  fail to improve.    Electronically signed by:    MAGGIE Chacon  07/26/2019

## 2019-07-27 RX ORDER — NICOTINE POLACRILEX 4 MG/1
2 GUM, CHEWING ORAL DAILY
Qty: 60 EACH | Refills: 5 | Status: SHIPPED | OUTPATIENT
Start: 2019-07-27 | End: 2020-01-22

## 2019-07-29 ENCOUNTER — CONSULT (OUTPATIENT)
Dept: CARDIOLOGY | Facility: CLINIC | Age: 39
End: 2019-07-29

## 2019-07-29 ENCOUNTER — PRIOR AUTHORIZATION (OUTPATIENT)
Dept: INTERNAL MEDICINE | Facility: CLINIC | Age: 39
End: 2019-07-29

## 2019-07-29 VITALS
BODY MASS INDEX: 35.87 KG/M2 | HEART RATE: 107 BPM | HEIGHT: 61 IN | WEIGHT: 190 LBS | DIASTOLIC BLOOD PRESSURE: 92 MMHG | SYSTOLIC BLOOD PRESSURE: 127 MMHG

## 2019-07-29 DIAGNOSIS — R07.2 PRECORDIAL PAIN: ICD-10-CM

## 2019-07-29 DIAGNOSIS — R00.2 PALPITATIONS: Primary | ICD-10-CM

## 2019-07-29 LAB
ENDOMYSIUM IGA SER QL: NEGATIVE
GLIADIN PEPTIDE IGA SER-ACNC: 5 UNITS (ref 0–19)
GLIADIN PEPTIDE IGG SER-ACNC: 3 UNITS (ref 0–19)
IGA SERPL-MCNC: 187 MG/DL (ref 87–352)
TTG IGA SER-ACNC: <2 U/ML (ref 0–3)
TTG IGG SER-ACNC: <2 U/ML (ref 0–5)

## 2019-07-29 PROCEDURE — 93000 ELECTROCARDIOGRAM COMPLETE: CPT | Performed by: INTERNAL MEDICINE

## 2019-07-29 PROCEDURE — 99204 OFFICE O/P NEW MOD 45 MIN: CPT | Performed by: INTERNAL MEDICINE

## 2019-07-29 NOTE — PROGRESS NOTES
"[facility] at [facility]  Consultation H&P  Crystal A Day  1980    There is no work phone number on file..    VISIT DATE:  07/29/2019    PCP: Sugey Ovalles, APRN  [id number] [address]    CC:  Chief Complaint   Patient presents with   • Chest Pain   • Dizziness   • Shortness of Breath   • Irregular Heart Beat       ASSESSMENT:   Diagnosis Plan   1. Palpitations     2. Precordial pain         PLAN:  We will review previous cardiac evaluation when available.  2-week ambulatory ECG monitor for symptom rhythm correlation.  Smoking cessation.    History of Present Illness   39-year-old female with a previous history of palpitations, atypical chest pain and presyncope.  She does have a history of mental illness and reports that she does not remember things well and is a poor historian.  She does report having a previous heart catheterization and a relatively recent echo and stress test.  She had a more remote ambulatory ECG monitor.  Currently reports intermittent exertion precordial chest discomfort which she describes as a tightness sensation.  Has not been exercising on a regular basis.  She is a social smoker.  Blood pressures run less than 130/80 mmHg.  Does report intermittent racing heartbeats with associated lightheadedness.  No obvious triggers.  Also with intermittent lightheadedness predominantly in the standing position.  I requested her previous cardiac evaluation to include cardiac catheterization, ambulatory ECG monitor, echo and stress test.    PHYSICAL EXAMINATION:  Vitals:    07/29/19 1515   BP: 127/92   BP Location: Right arm   Patient Position: Sitting   Pulse: 107   Weight: 86.2 kg (190 lb)   Height: 154.9 cm (61\")     General Appearance:    Alert, cooperative, no distress, appears stated age   Head:    Normocephalic, without obvious abnormality, atraumatic   Eyes:    conjunctiva/corneas clear, EOM's intact, fundi     benign, both eyes   Ears:    " Normal TM's and external ear canals, both ears   Nose:   Nares normal, septum midline, mucosa normal, no drainage    or sinus tenderness   Throat:   Lips, mucosa, and tongue normal; teeth and gums normal   Neck:   Supple, symmetrical, trachea midline, no adenopathy;     thyroid:  no enlargement/tenderness/nodules; no carotid    bruit or JVD   Back:     Symmetric, no curvature, ROM normal, no CVA tenderness   Lungs:     Clear to auscultation bilaterally, respirations unlabored   Chest Wall:    No tenderness or deformity    Heart:    Regular rate and rhythm, S1 and S2 normal, no murmur, rub   or gallop, normal carotid impulse bilaterally without bruit.   Abdomen:     Soft, non-tender, bowel sounds active all four quadrants,     no masses, no organomegaly   Extremities:   Extremities normal, atraumatic, no cyanosis or edema   Pulses:   2+ and symmetric all extremities   Skin:   Skin color, texture, turgor normal, no rashes or lesions   Lymph nodes:   Cervical, supraclavicular, and axillary nodes normal   Neurologic:   normal strength, sensation intact     throughout       Diagnostic Data:    ECG 12 Lead  Date/Time: 7/29/2019 3:22 PM  Performed by: Zia Meier III, MD  Authorized by: Zia Meier III, MD   Comparison: compared with previous ECG from 11/4/2018  Similar to previous ECG  Rhythm: sinus rhythm  Q waves: III and aVF    Other findings: poor R wave progression    Clinical impression: abnormal EKG          Lab Results   Component Value Date    CHLPL 218 (H) 05/29/2014    TRIG 135 05/29/2014    HDL 59 05/29/2014     Lab Results   Component Value Date    GLUCOSE 94 03/27/2019    BUN 16 03/27/2019    CREATININE 0.77 03/27/2019     03/27/2019    K 3.5 03/27/2019     03/27/2019    CO2 21.0 03/27/2019     Lab Results   Component Value Date    HGBA1C 5.20 02/27/2019     Lab Results   Component Value Date    WBC 10.25 03/27/2019    HGB 13.5 03/27/2019    HCT 42.0 03/27/2019     03/27/2019        PROBLEM LIST:  Patient Active Problem List   Diagnosis   • Allergic rhinitis   • Tavera's esophagus   • Bipolar disorder (CMS/HCC)   • Fibromyalgia   • Gastroesophageal reflux disease   • Insomnia   • Abdominal wall abscess   • Acute recurrent frontal sinusitis   • Spasm   • Right upper quadrant pain   • Uncomplicated asthma   • Shortness of breath   • Atypical chest pain   • Lung nodules - calcified granulomas   • Atelectasis   • Hypercalcemia   • Displacement of intervertebral disc of lumbosacral region   • Lumbar discogenic pain syndrome   • Spondylosis of lumbar region without myelopathy or radiculopathy   • Memory loss   • Bronchitis   • Palpitations   • Precordial pain       PAST MEDICAL HX  Past Medical History:   Diagnosis Date   • Abdominal pain    • Abnormal breast exam    • Abnormal Pap smear of cervix    • Achilles tendinitis    • Acute sinusitis    • Anxiety    • Arthritis    • Asthma    • Tavera's syndrome    • Bipolar 1 disorder (CMS/HCC)    • Bowel trouble    • Breast cyst    • Colon polyps    • Constipation    • Depression    • Diverticulitis    • Diverticulitis of colon    • Endometriosis    • Eustachian tube dysfunction    • Extremity pain    • Fatty liver    • Female pelvic pain    • Gastric ulcer    • H/O bladder infections    • History of rashes as a child    • Irritable bowel syndrome    • Low back pain    • Menopausal symptoms    • Muscle weakness    • Ovarian cyst    • PID (pelvic inflammatory disease)    • Recurrent UTI    • Sexual problems        Allergies  Allergies   Allergen Reactions   • Adhesive Tape Hives   • Latex Hives   • Sulfa Antibiotics Hives   • Sulfate Hives   • Biaxin [Clarithromycin] Nausea Only   • Codeine Itching   • Penicillins Nausea Only       Current Medications    Current Outpatient Medications:   •  albuterol 108 (90 Base) MCG/ACT inhaler, Inhale 2 puffs Every 4 (Four) Hours As Needed for Wheezing., Disp: 18 g, Rfl: 11  •  baclofen (LIORESAL) 10 MG tablet,  Take 1 tablet by mouth 3 (Three) Times a Day As Needed for Muscle Spasms., Disp: 90 tablet, Rfl: 2  •  benzonatate (TESSALON) 200 MG capsule, Take 1 capsule by mouth 3 (Three) Times a Day As Needed for Cough., Disp: 20 capsule, Rfl: 0  •  cetirizine (zyrTEC) 10 MG tablet, Take 1 tablet by mouth Daily., Disp: , Rfl: 1  •  dicyclomine (BENTYL) 20 MG tablet, Take 1 tablet by mouth Every 8 (Eight) Hours As Needed (abdominal pain/cramping/diarrhea)., Disp: 21 tablet, Rfl: 0  •  DULoxetine (CYMBALTA) 30 MG capsule, Take 30 mg by mouth Daily., Disp: , Rfl:   •  EPIPEN 2-JAREN 0.3 MG/0.3ML solution auto-injector injection, U UTD, Disp: , Rfl: 6  •  estradiol (ESTRACE) 2 MG tablet, Take 1 tablet by mouth Daily., Disp: 30 tablet, Rfl: 5  •  fluticasone (FLONASE) 50 MCG/ACT nasal spray, 2 sprays into each nostril Daily., Disp: 1 bottle, Rfl: 3  •  gabapentin (NEURONTIN) 300 MG capsule, 1 capsule by mouth every night, Disp: 30 capsule, Rfl: 2  •  ipratropium-albuterol (DUO-NEB) 0.5-2.5 mg/3 ml nebulizer, Take 3 mL by nebulization Every 4 (Four) Hours As Needed for Wheezing or Shortness of Air., Disp: 360 mL, Rfl: 11  •  lidocaine (XYLOCAINE) 5 % ointment, As Needed., Disp: , Rfl:   •  LORazepam (ATIVAN) 1 MG tablet, TK 1 T PO D PRN, Disp: , Rfl: 0  •  neomycin-polymyxin-hydrocortisone (CORTISPORIN) 3.5-47531-2 otic solution, Administer 3 drops into the left ear 4 (Four) Times a Day for 5 days., Disp: 10 mL, Rfl: 0  •  nicotine (NICODERM CQ) 21 MG/24HR patch, Place 1 patch on the skin as directed by provider Daily., Disp: 28 each, Rfl: 11  •  Omeprazole 20 MG tablet delayed-release, Take 40 mg by mouth Daily., Disp: 60 each, Rfl: 5  •  ondansetron ODT (ZOFRAN-ODT) 4 MG disintegrating tablet, Take 1 tablet by mouth Every 8 (Eight) Hours As Needed for Nausea or Vomiting., Disp: 60 tablet, Rfl: 2  •  PATADAY 0.2 % solution ophthalmic solution, USE 1 DROP AEY ONCE DAILY PRN, Disp: , Rfl: 3  •  saccharomyces boulardii (FLORASTOR) 250  MG capsule, Take 1 capsule by mouth 2 (Two) Times a Day., Disp: 14 capsule, Rfl: 0  •  SUMAtriptan (IMITREX) 25 MG tablet, Take one tablet at onset of headache. May repeat dose one time in 2 hours if headache not relieved., Disp: 16 tablet, Rfl: 2  •  traMADol (ULTRAM) 50 MG tablet, Take 1 tablet by mouth Every 8 (Eight) Hours As Needed for Moderate Pain ., Disp: 60 tablet, Rfl: 0  •  traZODone (DESYREL) 150 MG tablet, Take 1 tablet by mouth As Needed., Disp: , Rfl: 3         ROS  Review of Systems   Constitution: Positive for weight gain.   Cardiovascular: Positive for chest pain and palpitations.   Musculoskeletal: Positive for arthritis, muscle weakness and myalgias.   Gastrointestinal: Positive for abdominal pain and heartburn.   Neurological: Positive for dizziness.   Psychiatric/Behavioral: Positive for depression and memory loss. The patient is nervous/anxious.        All other body systems reviewed and are negative    SOCIAL HX  Social History     Socioeconomic History   • Marital status: Single     Spouse name: Not on file   • Number of children: Not on file   • Years of education: Not on file   • Highest education level: Not on file   Tobacco Use   • Smoking status: Current Every Day Smoker     Packs/day: 0.50     Types: Cigarettes     Start date: 6/11/2003   • Smokeless tobacco: Never Used   • Tobacco comment: nicotine patches for smoking cessation   Substance and Sexual Activity   • Alcohol use: No   • Drug use: No   • Sexual activity: Defer       FAMILY HX  Family History   Problem Relation Age of Onset   • Liver disease Mother    • Diabetes Mother    • Hyperlipidemia Mother    • Hypertension Mother    • Thyroid disease Mother    • Arthritis Father    • Mental illness Father    • Migraines Sister    • Stroke Other    • Diabetes Other    • Hyperlipidemia Other    • Hypertension Other    • Mental illness Other    • Migraines Other    • Tuberculosis Other    • Breast cancer Neg Hx    • Endometrial cancer  Neg Hx    • Ovarian cancer Neg Hx              Zia Meier III, MD, FACC

## 2019-07-31 LAB — WHEAT IGE QN: <0.1 KU/L

## 2019-08-01 ENCOUNTER — PRIOR AUTHORIZATION (OUTPATIENT)
Dept: INTERNAL MEDICINE | Facility: CLINIC | Age: 39
End: 2019-08-01

## 2019-08-09 ENCOUNTER — APPOINTMENT (OUTPATIENT)
Dept: NUTRITION | Facility: HOSPITAL | Age: 39
End: 2019-08-09

## 2019-08-20 ENCOUNTER — APPOINTMENT (OUTPATIENT)
Dept: PHYSICAL THERAPY | Facility: HOSPITAL | Age: 39
End: 2019-08-20

## 2019-08-25 ENCOUNTER — OFFICE VISIT (OUTPATIENT)
Dept: INTERNAL MEDICINE | Facility: CLINIC | Age: 39
End: 2019-08-25

## 2019-08-25 VITALS
OXYGEN SATURATION: 95 % | HEIGHT: 61 IN | SYSTOLIC BLOOD PRESSURE: 124 MMHG | WEIGHT: 190 LBS | DIASTOLIC BLOOD PRESSURE: 80 MMHG | BODY MASS INDEX: 35.87 KG/M2 | TEMPERATURE: 98.6 F | HEART RATE: 75 BPM

## 2019-08-25 DIAGNOSIS — L03.313 CELLULITIS OF CHEST WALL: ICD-10-CM

## 2019-08-25 DIAGNOSIS — R51.9 SINUS HEADACHE: ICD-10-CM

## 2019-08-25 DIAGNOSIS — J01.00 ACUTE NON-RECURRENT MAXILLARY SINUSITIS: Primary | ICD-10-CM

## 2019-08-25 PROCEDURE — 96372 THER/PROPH/DIAG INJ SC/IM: CPT | Performed by: NURSE PRACTITIONER

## 2019-08-25 PROCEDURE — 99214 OFFICE O/P EST MOD 30 MIN: CPT | Performed by: NURSE PRACTITIONER

## 2019-08-25 RX ORDER — ECHINACEA PURPUREA EXTRACT 125 MG
1 TABLET ORAL AS NEEDED
Qty: 30 ML | Refills: 0 | Status: SHIPPED | OUTPATIENT
Start: 2019-08-25 | End: 2021-03-18

## 2019-08-25 RX ORDER — BROMPHENIRAMINE MALEATE, PSEUDOEPHEDRINE HYDROCHLORIDE, AND DEXTROMETHORPHAN HYDROBROMIDE 2; 30; 10 MG/5ML; MG/5ML; MG/5ML
5 SYRUP ORAL 4 TIMES DAILY PRN
Qty: 118 ML | Refills: 0 | Status: SHIPPED | OUTPATIENT
Start: 2019-08-25 | End: 2020-03-10 | Stop reason: ALTCHOICE

## 2019-08-25 RX ORDER — KETOROLAC TROMETHAMINE 30 MG/ML
60 INJECTION, SOLUTION INTRAMUSCULAR; INTRAVENOUS ONCE
Status: COMPLETED | OUTPATIENT
Start: 2019-08-25 | End: 2019-08-25

## 2019-08-25 RX ORDER — CEFDINIR 300 MG/1
300 CAPSULE ORAL 2 TIMES DAILY
Qty: 20 CAPSULE | Refills: 0 | Status: SHIPPED | OUTPATIENT
Start: 2019-08-25 | End: 2019-09-04

## 2019-08-25 RX ADMIN — KETOROLAC TROMETHAMINE 60 MG: 30 INJECTION, SOLUTION INTRAMUSCULAR; INTRAVENOUS at 11:34

## 2019-08-25 NOTE — PROGRESS NOTES
"Chief Complaint   Patient presents with   • Sinusitis   • Rash       History of Present Illness  39 y.o.female presents for sinusitis and rash.  The patient describes greater than 3 weeks of sinus congestion not improving with home care with significant worsening in last 4 days. Feels like never completely got better after last here for sinus infection. The patient reports associated sinus pressure with nasal purulence; constant purulence can't breath thru nose.  The patient is  experiencing a post nasal drip with associated scratchy throat; productive cough and short of air. No wheezing.  Positive fever low grade chills. Positive sinus headache; ha will not go away hurts sinus behind eyes frontal.  Has tried flonase, allergy med, sudafed.    C/o redness on right breast underside onset couple days. Started as red spot like pimple and got bigger with redness.  No pain, drainage, streaking or radiation. \"Not sure if got bit by a spider or what.\"    Review of Systems   Constitutional: Positive for chills and fever.   HENT: Positive for congestion, postnasal drip, rhinorrhea, sinus pressure, sneezing, sore throat and swollen glands. Negative for ear pain.    Respiratory: Positive for cough. Negative for shortness of breath.    Musculoskeletal: Negative for myalgias.   Skin: Positive for color change and skin lesions.   Neurological: Negative for headache.         [facility]  The following portions of the patient's history were reviewed and updated as appropriate: allergies, current medications, past family history, past medical history, past social history, past surgical history and problem list.       Past Medical History:   Diagnosis Date   • Abdominal pain    • Abnormal breast exam    • Abnormal Pap smear of cervix    • Achilles tendinitis    • Acute sinusitis    • Anxiety    • Arthritis    • Asthma    • Tavera's syndrome    • Bipolar 1 disorder (CMS/HCC)    • Bowel trouble    • Breast cyst    • Colon polyps    • " Constipation    • Depression    • Diverticulitis    • Diverticulitis of colon    • Endometriosis    • Eustachian tube dysfunction    • Extremity pain    • Fatty liver    • Female pelvic pain    • Gastric ulcer    • H/O bladder infections    • History of rashes as a child    • Irritable bowel syndrome    • Low back pain    • Menopausal symptoms    • Muscle weakness    • Ovarian cyst    • PID (pelvic inflammatory disease)    • Recurrent UTI    • Sexual problems       Past Surgical History:   Procedure Laterality Date   •  SECTION     • CHOLECYSTECTOMY     • COLONOSCOPY  2017   • HYSTERECTOMY     • NASAL ENDOSCOPY     • OOPHORECTOMY Bilateral    • OTHER SURGICAL HISTORY      Laparoscopy (diagnostic) gynecologic with biopsy   • SHOULDER SURGERY     • UPPER GASTROINTESTINAL ENDOSCOPY  2019      Allergies   Allergen Reactions   • Adhesive Tape Hives   • Latex Hives   • Sulfa Antibiotics Hives   • Sulfate Hives   • Biaxin [Clarithromycin] Nausea Only   • Codeine Itching   • Penicillins Nausea Only      Family History   Problem Relation Age of Onset   • Liver disease Mother    • Diabetes Mother    • Hyperlipidemia Mother    • Hypertension Mother    • Thyroid disease Mother    • Arthritis Father    • Mental illness Father    • Migraines Sister    • Stroke Other    • Diabetes Other    • Hyperlipidemia Other    • Hypertension Other    • Mental illness Other    • Migraines Other    • Tuberculosis Other    • Breast cancer Neg Hx    • Endometrial cancer Neg Hx    • Ovarian cancer Neg Hx             Current Outpatient Medications:   •  albuterol 108 (90 Base) MCG/ACT inhaler, Inhale 2 puffs Every 4 (Four) Hours As Needed for Wheezing., Disp: 18 g, Rfl: 11  •  baclofen (LIORESAL) 10 MG tablet, Take 1 tablet by mouth 3 (Three) Times a Day As Needed for Muscle Spasms., Disp: 90 tablet, Rfl: 2  •  cetirizine (zyrTEC) 10 MG tablet, Take 1 tablet by mouth Daily., Disp: , Rfl: 1  •  dicyclomine (BENTYL) 20 MG tablet, Take 1  tablet by mouth Every 8 (Eight) Hours As Needed (abdominal pain/cramping/diarrhea)., Disp: 21 tablet, Rfl: 0  •  DULoxetine (CYMBALTA) 30 MG capsule, Take 30 mg by mouth Daily., Disp: , Rfl:   •  EPIPEN 2-JAREN 0.3 MG/0.3ML solution auto-injector injection, U UTD, Disp: , Rfl: 6  •  estradiol (ESTRACE) 2 MG tablet, Take 1 tablet by mouth Daily., Disp: 30 tablet, Rfl: 5  •  fluticasone (FLONASE) 50 MCG/ACT nasal spray, 2 sprays into each nostril Daily., Disp: 1 bottle, Rfl: 3  •  gabapentin (NEURONTIN) 300 MG capsule, 1 capsule by mouth every night, Disp: 30 capsule, Rfl: 2  •  ipratropium-albuterol (DUO-NEB) 0.5-2.5 mg/3 ml nebulizer, Take 3 mL by nebulization Every 4 (Four) Hours As Needed for Wheezing or Shortness of Air., Disp: 360 mL, Rfl: 11  •  lidocaine (XYLOCAINE) 5 % ointment, As Needed., Disp: , Rfl:   •  LORazepam (ATIVAN) 1 MG tablet, TK 1 T PO D PRN, Disp: , Rfl: 0  •  nicotine (NICODERM CQ) 21 MG/24HR patch, Place 1 patch on the skin as directed by provider Daily., Disp: 28 each, Rfl: 11  •  Omeprazole 20 MG tablet delayed-release, Take 40 mg by mouth Daily., Disp: 60 each, Rfl: 5  •  ondansetron ODT (ZOFRAN-ODT) 4 MG disintegrating tablet, Take 1 tablet by mouth Every 8 (Eight) Hours As Needed for Nausea or Vomiting., Disp: 60 tablet, Rfl: 2  •  PATADAY 0.2 % solution ophthalmic solution, USE 1 DROP AEY ONCE DAILY PRN, Disp: , Rfl: 3  •  saccharomyces boulardii (FLORASTOR) 250 MG capsule, Take 1 capsule by mouth 2 (Two) Times a Day., Disp: 14 capsule, Rfl: 0  •  SUMAtriptan (IMITREX) 25 MG tablet, Take one tablet at onset of headache. May repeat dose one time in 2 hours if headache not relieved., Disp: 16 tablet, Rfl: 2  •  traMADol (ULTRAM) 50 MG tablet, Take 1 tablet by mouth Every 8 (Eight) Hours As Needed for Moderate Pain ., Disp: 60 tablet, Rfl: 0  •  traZODone (DESYREL) 150 MG tablet, Take 1 tablet by mouth As Needed., Disp: , Rfl: 3  •  benzonatate (TESSALON) 200 MG capsule, Take 1 capsule by  "mouth 3 (Three) Times a Day As Needed for Cough., Disp: 20 capsule, Rfl: 0    VITALS:  /80   Pulse 75   Temp 98.6 °F (37 °C)   Ht 154.9 cm (61\")   Wt 86.2 kg (190 lb)   SpO2 95%   BMI 35.90 kg/m²     Physical Exam   Constitutional: She is oriented to person, place, and time. She appears well-developed and well-nourished. No distress.   HENT:   Head: Normocephalic and atraumatic.   Right Ear: Tympanic membrane, external ear and ear canal normal.   Left Ear: Tympanic membrane, external ear and ear canal normal.   Nose: Mucosal edema, rhinorrhea, sinus tenderness and congestion present. Right sinus exhibits maxillary sinus tenderness. Right sinus exhibits no frontal sinus tenderness. Left sinus exhibits maxillary sinus tenderness. Left sinus exhibits no frontal sinus tenderness.   Mouth/Throat: Oropharynx is clear and moist. No oropharyngeal exudate.   Purulent nasal secretions   Eyes: Conjunctivae are normal. Pupils are equal, round, and reactive to light. Right conjunctiva is not injected. Left conjunctiva is not injected.   Cardiovascular: Normal rate, regular rhythm and normal heart sounds.   Pulmonary/Chest: Effort normal and breath sounds normal. No respiratory distress.   Lymphadenopathy:        Head (right side): No submental, no submandibular and no tonsillar adenopathy present.        Head (left side): No submental, no submandibular and no tonsillar adenopathy present.     She has no cervical adenopathy.   Neurological: She is alert and oriented to person, place, and time.   Skin: Skin is warm and dry. Turgor is normal.    Erythematous area about 2in circumference right breast aprox 1 1/2 inch below areola. No drainage swelling or streaking   Nursing note and vitals reviewed.      LABS  No new labs    ASSESSMENT/PLAN  Crystal was seen today for sinusitis and rash.    Diagnoses and all orders for this visit:    Acute non-recurrent maxillary sinusitis  -     cefdinir (OMNICEF) 300 MG capsule; Take 1 " capsule by mouth 2 (Two) Times a Day for 10 days.  -     brompheniramine-pseudoephedrine-DM 30-2-10 MG/5ML syrup; Take 5 mL by mouth 4 (Four) Times a Day As Needed for Congestion or Cough.  -     sodium chloride (OCEAN NASAL SPRAY) 0.65 % nasal spray; 1 spray into the nostril(s) as directed by provider As Needed for Congestion.    Sinus headache  -     brompheniramine-pseudoephedrine-DM 30-2-10 MG/5ML syrup; Take 5 mL by mouth 4 (Four) Times a Day As Needed for Congestion or Cough.  -     ketorolac (TORADOL) injection 60 mg    Cellulitis of chest wall  -     cefdinir (OMNICEF) 300 MG capsule; Take 1 capsule by mouth 2 (Two) Times a Day for 10 days.  -     mupirocin (BACTROBAN) 2 % ointment; Apply  topically to the appropriate area as directed 3 (Three) Times a Day.    If worsening of symptoms or no improvement in symptoms or fever > 101.5 patient should contact our office for further evaluation treatment or seek urgent care.    Explained to pt if redness does not go away on chest area needs to let me now as would need further workup. Pt verbalized understanding.    I discussed the patients findings and my recommendations with patient.  Patient was encouraged to keep me informed of any acute changes, lack of improvement, or any new concerning symptoms.    Patient voiced understanding of all instructions and denied further questions.      FOLLOW-UP  Return if symptoms worsen or fail to improve.    Electronically signed by:    MAGGIE Chacon  08/25/2019

## 2019-09-03 ENCOUNTER — OFFICE VISIT (OUTPATIENT)
Dept: INTERNAL MEDICINE | Facility: CLINIC | Age: 39
End: 2019-09-03

## 2019-09-03 VITALS
HEART RATE: 117 BPM | DIASTOLIC BLOOD PRESSURE: 80 MMHG | TEMPERATURE: 98.5 F | SYSTOLIC BLOOD PRESSURE: 128 MMHG | WEIGHT: 186.4 LBS | OXYGEN SATURATION: 99 % | HEIGHT: 61 IN | BODY MASS INDEX: 35.19 KG/M2

## 2019-09-03 DIAGNOSIS — R23.4 BREAST SKIN CHANGES: ICD-10-CM

## 2019-09-03 DIAGNOSIS — N39.0 URINARY TRACT INFECTION WITHOUT HEMATURIA, SITE UNSPECIFIED: Primary | ICD-10-CM

## 2019-09-03 DIAGNOSIS — N76.0 ACUTE VAGINITIS: ICD-10-CM

## 2019-09-03 LAB
BILIRUB BLD-MCNC: ABNORMAL MG/DL
CLARITY, POC: ABNORMAL
COLOR UR: ABNORMAL
GLUCOSE UR STRIP-MCNC: NEGATIVE MG/DL
KETONES UR QL: ABNORMAL
LEUKOCYTE EST, POC: ABNORMAL
NITRITE UR-MCNC: NEGATIVE MG/ML
PH UR: 5 [PH] (ref 5–8)
PROT UR STRIP-MCNC: ABNORMAL MG/DL
RBC # UR STRIP: ABNORMAL /UL
SP GR UR: 1.02 (ref 1–1.03)
UROBILINOGEN UR QL: ABNORMAL

## 2019-09-03 PROCEDURE — 99214 OFFICE O/P EST MOD 30 MIN: CPT | Performed by: NURSE PRACTITIONER

## 2019-09-03 PROCEDURE — 87086 URINE CULTURE/COLONY COUNT: CPT | Performed by: NURSE PRACTITIONER

## 2019-09-03 RX ORDER — FLUCONAZOLE 150 MG/1
150 TABLET ORAL ONCE
Qty: 1 TABLET | Refills: 0 | Status: SHIPPED | OUTPATIENT
Start: 2019-09-03 | End: 2019-09-03

## 2019-09-03 RX ORDER — METRONIDAZOLE 7.5 MG/G
GEL VAGINAL 2 TIMES DAILY
Qty: 1 TUBE | Refills: 0 | Status: SHIPPED | OUTPATIENT
Start: 2019-09-03 | End: 2019-09-08

## 2019-09-03 RX ORDER — NITROFURANTOIN 25; 75 MG/1; MG/1
100 CAPSULE ORAL EVERY 12 HOURS SCHEDULED
Qty: 14 CAPSULE | Refills: 0 | Status: SHIPPED | OUTPATIENT
Start: 2019-09-03 | End: 2019-09-10

## 2019-09-03 NOTE — PROGRESS NOTES
Chief Complaint   Patient presents with   • Urinary Tract Infection   • Vaginitis   • Breast Problem       History of Present Illness  39 y.o.female presents for uti, vaginitis, breast problem  The patient describes dysuria for one day not improving with home care.  The patient reports associated urinary frequency, urgency.  There is no associated gross hematuria, incontinence or pelvic pain.  The patient denies fever, urethral/urogenital discharge or flank pain.  The patient continues to hydrate well.    Still has red discoloration on upper right breast nonpainful no swelling nonradiating. Did not get better with abx. No fever. Onset few weeks.    C/o vaginal itching and discharge last few days. Has hx of BV reminds her of this but she has been on abx recently. No pelvic pain or radiation. Does have dysuria as above. No concern for std. Has not tried anything for sx.    Review of Systems   Constitutional: Negative for chills, fatigue and fever.   Respiratory: Negative for shortness of breath.    Cardiovascular: Negative for chest pain.   Gastrointestinal: Positive for abdominal pain. Negative for constipation, diarrhea, nausea and vomiting.   Genitourinary: Positive for dysuria, frequency, urgency and vaginal discharge. Negative for breast discharge, breast lump, breast pain, difficulty urinating, flank pain, hematuria, pelvic pain and vaginal pain.        Breast skin redness   Musculoskeletal: Negative for myalgias.   Skin: Positive for color change.         [facility]  The following portions of the patient's history were reviewed and updated as appropriate: allergies, current medications, past family history, past medical history, past social history, past surgical history and problem list.       Past Medical History:   Diagnosis Date   • Abdominal pain    • Abnormal breast exam    • Abnormal Pap smear of cervix    • Achilles tendinitis    • Acute sinusitis    • Anxiety    • Arthritis    • Asthma    • Tavera's  syndrome    • Bipolar 1 disorder (CMS/HCC)    • Bowel trouble    • Breast cyst    • Colon polyps    • Constipation    • Depression    • Diverticulitis    • Diverticulitis of colon    • Endometriosis    • Eustachian tube dysfunction    • Extremity pain    • Fatty liver    • Female pelvic pain    • Gastric ulcer    • H/O bladder infections    • History of rashes as a child    • Irritable bowel syndrome    • Low back pain    • Menopausal symptoms    • Muscle weakness    • Ovarian cyst    • PID (pelvic inflammatory disease)    • Recurrent UTI    • Sexual problems       Past Surgical History:   Procedure Laterality Date   •  SECTION     • CHOLECYSTECTOMY     • COLONOSCOPY  2017   • HYSTERECTOMY     • NASAL ENDOSCOPY     • OOPHORECTOMY Bilateral    • OTHER SURGICAL HISTORY      Laparoscopy (diagnostic) gynecologic with biopsy   • SHOULDER SURGERY     • UPPER GASTROINTESTINAL ENDOSCOPY  2019      Allergies   Allergen Reactions   • Adhesive Tape Hives   • Latex Hives   • Sulfa Antibiotics Hives   • Sulfate Hives   • Biaxin [Clarithromycin] Nausea Only   • Codeine Itching   • Penicillins Nausea Only      Family History   Problem Relation Age of Onset   • Liver disease Mother    • Diabetes Mother    • Hyperlipidemia Mother    • Hypertension Mother    • Thyroid disease Mother    • Arthritis Father    • Mental illness Father    • Migraines Sister    • Stroke Other    • Diabetes Other    • Hyperlipidemia Other    • Hypertension Other    • Mental illness Other    • Migraines Other    • Tuberculosis Other    • Breast cancer Neg Hx    • Endometrial cancer Neg Hx    • Ovarian cancer Neg Hx             Current Outpatient Medications:   •  albuterol 108 (90 Base) MCG/ACT inhaler, Inhale 2 puffs Every 4 (Four) Hours As Needed for Wheezing., Disp: 18 g, Rfl: 11  •  baclofen (LIORESAL) 10 MG tablet, Take 1 tablet by mouth 3 (Three) Times a Day As Needed for Muscle Spasms., Disp: 90 tablet, Rfl: 2  •  benzonatate (TESSALON) 200  MG capsule, Take 1 capsule by mouth 3 (Three) Times a Day As Needed for Cough., Disp: 20 capsule, Rfl: 0  •  brompheniramine-pseudoephedrine-DM 30-2-10 MG/5ML syrup, Take 5 mL by mouth 4 (Four) Times a Day As Needed for Congestion or Cough., Disp: 118 mL, Rfl: 0  •  cefdinir (OMNICEF) 300 MG capsule, Take 1 capsule by mouth 2 (Two) Times a Day for 10 days., Disp: 20 capsule, Rfl: 0  •  cetirizine (zyrTEC) 10 MG tablet, Take 1 tablet by mouth Daily., Disp: , Rfl: 1  •  dicyclomine (BENTYL) 20 MG tablet, Take 1 tablet by mouth Every 8 (Eight) Hours As Needed (abdominal pain/cramping/diarrhea)., Disp: 21 tablet, Rfl: 0  •  DULoxetine (CYMBALTA) 30 MG capsule, Take 30 mg by mouth Daily., Disp: , Rfl:   •  EPIPEN 2-JAREN 0.3 MG/0.3ML solution auto-injector injection, U UTD, Disp: , Rfl: 6  •  estradiol (ESTRACE) 2 MG tablet, Take 1 tablet by mouth Daily., Disp: 30 tablet, Rfl: 5  •  fluticasone (FLONASE) 50 MCG/ACT nasal spray, 2 sprays into each nostril Daily., Disp: 1 bottle, Rfl: 3  •  gabapentin (NEURONTIN) 300 MG capsule, 1 capsule by mouth every night, Disp: 30 capsule, Rfl: 2  •  ipratropium-albuterol (DUO-NEB) 0.5-2.5 mg/3 ml nebulizer, Take 3 mL by nebulization Every 4 (Four) Hours As Needed for Wheezing or Shortness of Air., Disp: 360 mL, Rfl: 11  •  lidocaine (XYLOCAINE) 5 % ointment, As Needed., Disp: , Rfl:   •  LORazepam (ATIVAN) 1 MG tablet, TK 1 T PO D PRN, Disp: , Rfl: 0  •  mupirocin (BACTROBAN) 2 % ointment, Apply  topically to the appropriate area as directed 3 (Three) Times a Day., Disp: 15 g, Rfl: 0  •  nicotine (NICODERM CQ) 21 MG/24HR patch, Place 1 patch on the skin as directed by provider Daily., Disp: 28 each, Rfl: 11  •  Omeprazole 20 MG tablet delayed-release, Take 40 mg by mouth Daily., Disp: 60 each, Rfl: 5  •  ondansetron ODT (ZOFRAN-ODT) 4 MG disintegrating tablet, Take 1 tablet by mouth Every 8 (Eight) Hours As Needed for Nausea or Vomiting., Disp: 60 tablet, Rfl: 2  •  PATADAY 0.2 %  "solution ophthalmic solution, USE 1 DROP AEY ONCE DAILY PRN, Disp: , Rfl: 3  •  saccharomyces boulardii (FLORASTOR) 250 MG capsule, Take 1 capsule by mouth 2 (Two) Times a Day., Disp: 14 capsule, Rfl: 0  •  sodium chloride (OCEAN NASAL SPRAY) 0.65 % nasal spray, 1 spray into the nostril(s) as directed by provider As Needed for Congestion., Disp: 30 mL, Rfl: 0  •  SUMAtriptan (IMITREX) 25 MG tablet, Take one tablet at onset of headache. May repeat dose one time in 2 hours if headache not relieved., Disp: 16 tablet, Rfl: 2  •  traMADol (ULTRAM) 50 MG tablet, Take 1 tablet by mouth Every 8 (Eight) Hours As Needed for Moderate Pain ., Disp: 60 tablet, Rfl: 0  •  traZODone (DESYREL) 150 MG tablet, Take 1 tablet by mouth As Needed., Disp: , Rfl: 3    VITALS:  /80   Pulse 117   Temp 98.5 °F (36.9 °C)   Ht 154.9 cm (61\")   Wt 84.6 kg (186 lb 6.4 oz)   SpO2 99%   BMI 35.22 kg/m²     Physical Exam   Constitutional: She is oriented to person, place, and time. She appears well-developed and well-nourished. No distress.   Cardiovascular: Normal rate.   Pulmonary/Chest: Effort normal.   redmacule area right breast about 12 oclock about 2 inches above nipple       Abdominal: There is no tenderness. There is no CVA tenderness.   Genitourinary:   Genitourinary Comments: Pt defered vagina exam/swab   Neurological: She is alert and oriented to person, place, and time.   Skin: Skin is warm and dry. No rash noted.   Vitals reviewed.      LABS  Results for orders placed or performed in visit on 09/03/19   Urine Culture - Urine, Urine, Clean Catch   Result Value Ref Range    Urine Culture No growth    POCT urinalysis dipstick, automated   Result Value Ref Range    Color Dark Yellow Yellow, Straw, Dark Yellow, Janie    Clarity, UA Cloudy (A) Clear    Specific Gravity  1.025 1.005 - 1.030    pH, Urine 5.0 5.0 - 8.0    Leukocytes 25 Ceasar/ul (A) Negative    Nitrite, UA Negative Negative    Protein, POC Trace (A) Negative mg/dL    " Glucose, UA Negative Negative, 1000 mg/dL (3+) mg/dL    Ketones, UA 15 mg/dL (A) Negative    Urobilinogen, UA 1 E.U./dL  (A) Normal    Bilirubin 1 mg/dL (A) Negative    Blood, UA Trace (A) Negative       ASSESSMENT/PLAN  Crystal was seen today for urinary tract infection.    Diagnoses and all orders for this visit:    Urinary tract infection without hematuria, site unspecified  -     POCT urinalysis dipstick, automated  -     Urine Culture - Urine, Urine, Clean Catch  -     nitrofurantoin, macrocrystal-monohydrate, (MACROBID) 100 MG capsule; Take 1 capsule by mouth Every 12 (Twelve) Hours for 7 days.  Culture was negative; advised can stop abx. Her dysuria may be r/t vaginitis sx.    Breast skin changes  -     Mammo diagnostic digital tomosynthesis bilateral w CAD; Future  -     US breast bilateral complete; Future    Acute vaginitis  -     fluconazole (DIFLUCAN) 150 MG tablet; Take 1 tablet by mouth 1 (One) Time for 1 dose.  -     metroNIDAZOLE (METROGEL) 0.75 % vaginal gel; Insert  into the vagina 2 (Two) Times a Day for 5 days.  Pt declined vaginal exam/swab. Will tx with diflucan and 1 round of metrogel. If no improvement she is to schedule appt for vaginal swab.  Encouraged eat yogurt; avoid tub baths perfumed soaps etc.      I discussed the patients findings and my recommendations with patient.  Patient was encouraged to keep me informed of any acute changes, lack of improvement, or any new concerning symptoms.    Patient voiced understanding of all instructions and denied further questions.      FOLLOW-UP  Return if symptoms worsen or fail to improve.  Will FU for vag exam if no improvement. Will contact pt with urine and mammogram results.  Electronically signed by:    MAGGIE Chacon  09/03/2019

## 2019-09-04 ENCOUNTER — TREATMENT (OUTPATIENT)
Dept: PHYSICAL THERAPY | Facility: CLINIC | Age: 39
End: 2019-09-04

## 2019-09-04 DIAGNOSIS — M54.42 CHRONIC MIDLINE LOW BACK PAIN WITH BILATERAL SCIATICA: Primary | ICD-10-CM

## 2019-09-04 DIAGNOSIS — G89.29 CHRONIC MIDLINE LOW BACK PAIN WITH BILATERAL SCIATICA: Primary | ICD-10-CM

## 2019-09-04 DIAGNOSIS — M54.41 CHRONIC MIDLINE LOW BACK PAIN WITH BILATERAL SCIATICA: Primary | ICD-10-CM

## 2019-09-04 PROCEDURE — 97161 PT EVAL LOW COMPLEX 20 MIN: CPT | Performed by: PHYSICAL THERAPIST
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
## 2019-09-05 LAB — BACTERIA SPEC AEROBE CULT: NO GROWTH

## 2019-09-17 DIAGNOSIS — M51.26 LUMBAR DISCOGENIC PAIN SYNDROME: ICD-10-CM

## 2019-09-17 DIAGNOSIS — M47.816 SPONDYLOSIS OF LUMBAR REGION WITHOUT MYELOPATHY OR RADICULOPATHY: ICD-10-CM

## 2019-09-17 DIAGNOSIS — M51.26 HERNIATED LUMBAR INTERVERTEBRAL DISC: ICD-10-CM

## 2019-09-18 RX ORDER — TRAMADOL HYDROCHLORIDE 50 MG/1
TABLET ORAL
Qty: 90 TABLET | Refills: 0 | OUTPATIENT
Start: 2019-09-18

## 2019-09-19 ENCOUNTER — APPOINTMENT (OUTPATIENT)
Dept: ULTRASOUND IMAGING | Facility: HOSPITAL | Age: 39
End: 2019-09-19

## 2019-09-19 ENCOUNTER — APPOINTMENT (OUTPATIENT)
Dept: MAMMOGRAPHY | Facility: HOSPITAL | Age: 39
End: 2019-09-19

## 2019-09-23 DIAGNOSIS — M47.816 SPONDYLOSIS OF LUMBAR REGION WITHOUT MYELOPATHY OR RADICULOPATHY: ICD-10-CM

## 2019-09-23 DIAGNOSIS — M51.26 LUMBAR DISCOGENIC PAIN SYNDROME: ICD-10-CM

## 2019-09-23 DIAGNOSIS — M51.26 HERNIATED LUMBAR INTERVERTEBRAL DISC: ICD-10-CM

## 2019-09-23 RX ORDER — TRAMADOL HYDROCHLORIDE 50 MG/1
50 TABLET ORAL EVERY 8 HOURS PRN
Qty: 60 TABLET | Refills: 0 | OUTPATIENT
Start: 2019-09-23

## 2019-09-26 ENCOUNTER — APPOINTMENT (OUTPATIENT)
Dept: LAB | Facility: HOSPITAL | Age: 39
End: 2019-09-26

## 2019-09-26 ENCOUNTER — OFFICE VISIT (OUTPATIENT)
Dept: INTERNAL MEDICINE | Facility: CLINIC | Age: 39
End: 2019-09-26

## 2019-09-26 ENCOUNTER — TELEPHONE (OUTPATIENT)
Dept: INTERNAL MEDICINE | Facility: CLINIC | Age: 39
End: 2019-09-26

## 2019-09-26 ENCOUNTER — HOSPITAL ENCOUNTER (EMERGENCY)
Facility: HOSPITAL | Age: 39
Discharge: HOME | End: 2019-09-26

## 2019-09-26 VITALS
SYSTOLIC BLOOD PRESSURE: 127 MMHG | HEART RATE: 108 BPM | HEIGHT: 61 IN | TEMPERATURE: 98 F | RESPIRATION RATE: 18 BRPM | DIASTOLIC BLOOD PRESSURE: 70 MMHG | OXYGEN SATURATION: 96 % | BODY MASS INDEX: 34.36 KG/M2 | WEIGHT: 182 LBS

## 2019-09-26 VITALS
HEIGHT: 61 IN | RESPIRATION RATE: 18 BRPM | WEIGHT: 181 LBS | TEMPERATURE: 99.4 F | OXYGEN SATURATION: 98 % | BODY MASS INDEX: 34.17 KG/M2

## 2019-09-26 DIAGNOSIS — R10.84 GENERALIZED ABDOMINAL PAIN: ICD-10-CM

## 2019-09-26 DIAGNOSIS — R11.0 NAUSEA: ICD-10-CM

## 2019-09-26 DIAGNOSIS — Z91.018 ALLERGY TO OTHER FOODS: ICD-10-CM

## 2019-09-26 DIAGNOSIS — R19.7 DIARRHEA, UNSPECIFIED TYPE: Primary | ICD-10-CM

## 2019-09-26 LAB
ALBUMIN SERPL-MCNC: 4.6 G/DL (ref 3.5–5.2)
ALBUMIN/GLOB SERPL: 1.4 G/DL
ALP SERPL-CCNC: 65 U/L (ref 39–117)
ALT SERPL W P-5'-P-CCNC: 17 U/L (ref 1–33)
ANION GAP SERPL CALCULATED.3IONS-SCNC: 15 MMOL/L (ref 5–15)
AST SERPL-CCNC: 14 U/L (ref 1–32)
BASOPHILS # BLD AUTO: 0.03 10*3/MM3 (ref 0–0.2)
BASOPHILS # BLD AUTO: 0.04 10*3/MM3 (ref 0–0.2)
BASOPHILS NFR BLD AUTO: 0.3 % (ref 0–1.5)
BASOPHILS NFR BLD AUTO: 0.5 % (ref 0–1.5)
BILIRUB SERPL-MCNC: 0.3 MG/DL (ref 0.2–1.2)
BUN BLD-MCNC: 10 MG/DL (ref 6–20)
BUN/CREAT SERPL: 14.5 (ref 7–25)
CALCIUM SPEC-SCNC: 9.3 MG/DL (ref 8.6–10.5)
CHLORIDE SERPL-SCNC: 101 MMOL/L (ref 98–107)
CO2 SERPL-SCNC: 24 MMOL/L (ref 22–29)
CREAT BLD-MCNC: 0.69 MG/DL (ref 0.57–1)
DEPRECATED RDW RBC AUTO: 42.5 FL (ref 37–54)
DEPRECATED RDW RBC AUTO: 42.7 FL (ref 37–54)
EOSINOPHIL # BLD AUTO: 0.13 10*3/MM3 (ref 0–0.4)
EOSINOPHIL # BLD AUTO: 0.15 10*3/MM3 (ref 0–0.4)
EOSINOPHIL NFR BLD AUTO: 1.4 % (ref 0.3–6.2)
EOSINOPHIL NFR BLD AUTO: 1.9 % (ref 0.3–6.2)
ERYTHROCYTE [DISTWIDTH] IN BLOOD BY AUTOMATED COUNT: 12.9 % (ref 12.3–15.4)
ERYTHROCYTE [DISTWIDTH] IN BLOOD BY AUTOMATED COUNT: 13 % (ref 12.3–15.4)
GFR SERPL CREATININE-BSD FRML MDRD: 96 ML/MIN/1.73
GLOBULIN UR ELPH-MCNC: 3.2 GM/DL
GLUCOSE BLD-MCNC: 98 MG/DL (ref 65–99)
HCT VFR BLD AUTO: 44.1 % (ref 34–46.6)
HCT VFR BLD AUTO: 44.4 % (ref 34–46.6)
HGB BLD-MCNC: 13.9 G/DL (ref 12–15.9)
HGB BLD-MCNC: 14.2 G/DL (ref 12–15.9)
IMM GRANULOCYTES # BLD AUTO: 0.02 10*3/MM3 (ref 0–0.05)
IMM GRANULOCYTES # BLD AUTO: 0.03 10*3/MM3 (ref 0–0.05)
IMM GRANULOCYTES NFR BLD AUTO: 0.2 % (ref 0–0.5)
IMM GRANULOCYTES NFR BLD AUTO: 0.4 % (ref 0–0.5)
LYMPHOCYTES # BLD AUTO: 1.98 10*3/MM3 (ref 0.7–3.1)
LYMPHOCYTES # BLD AUTO: 2.52 10*3/MM3 (ref 0.7–3.1)
LYMPHOCYTES NFR BLD AUTO: 21.6 % (ref 19.6–45.3)
LYMPHOCYTES NFR BLD AUTO: 31.9 % (ref 19.6–45.3)
MCH RBC QN AUTO: 28.4 PG (ref 26.6–33)
MCH RBC QN AUTO: 28.7 PG (ref 26.6–33)
MCHC RBC AUTO-ENTMCNC: 31.5 G/DL (ref 31.5–35.7)
MCHC RBC AUTO-ENTMCNC: 32 G/DL (ref 31.5–35.7)
MCV RBC AUTO: 89.7 FL (ref 79–97)
MCV RBC AUTO: 90.2 FL (ref 79–97)
MONOCYTES # BLD AUTO: 0.36 10*3/MM3 (ref 0.1–0.9)
MONOCYTES # BLD AUTO: 0.46 10*3/MM3 (ref 0.1–0.9)
MONOCYTES NFR BLD AUTO: 3.9 % (ref 5–12)
MONOCYTES NFR BLD AUTO: 5.8 % (ref 5–12)
NEUTROPHILS # BLD AUTO: 4.7 10*3/MM3 (ref 1.7–7)
NEUTROPHILS # BLD AUTO: 6.63 10*3/MM3 (ref 1.7–7)
NEUTROPHILS NFR BLD AUTO: 59.5 % (ref 42.7–76)
NEUTROPHILS NFR BLD AUTO: 72.6 % (ref 42.7–76)
NRBC BLD AUTO-RTO: 0 /100 WBC (ref 0–0.2)
NRBC BLD AUTO-RTO: 0 /100 WBC (ref 0–0.2)
PLATELET # BLD AUTO: 328 10*3/MM3 (ref 140–450)
PLATELET # BLD AUTO: 340 10*3/MM3 (ref 140–450)
PMV BLD AUTO: 10.1 FL (ref 6–12)
PMV BLD AUTO: 10.3 FL (ref 6–12)
POTASSIUM BLD-SCNC: 3.8 MMOL/L (ref 3.5–5.2)
PROT SERPL-MCNC: 7.8 G/DL (ref 6–8.5)
RBC # BLD AUTO: 4.89 10*6/MM3 (ref 3.77–5.28)
RBC # BLD AUTO: 4.95 10*6/MM3 (ref 3.77–5.28)
SODIUM BLD-SCNC: 140 MMOL/L (ref 136–145)
WBC NRBC COR # BLD: 7.9 10*3/MM3 (ref 3.4–10.8)
WBC NRBC COR # BLD: 9.15 10*3/MM3 (ref 3.4–10.8)

## 2019-09-26 PROCEDURE — 99284 EMERGENCY DEPT VISIT MOD MDM: CPT

## 2019-09-26 PROCEDURE — 83690 ASSAY OF LIPASE: CPT

## 2019-09-26 PROCEDURE — 85025 COMPLETE CBC W/AUTO DIFF WBC: CPT

## 2019-09-26 PROCEDURE — 93005 ELECTROCARDIOGRAM TRACING: CPT | Performed by: EMERGENCY MEDICINE

## 2019-09-26 PROCEDURE — 85025 COMPLETE CBC W/AUTO DIFF WBC: CPT | Performed by: NURSE PRACTITIONER

## 2019-09-26 PROCEDURE — 80053 COMPREHEN METABOLIC PANEL: CPT

## 2019-09-26 PROCEDURE — 80053 COMPREHEN METABOLIC PANEL: CPT | Performed by: NURSE PRACTITIONER

## 2019-09-26 PROCEDURE — 93005 ELECTROCARDIOGRAM TRACING: CPT

## 2019-09-26 PROCEDURE — 99213 OFFICE O/P EST LOW 20 MIN: CPT | Performed by: NURSE PRACTITIONER

## 2019-09-26 RX ORDER — SODIUM CHLORIDE 0.9 % (FLUSH) 0.9 %
10 SYRINGE (ML) INJECTION AS NEEDED
Status: DISCONTINUED | OUTPATIENT
Start: 2019-09-26 | End: 2019-09-27 | Stop reason: HOSPADM

## 2019-09-26 RX ORDER — ONDANSETRON 4 MG/1
4 TABLET, FILM COATED ORAL EVERY 8 HOURS PRN
Qty: 8 TABLET | Refills: 0 | Status: SHIPPED | OUTPATIENT
Start: 2019-09-26 | End: 2020-01-22

## 2019-09-26 NOTE — PROGRESS NOTES
Lizandro Post is a 39 y.o. female.   Chief Complaint   Patient presents with   • Diarrhea     x 4 days   • Nausea   • Fatigue      History of Present Illness Patient presents today with diarrhea for four days. Patient has had 4-5 loose BM a day. Patient is unsure if she has had blood or mucous in the stool. Has had lower abdominal pain and extreme fatigue. Positive for fevers. Patient has been very nauseous and unable to eat or drink much the last four days. Denies any vomiting but is nauseated.. Patient denies any change in diet prior to this. Does complain of dizziness upon standing. Has taken Imodium 3-4 times a day since this started with no relief. Recently was on an antibiotic for a UTI. Patients mother and sister have both been diagnosed with E. Coli infections recently. Patient also has a positive history of IBS but states that this does not feel like that is the issue.       The following portions of the patient's history were reviewed and updated as appropriate: allergies, current medications, past family history, past medical history, past social history, past surgical history and problem list.    Current Outpatient Medications:   •  albuterol 108 (90 Base) MCG/ACT inhaler, Inhale 2 puffs Every 4 (Four) Hours As Needed for Wheezing., Disp: 18 g, Rfl: 11  •  baclofen (LIORESAL) 10 MG tablet, Take 1 tablet by mouth 3 (Three) Times a Day As Needed for Muscle Spasms., Disp: 90 tablet, Rfl: 2  •  benzonatate (TESSALON) 200 MG capsule, Take 1 capsule by mouth 3 (Three) Times a Day As Needed for Cough., Disp: 20 capsule, Rfl: 0  •  brompheniramine-pseudoephedrine-DM 30-2-10 MG/5ML syrup, Take 5 mL by mouth 4 (Four) Times a Day As Needed for Congestion or Cough., Disp: 118 mL, Rfl: 0  •  cetirizine (zyrTEC) 10 MG tablet, Take 1 tablet by mouth Daily., Disp: , Rfl: 1  •  dicyclomine (BENTYL) 20 MG tablet, Take 1 tablet by mouth Every 8 (Eight) Hours As Needed (abdominal pain/cramping/diarrhea)., Disp:  21 tablet, Rfl: 0  •  DULoxetine (CYMBALTA) 30 MG capsule, Take 30 mg by mouth Daily., Disp: , Rfl:   •  EPIPEN 2-JAREN 0.3 MG/0.3ML solution auto-injector injection, U UTD, Disp: , Rfl: 6  •  estradiol (ESTRACE) 2 MG tablet, Take 1 tablet by mouth Daily., Disp: 30 tablet, Rfl: 5  •  fluticasone (FLONASE) 50 MCG/ACT nasal spray, 2 sprays into each nostril Daily., Disp: 1 bottle, Rfl: 3  •  gabapentin (NEURONTIN) 300 MG capsule, 1 capsule by mouth every night, Disp: 30 capsule, Rfl: 2  •  ipratropium-albuterol (DUO-NEB) 0.5-2.5 mg/3 ml nebulizer, Take 3 mL by nebulization Every 4 (Four) Hours As Needed for Wheezing or Shortness of Air., Disp: 360 mL, Rfl: 11  •  lidocaine (XYLOCAINE) 5 % ointment, As Needed., Disp: , Rfl:   •  LORazepam (ATIVAN) 1 MG tablet, TK 1 T PO D PRN, Disp: , Rfl: 0  •  mupirocin (BACTROBAN) 2 % ointment, Apply  topically to the appropriate area as directed 3 (Three) Times a Day., Disp: 15 g, Rfl: 0  •  nicotine (NICODERM CQ) 21 MG/24HR patch, Place 1 patch on the skin as directed by provider Daily., Disp: 28 each, Rfl: 11  •  Omeprazole 20 MG tablet delayed-release, Take 40 mg by mouth Daily., Disp: 60 each, Rfl: 5  •  PATADAY 0.2 % solution ophthalmic solution, USE 1 DROP AEY ONCE DAILY PRN, Disp: , Rfl: 3  •  saccharomyces boulardii (FLORASTOR) 250 MG capsule, Take 1 capsule by mouth 2 (Two) Times a Day., Disp: 14 capsule, Rfl: 0  •  sodium chloride (OCEAN NASAL SPRAY) 0.65 % nasal spray, 1 spray into the nostril(s) as directed by provider As Needed for Congestion., Disp: 30 mL, Rfl: 0  •  SUMAtriptan (IMITREX) 25 MG tablet, Take one tablet at onset of headache. May repeat dose one time in 2 hours if headache not relieved., Disp: 16 tablet, Rfl: 2  •  traMADol (ULTRAM) 50 MG tablet, Take 1 tablet by mouth Every 8 (Eight) Hours As Needed for Moderate Pain ., Disp: 60 tablet, Rfl: 0  •  traZODone (DESYREL) 150 MG tablet, Take 1 tablet by mouth As Needed., Disp: , Rfl: 3  •  ondansetron (ZOFRAN)  "4 MG tablet, Take 1 tablet by mouth Every 8 (Eight) Hours As Needed for Nausea or Vomiting., Disp: 8 tablet, Rfl: 0    Review of Systems   Constitutional: Positive for activity change, appetite change, chills, diaphoresis, fatigue and fever.   HENT: Negative for congestion, rhinorrhea, sinus pressure and sore throat.    Respiratory: Negative for cough, chest tightness and shortness of breath.    Cardiovascular: Negative for chest pain.   Gastrointestinal: Positive for abdominal pain, diarrhea and nausea. Negative for abdominal distention, anal bleeding, blood in stool, constipation, rectal pain and vomiting.   Genitourinary: Negative for difficulty urinating, dysuria, flank pain, pelvic pain and urgency.   Skin: Negative for rash.   Neurological: Positive for dizziness. Negative for syncope, numbness and headaches.     Temp 99.4 °F (37.4 °C)   Resp 18   Ht 154.9 cm (61\")   Wt 82.1 kg (181 lb)   SpO2 98%   Breastfeeding? No   BMI 34.20 kg/m²     Objective   Allergies   Allergen Reactions   • Adhesive Tape Hives   • Latex Hives   • Sulfa Antibiotics Hives   • Sulfate Hives   • Biaxin [Clarithromycin] Nausea Only   • Codeine Itching   • Penicillins Nausea Only       Physical Exam   Constitutional: She is oriented to person, place, and time. She appears well-developed and well-nourished. She has a sickly appearance.   Appears not to feel well   HENT:   Head: Normocephalic.   Oral mucosa is moist   Eyes: Right eye exhibits no discharge. Left eye exhibits no discharge. No scleral icterus.   Cardiovascular: Regular rhythm, normal heart sounds and intact distal pulses. Tachycardia present.   Pulmonary/Chest: Effort normal and breath sounds normal. No respiratory distress.   Abdominal: Normal appearance and bowel sounds are normal. She exhibits no mass. There is tenderness in the right lower quadrant and left lower quadrant. There is no rebound.   Diffusely tender.   Neurological: She is alert and oriented to person, " place, and time.   Skin: Skin is warm, dry and intact. Capillary refill takes less than 2 seconds.   Is pink, no rash    Psychiatric: She has a normal mood and affect.       Procedures    LABS  Results for orders placed or performed in visit on 09/26/19   Comprehensive Metabolic Panel   Result Value Ref Range    Glucose 98 65 - 99 mg/dL    BUN 10 6 - 20 mg/dL    Creatinine 0.69 0.57 - 1.00 mg/dL    Sodium 140 136 - 145 mmol/L    Potassium 3.8 3.5 - 5.2 mmol/L    Chloride 101 98 - 107 mmol/L    CO2 24.0 22.0 - 29.0 mmol/L    Calcium 9.3 8.6 - 10.5 mg/dL    Total Protein 7.8 6.0 - 8.5 g/dL    Albumin 4.60 3.50 - 5.20 g/dL    ALT (SGPT) 17 1 - 33 U/L    AST (SGOT) 14 1 - 32 U/L    Alkaline Phosphatase 65 39 - 117 U/L    Total Bilirubin 0.3 0.2 - 1.2 mg/dL    eGFR Non African Amer 96 >60 mL/min/1.73    Globulin 3.2 gm/dL    A/G Ratio 1.4 g/dL    BUN/Creatinine Ratio 14.5 7.0 - 25.0    Anion Gap 15.0 5.0 - 15.0 mmol/L   CBC Auto Differential   Result Value Ref Range    WBC 9.15 3.40 - 10.80 10*3/mm3    RBC 4.95 3.77 - 5.28 10*6/mm3    Hemoglobin 14.2 12.0 - 15.9 g/dL    Hematocrit 44.4 34.0 - 46.6 %    MCV 89.7 79.0 - 97.0 fL    MCH 28.7 26.6 - 33.0 pg    MCHC 32.0 31.5 - 35.7 g/dL    RDW 12.9 12.3 - 15.4 %    RDW-SD 42.5 37.0 - 54.0 fl    MPV 10.3 6.0 - 12.0 fL    Platelets 340 140 - 450 10*3/mm3    Neutrophil % 72.6 42.7 - 76.0 %    Lymphocyte % 21.6 19.6 - 45.3 %    Monocyte % 3.9 (L) 5.0 - 12.0 %    Eosinophil % 1.4 0.3 - 6.2 %    Basophil % 0.3 0.0 - 1.5 %    Immature Grans % 0.2 0.0 - 0.5 %    Neutrophils, Absolute 6.63 1.70 - 7.00 10*3/mm3    Lymphocytes, Absolute 1.98 0.70 - 3.10 10*3/mm3    Monocytes, Absolute 0.36 0.10 - 0.90 10*3/mm3    Eosinophils, Absolute 0.13 0.00 - 0.40 10*3/mm3    Basophils, Absolute 0.03 0.00 - 0.20 10*3/mm3    Immature Grans, Absolute 0.02 0.00 - 0.05 10*3/mm3    nRBC 0.0 0.0 - 0.2 /100 WBC       Assessment/Plan   Crystal was seen today for diarrhea, nausea and fatigue.    Diagnoses  and all orders for this visit:    Diarrhea, unspecified type  -     CBC & Differential  -     Comprehensive Metabolic Panel  -     Clostridium Difficile Toxin, PCR - Stool, Per Rectum  -     Stool Culture (Reference Lab) - Stool, Per Rectum  -     CBC Auto Differential    Generalized abdominal pain    Nausea  -     ondansetron (ZOFRAN) 4 MG tablet; Take 1 tablet by mouth Every 8 (Eight) Hours As Needed for Nausea or Vomiting.        Patient Instructions   Patient was orthostatic here in the office.  Recommend going to the emergency department for further [id number] IV fluids, labs,CT. patient is agreeable to go.Pt verbalizes understanding and agreement with plan of care.       Approximately 1630 patient called stating it would be at least a 3-hour wait in the emergency department and she left.  I have encouraged her to drink plenty of fluids, go back to the hospital for stat labs including stool specimen for C. difficile culture sensitivity.  I have encouraged her if she worsens in any way go to the emergency department.Pt verbalizes understanding and agreement with plan of care.         MAGGIE Rai

## 2019-09-26 NOTE — PATIENT INSTRUCTIONS
Patient was orthostatic here in the office.  Recommend going to the emergency department for further [id number] IV fluids, labs,CT. patient is agreeable to go.Pt verbalizes understanding and agreement with plan of care.       Approximately 1630 patient called stating it would be at least a 3-hour wait in the emergency department and she left.  I have encouraged her to drink plenty of fluids, go back to the hospital for stat labs including stool specimen for C. difficile culture sensitivity.  I have encouraged her if she worsens in any way go to the emergency department.Pt verbalizes understanding and agreement with plan of care.

## 2019-09-27 ENCOUNTER — APPOINTMENT (OUTPATIENT)
Dept: CT IMAGING | Facility: HOSPITAL | Age: 39
End: 2019-09-27

## 2019-09-27 ENCOUNTER — HOSPITAL ENCOUNTER (EMERGENCY)
Facility: HOSPITAL | Age: 39
Discharge: HOME | End: 2019-09-27
Attending: EMERGENCY MEDICINE | Admitting: EMERGENCY MEDICINE

## 2019-09-27 VITALS
SYSTOLIC BLOOD PRESSURE: 101 MMHG | OXYGEN SATURATION: 94 % | HEART RATE: 92 BPM | RESPIRATION RATE: 18 BRPM | BODY MASS INDEX: 34.55 KG/M2 | TEMPERATURE: 98.6 F | WEIGHT: 183 LBS | DIASTOLIC BLOOD PRESSURE: 58 MMHG | HEIGHT: 61 IN

## 2019-09-27 DIAGNOSIS — R10.31 BILATERAL LOWER ABDOMINAL CRAMPING: Primary | ICD-10-CM

## 2019-09-27 DIAGNOSIS — R11.0 NAUSEA: ICD-10-CM

## 2019-09-27 DIAGNOSIS — R10.32 BILATERAL LOWER ABDOMINAL CRAMPING: Primary | ICD-10-CM

## 2019-09-27 DIAGNOSIS — R19.7 DIARRHEA, UNSPECIFIED TYPE: ICD-10-CM

## 2019-09-27 DIAGNOSIS — Z87.19 HISTORY OF IBS: ICD-10-CM

## 2019-09-27 LAB
ALBUMIN SERPL-MCNC: 4.2 G/DL (ref 3.5–5.2)
ALBUMIN/GLOB SERPL: 1.3 G/DL
ALP SERPL-CCNC: 62 U/L (ref 39–117)
ALT SERPL W P-5'-P-CCNC: 16 U/L (ref 1–33)
ANION GAP SERPL CALCULATED.3IONS-SCNC: 16 MMOL/L (ref 5–15)
AST SERPL-CCNC: 11 U/L (ref 1–32)
BILIRUB SERPL-MCNC: 0.2 MG/DL (ref 0.2–1.2)
BILIRUB UR QL STRIP: NEGATIVE
BUN BLD-MCNC: 11 MG/DL (ref 6–20)
BUN/CREAT SERPL: 14.7 (ref 7–25)
CALCIUM SPEC-SCNC: 9.1 MG/DL (ref 8.6–10.5)
CHLORIDE SERPL-SCNC: 103 MMOL/L (ref 98–107)
CLARITY UR: CLEAR
CO2 SERPL-SCNC: 23 MMOL/L (ref 22–29)
COLOR UR: YELLOW
CREAT BLD-MCNC: 0.75 MG/DL (ref 0.57–1)
GFR SERPL CREATININE-BSD FRML MDRD: 87 ML/MIN/1.73
GLOBULIN UR ELPH-MCNC: 3.2 GM/DL
GLUCOSE BLD-MCNC: 131 MG/DL (ref 65–99)
GLUCOSE UR STRIP-MCNC: NEGATIVE MG/DL
HGB UR QL STRIP.AUTO: NEGATIVE
HOLD SPECIMEN: NORMAL
HOLD SPECIMEN: NORMAL
KETONES UR QL STRIP: NEGATIVE
LEUKOCYTE ESTERASE UR QL STRIP.AUTO: NEGATIVE
LIPASE SERPL-CCNC: 65 U/L (ref 13–60)
NITRITE UR QL STRIP: NEGATIVE
PH UR STRIP.AUTO: 6 [PH] (ref 5–8)
POTASSIUM BLD-SCNC: 3.7 MMOL/L (ref 3.5–5.2)
PROT SERPL-MCNC: 7.4 G/DL (ref 6–8.5)
PROT UR QL STRIP: NEGATIVE
SODIUM BLD-SCNC: 142 MMOL/L (ref 136–145)
SP GR UR STRIP: 1.06 (ref 1–1.03)
UROBILINOGEN UR QL STRIP: ABNORMAL
WHOLE BLOOD HOLD SPECIMEN: NORMAL
WHOLE BLOOD HOLD SPECIMEN: NORMAL

## 2019-09-27 PROCEDURE — 25010000002 ONDANSETRON PER 1 MG: Performed by: PHYSICIAN ASSISTANT

## 2019-09-27 PROCEDURE — 25010000002 IOPAMIDOL 61 % SOLUTION: Performed by: EMERGENCY MEDICINE

## 2019-09-27 PROCEDURE — 81003 URINALYSIS AUTO W/O SCOPE: CPT | Performed by: EMERGENCY MEDICINE

## 2019-09-27 PROCEDURE — 74177 CT ABD & PELVIS W/CONTRAST: CPT

## 2019-09-27 PROCEDURE — 96375 TX/PRO/DX INJ NEW DRUG ADDON: CPT

## 2019-09-27 PROCEDURE — 96374 THER/PROPH/DIAG INJ IV PUSH: CPT

## 2019-09-27 PROCEDURE — 25010000002 MORPHINE PER 10 MG: Performed by: PHYSICIAN ASSISTANT

## 2019-09-27 RX ORDER — MORPHINE SULFATE 2 MG/ML
2 INJECTION, SOLUTION INTRAMUSCULAR; INTRAVENOUS ONCE
Status: COMPLETED | OUTPATIENT
Start: 2019-09-27 | End: 2019-09-27

## 2019-09-27 RX ORDER — ONDANSETRON 2 MG/ML
4 INJECTION INTRAMUSCULAR; INTRAVENOUS ONCE
Status: COMPLETED | OUTPATIENT
Start: 2019-09-27 | End: 2019-09-27

## 2019-09-27 RX ADMIN — SODIUM CHLORIDE 1000 ML: 9 INJECTION, SOLUTION INTRAVENOUS at 02:30

## 2019-09-27 RX ADMIN — MORPHINE SULFATE 2 MG: 2 INJECTION, SOLUTION INTRAMUSCULAR; INTRAVENOUS at 02:30

## 2019-09-27 RX ADMIN — ONDANSETRON 4 MG: 2 INJECTION INTRAMUSCULAR; INTRAVENOUS at 02:30

## 2019-09-27 RX ADMIN — IOPAMIDOL 100 ML: 612 INJECTION, SOLUTION INTRAVENOUS at 02:49

## 2019-10-02 ENCOUNTER — DOCUMENTATION (OUTPATIENT)
Dept: PHYSICAL THERAPY | Facility: CLINIC | Age: 39
End: 2019-10-02

## 2019-10-02 NOTE — PROGRESS NOTES
Discharge Summary  Discharge Summary from Physical Therapy Report      Patient: Megan Post   : 1980  Diagnosis/ICD-10 Code:  There were no encounter diagnoses.   Referring practitioner: No ref. provider found  Date of Initial Visit: No linked episodes  Today's Date: 10/2/2019  Date of Last Visit: 19     Number of Visits: Visit count could not be calculated. Make sure you are using a visit which is associated with an episode.    Discharge Status of Patient: Did not return after initial evaluation.    Goals: Not Met    Discharge Plan: Continue with current home exercise program as instructed    Comments:  Pt. Did not complete recommended course of treatment.    Date of Discharge: 10/2/2019        Aysha Healy, PT

## 2019-10-14 NOTE — PROGRESS NOTES
Discharge Summary  Discharge Summary from Physical Therapy Report      Patient: Megan Post   : 1980  Diagnosis/ICD-10 Code:  There were no encounter diagnoses.   Referring practitioner: No ref. provider found  Date of Initial Visit: No linked episodes  Today's Date: 10/14/2019  Date of Last Visit: 19     Number of Visits: 1  Discharge Status of Patient: Did not return after initial evaluation.  Goals: Not Met    Discharge Plan: Continue with current home exercise program as instructed    Comments: Did not complete treatment.    Date of Discharge: 10/14/2019        Aysha Healy, PT

## 2019-10-15 ENCOUNTER — OFFICE VISIT (OUTPATIENT)
Dept: INTERNAL MEDICINE | Facility: CLINIC | Age: 39
End: 2019-10-15

## 2019-10-15 VITALS
BODY MASS INDEX: 34.32 KG/M2 | SYSTOLIC BLOOD PRESSURE: 118 MMHG | DIASTOLIC BLOOD PRESSURE: 64 MMHG | HEIGHT: 61 IN | RESPIRATION RATE: 16 BRPM | HEART RATE: 106 BPM | TEMPERATURE: 98.7 F | OXYGEN SATURATION: 98 % | WEIGHT: 181.8 LBS

## 2019-10-15 DIAGNOSIS — M54.41 CHRONIC MIDLINE LOW BACK PAIN WITH BILATERAL SCIATICA: ICD-10-CM

## 2019-10-15 DIAGNOSIS — M54.42 CHRONIC MIDLINE LOW BACK PAIN WITH BILATERAL SCIATICA: ICD-10-CM

## 2019-10-15 DIAGNOSIS — Z87.891 HISTORY OF TOBACCO ABUSE: ICD-10-CM

## 2019-10-15 DIAGNOSIS — M51.26 LUMBAR DISCOGENIC PAIN SYNDROME: ICD-10-CM

## 2019-10-15 DIAGNOSIS — G89.29 CHRONIC MIDLINE LOW BACK PAIN WITH BILATERAL SCIATICA: ICD-10-CM

## 2019-10-15 DIAGNOSIS — Z23 NEED FOR VACCINATION: ICD-10-CM

## 2019-10-15 DIAGNOSIS — R51.9 ACUTE INTRACTABLE HEADACHE, UNSPECIFIED HEADACHE TYPE: Primary | ICD-10-CM

## 2019-10-15 PROCEDURE — 96372 THER/PROPH/DIAG INJ SC/IM: CPT | Performed by: NURSE PRACTITIONER

## 2019-10-15 PROCEDURE — 90686 IIV4 VACC NO PRSV 0.5 ML IM: CPT | Performed by: NURSE PRACTITIONER

## 2019-10-15 PROCEDURE — 99214 OFFICE O/P EST MOD 30 MIN: CPT | Performed by: NURSE PRACTITIONER

## 2019-10-15 PROCEDURE — G0008 ADMIN INFLUENZA VIRUS VAC: HCPCS | Performed by: NURSE PRACTITIONER

## 2019-10-15 RX ORDER — KETOROLAC TROMETHAMINE 30 MG/ML
30 INJECTION, SOLUTION INTRAMUSCULAR; INTRAVENOUS ONCE
Status: COMPLETED | OUTPATIENT
Start: 2019-10-15 | End: 2019-10-15

## 2019-10-15 RX ORDER — BACLOFEN 10 MG/1
10 TABLET ORAL 3 TIMES DAILY PRN
Qty: 90 TABLET | Refills: 2 | Status: SHIPPED | OUTPATIENT
Start: 2019-10-15 | End: 2019-11-15

## 2019-10-15 RX ORDER — TRAMADOL HYDROCHLORIDE 50 MG/1
50 TABLET ORAL EVERY 8 HOURS PRN
Qty: 60 TABLET | Refills: 0 | Status: SHIPPED | OUTPATIENT
Start: 2019-10-15 | End: 2020-01-16 | Stop reason: SDUPTHER

## 2019-10-15 RX ORDER — NICOTINE 21 MG/24HR
1 PATCH, TRANSDERMAL 24 HOURS TRANSDERMAL EVERY 24 HOURS
Qty: 28 EACH | Refills: 11 | Status: CANCELLED | OUTPATIENT
Start: 2019-10-15

## 2019-10-15 RX ORDER — BENZONATATE 200 MG/1
200 CAPSULE ORAL 3 TIMES DAILY PRN
Qty: 20 CAPSULE | Refills: 0 | Status: CANCELLED | OUTPATIENT
Start: 2019-10-15

## 2019-10-15 RX ADMIN — KETOROLAC TROMETHAMINE 30 MG: 30 INJECTION, SOLUTION INTRAMUSCULAR; INTRAVENOUS at 15:55

## 2019-10-15 NOTE — PROGRESS NOTES
Chief Complaint   Patient presents with   • Facial Pain   • Headache       History of Present Illness  39 y.o.female presents for headache and facial pain.  Sp sinus surgery 10-1 with c/o headache and facial pain. Headache frontal/face dull pain won't go away. Would like a toradol shot. No fever.  Some nasal congestion no nasal purulence.  Has tried nsaids and migraine med. No neck pain.  Hx chronic low back pain onset years; with herniated lumbar disc spondylosis with bilateral sciatics.  Takes baclofen and prn tramadol.  Needs refill.  Hx tobacco abuse; has not smoked since surgery early this month. Having cravings. Wants to try low dose patch to see if helps.  Needs flu vaccine      Review of Systems   Constitutional: Negative for chills and fever.   HENT: Positive for congestion and sinus pressure. Negative for ear pain, postnasal drip, rhinorrhea, sneezing, sore throat and swollen glands.    Respiratory: Negative for cough and shortness of breath.    Musculoskeletal: Positive for arthralgias and back pain.   Neurological: Positive for headache. Negative for dizziness, weakness and light-headedness.       [facility]  The following portions of the patient's history were reviewed and updated as appropriate: allergies, current medications, past family history, past medical history, past social history, past surgical history and problem list.     Social hx;   Quit smoking 2 weeks ago.    Past Medical History:   Diagnosis Date   • Abdominal pain    • Abnormal breast exam    • Abnormal Pap smear of cervix    • Achilles tendinitis    • Acute sinusitis    • Anxiety    • Arthritis    • Asthma    • Tavera's syndrome    • Bipolar 1 disorder (CMS/HCC)    • Bowel trouble    • Breast cyst    • Colon polyps    • Constipation    • Depression    • Diverticulitis    • Diverticulitis of colon    • Endometriosis    • Eustachian tube dysfunction    • Extremity pain    • Fatty liver    • Female pelvic pain    • Gastric ulcer    • H/O  bladder infections    • History of rashes as a child    • Irritable bowel syndrome    • Low back pain    • Menopausal symptoms    • Muscle weakness    • Ovarian cyst    • PID (pelvic inflammatory disease)    • Recurrent UTI    • Sexual problems       Past Surgical History:   Procedure Laterality Date   •  SECTION     • CHOLECYSTECTOMY     • COLONOSCOPY     • HYSTERECTOMY     • NASAL ENDOSCOPY     • OOPHORECTOMY Bilateral    • OTHER SURGICAL HISTORY      Laparoscopy (diagnostic) gynecologic with biopsy   • SHOULDER SURGERY     • UPPER GASTROINTESTINAL ENDOSCOPY  2019      Allergies   Allergen Reactions   • Adhesive Tape Hives   • Latex Hives   • Sulfa Antibiotics Hives   • Sulfate Hives   • Biaxin [Clarithromycin] Nausea Only   • Codeine Itching   • Penicillins Nausea Only      Family History   Problem Relation Age of Onset   • Liver disease Mother    • Diabetes Mother    • Hyperlipidemia Mother    • Hypertension Mother    • Thyroid disease Mother    • Arthritis Father    • Mental illness Father    • Migraines Sister    • Stroke Other    • Diabetes Other    • Hyperlipidemia Other    • Hypertension Other    • Mental illness Other    • Migraines Other    • Tuberculosis Other    • Breast cancer Neg Hx    • Endometrial cancer Neg Hx    • Ovarian cancer Neg Hx             Current Outpatient Medications:   •  albuterol 108 (90 Base) MCG/ACT inhaler, Inhale 2 puffs Every 4 (Four) Hours As Needed for Wheezing., Disp: 18 g, Rfl: 11  •  baclofen (LIORESAL) 10 MG tablet, Take 1 tablet by mouth 3 (Three) Times a Day As Needed for Muscle Spasms., Disp: 90 tablet, Rfl: 2  •  benzonatate (TESSALON) 200 MG capsule, Take 1 capsule by mouth 3 (Three) Times a Day As Needed for Cough., Disp: 20 capsule, Rfl: 0  •  brompheniramine-pseudoephedrine-DM 30-2-10 MG/5ML syrup, Take 5 mL by mouth 4 (Four) Times a Day As Needed for Congestion or Cough., Disp: 118 mL, Rfl: 0  •  cetirizine (zyrTEC) 10 MG tablet, Take 1 tablet by  mouth Daily., Disp: , Rfl: 1  •  dicyclomine (BENTYL) 20 MG tablet, Take 1 tablet by mouth Every 8 (Eight) Hours As Needed (abdominal pain/cramping/diarrhea)., Disp: 21 tablet, Rfl: 0  •  DULoxetine (CYMBALTA) 30 MG capsule, Take 30 mg by mouth Daily., Disp: , Rfl:   •  EPIPEN 2-JAREN 0.3 MG/0.3ML solution auto-injector injection, U UTD, Disp: , Rfl: 6  •  estradiol (ESTRACE) 2 MG tablet, Take 1 tablet by mouth Daily., Disp: 30 tablet, Rfl: 5  •  fluticasone (FLONASE) 50 MCG/ACT nasal spray, 2 sprays into each nostril Daily., Disp: 1 bottle, Rfl: 3  •  ipratropium-albuterol (DUO-NEB) 0.5-2.5 mg/3 ml nebulizer, Take 3 mL by nebulization Every 4 (Four) Hours As Needed for Wheezing or Shortness of Air., Disp: 360 mL, Rfl: 11  •  lidocaine (XYLOCAINE) 5 % ointment, As Needed., Disp: , Rfl:   •  LORazepam (ATIVAN) 1 MG tablet, TK 1 T PO D PRN, Disp: , Rfl: 0  •  mupirocin (BACTROBAN) 2 % ointment, Apply  topically to the appropriate area as directed 3 (Three) Times a Day., Disp: 15 g, Rfl: 0  •  nicotine (NICODERM CQ) 21 MG/24HR patch, Place 1 patch on the skin as directed by provider Daily., Disp: 28 each, Rfl: 11  •  Omeprazole 20 MG tablet delayed-release, Take 40 mg by mouth Daily., Disp: 60 each, Rfl: 5  •  ondansetron (ZOFRAN) 4 MG tablet, Take 1 tablet by mouth Every 8 (Eight) Hours As Needed for Nausea or Vomiting., Disp: 8 tablet, Rfl: 0  •  PATADAY 0.2 % solution ophthalmic solution, USE 1 DROP AEY ONCE DAILY PRN, Disp: , Rfl: 3  •  saccharomyces boulardii (FLORASTOR) 250 MG capsule, Take 1 capsule by mouth 2 (Two) Times a Day., Disp: 14 capsule, Rfl: 0  •  sodium chloride (OCEAN NASAL SPRAY) 0.65 % nasal spray, 1 spray into the nostril(s) as directed by provider As Needed for Congestion., Disp: 30 mL, Rfl: 0  •  SUMAtriptan (IMITREX) 25 MG tablet, Take one tablet at onset of headache. May repeat dose one time in 2 hours if headache not relieved., Disp: 16 tablet, Rfl: 2  •  traMADol (ULTRAM) 50 MG tablet, Take 1  "tablet by mouth Every 8 (Eight) Hours As Needed for Moderate Pain ., Disp: 60 tablet, Rfl: 0  •  traZODone (DESYREL) 150 MG tablet, Take 1 tablet by mouth As Needed., Disp: , Rfl: 3  •  gabapentin (NEURONTIN) 300 MG capsule, 1 capsule by mouth every night, Disp: 30 capsule, Rfl: 2    VITALS:  /64   Pulse 106   Temp 98.7 °F (37.1 °C) (Oral)   Resp 16   Ht 154.9 cm (61\")   Wt 82.5 kg (181 lb 12.8 oz)   SpO2 98%   BMI 34.35 kg/m²     Physical Exam   Constitutional: She is oriented to person, place, and time. She appears well-developed and well-nourished. No distress.   HENT:   Head: Normocephalic.   Right Ear: Tympanic membrane, external ear and ear canal normal.   Left Ear: Tympanic membrane, external ear and ear canal normal.   Nose: Mucosal edema and congestion present. No sinus tenderness. Right sinus exhibits no maxillary sinus tenderness and no frontal sinus tenderness. Left sinus exhibits no maxillary sinus tenderness and no frontal sinus tenderness.   Mouth/Throat: Oropharynx is clear and moist and mucous membranes are normal.   Eyes: Pupils are equal, round, and reactive to light.   Cardiovascular: Normal rate, regular rhythm and normal heart sounds.   Pulmonary/Chest: Effort normal and breath sounds normal. No respiratory distress.   Musculoskeletal:        Lumbar back: She exhibits decreased range of motion and tenderness. She exhibits no edema, no pain and no spasm.   Neurological: She is alert and oriented to person, place, and time. No cranial nerve deficit. She exhibits normal muscle tone. Coordination normal.   Skin: Skin is warm and dry. Capillary refill takes less than 2 seconds.   Psychiatric: She has a normal mood and affect. Her behavior is normal.   Vitals reviewed.      LABS  No new labs    ASSESSMENT/PLAN  Crystal was seen today for facial pain and headache.    Diagnoses and all orders for this visit:    Acute intractable headache, unspecified headache type  -     ketorolac " (TORADOL) injection 30 mg    Lumbar discogenic pain syndrome  -     baclofen (LIORESAL) 10 MG tablet; Take 1 tablet by mouth 3 (Three) Times a Day As Needed for Muscle Spasms.  -     traMADol (ULTRAM) 50 MG tablet; Take 1 tablet by mouth Every 8 (Eight) Hours As Needed for Moderate Pain .    Chronic midline low back pain with bilateral sciatica  -     baclofen (LIORESAL) 10 MG tablet; Take 1 tablet by mouth 3 (Three) Times a Day As Needed for Muscle Spasms.    History of tobacco abuse  -     nicotine (NICODERM CQ) 7 MG/24HR patch; Place 1 patch on the skin as directed by provider Daily.    Need for vaccination  -     Flucelvax Quad=>4Years ([id number])      As part of patient's treatment plan I am prescribing a controlled substance.  The patient has been made aware of the appropriate use of such medications, including potential risk of somnolence, limited ability to drive and/or work safely, and potential for dependence and/or overdose.  It has also been made clear that these medications are for use by this patient only, without concomitant use of alcohol or other substances, unless prescribed.  The patient has read and signed the [facility] Controlled Substance Contract.  I will continue to see patient for regular follow up appointments.  They are well controlled on their medication.  [facility] [id number] is updated every 3 months. The patient is aware of the potential for addiction and dependence.    I discussed the patients findings and my recommendations with patient.  Patient was encouraged to keep me informed of any acute changes, lack of improvement, or any new concerning symptoms.    Patient voiced understanding of all instructions and denied further questions.      FOLLOW-UP  Return if symptoms worsen or fail to improve.    Electronically signed by:    MAGGIE Chacon  10/15/2019

## 2019-10-17 DIAGNOSIS — Z79.890 HORMONE REPLACEMENT THERAPY (HRT): ICD-10-CM

## 2019-10-18 DIAGNOSIS — Z79.890 HORMONE REPLACEMENT THERAPY (HRT): ICD-10-CM

## 2019-10-18 RX ORDER — ESTRADIOL 2 MG/1
2 TABLET ORAL DAILY
Qty: 30 TABLET | Refills: 0 | Status: SHIPPED | OUTPATIENT
Start: 2019-10-18 | End: 2019-11-17 | Stop reason: SDUPTHER

## 2019-10-18 RX ORDER — ESTRADIOL 2 MG/1
2 TABLET ORAL DAILY
Qty: 90 TABLET | Refills: 0 | OUTPATIENT
Start: 2019-10-18

## 2019-10-18 RX ORDER — ESTRADIOL 2 MG/1
2 TABLET ORAL DAILY
Qty: 30 TABLET | Refills: 5 | Status: CANCELLED | OUTPATIENT
Start: 2019-10-18

## 2019-10-18 NOTE — TELEPHONE ENCOUNTER
PATIENT IS CALLING AGAIN REGARDING HER ESTRADIOL PRESCRIPTION. HER PHARMACY IS [address] PT CAN BE REACHED [phone]

## 2019-10-18 NOTE — TELEPHONE ENCOUNTER
We need a copy of her mammogram report please. I think Chloe has info listed under referrals where pt went to.  Please obtain report.

## 2019-10-24 ENCOUNTER — TELEPHONE (OUTPATIENT)
Dept: INTERNAL MEDICINE | Facility: CLINIC | Age: 39
End: 2019-10-24

## 2019-10-24 NOTE — TELEPHONE ENCOUNTER
Called pt stating we need her romeo report faxed to us as I have requested it and its not been faxed

## 2019-10-24 NOTE — TELEPHONE ENCOUNTER
PATIENT RETURNED YOUR CALL. SHE IS UNSURE WHAT YOU NEED FOR THE MAMMO. SHE ALSO STATES THAT HER NICOTINE PATCHED REQUIRE A PRIOR AUTH.

## 2019-10-26 ENCOUNTER — OFFICE VISIT (OUTPATIENT)
Dept: INTERNAL MEDICINE | Facility: CLINIC | Age: 39
End: 2019-10-26

## 2019-10-26 VITALS
TEMPERATURE: 98.5 F | DIASTOLIC BLOOD PRESSURE: 70 MMHG | HEART RATE: 105 BPM | HEIGHT: 61 IN | OXYGEN SATURATION: 97 % | WEIGHT: 186 LBS | SYSTOLIC BLOOD PRESSURE: 104 MMHG | BODY MASS INDEX: 35.12 KG/M2

## 2019-10-26 DIAGNOSIS — M54.2 NECK PAIN ON LEFT SIDE: Primary | ICD-10-CM

## 2019-10-26 DIAGNOSIS — M25.512 ACUTE PAIN OF LEFT SHOULDER: ICD-10-CM

## 2019-10-26 PROCEDURE — 99213 OFFICE O/P EST LOW 20 MIN: CPT | Performed by: NURSE PRACTITIONER

## 2019-10-26 PROCEDURE — 96372 THER/PROPH/DIAG INJ SC/IM: CPT | Performed by: NURSE PRACTITIONER

## 2019-10-26 RX ORDER — KETOROLAC TROMETHAMINE 30 MG/ML
30 INJECTION, SOLUTION INTRAMUSCULAR; INTRAVENOUS ONCE
Status: COMPLETED | OUTPATIENT
Start: 2019-10-26 | End: 2019-10-26

## 2019-10-26 RX ADMIN — KETOROLAC TROMETHAMINE 30 MG: 30 INJECTION, SOLUTION INTRAMUSCULAR; INTRAVENOUS at 15:19
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
## 2019-10-31 ENCOUNTER — PRIOR AUTHORIZATION (OUTPATIENT)
Dept: INTERNAL MEDICINE | Facility: CLINIC | Age: 39
End: 2019-10-31

## 2019-11-05 ENCOUNTER — TELEPHONE (OUTPATIENT)
Dept: INTERNAL MEDICINE | Facility: CLINIC | Age: 39
End: 2019-11-05

## 2019-11-06 ENCOUNTER — TRANSCRIBE ORDERS (OUTPATIENT)
Dept: INTERNAL MEDICINE | Facility: CLINIC | Age: 39
End: 2019-11-06

## 2019-11-06 DIAGNOSIS — Z12.31 ENCOUNTER FOR SCREENING MAMMOGRAM FOR MALIGNANT NEOPLASM OF BREAST: Primary | ICD-10-CM

## 2019-11-15 ENCOUNTER — TELEPHONE (OUTPATIENT)
Dept: INTERNAL MEDICINE | Facility: CLINIC | Age: 39
End: 2019-11-15

## 2019-11-15 DIAGNOSIS — M54.42 CHRONIC MIDLINE LOW BACK PAIN WITH BILATERAL SCIATICA: ICD-10-CM

## 2019-11-15 DIAGNOSIS — R07.9 CHEST PAIN, UNSPECIFIED TYPE: ICD-10-CM

## 2019-11-15 DIAGNOSIS — M47.816 SPONDYLOSIS OF LUMBAR REGION WITHOUT MYELOPATHY OR RADICULOPATHY: ICD-10-CM

## 2019-11-15 DIAGNOSIS — K21.9 GASTROESOPHAGEAL REFLUX DISEASE WITHOUT ESOPHAGITIS: ICD-10-CM

## 2019-11-15 DIAGNOSIS — K21.00 GASTROESOPHAGEAL REFLUX DISEASE WITH ESOPHAGITIS: ICD-10-CM

## 2019-11-15 DIAGNOSIS — M51.26 LUMBAR DISCOGENIC PAIN SYNDROME: ICD-10-CM

## 2019-11-15 DIAGNOSIS — M51.26 HERNIATED LUMBAR INTERVERTEBRAL DISC: ICD-10-CM

## 2019-11-15 DIAGNOSIS — M54.41 CHRONIC MIDLINE LOW BACK PAIN WITH BILATERAL SCIATICA: ICD-10-CM

## 2019-11-15 DIAGNOSIS — G89.29 CHRONIC MIDLINE LOW BACK PAIN WITH BILATERAL SCIATICA: ICD-10-CM

## 2019-11-15 RX ORDER — BACLOFEN 10 MG/1
TABLET ORAL
Qty: 90 TABLET | Refills: 0 | Status: SHIPPED | OUTPATIENT
Start: 2019-11-15 | End: 2020-01-08

## 2019-11-15 NOTE — TELEPHONE ENCOUNTER
Let pt know that PA for nicotine patches were denied, per Sil Sanuders note. Let pt know to contact me if she can find out otherwise through her insurance.

## 2019-11-17 DIAGNOSIS — Z79.890 HORMONE REPLACEMENT THERAPY (HRT): ICD-10-CM

## 2019-11-17 RX ORDER — ESTRADIOL 2 MG/1
2 TABLET ORAL DAILY
Qty: 30 TABLET | Refills: 0 | Status: SHIPPED | OUTPATIENT
Start: 2019-11-17 | End: 2020-01-08

## 2020-01-08 DIAGNOSIS — M51.26 LUMBAR DISCOGENIC PAIN SYNDROME: ICD-10-CM

## 2020-01-08 DIAGNOSIS — M47.816 SPONDYLOSIS OF LUMBAR REGION WITHOUT MYELOPATHY OR RADICULOPATHY: ICD-10-CM

## 2020-01-08 DIAGNOSIS — M54.41 CHRONIC MIDLINE LOW BACK PAIN WITH BILATERAL SCIATICA: ICD-10-CM

## 2020-01-08 DIAGNOSIS — G89.29 CHRONIC MIDLINE LOW BACK PAIN WITH BILATERAL SCIATICA: ICD-10-CM

## 2020-01-08 DIAGNOSIS — M54.42 CHRONIC MIDLINE LOW BACK PAIN WITH BILATERAL SCIATICA: ICD-10-CM

## 2020-01-08 DIAGNOSIS — Z79.890 HORMONE REPLACEMENT THERAPY (HRT): ICD-10-CM

## 2020-01-08 DIAGNOSIS — M51.26 HERNIATED LUMBAR INTERVERTEBRAL DISC: ICD-10-CM

## 2020-01-08 RX ORDER — BACLOFEN 10 MG/1
TABLET ORAL
Qty: 90 TABLET | Refills: 0 | Status: SHIPPED | OUTPATIENT
Start: 2020-01-08 | End: 2020-02-05

## 2020-01-08 RX ORDER — ESTRADIOL 2 MG/1
2 TABLET ORAL DAILY
Qty: 30 TABLET | Refills: 0 | Status: SHIPPED | OUTPATIENT
Start: 2020-01-08 | End: 2020-02-05

## 2020-01-16 ENCOUNTER — OFFICE VISIT (OUTPATIENT)
Dept: INTERNAL MEDICINE | Facility: CLINIC | Age: 40
End: 2020-01-16

## 2020-01-16 VITALS
SYSTOLIC BLOOD PRESSURE: 102 MMHG | BODY MASS INDEX: 35.12 KG/M2 | WEIGHT: 186 LBS | HEIGHT: 61 IN | DIASTOLIC BLOOD PRESSURE: 80 MMHG | HEART RATE: 119 BPM | TEMPERATURE: 99.7 F | OXYGEN SATURATION: 99 %

## 2020-01-16 DIAGNOSIS — R09.81 SINUS CONGESTION: ICD-10-CM

## 2020-01-16 DIAGNOSIS — M51.26 LUMBAR DISCOGENIC PAIN SYNDROME: ICD-10-CM

## 2020-01-16 DIAGNOSIS — Z79.899 HIGH RISK MEDICATION USE: ICD-10-CM

## 2020-01-16 DIAGNOSIS — R30.0 DYSURIA: Primary | ICD-10-CM

## 2020-01-16 DIAGNOSIS — N89.8 VAGINAL DISCHARGE: ICD-10-CM

## 2020-01-16 LAB
BILIRUB BLD-MCNC: NEGATIVE MG/DL
CLARITY, POC: CLEAR
COLOR UR: YELLOW
GLUCOSE UR STRIP-MCNC: NEGATIVE MG/DL
KETONES UR QL: NEGATIVE
LEUKOCYTE EST, POC: NEGATIVE
NITRITE UR-MCNC: NEGATIVE MG/ML
PH UR: 7.5 [PH] (ref 5–8)
PROT UR STRIP-MCNC: NEGATIVE MG/DL
RBC # UR STRIP: NEGATIVE /UL
SP GR UR: 1.01 (ref 1–1.03)
UROBILINOGEN UR QL: NORMAL

## 2020-01-16 PROCEDURE — 99214 OFFICE O/P EST MOD 30 MIN: CPT | Performed by: NURSE PRACTITIONER

## 2020-01-16 PROCEDURE — 81003 URINALYSIS AUTO W/O SCOPE: CPT | Performed by: NURSE PRACTITIONER

## 2020-01-16 RX ORDER — TRAMADOL HYDROCHLORIDE 50 MG/1
50 TABLET ORAL EVERY 8 HOURS PRN
Qty: 60 TABLET | Refills: 0 | Status: SHIPPED | OUTPATIENT
Start: 2020-01-16 | End: 2020-03-20 | Stop reason: SDUPTHER

## 2020-01-16 RX ORDER — TRAMADOL HYDROCHLORIDE 50 MG/1
50 TABLET ORAL EVERY 8 HOURS PRN
Qty: 60 TABLET | Refills: 0 | Status: SHIPPED | OUTPATIENT
Start: 2020-01-16 | End: 2020-01-16 | Stop reason: SDUPTHER

## 2020-01-16 NOTE — PROGRESS NOTES
Chief Complaint   Patient presents with   • Difficulty Urinating   • Sinusitis     on meds but its not going away x2 weeks        39 y.o.female presents for difficulty urinating, vaginal discharge, sinusitis, and back pain for tramadol prn  Pt has appt with cardiology next week due to chest pain and rapid heart pain.    Difficulty Urinating   This is a new problem. The current episode started in the past 7 days. The problem occurs constantly. The problem has been gradually worsening. Associated symptoms include urinary symptoms. Pertinent negatives include no abdominal pain, change in bowel habit, chest pain, chills, congestion, coughing, diaphoresis, fatigue, fever, headaches, nausea, numbness, rash, sore throat, vertigo or vomiting. The symptoms are aggravated by sneezing. She has tried nothing for the symptoms.   Vaginal Discharge   The patient's primary symptoms include vaginal discharge. The patient's pertinent negatives include no genital itching, genital lesions, genital odor or vaginal bleeding. This is a new problem. The current episode started in the past 7 days. The problem occurs constantly. The problem has been rapidly worsening. The patient is experiencing no pain. She is not pregnant. Associated symptoms include frequency. Pertinent negatives include no abdominal pain, back pain, chills, constipation, diarrhea, fever, headaches, nausea, rash, sore throat or vomiting. The vaginal discharge was clear, milky, thick and copious. She has tried nothing for the symptoms. She is sexually active. She uses hysterectomy for contraception. Her past medical history is significant for vaginosis.   Sinusitis   This is a recurrent problem. The current episode started 1 to 4 weeks ago. The problem has been gradually worsening since onset. There has been no fever. Associated symptoms include ear pain and sinus pressure. Pertinent negatives include no chills, congestion, coughing, diaphoresis, headaches or sore throat.  Past treatments include antibiotics. The treatment provided no relief.   Back Pain   This is a chronic problem. The current episode started more than 1 year ago. The problem occurs constantly. The problem has been gradually worsening since onset. The pain is present in the lumbar spine and thoracic spine. The quality of the pain is described as burning and stabbing. The pain is at a severity of 8/10. The pain is moderate. The pain is the same all the time. The symptoms are aggravated by position, standing and twisting. Associated symptoms include paresthesias and tingling. Pertinent negatives include no abdominal pain, bladder incontinence, bowel incontinence, chest pain, fever, headaches or numbness. Risk factors include obesity. She has tried chiropractic manipulation, muscle relaxant, analgesics and heat for the symptoms. The treatment provided mild relief.       CS agreement on file dated 10-15-19.  Last aylin 10-13-19  UDS due     Review of Systems   Constitutional: Negative for chills, diaphoresis, fatigue and fever.   HENT: Positive for ear pain, sinus pressure and tinnitus. Negative for congestion and sore throat.    Eyes: Negative for visual disturbance.   Respiratory: Negative for cough.    Cardiovascular: Negative for chest pain.   Gastrointestinal: Negative for abdominal pain, bowel incontinence, change in bowel habit, constipation, diarrhea, nausea and vomiting.   Genitourinary: Positive for difficulty urinating, frequency, pelvic pressure and vaginal discharge. Negative for urinary incontinence.   Musculoskeletal: Negative for back pain.   Skin: Negative for rash.   Neurological: Positive for tingling and paresthesias. Negative for vertigo and numbness.         [facility]  The following portions of the patient's history were reviewed and updated as appropriate: allergies, current medications, past family history, past medical history, past social history, past surgical history and problem list.     Past  Medical History:   Diagnosis Date   • Abdominal pain    • Abnormal breast exam    • Abnormal Pap smear of cervix    • Achilles tendinitis    • Acute sinusitis    • Anxiety    • Arthritis    • Asthma    • Tavera's syndrome    • Bipolar 1 disorder (CMS/HCC)    • Bowel trouble    • Breast cyst    • Colon polyps    • Constipation    • Depression    • Diverticulitis    • Diverticulitis of colon    • Endometriosis    • Eustachian tube dysfunction    • Extremity pain    • Fatty liver    • Female pelvic pain    • Gastric ulcer    • H/O bladder infections    • History of rashes as a child    • Irritable bowel syndrome    • Low back pain    • Menopausal symptoms    • Muscle weakness    • Ovarian cyst    • PID (pelvic inflammatory disease)    • Recurrent UTI    • Sexual problems       Past Surgical History:   Procedure Laterality Date   •  SECTION     • CHOLECYSTECTOMY     • COLONOSCOPY  2017   • HYSTERECTOMY     • NASAL ENDOSCOPY     • OOPHORECTOMY Bilateral    • OTHER SURGICAL HISTORY      Laparoscopy (diagnostic) gynecologic with biopsy   • SHOULDER SURGERY     • UPPER GASTROINTESTINAL ENDOSCOPY  2019      Allergies   Allergen Reactions   • Adhesive Tape Hives   • Latex Hives   • Sulfa Antibiotics Hives   • Sulfate Hives   • Biaxin [Clarithromycin] Nausea Only   • Codeine Itching   • Penicillins Nausea Only      Social History     Socioeconomic History   • Marital status: Single     Spouse name: Not on file   • Number of children: Not on file   • Years of education: Not on file   • Highest education level: Not on file   Tobacco Use   • Smoking status: Current Every Day Smoker     Packs/day: 0.50     Types: Cigarettes     Start date: 2003   • Smokeless tobacco: Never Used   • Tobacco comment: nicotine patches for smoking cessation   Substance and Sexual Activity   • Alcohol use: No   • Drug use: No   • Sexual activity: Defer     Family History   Problem Relation Age of Onset   • Liver disease Mother    •  Diabetes Mother    • Hyperlipidemia Mother    • Hypertension Mother    • Thyroid disease Mother    • Arthritis Father    • Mental illness Father    • Migraines Sister    • Stroke Other    • Diabetes Other    • Hyperlipidemia Other    • Hypertension Other    • Mental illness Other    • Migraines Other    • Tuberculosis Other    • Breast cancer Neg Hx    • Endometrial cancer Neg Hx    • Ovarian cancer Neg Hx             Current Outpatient Medications:   •  albuterol 108 (90 Base) MCG/ACT inhaler, Inhale 2 puffs Every 4 (Four) Hours As Needed for Wheezing., Disp: 18 g, Rfl: 11  •  baclofen (LIORESAL) 10 MG tablet, TAKE 1 TABLET BY MOUTH THREE TIMES DAILY AS NEEDED FOR MUSCLE SPASMS, Disp: 90 tablet, Rfl: 0  •  benzonatate (TESSALON) 200 MG capsule, Take 1 capsule by mouth 3 (Three) Times a Day As Needed for Cough., Disp: 20 capsule, Rfl: 0  •  brompheniramine-pseudoephedrine-DM 30-2-10 MG/5ML syrup, Take 5 mL by mouth 4 (Four) Times a Day As Needed for Congestion or Cough., Disp: 118 mL, Rfl: 0  •  cetirizine (zyrTEC) 10 MG tablet, Take 1 tablet by mouth Daily., Disp: , Rfl: 1  •  dicyclomine (BENTYL) 20 MG tablet, Take 1 tablet by mouth Every 8 (Eight) Hours As Needed (abdominal pain/cramping/diarrhea)., Disp: 21 tablet, Rfl: 0  •  DULoxetine (CYMBALTA) 30 MG capsule, Take 30 mg by mouth Daily., Disp: , Rfl:   •  EPIPEN 2-JAREN 0.3 MG/0.3ML solution auto-injector injection, U UTD, Disp: , Rfl: 6  •  estradiol (ESTRACE) 2 MG tablet, TAKE 1 TABLET BY MOUTH DAILY, Disp: 30 tablet, Rfl: 0  •  fluticasone (FLONASE) 50 MCG/ACT nasal spray, 2 sprays into each nostril Daily., Disp: 1 bottle, Rfl: 3  •  gabapentin (NEURONTIN) 300 MG capsule, 1 capsule by mouth every night, Disp: 30 capsule, Rfl: 2  •  ipratropium-albuterol (DUO-NEB) 0.5-2.5 mg/3 ml nebulizer, Take 3 mL by nebulization Every 4 (Four) Hours As Needed for Wheezing or Shortness of Air., Disp: 360 mL, Rfl: 11  •  lidocaine (XYLOCAINE) 5 % ointment, As Needed., Disp:  ", Rfl:   •  LORazepam (ATIVAN) 1 MG tablet, TK 1 T PO D PRN, Disp: , Rfl: 0  •  mupirocin (BACTROBAN) 2 % ointment, Apply  topically to the appropriate area as directed 3 (Three) Times a Day., Disp: 15 g, Rfl: 0  •  nicotine (NICODERM CQ) 7 MG/24HR patch, Place 1 patch on the skin as directed by provider Daily., Disp: 30 each, Rfl: 2  •  Omeprazole 20 MG tablet delayed-release, Take 40 mg by mouth Daily., Disp: 60 each, Rfl: 5  •  ondansetron (ZOFRAN) 4 MG tablet, Take 1 tablet by mouth Every 8 (Eight) Hours As Needed for Nausea or Vomiting., Disp: 8 tablet, Rfl: 0  •  PATADAY 0.2 % solution ophthalmic solution, USE 1 DROP AEY ONCE DAILY PRN, Disp: , Rfl: 3  •  saccharomyces boulardii (FLORASTOR) 250 MG capsule, Take 1 capsule by mouth 2 (Two) Times a Day., Disp: 14 capsule, Rfl: 0  •  sodium chloride (OCEAN NASAL SPRAY) 0.65 % nasal spray, 1 spray into the nostril(s) as directed by provider As Needed for Congestion., Disp: 30 mL, Rfl: 0  •  SUMAtriptan (IMITREX) 25 MG tablet, Take one tablet at onset of headache. May repeat dose one time in 2 hours if headache not relieved., Disp: 16 tablet, Rfl: 2  •  traMADol (ULTRAM) 50 MG tablet, Take 1 tablet by mouth Every 8 (Eight) Hours As Needed for Moderate Pain ., Disp: 60 tablet, Rfl: 0  •  traZODone (DESYREL) 150 MG tablet, Take 1 tablet by mouth As Needed., Disp: , Rfl: 3    VITALS:  /80   Pulse 119   Temp 99.7 °F (37.6 °C)   Ht 154.9 cm (60.98\")   Wt 84.4 kg (186 lb)   SpO2 99%   BMI 35.17 kg/m²     Physical Exam   Constitutional: She is oriented to person, place, and time. She appears well-developed and well-nourished. No distress.   Able to climb onto exam table without difficulty   HENT:   Head: Normocephalic and atraumatic.   Right Ear: Tympanic membrane, external ear and ear canal normal.   Left Ear: Tympanic membrane, external ear and ear canal normal.   Nose: Mucosal edema, rhinorrhea, sinus tenderness and congestion present. Right sinus exhibits " no maxillary sinus tenderness and no frontal sinus tenderness. Left sinus exhibits no maxillary sinus tenderness and no frontal sinus tenderness.   Mouth/Throat: Oropharynx is clear and moist. No oropharyngeal exudate.   Eyes: Pupils are equal, round, and reactive to light. Conjunctivae are normal. Right conjunctiva is not injected. Left conjunctiva is not injected.   Cardiovascular: Normal rate, regular rhythm and normal heart sounds.   Pulmonary/Chest: Effort normal and breath sounds normal. No respiratory distress.   Abdominal: Soft. Bowel sounds are normal. There is tenderness in the suprapubic area. There is no CVA tenderness.   Musculoskeletal:        Lumbar back: She exhibits normal range of motion, no tenderness, no bony tenderness and no spasm.   Negative straight leg raises.   Lymphadenopathy:        Head (right side): No submental, no submandibular and no tonsillar adenopathy present.        Head (left side): No submental, no submandibular and no tonsillar adenopathy present.     She has no cervical adenopathy.   Neurological: She is alert and oriented to person, place, and time. She has normal strength. No sensory deficit. Gait normal.   Skin: Skin is warm and dry. No rash noted.   Psychiatric: She has a normal mood and affect. Her behavior is normal.   Nursing note and vitals reviewed.      LABS  Results for orders placed or performed in visit on 01/16/20   POCT urinalysis dipstick, automated   Result Value Ref Range    Color Yellow Yellow, Straw, Dark Yellow, Janie    Clarity, UA Clear Clear    Specific Gravity  1.015 1.005 - 1.030    pH, Urine 7.5 5.0 - 8.0    Leukocytes Negative Negative    Nitrite, UA Negative Negative    Protein, POC Negative Negative mg/dL    Glucose, UA Negative Negative, 1000 mg/dL (3+) mg/dL    Ketones, UA Negative Negative    Urobilinogen, UA Normal Normal    Bilirubin Negative Negative    Blood, UA Negative Negative       ASSESSMENT/PLAN  Crystal was seen today for difficulty  urinating and sinusitis.    Diagnoses and all orders for this visit:    Dysuria  -     POCT urinalysis dipstick, automated  Negative UTI.  Pt feels like her dysuria is more related to vaginal concerns.  Vaginal discharge  Pt declined in office pelvic exam; will schedule an appt next to update pap and vaginal swabs.  To report fever >100 or inability to void.  Lumbar discogenic pain syndrome  -     traMADol (ULTRAM) 50 MG tablet; Take 1 tablet by mouth Every 8 (Eight) Hours As Needed for Moderate Pain .    High risk medication use  -     Urine Drug Screen - Urine, Clean Catch; Future  As part of patient's treatment plan I am prescribing a controlled substance.  The patient has been made aware of the appropriate use of such medications, including potential risk of somnolence, limited ability to drive and/or work safely, and potential for dependence and/or overdose.  It has also been made clear that these medications are for use by this patient only, without concomitant use of alcohol or other substances, unless prescribed.  The patient has read and signed the [facility] Controlled Substance Contract.  I will continue to see patient for regular follow up appointments.  They are well controlled on their medication.  JOSELYN is updated today every 3 months; appears appropriate for fill. The patient is aware of the potential for addiction and dependence.  Diagnoses: lumbar discogenic disorder.  JOSELYN report reviewed and scanned into chart.  Last JOSELYN date today [id number]  Last drug screen: updated today    Sinus congestion  No purulence on exam. With hx recurrent chronic sinusitis and recent sinus surgery. Has appt in couple weeks with ent. Will cont saline nasal spray and nasal steroid spray for now. To let me know if fever >100 or purulence that does not clear.       I discussed the patients findings and my recommendations with patient.  Patient was encouraged to keep me informed of any acute changes, lack of  improvement, or any new concerning symptoms.  Patient voiced understanding of all instructions and denied further questions.      FOLLOW-UP  Return in about 1 week (around 1/23/2020).  For pap and vag swab  Electronically signed by:    MAGGIE Chacon  01/16/2020

## 2020-01-22 ENCOUNTER — OFFICE VISIT (OUTPATIENT)
Dept: CARDIOLOGY | Facility: CLINIC | Age: 40
End: 2020-01-22

## 2020-01-22 VITALS
BODY MASS INDEX: 34.81 KG/M2 | WEIGHT: 184.4 LBS | SYSTOLIC BLOOD PRESSURE: 126 MMHG | HEIGHT: 61 IN | OXYGEN SATURATION: 96 % | HEART RATE: 140 BPM | DIASTOLIC BLOOD PRESSURE: 82 MMHG

## 2020-01-22 DIAGNOSIS — R00.2 PALPITATIONS: Primary | ICD-10-CM

## 2020-01-22 DIAGNOSIS — R00.0 INAPPROPRIATE SINUS TACHYCARDIA: ICD-10-CM

## 2020-01-22 PROBLEM — I47.11 INAPPROPRIATE SINUS TACHYCARDIA: Status: ACTIVE | Noted: 2020-01-22

## 2020-01-22 PROCEDURE — 93000 ELECTROCARDIOGRAM COMPLETE: CPT | Performed by: INTERNAL MEDICINE

## 2020-01-22 PROCEDURE — 99213 OFFICE O/P EST LOW 20 MIN: CPT | Performed by: INTERNAL MEDICINE

## 2020-01-22 RX ORDER — ESOMEPRAZOLE MAGNESIUM 40 MG/1
40 CAPSULE, DELAYED RELEASE ORAL DAILY
COMMUNITY
End: 2020-06-13

## 2020-01-22 NOTE — PROGRESS NOTES
[facility]  Office visit  Crystal A Day  1980    There is no work phone number on file.    VISIT DATE:  1/22/2020    PCP: Sugey Ovalles, APRN  [id number] [address]    CC:  Chief Complaint   Patient presents with   • Chest Pain   • Shortness of Breath       Previous cardiac studies and procedures:  March 2016 myocardial perfusion imaging: Normal EF, small region of inferior apical ischemia.    June 2016 cardiac catheterization: Normal coronaries.    November 2018   echo: Normal EF, mild MR, morphologically normal cardiac valves.  Normal diastolic function, normal pulmonary pressures.  Exercise treadmill test: Diminished exercise capacity, exercise for 4 minutes and 14 seconds, normal EKG.    July 2019 24-hour Holter monitor: Symptoms correlate with sinus rhythm    ASSESSMENT:   Diagnosis Plan   1. Palpitations     2. Inappropriate sinus tachycardia         PLAN:  Avoiding beta blockade due to questionable history of anaphylaxis with peanut allergy requiring her to have an EpiPen although she is never used it  Diltiazem immediate release 30 mg p.o. twice daily for symptom relief  Avoid dehydration regular exercise      Subjective  Interval assessment: Stable functional capacity.  Still having palpitations and racing heartbeats with activity and shortness breath going up flight of stairs.  She has had an unremarkable cardiac evaluation with normal myocardial structure and function and no clinically significant arrhythmia.  Previous negative ischemia evaluation.      Initial evaluation:39-year-old female with a previous history of palpitations, atypical chest pain and presyncope.  She does have a history of mental illness and reports that she does not remember things well and is a poor historian.  She does report having a previous heart catheterization and a relatively recent echo and stress test.  She had a more remote ambulatory ECG monitor.   "Currently reports intermittent exertion precordial chest discomfort which she describes as a tightness sensation.  Has not been exercising on a regular basis.  She is a social smoker.  Blood pressures run less than 130/80 mmHg.  Does report intermittent racing heartbeats with associated lightheadedness.  No obvious triggers.  Also with intermittent lightheadedness predominantly in the standing position.  I requested her previous cardiac evaluation to include cardiac catheterization, ambulatory ECG monitor, echo and stress test.    PHYSICAL EXAMINATION:  Vitals:    01/22/20 1456   BP: 126/82   BP Location: Right arm   Patient Position: Sitting   Pulse: (!) 140   SpO2: 96%   Weight: 83.6 kg (184 lb 6.4 oz)   Height: 154.9 cm (61\")     General Appearance:    Alert, cooperative, no distress, appears stated age   Head:    Normocephalic, without obvious abnormality, atraumatic   Eyes:    conjunctiva/corneas clear   Nose:   Nares normal, septum midline, mucosa normal, no drainage   Throat:   Lips, teeth and gums normal   Neck:   Supple, symmetrical, trachea midline, no carotid    bruit or JVD   Lungs:     Clear to auscultation bilaterally, respirations unlabored   Chest Wall:    No tenderness or deformity    Heart:   Tacky, S1 and S2 normal, no murmur, rub   or gallop, normal carotid impulse bilaterally without bruit.   Abdomen:     Soft, non-tender   Extremities:   Extremities normal, atraumatic, no cyanosis or edema   Pulses:   2+ and symmetric all extremities   Skin:   Skin color, texture, turgor normal, no rashes or lesions       Diagnostic Data:    ECG 12 Lead  Date/Time: 1/22/2020 3:10 PM  Performed by: Zia Meier III, MD  Authorized by: Zia Meier III, MD   Comparison: compared with previous ECG from 10/8/2019  Similar to previous ECG  Rhythm: sinus tachycardia  Q waves: III and aVF      Clinical impression: abnormal EKG          Lab Results   Component Value Date    CHLPL 218 (H) 05/29/2014    TRIG 135 " 05/29/2014    HDL 59 05/29/2014     Lab Results   Component Value Date    GLUCOSE 131 (H) 09/26/2019    BUN 11 09/26/2019    CREATININE 0.75 09/26/2019     09/26/2019    K 3.7 09/26/2019     09/26/2019    CO2 23.0 09/26/2019     Lab Results   Component Value Date    HGBA1C 5.20 02/27/2019     Lab Results   Component Value Date    WBC 7.90 09/26/2019    HGB 13.9 09/26/2019    HCT 44.1 09/26/2019     09/26/2019       Allergies  Allergies   Allergen Reactions   • Adhesive Tape Hives   • Latex Hives   • Sulfa Antibiotics Hives   • Sulfate Hives   • Biaxin [Clarithromycin] Nausea Only   • Codeine Itching   • Penicillins Nausea Only       Current Medications    Current Outpatient Medications:   •  albuterol 108 (90 Base) MCG/ACT inhaler, Inhale 2 puffs Every 4 (Four) Hours As Needed for Wheezing., Disp: 18 g, Rfl: 11  •  baclofen (LIORESAL) 10 MG tablet, TAKE 1 TABLET BY MOUTH THREE TIMES DAILY AS NEEDED FOR MUSCLE SPASMS, Disp: 90 tablet, Rfl: 0  •  benzonatate (TESSALON) 200 MG capsule, Take 1 capsule by mouth 3 (Three) Times a Day As Needed for Cough., Disp: 20 capsule, Rfl: 0  •  brompheniramine-pseudoephedrine-DM 30-2-10 MG/5ML syrup, Take 5 mL by mouth 4 (Four) Times a Day As Needed for Congestion or Cough., Disp: 118 mL, Rfl: 0  •  cetirizine (zyrTEC) 10 MG tablet, Take 1 tablet by mouth Daily., Disp: , Rfl: 1  •  DULoxetine (CYMBALTA) 30 MG capsule, Take 30 mg by mouth Daily., Disp: , Rfl:   •  EPIPEN 2-JAREN 0.3 MG/0.3ML solution auto-injector injection, U UTD, Disp: , Rfl: 6  •  esomeprazole (nexIUM) 40 MG capsule, Take 40 mg by mouth Daily., Disp: , Rfl:   •  estradiol (ESTRACE) 2 MG tablet, TAKE 1 TABLET BY MOUTH DAILY, Disp: 30 tablet, Rfl: 0  •  ipratropium-albuterol (DUO-NEB) 0.5-2.5 mg/3 ml nebulizer, Take 3 mL by nebulization Every 4 (Four) Hours As Needed for Wheezing or Shortness of Air., Disp: 360 mL, Rfl: 11  •  lidocaine (XYLOCAINE) 5 % ointment, As Needed., Disp: , Rfl:   •   LORazepam (ATIVAN) 1 MG tablet, TK 1 T PO D PRN, Disp: , Rfl: 0  •  Multiple Vitamin (MULTI-VITAMIN DAILY PO), Take  by mouth Daily., Disp: , Rfl:   •  nicotine (NICODERM CQ) 7 MG/24HR patch, Place 1 patch on the skin as directed by provider Daily. (Patient taking differently: Place 1 patch on the skin as directed by provider As Needed.), Disp: 30 each, Rfl: 2  •  PATADAY 0.2 % solution ophthalmic solution, USE 1 DROP AEY ONCE DAILY PRN, Disp: , Rfl: 3  •  saccharomyces boulardii (FLORASTOR) 250 MG capsule, Take 1 capsule by mouth 2 (Two) Times a Day., Disp: 14 capsule, Rfl: 0  •  sodium chloride (OCEAN NASAL SPRAY) 0.65 % nasal spray, 1 spray into the nostril(s) as directed by provider As Needed for Congestion., Disp: 30 mL, Rfl: 0  •  traMADol (ULTRAM) 50 MG tablet, Take 1 tablet by mouth Every 8 (Eight) Hours As Needed for Moderate Pain ., Disp: 60 tablet, Rfl: 0  •  traZODone (DESYREL) 150 MG tablet, Take 1 tablet by mouth As Needed., Disp: , Rfl: 3  •  dilTIAZem (CARDIZEM) 30 MG tablet, Take 1 tablet by mouth 2 (Two) Times a Day., Disp: 60 tablet, Rfl: 5          ROS  Review of Systems   Cardiovascular: Positive for chest pain, irregular heartbeat and palpitations.   Respiratory: Positive for shortness of breath.    Neurological: Positive for light-headedness.       SOCIAL HX  Social History     Socioeconomic History   • Marital status: Single     Spouse name: Not on file   • Number of children: Not on file   • Years of education: Not on file   • Highest education level: Not on file   Tobacco Use   • Smoking status: Current Every Day Smoker     Packs/day: 0.50     Types: Cigarettes     Start date: 6/11/2003   • Smokeless tobacco: Never Used   • Tobacco comment: nicotine patches for smoking cessation   Substance and Sexual Activity   • Alcohol use: No   • Drug use: No   • Sexual activity: Defer       FAMILY HX  Family History   Problem Relation Age of Onset   • Liver disease Mother    • Diabetes Mother    •  Hyperlipidemia Mother    • Hypertension Mother    • Thyroid disease Mother    • Arthritis Father    • Mental illness Father    • Migraines Sister    • Stroke Other    • Diabetes Other    • Hyperlipidemia Other    • Hypertension Other    • Mental illness Other    • Migraines Other    • Tuberculosis Other    • Breast cancer Neg Hx    • Endometrial cancer Neg Hx    • Ovarian cancer Neg Hx              Zia Meier III, MD, [facility]

## 2020-01-25 ENCOUNTER — OFFICE VISIT (OUTPATIENT)
Dept: INTERNAL MEDICINE | Facility: CLINIC | Age: 40
End: 2020-01-25

## 2020-01-25 VITALS
DIASTOLIC BLOOD PRESSURE: 80 MMHG | OXYGEN SATURATION: 99 % | HEART RATE: 113 BPM | WEIGHT: 187 LBS | HEIGHT: 61 IN | BODY MASS INDEX: 35.3 KG/M2 | SYSTOLIC BLOOD PRESSURE: 122 MMHG

## 2020-01-25 DIAGNOSIS — Z01.411 ENCOUNTER FOR GYNECOLOGICAL EXAMINATION WITH ABNORMAL FINDING: Primary | ICD-10-CM

## 2020-01-25 DIAGNOSIS — J32.0 CHRONIC MAXILLARY SINUSITIS: ICD-10-CM

## 2020-01-25 DIAGNOSIS — N89.8 VAGINAL DISCHARGE: ICD-10-CM

## 2020-01-25 DIAGNOSIS — N95.1 MENOPAUSAL STATE: ICD-10-CM

## 2020-01-25 PROCEDURE — 99214 OFFICE O/P EST MOD 30 MIN: CPT | Performed by: NURSE PRACTITIONER

## 2020-01-25 RX ORDER — CEFDINIR 300 MG/1
300 CAPSULE ORAL 2 TIMES DAILY
Qty: 14 CAPSULE | Refills: 0 | Status: SHIPPED | OUTPATIENT
Start: 2020-01-25 | End: 2020-02-01

## 2020-01-25 NOTE — PROGRESS NOTES
Chief Complaint   Patient presents with   • Vaginitis   • Gynecologic Exam       History of Present Illness  39 y.o.female presents for vaginitis and gyn exam.  Has been having dysuria and vaginitis with feeling of vaginal pressure onset couple weeks. Feels like something is prolapse.  Increased vaginal drainage at times copious amts.  Here for pelvic exam and pap of vaginal cuff. Hx of hysterectomy. UA negative last visit 1-16.  No breast complaints.   Need updated dexa for early onset menopause.    C/o sinusitis chronic recurrent onset months; positive nasal purulence with sinus pressure; worsening over last week. Had sinus surgery a few months ago. Has appt next week with ent. Has tried nose spray not helping. Low grade fever.    Review of Systems   Constitutional: Positive for fever. Negative for chills.   HENT: Positive for congestion, postnasal drip and sinus pressure.    Gastrointestinal: Negative for abdominal pain.   Genitourinary: Positive for dysuria, pelvic pressure, vaginal discharge and vaginal pain. Negative for breast discharge, breast lump, breast pain, difficulty urinating, flank pain, frequency, genital sores, hematuria, menstrual problem, pelvic pain, urgency and vaginal bleeding.         [facility]  The following portions of the patient's history were reviewed and updated as appropriate: allergies, current medications, past family history, past medical history, past social history, past surgical history and problem list.     Past Medical History:   Diagnosis Date   • Abdominal pain    • Abnormal breast exam    • Abnormal Pap smear of cervix    • Achilles tendinitis    • Acute sinusitis    • Anxiety    • Arthritis    • Asthma    • Tavera's syndrome    • Bipolar 1 disorder (CMS/HCC)    • Bowel trouble    • Breast cyst    • Colon polyps    • Constipation    • Depression    • Diverticulitis    • Diverticulitis of colon    • Endometriosis    • Eustachian tube dysfunction    • Extremity pain    • Fatty  liver    • Female pelvic pain    • Gastric ulcer    • H/O bladder infections    • History of rashes as a child    • Irritable bowel syndrome    • Low back pain    • Menopausal symptoms    • Muscle weakness    • Ovarian cyst    • PID (pelvic inflammatory disease)    • Recurrent UTI    • Sexual problems       Past Surgical History:   Procedure Laterality Date   •  SECTION     • CHOLECYSTECTOMY     • COLONOSCOPY  2017   • HYSTERECTOMY     • NASAL ENDOSCOPY     • OOPHORECTOMY Bilateral    • OTHER SURGICAL HISTORY      Laparoscopy (diagnostic) gynecologic with biopsy   • SHOULDER SURGERY     • UPPER GASTROINTESTINAL ENDOSCOPY  2019      Allergies   Allergen Reactions   • Adhesive Tape Hives   • Latex Hives   • Sulfa Antibiotics Hives   • Sulfate Hives   • Biaxin [Clarithromycin] Nausea Only   • Codeine Itching   • Penicillins Nausea Only      Social History     Socioeconomic History   • Marital status: Single     Spouse name: Not on file   • Number of children: Not on file   • Years of education: Not on file   • Highest education level: Not on file   Tobacco Use   • Smoking status: Current Every Day Smoker     Packs/day: 0.50     Types: Cigarettes     Start date: 2003   • Smokeless tobacco: Never Used   • Tobacco comment: nicotine patches for smoking cessation   Substance and Sexual Activity   • Alcohol use: No   • Drug use: No   • Sexual activity: Defer     Family History   Problem Relation Age of Onset   • Liver disease Mother    • Diabetes Mother    • Hyperlipidemia Mother    • Hypertension Mother    • Thyroid disease Mother    • Arthritis Father    • Mental illness Father    • Migraines Sister    • Stroke Other    • Diabetes Other    • Hyperlipidemia Other    • Hypertension Other    • Mental illness Other    • Migraines Other    • Tuberculosis Other    • Breast cancer Neg Hx    • Endometrial cancer Neg Hx    • Ovarian cancer Neg Hx             Current Outpatient Medications:   •  albuterol 108 (90  Base) MCG/ACT inhaler, Inhale 2 puffs Every 4 (Four) Hours As Needed for Wheezing., Disp: 18 g, Rfl: 11  •  baclofen (LIORESAL) 10 MG tablet, TAKE 1 TABLET BY MOUTH THREE TIMES DAILY AS NEEDED FOR MUSCLE SPASMS, Disp: 90 tablet, Rfl: 0  •  benzonatate (TESSALON) 200 MG capsule, Take 1 capsule by mouth 3 (Three) Times a Day As Needed for Cough., Disp: 20 capsule, Rfl: 0  •  brompheniramine-pseudoephedrine-DM 30-2-10 MG/5ML syrup, Take 5 mL by mouth 4 (Four) Times a Day As Needed for Congestion or Cough., Disp: 118 mL, Rfl: 0  •  cetirizine (zyrTEC) 10 MG tablet, Take 1 tablet by mouth Daily., Disp: , Rfl: 1  •  dilTIAZem (CARDIZEM) 30 MG tablet, Take 1 tablet by mouth 2 (Two) Times a Day., Disp: 60 tablet, Rfl: 5  •  DULoxetine (CYMBALTA) 30 MG capsule, Take 30 mg by mouth Daily., Disp: , Rfl:   •  EPIPEN 2-JAREN 0.3 MG/0.3ML solution auto-injector injection, U UTD, Disp: , Rfl: 6  •  esomeprazole (nexIUM) 40 MG capsule, Take 40 mg by mouth Daily., Disp: , Rfl:   •  estradiol (ESTRACE) 2 MG tablet, TAKE 1 TABLET BY MOUTH DAILY, Disp: 30 tablet, Rfl: 0  •  ipratropium-albuterol (DUO-NEB) 0.5-2.5 mg/3 ml nebulizer, Take 3 mL by nebulization Every 4 (Four) Hours As Needed for Wheezing or Shortness of Air., Disp: 360 mL, Rfl: 11  •  lidocaine (XYLOCAINE) 5 % ointment, As Needed., Disp: , Rfl:   •  LORazepam (ATIVAN) 1 MG tablet, TK 1 T PO D PRN, Disp: , Rfl: 0  •  Multiple Vitamin (MULTI-VITAMIN DAILY PO), Take  by mouth Daily., Disp: , Rfl:   •  nicotine (NICODERM CQ) 7 MG/24HR patch, Place 1 patch on the skin as directed by provider Daily. (Patient taking differently: Place 1 patch on the skin as directed by provider As Needed.), Disp: 30 each, Rfl: 2  •  PATADAY 0.2 % solution ophthalmic solution, USE 1 DROP AEY ONCE DAILY PRN, Disp: , Rfl: 3  •  saccharomyces boulardii (FLORASTOR) 250 MG capsule, Take 1 capsule by mouth 2 (Two) Times a Day., Disp: 14 capsule, Rfl: 0  •  sodium chloride (OCEAN NASAL SPRAY) 0.65 % nasal  "spray, 1 spray into the nostril(s) as directed by provider As Needed for Congestion., Disp: 30 mL, Rfl: 0  •  traMADol (ULTRAM) 50 MG tablet, Take 1 tablet by mouth Every 8 (Eight) Hours As Needed for Moderate Pain ., Disp: 60 tablet, Rfl: 0  •  traZODone (DESYREL) 150 MG tablet, Take 1 tablet by mouth As Needed., Disp: , Rfl: 3    VITALS:  /80   Pulse 113   Ht 154.9 cm (61\")   Wt 84.8 kg (187 lb)   SpO2 99%   BMI 35.33 kg/m²     Physical Exam   Constitutional: She appears well-developed and well-nourished. No distress.   Pulmonary/Chest: Effort normal. No respiratory distress.   Abdominal: Soft. There is no tenderness.   Genitourinary: Pelvic exam was performed with patient supine. There is no rash, tenderness, lesion, injury or Bartholin's cyst on the right labia. There is no rash, tenderness, lesion, injury or Bartholin's cyst on the left labia. Uterus is absent.   Cervix is absent. Right adnexum is absent.Left adnexum is absent.Vagina exhibits no lesion and no loss of rugae. No erythema, tenderness or bleeding in the vagina. No foreign body in the vagina. No signs of injury around the vagina. Vaginal discharge found. No cystocele or rectocele present.  Genitourinary Comments: Vaginal discharge thin milky drainage.   Neurological: She is alert.   Skin: Skin is warm and dry.   Vitals reviewed.      LABS  Results for orders placed or performed in visit on 01/16/20   POCT urinalysis dipstick, automated   Result Value Ref Range    Color Yellow Yellow, Straw, Dark Yellow, Janie    Clarity, UA Clear Clear    Specific Gravity  1.015 1.005 - 1.030    pH, Urine 7.5 5.0 - 8.0    Leukocytes Negative Negative    Nitrite, UA Negative Negative    Protein, POC Negative Negative mg/dL    Glucose, UA Negative Negative, 1000 mg/dL (3+) mg/dL    Ketones, UA Negative Negative    Urobilinogen, UA Normal Normal    Bilirubin Negative Negative    Blood, UA Negative Negative       ASSESSMENT/PLAN  Crystal was seen today for " vaginitis and gynecologic exam.    Diagnoses and all orders for this visit:    Encounter for gynecological examination with abnormal finding  -     Liquid-based Pap Smear, Screening; Future    Vaginal discharge  Comments:  vaginitis panel and std testing ordered on pap form  Will contact pt with results.  Menopausal state  -     DEXA Bone Density Axial; Future  Calcium 1200mg day and vit D 800 units.OTC    Chronic maxillary sinusitis  -     cefdinir (OMNICEF) 300 MG capsule; Take 1 capsule by mouth 2 (Two) Times a Day for 7 days.  Keep fu with ENT.      I discussed the patients findings and my recommendations with patient.  Patient was encouraged to keep me informed of any acute changes, lack of improvement, or any new concerning symptoms.  Patient voiced understanding of all instructions and denied further questions.      FOLLOW-UP  Return in about 6 months (around 7/25/2020), or if symptoms worsen or fail to improve, for Recheck.    Electronically signed by:    MAGGIE Chacon  01/25/2020    EMR Dragon/Transcription Disclaimer:  Much of this encounter note is an electronic transcription/translation of spoken language to printed text.  The electronic translation of spoken language may permit erroneous, or at times, nonsensical words or phrases to be inadvertently transcribed.  Although I have reviewed the note for such errors, some may still exist

## 2020-01-27 ENCOUNTER — TELEPHONE (OUTPATIENT)
Dept: INTERNAL MEDICINE | Facility: CLINIC | Age: 40
End: 2020-01-27

## 2020-01-27 NOTE — TELEPHONE ENCOUNTER
Called pt to see what she was needing,  She was wondering if you were going to call her in something for the BV, And if you do she normally gets yeast infections and would like a pill for that as well .

## 2020-01-27 NOTE — TELEPHONE ENCOUNTER
Patient is calling about an antibiotic medication that she thought would have been called in for her diagnosis.  Patient received test results on Saturday.  Patient confirmed using [employer] on [facility].  Patient can be reached at [phone]   Patient wants to discuss further with nurse, please call back.

## 2020-01-27 NOTE — TELEPHONE ENCOUNTER
Let her know I am waiting on the culture swab to come back with results that will confirm for sure if BV or not.  I will send rx if is positive.

## 2020-01-29 ENCOUNTER — TELEPHONE (OUTPATIENT)
Dept: INTERNAL MEDICINE | Facility: CLINIC | Age: 40
End: 2020-01-29

## 2020-01-29 ENCOUNTER — APPOINTMENT (OUTPATIENT)
Dept: MAMMOGRAPHY | Facility: HOSPITAL | Age: 40
End: 2020-01-29

## 2020-01-29 NOTE — TELEPHONE ENCOUNTER
Patient called stating she would like to let char nurse her symptoms are getting worse and still running a fever and would like a call to tell the nurse more. Please advise  [phone]

## 2020-01-29 NOTE — TELEPHONE ENCOUNTER
Called pt and she wanted me to let you know that she is very itchy and there is a lot of discharge, she wanted to know if you could send her in something for yeast.    Also she states her temp is staying in the high 99.0 and 100.3

## 2020-01-30 ENCOUNTER — TELEPHONE (OUTPATIENT)
Dept: INTERNAL MEDICINE | Facility: CLINIC | Age: 40
End: 2020-01-30

## 2020-01-30 NOTE — TELEPHONE ENCOUNTER
"PT called, states she was just calling to see if her recent test results are in. Asked PT to elaborate, states she'd rather not discuss what test results she's referring to, but that the test was conducted \"some time last week.\"       Please call Crystal back: [phone]    "

## 2020-01-31 ENCOUNTER — TELEPHONE (OUTPATIENT)
Dept: INTERNAL MEDICINE | Facility: CLINIC | Age: 40
End: 2020-01-31

## 2020-01-31 ENCOUNTER — APPOINTMENT (OUTPATIENT)
Dept: BONE DENSITY | Facility: HOSPITAL | Age: 40
End: 2020-01-31

## 2020-01-31 RX ORDER — FLUCONAZOLE 150 MG/1
150 TABLET ORAL ONCE
Qty: 1 TABLET | Refills: 0 | Status: SHIPPED | OUTPATIENT
Start: 2020-01-31 | End: 2020-01-31

## 2020-01-31 NOTE — TELEPHONE ENCOUNTER
PT notified stated that she does have an apt scheduled with ENT for FU, Notified pt that we would contact her once pap results are received. PT voiced understanding

## 2020-01-31 NOTE — TELEPHONE ENCOUNTER
I sent diflucan rx.  Once I get her vaginal swab back, will see if was any BV and if so will send rx.  Fever; she needs to schedule an appt with ENT for follow up after her sinus surger..  Not good for her to be on abx all the time for recurrent sinus infection and does not seem to be helping much either.

## 2020-01-31 NOTE — TELEPHONE ENCOUNTER
Pt states she is still running fever of up 99 to 100.7 and has bad fatigue, she wanted to let you know

## 2020-02-01 RX ORDER — METRONIDAZOLE 7.5 MG/G
GEL VAGINAL 2 TIMES DAILY
Qty: 70 G | Refills: 0 | Status: SHIPPED | OUTPATIENT
Start: 2020-02-01 | End: 2020-02-06

## 2020-02-05 DIAGNOSIS — Z79.890 HORMONE REPLACEMENT THERAPY (HRT): ICD-10-CM

## 2020-02-05 DIAGNOSIS — M54.41 CHRONIC MIDLINE LOW BACK PAIN WITH BILATERAL SCIATICA: ICD-10-CM

## 2020-02-05 DIAGNOSIS — M47.816 SPONDYLOSIS OF LUMBAR REGION WITHOUT MYELOPATHY OR RADICULOPATHY: ICD-10-CM

## 2020-02-05 DIAGNOSIS — G89.29 CHRONIC MIDLINE LOW BACK PAIN WITH BILATERAL SCIATICA: ICD-10-CM

## 2020-02-05 DIAGNOSIS — M54.42 CHRONIC MIDLINE LOW BACK PAIN WITH BILATERAL SCIATICA: ICD-10-CM

## 2020-02-05 DIAGNOSIS — M51.26 HERNIATED LUMBAR INTERVERTEBRAL DISC: ICD-10-CM

## 2020-02-05 DIAGNOSIS — M51.26 LUMBAR DISCOGENIC PAIN SYNDROME: ICD-10-CM

## 2020-02-05 RX ORDER — ESTRADIOL 2 MG/1
2 TABLET ORAL DAILY
Qty: 30 TABLET | Refills: 0 | Status: SHIPPED | OUTPATIENT
Start: 2020-02-05 | End: 2020-05-08

## 2020-02-05 RX ORDER — BACLOFEN 10 MG/1
TABLET ORAL
Qty: 90 TABLET | Refills: 0 | Status: SHIPPED | OUTPATIENT
Start: 2020-02-05 | End: 2020-02-09 | Stop reason: DRUGHIGH

## 2020-02-07 ENCOUNTER — TELEPHONE (OUTPATIENT)
Dept: INTERNAL MEDICINE | Facility: CLINIC | Age: 40
End: 2020-02-07

## 2020-02-07 DIAGNOSIS — R10.2 PELVIC PRESSURE IN FEMALE: ICD-10-CM

## 2020-02-07 DIAGNOSIS — N89.8 VAGINAL DISCHARGE: Primary | ICD-10-CM

## 2020-02-07 NOTE — TELEPHONE ENCOUNTER
----- Message from MAGGIE Patel sent at 2/4/2020  3:46 PM EST -----  Let pt know pap showed no malignant cells.  Negative for yeast, BV, chlamydia, gonorrhea and trich.  She needs to see gyn with her vaginal drainage and pressure.  Does she have a gyn?

## 2020-02-09 ENCOUNTER — OFFICE VISIT (OUTPATIENT)
Dept: INTERNAL MEDICINE | Facility: CLINIC | Age: 40
End: 2020-02-09

## 2020-02-09 VITALS
OXYGEN SATURATION: 98 % | HEIGHT: 61 IN | BODY MASS INDEX: 35.3 KG/M2 | DIASTOLIC BLOOD PRESSURE: 80 MMHG | HEART RATE: 112 BPM | SYSTOLIC BLOOD PRESSURE: 128 MMHG | WEIGHT: 187 LBS

## 2020-02-09 DIAGNOSIS — M54.2 NECK PAIN: ICD-10-CM

## 2020-02-09 DIAGNOSIS — M54.50 ACUTE EXACERBATION OF CHRONIC LOW BACK PAIN: Primary | ICD-10-CM

## 2020-02-09 DIAGNOSIS — R15.9 INCONTINENCE OF FECES, UNSPECIFIED FECAL INCONTINENCE TYPE: ICD-10-CM

## 2020-02-09 DIAGNOSIS — G89.29 ACUTE EXACERBATION OF CHRONIC LOW BACK PAIN: Primary | ICD-10-CM

## 2020-02-09 DIAGNOSIS — M54.16 LUMBAR BACK PAIN WITH RADICULOPATHY AFFECTING LOWER EXTREMITY: ICD-10-CM

## 2020-02-09 PROCEDURE — 99214 OFFICE O/P EST MOD 30 MIN: CPT | Performed by: NURSE PRACTITIONER

## 2020-02-09 PROCEDURE — 96372 THER/PROPH/DIAG INJ SC/IM: CPT | Performed by: NURSE PRACTITIONER

## 2020-02-09 RX ORDER — METHYLPREDNISOLONE ACETATE 40 MG/ML
40 INJECTION, SUSPENSION INTRA-ARTICULAR; INTRALESIONAL; INTRAMUSCULAR; SOFT TISSUE ONCE
Status: COMPLETED | OUTPATIENT
Start: 2020-02-09 | End: 2020-02-09

## 2020-02-09 RX ORDER — KETOROLAC TROMETHAMINE 30 MG/ML
30 INJECTION, SOLUTION INTRAMUSCULAR; INTRAVENOUS ONCE
Status: COMPLETED | OUTPATIENT
Start: 2020-02-09 | End: 2020-02-09

## 2020-02-09 RX ORDER — CYCLOBENZAPRINE HCL 10 MG
10 TABLET ORAL 3 TIMES DAILY PRN
Qty: 30 TABLET | Refills: 0 | Status: SHIPPED | OUTPATIENT
Start: 2020-02-09 | End: 2020-05-08

## 2020-02-09 RX ADMIN — KETOROLAC TROMETHAMINE 30 MG: 30 INJECTION, SOLUTION INTRAMUSCULAR; INTRAVENOUS at 13:37

## 2020-02-09 RX ADMIN — METHYLPREDNISOLONE ACETATE 40 MG: 40 INJECTION, SUSPENSION INTRA-ARTICULAR; INTRALESIONAL; INTRAMUSCULAR; SOFT TISSUE at 13:38

## 2020-02-09 NOTE — PROGRESS NOTES
Chief Complaint   Patient presents with   • Neck Pain   • Back Pain       History of Present Illness  39 y.o.female presents for neck and back pain.    Back Pain   This is a chronic (History of spinal stenosis, herniated disc, and  bone spurs) problem. The current episode started more than 1 year ago (with exacerbation over last month no new injury). The problem occurs constantly. The problem has been gradually worsening since onset. The pain is present in the lumbar spine. The quality of the pain is described as aching, burning, shooting and stabbing. Radiates to: numbness and tingling radiating down both legs to her feet. The pain is at a severity of 9/10. The pain is severe. The pain is the same all the time. The symptoms are aggravated by position, sitting, standing and bending (walking, standing). Associated symptoms include bowel incontinence, headaches, leg pain, numbness, paresthesias, tingling and weakness. Pertinent negatives include no abdominal pain, bladder incontinence, chest pain, fever, pelvic pain or perianal numbness. (Bowel incontinence occurs when her low back pain gets really severe.) Risk factors include obesity and menopause. She has tried bed rest, muscle relaxant, chiropractic manipulation and analgesics (lying down takes the pressure off her back and the pain in mildly relieved) for the symptoms. The treatment provided mild relief.   Neck Pain    This is a chronic problem. The current episode started more than 1 year ago. The problem occurs constantly. The problem has been gradually worsening. The pain is associated with a sleep position. The pain is present in the right side, left side and midline. The quality of the pain is described as aching, burning and shooting. The pain is at a severity of 9/10. The pain is severe. The symptoms are aggravated by position and bending (activity, cleaning). The pain is same all the time. Associated symptoms include headaches, leg pain, numbness,  tingling and weakness. Pertinent negatives include no chest pain or fever. Associated symptoms comments: Left shoulder surgery done over 5 years ago not sure if having radiating sx from that.. She has tried muscle relaxants, oral narcotics and chiropractic manipulation for the symptoms. The treatment provided no relief.       Review of Systems   Constitutional: Negative for chills, fatigue and fever.   Eyes: Positive for blurred vision and visual disturbance. Negative for double vision.   Respiratory: Negative for chest tightness and shortness of breath.    Cardiovascular: Negative for chest pain.   Gastrointestinal: Positive for bowel incontinence. Negative for abdominal pain.   Genitourinary: Negative for pelvic pain and urinary incontinence.   Musculoskeletal: Positive for arthralgias, back pain, gait problem and neck pain.   Neurological: Positive for tingling, weakness, light-headedness, numbness, headache and paresthesias. Negative for dizziness.   Hematological: Does not bruise/bleed easily.         [facility]  The following portions of the patient's history were reviewed and updated as appropriate: allergies, current medications, past family history, past medical history, past social history, past surgical history and problem list.     Past Medical History:   Diagnosis Date   • Abdominal pain    • Abnormal breast exam    • Abnormal Pap smear of cervix    • Achilles tendinitis    • Acute sinusitis    • Anxiety    • Arthritis    • Asthma    • Tavera's syndrome    • Bipolar 1 disorder (CMS/HCC)    • Bowel trouble    • Breast cyst    • Colon polyps    • Constipation    • Depression    • Diverticulitis    • Diverticulitis of colon    • Endometriosis    • Eustachian tube dysfunction    • Extremity pain    • Fatty liver    • Female pelvic pain    • Gastric ulcer    • H/O bladder infections    • History of rashes as a child    • Irritable bowel syndrome    • Low back pain    • Menopausal symptoms    • Muscle weakness     • Ovarian cyst    • PID (pelvic inflammatory disease)    • Recurrent UTI    • Sexual problems       Past Surgical History:   Procedure Laterality Date   •  SECTION     • CHOLECYSTECTOMY     • COLONOSCOPY  2017   • HYSTERECTOMY     • NASAL ENDOSCOPY     • OOPHORECTOMY Bilateral    • OTHER SURGICAL HISTORY      Laparoscopy (diagnostic) gynecologic with biopsy   • SHOULDER SURGERY     • UPPER GASTROINTESTINAL ENDOSCOPY  2019      Allergies   Allergen Reactions   • Adhesive Tape Hives   • Latex Hives   • Sulfa Antibiotics Hives   • Sulfate Hives   • Biaxin [Clarithromycin] Nausea Only   • Codeine Itching   • Penicillins Nausea Only      Social History     Socioeconomic History   • Marital status: Single     Spouse name: Not on file   • Number of children: Not on file   • Years of education: Not on file   • Highest education level: Not on file   Tobacco Use   • Smoking status: Current Every Day Smoker     Packs/day: 0.50     Types: Cigarettes     Start date: 2003   • Smokeless tobacco: Never Used   • Tobacco comment: nicotine patches for smoking cessation   Substance and Sexual Activity   • Alcohol use: No   • Drug use: No   • Sexual activity: Defer     Family History   Problem Relation Age of Onset   • Liver disease Mother    • Diabetes Mother    • Hyperlipidemia Mother    • Hypertension Mother    • Thyroid disease Mother    • Arthritis Father    • Mental illness Father    • Migraines Sister    • Stroke Other    • Diabetes Other    • Hyperlipidemia Other    • Hypertension Other    • Mental illness Other    • Migraines Other    • Tuberculosis Other    • Breast cancer Neg Hx    • Endometrial cancer Neg Hx    • Ovarian cancer Neg Hx             Current Outpatient Medications:   •  albuterol 108 (90 Base) MCG/ACT inhaler, Inhale 2 puffs Every 4 (Four) Hours As Needed for Wheezing., Disp: 18 g, Rfl: 11  •  benzonatate (TESSALON) 200 MG capsule, Take 1 capsule by mouth 3 (Three) Times a Day As Needed for  Cough., Disp: 20 capsule, Rfl: 0  •  brompheniramine-pseudoephedrine-DM 30-2-10 MG/5ML syrup, Take 5 mL by mouth 4 (Four) Times a Day As Needed for Congestion or Cough., Disp: 118 mL, Rfl: 0  •  cetirizine (zyrTEC) 10 MG tablet, Take 1 tablet by mouth Daily., Disp: , Rfl: 1  •  dilTIAZem (CARDIZEM) 30 MG tablet, Take 1 tablet by mouth 2 (Two) Times a Day., Disp: 60 tablet, Rfl: 5  •  DULoxetine (CYMBALTA) 30 MG capsule, Take 30 mg by mouth Daily., Disp: , Rfl:   •  EPIPEN 2-JAREN 0.3 MG/0.3ML solution auto-injector injection, U UTD, Disp: , Rfl: 6  •  esomeprazole (nexIUM) 40 MG capsule, Take 40 mg by mouth Daily., Disp: , Rfl:   •  estradiol (ESTRACE) 2 MG tablet, TAKE 1 TABLET BY MOUTH DAILY, Disp: 30 tablet, Rfl: 0  •  ipratropium-albuterol (DUO-NEB) 0.5-2.5 mg/3 ml nebulizer, Take 3 mL by nebulization Every 4 (Four) Hours As Needed for Wheezing or Shortness of Air., Disp: 360 mL, Rfl: 11  •  lidocaine (XYLOCAINE) 5 % ointment, As Needed., Disp: , Rfl:   •  LORazepam (ATIVAN) 1 MG tablet, TK 1 T PO D PRN, Disp: , Rfl: 0  •  Multiple Vitamin (MULTI-VITAMIN DAILY PO), Take  by mouth Daily., Disp: , Rfl:   •  nicotine (NICODERM CQ) 7 MG/24HR patch, Place 1 patch on the skin as directed by provider Daily. (Patient taking differently: Place 1 patch on the skin as directed by provider As Needed.), Disp: 30 each, Rfl: 2  •  PATADAY 0.2 % solution ophthalmic solution, USE 1 DROP AEY ONCE DAILY PRN, Disp: , Rfl: 3  •  saccharomyces boulardii (FLORASTOR) 250 MG capsule, Take 1 capsule by mouth 2 (Two) Times a Day., Disp: 14 capsule, Rfl: 0  •  sodium chloride (OCEAN NASAL SPRAY) 0.65 % nasal spray, 1 spray into the nostril(s) as directed by provider As Needed for Congestion., Disp: 30 mL, Rfl: 0  •  traMADol (ULTRAM) 50 MG tablet, Take 1 tablet by mouth Every 8 (Eight) Hours As Needed for Moderate Pain ., Disp: 60 tablet, Rfl: 0  •  traZODone (DESYREL) 150 MG tablet, Take 1 tablet by mouth As Needed., Disp: , Rfl: 3  •   "cyclobenzaprine (FLEXERIL) 10 MG tablet, Take 1 tablet by mouth 3 (Three) Times a Day As Needed for Muscle Spasms., Disp: 30 tablet, Rfl: 0    Current Facility-Administered Medications:   •  ketorolac (TORADOL) injection 30 mg, 30 mg, Intramuscular, Once, Sugey Ovalles, APRN  •  methylPREDNISolone acetate (DEPO-medrol) injection 40 mg, 40 mg, Intramuscular, Once, Sugey Ovalles, APRN    VITALS:  /80   Pulse 112   Ht 154.9 cm (61\")   Wt 84.8 kg (187 lb)   SpO2 98%   BMI 35.33 kg/m²     Physical Exam   Constitutional: She is oriented to person, place, and time. She appears well-developed and well-nourished. No distress.   HENT:   Head: Normocephalic and atraumatic.   Neck: Neck supple. Muscular tenderness present. No spinous process tenderness present. No neck rigidity. Decreased range of motion present. No edema and no erythema present.   Mildly decreased ROM in neck   Cardiovascular: Normal rate, regular rhythm and normal heart sounds.   Pulmonary/Chest: Effort normal and breath sounds normal. No respiratory distress.   Musculoskeletal: She exhibits tenderness.        Lumbar back: She exhibits decreased range of motion, tenderness and pain. She exhibits no bony tenderness, no swelling and no spasm.   Bilateral straight leg raises worsen her low back pain.  Decreased lateral, extension, and flexion in lumbar region.    Neurological: She is alert and oriented to person, place, and time. She has normal strength. No sensory deficit. Gait normal.   Reflex Scores:       Tricep reflexes are 2+ on the right side and 2+ on the left side.       Bicep reflexes are 2+ on the right side and 2+ on the left side.       Patellar reflexes are 2+ on the right side and 1+ on the left side.       Achilles reflexes are 2+ on the right side and 2+ on the left side.  Decreased reflex left lower ext patellar   Psychiatric: She has a normal mood and affect. Her behavior is normal.   Nursing note and vitals " reviewed.      LABS  No new labs    ASSESSMENT/PLAN  Megan was seen today for neck pain and back pain.    Diagnoses and all orders for this visit:    Acute exacerbation of chronic low back pain  Comments:  With new bowel incontinence with low back pain need urgent MRI lumbar. cont stretching; heat/ice therapy. Rec no further chiropractic manipulation until MRI results. After MRI complete will need NS eval.  Orders:  -     Ambulatory Referral to Pain Management  -     MRI Lumbar Spine Without Contrast; Future  -     cyclobenzaprine (FLEXERIL) 10 MG tablet; Take 1 tablet by mouth 3 (Three) Times a Day As Needed for Muscle Spasms.  -     methylPREDNISolone acetate (DEPO-medrol) injection 40 mg  -     ketorolac (TORADOL) injection 30 mg    Lumbar back pain with radiculopathy affecting lower extremity  -     MRI Lumbar Spine Without Contrast; Future  Incontinence of feces  -     MRI Lumbar Spine Without Contrast; Future  Neck pain  -     Ambulatory Referral to Pain Management  -     cyclobenzaprine (FLEXERIL) 10 MG tablet; Take 1 tablet by mouth 3 (Three) Times a Day As Needed for Muscle Spasms.  -     methylPREDNISolone acetate (DEPO-medrol) injection 40 mg  -     ketorolac (TORADOL) injection 30 mg      If pt experiences bladder incontinence, saddle numbness or pain, or worsening bowel incontinence she is to go to ER urgently.  Pt verbalized understanding.    I discussed the patients findings and my recommendations with patient.  Patient was encouraged to keep me informed of any acute changes, lack of improvement, or any new concerning symptoms.  Patient voiced understanding of all instructions and denied further questions.      FOLLOW-UP  Return in about 1 week (around 2/16/2020), or if symptoms worsen or fail to improve.    Electronically signed by:    MAGGIE Chacon  02/09/2020    EMR Dragon/Transcription Disclaimer:  Much of this encounter note is an electronic transcription/translation of spoken  language to printed text.  The electronic translation of spoken language may permit erroneous, or at times, nonsensical words or phrases to be inadvertently transcribed.  Although I have reviewed the note for such errors, some may still exist

## 2020-02-10 ENCOUNTER — HOSPITAL ENCOUNTER (EMERGENCY)
Facility: HOSPITAL | Age: 40
Discharge: HOME | End: 2020-02-11
Attending: EMERGENCY MEDICINE | Admitting: EMERGENCY MEDICINE

## 2020-02-10 ENCOUNTER — APPOINTMENT (OUTPATIENT)
Dept: CT IMAGING | Facility: HOSPITAL | Age: 40
End: 2020-02-10

## 2020-02-10 VITALS
DIASTOLIC BLOOD PRESSURE: 90 MMHG | SYSTOLIC BLOOD PRESSURE: 143 MMHG | OXYGEN SATURATION: 99 % | HEART RATE: 126 BPM | RESPIRATION RATE: 16 BRPM | BODY MASS INDEX: 34.55 KG/M2 | HEIGHT: 61 IN | TEMPERATURE: 98.5 F | WEIGHT: 183 LBS

## 2020-02-10 DIAGNOSIS — R51.9 HEADACHE, UNSPECIFIED HEADACHE TYPE: Primary | ICD-10-CM

## 2020-02-10 DIAGNOSIS — S16.1XXA STRAIN OF NECK MUSCLE, INITIAL ENCOUNTER: ICD-10-CM

## 2020-02-10 LAB — CREAT BLDA-MCNC: 0.6 MG/DL (ref 0.6–1.3)

## 2020-02-10 PROCEDURE — 99283 EMERGENCY DEPT VISIT LOW MDM: CPT

## 2020-02-10 PROCEDURE — 0 IOPAMIDOL PER 1 ML: Performed by: EMERGENCY MEDICINE

## 2020-02-10 PROCEDURE — 25010000002 KETOROLAC TROMETHAMINE PER 15 MG: Performed by: EMERGENCY MEDICINE

## 2020-02-10 PROCEDURE — 82565 ASSAY OF CREATININE: CPT

## 2020-02-10 PROCEDURE — 25010000002 PROCHLORPERAZINE 10 MG/2ML SOLUTION: Performed by: EMERGENCY MEDICINE

## 2020-02-10 PROCEDURE — 70496 CT ANGIOGRAPHY HEAD: CPT

## 2020-02-10 PROCEDURE — 96374 THER/PROPH/DIAG INJ IV PUSH: CPT

## 2020-02-10 PROCEDURE — 96375 TX/PRO/DX INJ NEW DRUG ADDON: CPT

## 2020-02-10 PROCEDURE — 70498 CT ANGIOGRAPHY NECK: CPT

## 2020-02-10 PROCEDURE — 70450 CT HEAD/BRAIN W/O DYE: CPT

## 2020-02-10 RX ORDER — ACETAMINOPHEN 500 MG
1000 TABLET ORAL ONCE
Status: COMPLETED | OUTPATIENT
Start: 2020-02-10 | End: 2020-02-10

## 2020-02-10 RX ORDER — SODIUM CHLORIDE 0.9 % (FLUSH) 0.9 %
10 SYRINGE (ML) INJECTION AS NEEDED
Status: DISCONTINUED | OUTPATIENT
Start: 2020-02-10 | End: 2020-02-11 | Stop reason: HOSPADM

## 2020-02-10 RX ORDER — PROCHLORPERAZINE EDISYLATE 5 MG/ML
10 INJECTION INTRAMUSCULAR; INTRAVENOUS EVERY 6 HOURS PRN
Status: DISCONTINUED | OUTPATIENT
Start: 2020-02-10 | End: 2020-02-11 | Stop reason: HOSPADM

## 2020-02-10 RX ORDER — KETOROLAC TROMETHAMINE 15 MG/ML
15 INJECTION, SOLUTION INTRAMUSCULAR; INTRAVENOUS ONCE
Status: COMPLETED | OUTPATIENT
Start: 2020-02-10 | End: 2020-02-10

## 2020-02-10 RX ADMIN — PROCHLORPERAZINE EDISYLATE 10 MG: 5 INJECTION INTRAMUSCULAR; INTRAVENOUS at 21:59

## 2020-02-10 RX ADMIN — IOPAMIDOL 75 ML: 755 INJECTION, SOLUTION INTRAVENOUS at 22:46

## 2020-02-10 RX ADMIN — KETOROLAC TROMETHAMINE 15 MG: 15 INJECTION, SOLUTION INTRAMUSCULAR; INTRAVENOUS at 23:50

## 2020-02-10 RX ADMIN — SODIUM CHLORIDE 1000 ML: 9 INJECTION, SOLUTION INTRAVENOUS at 21:59

## 2020-02-10 RX ADMIN — ACETAMINOPHEN 1000 MG: 500 TABLET, FILM COATED ORAL at 21:59

## 2020-02-11 ENCOUNTER — EPISODE CHANGES (OUTPATIENT)
Dept: CASE MANAGEMENT | Facility: OTHER | Age: 40
End: 2020-02-11

## 2020-02-11 NOTE — ED PROVIDER NOTES
Subjective   39-year-old female presents for evaluation of left-sided headache and neck pain.  She states that she was having increased neck pain when compared to baseline (she suffers from chronic neck pain) earlier today and went to the chiropractor this afternoon.  Just after 1230 today, she had a chiropractic manipulation of her neck and since that time is been experiencing increased pain on the left side of her neck and the back of her head on the left side.  She tried taking NSAIDs and muscle relaxers at home without any significant relief.  No vision changes.  No weakness in her extremities.  No paresthesias.      History provided by:  Patient  Neck Pain   Pain location:  L side  Pain radiates to:  Head  Pain severity:  Severe  Pain is:  Unable to specify  Duration: 1 year, worse since 1230.  Timing:  Constant  Progression:  Worsening  Chronicity:  Chronic  Context comment:  Chiropractic manipulation  Relieved by:  Nothing  Worsened by:  Twisting  Ineffective treatments:  Muscle relaxants and NSAIDs  Associated symptoms: headaches        Review of Systems   Musculoskeletal: Positive for neck pain (left sided).   Neurological: Positive for headaches.   All other systems reviewed and are negative.      Past Medical History:   Diagnosis Date   • Abdominal pain    • Abnormal breast exam    • Abnormal Pap smear of cervix    • Achilles tendinitis    • Acute sinusitis    • Anxiety    • Arthritis    • Asthma    • Tavera's syndrome    • Bipolar 1 disorder (CMS/HCC)    • Bowel trouble    • Breast cyst    • Colon polyps    • Constipation    • Depression    • Diverticulitis    • Diverticulitis of colon    • Endometriosis    • Eustachian tube dysfunction    • Extremity pain    • Fatty liver    • Female pelvic pain    • Gastric ulcer    • H/O bladder infections    • History of rashes as a child    • Irritable bowel syndrome    • Low back pain    • Menopausal symptoms    • Muscle weakness    • Ovarian cyst    • PID (pelvic  inflammatory disease)    • Recurrent UTI    • Sexual problems        Allergies   Allergen Reactions   • Adhesive Tape Hives   • Latex Hives   • Sulfa Antibiotics Hives   • Sulfate Hives   • Biaxin [Clarithromycin] Nausea Only   • Codeine Itching   • Penicillins Nausea Only       Past Surgical History:   Procedure Laterality Date   •  SECTION     • CHOLECYSTECTOMY     • COLONOSCOPY     • HYSTERECTOMY     • NASAL ENDOSCOPY     • OOPHORECTOMY Bilateral    • OTHER SURGICAL HISTORY      Laparoscopy (diagnostic) gynecologic with biopsy   • SHOULDER SURGERY     • UPPER GASTROINTESTINAL ENDOSCOPY  2019       Family History   Problem Relation Age of Onset   • Liver disease Mother    • Diabetes Mother    • Hyperlipidemia Mother    • Hypertension Mother    • Thyroid disease Mother    • Arthritis Father    • Mental illness Father    • Migraines Sister    • Stroke Other    • Diabetes Other    • Hyperlipidemia Other    • Hypertension Other    • Mental illness Other    • Migraines Other    • Tuberculosis Other    • Breast cancer Neg Hx    • Endometrial cancer Neg Hx    • Ovarian cancer Neg Hx        Social History     Socioeconomic History   • Marital status: Single     Spouse name: Not on file   • Number of children: Not on file   • Years of education: Not on file   • Highest education level: Not on file   Tobacco Use   • Smoking status: Current Every Day Smoker     Packs/day: 0.50     Types: Cigarettes     Start date: 2003   • Smokeless tobacco: Never Used   • Tobacco comment: nicotine patches for smoking cessation   Substance and Sexual Activity   • Alcohol use: No   • Drug use: No   • Sexual activity: Defer         Objective   Physical Exam   Constitutional: She is oriented to person, place, and time. She appears well-developed and well-nourished.   Nontoxic-appearing female, appears mildly anxious   HENT:   Head: Normocephalic and atraumatic.   Mouth/Throat: Oropharynx is clear and moist.   Eyes: Pupils  are equal, round, and reactive to light. EOM are normal.   Neck: Normal range of motion. Neck supple.   No carotid bruits present   Cardiovascular: Regular rhythm, normal heart sounds and intact distal pulses. Exam reveals no gallop and no friction rub.   No murmur heard.  Tachycardic   Pulmonary/Chest: Effort normal and breath sounds normal. No respiratory distress. She has no wheezes. She has no rales.   Musculoskeletal: Normal range of motion.   Neurological: She is alert and oriented to person, place, and time. No cranial nerve deficit or sensory deficit. Coordination normal.   Skin: Skin is warm and dry. No rash noted. She is not diaphoretic. No erythema.   Psychiatric: She has a normal mood and affect. Judgment and thought content normal.   Nursing note and vitals reviewed.      Procedures         ED Course  ED Course as of Feb 11 0332   Mon Feb 10, 2020   2241 39-year-old female with a history of chronic neck pain presents for evaluation of worsening headache and left-sided neck pain following chiropractic manipulation this afternoon.  On arrival to the ED, patient nontoxic-appearing.  No bruits.  No neurological deficits appreciated.  We will obtain advanced imaging and will reassess following initial interventions.    [DD]   2330 Advanced imaging unremarkable.    [DD]   2339 At bedside re-evaluating the patient and updating her on labs/imaging, as well as, discharge and follow up instructions. -DMD    [NP]   2346 Upon reevaluation, patient much improved.  Headache and neck pain now tolerable.  Reassured and counseled regarding symptomatic management.  The patient will follow up with her primary care physician within the next week.  Agreeable with plan and given appropriate strict return precautions.    [DD]      ED Course User Index  [DD] Checo Escobar MD  [NP] Kathleen Medina     Recent Results (from the past 24 hour(s))   POC Creatinine    Collection Time: 02/10/20  9:37 PM   Result Value Ref Range  "   Creatinine 0.60 0.60 - 1.30 mg/dL     Note: In addition to lab results from this visit, the labs listed above may include labs taken at another facility or during a different encounter within the last 24 hours. Please correlate lab times with ED admission and discharge times for further clarification of the services performed during this visit.    CT Head Without Contrast   Final Result   Normal, negative unenhanced head CT.               Signer Name: Howard Asencio MD    Signed: 2/10/2020 11:11 PM    [facility]     [facility]      CT Angiogram Neck         CT Angiogram Head           Vitals:    02/10/20 2103   BP: 143/90   Pulse: (!) 126   Resp: 16   Temp: 98.5 °F (36.9 °C)   TempSrc: Oral   SpO2: 99%   Weight: 83 kg (183 lb)   Height: 154.9 cm (61\")     Medications   sodium chloride 0.9 % flush 10 mL (has no administration in time range)   prochlorperazine (COMPAZINE) injection 10 mg (10 mg Intravenous Given 2/10/20 2159)   ketorolac (TORADOL) injection 15 mg (has no administration in time range)   sodium chloride 0.9 % bolus 1,000 mL (0 mL Intravenous Stopped 2/10/20 2237)   acetaminophen (TYLENOL) tablet 1,000 mg (1,000 mg Oral Given 2/10/20 2159)   iopamidol (ISOVUE-370) 76 % injection 100 mL (75 mL Intravenous Given 2/10/20 2246)     ECG/EMG Results (last 24 hours)     ** No results found for the last 24 hours. **        No orders to display                                              Recent Results (from the past 24 hour(s))   POC Creatinine    Collection Time: 02/10/20  9:37 PM   Result Value Ref Range    Creatinine 0.60 0.60 - 1.30 mg/dL     Note: In addition to lab results from this visit, the labs listed above may include labs taken at another facility or during a different encounter within the last 24 hours. Please correlate lab times with ED admission and discharge times for further clarification of the services performed during this visit.    CT Head " "Without Contrast   Final Result   Normal, negative unenhanced head CT.               Signer Name: Howard Asencio MD    Signed: 2/10/2020 11:11 PM    [facility]     [facility]      CT Angiogram Neck         CT Angiogram Head           Vitals:    02/10/20 2103   BP: 143/90   Pulse: (!) 126   Resp: 16   Temp: 98.5 °F (36.9 °C)   TempSrc: Oral   SpO2: 99%   Weight: 83 kg (183 lb)   Height: 154.9 cm (61\")     Medications   sodium chloride 0.9 % bolus 1,000 mL (0 mL Intravenous Stopped 2/10/20 2237)   acetaminophen (TYLENOL) tablet 1,000 mg (1,000 mg Oral Given 2/10/20 2159)   iopamidol (ISOVUE-370) 76 % injection 100 mL (75 mL Intravenous Given 2/10/20 2246)   ketorolac (TORADOL) injection 15 mg (15 mg Intravenous Given by Other 2/10/20 5100)     ECG/EMG Results (last 24 hours)     ** No results found for the last 24 hours. **        No orders to display           MDM    Final diagnoses:   Headache, unspecified headache type   Strain of neck muscle, initial encounter       Documentation assistance provided by yrn Medina.  Information recorded by the yrn was done at my direction and has been verified and validated by me.              Kathleen Medina  02/10/20 2698       Checo Escobar MD  02/11/20 1220    "

## 2020-02-12 ENCOUNTER — PATIENT OUTREACH (OUTPATIENT)
Dept: CASE MANAGEMENT | Facility: OTHER | Age: 40
End: 2020-02-12

## 2020-02-12 NOTE — OUTREACH NOTE
Patient Outreach Note    RN-ACM outreach to patient (ARELI DHALIWAL).  Patient presented to ED at [facility] 02/10/20 with complaints of neck pain.  Patient was evaluated and discharged to home.  Discharge instructions reviewed with patient.  Patient reported continued symptoms and indicated a long history of neck and back pain.  Patient declined scheduling of f/u visit with PCP at this time.  Patient stated she had multiple appointments with specialty scheduled.  Patient was traveling in car at time of call and denied other needs/questions/concerns.  Contact information provided.  Patient stated awareness of [facility] services.      Margie Swann RN  Ambulatory     2/12/2020, 3:31 PM

## 2020-02-24 ENCOUNTER — OFFICE VISIT (OUTPATIENT)
Dept: INTERNAL MEDICINE | Facility: CLINIC | Age: 40
End: 2020-02-24

## 2020-02-24 VITALS
HEART RATE: 90 BPM | SYSTOLIC BLOOD PRESSURE: 126 MMHG | WEIGHT: 183.4 LBS | DIASTOLIC BLOOD PRESSURE: 82 MMHG | TEMPERATURE: 97.9 F | BODY MASS INDEX: 34.65 KG/M2 | OXYGEN SATURATION: 99 %

## 2020-02-24 DIAGNOSIS — J00 ACUTE NASOPHARYNGITIS: Primary | ICD-10-CM

## 2020-02-24 PROCEDURE — 99213 OFFICE O/P EST LOW 20 MIN: CPT | Performed by: NURSE PRACTITIONER

## 2020-02-24 RX ORDER — MONTELUKAST SODIUM 10 MG/1
TABLET ORAL DAILY
COMMUNITY
Start: 2020-02-20 | End: 2020-06-13

## 2020-02-24 RX ORDER — OLOPATADINE HYDROCHLORIDE 1 MG/ML
SOLUTION/ DROPS OPHTHALMIC
COMMUNITY
Start: 2020-02-20 | End: 2020-06-13

## 2020-02-24 RX ORDER — ESZOPICLONE 2 MG/1
2 TABLET, FILM COATED ORAL 2 TIMES DAILY
COMMUNITY
Start: 2020-02-11 | End: 2021-03-18

## 2020-02-24 RX ORDER — DUPILUMAB 300 MG/2ML
INJECTION, SOLUTION SUBCUTANEOUS
COMMUNITY
Start: 2020-02-21 | End: 2021-03-01

## 2020-02-24 RX ORDER — BECLOMETHASONE DIPROPIONATE HFA 80 UG/1
2 AEROSOL, METERED RESPIRATORY (INHALATION) 2 TIMES DAILY PRN
COMMUNITY
Start: 2020-02-20 | End: 2021-08-10 | Stop reason: SDUPTHER

## 2020-02-24 RX ORDER — FLUTICASONE PROPIONATE 93 UG/1
1 SPRAY, METERED NASAL DAILY PRN
COMMUNITY
Start: 2020-02-21 | End: 2021-09-16 | Stop reason: SDUPTHER

## 2020-02-24 RX ORDER — DEXTROMETHORPHAN HYDROBROMIDE AND PROMETHAZINE HYDROCHLORIDE 15; 6.25 MG/5ML; MG/5ML
5 SYRUP ORAL 4 TIMES DAILY PRN
Qty: 473 ML | Refills: 0 | Status: SHIPPED | OUTPATIENT
Start: 2020-02-24 | End: 2020-06-13

## 2020-02-24 NOTE — PATIENT INSTRUCTIONS
Upper Respiratory Infection, Adult  An upper respiratory infection (URI) is a common viral infection of the nose, throat, and upper air passages that lead to the lungs. The most common type of URI is the common cold. URIs usually get better on their own, without medical treatment.  What are the causes?  A URI is caused by a virus. You may catch a virus by:  · Breathing in droplets from an infected person's cough or sneeze.  · Touching something that has been exposed to the virus (contaminated) and then touching your mouth, nose, or eyes.  What increases the risk?  You are more likely to get a URI if:  · You are very young or very old.  · It is blanco or winter.  · You have close contact with others, such as at a , school, or health care facility.  · You smoke.  · You have long-term (chronic) heart or lung disease.  · You have a weakened disease-fighting (immune) system.  · You have nasal allergies or asthma.  · You are experiencing a lot of stress.  · You work in an area that has poor air circulation.  · You have poor nutrition.  What are the signs or symptoms?  A URI usually involves some of the following symptoms:  · Runny or stuffy (congested) nose.  · Sneezing.  · Cough.  · Sore throat.  · Headache.  · Fatigue.  · Fever.  · Loss of appetite.  · Pain in your forehead, behind your eyes, and over your cheekbones (sinus pain).  · Muscle aches.  · Redness or irritation of the eyes.  · Pressure in the ears or face.  How is this diagnosed?  This condition may be diagnosed based on your medical history and symptoms, and a physical exam. Your health care provider may use a cotton swab to take a mucus sample from your nose (nasal swab). This sample can be tested to determine what virus is causing the illness.  How is this treated?  URIs usually get better on their own within 7-10 days. You can take steps at home to relieve your symptoms. Medicines cannot cure URIs, but your health care provider may recommend  certain medicines to help relieve symptoms, such as:  · Over-the-counter cold medicines.  · Cough suppressants. Coughing is a type of defense against infection that helps to clear the respiratory system, so take these medicines only as recommended by your health care provider.  · Fever-reducing medicines.  Follow these instructions at home:  Activity  · Rest as needed.  · If you have a fever, stay home from work or school until your fever is gone or until your health care provider says you are no longer contagious. Your health care provider may have you wear a face mask to prevent your infection from spreading.  Relieving symptoms  · Gargle with a salt-water mixture 3-4 times a day or as needed. To make a salt-water mixture, completely dissolve ½-1 tsp of salt in 1 cup of warm water.  · Use a cool-mist humidifier to add moisture to the air. This can help you breathe more easily.  Eating and drinking    · Drink enough fluid to keep your urine pale yellow.  · Eat soups and other clear broths.  General instructions    · Take over-the-counter and prescription medicines only as told by your health care provider. These include cold medicines, fever reducers, and cough suppressants.  · Do not use any products that contain nicotine or tobacco, such as cigarettes and e-cigarettes. If you need help quitting, ask your health care provider.  · Stay away from secondhand smoke.  · Stay up to date on all immunizations, including the yearly (annual) flu vaccine.  · Keep all follow-up visits as told by your health care provider. This is important.  How to prevent the spread of infection to others    · URIs can be passed from person to person (are contagious). To prevent the infection from spreading:  ? Wash your hands often with soap and water. If soap and water are not available, use hand .  ? Avoid touching your mouth, face, eyes, or nose.  ? Cough or sneeze into a tissue or your sleeve or elbow instead of into your hand  or into the air.  Contact a health care provider if:  · You are getting worse instead of better.  · You have a fever or chills.  · Your mucus is brown or red.  · You have yellow or brown discharge coming from your nose.  · You have pain in your face, especially when you bend forward.  · You have swollen neck glands.  · You have pain while swallowing.  · You have white areas in the back of your throat.  Get help right away if:  · You have shortness of breath that gets worse.  · You have severe or persistent:  ? Headache.  ? Ear pain.  ? Sinus pain.  ? Chest pain.  · You have chronic lung disease along with any of the following:  ? Wheezing.  ? Prolonged cough.  ? Coughing up blood.  ? A change in your usual mucus.  · You have a stiff neck.  · You have changes in your:  ? Vision.  ? Hearing.  ? Thinking.  ? Mood.  Summary  · An upper respiratory infection (URI) is a common infection of the nose, throat, and upper air passages that lead to the lungs.  · A URI is caused by a virus.  · URIs usually get better on their own within 7-10 days.  · Medicines cannot cure URIs, but your health care provider may recommend certain medicines to help relieve symptoms.  This information is not intended to replace advice given to you by your health care provider. Make sure you discuss any questions you have with your health care provider.  Document Released: 06/13/2002 Document Revised: 12/26/2019 Document Reviewed: 08/03/2018  [employer] Interactive Patient Education © 2020 [employer]

## 2020-02-24 NOTE — PROGRESS NOTES
Chief Complaint   Patient presents with   • Fever   • Cough       History of Present Illness    Crystal A Day is a 39 y.o. female who presents today for URI.    URI    This is a new problem. Episode onset: 4 days ago. The problem has been gradually worsening. There has been no fever. Associated symptoms include chest pain, congestion, coughing, ear pain (right ear), headaches, a plugged ear sensation, rhinorrhea, a sore throat and wheezing. Pertinent negatives include no abdominal pain, diarrhea, dysuria, nausea, neck pain, rash, sinus pain, sneezing or vomiting. She has tried NSAIDs (inhalers, theraflu, nebulizer) for the symptoms. The treatment provided mild relief.       [facility]    The following portions of the patient's history were reviewed and updated as appropriate: allergies, current medications, past family history, past medical history, past social history, past surgical history and problem list.     Social History     Tobacco Use   • Smoking status: Current Every Day Smoker     Packs/day: 0.50     Types: Cigarettes     Start date: 6/11/2003   • Smokeless tobacco: Never Used   • Tobacco comment: nicotine patches for smoking cessation   Substance Use Topics   • Alcohol use: No       Past Medical History:   Diagnosis Date   • Abdominal pain    • Abnormal breast exam    • Abnormal Pap smear of cervix    • Achilles tendinitis    • Acute sinusitis    • Anxiety    • Arthritis    • Asthma    • Tavera's syndrome    • Bipolar 1 disorder (CMS/HCC)    • Bowel trouble    • Breast cyst    • Colon polyps    • Constipation    • Depression    • Diverticulitis    • Diverticulitis of colon    • Endometriosis    • Eustachian tube dysfunction    • Extremity pain    • Fatty liver    • Female pelvic pain    • Gastric ulcer    • H/O bladder infections    • History of rashes as a child    • Irritable bowel syndrome    • Low back pain    • Menopausal symptoms    • Muscle weakness    • Ovarian cyst    • PID (pelvic inflammatory  disease)    • Recurrent UTI    • Sexual problems        Past Surgical History:   Procedure Laterality Date   •  SECTION     • CHOLECYSTECTOMY     • COLONOSCOPY  2017   • HYSTERECTOMY     • NASAL ENDOSCOPY     • OOPHORECTOMY Bilateral    • OTHER SURGICAL HISTORY      Laparoscopy (diagnostic) gynecologic with biopsy   • SHOULDER SURGERY     • UPPER GASTROINTESTINAL ENDOSCOPY  2019       Family History   Problem Relation Age of Onset   • Liver disease Mother    • Diabetes Mother    • Hyperlipidemia Mother    • Hypertension Mother    • Thyroid disease Mother    • Arthritis Father    • Mental illness Father    • Migraines Sister    • Stroke Other    • Diabetes Other    • Hyperlipidemia Other    • Hypertension Other    • Mental illness Other    • Migraines Other    • Tuberculosis Other    • Breast cancer Neg Hx    • Endometrial cancer Neg Hx    • Ovarian cancer Neg Hx        Allergies   Allergen Reactions   • Adhesive Tape Hives   • Latex Hives   • Sulfa Antibiotics Hives   • Sulfate Hives   • Biaxin [Clarithromycin] Nausea Only   • Codeine Itching   • Penicillins Nausea Only         Current Outpatient Medications:   •  albuterol 108 (90 Base) MCG/ACT inhaler, Inhale 2 puffs Every 4 (Four) Hours As Needed for Wheezing., Disp: 18 g, Rfl: 11  •  benzonatate (TESSALON) 200 MG capsule, Take 1 capsule by mouth 3 (Three) Times a Day As Needed for Cough., Disp: 20 capsule, Rfl: 0  •  brompheniramine-pseudoephedrine-DM 30-2-10 MG/5ML syrup, Take 5 mL by mouth 4 (Four) Times a Day As Needed for Congestion or Cough., Disp: 118 mL, Rfl: 0  •  cetirizine (zyrTEC) 10 MG tablet, Take 1 tablet by mouth Daily., Disp: , Rfl: 1  •  cyclobenzaprine (FLEXERIL) 10 MG tablet, Take 1 tablet by mouth 3 (Three) Times a Day As Needed for Muscle Spasms., Disp: 30 tablet, Rfl: 0  •  dilTIAZem (CARDIZEM) 30 MG tablet, Take 1 tablet by mouth 2 (Two) Times a Day., Disp: 60 tablet, Rfl: 5  •  DULoxetine (CYMBALTA) 30 MG capsule, Take 30 mg by  mouth Daily., Disp: , Rfl:   •  DUPIXENT 300 MG/2ML solution prefilled syringe, , Disp: , Rfl:   •  EPIPEN 2-JAREN 0.3 MG/0.3ML solution auto-injector injection, U UTD, Disp: , Rfl: 6  •  esomeprazole (nexIUM) 20 MG capsule, TK 1 C PO QD, Disp: , Rfl:   •  esomeprazole (nexIUM) 40 MG capsule, Take 40 mg by mouth Daily., Disp: , Rfl:   •  estradiol (ESTRACE) 2 MG tablet, TAKE 1 TABLET BY MOUTH DAILY, Disp: 30 tablet, Rfl: 0  •  eszopiclone (LUNESTA) 2 MG tablet, TK 1 T PO QHS PRN SLEEP, Disp: , Rfl:   •  ipratropium-albuterol (DUO-NEB) 0.5-2.5 mg/3 ml nebulizer, Take 3 mL by nebulization Every 4 (Four) Hours As Needed for Wheezing or Shortness of Air., Disp: 360 mL, Rfl: 11  •  lidocaine (XYLOCAINE) 5 % ointment, As Needed., Disp: , Rfl:   •  LORazepam (ATIVAN) 1 MG tablet, TK 1 T PO D PRN, Disp: , Rfl: 0  •  montelukast (SINGULAIR) 10 MG tablet, Take  by mouth Daily., Disp: , Rfl:   •  Multiple Vitamin (MULTI-VITAMIN DAILY PO), Take  by mouth Daily., Disp: , Rfl:   •  nicotine (NICODERM CQ) 7 MG/24HR patch, Place 1 patch on the skin as directed by provider Daily. (Patient taking differently: Place 1 patch on the skin as directed by provider As Needed.), Disp: 30 each, Rfl: 2  •  olopatadine (PATANOL) 0.1 % ophthalmic solution, INSTILL 1 DROP INTO BOTH EYES BID, Disp: , Rfl:   •  PATADAY 0.2 % solution ophthalmic solution, USE 1 DROP AEY ONCE DAILY PRN, Disp: , Rfl: 3  •  promethazine-dextromethorphan (PROMETHAZINE-DM) 6.25-15 MG/5ML syrup, Take 5 mL by mouth 4 (Four) Times a Day As Needed for Cough., Disp: 473 mL, Rfl: 0  •  QVAR REDIHALER 80 MCG/ACT inhaler, INHALE 2 PUFFS BID, Disp: , Rfl:   •  saccharomyces boulardii (FLORASTOR) 250 MG capsule, Take 1 capsule by mouth 2 (Two) Times a Day., Disp: 14 capsule, Rfl: 0  •  sodium chloride (OCEAN NASAL SPRAY) 0.65 % nasal spray, 1 spray into the nostril(s) as directed by provider As Needed for Congestion., Disp: 30 mL, Rfl: 0  •  traMADol (ULTRAM) 50 MG tablet, Take 1  tablet by mouth Every 8 (Eight) Hours As Needed for Moderate Pain ., Disp: 60 tablet, Rfl: 0  •  traZODone (DESYREL) 150 MG tablet, Take 1 tablet by mouth As Needed., Disp: , Rfl: 3  •  XHANCE 93 MCG/ACT Exhaler Suspension, , Disp: , Rfl:     Review of Systems  Review of Systems   Constitutional: Positive for chills, diaphoresis and fatigue. Negative for appetite change and fever.   HENT: Positive for congestion, ear pain (right ear), rhinorrhea, sinus pressure and sore throat. Negative for postnasal drip, sinus pain and sneezing.    Eyes: Positive for discharge (watery). Negative for pain and visual disturbance.   Respiratory: Positive for cough and wheezing. Negative for chest tightness and shortness of breath.    Cardiovascular: Positive for chest pain. Negative for palpitations.   Gastrointestinal: Negative for abdominal pain, constipation, diarrhea, nausea and vomiting.   Genitourinary: Negative for dysuria.   Musculoskeletal: Positive for myalgias. Negative for arthralgias, joint swelling and neck pain.   Skin: Negative for color change and rash.   Neurological: Positive for headaches. Negative for dizziness, light-headedness and numbness.   Hematological: Negative for adenopathy. Does not bruise/bleed easily.   Psychiatric/Behavioral: Negative for confusion, dysphoric mood and sleep disturbance.       Vitals:  Vitals:    02/24/20 1808   BP: 126/82   Pulse: 90   Temp: 97.9 °F (36.6 °C)   SpO2: 99%   Weight: 83.2 kg (183 lb 6.4 oz)   PainSc: 0-No pain       Physical Exam  Physical Exam   Constitutional: She is oriented to person, place, and time. Vital signs are normal. She appears well-developed and well-nourished.   HENT:   Head: Normocephalic and atraumatic.   Right Ear: External ear and ear canal normal. Tympanic membrane is not scarred, not perforated, not erythematous, not retracted and not bulging. A middle ear effusion is present.   Left Ear: Tympanic membrane, external ear and ear canal normal.  Tympanic membrane is not scarred, not perforated, not erythematous, not retracted and not bulging.   Nose: Mucosal edema and congestion present. No rhinorrhea or nasal deformity. Right sinus exhibits maxillary sinus tenderness. Right sinus exhibits no frontal sinus tenderness. Left sinus exhibits maxillary sinus tenderness. Left sinus exhibits no frontal sinus tenderness.   Mouth/Throat: Uvula is midline, oropharynx is clear and moist and mucous membranes are normal.   Eyes: Pupils are equal, round, and reactive to light. Conjunctivae and EOM are normal.   Neck: Trachea normal, normal range of motion and phonation normal. Neck supple.   Cardiovascular: Normal rate, regular rhythm and normal heart sounds.   Pulmonary/Chest: Effort normal and breath sounds normal. No respiratory distress. She has no decreased breath sounds. She has no wheezes. She has no rhonchi. She has no rales.   Lymphadenopathy:        Head (right side): No submental, no submandibular, no tonsillar, no preauricular and no posterior auricular adenopathy present.        Head (left side): No submental, no submandibular, no tonsillar, no preauricular and no posterior auricular adenopathy present.     She has no cervical adenopathy.   Neurological: She is alert and oriented to person, place, and time.   Skin: Skin is warm, dry and intact. No rash noted.   Psychiatric: She has a normal mood and affect. Her behavior is normal.   Nursing note and vitals reviewed.      Labs  None this visit    Assessment/Plan    Megan was seen today for fever and cough.    Diagnoses and all orders for this visit:    Acute nasopharyngitis  -     promethazine-dextromethorphan (PROMETHAZINE-DM) 6.25-15 MG/5ML syrup; Take 5 mL by mouth 4 (Four) Times a Day As Needed for Cough.    Discussed Viral vs. Bacterial etiology. Signs and symptoms consistent with viral infection at this time. Advised in symptomatic relief with recommended OTC medications or prescribed medications this  visit. Encouraged rest and hydration. Salt water gargles/chloraseptic spray for sore throat prn. Tylenol/Ibuprofen prn for fevers, HA, body aches. Advised in hygiene measures and time frame of 10-14 days for resolution of symptoms. Follow-up with PCP for new, worsening, or persistent symptoms.    Plan of care reviewed with patient at conclusion of today's visit. Patient education was provided regarding diagnosis, management, and prescribed or recommended OTC medications. Patient was informed to notify office of any new, worsening, or persistent symptoms. Patient verbalized understanding and agreement with plan of care.     Follow-Up  Return if symptoms worsen or fail to improve.      Electronically Signed By:  MAGGIE Langston Dragon/Transcription Disclaimer:  Much of this encounter note is an electronic transcription/translation of spoken language to printed text.  The electronic translation of spoken language may permit erroneous, or at times, nonsensical words or phrases to be inadvertently transcribed.  Although I have reviewed the note for such errors, some may still exist.

## 2020-03-04 ENCOUNTER — TELEPHONE (OUTPATIENT)
Dept: INTERNAL MEDICINE | Facility: CLINIC | Age: 40
End: 2020-03-04

## 2020-03-04 DIAGNOSIS — G89.29 CHRONIC MIDLINE LOW BACK PAIN WITH BILATERAL SCIATICA: ICD-10-CM

## 2020-03-04 DIAGNOSIS — Z79.890 HORMONE REPLACEMENT THERAPY (HRT): ICD-10-CM

## 2020-03-04 DIAGNOSIS — M51.26 HERNIATED LUMBAR INTERVERTEBRAL DISC: ICD-10-CM

## 2020-03-04 DIAGNOSIS — M47.816 SPONDYLOSIS OF LUMBAR REGION WITHOUT MYELOPATHY OR RADICULOPATHY: ICD-10-CM

## 2020-03-04 DIAGNOSIS — M54.42 CHRONIC MIDLINE LOW BACK PAIN WITH BILATERAL SCIATICA: ICD-10-CM

## 2020-03-04 DIAGNOSIS — M54.41 CHRONIC MIDLINE LOW BACK PAIN WITH BILATERAL SCIATICA: ICD-10-CM

## 2020-03-04 DIAGNOSIS — M51.26 LUMBAR DISCOGENIC PAIN SYNDROME: ICD-10-CM

## 2020-03-05 ENCOUNTER — TELEPHONE (OUTPATIENT)
Dept: INTERNAL MEDICINE | Facility: CLINIC | Age: 40
End: 2020-03-05

## 2020-03-05 ENCOUNTER — LAB (OUTPATIENT)
Dept: LAB | Facility: HOSPITAL | Age: 40
End: 2020-03-05

## 2020-03-05 DIAGNOSIS — Z00.00 MEDICARE ANNUAL WELLNESS VISIT, SUBSEQUENT: ICD-10-CM

## 2020-03-05 LAB
ALBUMIN SERPL-MCNC: 4.3 G/DL (ref 3.5–5.2)
ALBUMIN/GLOB SERPL: 1.3 G/DL
ALP SERPL-CCNC: 74 U/L (ref 39–117)
ALT SERPL W P-5'-P-CCNC: 21 U/L (ref 1–33)
ANION GAP SERPL CALCULATED.3IONS-SCNC: 15.4 MMOL/L (ref 5–15)
AST SERPL-CCNC: 14 U/L (ref 1–32)
BASOPHILS # BLD AUTO: 0.03 10*3/MM3 (ref 0–0.2)
BASOPHILS NFR BLD AUTO: 0.3 % (ref 0–1.5)
BILIRUB SERPL-MCNC: 0.2 MG/DL (ref 0.2–1.2)
BUN BLD-MCNC: 14 MG/DL (ref 6–20)
BUN/CREAT SERPL: 17.9 (ref 7–25)
CALCIUM SPEC-SCNC: 9.9 MG/DL (ref 8.6–10.5)
CHLORIDE SERPL-SCNC: 98 MMOL/L (ref 98–107)
CHOLEST SERPL-MCNC: 252 MG/DL (ref 0–200)
CO2 SERPL-SCNC: 25.6 MMOL/L (ref 22–29)
CREAT BLD-MCNC: 0.78 MG/DL (ref 0.57–1)
DEPRECATED RDW RBC AUTO: 41.1 FL (ref 37–54)
EOSINOPHIL # BLD AUTO: 0.08 10*3/MM3 (ref 0–0.4)
EOSINOPHIL NFR BLD AUTO: 0.8 % (ref 0.3–6.2)
ERYTHROCYTE [DISTWIDTH] IN BLOOD BY AUTOMATED COUNT: 13 % (ref 12.3–15.4)
GFR SERPL CREATININE-BSD FRML MDRD: 83 ML/MIN/1.73
GLOBULIN UR ELPH-MCNC: 3.3 GM/DL
GLUCOSE BLD-MCNC: 166 MG/DL (ref 65–99)
HCT VFR BLD AUTO: 44.5 % (ref 34–46.6)
HDLC SERPL-MCNC: 56 MG/DL (ref 40–60)
HGB BLD-MCNC: 14.6 G/DL (ref 12–15.9)
IMM GRANULOCYTES # BLD AUTO: 0.05 10*3/MM3 (ref 0–0.05)
IMM GRANULOCYTES NFR BLD AUTO: 0.5 % (ref 0–0.5)
LDLC SERPL CALC-MCNC: 159 MG/DL (ref 0–100)
LDLC/HDLC SERPL: 2.84 {RATIO}
LYMPHOCYTES # BLD AUTO: 2.55 10*3/MM3 (ref 0.7–3.1)
LYMPHOCYTES NFR BLD AUTO: 25.7 % (ref 19.6–45.3)
MCH RBC QN AUTO: 28.8 PG (ref 26.6–33)
MCHC RBC AUTO-ENTMCNC: 32.8 G/DL (ref 31.5–35.7)
MCV RBC AUTO: 87.8 FL (ref 79–97)
MONOCYTES # BLD AUTO: 0.38 10*3/MM3 (ref 0.1–0.9)
MONOCYTES NFR BLD AUTO: 3.8 % (ref 5–12)
NEUTROPHILS # BLD AUTO: 6.84 10*3/MM3 (ref 1.7–7)
NEUTROPHILS NFR BLD AUTO: 68.9 % (ref 42.7–76)
NRBC BLD AUTO-RTO: 0 /100 WBC (ref 0–0.2)
PLATELET # BLD AUTO: 364 10*3/MM3 (ref 140–450)
PMV BLD AUTO: 10.8 FL (ref 6–12)
POTASSIUM BLD-SCNC: 4 MMOL/L (ref 3.5–5.2)
PROT SERPL-MCNC: 7.6 G/DL (ref 6–8.5)
RBC # BLD AUTO: 5.07 10*6/MM3 (ref 3.77–5.28)
SODIUM BLD-SCNC: 139 MMOL/L (ref 136–145)
TRIGL SERPL-MCNC: 186 MG/DL (ref 0–150)
TSH SERPL DL<=0.05 MIU/L-ACNC: 1.27 UIU/ML (ref 0.27–4.2)
VLDLC SERPL-MCNC: 37.2 MG/DL (ref 5–40)
WBC NRBC COR # BLD: 9.93 10*3/MM3 (ref 3.4–10.8)

## 2020-03-05 PROCEDURE — 80053 COMPREHEN METABOLIC PANEL: CPT | Performed by: NURSE PRACTITIONER

## 2020-03-05 PROCEDURE — 85025 COMPLETE CBC W/AUTO DIFF WBC: CPT | Performed by: NURSE PRACTITIONER

## 2020-03-05 PROCEDURE — 80061 LIPID PANEL: CPT | Performed by: NURSE PRACTITIONER

## 2020-03-05 PROCEDURE — 84443 ASSAY THYROID STIM HORMONE: CPT | Performed by: NURSE PRACTITIONER

## 2020-03-05 RX ORDER — BACLOFEN 10 MG/1
TABLET ORAL
Qty: 90 TABLET | Refills: 0 | OUTPATIENT
Start: 2020-03-05

## 2020-03-05 RX ORDER — ESTRADIOL 2 MG/1
2 TABLET ORAL DAILY
Qty: 30 TABLET | Refills: 0 | OUTPATIENT
Start: 2020-03-05

## 2020-03-10 ENCOUNTER — APPOINTMENT (OUTPATIENT)
Dept: LAB | Facility: HOSPITAL | Age: 40
End: 2020-03-10

## 2020-03-10 ENCOUNTER — OFFICE VISIT (OUTPATIENT)
Dept: INTERNAL MEDICINE | Facility: CLINIC | Age: 40
End: 2020-03-10

## 2020-03-10 VITALS
BODY MASS INDEX: 34.74 KG/M2 | TEMPERATURE: 99.1 F | HEART RATE: 112 BPM | OXYGEN SATURATION: 98 % | HEIGHT: 61 IN | RESPIRATION RATE: 16 BRPM | WEIGHT: 184 LBS

## 2020-03-10 DIAGNOSIS — J34.0 ABSCESS OF NASAL CAVITY: ICD-10-CM

## 2020-03-10 DIAGNOSIS — J01.40 ACUTE PANSINUSITIS, RECURRENCE NOT SPECIFIED: Primary | ICD-10-CM

## 2020-03-10 PROBLEM — A49.02 MRSA INFECTION: Status: ACTIVE | Noted: 2020-03-10

## 2020-03-10 PROCEDURE — 99213 OFFICE O/P EST LOW 20 MIN: CPT | Performed by: NURSE PRACTITIONER

## 2020-03-10 PROCEDURE — 87205 SMEAR GRAM STAIN: CPT | Performed by: NURSE PRACTITIONER

## 2020-03-10 PROCEDURE — 87070 CULTURE OTHR SPECIMN AEROBIC: CPT | Performed by: NURSE PRACTITIONER

## 2020-03-10 PROCEDURE — 87147 CULTURE TYPE IMMUNOLOGIC: CPT | Performed by: NURSE PRACTITIONER

## 2020-03-10 PROCEDURE — 87186 SC STD MICRODIL/AGAR DIL: CPT | Performed by: NURSE PRACTITIONER

## 2020-03-10 RX ORDER — FLUCONAZOLE 150 MG/1
150 TABLET ORAL ONCE
Qty: 2 TABLET | Refills: 0 | Status: SHIPPED | OUTPATIENT
Start: 2020-03-10 | End: 2020-03-10

## 2020-03-10 RX ORDER — DOXYCYCLINE 100 MG/1
100 CAPSULE ORAL 2 TIMES DAILY
Qty: 20 CAPSULE | Refills: 0 | Status: SHIPPED | OUTPATIENT
Start: 2020-03-10 | End: 2020-04-20

## 2020-03-10 NOTE — PROGRESS NOTES
"Megan Post is a 39 y.o. female who presents for sinus congestion and runny nose.    Chief Complaint   Patient presents with   • Fever     Pt still not feeling better since last visit, pt states feels like when she had MRSA and smells like \"dirty feet\"   • Fatigue       39-year-old female presents with complaints of sinus congestion, runny nose, and she feels like MRSA is recurring in her right nare.  She developed MRSA after a polypectomy in her sinus cavities while she was at [facility].  She reports that she has a foul smell in her nose and that it is similar to when she tested positive for MRSA and she would like to be swabbed.        Past Medical History:   Diagnosis Date   • Abdominal pain    • Abnormal breast exam    • Abnormal Pap smear of cervix    • Achilles tendinitis    • Acute sinusitis    • Anxiety    • Arthritis    • Asthma    • Tavera's syndrome    • Bipolar 1 disorder (CMS/HCC)    • Bowel trouble    • Breast cyst    • Colon polyps    • Constipation    • Depression    • Diverticulitis    • Diverticulitis of colon    • Endometriosis    • Eustachian tube dysfunction    • Extremity pain    • Fatty liver    • Female pelvic pain    • Gastric ulcer    • H/O bladder infections    • History of rashes as a child    • Irritable bowel syndrome    • Low back pain    • Menopausal symptoms    • Muscle weakness    • Ovarian cyst    • PID (pelvic inflammatory disease)    • Recurrent UTI    • Sexual problems        Past Surgical History:   Procedure Laterality Date   •  SECTION     • CHOLECYSTECTOMY     • COLONOSCOPY     • HYSTERECTOMY     • NASAL ENDOSCOPY     • OOPHORECTOMY Bilateral    • OTHER SURGICAL HISTORY      Laparoscopy (diagnostic) gynecologic with biopsy   • SHOULDER SURGERY     • UPPER GASTROINTESTINAL ENDOSCOPY  2019       Family History   Problem Relation Age of Onset   • Liver disease Mother    • Diabetes Mother    • Hyperlipidemia Mother    • Hypertension Mother    • Thyroid " "disease Mother    • Arthritis Father    • Mental illness Father    • Migraines Sister    • Stroke Other    • Diabetes Other    • Hyperlipidemia Other    • Hypertension Other    • Mental illness Other    • Migraines Other    • Tuberculosis Other    • Breast cancer Neg Hx    • Endometrial cancer Neg Hx    • Ovarian cancer Neg Hx        Social History     Socioeconomic History   • Marital status: Single     Spouse name: Not on file   • Number of children: Not on file   • Years of education: Not on file   • Highest education level: Not on file   Tobacco Use   • Smoking status: Current Every Day Smoker     Packs/day: 0.50     Types: Cigarettes     Start date: 6/11/2003   • Smokeless tobacco: Never Used   • Tobacco comment: nicotine patches for smoking cessation   Substance and Sexual Activity   • Alcohol use: No   • Drug use: No   • Sexual activity: Defer       Allergies   Allergen Reactions   • Adhesive Tape Hives   • Latex Hives   • Sulfa Antibiotics Hives   • Sulfate Hives   • Biaxin [Clarithromycin] Nausea Only   • Codeine Itching   • Penicillins Nausea Only       ROS    Review of Systems   Constitutional: Negative for chills and fever.   HENT: Positive for congestion, rhinorrhea and sinus pressure. Negative for sneezing and sore throat.    Respiratory: Negative for cough, chest tightness and wheezing.    Cardiovascular: Negative for chest pain.   Skin: Negative for rash.   Allergic/Immunologic: Negative for environmental allergies and immunocompromised state.   Hematological: Negative for adenopathy.   Psychiatric/Behavioral: The patient is not nervous/anxious.        Vitals:    03/10/20 1511   Pulse: 112   Resp: 16   Temp: 99.1 °F (37.3 °C)   SpO2: 98%   Weight: 83.5 kg (184 lb)   Height: 154.9 cm (61\")   PainSc:   2         Current Outpatient Medications:   •  albuterol 108 (90 Base) MCG/ACT inhaler, Inhale 2 puffs Every 4 (Four) Hours As Needed for Wheezing., Disp: 18 g, Rfl: 11  •  cetirizine (zyrTEC) 10 MG " tablet, Take 1 tablet by mouth Daily., Disp: , Rfl: 1  •  cyclobenzaprine (FLEXERIL) 10 MG tablet, Take 1 tablet by mouth 3 (Three) Times a Day As Needed for Muscle Spasms., Disp: 30 tablet, Rfl: 0  •  dilTIAZem (CARDIZEM) 30 MG tablet, Take 1 tablet by mouth 2 (Two) Times a Day., Disp: 60 tablet, Rfl: 5  •  DULoxetine (CYMBALTA) 30 MG capsule, Take 30 mg by mouth Daily., Disp: , Rfl:   •  DUPIXENT 300 MG/2ML solution prefilled syringe, , Disp: , Rfl:   •  EPIPEN 2-JAREN 0.3 MG/0.3ML solution auto-injector injection, U UTD, Disp: , Rfl: 6  •  esomeprazole (nexIUM) 40 MG capsule, Take 40 mg by mouth Daily., Disp: , Rfl:   •  estradiol (ESTRACE) 2 MG tablet, TAKE 1 TABLET BY MOUTH DAILY, Disp: 30 tablet, Rfl: 0  •  eszopiclone (LUNESTA) 2 MG tablet, TK 1 T PO QHS PRN SLEEP, Disp: , Rfl:   •  ipratropium-albuterol (DUO-NEB) 0.5-2.5 mg/3 ml nebulizer, Take 3 mL by nebulization Every 4 (Four) Hours As Needed for Wheezing or Shortness of Air., Disp: 360 mL, Rfl: 11  •  lidocaine (XYLOCAINE) 5 % ointment, As Needed., Disp: , Rfl:   •  LORazepam (ATIVAN) 1 MG tablet, TK 1 T PO D PRN, Disp: , Rfl: 0  •  montelukast (SINGULAIR) 10 MG tablet, Take  by mouth Daily., Disp: , Rfl:   •  Multiple Vitamin (MULTI-VITAMIN DAILY PO), Take  by mouth Daily., Disp: , Rfl:   •  nicotine (NICODERM CQ) 7 MG/24HR patch, Place 1 patch on the skin as directed by provider Daily. (Patient taking differently: Place 1 patch on the skin as directed by provider As Needed.), Disp: 30 each, Rfl: 2  •  olopatadine (PATANOL) 0.1 % ophthalmic solution, INSTILL 1 DROP INTO BOTH EYES BID, Disp: , Rfl:   •  PATADAY 0.2 % solution ophthalmic solution, USE 1 DROP AEY ONCE DAILY PRN, Disp: , Rfl: 3  •  QVAR REDIHALER 80 MCG/ACT inhaler, INHALE 2 PUFFS BID, Disp: , Rfl:   •  saccharomyces boulardii (FLORASTOR) 250 MG capsule, Take 1 capsule by mouth 2 (Two) Times a Day., Disp: 14 capsule, Rfl: 0  •  sodium chloride (OCEAN NASAL SPRAY) 0.65 % nasal spray, 1 spray  into the nostril(s) as directed by provider As Needed for Congestion., Disp: 30 mL, Rfl: 0  •  traMADol (ULTRAM) 50 MG tablet, Take 1 tablet by mouth Every 8 (Eight) Hours As Needed for Moderate Pain ., Disp: 60 tablet, Rfl: 0  •  traZODone (DESYREL) 150 MG tablet, Take 1 tablet by mouth As Needed., Disp: , Rfl: 3  •  XHANCE 93 MCG/ACT Exhaler Suspension, , Disp: , Rfl:   •  doxycycline (MONODOX) 100 MG capsule, Take 1 capsule by mouth 2 (Two) Times a Day., Disp: 20 capsule, Rfl: 0  •  fluconazole (DIFLUCAN) 150 MG tablet, Take 1 tablet by mouth 1 (One) Time for 1 dose. May repeat dose of one tablet in 3 days., Disp: 2 tablet, Rfl: 0  •  mupirocin (BACTROBAN) 2 % ointment, Apply  topically to the appropriate area as directed 3 (Three) Times a Day for 10 days., Disp: 15 g, Rfl: 1  •  promethazine-dextromethorphan (PROMETHAZINE-DM) 6.25-15 MG/5ML syrup, Take 5 mL by mouth 4 (Four) Times a Day As Needed for Cough., Disp: 473 mL, Rfl: 0    PE    Physical Exam   Constitutional: She is oriented to person, place, and time. She appears well-developed and well-nourished. No distress.   HENT:   Head: Normocephalic and atraumatic.   Nose: Mucosal edema, rhinorrhea, sinus tenderness and nasal deformity (Small nasal abscess in right nare) present. Right sinus exhibits maxillary sinus tenderness and frontal sinus tenderness. Left sinus exhibits maxillary sinus tenderness and frontal sinus tenderness.   Mouth/Throat: No oropharyngeal exudate.   Eyes: Pupils are equal, round, and reactive to light. Conjunctivae and EOM are normal.   Neck: Normal range of motion. Neck supple.   Cardiovascular: Normal rate, regular rhythm and normal heart sounds. Exam reveals no gallop and no friction rub.   No murmur heard.  Pulmonary/Chest: Effort normal and breath sounds normal.   Musculoskeletal: Normal range of motion.   Lymphadenopathy:     She has no cervical adenopathy.   Neurological: She is alert and oriented to person, place, and time.    Skin: Skin is warm and dry. Capillary refill takes less than 2 seconds. No rash noted. She is not diaphoretic.   Psychiatric: She has a normal mood and affect. Her behavior is normal. Judgment and thought content normal.   Nursing note and vitals reviewed.       A/P    Problem List Items Addressed This Visit     None      Visit Diagnoses     Acute pansinusitis, recurrence not specified    -  Primary    Take antibiotics as prescribed, Tylenol and ibuprofen as needed for fever or discomfort.    Relevant Medications    doxycycline (MONODOX) 100 MG capsule    fluconazole (DIFLUCAN) 150 MG tablet    Abscess of nasal cavity        Swab collected to test for MRSA recurrence.  Use Bactroban ointment as directed.    Relevant Medications    mupirocin (BACTROBAN) 2 % ointment    doxycycline (MONODOX) 100 MG capsule    Other Relevant Orders    Wound Culture - Wound, Naris, Right          Assessment      ICD-10-CM ICD-9-CM   1. Acute pansinusitis, recurrence not specified J01.40 461.8   2. Abscess of nasal cavity J34.0 478.19            Problems Addressed this Visit     None      Visit Diagnoses     Acute pansinusitis, recurrence not specified    -  Primary    Take antibiotics as prescribed, Tylenol and ibuprofen as needed for fever or discomfort.    Relevant Medications    doxycycline (MONODOX) 100 MG capsule    fluconazole (DIFLUCAN) 150 MG tablet    Abscess of nasal cavity        Swab collected to test for MRSA recurrence.  Use Bactroban ointment as directed.    Relevant Medications    mupirocin (BACTROBAN) 2 % ointment    doxycycline (MONODOX) 100 MG capsule    Other Relevant Orders    Wound Culture - Wound, Naris, Right          Plan    Orders Placed This Encounter   Procedures   • Wound Culture - Wound, Naris, Right     New Medications Ordered This Visit   Medications   • mupirocin (BACTROBAN) 2 % ointment     Sig: Apply  topically to the appropriate area as directed 3 (Three) Times a Day for 10 days.     Dispense:  15 g      Refill:  1   • doxycycline (MONODOX) 100 MG capsule     Sig: Take 1 capsule by mouth 2 (Two) Times a Day.     Dispense:  20 capsule     Refill:  0   • fluconazole (DIFLUCAN) 150 MG tablet     Sig: Take 1 tablet by mouth 1 (One) Time for 1 dose. May repeat dose of one tablet in 3 days.     Dispense:  2 tablet     Refill:  0                 Plan of care reviewed with patient at the conclusion of today's visit. Education was provided regarding diagnosis, management and any prescribed or recommended OTC medications.  Patient verbalizes understanding of and agreement with management plan.    Return if symptoms worsen or fail to improve.     MAGGIE Robertson

## 2020-03-13 LAB
BACTERIA SPEC AEROBE CULT: ABNORMAL
GRAM STN SPEC: ABNORMAL
GRAM STN SPEC: ABNORMAL

## 2020-03-20 ENCOUNTER — TELEPHONE (OUTPATIENT)
Dept: INTERNAL MEDICINE | Facility: CLINIC | Age: 40
End: 2020-03-20

## 2020-03-20 DIAGNOSIS — M51.26 LUMBAR DISCOGENIC PAIN SYNDROME: ICD-10-CM

## 2020-03-20 RX ORDER — TRAMADOL HYDROCHLORIDE 50 MG/1
50 TABLET ORAL EVERY 8 HOURS PRN
Qty: 60 TABLET | Refills: 0 | Status: SHIPPED | OUTPATIENT
Start: 2020-03-20 | End: 2020-06-13 | Stop reason: SDUPTHER

## 2020-03-20 NOTE — TELEPHONE ENCOUNTER
Pt called and requested a call back about her test results she just back, she is in an antibiotic for it but she does not think it is helping. Pt stated she had an appointment for today and had to cancel because she doesn't feel good and is very week. Please call pt back at [phone]

## 2020-03-20 NOTE — TELEPHONE ENCOUNTER
PT CALLED FOR REFILL FOR RX    traMADol (ULTRAM) 50 MG tablet.    CALL BACK: [phone]     [id number] - [place], [place] - [address] AT [facility] [address]

## 2020-03-28 DIAGNOSIS — G43.009 MIGRAINE WITHOUT AURA AND WITHOUT STATUS MIGRAINOSUS, NOT INTRACTABLE: ICD-10-CM

## 2020-03-30 RX ORDER — SUMATRIPTAN 25 MG/1
TABLET, FILM COATED ORAL
Qty: 16 TABLET | Refills: 2 | OUTPATIENT
Start: 2020-03-30

## 2020-04-08 ENCOUNTER — APPOINTMENT (OUTPATIENT)
Dept: MAMMOGRAPHY | Facility: HOSPITAL | Age: 40
End: 2020-04-08

## 2020-04-20 ENCOUNTER — APPOINTMENT (OUTPATIENT)
Dept: LAB | Facility: HOSPITAL | Age: 40
End: 2020-04-20

## 2020-04-20 ENCOUNTER — OFFICE VISIT (OUTPATIENT)
Dept: INTERNAL MEDICINE | Facility: CLINIC | Age: 40
End: 2020-04-20

## 2020-04-20 VITALS
DIASTOLIC BLOOD PRESSURE: 70 MMHG | SYSTOLIC BLOOD PRESSURE: 124 MMHG | WEIGHT: 184 LBS | BODY MASS INDEX: 34.74 KG/M2 | OXYGEN SATURATION: 98 % | TEMPERATURE: 98.8 F | HEIGHT: 61 IN | HEART RATE: 119 BPM

## 2020-04-20 DIAGNOSIS — Z11.59 ENCOUNTER FOR SCREENING FOR OTHER VIRAL DISEASES: ICD-10-CM

## 2020-04-20 DIAGNOSIS — Z11.3 ENCOUNTER FOR SCREENING FOR INFECTIONS WITH A PREDOMINANTLY SEXUAL MODE OF TRANSMISSION: ICD-10-CM

## 2020-04-20 DIAGNOSIS — N89.8 VAGINAL DISCHARGE: Primary | ICD-10-CM

## 2020-04-20 PROCEDURE — 86592 SYPHILIS TEST NON-TREP QUAL: CPT | Performed by: NURSE PRACTITIONER

## 2020-04-20 PROCEDURE — 87350 HEPATITIS BE AG IA: CPT | Performed by: NURSE PRACTITIONER

## 2020-04-20 PROCEDURE — 86707 HEPATITIS BE ANTIBODY: CPT | Performed by: NURSE PRACTITIONER

## 2020-04-20 PROCEDURE — G0432 EIA HIV-1/HIV-2 SCREEN: HCPCS | Performed by: NURSE PRACTITIONER

## 2020-04-20 PROCEDURE — 86695 HERPES SIMPLEX TYPE 1 TEST: CPT | Performed by: NURSE PRACTITIONER

## 2020-04-20 PROCEDURE — 86696 HERPES SIMPLEX TYPE 2 TEST: CPT | Performed by: NURSE PRACTITIONER

## 2020-04-20 PROCEDURE — G0499 HEPB SCREEN HIGH RISK INDIV: HCPCS | Performed by: NURSE PRACTITIONER

## 2020-04-20 PROCEDURE — 86803 HEPATITIS C AB TEST: CPT | Performed by: NURSE PRACTITIONER

## 2020-04-20 PROCEDURE — 86705 HEP B CORE ANTIBODY IGM: CPT | Performed by: NURSE PRACTITIONER

## 2020-04-20 PROCEDURE — 99214 OFFICE O/P EST MOD 30 MIN: CPT | Performed by: NURSE PRACTITIONER

## 2020-04-20 NOTE — PROGRESS NOTES
Chief Complaint   Patient presents with   • Exposure to STD       History of Present Illness    Crystal A Day is a 40 y.o. female who presents today for vaginal discharge and STD exposure.    Exposure to STD    The patient's primary symptoms include a discharge, genital itching and pelvic pain. The patient's pertinent negatives include no dysuria, genital lesions or genital rash. Primary symptoms comment: Reports significant other has been unfaithful. This is a new problem. Episode onset: 2 weeks ago. The problem has been unchanged. The vaginal discharge was clear and milky (denies malodor, color). Associate symptoms include a sore throat (secondary to seasonal allergies) and urinary frequency. Pertinent negatives include no abdominal pain, anorexia, diaphoresis, fever or genital odor. She has tried antifungals (Monistat, metronidazole (partial dose)) for the symptoms. The treatment provided no relief. Risk factors include history of STDs (History of BV, gonorrhea).   One sexual partner in the last year. She has not been using condoms. Has had a [facility]MAN.     [facility]    The following portions of the patient's history were reviewed and updated as appropriate: allergies, current medications, past family history, past medical history, past social history, past surgical history and problem list.     Social History     Tobacco Use   • Smoking status: Current Every Day Smoker     Packs/day: 0.50     Types: Cigarettes     Start date: 6/11/2003   • Smokeless tobacco: Never Used   • Tobacco comment: nicotine patches for smoking cessation   Substance Use Topics   • Alcohol use: No       Past Medical History:   Diagnosis Date   • Abdominal pain    • Abnormal breast exam    • Abnormal Pap smear of cervix    • Achilles tendinitis    • Acute sinusitis    • Anxiety    • Arthritis    • Asthma    • Tavera's syndrome    • Bipolar 1 disorder (CMS/HCC)    • Bowel trouble    • Breast cyst    • Colon polyps    • Constipation    • Depression     • Diverticulitis    • Diverticulitis of colon    • Endometriosis    • Eustachian tube dysfunction    • Extremity pain    • Fatty liver    • Female pelvic pain    • Fibromyalgia    • Gastric ulcer    • H/O bladder infections    • History of rashes as a child    • Irritable bowel syndrome    • Low back pain    • Lumbar radiculopathy    • Menopausal symptoms    • Muscle weakness    • Ovarian cyst    • PID (pelvic inflammatory disease)    • Recurrent UTI    • Sexual problems    • Spinal stenosis of lumbar region        Past Surgical History:   Procedure Laterality Date   •  SECTION     • CHOLECYSTECTOMY     • COLONOSCOPY     • HYSTERECTOMY     • NASAL ENDOSCOPY     • OOPHORECTOMY Bilateral    • OTHER SURGICAL HISTORY      Laparoscopy (diagnostic) gynecologic with biopsy   • SHOULDER SURGERY     • UPPER GASTROINTESTINAL ENDOSCOPY  2019       Family History   Problem Relation Age of Onset   • Liver disease Mother    • Diabetes Mother    • Hyperlipidemia Mother    • Hypertension Mother    • Thyroid disease Mother    • Arthritis Father    • Mental illness Father    • Migraines Sister    • Stroke Other    • Diabetes Other    • Hyperlipidemia Other    • Hypertension Other    • Mental illness Other    • Migraines Other    • Tuberculosis Other    • Breast cancer Neg Hx    • Endometrial cancer Neg Hx    • Ovarian cancer Neg Hx        Allergies   Allergen Reactions   • Adhesive Tape Hives   • Latex Hives   • Sulfa Antibiotics Hives   • Sulfate Hives   • Biaxin [Clarithromycin] Nausea Only   • Codeine Itching   • Penicillins Nausea Only         Current Outpatient Medications:   •  albuterol 108 (90 Base) MCG/ACT inhaler, Inhale 2 puffs Every 4 (Four) Hours As Needed for Wheezing., Disp: 18 g, Rfl: 11  •  cetirizine (zyrTEC) 10 MG tablet, Take 1 tablet by mouth Daily., Disp: , Rfl: 1  •  cyclobenzaprine (FLEXERIL) 10 MG tablet, Take 1 tablet by mouth 3 (Three) Times a Day As Needed for Muscle Spasms., Disp: 30  tablet, Rfl: 0  •  dilTIAZem (CARDIZEM) 30 MG tablet, Take 1 tablet by mouth 2 (Two) Times a Day., Disp: 60 tablet, Rfl: 5  •  DULoxetine (CYMBALTA) 30 MG capsule, Take 30 mg by mouth Daily., Disp: , Rfl:   •  DUPIXENT 300 MG/2ML solution prefilled syringe, , Disp: , Rfl:   •  EPIPEN 2-JAREN 0.3 MG/0.3ML solution auto-injector injection, U UTD, Disp: , Rfl: 6  •  esomeprazole (nexIUM) 40 MG capsule, Take 40 mg by mouth Daily., Disp: , Rfl:   •  estradiol (ESTRACE) 2 MG tablet, TAKE 1 TABLET BY MOUTH DAILY, Disp: 30 tablet, Rfl: 0  •  eszopiclone (LUNESTA) 2 MG tablet, TK 1 T PO QHS PRN SLEEP, Disp: , Rfl:   •  ipratropium-albuterol (DUO-NEB) 0.5-2.5 mg/3 ml nebulizer, Take 3 mL by nebulization Every 4 (Four) Hours As Needed for Wheezing or Shortness of Air., Disp: 360 mL, Rfl: 11  •  lidocaine (XYLOCAINE) 5 % ointment, As Needed., Disp: , Rfl:   •  LORazepam (ATIVAN) 1 MG tablet, TK 1 T PO D PRN, Disp: , Rfl: 0  •  montelukast (SINGULAIR) 10 MG tablet, Take  by mouth Daily., Disp: , Rfl:   •  Multiple Vitamin (MULTI-VITAMIN DAILY PO), Take  by mouth Daily., Disp: , Rfl:   •  nicotine (NICODERM CQ) 7 MG/24HR patch, Place 1 patch on the skin as directed by provider Daily. (Patient taking differently: Place 1 patch on the skin as directed by provider As Needed.), Disp: 30 each, Rfl: 2  •  olopatadine (PATANOL) 0.1 % ophthalmic solution, INSTILL 1 DROP INTO BOTH EYES BID, Disp: , Rfl:   •  PATADAY 0.2 % solution ophthalmic solution, USE 1 DROP AEY ONCE DAILY PRN, Disp: , Rfl: 3  •  promethazine-dextromethorphan (PROMETHAZINE-DM) 6.25-15 MG/5ML syrup, Take 5 mL by mouth 4 (Four) Times a Day As Needed for Cough., Disp: 473 mL, Rfl: 0  •  QVAR REDIHALER 80 MCG/ACT inhaler, INHALE 2 PUFFS BID, Disp: , Rfl:   •  saccharomyces boulardii (FLORASTOR) 250 MG capsule, Take 1 capsule by mouth 2 (Two) Times a Day., Disp: 14 capsule, Rfl: 0  •  sodium chloride (OCEAN NASAL SPRAY) 0.65 % nasal spray, 1 spray into the nostril(s) as  "directed by provider As Needed for Congestion., Disp: 30 mL, Rfl: 0  •  traMADol (ULTRAM) 50 MG tablet, Take 1 tablet by mouth Every 8 (Eight) Hours As Needed for Moderate Pain ., Disp: 60 tablet, Rfl: 0  •  traZODone (DESYREL) 150 MG tablet, Take 1 tablet by mouth As Needed., Disp: , Rfl: 3  •  XHANCE 93 MCG/ACT Exhaler Suspension, , Disp: , Rfl:     Review of Systems  Review of Systems   Constitutional: Negative for activity change, appetite change, chills, diaphoresis, fatigue, fever and unexpected weight change.   HENT: Positive for postnasal drip and sore throat (secondary to seasonal allergies).    Respiratory: Negative for cough, chest tightness and shortness of breath.    Cardiovascular: Negative for chest pain and palpitations.   Gastrointestinal: Negative for abdominal pain, anorexia, diarrhea, nausea and vomiting.   Genitourinary: Positive for frequency, pelvic pain and vaginal discharge. Negative for difficulty urinating, dysuria, enuresis, flank pain, genital sores, hematuria, urgency, vaginal bleeding and vaginal pain.   Musculoskeletal: Positive for myalgias.   Skin: Negative for color change, rash and wound.   Neurological: Positive for headaches.   Psychiatric/Behavioral: Negative for sleep disturbance.       Vitals:  Vitals:    04/20/20 1349   BP: 124/70   Pulse: 119   Temp: 98.8 °F (37.1 °C)   SpO2: 98%   Weight: 83.5 kg (184 lb)   Height: 154.9 cm (61\")   PainSc: 0-No pain       Physical Exam  Physical Exam   Constitutional: She appears well-developed and well-nourished.  Non-toxic appearance.   Eyes: Conjunctivae, EOM and lids are normal.   Cardiovascular: Regular rhythm and normal heart sounds. Tachycardia present.   Pulmonary/Chest: Effort normal. No respiratory distress. She has no decreased breath sounds. She has no wheezes. She has no rhonchi. She has no rales.   Genitourinary: There is no tenderness, lesion or injury on the right labia. There is no rash, tenderness, lesion or injury on the " left labia. Vagina exhibits no lesion. No erythema, tenderness or bleeding in the vagina. No foreign body in the vagina. No signs of injury around the vagina. Vaginal discharge (thin, milky) found.   Neurological: She is alert.   Skin: Skin is warm, dry and intact. No rash noted.   Psychiatric: She has a normal mood and affect. Her behavior is normal.   Vitals reviewed.      Labs  Labs per visit orders    Assessment/Plan    Crystal was seen today for exposure to std.    Diagnoses and all orders for this visit:    Vaginal discharge  -     HSV 1 & 2 IgM, Antibodies, Indirect  -     HSV 1 & 2 - Specific Antibody, IgG  -     HIV-1 / O / 2 Ag / Antibody 4th Generation  -     RPR  -     Hepatitis B Virus Profile  -     Hepatitis C Antibody  -     Liquid-based Pap Smear, Screening    Encounter for screening for other viral diseases   -     Hepatitis B Virus Profile    Encounter for screening for infections with a predominantly sexual mode of transmission  -     HSV 1 & 2 IgM, Antibodies, Indirect  -     HSV 1 & 2 - Specific Antibody, IgG  -     HIV-1 / O / 2 Ag / Antibody 4th Generation  -     RPR  -     Hepatitis B Virus Profile  -     Hepatitis C Antibody  -     Liquid-based Pap Smear, Screening    Labs today per orders, will notify of results when received and treat as necessary. Patient advised to abstain form intercourse until results received as well as during and for 1-2 weeks post treatment should it be necessary. Advised in safe sex practices and follow-up with PCP or gyn if worsening or no improvement in symptoms.     Plan of care reviewed with patient at conclusion of today's visit. Patient education was provided regarding diagnosis, management, and prescribed or recommended OTC medications. Patient was informed to notify office of any new, worsening, or persistent symptoms. Patient verbalized understanding and agreement with plan of care.     Follow-Up  Return if symptoms worsen or fail to  improve.      Electronically Signed By:  MAGGIE Langston/Transcription Disclaimer:  Please note that portions of this encounter note were completed using electronic transcription/translation of spoken language to printed text.  The electronic transcription/translation of spoken language may permit erroneous, or at times, nonsensical words or phrases to be inadvertently transcribed.  Although I have reviewed the note for such errors, some may still exist in this documentation.

## 2020-04-21 LAB
HCV AB SER DONR QL: NORMAL
HIV1+2 AB SER QL: NORMAL
RPR SER QL: NORMAL

## 2020-04-22 LAB
HBV CORE AB SER DONR QL IA: NEGATIVE
HBV CORE IGM SERPL QL IA: NEGATIVE
HBV E AB SERPL QL IA: NEGATIVE
HBV E AG SERPL QL IA: NEGATIVE
HBV SURFACE AB SER QL: REACTIVE
HBV SURFACE AG SERPL QL IA: NEGATIVE
HSV1 IGG SER IA-ACNC: <0.91 INDEX (ref 0–0.9)
HSV2 IGG SER IA-ACNC: 12.6 INDEX (ref 0–0.9)

## 2020-04-23 LAB
HSV1 IGM TITR SER IF: NORMAL TITER
HSV2 IGM TITR SER IF: NORMAL TITER

## 2020-04-27 ENCOUNTER — TELEPHONE (OUTPATIENT)
Dept: INTERNAL MEDICINE | Facility: CLINIC | Age: 40
End: 2020-04-27

## 2020-04-28 ENCOUNTER — TELEPHONE (OUTPATIENT)
Dept: INTERNAL MEDICINE | Facility: CLINIC | Age: 40
End: 2020-04-28

## 2020-04-28 ENCOUNTER — TELEPHONE (OUTPATIENT)
Dept: CARDIOLOGY | Facility: CLINIC | Age: 40
End: 2020-04-28

## 2020-04-28 RX ORDER — DILTIAZEM HYDROCHLORIDE 60 MG/1
60 TABLET, FILM COATED ORAL 2 TIMES DAILY
Qty: 60 TABLET | Refills: 3 | Status: SHIPPED | OUTPATIENT
Start: 2020-04-28 | End: 2020-11-09 | Stop reason: SDUPTHER

## 2020-04-28 NOTE — TELEPHONE ENCOUNTER
Taking Diltiazem 30 mg twice daily. Having palpitations and chest tightness at times with elevated HR.Heart rate 158-168 when walking. At rest @ 121 bpm. Been going on for one month. No b/p readings reported. Wants her medication increased. Please advise.

## 2020-04-29 ENCOUNTER — TELEPHONE (OUTPATIENT)
Dept: INTERNAL MEDICINE | Facility: CLINIC | Age: 40
End: 2020-04-29

## 2020-04-29 NOTE — TELEPHONE ENCOUNTER
----- Message from MAGGIE Langston sent at 4/29/2020  9:04 AM EDT -----      ----- Message -----  From: Francine Mccoy  Sent: 4/28/2020   1:43 PM EDT  To: MAGGIE Patel, MAGGIE Langston

## 2020-04-29 NOTE — PROGRESS NOTES
Please call patient and inform pap results received and reviewed, findings negative for chlamydia, gonorrhea, trichomonas, yeast, or bacterial vaginosis.

## 2020-05-07 DIAGNOSIS — M54.2 NECK PAIN: ICD-10-CM

## 2020-05-07 DIAGNOSIS — G89.29 ACUTE EXACERBATION OF CHRONIC LOW BACK PAIN: ICD-10-CM

## 2020-05-07 DIAGNOSIS — Z79.890 HORMONE REPLACEMENT THERAPY (HRT): ICD-10-CM

## 2020-05-07 DIAGNOSIS — M54.50 ACUTE EXACERBATION OF CHRONIC LOW BACK PAIN: ICD-10-CM

## 2020-05-08 RX ORDER — CYCLOBENZAPRINE HCL 10 MG
TABLET ORAL
Qty: 30 TABLET | Refills: 0 | Status: SHIPPED | OUTPATIENT
Start: 2020-05-08 | End: 2020-06-16 | Stop reason: SDUPTHER

## 2020-05-08 RX ORDER — ESTRADIOL 2 MG/1
2 TABLET ORAL DAILY
Qty: 30 TABLET | Refills: 0 | Status: SHIPPED | OUTPATIENT
Start: 2020-05-08 | End: 2020-06-08

## 2020-05-21 ENCOUNTER — TELEPHONE (OUTPATIENT)
Dept: INTERNAL MEDICINE | Facility: CLINIC | Age: 40
End: 2020-05-21

## 2020-05-21 NOTE — TELEPHONE ENCOUNTER
This was a vaginal panel only for vaginal discharge, non pap screening per visit diagnoses and orders.

## 2020-05-21 NOTE — TELEPHONE ENCOUNTER
Katey / [facility] Billing Dept. Called.  They are needing to know why the Vaginal Panel was run on this patient; there is no Pap.     Phone:  [phone]

## 2020-05-26 DIAGNOSIS — M54.42 CHRONIC MIDLINE LOW BACK PAIN WITH BILATERAL SCIATICA: ICD-10-CM

## 2020-05-26 DIAGNOSIS — M47.816 SPONDYLOSIS OF LUMBAR REGION WITHOUT MYELOPATHY OR RADICULOPATHY: ICD-10-CM

## 2020-05-26 DIAGNOSIS — G89.29 CHRONIC MIDLINE LOW BACK PAIN WITH BILATERAL SCIATICA: ICD-10-CM

## 2020-05-26 DIAGNOSIS — M51.26 LUMBAR DISCOGENIC PAIN SYNDROME: ICD-10-CM

## 2020-05-26 DIAGNOSIS — M54.41 CHRONIC MIDLINE LOW BACK PAIN WITH BILATERAL SCIATICA: ICD-10-CM

## 2020-05-26 DIAGNOSIS — M51.26 HERNIATED LUMBAR INTERVERTEBRAL DISC: ICD-10-CM

## 2020-05-27 ENCOUNTER — TELEPHONE (OUTPATIENT)
Dept: INTERNAL MEDICINE | Facility: CLINIC | Age: 40
End: 2020-05-27

## 2020-05-27 RX ORDER — TRAMADOL HYDROCHLORIDE 50 MG/1
TABLET ORAL
Qty: 60 TABLET | OUTPATIENT
Start: 2020-05-27

## 2020-05-27 RX ORDER — BACLOFEN 10 MG/1
TABLET ORAL
Qty: 90 TABLET | Refills: 0 | OUTPATIENT
Start: 2020-05-27

## 2020-05-27 NOTE — TELEPHONE ENCOUNTER
PT CALLED TO GET A REFILL ON TRAMADOL AND HER MUSCLE RELAXER; PT WAS ADVISED THAT SHE HAD TO MAKE AN APPT TO GET MEDICATION FILLED PER PAC

## 2020-05-28 ENCOUNTER — TELEPHONE (OUTPATIENT)
Dept: INTERNAL MEDICINE | Facility: CLINIC | Age: 40
End: 2020-05-28

## 2020-05-28 ENCOUNTER — APPOINTMENT (OUTPATIENT)
Dept: BONE DENSITY | Facility: HOSPITAL | Age: 40
End: 2020-05-28

## 2020-05-28 NOTE — TELEPHONE ENCOUNTER
LEFT MESSAGE FOR PT TO CALL THE OFFICE AS WE HAVE TO R/S APPT WITH [facility] FOR CONTROLED SUBSTANCES

## 2020-06-01 PROBLEM — Z01.419 WELL WOMAN EXAM: Status: ACTIVE | Noted: 2020-06-01

## 2020-06-07 DIAGNOSIS — Z79.890 HORMONE REPLACEMENT THERAPY (HRT): ICD-10-CM

## 2020-06-08 RX ORDER — ESTRADIOL 2 MG/1
2 TABLET ORAL DAILY
Qty: 30 TABLET | Refills: 0 | Status: SHIPPED | OUTPATIENT
Start: 2020-06-08 | End: 2020-07-07

## 2020-06-13 ENCOUNTER — OFFICE VISIT (OUTPATIENT)
Dept: INTERNAL MEDICINE | Facility: CLINIC | Age: 40
End: 2020-06-13

## 2020-06-13 VITALS
BODY MASS INDEX: 34.93 KG/M2 | OXYGEN SATURATION: 98 % | TEMPERATURE: 98.6 F | DIASTOLIC BLOOD PRESSURE: 70 MMHG | HEART RATE: 93 BPM | HEIGHT: 61 IN | SYSTOLIC BLOOD PRESSURE: 120 MMHG | WEIGHT: 185 LBS

## 2020-06-13 DIAGNOSIS — M51.26 LUMBAR DISCOGENIC PAIN SYNDROME: ICD-10-CM

## 2020-06-13 PROCEDURE — 99213 OFFICE O/P EST LOW 20 MIN: CPT | Performed by: NURSE PRACTITIONER

## 2020-06-13 RX ORDER — MULTIVITAMIN WITH IRON
TABLET ORAL
COMMUNITY
Start: 2020-04-17

## 2020-06-13 RX ORDER — TRAMADOL HYDROCHLORIDE 50 MG/1
50 TABLET ORAL EVERY 8 HOURS PRN
Qty: 60 TABLET | Refills: 0 | Status: SHIPPED | OUTPATIENT
Start: 2020-06-13 | End: 2020-07-17 | Stop reason: SDUPTHER

## 2020-06-13 RX ORDER — TOPIRAMATE 50 MG/1
50 TABLET, FILM COATED ORAL 2 TIMES DAILY
COMMUNITY
Start: 2020-06-08 | End: 2021-09-02 | Stop reason: DRUGHIGH

## 2020-06-13 RX ORDER — OMEPRAZOLE 40 MG/1
CAPSULE, DELAYED RELEASE ORAL
COMMUNITY
Start: 2020-06-01 | End: 2020-09-24

## 2020-06-13 RX ORDER — DULOXETIN HYDROCHLORIDE 60 MG/1
80 CAPSULE, DELAYED RELEASE ORAL
COMMUNITY
Start: 2020-04-27 | End: 2021-07-20

## 2020-06-13 RX ORDER — PREGABALIN 100 MG/1
100 CAPSULE ORAL 2 TIMES DAILY
COMMUNITY
Start: 2020-06-09 | End: 2021-07-20 | Stop reason: SDUPTHER

## 2020-06-13 NOTE — PROGRESS NOTES
Chief Complaint   Patient presents with   • Back Pain       History of Present Illness  40 y.o.female presents for back pain and medication refill.  Chronic onset years lumbar back pain with intermittent radiculopathy radiation into buttocks.  Pain is dull to sharp at times.  Takes Flexeril, Cymbalta, Lyrica and tramadol.  States that they are going to try some new injections next time.  She needs a refill of her tramadol today.      Review of Systems   Constitutional: Negative for chills and fever.   Musculoskeletal: Positive for arthralgias and back pain.         Psychiatric  The following portions of the patient's history were reviewed and updated as appropriate: allergies, current medications, past family history, past medical history, past social history, past surgical history and problem list.     Past Medical History:   Diagnosis Date   • Abdominal pain    • Abnormal breast exam    • Abnormal Pap smear of cervix    • Achilles tendinitis    • Acute sinusitis    • Anxiety    • Arthritis    • Asthma    • Tavera's syndrome    • Bipolar 1 disorder (CMS/HCC)    • Bowel trouble    • Breast cyst    • Colon polyps    • Constipation    • Depression    • Diverticulitis    • Diverticulitis of colon    • Endometriosis    • Eustachian tube dysfunction    • Extremity pain    • Fatty liver    • Female pelvic pain    • Fibromyalgia    • Gastric ulcer    • H/O bladder infections    • History of rashes as a child    • Irritable bowel syndrome    • Low back pain    • Lumbar radiculopathy    • Menopausal symptoms    • Muscle weakness    • Ovarian cyst    • PID (pelvic inflammatory disease)    • Recurrent UTI    • Sexual problems    • Spinal stenosis of lumbar region       Past Surgical History:   Procedure Laterality Date   •  SECTION     • CHOLECYSTECTOMY     • COLONOSCOPY  2017   • HYSTERECTOMY     • NASAL ENDOSCOPY     • OOPHORECTOMY Bilateral    • OTHER SURGICAL HISTORY      Laparoscopy (diagnostic) gynecologic with biopsy    • SHOULDER SURGERY     • UPPER GASTROINTESTINAL ENDOSCOPY  01/2019      Allergies   Allergen Reactions   • Adhesive Tape Hives   • Latex Hives   • Sulfa Antibiotics Hives   • Sulfate Hives   • Biaxin [Clarithromycin] Nausea Only   • Codeine Itching   • Penicillins Nausea Only      Social History     Socioeconomic History   • Marital status: Single     Spouse name: Not on file   • Number of children: Not on file   • Years of education: Not on file   • Highest education level: Not on file   Tobacco Use   • Smoking status: Current Every Day Smoker     Packs/day: 0.50     Types: Cigarettes     Start date: 6/11/2003   • Smokeless tobacco: Never Used   • Tobacco comment: nicotine patches for smoking cessation   Substance and Sexual Activity   • Alcohol use: No   • Drug use: No   • Sexual activity: Defer     Family History   Problem Relation Age of Onset   • Liver disease Mother    • Diabetes Mother    • Hyperlipidemia Mother    • Hypertension Mother    • Thyroid disease Mother    • Arthritis Father    • Mental illness Father    • Migraines Sister    • Stroke Other    • Diabetes Other    • Hyperlipidemia Other    • Hypertension Other    • Mental illness Other    • Migraines Other    • Tuberculosis Other    • Breast cancer Neg Hx    • Endometrial cancer Neg Hx    • Ovarian cancer Neg Hx             Current Outpatient Medications:   •  cetirizine (zyrTEC) 10 MG tablet, Take 1 tablet by mouth Daily., Disp: , Rfl: 1  •  cyclobenzaprine (FLEXERIL) 10 MG tablet, TAKE 1 TABLET BY MOUTH THREE TIMES DAILY AS NEEDED FOR MUSCLE SPASMS, Disp: 30 tablet, Rfl: 0  •  dilTIAZem (Cardizem) 60 MG tablet, Take 1 tablet by mouth 2 (Two) Times a Day., Disp: 60 tablet, Rfl: 3  •  DULoxetine (CYMBALTA) 30 MG capsule, Take 30 mg by mouth Daily., Disp: , Rfl:   •  DULoxetine (CYMBALTA) 60 MG capsule, TK 1 C PO QAM, Disp: , Rfl:   •  DUPIXENT 300 MG/2ML solution prefilled syringe, , Disp: , Rfl:   •  EPIPEN 2-JAREN 0.3 MG/0.3ML solution  "auto-injector injection, U UTD, Disp: , Rfl: 6  •  estradiol (ESTRACE) 2 MG tablet, TAKE 1 TABLET BY MOUTH DAILY, Disp: 30 tablet, Rfl: 0  •  eszopiclone (LUNESTA) 2 MG tablet, TK 1 T PO QHS PRN SLEEP, Disp: , Rfl:   •  LORazepam (ATIVAN) 1 MG tablet, TK 1 T PO D PRN, Disp: , Rfl: 0  •  Magnesium 250 MG tablet, TK 1-2 TS PO D FOR CONSTIPATION IF TAKING MIRALAX IS NOT EFFECTIVE, Disp: , Rfl:   •  Multiple Vitamin (MULTI-VITAMIN DAILY PO), Take  by mouth Daily., Disp: , Rfl:   •  nicotine (NICODERM CQ) 7 MG/24HR patch, Place 1 patch on the skin as directed by provider Daily. (Patient taking differently: Place 1 patch on the skin as directed by provider As Needed.), Disp: 30 each, Rfl: 2  •  omeprazole (priLOSEC) 40 MG capsule, TK 1 C PO BID 30 MINUTES B ANGELA AND SUPPER, Disp: , Rfl:   •  pregabalin (LYRICA) 100 MG capsule, TK 1 C PO BID UTD, Disp: , Rfl:   •  QVAR REDIHALER 80 MCG/ACT inhaler, INHALE 2 PUFFS BID, Disp: , Rfl:   •  saccharomyces boulardii (FLORASTOR) 250 MG capsule, Take 1 capsule by mouth 2 (Two) Times a Day., Disp: 14 capsule, Rfl: 0  •  sodium chloride (OCEAN NASAL SPRAY) 0.65 % nasal spray, 1 spray into the nostril(s) as directed by provider As Needed for Congestion., Disp: 30 mL, Rfl: 0  •  topiramate (TOPAMAX) 50 MG tablet, Take 50 mg by mouth 2 (Two) Times a Day., Disp: , Rfl:   •  traMADol (ULTRAM) 50 MG tablet, Take 1 tablet by mouth Every 8 (Eight) Hours As Needed for Moderate Pain ., Disp: 60 tablet, Rfl: 0  •  traZODone (DESYREL) 150 MG tablet, Take 1 tablet by mouth As Needed., Disp: , Rfl: 3  •  XHANCE 93 MCG/ACT Exhaler Suspension, , Disp: , Rfl:     VITALS:  /70   Pulse 93   Temp 98.6 °F (37 °C)   Ht 154.9 cm (61\")   Wt 83.9 kg (185 lb)   SpO2 98%   BMI 34.96 kg/m²     Physical Exam   Constitutional: She appears well-developed and well-nourished. No distress.   Musculoskeletal:        Lumbar back: She exhibits decreased range of motion and tenderness. She exhibits no bony " tenderness.   Neurological: She is alert.       LABS  No new labs      ASSESSMENT/PLAN  Megan was seen today for back pain.    Diagnoses and all orders for this visit:    Lumbar discogenic pain syndrome  -     traMADol (ULTRAM) 50 MG tablet; Take 1 tablet by mouth Every 8 (Eight) Hours As Needed for Moderate Pain .    As part of patient's treatment plan I am prescribing a controlled substance.  The patient has been made aware of the appropriate use of such medications, including potential risk of somnolence, limited ability to drive and/or work safely, and potential for dependence and/or overdose.  It has also been made clear that these medications are for use by this patient only, without concomitant use of alcohol or other substances, unless prescribed.  The patient has read and signed the [facility] Controlled Substance Contract.  I will continue to see patient for regular follow up appointments.  They are well controlled on their medication.  JOSELYN is updated today [id number] every 3 months; appears appropriate for fill. The patient is aware of the potential for addiction and dependence.      I discussed the patients findings and my recommendations with patient.  Patient was encouraged to keep me informed of any acute changes, lack of improvement, or any new concerning symptoms.  Patient voiced understanding of all instructions and denied further questions.      FOLLOW-UP  Return in about 3 months (around 9/13/2020), or if symptoms worsen or fail to improve.  Appointment next week for her annual wellness exam.    Electronically signed by:    MAGGIE Chacon  06/13/2020    EMR Dragon/Transcription Disclaimer:  Much of this encounter note is an electronic transcription/translation of spoken language to printed text.  The electronic translation of spoken language may permit erroneous, or at times, nonsensical words or phrases to be inadvertently transcribed.  Although I have reviewed the note for such  errors, some may still exist

## 2020-06-16 ENCOUNTER — OFFICE VISIT (OUTPATIENT)
Dept: INTERNAL MEDICINE | Facility: CLINIC | Age: 40
End: 2020-06-16

## 2020-06-16 VITALS
OXYGEN SATURATION: 99 % | SYSTOLIC BLOOD PRESSURE: 124 MMHG | WEIGHT: 183 LBS | HEART RATE: 78 BPM | TEMPERATURE: 98.4 F | HEIGHT: 61 IN | DIASTOLIC BLOOD PRESSURE: 80 MMHG | BODY MASS INDEX: 34.55 KG/M2

## 2020-06-16 DIAGNOSIS — G89.29 CHRONIC MIDLINE LOW BACK PAIN WITH BILATERAL SCIATICA: ICD-10-CM

## 2020-06-16 DIAGNOSIS — Z00.00 MEDICARE ANNUAL WELLNESS VISIT, SUBSEQUENT: Primary | ICD-10-CM

## 2020-06-16 DIAGNOSIS — R07.9 CHEST PAIN, UNSPECIFIED TYPE: ICD-10-CM

## 2020-06-16 DIAGNOSIS — M54.41 CHRONIC MIDLINE LOW BACK PAIN WITH BILATERAL SCIATICA: ICD-10-CM

## 2020-06-16 DIAGNOSIS — R21 RASH OF BACK: ICD-10-CM

## 2020-06-16 DIAGNOSIS — Z79.890 HORMONE REPLACEMENT THERAPY (HRT): ICD-10-CM

## 2020-06-16 DIAGNOSIS — Z23 NEED FOR TDAP VACCINATION: ICD-10-CM

## 2020-06-16 DIAGNOSIS — R73.03 PREDIABETES: ICD-10-CM

## 2020-06-16 DIAGNOSIS — M54.42 CHRONIC MIDLINE LOW BACK PAIN WITH BILATERAL SCIATICA: ICD-10-CM

## 2020-06-16 DIAGNOSIS — Z23 NEED FOR PNEUMOCOCCAL VACCINE: ICD-10-CM

## 2020-06-16 LAB — HBA1C MFR BLD: 5.9 %

## 2020-06-16 PROCEDURE — 90471 IMMUNIZATION ADMIN: CPT | Performed by: NURSE PRACTITIONER

## 2020-06-16 PROCEDURE — 83036 HEMOGLOBIN GLYCOSYLATED A1C: CPT | Performed by: NURSE PRACTITIONER

## 2020-06-16 PROCEDURE — G0439 PPPS, SUBSEQ VISIT: HCPCS | Performed by: NURSE PRACTITIONER

## 2020-06-16 PROCEDURE — 90715 TDAP VACCINE 7 YRS/> IM: CPT | Performed by: NURSE PRACTITIONER

## 2020-06-16 PROCEDURE — 93000 ELECTROCARDIOGRAM COMPLETE: CPT | Performed by: NURSE PRACTITIONER

## 2020-06-16 RX ORDER — TRIAMCINOLONE ACETONIDE 1 MG/G
CREAM TOPICAL 2 TIMES DAILY
Qty: 30 G | Refills: 0 | Status: SHIPPED | OUTPATIENT
Start: 2020-06-16 | End: 2020-06-23

## 2020-06-16 RX ORDER — CYCLOBENZAPRINE HCL 10 MG
10 TABLET ORAL 3 TIMES DAILY PRN
Qty: 30 TABLET | Refills: 5 | Status: SHIPPED | OUTPATIENT
Start: 2020-06-16 | End: 2021-01-05

## 2020-06-17 ENCOUNTER — HOSPITAL ENCOUNTER (OUTPATIENT)
Dept: MAMMOGRAPHY | Facility: HOSPITAL | Age: 40
End: 2020-06-17

## 2020-07-01 ENCOUNTER — HOSPITAL ENCOUNTER (OUTPATIENT)
Dept: GENERAL RADIOLOGY | Facility: HOSPITAL | Age: 40
Discharge: HOME | End: 2020-07-01
Admitting: NURSE PRACTITIONER

## 2020-07-01 ENCOUNTER — LAB (OUTPATIENT)
Dept: LAB | Facility: HOSPITAL | Age: 40
End: 2020-07-01

## 2020-07-01 DIAGNOSIS — Z79.890 HORMONE REPLACEMENT THERAPY (HRT): ICD-10-CM

## 2020-07-01 DIAGNOSIS — R07.9 CHEST PAIN, UNSPECIFIED TYPE: ICD-10-CM

## 2020-07-01 PROCEDURE — 84402 ASSAY OF FREE TESTOSTERONE: CPT | Performed by: NURSE PRACTITIONER

## 2020-07-01 PROCEDURE — 84403 ASSAY OF TOTAL TESTOSTERONE: CPT | Performed by: NURSE PRACTITIONER

## 2020-07-01 PROCEDURE — 71046 X-RAY EXAM CHEST 2 VIEWS: CPT

## 2020-07-01 PROCEDURE — 82670 ASSAY OF TOTAL ESTRADIOL: CPT | Performed by: NURSE PRACTITIONER

## 2020-07-02 ENCOUNTER — TELEPHONE (OUTPATIENT)
Dept: INTERNAL MEDICINE | Facility: CLINIC | Age: 40
End: 2020-07-02

## 2020-07-02 ENCOUNTER — HOSPITAL ENCOUNTER (OUTPATIENT)
Dept: MAMMOGRAPHY | Facility: HOSPITAL | Age: 40
End: 2020-07-02

## 2020-07-02 DIAGNOSIS — R23.4 BREAST SKIN CHANGES: ICD-10-CM

## 2020-07-02 DIAGNOSIS — Z12.39 ENCOUNTER FOR SCREENING FOR MALIGNANT NEOPLASM OF BREAST: ICD-10-CM

## 2020-07-02 DIAGNOSIS — Z12.31 ENCOUNTER FOR SCREENING MAMMOGRAM FOR MALIGNANT NEOPLASM OF BREAST: Primary | ICD-10-CM

## 2020-07-02 DIAGNOSIS — Z12.31 ENCOUNTER FOR SCREENING MAMMOGRAM FOR BREAST CANCER: ICD-10-CM

## 2020-07-02 LAB — ESTRADIOL SERPL HS-MCNC: 99.9 PG/ML

## 2020-07-02 NOTE — TELEPHONE ENCOUNTER
Ayanna called from [facility] and she states they cant do patients mammogram screening that it needs to be a diagnostic mammogram, and they wont be able to see the patient today, so they will call the patient once the order is in.

## 2020-07-05 DIAGNOSIS — N64.59 ABNORMAL BREAST EXAM: ICD-10-CM

## 2020-07-05 DIAGNOSIS — R23.4 BREAST SKIN CHANGES: Primary | ICD-10-CM

## 2020-07-06 DIAGNOSIS — R91.8 ABNORMALITY OF LUNG ON CHEST X-RAY: Primary | ICD-10-CM

## 2020-07-07 DIAGNOSIS — Z79.890 HORMONE REPLACEMENT THERAPY (HRT): ICD-10-CM

## 2020-07-07 RX ORDER — ESTRADIOL 2 MG/1
2 TABLET ORAL DAILY
Qty: 30 TABLET | Refills: 5 | Status: SHIPPED | OUTPATIENT
Start: 2020-07-07 | End: 2021-01-05 | Stop reason: SDUPTHER

## 2020-07-07 NOTE — TELEPHONE ENCOUNTER
----- Message from MAGGIE Patel sent at 7/6/2020 11:44 AM EDT -----  Call patient with results of chest x-ray.  I have ordered ct chest as recommended per radiology.  Although present on the prior exam, calcified granuloma in  the right midlung has increased surrounding scarring of  indeterminate significance with CT recommended to exclude underlying  soft tissue nodule or mass.

## 2020-07-09 LAB
TESTOST FREE SERPL-MCNC: <0.2 PG/ML (ref 0–4.2)
TESTOST SERPL-MCNC: <3 NG/DL (ref 8–48)

## 2020-07-10 ENCOUNTER — HOSPITAL ENCOUNTER (OUTPATIENT)
Dept: CT IMAGING | Facility: HOSPITAL | Age: 40
Discharge: HOME | End: 2020-07-10
Admitting: NURSE PRACTITIONER

## 2020-07-10 DIAGNOSIS — R91.8 ABNORMALITY OF LUNG ON CHEST X-RAY: ICD-10-CM

## 2020-07-10 PROCEDURE — 71250 CT THORAX DX C-: CPT

## 2020-07-13 DIAGNOSIS — R73.03 PREDIABETES: ICD-10-CM

## 2020-07-14 ENCOUNTER — TELEPHONE (OUTPATIENT)
Dept: INTERNAL MEDICINE | Facility: CLINIC | Age: 40
End: 2020-07-14

## 2020-07-14 NOTE — TELEPHONE ENCOUNTER
Called pt and she voiced understanding,    ----- Message from MAGGIE Patel sent at 7/13/2020 10:20 PM EDT -----  Let pt know ct chest results.  IMPRESSION:  Stable chronic changes from 2019 including prior healed  granulomatous involvement.  Will repeat ct chest 1 year.

## 2020-07-17 DIAGNOSIS — M51.26 LUMBAR DISCOGENIC PAIN SYNDROME: ICD-10-CM

## 2020-07-20 ENCOUNTER — TELEPHONE (OUTPATIENT)
Dept: INTERNAL MEDICINE | Facility: CLINIC | Age: 40
End: 2020-07-20

## 2020-07-20 RX ORDER — TRAMADOL HYDROCHLORIDE 50 MG/1
50 TABLET ORAL EVERY 8 HOURS PRN
Qty: 60 TABLET | Refills: 0 | Status: SHIPPED | OUTPATIENT
Start: 2020-07-20 | End: 2020-10-22 | Stop reason: SDUPTHER

## 2020-07-20 NOTE — TELEPHONE ENCOUNTER
LOV: medicare wellness was 06/16/2020  NOV: 12/16/2020  Last refill:06/13/2020 x60  Norris: updated 06/13/2020    Spoke with pt she has not spoke tp pain mgt yet, she states she needs one more refill from you and then after she will talk to them

## 2020-08-07 ENCOUNTER — APPOINTMENT (OUTPATIENT)
Dept: MAMMOGRAPHY | Facility: HOSPITAL | Age: 40
End: 2020-08-07

## 2020-09-03 DIAGNOSIS — M51.26 LUMBAR DISCOGENIC PAIN SYNDROME: ICD-10-CM

## 2020-09-03 RX ORDER — TRAMADOL HYDROCHLORIDE 50 MG/1
TABLET ORAL
Qty: 60 TABLET | Refills: 0 | OUTPATIENT
Start: 2020-09-03

## 2020-09-04 DIAGNOSIS — M51.26 LUMBAR DISCOGENIC PAIN SYNDROME: ICD-10-CM

## 2020-09-04 RX ORDER — TRAMADOL HYDROCHLORIDE 50 MG/1
50 TABLET ORAL EVERY 8 HOURS PRN
Qty: 60 TABLET | Refills: 0 | OUTPATIENT
Start: 2020-09-04

## 2020-09-04 NOTE — TELEPHONE ENCOUNTER
Caller: Megan Post    Relationship: Self    Best call back number:  [phone]    Medication needed:   Requested Prescriptions     Pending Prescriptions Disp Refills   • traMADol (ULTRAM) 50 MG tablet 60 tablet 0     Sig: Take 1 tablet by mouth Every 8 (Eight) Hours As Needed for Moderate Pain .       When do you need the refill by: TODAY    What details did the patient provide when requesting the medication: PATIENT IS COMPLETELY OUT OF MEDICATION    Does the patient have less than a 3 day supply:  [x] Yes  [] No    What is the patient's preferred pharmacy: [id number] [place], [place] - [facility] [address] AT [facility] [address] - [phone] [facility] [phone] FX

## 2020-09-16 ENCOUNTER — HOSPITAL ENCOUNTER (OUTPATIENT)
Dept: BONE DENSITY | Facility: HOSPITAL | Age: 40
Discharge: HOME | End: 2020-09-16
Admitting: NURSE PRACTITIONER

## 2020-09-16 DIAGNOSIS — N95.1 MENOPAUSAL STATE: ICD-10-CM

## 2020-09-16 PROCEDURE — 77080 DXA BONE DENSITY AXIAL: CPT

## 2020-09-22 ENCOUNTER — TELEPHONE (OUTPATIENT)
Dept: INTERNAL MEDICINE | Facility: CLINIC | Age: 40
End: 2020-09-22

## 2020-09-22 NOTE — TELEPHONE ENCOUNTER
PT CALLED REQUESTING A CALL EXPLAINING HER BONE DENSITY RESULTS    PLEASE ADVISE AT: [phone]

## 2020-09-25 ENCOUNTER — APPOINTMENT (OUTPATIENT)
Dept: MAMMOGRAPHY | Facility: HOSPITAL | Age: 40
End: 2020-09-25

## 2020-10-15 DIAGNOSIS — R73.03 PREDIABETES: ICD-10-CM

## 2020-10-16 NOTE — TELEPHONE ENCOUNTER
RX last sent on 07/13/20 for 90 day supply    Patient has follow up appointment scheduled on 10/22/20

## 2020-10-22 ENCOUNTER — OFFICE VISIT (OUTPATIENT)
Dept: INTERNAL MEDICINE | Facility: CLINIC | Age: 40
End: 2020-10-22

## 2020-10-22 VITALS
WEIGHT: 188 LBS | HEART RATE: 84 BPM | BODY MASS INDEX: 35.52 KG/M2 | OXYGEN SATURATION: 99 % | SYSTOLIC BLOOD PRESSURE: 126 MMHG | DIASTOLIC BLOOD PRESSURE: 78 MMHG

## 2020-10-22 DIAGNOSIS — E66.01 MORBID OBESITY WITH BMI OF 40.0-44.9, ADULT (HCC): ICD-10-CM

## 2020-10-22 DIAGNOSIS — K58.0 IRRITABLE BOWEL SYNDROME WITH DIARRHEA: ICD-10-CM

## 2020-10-22 DIAGNOSIS — R73.03 PREDIABETES: ICD-10-CM

## 2020-10-22 DIAGNOSIS — M51.26 LUMBAR DISCOGENIC PAIN SYNDROME: Primary | ICD-10-CM

## 2020-10-22 DIAGNOSIS — Z79.899 MEDICATION MANAGEMENT: ICD-10-CM

## 2020-10-22 DIAGNOSIS — Z23 NEED FOR INFLUENZA VACCINATION: ICD-10-CM

## 2020-10-22 PROCEDURE — 90686 IIV4 VACC NO PRSV 0.5 ML IM: CPT | Performed by: NURSE PRACTITIONER

## 2020-10-22 PROCEDURE — 99214 OFFICE O/P EST MOD 30 MIN: CPT | Performed by: NURSE PRACTITIONER

## 2020-10-22 PROCEDURE — G0008 ADMIN INFLUENZA VIRUS VAC: HCPCS | Performed by: NURSE PRACTITIONER

## 2020-10-22 RX ORDER — TRAMADOL HYDROCHLORIDE 50 MG/1
50 TABLET ORAL EVERY 8 HOURS PRN
Qty: 60 TABLET | Refills: 0 | Status: CANCELLED | OUTPATIENT
Start: 2020-10-22

## 2020-10-22 RX ORDER — DICYCLOMINE HYDROCHLORIDE 10 MG/1
10 CAPSULE ORAL
Qty: 120 CAPSULE | Refills: 2 | Status: SHIPPED | OUTPATIENT
Start: 2020-10-22

## 2020-10-22 RX ORDER — TRAMADOL HYDROCHLORIDE 50 MG/1
50 TABLET ORAL EVERY 8 HOURS PRN
Qty: 60 TABLET | Refills: 2 | Status: SHIPPED | OUTPATIENT
Start: 2020-10-22 | End: 2021-03-24 | Stop reason: SDUPTHER

## 2020-10-22 RX ORDER — CARIPRAZINE 1.5 MG/1
CAPSULE, GELATIN COATED ORAL
COMMUNITY
Start: 2020-10-16 | End: 2021-03-18

## 2020-10-22 RX ORDER — BISACODYL 5 MG
TABLET, DELAYED RELEASE (ENTERIC COATED) ORAL
COMMUNITY
Start: 2020-09-29 | End: 2021-03-18

## 2020-10-22 RX ORDER — OMEPRAZOLE 40 MG/1
40 CAPSULE, DELAYED RELEASE ORAL 2 TIMES DAILY
COMMUNITY
Start: 2020-09-29

## 2020-10-22 NOTE — PROGRESS NOTES
Chief Complaint   Patient presents with   • Follow-up     Back Pain   • Prediabetes   • Irritable Bowel Syndrome   • Obesity       History of Present Illness  40 y.o.female presents for back pan, prediabetes, IBS, and obesity.    Back pain chronic onset years with displacement of intervertebral disc of lumbar sacral region and spondylosis of lumbar region.  Patient takes Flexeril as needed, duloxetine, Lyrica, and tramadol.  She is here today for her tramadol refill.  Patient is well controlled on her medications without any unwanted side effects.  Prediabetes chronic onset greater than 1 year.  She takes Metformin daily.  Last A1c 5.9 June 2020.  ibs diarrhea chronic onset years.  Has been bothering her some over last several days.  She has been having diarrhea after meals.  obesity chronic onset years.  Would like to try some weight loss medication to see if might help.    Review of Systems   Constitutional: Negative for chills, fatigue and fever.   Eyes: Negative for blurred vision and visual disturbance.   Respiratory: Negative for cough and shortness of breath.    Cardiovascular: Negative for chest pain, palpitations and leg swelling.   Gastrointestinal: Positive for diarrhea. Negative for abdominal pain, constipation, nausea and vomiting.   Genitourinary: Negative for difficulty urinating.   Musculoskeletal: Positive for arthralgias and back pain.   Neurological: Negative for dizziness, syncope, light-headedness and headache.         [facility]  The following portions of the patient's history were reviewed and updated as appropriate: allergies, current medications, past family history, past medical history, past social history, past surgical history and problem list.     Past Medical History:   Diagnosis Date   • Abdominal pain    • Abnormal breast exam    • Abnormal Pap smear of cervix    • Achilles tendinitis    • Acute sinusitis    • Anxiety    • Arthritis    • Asthma    • Tavera's syndrome    • Bipolar 1  disorder (CMS/HCC)    • Bowel trouble    • Breast cyst    • Colon polyps    • Constipation    • Depression    • Diverticulitis    • Diverticulitis of colon    • Endometriosis    • Eustachian tube dysfunction    • Extremity pain    • Fatty liver    • Female pelvic pain    • Fibromyalgia    • Gastric ulcer    • H/O bladder infections    • History of rashes as a child    • Irritable bowel syndrome    • Low back pain    • Lumbar radiculopathy    • Menopausal symptoms    • Muscle weakness    • Ovarian cyst    • PID (pelvic inflammatory disease)    • Recurrent UTI    • Sexual problems    • Spinal stenosis of lumbar region       Past Surgical History:   Procedure Laterality Date   •  SECTION     • CHOLECYSTECTOMY     • COLONOSCOPY  2017   • HYSTERECTOMY     • NASAL ENDOSCOPY     • OOPHORECTOMY Bilateral    • OTHER SURGICAL HISTORY      Laparoscopy (diagnostic) gynecologic with biopsy   • SHOULDER SURGERY     • UPPER GASTROINTESTINAL ENDOSCOPY  2019      Allergies   Allergen Reactions   • Adhesive Tape Hives   • Latex Hives   • Sulfa Antibiotics Hives   • Sulfate Hives   • Biaxin [Clarithromycin] Nausea Only   • Codeine Itching   • Penicillins Nausea Only      Social History     Socioeconomic History   • Marital status: Single   Tobacco Use   • Smoking status: Current Every Day Smoker     Packs/day: 0.50     Types: Cigarettes     Start date: 2003   • Smokeless tobacco: Never Used   • Tobacco comment: nicotine patches for smoking cessation   Substance and Sexual Activity   • Alcohol use: No   • Drug use: No   • Sexual activity: Defer     Family History   Problem Relation Age of Onset   • Liver disease Mother    • Diabetes Mother    • Hyperlipidemia Mother    • Hypertension Mother    • Thyroid disease Mother    • Arthritis Father    • Mental illness Father    • Migraines Sister    • Stroke Other    • Diabetes Other    • Hyperlipidemia Other    • Hypertension Other    • Mental illness Other    • Migraines Other     • Tuberculosis Other    • Breast cancer Neg Hx    • Endometrial cancer Neg Hx    • Ovarian cancer Neg Hx             Current Outpatient Medications:   •  bisacodyl 5 MG EC tablet, TK 1 T PO 1 TO 2 XD FOR CONSTIPATION, Disp: , Rfl:   •  cetirizine (zyrTEC) 10 MG tablet, Take 1 tablet by mouth Daily., Disp: , Rfl: 1  •  cyclobenzaprine (FLEXERIL) 10 MG tablet, Take 1 tablet by mouth 3 (Three) Times a Day As Needed for Muscle Spasms. for muscle spams, Disp: 30 tablet, Rfl: 5  •  dilTIAZem (Cardizem) 60 MG tablet, Take 1 tablet by mouth 2 (Two) Times a Day., Disp: 60 tablet, Rfl: 3  •  DULoxetine (CYMBALTA) 60 MG capsule, 80 mg., Disp: , Rfl:   •  DUPIXENT 300 MG/2ML solution prefilled syringe, , Disp: , Rfl:   •  EPIPEN 2-JAREN 0.3 MG/0.3ML solution auto-injector injection, U UTD, Disp: , Rfl: 6  •  esomeprazole (nexIUM) 20 MG capsule, Take 1 capsule by mouth 2 (two) times a day., Disp: 60 capsule, Rfl: 11  •  estradiol (ESTRACE) 2 MG tablet, TAKE 1 TABLET BY MOUTH DAILY, Disp: 30 tablet, Rfl: 5  •  eszopiclone (LUNESTA) 2 MG tablet, TK 1 T PO QHS PRN SLEEP, Disp: , Rfl:   •  LORazepam (ATIVAN) 1 MG tablet, TK 1 T PO D PRN, Disp: , Rfl: 0  •  Magnesium 250 MG tablet, TK 1-2 TS PO D FOR CONSTIPATION IF TAKING MIRALAX IS NOT EFFECTIVE, Disp: , Rfl:   •  metFORMIN (GLUCOPHAGE) 500 MG tablet, TAKE 1 TABLET BY MOUTH DAILY WITH BREAKFAST, Disp: 90 tablet, Rfl: 0  •  Multiple Vitamin (MULTI-VITAMIN DAILY PO), Take  by mouth Daily., Disp: , Rfl:   •  omeprazole (priLOSEC) 40 MG capsule, TK 1 C PO BID 30 MINUTES B ANGELA AND SUPPER, Disp: , Rfl:   •  pregabalin (LYRICA) 100 MG capsule, TK 1 C PO BID UTD, Disp: , Rfl:   •  QVAR REDIHALER 80 MCG/ACT inhaler, INHALE 2 PUFFS BID, Disp: , Rfl:   •  sodium chloride (OCEAN NASAL SPRAY) 0.65 % nasal spray, 1 spray into the nostril(s) as directed by provider As Needed for Congestion., Disp: 30 mL, Rfl: 0  •  topiramate (TOPAMAX) 50 MG tablet, Take 50 mg by mouth 2 (Two) Times a Day.,  Disp: , Rfl:   •  traMADol (ULTRAM) 50 MG tablet, Take 1 tablet by mouth Every 8 (Eight) Hours As Needed for Moderate Pain ., Disp: 60 tablet, Rfl: 0  •  traZODone (DESYREL) 150 MG tablet, Take 1 tablet by mouth As Needed., Disp: , Rfl: 3  •  Vraylar 1.5 MG capsule capsule, TK 1 C PO D, Disp: , Rfl:   •  XHANCE 93 MCG/ACT Exhaler Suspension, , Disp: , Rfl:     VITALS:  /78   Pulse 84   Wt 85.3 kg (188 lb)   SpO2 99%   Breastfeeding No   BMI 35.52 kg/m²     Physical Exam  Vitals signs reviewed.   Constitutional:       General: She is not in acute distress.     Appearance: She is well-developed.   HENT:      Head: Normocephalic.      Mouth/Throat:      Mouth: Mucous membranes are moist.      Pharynx: Oropharynx is clear.   Eyes:      Pupils: Pupils are equal, round, and reactive to light.   Neck:      Musculoskeletal: Normal range of motion and neck supple.      Thyroid: No thyromegaly.   Cardiovascular:      Rate and Rhythm: Normal rate and regular rhythm.      Heart sounds: Normal heart sounds.      Comments: No edema  Pulmonary:      Effort: Pulmonary effort is normal. No respiratory distress.      Breath sounds: Normal breath sounds.   Abdominal:      General: Bowel sounds are normal.      Palpations: Abdomen is soft.      Tenderness: There is no abdominal tenderness.   Musculoskeletal: Normal range of motion.      Comments: Normal ROM all major joints   Lymphadenopathy:      Cervical: No cervical adenopathy.   Skin:     General: Skin is warm and dry.      Capillary Refill: Capillary refill takes less than 2 seconds.      Findings: No rash.   Neurological:      Mental Status: She is alert and oriented to person, place, and time.   Psychiatric:         Mood and Affect: Mood normal.         Behavior: Behavior normal.         LABS  pending      ASSESSMENT/PLAN  Diagnoses and all orders for this visit:    1. Lumbar discogenic pain syndrome (Primary)  -     traMADol (ULTRAM) 50 MG tablet; Take 1 tablet by  mouth Every 8 (Eight) Hours As Needed for Moderate Pain .  Dispense: 60 tablet; Refill: 2  As part of patient's treatment plan I am prescribing a controlled substance.  The patient has been made aware of the appropriate use of such medications, including potential risk of somnolence, limited ability to drive and/or work safely, and potential for dependence and/or overdose.  It has also been made clear that these medications are for use by this patient only, without concomitant use of alcohol or other substances, unless prescribed.  The patient has read and signed the [facility] Controlled Substance Contract (updated today).  I will continue to see patient for regular follow up appointments.  They are well controlled on their medication.  JOSELYN is updated today every 3 months; appears appropriate for fill. The patient is aware of the potential for addiction and dependence.    2. Medication management  -     Urine Drug Screen - Urine, Clean Catch; Future    3. Prediabetes  -     metFORMIN (GLUCOPHAGE) 500 MG tablet; Take 1 tablet by mouth Daily With Breakfast.  Dispense: 90 tablet; Refill: 0    4. Irritable bowel syndrome with diarrhea  -     dicyclomine (Bentyl) 10 MG capsule; Take 1 capsule by mouth 4 (Four) Times a Day Before Meals & at Bedtime As Needed (abdominal cramping diarrhea).  Dispense: 120 capsule; Refill: 2  Add bentyl.  5. Need for influenza vaccination  -     Fluarix/Fluzone/Afluria Quad>6 Months    6. Morbid obesity with BMI of 40.0-44.9, adult (CMS/[facility])  Discussed risks benefits of different treatment options. Pt would like to try saxsenda. Provided pt with sample pen. Advised of administration as per pamphlet instructions. Understands is a daily injection with titration up weekly til reach max dose of 3.0 mg.    I discussed the patients findings and my recommendations with patient.  Patient was encouraged to keep me informed of any acute changes, lack of improvement, or any new concerning  symptoms.  Patient voiced understanding of all instructions and denied further questions.      FOLLOW-UP  Return in about 4 weeks (around 11/19/2020), or if symptoms worsen or fail to improve, for Recheck wt.    Electronically signed by:    MAGGIE Chacon  10/22/2020    EMR Dragon/Transcription Disclaimer:  Much of this encounter note is an electronic transcription/translation of spoken language to printed text.  The electronic translation of spoken language may permit erroneous, or at times, nonsensical words or phrases to be inadvertently transcribed.  Although I have reviewed the note for such errors, some may still exist

## 2020-10-23 ENCOUNTER — TELEPHONE (OUTPATIENT)
Dept: INTERNAL MEDICINE | Facility: CLINIC | Age: 40
End: 2020-10-23

## 2020-10-23 NOTE — TELEPHONE ENCOUNTER
Caller: Megan Post    Relationship: Self    Best call back number: [phone]    What medication are you requesting: ZOFRAN     What are your current symptoms: Patient stated that an injection that she takes makes her nauseous and Dr. Ovalles stated that she would call in Zofran for the patient.    How long have you been experiencing symptoms:     Have you had these symptoms before:    [x] Yes  [] No    Have you been treated for these symptoms before:   [x] Yes  [] No    If a prescription is needed, what is your preferred pharmacy and phone number: [id number] - [place], [place] - [address] AT [facility] [address] - [phone]  - [phone] FX     Additional notes:

## 2020-10-23 NOTE — TELEPHONE ENCOUNTER
Called in Zofran 4mg disintegrating tablet to [facility]ica in previous message, pt notified and voiced understanding

## 2020-10-26 PROBLEM — Z01.419 WELL WOMAN EXAM: Status: RESOLVED | Noted: 2020-06-01 | Resolved: 2020-10-26

## 2020-10-26 PROBLEM — J98.11 ATELECTASIS: Status: RESOLVED | Noted: 2017-05-18 | Resolved: 2020-10-26

## 2020-10-26 PROBLEM — R07.89 ATYPICAL CHEST PAIN: Status: RESOLVED | Noted: 2017-05-18 | Resolved: 2020-10-26

## 2020-10-26 PROBLEM — R07.2 PRECORDIAL PAIN: Status: RESOLVED | Noted: 2019-07-29 | Resolved: 2020-10-26

## 2020-10-26 PROBLEM — R06.02 SHORTNESS OF BREATH: Status: RESOLVED | Noted: 2017-05-18 | Resolved: 2020-10-26

## 2020-10-26 PROBLEM — E66.01 MORBID OBESITY WITH BMI OF 40.0-44.9, ADULT (HCC): Status: ACTIVE | Noted: 2020-10-26

## 2020-10-26 PROBLEM — R10.11 RIGHT UPPER QUADRANT PAIN: Status: RESOLVED | Noted: 2017-02-21 | Resolved: 2020-10-26

## 2020-10-26 PROBLEM — J40 BRONCHITIS: Status: RESOLVED | Noted: 2019-02-08 | Resolved: 2020-10-26

## 2020-10-28 ENCOUNTER — HOSPITAL ENCOUNTER (OUTPATIENT)
Dept: MAMMOGRAPHY | Facility: HOSPITAL | Age: 40
Discharge: HOME | End: 2020-10-28

## 2020-10-28 ENCOUNTER — HOSPITAL ENCOUNTER (OUTPATIENT)
Dept: ULTRASOUND IMAGING | Facility: HOSPITAL | Age: 40
Discharge: HOME | End: 2020-10-28

## 2020-10-28 DIAGNOSIS — N64.59 ABNORMAL BREAST EXAM: ICD-10-CM

## 2020-10-28 DIAGNOSIS — R23.4 BREAST SKIN CHANGES: ICD-10-CM

## 2020-10-28 PROCEDURE — 77066 DX MAMMO INCL CAD BI: CPT | Performed by: RADIOLOGY

## 2020-10-28 PROCEDURE — G0279 TOMOSYNTHESIS, MAMMO: HCPCS

## 2020-10-28 PROCEDURE — G0279 TOMOSYNTHESIS, MAMMO: HCPCS | Performed by: RADIOLOGY

## 2020-10-28 PROCEDURE — 76642 ULTRASOUND BREAST LIMITED: CPT

## 2020-10-28 PROCEDURE — 76642 ULTRASOUND BREAST LIMITED: CPT | Performed by: RADIOLOGY

## 2020-10-28 PROCEDURE — 77066 DX MAMMO INCL CAD BI: CPT

## 2020-11-02 ENCOUNTER — TELEPHONE (OUTPATIENT)
Dept: INTERNAL MEDICINE | Facility: CLINIC | Age: 40
End: 2020-11-02

## 2020-11-02 NOTE — TELEPHONE ENCOUNTER
Patient called and stated after taking saxenda she is constipated, she's not eating or drinking she feels weak and nauseous.     Rosey did advise her to stop taking the saxenda and I let her know  I would send a message to Sil Ovalles just to let her know what she was experiencing.

## 2020-11-09 RX ORDER — DILTIAZEM HYDROCHLORIDE 60 MG/1
60 TABLET, FILM COATED ORAL 2 TIMES DAILY
Qty: 60 TABLET | Refills: 3 | Status: SHIPPED | OUTPATIENT
Start: 2020-11-09 | End: 2021-03-18

## 2020-12-03 ENCOUNTER — TELEMEDICINE (OUTPATIENT)
Dept: INTERNAL MEDICINE | Facility: CLINIC | Age: 40
End: 2020-12-03

## 2020-12-03 DIAGNOSIS — J01.00 ACUTE NON-RECURRENT MAXILLARY SINUSITIS: Primary | ICD-10-CM

## 2020-12-03 DIAGNOSIS — N76.0 ACUTE VAGINITIS: ICD-10-CM

## 2020-12-03 PROCEDURE — 99213 OFFICE O/P EST LOW 20 MIN: CPT | Performed by: NURSE PRACTITIONER

## 2020-12-03 RX ORDER — FLUCONAZOLE 150 MG/1
150 TABLET ORAL ONCE
Qty: 1 TABLET | Refills: 0 | Status: SHIPPED | OUTPATIENT
Start: 2020-12-03 | End: 2020-12-03

## 2020-12-03 RX ORDER — AMOXICILLIN AND CLAVULANATE POTASSIUM 875; 125 MG/1; MG/1
1 TABLET, FILM COATED ORAL 2 TIMES DAILY
Qty: 14 TABLET | Refills: 0 | Status: SHIPPED | OUTPATIENT
Start: 2020-12-03 | End: 2020-12-10

## 2020-12-03 NOTE — PROGRESS NOTES
Chief Complaint   Patient presents with   • Sinusitis       History of Present Illness  40 y.o.female presents for sinusitis.  The use of a video visit has been reviewed with the patient and verbal informed consent has been obtained.  Feels like Sinus infection; rhinorrhea headaches maxiallry face hurts/red swollen. Cough. Onset about week. Has tried otc cold meds not helping much. No fever.   Wt was not able to tolerate the saxsenda due to nausea.  Need diflucan if abx; gets vaginal yeast.    Review of Systems   Constitutional: Negative for chills and fever.   HENT: Positive for congestion, rhinorrhea and sinus pressure. Negative for ear pain, sneezing and sore throat.    Respiratory: Positive for cough. Negative for shortness of breath.    Musculoskeletal: Negative for myalgias.   Neurological: Negative for headache.       [facility]  The following portions of the patient's history were reviewed and updated as appropriate: allergies, current medications, past family history, past medical history, past social history, past surgical history and problem list.     Past Medical History:   Diagnosis Date   • Abdominal pain    • Abnormal breast exam    • Abnormal Pap smear of cervix    • Achilles tendinitis    • Acute sinusitis    • Anxiety    • Arthritis    • Asthma    • Tavera's syndrome    • Bipolar 1 disorder (CMS/HCC)    • Bowel trouble    • Breast cyst    • Colon polyps    • Constipation    • Depression    • Diverticulitis    • Diverticulitis of colon    • Endometriosis    • Eustachian tube dysfunction    • Extremity pain    • Fatty liver    • Female pelvic pain    • Fibromyalgia    • Gastric ulcer    • H/O bladder infections    • History of rashes as a child    • Irritable bowel syndrome    • Low back pain    • Lumbar radiculopathy    • Menopausal symptoms    • Muscle weakness    • Ovarian cyst    • PID (pelvic inflammatory disease)    • Recurrent UTI    • Sexual problems    • Spinal stenosis of lumbar region        Past Surgical History:   Procedure Laterality Date   •  SECTION     • CHOLECYSTECTOMY     • COLONOSCOPY  2017   • HYSTERECTOMY     • NASAL ENDOSCOPY     • OOPHORECTOMY Bilateral    • OTHER SURGICAL HISTORY      Laparoscopy (diagnostic) gynecologic with biopsy   • SHOULDER SURGERY     • UPPER GASTROINTESTINAL ENDOSCOPY  2019      Allergies   Allergen Reactions   • Adhesive Tape Hives   • Latex Hives   • Sulfa Antibiotics Hives   • Sulfate Hives   • Biaxin [Clarithromycin] Nausea Only   • Codeine Itching   • Penicillins Nausea Only      Social History     Socioeconomic History   • Marital status: Single   Tobacco Use   • Smoking status: Current Every Day Smoker     Packs/day: 0.50     Types: Cigarettes     Start date: 2003   • Smokeless tobacco: Never Used   • Tobacco comment: nicotine patches for smoking cessation   Substance and Sexual Activity   • Alcohol use: No   • Drug use: No   • Sexual activity: Defer     Family History   Problem Relation Age of Onset   • Liver disease Mother    • Diabetes Mother    • Hyperlipidemia Mother    • Hypertension Mother    • Thyroid disease Mother    • Arthritis Father    • Mental illness Father    • Migraines Sister    • Stroke Other    • Diabetes Other    • Hyperlipidemia Other    • Hypertension Other    • Mental illness Other    • Migraines Other    • Tuberculosis Other    • Breast cancer Neg Hx    • Endometrial cancer Neg Hx    • Ovarian cancer Neg Hx             Current Outpatient Medications:   •  bisacodyl 5 MG EC tablet, TK 1 T PO 1 TO 2 XD FOR CONSTIPATION, Disp: , Rfl:   •  cetirizine (zyrTEC) 10 MG tablet, Take 1 tablet by mouth Daily., Disp: , Rfl: 1  •  cyclobenzaprine (FLEXERIL) 10 MG tablet, Take 1 tablet by mouth 3 (Three) Times a Day As Needed for Muscle Spasms. for muscle spams, Disp: 30 tablet, Rfl: 5  •  dicyclomine (Bentyl) 10 MG capsule, Take 1 capsule by mouth 4 (Four) Times a Day Before Meals & at Bedtime As Needed (abdominal cramping  diarrhea)., Disp: 120 capsule, Rfl: 2  •  dilTIAZem (Cardizem) 60 MG tablet, Take 1 tablet by mouth 2 (Two) Times a Day., Disp: 60 tablet, Rfl: 3  •  DULoxetine (CYMBALTA) 60 MG capsule, 80 mg., Disp: , Rfl:   •  DUPIXENT 300 MG/2ML solution prefilled syringe, , Disp: , Rfl:   •  EPIPEN 2-JAREN 0.3 MG/0.3ML solution auto-injector injection, U UTD, Disp: , Rfl: 6  •  esomeprazole (nexIUM) 20 MG capsule, Take 1 capsule by mouth 2 (two) times a day., Disp: 60 capsule, Rfl: 11  •  estradiol (ESTRACE) 2 MG tablet, TAKE 1 TABLET BY MOUTH DAILY, Disp: 30 tablet, Rfl: 5  •  eszopiclone (LUNESTA) 2 MG tablet, TK 1 T PO QHS PRN SLEEP, Disp: , Rfl:   •  LORazepam (ATIVAN) 1 MG tablet, TK 1 T PO D PRN, Disp: , Rfl: 0  •  Magnesium 250 MG tablet, TK 1-2 TS PO D FOR CONSTIPATION IF TAKING MIRALAX IS NOT EFFECTIVE, Disp: , Rfl:   •  metFORMIN (GLUCOPHAGE) 500 MG tablet, Take 1 tablet by mouth Daily With Breakfast., Disp: 90 tablet, Rfl: 0  •  Multiple Vitamin (MULTI-VITAMIN DAILY PO), Take  by mouth Daily., Disp: , Rfl:   •  omeprazole (priLOSEC) 40 MG capsule, TK 1 C PO BID 30 MINUTES B ANGELA AND SUPPER, Disp: , Rfl:   •  pregabalin (LYRICA) 100 MG capsule, TK 1 C PO BID UTD, Disp: , Rfl:   •  QVAR REDIHALER 80 MCG/ACT inhaler, INHALE 2 PUFFS BID, Disp: , Rfl:   •  sodium chloride (OCEAN NASAL SPRAY) 0.65 % nasal spray, 1 spray into the nostril(s) as directed by provider As Needed for Congestion., Disp: 30 mL, Rfl: 0  •  topiramate (TOPAMAX) 50 MG tablet, Take 50 mg by mouth 2 (Two) Times a Day., Disp: , Rfl:   •  traMADol (ULTRAM) 50 MG tablet, Take 1 tablet by mouth Every 8 (Eight) Hours As Needed for Moderate Pain ., Disp: 60 tablet, Rfl: 2  •  traZODone (DESYREL) 150 MG tablet, Take 1 tablet by mouth As Needed., Disp: , Rfl: 3  •  Vraylar 1.5 MG capsule capsule, TK 1 C PO D, Disp: , Rfl:   •  XHANCE 93 MCG/ACT Exhaler Suspension, , Disp: , Rfl:     VITALS:  There were no vitals taken for this visit.    Physical Exam  HENT:       Head: Normocephalic.      Comments: Face flushed over cheeks.     Nose: Congestion present.      Right Sinus: Maxillary sinus tenderness present.      Left Sinus: Maxillary sinus tenderness present.   Eyes:      Conjunctiva/sclera: Conjunctivae normal.   Neurological:      Mental Status: She is alert.         LABS  No new labs      ASSESSMENT/PLAN  Diagnoses and all orders for this visit:    1. Acute non-recurrent maxillary sinusitis (Primary)  -     amoxicillin-clavulanate (Augmentin) 875-125 MG per tablet; Take 1 tablet by mouth 2 (Two) Times a Day for 7 days.  Dispense: 14 tablet; Refill: 0    2. Acute vaginitis  -     fluconazole (Diflucan) 150 MG tablet; Take 1 tablet by mouth 1 (One) Time for 1 dose.  Dispense: 1 tablet; Refill: 0    Recommendations:  Oral antihistamine such as allegra or claritin or zyrtec: one tablet by mouth once daily  Nasal steroid spray such as flonase or Nasacort: 1 spray each nostril daily.  Mucinex also known as guaifenesin can help to loosen chest secretions.  Drink plenty water which helps to loosen secretions.    If worsening of symptoms or no improvement in symptoms or fever > 101.5 patient should contact our office for further evaluation treatment or seek urgent care.      I discussed the patients findings and my recommendations with patient.  Patient was encouraged to keep me informed of any acute changes, lack of improvement, or any new concerning symptoms.  Patient voiced understanding of all instructions and denied further questions.      FOLLOW-UP   Return if symptoms worsen or fail to improve.    Electronically signed by:    MAGGIE Chacon  12/03/2020    EMR Dragon/Transcription Disclaimer:  Much of this encounter note is an electronic transcription/translation of spoken language to printed text.  The electronic translation of spoken language may permit erroneous, or at times, nonsensical words or phrases to be inadvertently transcribed.  Although I have reviewed  the note for such errors, some may still exist

## 2021-01-05 ENCOUNTER — TELEMEDICINE (OUTPATIENT)
Dept: INTERNAL MEDICINE | Facility: CLINIC | Age: 41
End: 2021-01-05

## 2021-01-05 DIAGNOSIS — Z79.890 HORMONE REPLACEMENT THERAPY (HRT): ICD-10-CM

## 2021-01-05 DIAGNOSIS — G89.29 CHRONIC MIDLINE LOW BACK PAIN WITHOUT SCIATICA: ICD-10-CM

## 2021-01-05 DIAGNOSIS — J30.9 ALLERGIC RHINITIS, UNSPECIFIED SEASONALITY, UNSPECIFIED TRIGGER: Primary | ICD-10-CM

## 2021-01-05 DIAGNOSIS — R73.03 PREDIABETES: ICD-10-CM

## 2021-01-05 DIAGNOSIS — M54.50 CHRONIC MIDLINE LOW BACK PAIN WITHOUT SCIATICA: ICD-10-CM

## 2021-01-05 PROCEDURE — 99214 OFFICE O/P EST MOD 30 MIN: CPT | Performed by: NURSE PRACTITIONER

## 2021-01-05 RX ORDER — ESTRADIOL 2 MG/1
2 TABLET ORAL DAILY
Qty: 30 TABLET | Refills: 5 | Status: SHIPPED | OUTPATIENT
Start: 2021-01-05 | End: 2021-08-10

## 2021-01-05 RX ORDER — METHOCARBAMOL 500 MG/1
500 TABLET, FILM COATED ORAL 3 TIMES DAILY PRN
Qty: 60 TABLET | Refills: 2 | Status: SHIPPED | OUTPATIENT
Start: 2021-01-05 | End: 2021-03-18

## 2021-01-05 RX ORDER — MONTELUKAST SODIUM 10 MG/1
10 TABLET ORAL NIGHTLY
Qty: 30 TABLET | Refills: 2 | Status: SHIPPED | OUTPATIENT
Start: 2021-01-05 | End: 2021-04-19 | Stop reason: SDUPTHER

## 2021-01-05 NOTE — PROGRESS NOTES
Chief Complaint   Patient presents with   • Nasal Congestion     sinuses   • Prediabetes   • Back Pain   • Med Refill     hormone replacement     The use of a video visit has been reviewed with the patient and verbal informed consent has been obtained.    History of Present Illness    40 y.o.female presents for   Patient having upper respiratory symptoms with sneezing some sinus congestion nasal congestion mild cough.  Feels like it does when she gets allergies for her chronic sinus problems.  But she does noticed some loss of taste and smell.  Has not had a fever.  She is scheduled to get a rapid Covid test done on the seventh of this month.  She has been trying Alma pot and XHANCE steroid nasal spray without much relief of symptoms.  Has been seen before by ENT for nasal polyps and had nasal surgery last year.  Does still get recurrent sinusitis with most recent Augmentin December 3.    Back Pain  This is a chronic problem. The current episode started more than 1 year ago. The problem occurs intermittently. The problem has been waxing and waning since onset. The pain is present in the lumbar spine. The quality of the pain is described as aching. The symptoms are aggravated by bending and position. She has tried analgesics and muscle relaxant for the symptoms. The treatment provided mild (Flexeril does not work as well would like to try different muscle relaxant) relief.     Prediabetes onset months.  Patient takes Metformin without noted difficulties.  Last A1c June 2020 of 5.9.    Patient with hysterectomy and on chronic hormone replacement.  Takes Estrace 2 mg daily.  Needs refill of medication.  Mammogram current.  No history of DVT.  No pelvic complaints.    ROS: any positive referenced above.   Other ROS negative.      [facility]  The following portions of the patient's history were reviewed and updated as appropriate: allergies, current medications, past family history, past medical history, past social  history, past surgical history and problem list.     Past Medical History:   Diagnosis Date   • Abdominal pain    • Abnormal breast exam    • Abnormal Pap smear of cervix    • Achilles tendinitis    • Acute sinusitis    • Anxiety    • Arthritis    • Asthma    • Tavera's syndrome    • Bipolar 1 disorder (CMS/HCC)    • Bowel trouble    • Breast cyst    • Colon polyps    • Constipation    • Depression    • Diverticulitis    • Diverticulitis of colon    • Endometriosis    • Eustachian tube dysfunction    • Extremity pain    • Fatty liver    • Female pelvic pain    • Fibromyalgia    • Gastric ulcer    • H/O bladder infections    • History of rashes as a child    • Irritable bowel syndrome    • Low back pain    • Lumbar radiculopathy    • Menopausal symptoms    • Muscle weakness    • Ovarian cyst    • PID (pelvic inflammatory disease)    • Recurrent UTI    • Sexual problems    • Spinal stenosis of lumbar region       Allergies   Allergen Reactions   • Adhesive Tape Hives   • Latex Hives   • Sulfa Antibiotics Hives   • Sulfate Hives   • Biaxin [Clarithromycin] Nausea Only   • Codeine Itching   • Penicillins Nausea Only      Social History     Tobacco Use   • Smoking status: Current Every Day Smoker     Packs/day: 0.50     Types: Cigarettes     Start date: 6/11/2003   • Smokeless tobacco: Never Used   • Tobacco comment: nicotine patches for smoking cessation   Substance Use Topics   • Alcohol use: No   • Drug use: No         Current Outpatient Medications:   •  bisacodyl 5 MG EC tablet, TK 1 T PO 1 TO 2 XD FOR CONSTIPATION, Disp: , Rfl:   •  cetirizine (zyrTEC) 10 MG tablet, Take 1 tablet by mouth Daily., Disp: , Rfl: 1  •  cyclobenzaprine (FLEXERIL) 10 MG tablet, Take 1 tablet by mouth 3 (Three) Times a Day As Needed for Muscle Spasms. for muscle spams, Disp: 30 tablet, Rfl: 5  •  dicyclomine (Bentyl) 10 MG capsule, Take 1 capsule by mouth 4 (Four) Times a Day Before Meals & at Bedtime As Needed (abdominal cramping  diarrhea)., Disp: 120 capsule, Rfl: 2  •  dilTIAZem (Cardizem) 60 MG tablet, Take 1 tablet by mouth 2 (Two) Times a Day., Disp: 60 tablet, Rfl: 3  •  DULoxetine (CYMBALTA) 60 MG capsule, 80 mg., Disp: , Rfl:   •  DUPIXENT 300 MG/2ML solution prefilled syringe, , Disp: , Rfl:   •  EPIPEN 2-JAREN 0.3 MG/0.3ML solution auto-injector injection, U UTD, Disp: , Rfl: 6  •  esomeprazole (nexIUM) 20 MG capsule, Take 1 capsule by mouth 2 (two) times a day., Disp: 60 capsule, Rfl: 11  •  estradiol (ESTRACE) 2 MG tablet, TAKE 1 TABLET BY MOUTH DAILY, Disp: 30 tablet, Rfl: 5  •  eszopiclone (LUNESTA) 2 MG tablet, TK 1 T PO QHS PRN SLEEP, Disp: , Rfl:   •  LORazepam (ATIVAN) 1 MG tablet, TK 1 T PO D PRN, Disp: , Rfl: 0  •  Magnesium 250 MG tablet, TK 1-2 TS PO D FOR CONSTIPATION IF TAKING MIRALAX IS NOT EFFECTIVE, Disp: , Rfl:   •  metFORMIN (GLUCOPHAGE) 500 MG tablet, Take 1 tablet by mouth Daily With Breakfast., Disp: 90 tablet, Rfl: 0  •  Multiple Vitamin (MULTI-VITAMIN DAILY PO), Take  by mouth Daily., Disp: , Rfl:   •  omeprazole (priLOSEC) 40 MG capsule, TK 1 C PO BID 30 MINUTES B ANGELA AND SUPPER, Disp: , Rfl:   •  pregabalin (LYRICA) 100 MG capsule, TK 1 C PO BID UTD, Disp: , Rfl:   •  QVAR REDIHALER 80 MCG/ACT inhaler, INHALE 2 PUFFS BID, Disp: , Rfl:   •  sodium chloride (OCEAN NASAL SPRAY) 0.65 % nasal spray, 1 spray into the nostril(s) as directed by provider As Needed for Congestion., Disp: 30 mL, Rfl: 0  •  topiramate (TOPAMAX) 50 MG tablet, Take 50 mg by mouth 2 (Two) Times a Day., Disp: , Rfl:   •  traMADol (ULTRAM) 50 MG tablet, Take 1 tablet by mouth Every 8 (Eight) Hours As Needed for Moderate Pain ., Disp: 60 tablet, Rfl: 2  •  traZODone (DESYREL) 150 MG tablet, Take 1 tablet by mouth As Needed., Disp: , Rfl: 3  •  Vraylar 1.5 MG capsule capsule, TK 1 C PO D, Disp: , Rfl:   •  XHANCE 93 MCG/ACT Exhaler Suspension, , Disp: , Rfl:     VITALS:  Physical Exam  HENT:      Head: Normocephalic.      Nose: Nose normal.    Pulmonary:      Effort: Pulmonary effort is normal. No respiratory distress.   Neurological:      Mental Status: She is alert and oriented to person, place, and time. Mental status is at baseline.   Psychiatric:         Mood and Affect: Mood normal.         Result Review :     Most Recent A1C    HGBA1C Most Recent 6/16/20   Hemoglobin A1C 5.9                Assessment and Plan    Diagnoses and all orders for this visit:    1. Allergic rhinitis, unspecified seasonality, unspecified trigger (Primary)  -     montelukast (SINGULAIR) 10 MG tablet; Take 1 tablet by mouth Every Night.  Dispense: 30 tablet; Refill: 2  Continue XHANCE steroid nasal spray.  Add Singulair to see if might help symptoms.  She is to let me know about her Covid test results.  Unless she is having a fever with purulent nasal secretions I would like to avoid antibiotics.  May need to consider follow-up with ENT.  2. Hormone replacement therapy (HRT)  -     estradiol (ESTRACE) 2 MG tablet; Take 1 tablet by mouth Daily.  Dispense: 30 tablet; Refill: 5    3. Prediabetes  -     metFORMIN (GLUCOPHAGE) 500 MG tablet; Take 1 tablet by mouth Daily With Breakfast.  Dispense: 90 tablet; Refill: 1  Encouraged healthy diet less processed foods less sugar and sweets.  4. Chronic midline low back pain without sciatica  -     methocarbamol (ROBAXIN) 500 MG tablet; Take 1 tablet by mouth 3 (Three) Times a Day As Needed for Muscle Spasms.  Dispense: 60 tablet; Refill: 2        I spent 38 minutes caring for Megan on this date of service. This time includes time spent by me in the following activities:preparing for the visit, reviewing tests, obtaining and/or reviewing a separately obtained history, performing a medically appropriate examination and/or evaluation , counseling and educating the patient/family/caregiver, ordering medications, tests, or procedures and documenting information in the medical record  Follow Up   Return in about 5 months (around  6/5/2021), or if symptoms worsen or fail to improve.      I discussed the patients findings and my recommendations with patient.  Patient was encouraged to keep me informed of any acute changes, lack of improvement, or any new concerning symptoms.  Patient voiced understanding of all instructions and denied further questions.    Electronically signed by:    MAGGIE Chacon  01/05/2021    EMR Dragon/Transcription Disclaimer:  Much of this encounter note is an electronic transcription/translation of spoken language to printed text.  The electronic translation of spoken language may permit erroneous, or at times, nonsensical words or phrases to be inadvertently transcribed.  Although I have reviewed the note for such errors, some may still exist

## 2021-01-19 ENCOUNTER — E-VISIT (OUTPATIENT)
Dept: INTERNAL MEDICINE | Facility: CLINIC | Age: 41
End: 2021-01-19

## 2021-01-19 DIAGNOSIS — J01.91 ACUTE RECURRENT SINUSITIS, UNSPECIFIED LOCATION: Primary | ICD-10-CM

## 2021-01-19 PROCEDURE — 99422 OL DIG E/M SVC 11-20 MIN: CPT | Performed by: NURSE PRACTITIONER

## 2021-01-25 ENCOUNTER — PRIOR AUTHORIZATION (OUTPATIENT)
Dept: INTERNAL MEDICINE | Facility: CLINIC | Age: 41
End: 2021-01-25

## 2021-01-26 ENCOUNTER — TELEPHONE (OUTPATIENT)
Dept: INTERNAL MEDICINE | Facility: CLINIC | Age: 41
End: 2021-01-26

## 2021-01-26 NOTE — TELEPHONE ENCOUNTER
PATIENT REQUESTS CALL BACK REGARDING SINUS INFECTION.    PATIENT STATED SHE HAS RECEIVED A COVID TEST, AND  TESTED NEGATIVE FROM [facility] 1/7/21    PATIENT REQUESTING MEDICATION TREAT HER SINUS INFECTION.      PATIENT STATED IF DOCTOR BARRIE IS ABLE TO PRESCRIBE AN ANTIBIOTIC, SHE WILL ALSO NEED A PRESCRIPTION TO TREAT YEAST INFECTION.      PATIENT STATED SHE HAD SENT A MESSAGE VIA [facility] 1/21.    PLEASE ADVISE:  [phone]      PHARMACY:      [id number] [place], [place] - [facility] [address] AT [facility] [address] - [phone]  - [phone]   [phone]

## 2021-01-27 RX ORDER — FLUCONAZOLE 150 MG/1
TABLET ORAL
Qty: 2 TABLET | Refills: 0 | Status: SHIPPED | OUTPATIENT
Start: 2021-01-27 | End: 2021-06-26 | Stop reason: SDUPTHER

## 2021-01-27 RX ORDER — AZITHROMYCIN 250 MG/1
TABLET, FILM COATED ORAL
Qty: 6 TABLET | Refills: 0 | Status: SHIPPED | OUTPATIENT
Start: 2021-01-27 | End: 2021-03-01

## 2021-01-28 ENCOUNTER — PRIOR AUTHORIZATION (OUTPATIENT)
Dept: INTERNAL MEDICINE | Facility: CLINIC | Age: 41
End: 2021-01-28

## 2021-02-02 NOTE — PROGRESS NOTES
Chief Complaint  Sinus infection    EVISIT    Subjective          Megan Post presents to [facility] for sinus infection    History of Present Illness  Onset sx jan 7 with nasal congestion, pain around nose face, headache, neck pain, sneezing cough congestion positive loss taste smell.  Had covid test a few days ago was negative.    Objective   Vital Signs:   Physical Exam   telehealth  Result Review :                 Assessment and Plan    Problem List Items Addressed This Visit     None      Visit Diagnoses     Acute recurrent sinusitis, unspecified location    -  Primary      azithromycin zpak 250mg tab  flonase OTC  Diflucan 150mg once for vaginitis sx with abx.    Discussed with pt repeating covid test. Pt declines. States had negative test earlier this month. Will treat for sinus infection as does have hx of recurrent sinusitis. If no improvement repeat covid test.    This visit has been rescheduled as a E visit to comply with patient safety concerns in accordance with CDC recommendations. Total time of discussion was 11 minutes.        Follow Up   Return if symptoms worsen or fail to improve.  Patient was given instructions and counseling regarding her condition or for health maintenance advice. Please see specific information pulled into the AVS if appropriate.

## 2021-03-01 ENCOUNTER — CLINICAL SUPPORT (OUTPATIENT)
Dept: INTERNAL MEDICINE | Facility: CLINIC | Age: 41
End: 2021-03-01

## 2021-03-01 ENCOUNTER — TELEMEDICINE (OUTPATIENT)
Dept: INTERNAL MEDICINE | Facility: CLINIC | Age: 41
End: 2021-03-01

## 2021-03-01 DIAGNOSIS — J01.01 ACUTE RECURRENT MAXILLARY SINUSITIS: ICD-10-CM

## 2021-03-01 DIAGNOSIS — J06.9 UPPER RESPIRATORY TRACT INFECTION, UNSPECIFIED TYPE: ICD-10-CM

## 2021-03-01 DIAGNOSIS — J30.2 SEASONAL ALLERGIES: ICD-10-CM

## 2021-03-01 DIAGNOSIS — J06.9 UPPER RESPIRATORY TRACT INFECTION, UNSPECIFIED TYPE: Primary | ICD-10-CM

## 2021-03-01 PROCEDURE — 99213 OFFICE O/P EST LOW 20 MIN: CPT | Performed by: NURSE PRACTITIONER

## 2021-03-01 PROCEDURE — U0004 COV-19 TEST NON-CDC HGH THRU: HCPCS | Performed by: NURSE PRACTITIONER

## 2021-03-01 RX ORDER — AZELASTINE 1 MG/ML
SPRAY, METERED NASAL
Qty: 30 ML | Refills: 2 | Status: SHIPPED | OUTPATIENT
Start: 2021-03-01 | End: 2022-07-01

## 2021-03-01 RX ORDER — AMOXICILLIN AND CLAVULANATE POTASSIUM 875; 125 MG/1; MG/1
1 TABLET, FILM COATED ORAL 2 TIMES DAILY
Qty: 20 TABLET | Refills: 0 | Status: SHIPPED | OUTPATIENT
Start: 2021-03-01 | End: 2021-03-11

## 2021-03-01 NOTE — PROGRESS NOTES
Chief Complaint   Patient presents with   • Sinusitis   • Allergies       The use of a video visit has been reviewed with the patient and verbal informed consent has been obtained.      History of Present Illness    40 y.o.female presents for allergies and sinus infection.  Not better since last seen; c/o dry cough congestion voice changes. has left facial maxillary pain swelling low grade fever. Eye watery allergies also bothering her. Has had similar symptoms over last couple months. Had zpack Jamil; aug dec. Has not seen ENT recently; hx of sinus surgery.  Has been taking zyrtec, and xhance.    ROS: any positive referenced above.   Other ROS negative.      [facility]  The following portions of the patient's history were reviewed and updated as appropriate: allergies, current medications, past family history, past medical history, past social history, past surgical history and problem list.     Past Medical History:   Diagnosis Date   • Abdominal pain    • Abnormal breast exam    • Abnormal Pap smear of cervix    • Achilles tendinitis    • Acute sinusitis    • Anxiety    • Arthritis    • Asthma    • Tavera's syndrome    • Bipolar 1 disorder (CMS/HCC)    • Bowel trouble    • Breast cyst    • Colon polyps    • Constipation    • Depression    • Diverticulitis    • Diverticulitis of colon    • Endometriosis    • Eustachian tube dysfunction    • Extremity pain    • Fatty liver    • Female pelvic pain    • Fibromyalgia    • Gastric ulcer    • H/O bladder infections    • History of rashes as a child    • Irritable bowel syndrome    • Low back pain    • Lumbar radiculopathy    • Menopausal symptoms    • Muscle weakness    • Ovarian cyst    • PID (pelvic inflammatory disease)    • Recurrent UTI    • Sexual problems    • Spinal stenosis of lumbar region       Allergies   Allergen Reactions   • Adhesive Tape Hives   • Latex Hives   • Sulfa Antibiotics Hives   • Sulfate Hives   • Biaxin [Clarithromycin] Nausea Only   • Codeine  Itching   • Penicillins Nausea Only      Social History     Tobacco Use   • Smoking status: Current Every Day Smoker     Packs/day: 0.50     Types: Cigarettes     Start date: 6/11/2003   • Smokeless tobacco: Never Used   • Tobacco comment: nicotine patches for smoking cessation   Substance Use Topics   • Alcohol use: No   • Drug use: No         Current Outpatient Medications:   •  azithromycin (ZITHROMAX) 250 MG tablet, Take 2 tablets the first day, then 1 tablet daily for 4 days., Disp: 6 tablet, Rfl: 0  •  bisacodyl 5 MG EC tablet, TK 1 T PO 1 TO 2 XD FOR CONSTIPATION, Disp: , Rfl:   •  cetirizine (zyrTEC) 10 MG tablet, Take 1 tablet by mouth Daily., Disp: , Rfl: 1  •  dicyclomine (Bentyl) 10 MG capsule, Take 1 capsule by mouth 4 (Four) Times a Day Before Meals & at Bedtime As Needed (abdominal cramping diarrhea)., Disp: 120 capsule, Rfl: 2  •  dilTIAZem (Cardizem) 60 MG tablet, Take 1 tablet by mouth 2 (Two) Times a Day., Disp: 60 tablet, Rfl: 3  •  DULoxetine (CYMBALTA) 60 MG capsule, 80 mg., Disp: , Rfl:   •  DUPIXENT 300 MG/2ML solution prefilled syringe, , Disp: , Rfl:   •  EPIPEN 2-JAREN 0.3 MG/0.3ML solution auto-injector injection, U UTD, Disp: , Rfl: 6  •  esomeprazole (nexIUM) 20 MG capsule, Take 1 capsule by mouth 2 (two) times a day., Disp: 60 capsule, Rfl: 11  •  estradiol (ESTRACE) 2 MG tablet, Take 1 tablet by mouth Daily., Disp: 30 tablet, Rfl: 5  •  eszopiclone (LUNESTA) 2 MG tablet, TK 1 T PO QHS PRN SLEEP, Disp: , Rfl:   •  fluconazole (Diflucan) 150 MG tablet, Take 1 tab by mouth now and repeat in 72 hrs., Disp: 2 tablet, Rfl: 0  •  LORazepam (ATIVAN) 1 MG tablet, TK 1 T PO D PRN, Disp: , Rfl: 0  •  Magnesium 250 MG tablet, TK 1-2 TS PO D FOR CONSTIPATION IF TAKING MIRALAX IS NOT EFFECTIVE, Disp: , Rfl:   •  metFORMIN (GLUCOPHAGE) 500 MG tablet, Take 1 tablet by mouth Daily With Breakfast., Disp: 90 tablet, Rfl: 1  •  methocarbamol (ROBAXIN) 500 MG tablet, Take 1 tablet by mouth 3 (Three) Times a  Day As Needed for Muscle Spasms., Disp: 60 tablet, Rfl: 2  •  montelukast (SINGULAIR) 10 MG tablet, Take 1 tablet by mouth Every Night., Disp: 30 tablet, Rfl: 2  •  Multiple Vitamin (MULTI-VITAMIN DAILY PO), Take  by mouth Daily., Disp: , Rfl:   •  omeprazole (priLOSEC) 40 MG capsule, TK 1 C PO BID 30 MINUTES B ANGELA AND SUPPER, Disp: , Rfl:   •  pregabalin (LYRICA) 100 MG capsule, TK 1 C PO BID UTD, Disp: , Rfl:   •  QVAR REDIHALER 80 MCG/ACT inhaler, INHALE 2 PUFFS BID, Disp: , Rfl:   •  sodium chloride (OCEAN NASAL SPRAY) 0.65 % nasal spray, 1 spray into the nostril(s) as directed by provider As Needed for Congestion., Disp: 30 mL, Rfl: 0  •  topiramate (TOPAMAX) 50 MG tablet, Take 50 mg by mouth 2 (Two) Times a Day., Disp: , Rfl:   •  traMADol (ULTRAM) 50 MG tablet, Take 1 tablet by mouth Every 8 (Eight) Hours As Needed for Moderate Pain ., Disp: 60 tablet, Rfl: 2  •  traZODone (DESYREL) 150 MG tablet, Take 1 tablet by mouth As Needed., Disp: , Rfl: 3  •  Vraylar 1.5 MG capsule capsule, TK 1 C PO D, Disp: , Rfl:   •  XHANCE 93 MCG/ACT Exhaler Suspension, , Disp: , Rfl:     VITALS:  Physical Exam  Constitutional:       General: She is not in acute distress.  HENT:      Head:      Comments: Face flushed and swollen over left maxillary sinus     Nose: Congestion present.      Left Sinus: Maxillary sinus tenderness present.   Eyes:      General:         Right eye: No discharge.         Left eye: No discharge.      Pupils: Pupils are equal, round, and reactive to light.   Pulmonary:      Effort: Pulmonary effort is normal. No respiratory distress.   Neurological:      General: No focal deficit present.      Mental Status: She is alert and oriented to person, place, and time.   Psychiatric:         Mood and Affect: Mood normal.         Result Review :            Assessment and Plan    Diagnoses and all orders for this visit:    1. Upper respiratory tract infection, unspecified type (Primary)  -     COVID-19 [facility], NP SWAB IN [employer] 24-30 HR TAT - Swab, Nasopharynx; Future    2. Acute recurrent maxillary sinusitis  -     amoxicillin-clavulanate (Augmentin) 875-125 MG per tablet; Take 1 tablet by mouth 2 (Two) Times a Day for 10 days.  Dispense: 20 tablet; Refill: 0    3. Seasonal allergies  -     azelastine (ASTELIN) 0.1 % nasal spray; 1 spray each nostril daily  Dispense: 30 mL; Refill: 2    pt has contact info for ENT; encouraged pt to call for FU appt for follow up recurrent sinusitis and allergies.        Follow Up   Return if symptoms worsen or fail to improve.      I discussed the patients findings and my recommendations with patient.  Patient was encouraged to keep me informed of any acute changes, lack of improvement, or any new concerning symptoms.  Patient voiced understanding of all instructions and denied further questions.    Electronically signed by:    MAGGIE Chacon  03/01/2021    EMR Dragon/Transcription Disclaimer:  Much of this encounter note is an electronic transcription/translation of spoken language to printed text.  The electronic translation of spoken language may permit erroneous, or at times, nonsensical words or phrases to be inadvertently transcribed.  Although I have reviewed the note for such errors, some may still exist

## 2021-03-02 LAB — SARS-COV-2 RNA RESP QL NAA+PROBE: NOT DETECTED

## 2021-03-17 NOTE — PROGRESS NOTES
"Chief Complaint  Establish Care and Shortness of Breath    Subjective    History of Present Illness {CC  Problem List  Visit  Diagnosis   Encounters  Notes  Medications  Labs  Result Review Imaging  Media :23}     Megan Post presents to [facility] CARDIOLOGY for   History of Present Illness   40-year-old female with inappropriate sinus tachycardia, bipolar disorder, fibromyalgia, asthma, anxiety who presents today as a self-referral her tachycardia and shortness of breath.  Patient was evaluated last year by Dr. Meier due to symptoms of palpitations, atypical chest pain and presyncope.  Patient had a normal exercise treadmill in 2018 with diminished exercise capacity, echo was also normal.  She had cardiac catheterization in 2010 which showed normal coronaries.  She wore a 24-hour Holter monitor in 2019 which was normal.    She reports having \"spells\" of near syncope, tachycardia in the shower. She reports also having significant worsening CASAS when climbing stairs. Reports that her HR gets up to 155.  She says she has gained 10lbs over the past 2 months. She thinks this exacerbated her recent symptoms. She reports that sometimes she forgets to take the diltiazem. She reports waking up gasping for air twice. She reports that she has never had symptoms like this before. She is scared she is going to go to sleep and not wake up. Denies syncope. She reports chest tightness if she exercises, which is chronic for her.  She thinks her chest pain might be worse.  She also has it at rest.    Objective     Vital Signs:   Vitals:    03/18/21 1403 03/18/21 1404 03/18/21 1405   BP: 119/80 113/73 117/71   BP Location: Right arm Left arm Left arm   Patient Position: Sitting Standing Sitting   Cuff Size: Large Adult Large Adult Adult   Pulse: 108 113 108   Resp:   18   Temp:   98.2 °F (36.8 °C)   TempSrc:   Temporal   SpO2: 97% 95% 93%   Weight:   87.1 kg (192 lb)   Height:   154.9 cm (61\")     Body " mass index is 36.28 kg/m².  Physical Exam  Vitals reviewed.   Constitutional:       Appearance: Normal appearance.   HENT:      Head: Normocephalic.   Eyes:      Extraocular Movements: Extraocular movements intact.      Pupils: Pupils are equal, round, and reactive to light.   Neck:      Vascular: No carotid bruit.   Cardiovascular:      Rate and Rhythm: Regular rhythm. Tachycardia present.      Pulses: Normal pulses.      Heart sounds: Normal heart sounds, S1 normal and S2 normal. No murmur heard.     Pulmonary:      Effort: Pulmonary effort is normal. No respiratory distress.      Breath sounds: Normal breath sounds.   Chest:      Chest wall: No tenderness.   Abdominal:      General: Abdomen is flat. Bowel sounds are normal.      Palpations: Abdomen is soft.   Musculoskeletal:      Cervical back: Neck supple.      Right lower leg: No edema.      Left lower leg: No edema.   Skin:     General: Skin is warm and dry.   Neurological:      General: No focal deficit present.      Mental Status: She is alert and oriented to person, place, and time. Mental status is at baseline.   Psychiatric:         Mood and Affect: Mood normal.         Behavior: Behavior normal.         Thought Content: Thought content normal.              Result Review  Data Reviewed:{ Labs  Result Review  Imaging  Med Tab  Media :23}   SCANNED - CARDIOLOGY (07/29/2019)  SCANNED - ECHOCARDIOGRAM (08/01/2019)  ECG 12 Lead (06/16/2020 15:45)  Holter Monitor - 72 Hour Up To 21 Days (07/29/2019 16:15)    Consultant notes Dr. Meier          EKG today shows sinus tachycardia, otherwise normal EKG.  Heart rate 105    Assessment and Plan {CC Problem List  Visit Diagnosis  ROS  Review (Popup)  Health Maintenance  Quality  BestPractice  Medications  SmartSets  SnapShot Encounters  Media :23}   1. Inappropriate sinus tachycardia  We will change her Cardizem to extended release 120 mg daily to help with compliance and see if this improves her heart  rates.  If blood pressure stable, will consider increasing to 180 mg daily  Offered to repeat a monitor, but she defers this because of history of severe skin reaction to previous monitors.  She would like to focus on losing weight, trying the diltiazem prior to considering further interventions  - ECG 12 Lead; Future  - Adult Transthoracic Echo Complete W/ Cont if Necessary Per Protocol; Future  - Comprehensive Metabolic Panel; Future  - TSH Rfx On Abnormal To Free T4; Future  - CBC & Differential; Future  - Magnesium; Future    2. Shortness of breath  - ECG 12 Lead; Future  - Adult Transthoracic Echo Complete W/ Cont if Necessary Per Protocol; Future  - Comprehensive Metabolic Panel; Future    3. Chest pain, unspecified type  Discussed doing stress test versus coronary CTA.  She defers both of these.  She would like to focus on better heart rate control and weight loss and see if her symptoms improve. .  Advised to call with any worsening symptoms  She did have a normal heart cath in 2016, which is reassuring  - Adult Transthoracic Echo Complete W/ Cont if Necessary Per Protocol; Future    4. Other fatigue  - Comprehensive Metabolic Panel; Future  - TSH Rfx On Abnormal To Free T4; Future  - CBC & Differential; Future  - Vitamin D 25 Hydroxy; Future  - Vitamin B12; Future    5. Prediabetes  - Hemoglobin A1c; Future    6. Mixed hyperlipidemia  - Lipid Panel; Future    7. Obesity (BMI 30-39.9)  Discussed weight loss.  She feels limited in her attempts to lose weight because of her tachycardia.  Advised her to start exercise slowly, but also reassured her that most of her weight loss will come from her diet.  Discussed various diet strategies including counting calories and weight watchers.  She plans to look into weight watchers  - Vitamin D 25 Hydroxy; Future            Follow Up {Instructions Charge Capture  Follow-up Communications :23}   Return in about 4 weeks (around 4/15/2021) for Telemedicine,  tachycardia.    Patient was given instructions and counseling regarding her condition or for health maintenance advice. Please see specific information pulled into the AVS if appropriate.  Patient was instructed to call the [facility] with any questions, concerns, or worsening symptoms.    *Please note that portions of this note were completed with a voice recognition program. Efforts were made to edit the dictations, but occasionally words are mistranscribed.

## 2021-03-18 ENCOUNTER — LAB (OUTPATIENT)
Dept: LAB | Facility: HOSPITAL | Age: 41
End: 2021-03-18

## 2021-03-18 ENCOUNTER — OFFICE VISIT (OUTPATIENT)
Dept: CARDIOLOGY | Facility: HOSPITAL | Age: 41
End: 2021-03-18

## 2021-03-18 ENCOUNTER — HOSPITAL ENCOUNTER (OUTPATIENT)
Dept: CARDIOLOGY | Facility: HOSPITAL | Age: 41
Discharge: HOME | End: 2021-03-18

## 2021-03-18 VITALS
SYSTOLIC BLOOD PRESSURE: 117 MMHG | HEART RATE: 108 BPM | OXYGEN SATURATION: 93 % | RESPIRATION RATE: 18 BRPM | TEMPERATURE: 98.2 F | WEIGHT: 192 LBS | BODY MASS INDEX: 36.25 KG/M2 | HEIGHT: 61 IN | DIASTOLIC BLOOD PRESSURE: 71 MMHG

## 2021-03-18 DIAGNOSIS — E78.2 MIXED HYPERLIPIDEMIA: ICD-10-CM

## 2021-03-18 DIAGNOSIS — E66.9 OBESITY (BMI 30-39.9): ICD-10-CM

## 2021-03-18 DIAGNOSIS — R53.83 OTHER FATIGUE: ICD-10-CM

## 2021-03-18 DIAGNOSIS — R00.0 INAPPROPRIATE SINUS TACHYCARDIA: ICD-10-CM

## 2021-03-18 DIAGNOSIS — R06.02 SHORTNESS OF BREATH: ICD-10-CM

## 2021-03-18 DIAGNOSIS — R00.0 INAPPROPRIATE SINUS TACHYCARDIA: Primary | ICD-10-CM

## 2021-03-18 DIAGNOSIS — R73.03 PREDIABETES: ICD-10-CM

## 2021-03-18 DIAGNOSIS — R07.9 CHEST PAIN, UNSPECIFIED TYPE: ICD-10-CM

## 2021-03-18 LAB
BASOPHILS # BLD AUTO: 0.03 10*3/MM3 (ref 0–0.2)
BASOPHILS NFR BLD AUTO: 0.4 % (ref 0–1.5)
DEPRECATED RDW RBC AUTO: 40.5 FL (ref 37–54)
EOSINOPHIL # BLD AUTO: 0.11 10*3/MM3 (ref 0–0.4)
EOSINOPHIL NFR BLD AUTO: 1.6 % (ref 0.3–6.2)
ERYTHROCYTE [DISTWIDTH] IN BLOOD BY AUTOMATED COUNT: 12.8 % (ref 12.3–15.4)
HCT VFR BLD AUTO: 40.2 % (ref 34–46.6)
HGB BLD-MCNC: 13.1 G/DL (ref 12–15.9)
IMM GRANULOCYTES # BLD AUTO: 0.04 10*3/MM3 (ref 0–0.05)
IMM GRANULOCYTES NFR BLD AUTO: 0.6 % (ref 0–0.5)
LYMPHOCYTES # BLD AUTO: 2.2 10*3/MM3 (ref 0.7–3.1)
LYMPHOCYTES NFR BLD AUTO: 32 % (ref 19.6–45.3)
MCH RBC QN AUTO: 28.5 PG (ref 26.6–33)
MCHC RBC AUTO-ENTMCNC: 32.6 G/DL (ref 31.5–35.7)
MCV RBC AUTO: 87.4 FL (ref 79–97)
MONOCYTES # BLD AUTO: 0.34 10*3/MM3 (ref 0.1–0.9)
MONOCYTES NFR BLD AUTO: 4.9 % (ref 5–12)
NEUTROPHILS NFR BLD AUTO: 4.15 10*3/MM3 (ref 1.7–7)
NEUTROPHILS NFR BLD AUTO: 60.5 % (ref 42.7–76)
NRBC BLD AUTO-RTO: 0 /100 WBC (ref 0–0.2)
PLATELET # BLD AUTO: 334 10*3/MM3 (ref 140–450)
PMV BLD AUTO: 11.4 FL (ref 6–12)
QT INTERVAL: 338 MS
QTC INTERVAL: 446 MS
RBC # BLD AUTO: 4.6 10*6/MM3 (ref 3.77–5.28)
WBC # BLD AUTO: 6.87 10*3/MM3 (ref 3.4–10.8)

## 2021-03-18 PROCEDURE — 82306 VITAMIN D 25 HYDROXY: CPT

## 2021-03-18 PROCEDURE — 80061 LIPID PANEL: CPT

## 2021-03-18 PROCEDURE — 83735 ASSAY OF MAGNESIUM: CPT

## 2021-03-18 PROCEDURE — 99214 OFFICE O/P EST MOD 30 MIN: CPT | Performed by: NURSE PRACTITIONER

## 2021-03-18 PROCEDURE — 80053 COMPREHEN METABOLIC PANEL: CPT

## 2021-03-18 PROCEDURE — 93005 ELECTROCARDIOGRAM TRACING: CPT | Performed by: NURSE PRACTITIONER

## 2021-03-18 PROCEDURE — 83036 HEMOGLOBIN GLYCOSYLATED A1C: CPT

## 2021-03-18 PROCEDURE — 93010 ELECTROCARDIOGRAM REPORT: CPT | Performed by: INTERNAL MEDICINE

## 2021-03-18 PROCEDURE — 84443 ASSAY THYROID STIM HORMONE: CPT

## 2021-03-18 PROCEDURE — 36415 COLL VENOUS BLD VENIPUNCTURE: CPT

## 2021-03-18 PROCEDURE — 85025 COMPLETE CBC W/AUTO DIFF WBC: CPT

## 2021-03-18 PROCEDURE — 82607 VITAMIN B-12: CPT

## 2021-03-18 RX ORDER — DILTIAZEM HYDROCHLORIDE 120 MG/1
120 CAPSULE, COATED, EXTENDED RELEASE ORAL DAILY
Qty: 30 CAPSULE | Refills: 3 | Status: SHIPPED | OUTPATIENT
Start: 2021-03-18 | End: 2021-08-09

## 2021-03-19 LAB
25(OH)D3 SERPL-MCNC: 51.1 NG/ML
ALBUMIN SERPL-MCNC: 4.1 G/DL (ref 3.5–5.2)
ALBUMIN/GLOB SERPL: 1.3 G/DL
ALP SERPL-CCNC: 67 U/L (ref 39–117)
ALT SERPL W P-5'-P-CCNC: 34 U/L (ref 1–33)
ANION GAP SERPL CALCULATED.3IONS-SCNC: 11.7 MMOL/L (ref 5–15)
AST SERPL-CCNC: 27 U/L (ref 1–32)
BILIRUB SERPL-MCNC: <0.2 MG/DL (ref 0–1.2)
BUN SERPL-MCNC: 14 MG/DL (ref 6–20)
BUN/CREAT SERPL: 18.9 (ref 7–25)
CALCIUM SPEC-SCNC: 9.1 MG/DL (ref 8.6–10.5)
CHLORIDE SERPL-SCNC: 106 MMOL/L (ref 98–107)
CHOLEST SERPL-MCNC: 235 MG/DL (ref 0–200)
CO2 SERPL-SCNC: 19.3 MMOL/L (ref 22–29)
CREAT SERPL-MCNC: 0.74 MG/DL (ref 0.57–1)
GFR SERPL CREATININE-BSD FRML MDRD: 88 ML/MIN/1.73
GLOBULIN UR ELPH-MCNC: 3.1 GM/DL
GLUCOSE SERPL-MCNC: 81 MG/DL (ref 65–99)
HBA1C MFR BLD: 5.69 % (ref 4.8–5.6)
HDLC SERPL-MCNC: 42 MG/DL (ref 40–60)
LDLC SERPL CALC-MCNC: 125 MG/DL (ref 0–100)
LDLC/HDLC SERPL: 2.76 {RATIO}
MAGNESIUM SERPL-MCNC: 2.1 MG/DL (ref 1.6–2.6)
POTASSIUM SERPL-SCNC: 4.1 MMOL/L (ref 3.5–5.2)
PROT SERPL-MCNC: 7.2 G/DL (ref 6–8.5)
SODIUM SERPL-SCNC: 137 MMOL/L (ref 136–145)
TRIGL SERPL-MCNC: 385 MG/DL (ref 0–150)
TSH SERPL DL<=0.05 MIU/L-ACNC: 1.49 UIU/ML (ref 0.27–4.2)
VIT B12 BLD-MCNC: 420 PG/ML (ref 211–946)
VLDLC SERPL-MCNC: 68 MG/DL (ref 5–40)

## 2021-03-24 ENCOUNTER — OFFICE VISIT (OUTPATIENT)
Dept: INTERNAL MEDICINE | Facility: CLINIC | Age: 41
End: 2021-03-24

## 2021-03-24 VITALS
WEIGHT: 188.6 LBS | HEART RATE: 94 BPM | DIASTOLIC BLOOD PRESSURE: 72 MMHG | SYSTOLIC BLOOD PRESSURE: 112 MMHG | BODY MASS INDEX: 35.64 KG/M2 | OXYGEN SATURATION: 98 % | TEMPERATURE: 97.7 F

## 2021-03-24 DIAGNOSIS — Z83.2 FAMILY HISTORY OF AUTOIMMUNE DISORDER: ICD-10-CM

## 2021-03-24 DIAGNOSIS — R19.5 LOOSE STOOLS: ICD-10-CM

## 2021-03-24 DIAGNOSIS — R21 FACIAL RASH: Primary | ICD-10-CM

## 2021-03-24 DIAGNOSIS — Z51.81 MEDICATION MONITORING ENCOUNTER: ICD-10-CM

## 2021-03-24 DIAGNOSIS — M51.26 LUMBAR DISCOGENIC PAIN SYNDROME: ICD-10-CM

## 2021-03-24 DIAGNOSIS — R63.5 WEIGHT GAIN: ICD-10-CM

## 2021-03-24 PROCEDURE — 99214 OFFICE O/P EST MOD 30 MIN: CPT | Performed by: NURSE PRACTITIONER

## 2021-03-24 RX ORDER — VILAZODONE HYDROCHLORIDE 20 MG/1
TABLET ORAL
COMMUNITY
Start: 2021-03-20 | End: 2021-06-07

## 2021-03-24 RX ORDER — TRAMADOL HYDROCHLORIDE 50 MG/1
50 TABLET ORAL EVERY 8 HOURS PRN
Qty: 60 TABLET | Refills: 2 | Status: SHIPPED | OUTPATIENT
Start: 2021-03-24 | End: 2021-09-28 | Stop reason: SDUPTHER

## 2021-03-24 NOTE — PROGRESS NOTES
Chief Complaint   Patient presents with   • Facial Swelling     rash on face; wants to be checked for autoimmune   • Abnormal Weight Gain   • Back Pain       History of Present Illness    41 y.o.female presents for face rash/swelling; can't lose weight, back pain.    Recently reeval with ENT for facial pain swelling. Did not think r/t sinuses. Pt has frequent face pain and sinus congestion pain with red swelling along maxillary sinuses. She gets rash on face across cheeks with intermittent pain. Has a family hx of autoimmune diseases including lupus and wants to be tested for autoimmune diseases. Feels like sick all the time with something. Has loose stools.  No fever; no pain swelling in front of ears or lower cheeks.  Frustrated still can't lose weight  Chronic back pain midline with radiation down buttocks legs with lumbar disc bulges on prior mri. Takes tramadol helps.    Review of Systems   Constitutional: Negative for chills and fever.   HENT: Positive for congestion and sinus pressure.    Respiratory: Negative for shortness of breath.    Cardiovascular: Negative for chest pain.   Musculoskeletal: Positive for arthralgias and back pain.   Skin: Positive for rash.           [facility]  The following portions of the patient's history were reviewed and updated as appropriate: allergies, current medications, past family history, past medical history, past social history, past surgical history and problem list.     Past Medical History:   Diagnosis Date   • Abdominal pain    • Abnormal breast exam    • Abnormal Pap smear of cervix    • Achilles tendinitis    • Acute sinusitis    • Anxiety    • Arthritis    • Asthma    • Tavera's syndrome    • Bipolar 1 disorder (CMS/HCC)    • Bowel trouble    • Breast cyst    • Colon polyps    • Constipation    • Depression    • Diverticulitis    • Diverticulitis of colon    • Endometriosis    • Eustachian tube dysfunction    • Extremity pain    • Fatty liver    • Female pelvic pain     • Fibromyalgia    • Gastric ulcer    • H/O bladder infections    • History of rashes as a child    • Irritable bowel syndrome    • Low back pain    • Lumbar radiculopathy    • Menopausal symptoms    • Muscle weakness    • Ovarian cyst    • PID (pelvic inflammatory disease)    • Recurrent UTI    • Sexual problems    • Spinal stenosis of lumbar region       Allergies   Allergen Reactions   • Adhesive Tape Hives   • Latex Hives   • Sulfa Antibiotics Hives   • Sulfate Hives   • Biaxin [Clarithromycin] Nausea Only   • Codeine Itching   • Penicillins Nausea Only      Social History     Tobacco Use   • Smoking status: Former Smoker     Packs/day: 0.50     Types: Cigarettes     Start date: 2003     Quit date: 2021     Years since quittin.1   • Smokeless tobacco: Never Used   • Tobacco comment: nicotine patches for smoking cessation   Substance Use Topics   • Alcohol use: No   • Drug use: Never         Current Outpatient Medications:   •  Acid Gone 160-105 MG chewable tablet chewable tablet, CHEW AND SWALLOW 2 TABLETS BY MOUTH TWICE DAILY TO FOUR TIMES DAILY IF NEEDED FOR HEARTBURN. TAKE SEPARATELY FROM OTHER MEDICATIONS, Disp: , Rfl:   •  azelastine (ASTELIN) 0.1 % nasal spray, 1 spray each nostril daily, Disp: 30 mL, Rfl: 2  •  BIOTIN PO, Take 1 tablet by mouth Daily., Disp: , Rfl:   •  cetirizine (zyrTEC) 10 MG tablet, Take 1 tablet by mouth Daily., Disp: , Rfl: 1  •  COLLAGEN PO, Take 1 tablet by mouth., Disp: , Rfl:   •  dicyclomine (Bentyl) 10 MG capsule, Take 1 capsule by mouth 4 (Four) Times a Day Before Meals & at Bedtime As Needed (abdominal cramping diarrhea)., Disp: 120 capsule, Rfl: 2  •  dilTIAZem CD (CARDIZEM CD) 120 MG 24 hr capsule, Take 1 capsule by mouth Daily., Disp: 30 capsule, Rfl: 3  •  DULoxetine (CYMBALTA) 60 MG capsule, 80 mg., Disp: , Rfl:   •  EPIPEN 2-JAREN 0.3 MG/0.3ML solution auto-injector injection, U UTD, Disp: , Rfl: 6  •  estradiol (ESTRACE) 2 MG tablet, Take 1 tablet by mouth  Daily., Disp: 30 tablet, Rfl: 5  •  fluconazole (Diflucan) 150 MG tablet, Take 1 tab by mouth now and repeat in 72 hrs., Disp: 2 tablet, Rfl: 0  •  LORazepam (ATIVAN) 1 MG tablet, Take 1 mg by mouth Daily As Needed., Disp: , Rfl: 0  •  lubiprostone (AMITIZA) 8 MCG capsule, Take 8 mcg by mouth 2 (Two) Times a Day With Meals., Disp: , Rfl:   •  Magnesium 250 MG tablet, TK 1-2 TS PO D FOR CONSTIPATION IF TAKING MIRALAX IS NOT EFFECTIVE, Disp: , Rfl:   •  metFORMIN (GLUCOPHAGE) 500 MG tablet, Take 1 tablet by mouth Daily With Breakfast., Disp: 90 tablet, Rfl: 1  •  montelukast (SINGULAIR) 10 MG tablet, Take 1 tablet by mouth Every Night., Disp: 30 tablet, Rfl: 2  •  Multiple Vitamin (MULTI-VITAMIN DAILY PO), Take  by mouth Daily., Disp: , Rfl:   •  omeprazole (priLOSEC) 40 MG capsule, Take 40 mg by mouth 2 (two) times a day., Disp: , Rfl:   •  pregabalin (LYRICA) 100 MG capsule, Take 100 mg by mouth 2 (Two) Times a Day., Disp: , Rfl:   •  QVAR REDIHALER 80 MCG/ACT inhaler, Inhale 2 puffs 2 (Two) Times a Day As Needed., Disp: , Rfl:   •  topiramate (TOPAMAX) 50 MG tablet, Take 50 mg by mouth 2 (Two) Times a Day., Disp: , Rfl:   •  traMADol (ULTRAM) 50 MG tablet, Take 1 tablet by mouth Every 8 (Eight) Hours As Needed for Moderate Pain ., Disp: 60 tablet, Rfl: 2  •  traZODone (DESYREL) 150 MG tablet, Take 1 tablet by mouth As Needed., Disp: , Rfl: 3  •  Viibryd 20 MG tablet tablet, , Disp: , Rfl:   •  XHANCE 93 MCG/ACT Exhaler Suspension, Inhale 1 puff Daily As Needed., Disp: , Rfl:     VITALS:  /72   Pulse 94   Temp 97.7 °F (36.5 °C)   Wt 85.5 kg (188 lb 9.6 oz)   SpO2 98%   Breastfeeding No   BMI 35.64 kg/m²     Physical Exam  HENT:      Head: Normocephalic.      Nose: Congestion present. No rhinorrhea.      Right Sinus: No maxillary sinus tenderness or frontal sinus tenderness.      Left Sinus: No maxillary sinus tenderness or frontal sinus tenderness.      Mouth/Throat:      Mouth: Mucous membranes are  moist.   Pulmonary:      Effort: Pulmonary effort is normal. No respiratory distress.   Musculoskeletal:      Lumbar back: Tenderness present. Decreased range of motion.   Skin:     General: Skin is warm and dry.      Comments: Facial plethora over maxillary areas; no swelling redness or pain of parotid glands.   Neurological:      General: No focal deficit present.      Mental Status: She is alert and oriented to person, place, and time.   Psychiatric:         Mood and Affect: Mood normal.         Result Review :   pending         Assessment and Plan    Diagnoses and all orders for this visit:    1. Facial rash (Primary)  -     Sedimentation Rate; Future  -     RADHA With / DsDNA, RNP, Sjogrens A / B, Roy; Future    2. Family history of autoimmune disorder  -     Thyroid Peroxidase Antibody; Future  -     Sedimentation Rate; Future  -     C-reactive protein; Future  -     Rheumatoid Factor; Future  -     RADHA With / DsDNA, RNP, Sjogrens A / B, Roy; Future  -     Celiac Comprehensive Panel; Future  Other labs recently done; normal thyroid lab.  3. Loose stools  -     Celiac Comprehensive Panel; Future    4. Weight gain  -     ACTH; Future  -     Cortisol; Future    5. Lumbar discogenic pain syndrome  -     traMADol (ULTRAM) 50 MG tablet; Take 1 tablet by mouth Every 8 (Eight) Hours As Needed for Moderate Pain .  Dispense: 60 tablet; Refill: 2    6. Medication monitoring encounter  -     Urine Drug Screen - Urine, Clean Catch; Future    As part of patient's treatment plan I am prescribing a controlled substance.  The patient has been made aware of the appropriate use of such medications, including potential risk of somnolence, limited ability to drive and/or work safely, and potential for dependence and/or overdose.  It has also been made clear that these medications are for use by this patient only, without concomitant use of alcohol or other substances, unless prescribed.  The patient has read and signed the  [facility] Controlled Substance Contract.  I will continue to see patient for regular follow up appointments.  They are well controlled on their medication.  JOSELYN is updated today; appears appropriate for fill. The patient is aware of the potential for addiction and dependence.  csa   uds March 2021        Follow Up   Return in about 6 months (around 9/24/2021), or if symptoms worsen or fail to improve.      I discussed the patients findings and my recommendations with patient.  Patient was encouraged to keep me informed of any acute changes, lack of improvement, or any new concerning symptoms.  Patient voiced understanding of all instructions and denied further questions.    Electronically signed by:    MAGGIE Chacon  03/24/2021    EMR Dragon/Transcription Disclaimer:  Much of this encounter note is an electronic transcription/translation of spoken language to printed text.  The electronic translation of spoken language may permit erroneous, or at times, nonsensical words or phrases to be inadvertently transcribed.  Although I have reviewed the note for such errors, some may still exist

## 2021-03-25 ENCOUNTER — LAB (OUTPATIENT)
Dept: LAB | Facility: HOSPITAL | Age: 41
End: 2021-03-25

## 2021-03-25 DIAGNOSIS — Z83.2 FAMILY HISTORY OF AUTOIMMUNE DISORDER: ICD-10-CM

## 2021-03-25 DIAGNOSIS — R63.5 WEIGHT GAIN: ICD-10-CM

## 2021-03-25 DIAGNOSIS — R21 FACIAL RASH: ICD-10-CM

## 2021-03-25 DIAGNOSIS — R19.5 LOOSE STOOLS: ICD-10-CM

## 2021-03-25 LAB
CHROMATIN AB SERPL-ACNC: <10 IU/ML (ref 0–14)
CORTIS SERPL-MCNC: 17.71 MCG/DL
CRP SERPL-MCNC: 1.73 MG/DL (ref 0–0.5)
ERYTHROCYTE [SEDIMENTATION RATE] IN BLOOD: 29 MM/HR (ref 0–20)

## 2021-03-25 PROCEDURE — 82784 ASSAY IGA/IGD/IGG/IGM EACH: CPT

## 2021-03-25 PROCEDURE — 85652 RBC SED RATE AUTOMATED: CPT

## 2021-03-25 PROCEDURE — 82533 TOTAL CORTISOL: CPT

## 2021-03-25 PROCEDURE — 83516 IMMUNOASSAY NONANTIBODY: CPT

## 2021-03-25 PROCEDURE — 86038 ANTINUCLEAR ANTIBODIES: CPT

## 2021-03-25 PROCEDURE — 86140 C-REACTIVE PROTEIN: CPT

## 2021-03-25 PROCEDURE — 82024 ASSAY OF ACTH: CPT

## 2021-03-25 PROCEDURE — 86431 RHEUMATOID FACTOR QUANT: CPT

## 2021-03-25 PROCEDURE — 86376 MICROSOMAL ANTIBODY EACH: CPT

## 2021-03-25 PROCEDURE — 86255 FLUORESCENT ANTIBODY SCREEN: CPT

## 2021-03-26 LAB
ACTH PLAS-MCNC: 16.9 PG/ML (ref 7.2–63.3)
ANA SER QL: NEGATIVE
ENDOMYSIUM IGA SER QL: NEGATIVE
GLIADIN PEPTIDE IGA SER-ACNC: 5 UNITS (ref 0–19)
GLIADIN PEPTIDE IGG SER-ACNC: 3 UNITS (ref 0–19)
IGA SERPL-MCNC: 165 MG/DL (ref 87–352)
THYROPEROXIDASE AB SERPL-ACNC: <9 IU/ML (ref 0–34)
TTG IGA SER-ACNC: <2 U/ML (ref 0–3)
TTG IGG SER-ACNC: 3 U/ML (ref 0–5)

## 2021-03-29 NOTE — PROGRESS NOTES
Lab review. Negative celiac disease. No IGA or IGG deficiency. Negative RADHA. Normal thyroid lab. Normal cortisol labs. Inflammatory marker sed rate slightly elevated CRP greatly improved from prior. Negative rheumatoid factor. I do not see any autoimmune processes on these labs.

## 2021-03-30 ENCOUNTER — OFFICE VISIT (OUTPATIENT)
Dept: INTERNAL MEDICINE | Facility: CLINIC | Age: 41
End: 2021-03-30

## 2021-03-30 DIAGNOSIS — E66.01 MORBID (SEVERE) OBESITY DUE TO EXCESS CALORIES (HCC): Primary | ICD-10-CM

## 2021-03-30 PROCEDURE — 99443 PR PHYS/QHP TELEPHONE EVALUATION 21-30 MIN: CPT | Performed by: NURSE PRACTITIONER

## 2021-03-30 NOTE — PROGRESS NOTES
Chief Complaint   Patient presents with   • Labs Only     wants to review results   • Obesity       You have chosen to receive care through a telephone visit. Do you consent to use a telephone visit for your medical care today? Yes    History of Present Illness    41 y.o.female presents for lab review and obesity.  Recent labs done as pt wanting to make sure no autoimmune process since she has been having a recurrent rash swelling on face. Has had diagnosis of rosacea in past, but recent eval with ENT referenced may have autoimmune process.     Obesity chronic onset years.  Has tried different wt loss programs. Does not feel like able to lose wt. Has tried saxsenda didn't like nausea side effect. Would like to try other wt loss medication. Does have hx of palpitations.    Review of Systems   Constitutional: Negative for fever.   Cardiovascular: Positive for palpitations.   Musculoskeletal: Positive for arthralgias and back pain.   Skin: Positive for rash.           PMS[facility]  The following portions of the patient's history were reviewed and updated as appropriate: allergies, current medications, past family history, past medical history, past social history, past surgical history and problem list.     Past Medical History:   Diagnosis Date   • Abdominal pain    • Abnormal breast exam    • Abnormal Pap smear of cervix    • Achilles tendinitis    • Acute sinusitis    • Anxiety    • Arthritis    • Asthma    • Tavera's syndrome    • Bipolar 1 disorder (CMS/HCC)    • Bowel trouble    • Breast cyst    • Colon polyps    • Constipation    • Depression    • Diverticulitis    • Diverticulitis of colon    • Endometriosis    • Eustachian tube dysfunction    • Extremity pain    • Fatty liver    • Female pelvic pain    • Fibromyalgia    • Gastric ulcer    • H/O bladder infections    • History of rashes as a child    • Irritable bowel syndrome    • Low back pain    • Lumbar radiculopathy    • Menopausal symptoms    • Muscle  weakness    • Ovarian cyst    • PID (pelvic inflammatory disease)    • Recurrent UTI    • Sexual problems    • Spinal stenosis of lumbar region       Allergies   Allergen Reactions   • Adhesive Tape Hives   • Latex Hives   • Sulfa Antibiotics Hives   • Sulfate Hives   • Biaxin [Clarithromycin] Nausea Only   • Codeine Itching   • Penicillins Nausea Only      Social History     Tobacco Use   • Smoking status: Former Smoker     Packs/day: 0.50     Types: Cigarettes     Start date: 2003     Quit date: 2021     Years since quittin.1   • Smokeless tobacco: Never Used   • Tobacco comment: nicotine patches for smoking cessation   Substance Use Topics   • Alcohol use: No   • Drug use: Never         Current Outpatient Medications:   •  Acid Gone 160-105 MG chewable tablet chewable tablet, CHEW AND SWALLOW 2 TABLETS BY MOUTH TWICE DAILY TO FOUR TIMES DAILY IF NEEDED FOR HEARTBURN. TAKE SEPARATELY FROM OTHER MEDICATIONS, Disp: , Rfl:   •  azelastine (ASTELIN) 0.1 % nasal spray, 1 spray each nostril daily, Disp: 30 mL, Rfl: 2  •  BIOTIN PO, Take 1 tablet by mouth Daily., Disp: , Rfl:   •  cetirizine (zyrTEC) 10 MG tablet, Take 1 tablet by mouth Daily., Disp: , Rfl: 1  •  COLLAGEN PO, Take 1 tablet by mouth., Disp: , Rfl:   •  dicyclomine (Bentyl) 10 MG capsule, Take 1 capsule by mouth 4 (Four) Times a Day Before Meals & at Bedtime As Needed (abdominal cramping diarrhea)., Disp: 120 capsule, Rfl: 2  •  dilTIAZem CD (CARDIZEM CD) 120 MG 24 hr capsule, Take 1 capsule by mouth Daily., Disp: 30 capsule, Rfl: 3  •  DULoxetine (CYMBALTA) 60 MG capsule, 80 mg., Disp: , Rfl:   •  EPIPEN 2-JAREN 0.3 MG/0.3ML solution auto-injector injection, U UTD, Disp: , Rfl: 6  •  estradiol (ESTRACE) 2 MG tablet, Take 1 tablet by mouth Daily., Disp: 30 tablet, Rfl: 5  •  fluconazole (Diflucan) 150 MG tablet, Take 1 tab by mouth now and repeat in 72 hrs., Disp: 2 tablet, Rfl: 0  •  LORazepam (ATIVAN) 1 MG tablet, Take 1 mg by mouth Daily As  Needed., Disp: , Rfl: 0  •  lubiprostone (AMITIZA) 8 MCG capsule, Take 8 mcg by mouth 2 (Two) Times a Day With Meals., Disp: , Rfl:   •  Magnesium 250 MG tablet, TK 1-2 TS PO D FOR CONSTIPATION IF TAKING MIRALAX IS NOT EFFECTIVE, Disp: , Rfl:   •  metFORMIN (GLUCOPHAGE) 500 MG tablet, Take 1 tablet by mouth Daily With Breakfast., Disp: 90 tablet, Rfl: 1  •  montelukast (SINGULAIR) 10 MG tablet, Take 1 tablet by mouth Every Night., Disp: 30 tablet, Rfl: 2  •  Multiple Vitamin (MULTI-VITAMIN DAILY PO), Take  by mouth Daily., Disp: , Rfl:   •  omeprazole (priLOSEC) 40 MG capsule, Take 40 mg by mouth 2 (two) times a day., Disp: , Rfl:   •  pregabalin (LYRICA) 100 MG capsule, Take 100 mg by mouth 2 (Two) Times a Day., Disp: , Rfl:   •  QVAR REDIHALER 80 MCG/ACT inhaler, Inhale 2 puffs 2 (Two) Times a Day As Needed., Disp: , Rfl:   •  topiramate (TOPAMAX) 50 MG tablet, Take 50 mg by mouth 2 (Two) Times a Day., Disp: , Rfl:   •  traMADol (ULTRAM) 50 MG tablet, Take 1 tablet by mouth Every 8 (Eight) Hours As Needed for Moderate Pain ., Disp: 60 tablet, Rfl: 2  •  traZODone (DESYREL) 150 MG tablet, Take 1 tablet by mouth As Needed., Disp: , Rfl: 3  •  Viibryd 20 MG tablet tablet, , Disp: , Rfl:   •  XHANCE 93 MCG/ACT Exhaler Suspension, Inhale 1 puff Daily As Needed., Disp: , Rfl:       Physical Exam  Telehealth; pt able to carry on conversation without difficulties.    Result Review  Reviewed all lab results with pat from 3-25 lab draw. Negative RADHA; mild elevated sed rate crp improved.    Assessment and Plan    Diagnoses and all orders for this visit:    1. Morbid (severe) obesity due to excess calories (CMS/HCC) (Primary)  -     naltrexone-bupropion ER (CONTRAVE) 8-90 MG tablet; Take 2 tablets by mouth 2 (Two) Times a Day.  Dispense: 120 tablet; Refill: 0    Nutrition and activity goals reviewed including: mainly water to drink, limit white flour/processed sugar; increase high protein, high fiber carbs, good breakfast,  working toward 150 mins cardio per week, resistance training 2x/week.    Discussed with pt regarding med administration do not crush or chew.  Contrave can decrease the effectiveness of her pain medication that she takes for her chronic back pain.  Patient verbalizes understanding and would still like to try the Contrave to see if helps lose weight.    This visit has been rescheduled as a phone visit to comply with patient safety concerns in accordance with CDC recommendations. Total time of discussion was 22 minutes.        Follow Up   Return in about 4 weeks (around 4/27/2021), or if symptoms worsen or fail to improve.      I discussed the patients findings and my recommendations with patient.  Patient was encouraged to keep me informed of any acute changes, lack of improvement, or any new concerning symptoms.  Patient voiced understanding of all instructions and denied further questions.    Electronically signed by:    MAGGIE Chacon  03/30/2021    EMR Dragon/Transcription Disclaimer:  Much of this encounter note is an electronic transcription/translation of spoken language to printed text.  The electronic translation of spoken language may permit erroneous, or at times, nonsensical words or phrases to be inadvertently transcribed.  Although I have reviewed the note for such errors, some may still exist

## 2021-04-02 DIAGNOSIS — Z51.81 MEDICATION MONITORING ENCOUNTER: ICD-10-CM

## 2021-04-06 ENCOUNTER — HOSPITAL ENCOUNTER (OUTPATIENT)
Dept: GENERAL RADIOLOGY | Facility: HOSPITAL | Age: 41
Discharge: HOME | End: 2021-04-06
Admitting: PHYSICIAN ASSISTANT

## 2021-04-06 ENCOUNTER — TRANSCRIBE ORDERS (OUTPATIENT)
Dept: ADMINISTRATIVE | Facility: HOSPITAL | Age: 41
End: 2021-04-06

## 2021-04-06 DIAGNOSIS — M54.2 CHRONIC NECK PAIN: Primary | ICD-10-CM

## 2021-04-06 DIAGNOSIS — G89.29 CHRONIC NECK PAIN: Primary | ICD-10-CM

## 2021-04-06 PROCEDURE — 72050 X-RAY EXAM NECK SPINE 4/5VWS: CPT

## 2021-04-07 ENCOUNTER — PRIOR AUTHORIZATION (OUTPATIENT)
Dept: INTERNAL MEDICINE | Facility: CLINIC | Age: 41
End: 2021-04-07

## 2021-04-10 PROCEDURE — 99283 EMERGENCY DEPT VISIT LOW MDM: CPT

## 2021-04-11 ENCOUNTER — HOSPITAL ENCOUNTER (EMERGENCY)
Facility: HOSPITAL | Age: 41
Discharge: HOME | End: 2021-04-11
Attending: EMERGENCY MEDICINE | Admitting: EMERGENCY MEDICINE

## 2021-04-11 VITALS
WEIGHT: 185 LBS | HEART RATE: 90 BPM | TEMPERATURE: 97.8 F | HEIGHT: 61 IN | RESPIRATION RATE: 16 BRPM | DIASTOLIC BLOOD PRESSURE: 93 MMHG | SYSTOLIC BLOOD PRESSURE: 140 MMHG | OXYGEN SATURATION: 98 % | BODY MASS INDEX: 34.93 KG/M2

## 2021-04-11 DIAGNOSIS — R51.9 ACUTE NONINTRACTABLE HEADACHE, UNSPECIFIED HEADACHE TYPE: Primary | ICD-10-CM

## 2021-04-11 PROCEDURE — 25010000002 PROCHLORPERAZINE 10 MG/2ML SOLUTION: Performed by: EMERGENCY MEDICINE

## 2021-04-11 PROCEDURE — 96375 TX/PRO/DX INJ NEW DRUG ADDON: CPT

## 2021-04-11 PROCEDURE — 25010000002 DIPHENHYDRAMINE PER 50 MG: Performed by: EMERGENCY MEDICINE

## 2021-04-11 PROCEDURE — 25010000002 DEXAMETHASONE SODIUM PHOSPHATE 100 MG/10ML SOLUTION: Performed by: EMERGENCY MEDICINE

## 2021-04-11 PROCEDURE — 25010000002 KETOROLAC TROMETHAMINE PER 15 MG: Performed by: EMERGENCY MEDICINE

## 2021-04-11 PROCEDURE — 96374 THER/PROPH/DIAG INJ IV PUSH: CPT

## 2021-04-11 RX ORDER — KETOROLAC TROMETHAMINE 15 MG/ML
15 INJECTION, SOLUTION INTRAMUSCULAR; INTRAVENOUS ONCE
Status: COMPLETED | OUTPATIENT
Start: 2021-04-11 | End: 2021-04-11

## 2021-04-11 RX ORDER — PROCHLORPERAZINE EDISYLATE 5 MG/ML
10 INJECTION INTRAMUSCULAR; INTRAVENOUS ONCE
Status: COMPLETED | OUTPATIENT
Start: 2021-04-11 | End: 2021-04-11

## 2021-04-11 RX ORDER — DIPHENHYDRAMINE HYDROCHLORIDE 50 MG/ML
25 INJECTION INTRAMUSCULAR; INTRAVENOUS ONCE
Status: COMPLETED | OUTPATIENT
Start: 2021-04-11 | End: 2021-04-11

## 2021-04-11 RX ORDER — ACETAMINOPHEN 500 MG
1000 TABLET ORAL ONCE
Status: COMPLETED | OUTPATIENT
Start: 2021-04-11 | End: 2021-04-11

## 2021-04-11 RX ADMIN — DEXAMETHASONE SODIUM PHOSPHATE 10 MG: 10 INJECTION, SOLUTION INTRAMUSCULAR; INTRAVENOUS at 01:37

## 2021-04-11 RX ADMIN — KETOROLAC TROMETHAMINE 15 MG: 15 INJECTION, SOLUTION INTRAMUSCULAR; INTRAVENOUS at 01:08

## 2021-04-11 RX ADMIN — ACETAMINOPHEN 1000 MG: 500 TABLET, FILM COATED ORAL at 01:08

## 2021-04-11 RX ADMIN — PROCHLORPERAZINE EDISYLATE 10 MG: 5 INJECTION INTRAMUSCULAR; INTRAVENOUS at 01:08

## 2021-04-11 RX ADMIN — DIPHENHYDRAMINE HYDROCHLORIDE 25 MG: 50 INJECTION INTRAMUSCULAR; INTRAVENOUS at 01:08

## 2021-04-11 NOTE — ED PROVIDER NOTES
EMERGENCY DEPARTMENT ENCOUNTER      Pt Name: Megan Post  MRN: [id number]  YOB: 1980  Date of evaluation: 4/10/2021  Provider: Johnny Roy MD    CHIEF COMPLAINT       Chief Complaint   Patient presents with   • Headache         HISTORY OF PRESENT ILLNESS  (Location/Symptom, Timing/Onset, Context/Setting, Quality, Duration, Modifying Factors, Severity.)   Megan Post is a 41 y.o. female who presents to the emergency department with complaints of moderate aching occipital and bifrontal headache without any extension into the neck and no stiffness of the neck for the past 4 days that was gradual in onset and is not associated with any fever, chills, recent illness, visual changes, weakness, numbness, or difficulty ambulating and is made worse with light exposure.  Her headache is consistent with her typical migraine that she has had for several years.  She had previously been on Imitrex but states that she ran out of this.  There is no family history of cerebral aneurysm or intracranial hemorrhage.      Nursing notes were reviewed.    REVIEW OF SYSTEMS    (2-9 systems for level 4, 10 or more for level 5)   ROS:  General:  No fevers, no chills, no weakness  Cardiovascular:  No chest pain, no palpitations  Respiratory:  No shortness of breath, no cough, no wheezing  Gastrointestinal:  No pain, no nausea, no vomiting, no diarrhea  Musculoskeletal:  No muscle pain, no joint pain  Skin:  No rash, no easy bruising  Neurologic: Headache  Psychiatric:  No anxiety  Genitourinary:  No dysuria, no hematuria    Except as noted above the remainder of the review of systems was reviewed and negative.       PAST MEDICAL HISTORY     Past Medical History:   Diagnosis Date   • Abdominal pain    • Abnormal breast exam    • Abnormal Pap smear of cervix    • Achilles tendinitis    • Acute sinusitis    • Anxiety    • Arthritis    • Asthma    • Tavera's syndrome    • Bipolar 1 disorder (CMS/[facility])    • Bowel  trouble    • Breast cyst    • Colon polyps    • Constipation    • Depression    • Diverticulitis    • Diverticulitis of colon    • Endometriosis    • Eustachian tube dysfunction    • Extremity pain    • Fatty liver    • Female pelvic pain    • Fibromyalgia    • Gastric ulcer    • H/O bladder infections    • History of rashes as a child    • Irritable bowel syndrome    • Low back pain    • Lumbar radiculopathy    • Menopausal symptoms    • Muscle weakness    • Ovarian cyst    • PID (pelvic inflammatory disease)    • Recurrent UTI    • Sexual problems    • Spinal stenosis of lumbar region          SURGICAL HISTORY       Past Surgical History:   Procedure Laterality Date   •  SECTION     • CHOLECYSTECTOMY     • COLONOSCOPY  2017   • HYSTERECTOMY     • NASAL ENDOSCOPY     • OOPHORECTOMY Bilateral    • OTHER SURGICAL HISTORY      Laparoscopy (diagnostic) gynecologic with biopsy   • SHOULDER SURGERY     • UPPER GASTROINTESTINAL ENDOSCOPY  2019         CURRENT MEDICATIONS     No current facility-administered medications for this encounter.    Current Outpatient Medications:   •  Acid Gone 160-105 MG chewable tablet chewable tablet, CHEW AND SWALLOW 2 TABLETS BY MOUTH TWICE DAILY TO FOUR TIMES DAILY IF NEEDED FOR HEARTBURN. TAKE SEPARATELY FROM OTHER MEDICATIONS, Disp: , Rfl:   •  azelastine (ASTELIN) 0.1 % nasal spray, 1 spray each nostril daily, Disp: 30 mL, Rfl: 2  •  BIOTIN PO, Take 1 tablet by mouth Daily., Disp: , Rfl:   •  cetirizine (zyrTEC) 10 MG tablet, Take 1 tablet by mouth Daily., Disp: , Rfl: 1  •  COLLAGEN PO, Take 1 tablet by mouth., Disp: , Rfl:   •  dicyclomine (Bentyl) 10 MG capsule, Take 1 capsule by mouth 4 (Four) Times a Day Before Meals & at Bedtime As Needed (abdominal cramping diarrhea)., Disp: 120 capsule, Rfl: 2  •  dilTIAZem CD (CARDIZEM CD) 120 MG 24 hr capsule, Take 1 capsule by mouth Daily., Disp: 30 capsule, Rfl: 3  •  DULoxetine (CYMBALTA) 60 MG capsule, 80 mg., Disp: , Rfl:   •   EPIPEN 2-JAREN 0.3 MG/0.3ML solution auto-injector injection, U UTD, Disp: , Rfl: 6  •  estradiol (ESTRACE) 2 MG tablet, Take 1 tablet by mouth Daily., Disp: 30 tablet, Rfl: 5  •  fluconazole (Diflucan) 150 MG tablet, Take 1 tab by mouth now and repeat in 72 hrs., Disp: 2 tablet, Rfl: 0  •  LORazepam (ATIVAN) 1 MG tablet, Take 1 mg by mouth Daily As Needed., Disp: , Rfl: 0  •  lubiprostone (AMITIZA) 8 MCG capsule, Take 8 mcg by mouth 2 (Two) Times a Day With Meals., Disp: , Rfl:   •  Magnesium 250 MG tablet, TK 1-2 TS PO D FOR CONSTIPATION IF TAKING MIRALAX IS NOT EFFECTIVE, Disp: , Rfl:   •  metFORMIN (GLUCOPHAGE) 500 MG tablet, Take 1 tablet by mouth Daily With Breakfast., Disp: 90 tablet, Rfl: 1  •  montelukast (SINGULAIR) 10 MG tablet, Take 1 tablet by mouth Every Night., Disp: 30 tablet, Rfl: 2  •  Multiple Vitamin (MULTI-VITAMIN DAILY PO), Take  by mouth Daily., Disp: , Rfl:   •  naltrexone-bupropion ER (CONTRAVE) 8-90 MG tablet, Take 2 tablets by mouth 2 (Two) Times a Day., Disp: 120 tablet, Rfl: 0  •  omeprazole (priLOSEC) 40 MG capsule, Take 40 mg by mouth 2 (two) times a day., Disp: , Rfl:   •  pregabalin (LYRICA) 100 MG capsule, Take 100 mg by mouth 2 (Two) Times a Day., Disp: , Rfl:   •  QVAR REDIHALER 80 MCG/ACT inhaler, Inhale 2 puffs 2 (Two) Times a Day As Needed., Disp: , Rfl:   •  topiramate (TOPAMAX) 50 MG tablet, Take 50 mg by mouth 2 (Two) Times a Day., Disp: , Rfl:   •  traMADol (ULTRAM) 50 MG tablet, Take 1 tablet by mouth Every 8 (Eight) Hours As Needed for Moderate Pain ., Disp: 60 tablet, Rfl: 2  •  traZODone (DESYREL) 150 MG tablet, Take 1 tablet by mouth As Needed., Disp: , Rfl: 3  •  Viibryd 20 MG tablet tablet, , Disp: , Rfl:   •  XHANCE 93 MCG/ACT Exhaler Suspension, Inhale 1 puff Daily As Needed., Disp: , Rfl:     ALLERGIES     Adhesive tape, Latex, Sulfa antibiotics, Sulfate, Biaxin [clarithromycin], Codeine, and Penicillins    FAMILY HISTORY       Family History   Problem Relation Age  "of Onset   • Liver disease Mother    • Diabetes Mother    • Hyperlipidemia Mother    • Hypertension Mother    • Thyroid disease Mother    • Arthritis Father    • Mental illness Father    • Migraines Sister    • Stroke Other    • Diabetes Other    • Hyperlipidemia Other    • Hypertension Other    • Mental illness Other    • Migraines Other    • Tuberculosis Other    • Cancer Maternal Grandmother    • Cancer Maternal Grandfather    • Cancer Paternal Grandmother    • Cancer Paternal Grandfather    • No Known Problems Son    • Mental illness Daughter    • Breast cancer Neg Hx    • Endometrial cancer Neg Hx    • Ovarian cancer Neg Hx           SOCIAL HISTORY       Social History     Socioeconomic History   • Marital status: Single     Spouse name: Not on file   • Number of children: Not on file   • Years of education: Not on file   • Highest education level: Not on file   Tobacco Use   • Smoking status: Former Smoker     Packs/day: 0.50     Types: Cigarettes     Start date: 2003     Quit date: 2021     Years since quittin.1   • Smokeless tobacco: Never Used   • Tobacco comment: nicotine patches for smoking cessation   Substance and Sexual Activity   • Alcohol use: No   • Drug use: Never   • Sexual activity: Defer         PHYSICAL EXAM    (up to 7 for level 4, 8 or more for level 5)     Vitals:    04/10/21 2251 21 0018   BP: (!) 129/105 140/93   BP Location: Right arm    Patient Position: Sitting    Pulse: 90    Resp: 16    Temp: 97.8 °F (36.6 °C)    TempSrc: Oral    SpO2: 98%    Weight: 83.9 kg (185 lb)    Height: 154.9 cm (61\")        Physical Exam  General: Awake, alert, no acute distress.  HEENT: Conjunctiva normal.  Neck: Trachea midline.  There is no nuchal rigidity.  Cardiac: Heart regular rate, rhythm, no murmurs, rubs, or gallops  Lungs: Lungs are clear to auscultation, there is no wheezing, rhonchi, or rales. There is no use of accessory muscles.  Chest wall: There is no tenderness to " "palpation over the chest wall or over ribs  Abdomen: Abdomen is soft, nontender, nondistended. There are no firm or pulsatile masses, no rebound rigidity or guarding.   Musculoskeletal: No deformity.  Neuro: Alert and oriented x4.  Cranial nerves II through XII are grossly intact.  There are no visual field deficits.  Cerebellar function intact with finger-to-nose and heel-to-shin testing.  Patient observed ambulating by myself and there is no evidence of ataxia or other gait abnormality.  Motor strength is intact in the face and strength is 5 out of 5 in all 4 extremities without any asymmetry.  Sensation to light touch is intact in all 4 extremities without any asymmetry.  Dermatology: Skin is warm and dry  Psych: Mentation is grossly normal, cognition is grossly normal. Affect is appropriate.      EMERGENCY DEPARTMENT COURSE and DIFFERENTIAL DIAGNOSIS/MDM:   Vitals:    Vitals:    04/10/21 2251 04/11/21 0018   BP: (!) 129/105 140/93   BP Location: Right arm    Patient Position: Sitting    Pulse: 90    Resp: 16    Temp: 97.8 °F (36.6 °C)    TempSrc: Oral    SpO2: 98%    Weight: 83.9 kg (185 lb)    Height: 154.9 cm (61\")        ED Course as of Apr 11 0516   Sun Apr 11, 2021   0228 Patient's headache is completely resolved.  She remains well-appearing and neurologically intact.  Reviewed close follow-up with her primary care provider.    [NS]      ED Course User Index  [NS] Johnny Roy MD [place] Subarachnoid Hemorrhage (SAH) Rule for Headache Evaluation from [employer]  on 4/11/2021  ** All calculations should be rechecked by clinician prior to use **    RESULT SUMMARY:  Ruled Out    This patient can be ruled out for subarachnoid hemorrhage by the [place] SAH Rule, which was 100% sensitive for SAH in its validation.      INPUTS:  Age ?40 --> 0 = No  Neck pain or stiffness --> 0 = No  Witnessed loss of consciousness --> 0 = No  Onset during exertion --> 0 = No  Thunderclap headache (peaking pain within 1 " second) --> 0 = No  Limited neck flexion on examination --> 0 = No    Patient's presentation is consistent with benign cause of headache.  Patient is overall well-appearing with normal vital signs including no evidence of fever, and with a completely normal neurologic exam as noted above.  There is no alteration in mental status, focal deficit, fever, or nuchal rigidity to suggest CNS infection.  This was not a thunderclap headache again with no nuchal rigidity or neurologic deficit and no risk factors to suggest subarachnoid hemorrhage.  There are no visual changes or specific eye pain to suggest acute angle-closure glaucoma.  There are no significant risk factors or findings that would suggest that this patient has CVT, cervical artery dissection, intracranial mass, idiopathic intracranial hypertension, preeclampsia, or carbon monoxide exposure I do not feel that imaging for this patient is warranted at this point.  Patient's symptoms were successfully treated with conservative measures in the emergency department and they will be discharged to follow-up closely with her primary care provider and/or neurologist.      I had a discussion with the patient/family regarding diagnosis, diagnostic results, treatment plan, and medications.  The patient/family indicated understanding of these instructions.  I spent adequate time at the bedside preceding discharge necessary to personally discuss the aftercare instructions, giving patient education, providing explanations of the results of our evaluations/findings, and my decision making to assure that the patient/family understand the plan of care.  Time was allotted to answer questions at that time and throughout the ED course.  Emphasis was placed on timely follow-up after discharge.  I also discussed the potential for the development of an acute emergent condition requiring further evaluation, admission, or even surgical intervention. I discussed that we found nothing  during the visit today indicating the need for further workup, admission, or the presence of an unstable medical condition.  I encouraged the patient to return to the emergency department immediately for ANY concerns, worsening, new complaints, or if symptoms persist and unable to seek follow-up in a timely fashion.  The patient/family expressed understanding and agreement with this plan.  The patient will follow-up with their PCP in 1-2 days for reevaluation.       MEDICATIONS ADMINISTERED IN ED:  Medications   prochlorperazine (COMPAZINE) injection 10 mg (10 mg Intravenous Given 4/11/21 0108)   diphenhydrAMINE (BENADRYL) injection 25 mg (25 mg Intravenous Given 4/11/21 0108)   ketorolac (TORADOL) injection 15 mg (15 mg Intravenous Given 4/11/21 0108)   acetaminophen (TYLENOL) tablet 1,000 mg (1,000 mg Oral Given 4/11/21 0108)   dexamethasone (DECADRON) IVPB 10 mg (0 mg Intravenous Stopped 4/11/21 0216)         FINAL IMPRESSION      1. Acute nonintractable headache, unspecified headache type          DISPOSITION/PLAN     ED Disposition     ED Disposition Condition Comment    Discharge Stable           PATIENT REFERRED TO:  Sugey Ovalles, APRN  [address] [address]  [phone]    Schedule an appointment as soon as possible for a visit in 2 days      [facility] Emergency Department  [address] [facility] [address] [phone]    If symptoms worsen      DISCHARGE MEDICATIONS:     Medication List      CONTINUE taking these medications    Acid Gone 160-105 MG chewable tablet chewable tablet  Generic drug: aluminum hydroxide-mag carbonate     azelastine 0.1 % nasal spray  Commonly known as: ASTELIN  1 spray each nostril daily     BIOTIN PO     cetirizine 10 MG tablet  Commonly known as: zyrTEC     COLLAGEN PO     dicyclomine 10 MG capsule  Commonly known as: Bentyl  Take 1 capsule by mouth 4 (Four) Times a Day Before Meals & at Bedtime As Needed (abdominal  cramping diarrhea).     dilTIAZem  MG 24 hr capsule  Commonly known as: CARDIZEM CD  Take 1 capsule by mouth Daily.     DULoxetine 60 MG capsule  Commonly known as: CYMBALTA     EpiPen 2-Robert 0.3 MG/0.3ML solution auto-injector injection  Generic drug: EPINEPHrine     estradiol 2 MG tablet  Commonly known as: ESTRACE  Take 1 tablet by mouth Daily.     fluconazole 150 MG tablet  Commonly known as: Diflucan  Take 1 tab by mouth now and repeat in 72 hrs.     LORazepam 1 MG tablet  Commonly known as: ATIVAN     lubiprostone 8 MCG capsule  Commonly known as: AMITIZA     Magnesium 250 MG tablet     metFORMIN 500 MG tablet  Commonly known as: GLUCOPHAGE  Take 1 tablet by mouth Daily With Breakfast.     montelukast 10 MG tablet  Commonly known as: SINGULAIR  Take 1 tablet by mouth Every Night.     multivitamin tablet tablet  Commonly known as: THERAGRAN     naltrexone-bupropion ER 8-90 MG tablet  Commonly known as: CONTRAVE  Take 2 tablets by mouth 2 (Two) Times a Day.     omeprazole 40 MG capsule  Commonly known as: priLOSEC     pregabalin 100 MG capsule  Commonly known as: LYRICA     Qvar RediHaler 80 MCG/ACT inhaler  Generic drug: Beclomethasone Diprop HFA     topiramate 50 MG tablet  Commonly known as: TOPAMAX     traMADol 50 MG tablet  Commonly known as: ULTRAM  Take 1 tablet by mouth Every 8 (Eight) Hours As Needed for Moderate Pain .     traZODone 150 MG tablet  Commonly known as: DESYREL     Viibryd 20 MG tablet tablet  Generic drug: vilazodone     Xhance 93 MCG/ACT Exhaler Suspension  Generic drug: Fluticasone Propionate                Comment: Please note this report has been produced using speech recognition software.      Johnny Roy MD  Attending Emergency Physician               Johnny Roy MD  04/11/21 0516

## 2021-04-14 ENCOUNTER — PATIENT OUTREACH (OUTPATIENT)
Dept: CASE MANAGEMENT | Facility: OTHER | Age: 41
End: 2021-04-14

## 2021-04-14 NOTE — OUTREACH NOTE
Patient Outreach Note    Pt notified that no sooner appt available at this time, but that she was placed on cancellation/waiting list and for her to call routinely to check on appts.  Confirmed with pt she had neurology office number.  Role of  explained and contact number given. Offered [facility] 24/7 Nurse line contact number.  She declined, but RN-ACMESSI did explain number is on AVS.  She does not have living will and is not interested.  She is active on [employer].  She denies any other needs and did voice her appreciation for the call.     Sugey Torre RN  Ambulatory     4/14/2021, 10:23 EDT

## 2021-04-14 NOTE — OUTREACH NOTE
Care Coordination Note    Talked with Jessica at [facility] Neurology office.  Pt has appt with Razia Snyder MAGGIE on June 2nd.  Asked if any sooner appt and/or if they had waiting list. At this time, they had no sooner appt, but she did place her on cancellation/waiting list and said pt can call routinely to ask about sooner appt.due to cancellations.      Sugey Torre RN  Ambulatory     4/14/2021, 10:20 EDT

## 2021-04-14 NOTE — OUTREACH NOTE
"Patient Outreach Note    Pt contacted regarding [facility] ED visit 4/10-4/11/21 with chief c/o headache.  States she has headaches at least 10 times a month and she has one at present, although not as bad as night she went to ED.  Offered to assist in scheduling ED f/u with her PCP, Sugey Ovalles, however she declined and states the doctor at the  placed a referral for neurology.  She states she is up and about at home.  She does not live alone, but declined to say with whom she lived.  She also states she does not manage her medicines.   States David manages them.  Asked if David was roommate, bf, family member, but she stated, \"Its just David.\"  She does state she drives herself and has no transportation issues and denies any food insecurities.  She states she does not work, \"I am disabled.\"  Asked if she needed anything that RN-ACM or SW could assist her with, and she stated, \"I wish my appt with neurology was sooner.\"  Offered to contact neurology office to check on appt, which she accepted.       Sugey Torre RN  Ambulatory     4/14/2021, 10:07 EDT      "

## 2021-04-18 DIAGNOSIS — J30.9 ALLERGIC RHINITIS, UNSPECIFIED SEASONALITY, UNSPECIFIED TRIGGER: ICD-10-CM

## 2021-04-19 ENCOUNTER — TELEMEDICINE (OUTPATIENT)
Dept: INTERNAL MEDICINE | Facility: CLINIC | Age: 41
End: 2021-04-19

## 2021-04-19 DIAGNOSIS — J02.9 PHARYNGITIS, UNSPECIFIED ETIOLOGY: Primary | ICD-10-CM

## 2021-04-19 PROCEDURE — 99213 OFFICE O/P EST LOW 20 MIN: CPT | Performed by: NURSE PRACTITIONER

## 2021-04-19 RX ORDER — MONTELUKAST SODIUM 10 MG/1
10 TABLET ORAL NIGHTLY
Qty: 30 TABLET | Refills: 11 | Status: SHIPPED | OUTPATIENT
Start: 2021-04-19 | End: 2022-07-15

## 2021-04-19 RX ORDER — ONDANSETRON 4 MG/1
4 TABLET, ORALLY DISINTEGRATING ORAL EVERY 8 HOURS PRN
Qty: 30 TABLET | Refills: 1 | Status: SHIPPED | OUTPATIENT
Start: 2021-04-19 | End: 2022-02-09 | Stop reason: SDUPTHER

## 2021-04-19 RX ORDER — AZITHROMYCIN 250 MG/1
TABLET, FILM COATED ORAL
Qty: 6 TABLET | Refills: 0 | Status: SHIPPED | OUTPATIENT
Start: 2021-04-19 | End: 2021-06-26

## 2021-04-19 NOTE — TELEPHONE ENCOUNTER
Last Office Visit: 3/30/21  Next Office Visit:6/25/21    Labs completed in past 6 months? Yes 3/25/21  Labs completed in past year? yes    Last Refill Date:1/5/21  Quantity:30  Refills:2    Pharmacy:     Please review pended refill request for any changes needed on refills or quantities. Thank you!

## 2021-04-19 NOTE — PROGRESS NOTES
Chief Complaint   Patient presents with   • Sore Throat     The use of a video visit has been reviewed with the patient and verbal informed consent has been obtained.    History of Present Illness    41 y.o.female presents for sore throat. Onset couple days. Positive strep exposure with multi people in same household test positive.   Pt with sore throat, difficulty swallowing, nasal congestion. No fever. C/o nausea need refill zofran.     Review of Systems   Constitutional: Negative for chills and fever.   HENT: Positive for congestion, sore throat, swollen glands and trouble swallowing. Negative for postnasal drip, rhinorrhea, sinus pressure and sneezing.    Gastrointestinal: Positive for nausea. Negative for vomiting.           PMSFH  The following portions of the patient's history were reviewed and updated as appropriate: allergies, current medications, past family history, past medical history, past social history, past surgical history and problem list.     Past Medical History:   Diagnosis Date   • Abdominal pain    • Abnormal breast exam    • Abnormal Pap smear of cervix    • Achilles tendinitis    • Acute sinusitis    • Anxiety    • Arthritis    • Asthma    • Tavera's syndrome    • Bipolar 1 disorder (CMS/HCC)    • Bowel trouble    • Breast cyst    • Colon polyps    • Constipation    • Depression    • Diverticulitis    • Diverticulitis of colon    • Endometriosis    • Eustachian tube dysfunction    • Extremity pain    • Fatty liver    • Female pelvic pain    • Fibromyalgia    • Gastric ulcer    • H/O bladder infections    • History of rashes as a child    • Irritable bowel syndrome    • Low back pain    • Lumbar radiculopathy    • Menopausal symptoms    • Muscle weakness    • Ovarian cyst    • PID (pelvic inflammatory disease)    • Recurrent UTI    • Sexual problems    • Spinal stenosis of lumbar region       Allergies   Allergen Reactions   • Adhesive Tape Hives   • Latex Hives   • Sulfa Antibiotics  Hives   • Sulfate Hives   • Biaxin [Clarithromycin] Nausea Only   • Codeine Itching   • Penicillins Nausea Only      Social History     Tobacco Use   • Smoking status: Former Smoker     Packs/day: 0.50     Types: Cigarettes     Start date: 2003     Quit date: 2021     Years since quittin.2   • Smokeless tobacco: Never Used   • Tobacco comment: nicotine patches for smoking cessation   Substance Use Topics   • Alcohol use: No   • Drug use: Never         Current Outpatient Medications:   •  Acid Gone 160-105 MG chewable tablet chewable tablet, CHEW AND SWALLOW 2 TABLETS BY MOUTH TWICE DAILY TO FOUR TIMES DAILY IF NEEDED FOR HEARTBURN. TAKE SEPARATELY FROM OTHER MEDICATIONS, Disp: , Rfl:   •  azelastine (ASTELIN) 0.1 % nasal spray, 1 spray each nostril daily, Disp: 30 mL, Rfl: 2  •  BIOTIN PO, Take 1 tablet by mouth Daily., Disp: , Rfl:   •  cetirizine (zyrTEC) 10 MG tablet, Take 1 tablet by mouth Daily., Disp: , Rfl: 1  •  COLLAGEN PO, Take 1 tablet by mouth., Disp: , Rfl:   •  dicyclomine (Bentyl) 10 MG capsule, Take 1 capsule by mouth 4 (Four) Times a Day Before Meals & at Bedtime As Needed (abdominal cramping diarrhea)., Disp: 120 capsule, Rfl: 2  •  dilTIAZem CD (CARDIZEM CD) 120 MG 24 hr capsule, Take 1 capsule by mouth Daily., Disp: 30 capsule, Rfl: 3  •  DULoxetine (CYMBALTA) 60 MG capsule, 80 mg., Disp: , Rfl:   •  EPIPEN 2-JAREN 0.3 MG/0.3ML solution auto-injector injection, U UTD, Disp: , Rfl: 6  •  estradiol (ESTRACE) 2 MG tablet, Take 1 tablet by mouth Daily., Disp: 30 tablet, Rfl: 5  •  fluconazole (Diflucan) 150 MG tablet, Take 1 tab by mouth now and repeat in 72 hrs., Disp: 2 tablet, Rfl: 0  •  LORazepam (ATIVAN) 1 MG tablet, Take 1 mg by mouth Daily As Needed., Disp: , Rfl: 0  •  lubiprostone (AMITIZA) 8 MCG capsule, Take 8 mcg by mouth 2 (Two) Times a Day With Meals., Disp: , Rfl:   •  Magnesium 250 MG tablet, TK 1-2 TS PO D FOR CONSTIPATION IF TAKING MIRALAX IS NOT EFFECTIVE, Disp: , Rfl:    •  metFORMIN (GLUCOPHAGE) 500 MG tablet, Take 1 tablet by mouth Daily With Breakfast., Disp: 90 tablet, Rfl: 1  •  montelukast (SINGULAIR) 10 MG tablet, TAKE 1 TABLET BY MOUTH EVERY NIGHT, Disp: 30 tablet, Rfl: 11  •  Multiple Vitamin (MULTI-VITAMIN DAILY PO), Take  by mouth Daily., Disp: , Rfl:   •  naltrexone-bupropion ER (CONTRAVE) 8-90 MG tablet, Take 2 tablets by mouth 2 (Two) Times a Day., Disp: 120 tablet, Rfl: 0  •  omeprazole (priLOSEC) 40 MG capsule, Take 40 mg by mouth 2 (two) times a day., Disp: , Rfl:   •  pregabalin (LYRICA) 100 MG capsule, Take 100 mg by mouth 2 (Two) Times a Day., Disp: , Rfl:   •  QVAR REDIHALER 80 MCG/ACT inhaler, Inhale 2 puffs 2 (Two) Times a Day As Needed., Disp: , Rfl:   •  topiramate (TOPAMAX) 50 MG tablet, Take 50 mg by mouth 2 (Two) Times a Day., Disp: , Rfl:   •  traMADol (ULTRAM) 50 MG tablet, Take 1 tablet by mouth Every 8 (Eight) Hours As Needed for Moderate Pain ., Disp: 60 tablet, Rfl: 2  •  traZODone (DESYREL) 150 MG tablet, Take 1 tablet by mouth As Needed., Disp: , Rfl: 3  •  Viibryd 20 MG tablet tablet, , Disp: , Rfl:   •  XHANCE 93 MCG/ACT Exhaler Suspension, Inhale 1 puff Daily As Needed., Disp: , Rfl:     Physical Exam  HENT:      Head: Normocephalic.   Pulmonary:      Effort: Pulmonary effort is normal.   Neurological:      General: No focal deficit present.      Mental Status: She is alert and oriented to person, place, and time.   Psychiatric:         Mood and Affect: Mood normal.           Assessment and Plan    Diagnoses and all orders for this visit:    1. Pharyngitis, unspecified etiology (Primary)  -     azithromycin (ZITHROMAX) 250 MG tablet; Take 2 tablets the first day, then 1 tablet daily for 4 days.  Dispense: 6 tablet; Refill: 0  -     ondansetron ODT (ZOFRAN-ODT) 4 MG disintegrating tablet; Place 1 tablet on the tongue Every 8 (Eight) Hours As Needed for Nausea or Vomiting.  Dispense: 30 tablet; Refill: 1    change toothbrush after 48 hrs  abx.  Stay hydrated.      Follow Up   Return if symptoms worsen or fail to improve.      I discussed the patients findings and my recommendations with patient.  Patient was encouraged to keep me informed of any acute changes, lack of improvement, or any new concerning symptoms.  Patient voiced understanding of all instructions and denied further questions.    Electronically signed by:    MAGGIE Chacon  04/19/2021    EMR Dragon/Transcription Disclaimer:  Much of this encounter note is an electronic transcription/translation of spoken language to printed text.  The electronic translation of spoken language may permit erroneous, or at times, nonsensical words or phrases to be inadvertently transcribed.  Although I have reviewed the note for such errors, some may still exist

## 2021-04-20 ENCOUNTER — APPOINTMENT (OUTPATIENT)
Dept: CARDIOLOGY | Facility: HOSPITAL | Age: 41
End: 2021-04-20

## 2021-05-03 NOTE — PROGRESS NOTES
Chief Complaint  No chief complaint on file.  This was an audio and video enabled telemedicine encounter.    Subjective    History of Present Illness {CC  Problem List  Visit  Diagnosis   Encounters  Notes  Medications  Labs  Result Review Imaging  Media :23}     Megan Post presents to [facility] for   History of Present Illness   40-year-old female with inappropriate sinus tachycardia, bipolar disorder, fibromyalgia, asthma, anxiety who presents today to follow up on tachycardia, shortness of breath and chronic chest pain.  Patient was evaluated last year by Dr. Meier due to symptoms of palpitations, atypical chest pain and presyncope.  Patient had a normal exercise treadmill in 2018 with diminished exercise capacity, echo was also normal.  She had cardiac catheterization in 2010 which showed normal coronaries.  She wore a 24-hour Holter monitor in 2019 which was normal.    At her last visit her Cardizem was changed to extended release to offer more heart rate coverage.  She deferred repeating a Holter monitor because of history of severe skin reaction.  She felt that her chest pain was unchanged and therefore deferred further evaluation and wanted to focus more on weight loss.      Objective     Vital Signs:   There were no vitals filed for this visit.  There is no height or weight on file to calculate BMI.  Physical Exam  Constitutional:       Appearance: Normal appearance.   HENT:      Head: Normocephalic.   Pulmonary:      Effort: Pulmonary effort is normal. No respiratory distress.   Neurological:      Mental Status: She is alert and oriented to person, place, and time.   Psychiatric:         Mood and Affect: Mood normal.         Behavior: Behavior normal.         Thought Content: Thought content normal.              Result Review  Data Reviewed:{ Labs  Result Review  Imaging  Med Tab  Media :23}   SCANNED - CARDIOLOGY (07/29/2019)  SCANNED - ECHOCARDIOGRAM  (08/01/2019)  ECG 12 Lead (06/16/2020 15:45)  Holter Monitor - 72 Hour Up To 21 Days (07/29/2019 16:15)    Consultant notes Dr. Meier          EKG today shows sinus tachycardia, otherwise normal EKG.  Heart rate 105    Assessment and Plan {CC Problem List  Visit Diagnosis  ROS  Review (Popup)  [facility] Maintenance  Quality  BestPractice  Medications  SmartSets  SnapShot Encounters  Media :23}   1. Inappropriate sinus tachycardia  We will change her Cardizem to extended release 120 mg daily to help with compliance and see if this improves her heart rates.  If blood pressure stable, will consider increasing to 180 mg daily at her last visit her immediate release Cardizem was changed to extended release to offer her more  Offered to repeat a monitor, but she defers this because of history of severe skin reaction to previous monitors.  She would like to focus on losing weight, trying the diltiazem prior to considering further interventions  - ECG 12 Lead; Future  - Adult Transthoracic Echo Complete W/ Cont if Necessary Per Protocol; Future  - Comprehensive Metabolic Panel; Future  - TSH Rfx On Abnormal To Free T4; Future  - CBC & Differential; Future  - Magnesium; Future    2. Shortness of breath  - ECG 12 Lead; Future  - Adult Transthoracic Echo Complete W/ Cont if Necessary Per Protocol; Future  - Comprehensive Metabolic Panel; Future    3. Chest pain, unspecified type  Discussed doing stress test versus coronary CTA.  She defers both of these.  She would like to focus on better heart rate control and weight loss and see if her symptoms improve. .  Advised to call with any worsening symptoms  She did have a normal heart cath in 2016, which is reassuring  - Adult Transthoracic Echo Complete W/ Cont if Necessary Per Protocol; Future    4. Other fatigue  - Comprehensive Metabolic Panel; Future  - TSH Rfx On Abnormal To Free T4; Future  - CBC & Differential; Future  - Vitamin D 25 Hydroxy; Future  - Vitamin B12;  Future    5. Prediabetes  - Hemoglobin A1c; Future    6. Mixed hyperlipidemia  - Lipid Panel; Future    7. Obesity (BMI 30-39.9)  Discussed weight loss.  She feels limited in her attempts to lose weight because of her tachycardia.  Advised her to start exercise slowly, but also reassured her that most of her weight loss will come from her diet.  Discussed various diet strategies including counting calories and weight watchers.  She plans to look into weight watchers  - Vitamin D 25 Hydroxy; Future            Follow Up {Instructions Charge Capture  Follow-up Communications :23}   No follow-ups on file.    Patient was given instructions and counseling regarding her condition or for health maintenance advice. Please see specific information pulled into the AVS if appropriate.  Patient was instructed to call the [facility] with any questions, concerns, or worsening symptoms.    *Please note that portions of this note were completed with a voice recognition program. Efforts were made to edit the dictations, but occasionally words are mistranscribed.

## 2021-05-04 ENCOUNTER — TELEMEDICINE (OUTPATIENT)
Dept: CARDIOLOGY | Facility: HOSPITAL | Age: 41
End: 2021-05-04

## 2021-05-04 RX ORDER — CYCLOBENZAPRINE HCL 10 MG
10 TABLET ORAL 3 TIMES DAILY PRN
Qty: 30 TABLET | Refills: 0 | Status: SHIPPED | OUTPATIENT
Start: 2021-05-04 | End: 2021-07-20

## 2021-05-04 NOTE — TELEPHONE ENCOUNTER
Last Office Visit: 3/30/21   Next Office Visit: 6/25/21    Labs completed in past 6 months? yes  Labs completed in past year? yes    Last Refill Date: 6/16/20  Quantity:30  Refills:5    Pharmacy:On file     Please review pended refill request for any changes needed on refills or quantities. Thank you!

## 2021-06-04 NOTE — PROGRESS NOTES
Chief Complaint  Follow-up (tachycardia)    This was an audio and video enabled telemedicine encounter.    Subjective    History of Present Illness {CC  Problem List  Visit  Diagnosis   Encounters  Notes  Medications  Labs  Result Review Imaging  Media :23}     Megan Post presents to [facility] for   History of Present Illness   40-year-old female with inappropriate sinus tachycardia, bipolar disorder, fibromyalgia, asthma, anxiety who presents today to follow up on tachycardia, shortness of breath and chronic chest pain.  Patient was evaluated last year by Dr. Meier due to symptoms of palpitations, atypical chest pain and presyncope.  Patient had a normal exercise treadmill in 2018 with diminished exercise capacity, echo was also normal.  She had cardiac catheterization in 2010 which showed normal coronaries.  She wore a 24-hour Holter monitor in 2019 which was normal.    At her last visit her Cardizem was changed to extended release to offer more heart rate coverage.  She deferred repeating a Holter monitor because of history of severe skin reaction.  She felt that her chest pain was unchanged and therefore deferred further evaluation and wanted to focus more on weight loss.    She says she continues to have chest pain, mostly right sided. She says she tried walking 2 miles the other day and had severe right sided chest pain and felt like she was going to pass out. She says her HR was 140s. She says she was very anxious and mad at the time. Anxiety seems to trigger chest pain but says it also happens if she does physical activity    She does feel tachycardia has improved with the extended release cardizem. She does noticed some dizziness and lightheadedness if she takes a shower, whether cold or hot water. She does not think it is worse. She says the shortness of breath has improved. Main symptoms are dizziness and chest pain      Objective     Vital Signs:   Vitals:     "06/07/21 1511 06/07/21 1520   BP: 110/80 111/77   Pulse: 110 107   Weight: 79.8 kg (176 lb)    Height: 154.9 cm (61\")      Body mass index is 33.25 kg/m².  Physical Exam  Constitutional:       Appearance: Normal appearance.   HENT:      Head: Normocephalic.   Pulmonary:      Effort: Pulmonary effort is normal. No respiratory distress.   Neurological:      Mental Status: She is alert and oriented to person, place, and time.   Psychiatric:         Mood and Affect: Mood normal.         Behavior: Behavior normal.         Thought Content: Thought content normal.              Result Review  Data Reviewed:{ Labs  Result Review  Imaging  Med Tab  Media :23}   SCANNED - CARDIOLOGY (07/29/2019)  SCANNED - ECHOCARDIOGRAM (08/01/2019)  ECG 12 Lead (06/16/2020 15:45)  Holter Monitor - 72 Hour Up To 21 Days (07/29/2019 16:15)    Consultant notes Dr. Meier         Assessment and Plan {CC Problem List  Visit Diagnosis  ROS  Review (Popup)  Health Maintenance  Quality  BestPractice  Medications  SmartSets  SnapShot Encounters  Media :23}   1. Inappropriate sinus tachycardia  Symptoms improved with cardizem  Encouraged to keep hydrated  Advised to start slow/progressive aerobic exercise program to improve resting heart rates. Continue efforts for weight loss  Her dyspnea has improved, so will defer echo at this time    2. Dizziness  Increase water intake  Recommend judicious salt intake with 2L of water and compression stockings  Could consider TTT if symptoms persist, but would first recommend lifestyle changes    3. Chest pain, unspecified type  Will proceed with cardiac CTA due to exertional chest pain                Follow Up {Instructions Charge Capture  Follow-up Communications :23}   Return if symptoms worsen or fail to improve.    Patient was given instructions and counseling regarding her condition or for health maintenance advice. Please see specific information pulled into the AVS if " appropriate.  Patient was instructed to call the [facility] with any questions, concerns, or worsening symptoms.    *Please note that portions of this note were completed with a voice recognition program. Efforts were made to edit the dictations, but occasionally words are mistranscribed.

## 2021-06-07 ENCOUNTER — TELEMEDICINE (OUTPATIENT)
Dept: CARDIOLOGY | Facility: HOSPITAL | Age: 41
End: 2021-06-07

## 2021-06-07 VITALS
HEIGHT: 61 IN | WEIGHT: 176 LBS | BODY MASS INDEX: 33.23 KG/M2 | HEART RATE: 107 BPM | DIASTOLIC BLOOD PRESSURE: 77 MMHG | SYSTOLIC BLOOD PRESSURE: 111 MMHG

## 2021-06-07 DIAGNOSIS — R07.9 CHEST PAIN, UNSPECIFIED TYPE: Primary | ICD-10-CM

## 2021-06-07 DIAGNOSIS — E78.2 MIXED HYPERLIPIDEMIA: ICD-10-CM

## 2021-06-07 PROCEDURE — 99213 OFFICE O/P EST LOW 20 MIN: CPT | Performed by: NURSE PRACTITIONER

## 2021-06-26 ENCOUNTER — OFFICE VISIT (OUTPATIENT)
Dept: INTERNAL MEDICINE | Facility: CLINIC | Age: 41
End: 2021-06-26

## 2021-06-26 VITALS
OXYGEN SATURATION: 97 % | HEIGHT: 61 IN | DIASTOLIC BLOOD PRESSURE: 80 MMHG | TEMPERATURE: 97.9 F | HEART RATE: 120 BPM | WEIGHT: 177.4 LBS | SYSTOLIC BLOOD PRESSURE: 92 MMHG | BODY MASS INDEX: 33.49 KG/M2

## 2021-06-26 DIAGNOSIS — J02.9 SORE THROAT: ICD-10-CM

## 2021-06-26 DIAGNOSIS — R05.9 COUGH: Primary | ICD-10-CM

## 2021-06-26 DIAGNOSIS — J20.9 ACUTE BRONCHITIS, UNSPECIFIED ORGANISM: ICD-10-CM

## 2021-06-26 DIAGNOSIS — R51.9 ACUTE INTRACTABLE HEADACHE, UNSPECIFIED HEADACHE TYPE: ICD-10-CM

## 2021-06-26 PROCEDURE — 99214 OFFICE O/P EST MOD 30 MIN: CPT | Performed by: NURSE PRACTITIONER

## 2021-06-26 PROCEDURE — 87880 STREP A ASSAY W/OPTIC: CPT | Performed by: NURSE PRACTITIONER

## 2021-06-26 PROCEDURE — U0004 COV-19 TEST NON-CDC HGH THRU: HCPCS | Performed by: NURSE PRACTITIONER

## 2021-06-26 PROCEDURE — 87804 INFLUENZA ASSAY W/OPTIC: CPT | Performed by: NURSE PRACTITIONER

## 2021-06-26 PROCEDURE — 96372 THER/PROPH/DIAG INJ SC/IM: CPT | Performed by: NURSE PRACTITIONER

## 2021-06-26 RX ORDER — PREDNISONE 20 MG/1
20 TABLET ORAL 2 TIMES DAILY
Qty: 8 TABLET | Refills: 0 | Status: SHIPPED | OUTPATIENT
Start: 2021-06-26 | End: 2021-06-30

## 2021-06-26 RX ORDER — DEXTROMETHORPHAN HYDROBROMIDE AND PROMETHAZINE HYDROCHLORIDE 15; 6.25 MG/5ML; MG/5ML
5 SYRUP ORAL 4 TIMES DAILY PRN
Qty: 118 ML | Refills: 0 | Status: SHIPPED | OUTPATIENT
Start: 2021-06-26 | End: 2021-10-26

## 2021-06-26 RX ORDER — KETOROLAC TROMETHAMINE 30 MG/ML
60 INJECTION, SOLUTION INTRAMUSCULAR; INTRAVENOUS ONCE
Status: COMPLETED | OUTPATIENT
Start: 2021-06-26 | End: 2021-06-26

## 2021-06-26 RX ORDER — AZITHROMYCIN 250 MG/1
TABLET, FILM COATED ORAL
Qty: 6 TABLET | Refills: 0 | Status: SHIPPED | OUTPATIENT
Start: 2021-06-26 | End: 2021-07-20

## 2021-06-26 RX ORDER — FLUCONAZOLE 150 MG/1
TABLET ORAL
Qty: 2 TABLET | Refills: 0 | Status: SHIPPED | OUTPATIENT
Start: 2021-06-26 | End: 2021-08-10

## 2021-06-26 RX ADMIN — KETOROLAC TROMETHAMINE 60 MG: 30 INJECTION, SOLUTION INTRAMUSCULAR; INTRAVENOUS at 15:47

## 2021-06-26 NOTE — PROGRESS NOTES
Chief Complaint   Patient presents with   • URI     cough   • Headache       History of Present Illness    41 y.o.female presents for uri cough and headache.    Cough deep into lungs feels like on fire. Worse at night. Mucus in throat yellow; gags threw it up. Low grade fever. Onset about a week.  Headache 3 days, fatigue. Nose congestion.  Feels wiped out.  100.9 highest temp. No short of breath. Positive headache 3 days.   no sick contacts in house.   Not had covid. Not had vaccines.   Tried exhanceafrin otc cough mucinex.   Gets vaginitis with abx.  Review of Systems   Constitutional: Positive for fatigue and fever. Negative for chills.   HENT: Positive for congestion, postnasal drip, rhinorrhea, sinus pressure and sore throat. Negative for ear pain and sneezing.    Respiratory: Positive for cough and chest tightness. Negative for shortness of breath.    Musculoskeletal: Negative for myalgias.   Neurological: Positive for headache.         [facility]  The following portions of the patient's history were reviewed and updated as appropriate: allergies, current medications, past family history, past medical history, past social history, past surgical history and problem list.     Past Medical History:   Diagnosis Date   • Abdominal pain    • Abnormal breast exam    • Abnormal Pap smear of cervix    • Achilles tendinitis    • Acute sinusitis    • Anxiety    • Arthritis    • Asthma    • Tavera's syndrome    • Bipolar 1 disorder (CMS/HCC)    • Bowel trouble    • Breast cyst    • Colon polyps    • Constipation    • Depression    • Diverticulitis    • Diverticulitis of colon    • Endometriosis    • Eustachian tube dysfunction    • Extremity pain    • Fatty liver    • Female pelvic pain    • Fibromyalgia    • Gastric ulcer    • H/O bladder infections    • History of rashes as a child    • Irritable bowel syndrome    • Low back pain    • Lumbar radiculopathy    • Menopausal symptoms    • Muscle weakness    • Ovarian cyst     • PID (pelvic inflammatory disease)    • Recurrent UTI    • Sexual problems    • Spinal stenosis of lumbar region       Allergies   Allergen Reactions   • Adhesive Tape Hives   • Latex Hives   • Sulfa Antibiotics Hives   • Sulfate Hives   • Biaxin [Clarithromycin] Nausea Only   • Codeine Itching   • Penicillins Nausea Only      Social History     Tobacco Use   • Smoking status: Former Smoker     Packs/day: 0.50     Types: Cigarettes     Start date: 2003     Quit date: 2021     Years since quittin.3   • Smokeless tobacco: Never Used   • Tobacco comment: nicotine patches for smoking cessation   Substance Use Topics   • Alcohol use: No   • Drug use: Never         Current Outpatient Medications:   •  Acid Gone 160-105 MG chewable tablet chewable tablet, CHEW AND SWALLOW 2 TABLETS BY MOUTH TWICE DAILY TO FOUR TIMES DAILY IF NEEDED FOR HEARTBURN. TAKE SEPARATELY FROM OTHER MEDICATIONS, Disp: , Rfl:   •  azelastine (ASTELIN) 0.1 % nasal spray, 1 spray each nostril daily, Disp: 30 mL, Rfl: 2  •  BIOTIN PO, Take 1 tablet by mouth Daily., Disp: , Rfl:   •  cetirizine (zyrTEC) 10 MG tablet, Take 1 tablet by mouth Daily., Disp: , Rfl: 1  •  COLLAGEN PO, Take 1 tablet by mouth., Disp: , Rfl:   •  cyclobenzaprine (FLEXERIL) 10 MG tablet, Take 1 tablet by mouth 3 (Three) Times a Day As Needed for Muscle Spasms., Disp: 30 tablet, Rfl: 0  •  dicyclomine (Bentyl) 10 MG capsule, Take 1 capsule by mouth 4 (Four) Times a Day Before Meals & at Bedtime As Needed (abdominal cramping diarrhea)., Disp: 120 capsule, Rfl: 2  •  dilTIAZem CD (CARDIZEM CD) 120 MG 24 hr capsule, Take 1 capsule by mouth Daily., Disp: 30 capsule, Rfl: 3  •  EPIPEN 2-JAREN 0.3 MG/0.3ML solution auto-injector injection, U UTD, Disp: , Rfl: 6  •  estradiol (ESTRACE) 2 MG tablet, Take 1 tablet by mouth Daily., Disp: 30 tablet, Rfl: 5  •  LORazepam (ATIVAN) 1 MG tablet, Take 1 mg by mouth Daily As Needed., Disp: , Rfl: 0  •  Magnesium 250 MG tablet, TK 1-2  "TS PO D FOR CONSTIPATION IF TAKING MIRALAX IS NOT EFFECTIVE, Disp: , Rfl:   •  metFORMIN (GLUCOPHAGE) 500 MG tablet, Take 1 tablet by mouth Daily With Breakfast., Disp: 90 tablet, Rfl: 1  •  montelukast (SINGULAIR) 10 MG tablet, TAKE 1 TABLET BY MOUTH EVERY NIGHT, Disp: 30 tablet, Rfl: 11  •  Multiple Vitamin (MULTI-VITAMIN DAILY PO), Take  by mouth Daily., Disp: , Rfl:   •  omeprazole (priLOSEC) 40 MG capsule, Take 40 mg by mouth 2 (two) times a day., Disp: , Rfl:   •  ondansetron ODT (ZOFRAN-ODT) 4 MG disintegrating tablet, Place 1 tablet on the tongue Every 8 (Eight) Hours As Needed for Nausea or Vomiting., Disp: 30 tablet, Rfl: 1  •  pregabalin (LYRICA) 100 MG capsule, Take 100 mg by mouth 2 (Two) Times a Day., Disp: , Rfl:   •  QVAR REDIHALER 80 MCG/ACT inhaler, Inhale 2 puffs 2 (Two) Times a Day As Needed., Disp: , Rfl:   •  topiramate (TOPAMAX) 50 MG tablet, Take 50 mg by mouth 2 (Two) Times a Day., Disp: , Rfl:   •  traMADol (ULTRAM) 50 MG tablet, Take 1 tablet by mouth Every 8 (Eight) Hours As Needed for Moderate Pain ., Disp: 60 tablet, Rfl: 2  •  traZODone (DESYREL) 150 MG tablet, Take 1 tablet by mouth Every Night., Disp: , Rfl: 3  •  XHANCE 93 MCG/ACT Exhaler Suspension, Inhale 1 puff Daily As Needed., Disp: , Rfl:   •  azithromycin (ZITHROMAX) 250 MG tablet, Take 2 tablets the first day, then 1 tablet daily for 4 days., Disp: 6 tablet, Rfl: 0  •  DULoxetine (CYMBALTA) 60 MG capsule, 80 mg., Disp: , Rfl:   •  fluconazole (Diflucan) 150 MG tablet, Take 1 tab by mouth now and repeat in 72 hrs., Disp: 2 tablet, Rfl: 0  •  lubiprostone (AMITIZA) 8 MCG capsule, Take 8 mcg by mouth 2 (Two) Times a Day As Needed., Disp: , Rfl:   •  naltrexone-bupropion ER (CONTRAVE) 8-90 MG tablet, Take 2 tablets by mouth 2 (Two) Times a Day., Disp: 120 tablet, Rfl: 0    VITALS:  BP 92/80   Pulse 120   Temp 97.9 °F (36.6 °C)   Ht 154.9 cm (61\")   Wt 80.5 kg (177 lb 6.4 oz)   SpO2 97%   BMI 33.52 kg/m²     Physical " Exam  Vitals reviewed.   Constitutional:       General: She is not in acute distress.     Appearance: She is not diaphoretic.   HENT:      Head: Normocephalic.      Right Ear: Ear canal and external ear normal. A middle ear effusion is present. Tympanic membrane is not injected, erythematous or bulging.      Left Ear: Ear canal and external ear normal. A middle ear effusion is present. Tympanic membrane is not injected, erythematous or bulging.      Nose: Congestion and rhinorrhea present. Rhinorrhea is clear.      Mouth/Throat:      Mouth: Mucous membranes are moist.      Pharynx: Posterior oropharyngeal erythema present. No oropharyngeal exudate.   Eyes:      Conjunctiva/sclera: Conjunctivae normal.      Pupils: Pupils are equal, round, and reactive to light.   Cardiovascular:      Rate and Rhythm: Regular rhythm.      Heart sounds: Normal heart sounds.   Pulmonary:      Effort: Pulmonary effort is normal. No respiratory distress.      Breath sounds: Normal breath sounds.   Skin:     General: Skin is warm and dry.   Neurological:      General: No focal deficit present.      Mental Status: She is alert and oriented to person, place, and time.   Psychiatric:         Mood and Affect: Mood normal.         Result Review :          POCT Influenza A/B  Order: [id number]  Status:  Final result   Visible to patient:  Yes (MyChart)   Next appt:  08/03/2021 at 10:30 AM in Radiology ([facility])   Dx:  Cough  Specimen Information: Swab        Component   Ref Range & Units 1 d ago   Rapid Influenza A Ag   Negative Negative    Rapid Influenza B Ag   Negative Negative    Internal Control   Passed Passed    Lot Number  [id number]    Expiration Date  5/7/2022          POCT rapid strep A  Order: [id number]  Status:  Final result   Visible to patient:  Yes (MyChart)   Next appt:  08/03/2021 at 10:30 AM in Radiology ([facility])   Dx:  Sore throat  Specimen Information: Swab        Component   Ref Range & Units 1 d ago   Rapid Strep A Screen    Negative, VALID, INVALID, Not Performed Negative    Internal Control   Passed Passed    Lot Number  [id number]    Expiration Date  10/14/2022          COVID-19 PCR, [facility], NP SWAB IN [facility] TRANSPORT MEDIA 24-30 HR TAT - Swab, Nasopharynx  Order: [id number]  Status:  Final result   Visible to patient:  Yes (Tara)   Next appt:  08/03/2021 at 10:30 AM in Radiology ([facility])   Dx:  Cough  Specimen Information: Nasopharynx; Swab        Component   Ref Range & Units    SARS-CoV-2 [facility]   Not Detected Not Detected                  Assessment and Plan    Diagnoses and all orders for this visit:    1. Cough (Primary)  -     POCT Influenza A/B  -     COVID-19 PCR, [facility], NP SWAB IN [facility]AL TRANSPORT MEDIA 24-30 HR TAT - Swab, Nasopharynx; Future  -     COVID-19 PCR, [facility], NP SWAB IN [facility] VIRAL TRANSPORT MEDIA 24-30 HR TAT - Swab, Nasopharynx    2. Sore throat  -     POCT rapid strep A    3. Acute intractable headache, unspecified headache type  -     ketorolac (TORADOL) injection 60 mg    4. Acute bronchitis, unspecified organism  -     promethazine-dextromethorphan (PROMETHAZINE-DM) 6.25-15 MG/5ML syrup; Take 5 mL by mouth 4 (Four) Times a Day As Needed for Cough.  Dispense: 118 mL; Refill: 0  -     predniSONE (DELTASONE) 20 MG tablet; Take 1 tablet by mouth 2 (Two) Times a Day for 4 days.  Dispense: 8 tablet; Refill: 0  -     azithromycin (ZITHROMAX) 250 MG tablet; Take 2 tablets the first day, then 1 tablet daily for 4 days.  Dispense: 6 tablet; Refill: 0    Other orders  -     fluconazole (Diflucan) 150 MG tablet; Take 1 tab by mouth now and repeat in 72 hrs.  Dispense: 2 tablet; Refill: 0        I discussed the patients findings and my recommendations with patient.  Patient was encouraged to keep me informed of any acute changes, lack of improvement, or any new concerning symptoms.  Patient voiced understanding of all instructions and denied further questions.      Follow Up   Return if  symptoms worsen or fail to improve.      Electronically signed by:    MAGGIE Chacon  06/26/2021

## 2021-06-27 LAB — SARS-COV-2 RNA NOSE QL NAA+PROBE: NOT DETECTED

## 2021-06-28 LAB
EXPIRATION DATE: NORMAL
EXPIRATION DATE: NORMAL
FLUAV AG NPH QL: NEGATIVE
FLUBV AG NPH QL: NEGATIVE
INTERNAL CONTROL: NORMAL
INTERNAL CONTROL: NORMAL
Lab: NORMAL
Lab: NORMAL
S PYO AG THROAT QL: NEGATIVE

## 2021-07-20 ENCOUNTER — OFFICE VISIT (OUTPATIENT)
Dept: INTERNAL MEDICINE | Facility: CLINIC | Age: 41
End: 2021-07-20

## 2021-07-20 VITALS
TEMPERATURE: 98.4 F | DIASTOLIC BLOOD PRESSURE: 88 MMHG | WEIGHT: 178.8 LBS | OXYGEN SATURATION: 97 % | SYSTOLIC BLOOD PRESSURE: 124 MMHG | HEART RATE: 118 BPM | BODY MASS INDEX: 33.78 KG/M2

## 2021-07-20 DIAGNOSIS — M54.42 ACUTE BILATERAL LOW BACK PAIN WITH BILATERAL SCIATICA: Primary | ICD-10-CM

## 2021-07-20 DIAGNOSIS — M54.41 ACUTE BILATERAL LOW BACK PAIN WITH BILATERAL SCIATICA: Primary | ICD-10-CM

## 2021-07-20 PROCEDURE — 96372 THER/PROPH/DIAG INJ SC/IM: CPT | Performed by: NURSE PRACTITIONER

## 2021-07-20 PROCEDURE — 99214 OFFICE O/P EST MOD 30 MIN: CPT | Performed by: NURSE PRACTITIONER

## 2021-07-20 RX ORDER — TOPIRAMATE 100 MG/1
100 TABLET, FILM COATED ORAL 2 TIMES DAILY
COMMUNITY
Start: 2021-07-06 | End: 2021-12-30

## 2021-07-20 RX ORDER — METHYLPREDNISOLONE ACETATE 40 MG/ML
40 INJECTION, SUSPENSION INTRA-ARTICULAR; INTRALESIONAL; INTRAMUSCULAR; SOFT TISSUE ONCE
Status: COMPLETED | OUTPATIENT
Start: 2021-07-20 | End: 2021-07-20

## 2021-07-20 RX ORDER — PREGABALIN 100 MG/1
100 CAPSULE ORAL 2 TIMES DAILY
Qty: 60 CAPSULE | Refills: 0 | Status: SHIPPED | OUTPATIENT
Start: 2021-07-20 | End: 2021-12-30

## 2021-07-20 RX ORDER — TIZANIDINE 4 MG/1
4 TABLET ORAL EVERY 8 HOURS PRN
Qty: 60 TABLET | Refills: 0 | Status: SHIPPED | OUTPATIENT
Start: 2021-07-20 | End: 2021-12-21 | Stop reason: SDUPTHER

## 2021-07-20 RX ADMIN — METHYLPREDNISOLONE ACETATE 40 MG: 40 INJECTION, SUSPENSION INTRA-ARTICULAR; INTRALESIONAL; INTRAMUSCULAR; SOFT TISSUE at 18:13

## 2021-07-20 NOTE — PROGRESS NOTES
Chief Complaint   Patient presents with   • Back Pain     recently helped move and it agrivated the chonic issues       History of Present Illness    41 y.o.female presents for back pain.   Has chronic low back pain that she takes tramadol prn and muscle relaxants. Recently moved on the 8th; packing picking up boxes and had sudden increase severe back pain. Hurts when sit walking. Severe constant with intermittent sharp shooting pain. Needle pin down legs to feet bilateral. Still unpacking and not able to get relief.   Has seen pain mgt in past for fibromyalgia. No longer seeing. Has not had any lyrica since Dec.     Review of Systems   Constitutional: Negative for chills and fever.   Genitourinary: Negative for urinary incontinence.   Musculoskeletal: Positive for arthralgias, back pain and gait problem. Negative for neck pain.   Neurological: Positive for numbness. Negative for weakness.         [facility]  The following portions of the patient's history were reviewed and updated as appropriate: allergies, current medications, past family history, past medical history, past social history, past surgical history and problem list.     Past Medical History:   Diagnosis Date   • Abdominal pain    • Abnormal breast exam    • Abnormal Pap smear of cervix    • Achilles tendinitis    • Acute sinusitis    • Anxiety    • Arthritis    • Asthma    • Tavera's syndrome    • Bipolar 1 disorder (CMS/HCC)    • Bowel trouble    • Breast cyst    • Colon polyps    • Constipation    • Depression    • Diverticulitis    • Diverticulitis of colon    • Endometriosis    • Eustachian tube dysfunction    • Extremity pain    • Fatty liver    • Female pelvic pain    • Fibromyalgia    • Gastric ulcer    • H/O bladder infections    • History of rashes as a child    • Irritable bowel syndrome    • Low back pain    • Lumbar radiculopathy    • Menopausal symptoms    • Muscle weakness    • Ovarian cyst    • PID (pelvic inflammatory disease)    •  Recurrent UTI    • Sexual problems    • Spinal stenosis of lumbar region       Allergies   Allergen Reactions   • Adhesive Tape Hives   • Latex Hives   • Sulfa Antibiotics Hives   • Sulfate Hives   • Biaxin [Clarithromycin] Nausea Only   • Codeine Itching   • Penicillins Nausea Only      Social History     Tobacco Use   • Smoking status: Former Smoker     Packs/day: 0.50     Types: Cigarettes     Start date: 2003     Quit date: 2021     Years since quittin.4   • Smokeless tobacco: Never Used   • Tobacco comment: nicotine patches for smoking cessation   Vaping Use   • Vaping Use: Never used   Substance Use Topics   • Alcohol use: No   • Drug use: Never     Past Surgical History:   Procedure Laterality Date   •  SECTION     • CHOLECYSTECTOMY     • COLONOSCOPY  2017   • HYSTERECTOMY     • NASAL ENDOSCOPY     • OOPHORECTOMY Bilateral    • OTHER SURGICAL HISTORY      Laparoscopy (diagnostic) gynecologic with biopsy   • SHOULDER SURGERY     • UPPER GASTROINTESTINAL ENDOSCOPY  2019      Family History   Problem Relation Age of Onset   • Liver disease Mother    • Diabetes Mother    • Hyperlipidemia Mother    • Hypertension Mother    • Thyroid disease Mother    • Arthritis Father    • Mental illness Father    • Migraines Sister    • Stroke Other    • Diabetes Other    • Hyperlipidemia Other    • Hypertension Other    • Mental illness Other    • Migraines Other    • Tuberculosis Other    • Cancer Maternal Grandmother    • Cancer Maternal Grandfather    • Cancer Paternal Grandmother    • Cancer Paternal Grandfather    • No Known Problems Son    • Mental illness Daughter    • Breast cancer Neg Hx    • Endometrial cancer Neg Hx    • Ovarian cancer Neg Hx            Current Outpatient Medications:   •  Acid Gone 160-105 MG chewable tablet chewable tablet, CHEW AND SWALLOW 2 TABLETS BY MOUTH TWICE DAILY TO FOUR TIMES DAILY IF NEEDED FOR HEARTBURN. TAKE SEPARATELY FROM OTHER MEDICATIONS, Disp: , Rfl:   •   azelastine (ASTELIN) 0.1 % nasal spray, 1 spray each nostril daily, Disp: 30 mL, Rfl: 2  •  azithromycin (ZITHROMAX) 250 MG tablet, Take 2 tablets the first day, then 1 tablet daily for 4 days., Disp: 6 tablet, Rfl: 0  •  BIOTIN PO, Take 1 tablet by mouth Daily., Disp: , Rfl:   •  cetirizine (zyrTEC) 10 MG tablet, Take 1 tablet by mouth Daily., Disp: , Rfl: 1  •  COLLAGEN PO, Take 1 tablet by mouth., Disp: , Rfl:   •  cyclobenzaprine (FLEXERIL) 10 MG tablet, Take 1 tablet by mouth 3 (Three) Times a Day As Needed for Muscle Spasms., Disp: 30 tablet, Rfl: 0  •  dicyclomine (Bentyl) 10 MG capsule, Take 1 capsule by mouth 4 (Four) Times a Day Before Meals & at Bedtime As Needed (abdominal cramping diarrhea)., Disp: 120 capsule, Rfl: 2  •  dilTIAZem CD (CARDIZEM CD) 120 MG 24 hr capsule, Take 1 capsule by mouth Daily., Disp: 30 capsule, Rfl: 3  •  DULoxetine (CYMBALTA) 60 MG capsule, 80 mg., Disp: , Rfl:   •  EPIPEN 2-JAREN 0.3 MG/0.3ML solution auto-injector injection, U UTD, Disp: , Rfl: 6  •  estradiol (ESTRACE) 2 MG tablet, Take 1 tablet by mouth Daily., Disp: 30 tablet, Rfl: 5  •  fluconazole (Diflucan) 150 MG tablet, Take 1 tab by mouth now and repeat in 72 hrs., Disp: 2 tablet, Rfl: 0  •  LORazepam (ATIVAN) 1 MG tablet, Take 1 mg by mouth Daily As Needed., Disp: , Rfl: 0  •  lubiprostone (AMITIZA) 8 MCG capsule, Take 8 mcg by mouth 2 (Two) Times a Day As Needed., Disp: , Rfl:   •  Magnesium 250 MG tablet, TK 1-2 TS PO D FOR CONSTIPATION IF TAKING MIRALAX IS NOT EFFECTIVE, Disp: , Rfl:   •  metFORMIN (GLUCOPHAGE) 500 MG tablet, Take 1 tablet by mouth Daily With Breakfast., Disp: 90 tablet, Rfl: 1  •  montelukast (SINGULAIR) 10 MG tablet, TAKE 1 TABLET BY MOUTH EVERY NIGHT, Disp: 30 tablet, Rfl: 11  •  Multiple Vitamin (MULTI-VITAMIN DAILY PO), Take  by mouth Daily., Disp: , Rfl:   •  naltrexone-bupropion ER (CONTRAVE) 8-90 MG tablet, Take 2 tablets by mouth 2 (Two) Times a Day., Disp: 120 tablet, Rfl: 0  •   omeprazole (priLOSEC) 40 MG capsule, Take 40 mg by mouth 2 (two) times a day., Disp: , Rfl:   •  ondansetron ODT (ZOFRAN-ODT) 4 MG disintegrating tablet, Place 1 tablet on the tongue Every 8 (Eight) Hours As Needed for Nausea or Vomiting., Disp: 30 tablet, Rfl: 1  •  pregabalin (LYRICA) 100 MG capsule, Take 100 mg by mouth 2 (Two) Times a Day., Disp: , Rfl:   •  promethazine-dextromethorphan (PROMETHAZINE-DM) 6.25-15 MG/5ML syrup, Take 5 mL by mouth 4 (Four) Times a Day As Needed for Cough., Disp: 118 mL, Rfl: 0  •  QVAR REDIHALER 80 MCG/ACT inhaler, Inhale 2 puffs 2 (Two) Times a Day As Needed., Disp: , Rfl:   •  topiramate (TOPAMAX) 50 MG tablet, Take 50 mg by mouth 2 (Two) Times a Day., Disp: , Rfl:   •  traMADol (ULTRAM) 50 MG tablet, Take 1 tablet by mouth Every 8 (Eight) Hours As Needed for Moderate Pain ., Disp: 60 tablet, Rfl: 2  •  traZODone (DESYREL) 150 MG tablet, Take 1 tablet by mouth Every Night., Disp: , Rfl: 3  •  XHANCE 93 MCG/ACT Exhaler Suspension, Inhale 1 puff Daily As Needed., Disp: , Rfl:   •  topiramate (TOPAMAX) 100 MG tablet, Take 100 mg by mouth 2 (Two) Times a Day., Disp: , Rfl:     VITALS:  /88   Pulse 118   Temp 98.4 °F (36.9 °C)   Wt 81.1 kg (178 lb 12.8 oz)   SpO2 97%   BMI 33.78 kg/m²     Physical Exam  Vitals reviewed.   HENT:      Head: Normocephalic.   Pulmonary:      Effort: Pulmonary effort is normal. No respiratory distress.   Musculoskeletal:      Lumbar back: Spasms, tenderness and bony tenderness present. Decreased range of motion. Negative right straight leg raise test and negative left straight leg raise test.      Comments: Stiff ROM from sitting to standing and walking.   Neurological:      Mental Status: She is alert and oriented to person, place, and time.   Psychiatric:         Mood and Affect: Mood normal.         Assessment and Plan    Diagnoses and all orders for this visit:    1. Acute bilateral low back pain with bilateral sciatica  (Primary)  Comments:  acute on chronic  Orders:  -     Ambulatory Referral to Physical Therapy Evaluate and treat (low back pain)  -     methylPREDNISolone acetate (DEPO-medrol) injection 40 mg  -     pregabalin (LYRICA) 100 MG capsule; Take 1 capsule by mouth 2 (Two) Times a Day.  Dispense: 60 capsule; Refill: 0  -     tiZANidine (Zanaflex) 4 MG tablet; Take 1 tablet by mouth Every 8 (Eight) Hours As Needed for Muscle Spasms.  Dispense: 60 tablet; Refill: 0    prn tramadol already on file.  As part of patient's treatment plan I am prescribing a controlled substance.  The patient has been made aware of the appropriate use of such medications, including potential risk of somnolence, limited ability to drive and/or work safely, and potential for dependence and/or overdose.  It has also been made clear that these medications are for use by this patient only, without concomitant use of alcohol or other substances, unless prescribed.  The patient has read and signed the [facility] Controlled Substance Contract.  I will continue to see patient for regular follow up appointments.  JOSELYN is updated today; appears appropriate for fill. The patient is aware of the potential for addiction and dependence.      I discussed the patients findings and my recommendations with patient.  Patient was encouraged to keep me informed of any acute changes, lack of improvement, or any new concerning symptoms.  Patient voiced understanding of all instructions and denied further questions.    I spent 30 minutes caring for Mgean on this date of service. This time includes time spent by me in the following activities:preparing for the visit, reviewing tests, obtaining and/or reviewing a separately obtained history, performing a medically appropriate examination and/or evaluation , counseling and educating the patient/family/caregiver, ordering medications, tests, or procedures and documenting information in the medical record  Follow Up    Return in about 3 months (around 10/20/2021) for Medicare Wellness, med refill.      Electronically signed by:    MAGGIE Chacon  07/20/2021

## 2021-07-26 NOTE — PROGRESS NOTES
"Subsequent Medicare Wellness Visit   The ABC's of the Annual Wellness Visit    Chief Complaint   Patient presents with   • Medicare Wellness-subsequent       HPI:  Megan Post, -1980, is a 40 y.o. female who presents for a Subsequent Medicare Wellness Visit.  Chronic low back pain onset years; followed by pain mgt for injections. Needs refill flexeril.  C/o chest pain left sided sharp burning at times. Doesn't feel like her heart \"feels like it is lungs.\" some mild short of air. No cough no fever. Hyperglycemia noted on earlier labs need diabetes screening.  Hysterectomy; no ovaries. On estrace tab. Feels like not enough hormones; would like to get hormone testing done.  C/o itching rash on upper back onset few days.  Recent Hospitalizations:  No hospitalization(s) within the last year.    Current Medical Providers:  Patient Care Team:  Sugey Ovalles APRN as PCP - General (Internal Medicine)  Flavia Moran DO as Consulting Physician (Obstetrics and Gynecology)  Paramjit Leahy MD as Consulting Physician (Otolaryngology)  Jasiel Sanchez MD as Consulting Physician (Pain Medicine)  Trudy Vela RD as Dietitian (Nutrition)  Dian Ricks APRN as Nurse Practitioner (Nurse Practitioner)  Millicent Pantoja APRN as Nurse Practitioner (Pulmonary Disease)  Rafaela Chavira PT as Physical Therapist (Physical Therapy)  Jason Frazier MD as Consulting Physician (Anesthesiology)    Health Habits and Functional and Cognitive Screening and Depression Screening:  Functional & Cognitive Status 2020   Do you have difficulty preparing food and eating? Yes   Do you have difficulty bathing yourself, getting dressed or grooming yourself? No   Do you have difficulty using the toilet? No   Do you have difficulty moving around from place to place? Yes   Do you have trouble with steps or getting out of a bed or a chair? Yes   Current Diet Limited Junk Food   Dental Exam Up to date   Eye Exam - "   Exercise (times per week) 0 times per week   Current Exercise Activities Include -   Do you need help using the phone?  No   Are you deaf or do you have serious difficulty hearing?  No   Do you need help with transportation? Yes   Do you need help shopping? Yes   Do you need help preparing meals?  Yes   Do you need help with housework?  Yes   Do you need help with laundry? Yes   Do you need help taking your medications? Yes   Do you need help managing money? Yes   Do you ever drive or ride in a car without wearing a seat belt? No   Have you felt unusual stress, anger or loneliness in the last month? Yes   Who do you live with? Other   If you need help, do you have trouble finding someone available to you? No   Have you been bothered in the last four weeks by sexual problems? Yes   Do you have difficulty concentrating, remembering or making decisions? Yes       Compared to one year ago, the patient feels her physical health is worse and her mental health is worse.    Depression Screen:  PHQ-2/PHQ-9 Depression Screening 6/16/2020   Little interest or pleasure in doing things 2   Feeling down, depressed, or hopeless 2   Trouble falling or staying asleep, or sleeping too much 3   Feeling tired or having little energy 2   Poor appetite or overeating 1   Feeling bad about yourself - or that you are a failure or have let yourself or your family down 3   Trouble concentrating on things, such as reading the newspaper or watching television 3   Moving or speaking so slowly that other people could have noticed. Or the opposite - being so fidgety or restless that you have been moving around a lot more than usual 0   Thoughts that you would be better off dead, or of hurting yourself in some way 0   Total Score 16   If you checked off any problems, how difficult have these problems made it for you to do your work, take care of things at home, or get along with other people? Extremely dIfficult         Past Medical/Family/Social  History:  The following portions of the patient's history were reviewed and updated as appropriate: allergies, current medications, past family history, past medical history, past social history, past surgical history and problem list.    Allergies   Allergen Reactions   • Adhesive Tape Hives   • Latex Hives   • Sulfa Antibiotics Hives   • Sulfate Hives   • Biaxin [Clarithromycin] Nausea Only   • Codeine Itching   • Penicillins Nausea Only         Current Outpatient Medications:   •  cetirizine (zyrTEC) 10 MG tablet, Take 1 tablet by mouth Daily., Disp: , Rfl: 1  •  cyclobenzaprine (FLEXERIL) 10 MG tablet, TAKE 1 TABLET BY MOUTH THREE TIMES DAILY AS NEEDED FOR MUSCLE SPASMS, Disp: 30 tablet, Rfl: 0  •  dilTIAZem (Cardizem) 60 MG tablet, Take 1 tablet by mouth 2 (Two) Times a Day., Disp: 60 tablet, Rfl: 3  •  DULoxetine (CYMBALTA) 60 MG capsule, TK 1 C PO QAM, Disp: , Rfl:   •  DUPIXENT 300 MG/2ML solution prefilled syringe, , Disp: , Rfl:   •  EPIPEN 2-JAREN 0.3 MG/0.3ML solution auto-injector injection, U UTD, Disp: , Rfl: 6  •  estradiol (ESTRACE) 2 MG tablet, TAKE 1 TABLET BY MOUTH DAILY, Disp: 30 tablet, Rfl: 0  •  eszopiclone (LUNESTA) 2 MG tablet, TK 1 T PO QHS PRN SLEEP, Disp: , Rfl:   •  LORazepam (ATIVAN) 1 MG tablet, TK 1 T PO D PRN, Disp: , Rfl: 0  •  Magnesium 250 MG tablet, TK 1-2 TS PO D FOR CONSTIPATION IF TAKING MIRALAX IS NOT EFFECTIVE, Disp: , Rfl:   •  Multiple Vitamin (MULTI-VITAMIN DAILY PO), Take  by mouth Daily., Disp: , Rfl:   •  nicotine (NICODERM CQ) 7 MG/24HR patch, Place 1 patch on the skin as directed by provider Daily. (Patient taking differently: Place 1 patch on the skin as directed by provider As Needed.), Disp: 30 each, Rfl: 2  •  omeprazole (priLOSEC) 40 MG capsule, TK 1 C PO BID 30 MINUTES B ANGELA AND SUPPER, Disp: , Rfl:   •  pregabalin (LYRICA) 100 MG capsule, TK 1 C PO BID UTD, Disp: , Rfl:   •  QVAR REDIHALER 80 MCG/ACT inhaler, INHALE 2 PUFFS BID, Disp: , Rfl:   •  sodium  chloride (OCEAN NASAL SPRAY) 0.65 % nasal spray, 1 spray into the nostril(s) as directed by provider As Needed for Congestion., Disp: 30 mL, Rfl: 0  •  topiramate (TOPAMAX) 50 MG tablet, Take 50 mg by mouth 2 (Two) Times a Day., Disp: , Rfl:   •  traMADol (ULTRAM) 50 MG tablet, Take 1 tablet by mouth Every 8 (Eight) Hours As Needed for Moderate Pain ., Disp: 60 tablet, Rfl: 0  •  traZODone (DESYREL) 150 MG tablet, Take 1 tablet by mouth As Needed., Disp: , Rfl: 3  •  XHANCE 93 MCG/ACT Exhaler Suspension, , Disp: , Rfl:     Aspirin use counseling: Does not need ASA (and currently is not on it)    Current medication list contains no high risk medications.  No harmful drug interactions have been identified.     Family History   Problem Relation Age of Onset   • Liver disease Mother    • Diabetes Mother    • Hyperlipidemia Mother    • Hypertension Mother    • Thyroid disease Mother    • Arthritis Father    • Mental illness Father    • Migraines Sister    • Stroke Other    • Diabetes Other    • Hyperlipidemia Other    • Hypertension Other    • Mental illness Other    • Migraines Other    • Tuberculosis Other    • Breast cancer Neg Hx    • Endometrial cancer Neg Hx    • Ovarian cancer Neg Hx        Social History     Tobacco Use   • Smoking status: Current Every Day Smoker     Packs/day: 0.50     Types: Cigarettes     Start date: 2003   • Smokeless tobacco: Never Used   • Tobacco comment: nicotine patches for smoking cessation   Substance Use Topics   • Alcohol use: No       Past Surgical History:   Procedure Laterality Date   •  SECTION     • CHOLECYSTECTOMY     • COLONOSCOPY     • HYSTERECTOMY     • NASAL ENDOSCOPY     • OOPHORECTOMY Bilateral    • OTHER SURGICAL HISTORY      Laparoscopy (diagnostic) gynecologic with biopsy   • SHOULDER SURGERY     • UPPER GASTROINTESTINAL ENDOSCOPY  2019       Patient Active Problem List   Diagnosis   • Allergic rhinitis   • Tavera's esophagus   • Bipolar disorder  "(CMS/[facility])   • Fibromyalgia   • Gastroesophageal reflux disease   • Insomnia   • Abdominal wall abscess   • Acute recurrent frontal sinusitis   • Spasm   • Right upper quadrant pain   • Uncomplicated asthma   • Shortness of breath   • Atypical chest pain   • Lung nodules - calcified granulomas   • Atelectasis   • Hypercalcemia   • Displacement of intervertebral disc of lumbosacral region   • Lumbar discogenic pain syndrome   • Spondylosis of lumbar region without myelopathy or radiculopathy   • Memory loss   • Bronchitis   • Palpitations   • Precordial pain   • Inappropriate sinus tachycardia   • MRSA infection   • Well woman exam       Review of Systems   Constitutional: Positive for fatigue. Negative for chills and fever.   Respiratory: Positive for chest tightness. Negative for cough and shortness of breath.    Cardiovascular: Positive for chest pain. Negative for palpitations and leg swelling.   Gastrointestinal: Negative for abdominal pain, constipation and diarrhea.   Endocrine: Positive for heat intolerance.   Genitourinary: Negative for difficulty urinating.   Musculoskeletal: Positive for arthralgias and back pain.   Skin: Positive for rash.       Objective     Vitals:    06/16/20 1549   BP: 124/80   Pulse: 78   Temp: 98.4 °F (36.9 °C)   SpO2: 99%   Weight: 83 kg (183 lb)   Height: 154.9 cm (61\")   PainSc:   6   PainLoc: Back       Patient's Body mass index is 34.58 kg/m². BMI is above normal parameters. Recommendations include: exercise counseling and nutrition counseling.    The patient has no evidence of cognitve impairment.       Physical Exam   Constitutional: She is oriented to person, place, and time. She appears well-developed and well-nourished. No distress.   HENT:   Head: Normocephalic.   Right Ear: External ear normal.   Left Ear: External ear normal.   Nose: Nose normal.   Mouth/Throat: Oropharynx is clear and moist.   Eyes: Pupils are equal, round, and reactive to light. Conjunctivae and EOM " are normal. Right eye exhibits no discharge. Left eye exhibits no discharge.   Neck: Normal range of motion. Neck supple. No thyromegaly present.   Cardiovascular: Normal rate, regular rhythm, normal heart sounds and intact distal pulses.   No edema   Pulmonary/Chest: Effort normal and breath sounds normal. No respiratory distress. She exhibits no mass and no tenderness.   Abdominal: Soft. Bowel sounds are normal. There is no tenderness.   Musculoskeletal:        Lumbar back: She exhibits decreased range of motion and tenderness.   Lymphadenopathy:     She has no cervical adenopathy.   Neurological: She is alert and oriented to person, place, and time.   Skin: Skin is warm and dry. Capillary refill takes less than 2 seconds. No rash noted.   Psychiatric: She has a normal mood and affect. Her behavior is normal.   Vitals reviewed.      ECG 12 Lead  Date/Time: 6/16/2020 4:48 PM  Performed by: Sugey Ovalles APRN  Authorized by: Sugey Ovalles APRN   Comparison: compared with previous ECG from 1/22/2020  Similar to previous ECG  Rhythm: sinus rhythm  Rate: normal  Conduction: conduction normal  ST Segments: ST segments normal  T Waves: T waves normal  QRS axis: normal    Clinical impression: normal ECG            Recent Lab Results:     Lab Results   Component Value Date    CHOL 252 (H) 03/05/2020    TRIG 186 (H) 03/05/2020    HDL 56 03/05/2020    VLDL 37.2 03/05/2020    LDLHDL 2.84 03/05/2020       Assessment/Plan   Age-appropriate Screening Schedule:  Refer to the list below for future screening recommendations based on patient's age, sex and/or medical conditions.      Health Maintenance   Topic Date Due   • PNEUMOCOCCAL VACCINE (19-64 MEDIUM RISK) (1 of 1 - PPSV23) 03/23/2001   • TDAP/TD VACCINES (1 - Tdap) 08/21/2020 (Originally 3/23/1993)   • INFLUENZA VACCINE  08/01/2020   • PAP SMEAR  02/04/2023       Medicare Risks and Personalized Health Plan:  Advance Directive Discussion  Breast Cancer/Mammogram  Screening  Diabetic Lab Screening   Immunizations Discussed/Encouraged (specific immunizations; adacel Tdap and Pneumococcal 23 )  Inactivity/Sedentary  Obesity/Overweight       CMS-Preventive Services Quick Reference  Medicare Preventive Services Addressed:  Annual Wellness Visit (AWV)  Diabetes Screening-Lab Order for either glucose quantitative blood (except reagent strip), glucose;post glucose dose(includes glucose), or glucose tolerance test-3 specimens(includes glucose)  Pneumococcal Vaccine and Administration  Mammogram will be rescheduled machine down today.    Advance Care Planning:  ACP discussion was held with the patient during this visit. Patient does not have an advance directive, information provided.        Diagnoses and all orders for this visit:    1. Medicare annual wellness visit, subsequent (Primary)    2. Need for Tdap vaccination  -     Tdap Vaccine Greater Than or Equal To 6yo IM    3. Need for pneumococcal vaccine  -     pneumococcal polysaccharide 23-valent (PNEUMOVAX-23) vaccine 0.5 mL    4. Chronic midline low back pain with bilateral sciatica  Comments:  cont stretching; heat/ice therapy. Rec no further chiropractic manipulation until MRI results.   Orders:  -     cyclobenzaprine (FLEXERIL) 10 MG tablet; Take 1 tablet by mouth 3 (Three) Times a Day As Needed for Muscle Spasms. for muscle spams  Dispense: 30 tablet; Refill: 5    5. Hormone replacement therapy (HRT)  -     Estradiol; Future  -     Testosterone, Free, Total; Future    6. Chest pain, unspecified type  -     XR Chest 2 View; Future  -     ECG 12 Lead    7. Prediabetes  -     POC Glycosylated Hemoglobin (Hb A1C)  -     metFORMIN (GLUCOPHAGE) 500 MG tablet; Take 1 tablet by mouth Daily With Breakfast.  Dispense: 30 tablet; Refill: 0  A1c 5.9. Discussed prediabetes and risk of diabetes with pt.  Recommend heart healhty diet low carbs.  Pt wants to go ahead and start on low dose metformin to see if helps with A1c and wt.  8. Rash  of back  -     triamcinolone (KENALOG) 0.1 % cream; Apply  topically to the appropriate area as directed 2 (Two) Times a Day for 7 days.  Dispense: 30 g; Refill: 0        An After Visit Summary and PPPS with all of these plans were given to the patient.      Follow Up:  Return in about 6 months (around 12/16/2020) for Recheck lipids and prediabetes.          Sil Ovalles APRN                                   Niacinamide Counseling: I recommended taking niacin or niacinamide, also know as vitamin B3, twice daily. Recent evidence suggests that taking vitamin B3 (500 mg twice daily) can reduce the risk of actinic keratoses and non-melanoma skin cancers. Side effects of vitamin B3 include flushing and headache.

## 2021-08-03 ENCOUNTER — APPOINTMENT (OUTPATIENT)
Dept: CT IMAGING | Facility: HOSPITAL | Age: 41
End: 2021-08-03

## 2021-08-09 DIAGNOSIS — Z79.890 HORMONE REPLACEMENT THERAPY (HRT): ICD-10-CM

## 2021-08-09 RX ORDER — DILTIAZEM HYDROCHLORIDE 120 MG/1
120 CAPSULE, COATED, EXTENDED RELEASE ORAL DAILY
Qty: 90 CAPSULE | Refills: 1 | Status: SHIPPED | OUTPATIENT
Start: 2021-08-09 | End: 2021-11-18 | Stop reason: SDUPTHER

## 2021-08-09 NOTE — TELEPHONE ENCOUNTER
Last visit on 67/21; refills sent for diltiazem 120mg daily to [address]     Alicia Kennedy, BuddyD

## 2021-08-09 NOTE — TELEPHONE ENCOUNTER
Last Office Visit: 7/20/2021  Next Office Visit:  10/21/2021    Labs completed in past 6 months? yes  Labs completed in past year? yes    Last Refill Date:  1/5/2021  Quantity:  30  Refills:  5    Pharmacy:  On file    Please review pended refill request for any changes needed on refills or quantities. Thank you!

## 2021-08-10 ENCOUNTER — OFFICE VISIT (OUTPATIENT)
Dept: INTERNAL MEDICINE | Facility: CLINIC | Age: 41
End: 2021-08-10

## 2021-08-10 VITALS
WEIGHT: 176 LBS | OXYGEN SATURATION: 98 % | DIASTOLIC BLOOD PRESSURE: 80 MMHG | HEART RATE: 116 BPM | SYSTOLIC BLOOD PRESSURE: 112 MMHG | BODY MASS INDEX: 33.23 KG/M2 | HEIGHT: 61 IN | TEMPERATURE: 97.8 F

## 2021-08-10 DIAGNOSIS — R05.9 COUGH: Primary | ICD-10-CM

## 2021-08-10 DIAGNOSIS — R35.0 URINE FREQUENCY: ICD-10-CM

## 2021-08-10 LAB
BILIRUB BLD-MCNC: ABNORMAL MG/DL
CLARITY, POC: ABNORMAL
COLOR UR: ABNORMAL
GLUCOSE UR STRIP-MCNC: NEGATIVE MG/DL
KETONES UR QL: ABNORMAL
LEUKOCYTE EST, POC: NEGATIVE
NITRITE UR-MCNC: NEGATIVE MG/ML
PH UR: 6 [PH] (ref 5–8)
PROT UR STRIP-MCNC: ABNORMAL MG/DL
RBC # UR STRIP: NEGATIVE /UL
SP GR UR: 1.03 (ref 1–1.03)
UROBILINOGEN UR QL: NORMAL

## 2021-08-10 PROCEDURE — 99213 OFFICE O/P EST LOW 20 MIN: CPT | Performed by: NURSE PRACTITIONER

## 2021-08-10 PROCEDURE — 81003 URINALYSIS AUTO W/O SCOPE: CPT | Performed by: NURSE PRACTITIONER

## 2021-08-10 RX ORDER — MULTIPLE VITAMINS W/ MINERALS TAB 9MG-400MCG
TAB ORAL DAILY
COMMUNITY
End: 2021-12-30

## 2021-08-10 RX ORDER — ESTRADIOL 2 MG/1
2 TABLET ORAL DAILY
Qty: 30 TABLET | Refills: 2 | Status: SHIPPED | OUTPATIENT
Start: 2021-08-10 | End: 2021-09-02 | Stop reason: SDUPTHER

## 2021-08-10 RX ORDER — DILTIAZEM HYDROCHLORIDE 60 MG/1
60 TABLET, FILM COATED ORAL
COMMUNITY
Start: 2021-03-20 | End: 2021-09-02 | Stop reason: DRUGHIGH

## 2021-08-30 DIAGNOSIS — R73.03 PREDIABETES: ICD-10-CM

## 2021-08-31 ENCOUNTER — CLINICAL SUPPORT NO REQUIREMENTS (OUTPATIENT)
Dept: PULMONOLOGY | Facility: CLINIC | Age: 41
End: 2021-08-31

## 2021-08-31 DIAGNOSIS — Z01.812 BLOOD TESTS PRIOR TO TREATMENT OR PROCEDURE: Primary | ICD-10-CM

## 2021-08-31 PROCEDURE — U0004 COV-19 TEST NON-CDC HGH THRU: HCPCS | Performed by: INTERNAL MEDICINE

## 2021-08-31 PROCEDURE — 99211 OFF/OP EST MAY X REQ PHY/QHP: CPT | Performed by: INTERNAL MEDICINE

## 2021-09-01 LAB — SARS-COV-2 RNA NOSE QL NAA+PROBE: NOT DETECTED

## 2021-09-02 ENCOUNTER — OFFICE VISIT (OUTPATIENT)
Dept: PULMONOLOGY | Facility: CLINIC | Age: 41
End: 2021-09-02

## 2021-09-02 VITALS
HEART RATE: 98 BPM | DIASTOLIC BLOOD PRESSURE: 70 MMHG | BODY MASS INDEX: 34.17 KG/M2 | TEMPERATURE: 98.4 F | RESPIRATION RATE: 18 BRPM | OXYGEN SATURATION: 98 % | WEIGHT: 181 LBS | HEIGHT: 61 IN | SYSTOLIC BLOOD PRESSURE: 118 MMHG

## 2021-09-02 DIAGNOSIS — E66.01 MORBID OBESITY WITH BMI OF 40.0-44.9, ADULT (HCC): ICD-10-CM

## 2021-09-02 DIAGNOSIS — R91.8 LUNG NODULES: ICD-10-CM

## 2021-09-02 DIAGNOSIS — J45.40 MODERATE PERSISTENT ASTHMA, UNSPECIFIED WHETHER COMPLICATED: Primary | ICD-10-CM

## 2021-09-02 DIAGNOSIS — J30.1 SEASONAL ALLERGIC RHINITIS DUE TO POLLEN: ICD-10-CM

## 2021-09-02 DIAGNOSIS — Z79.890 HORMONE REPLACEMENT THERAPY (HRT): ICD-10-CM

## 2021-09-02 PROCEDURE — 94375 RESPIRATORY FLOW VOLUME LOOP: CPT | Performed by: NURSE PRACTITIONER

## 2021-09-02 PROCEDURE — 94726 PLETHYSMOGRAPHY LUNG VOLUMES: CPT | Performed by: NURSE PRACTITIONER

## 2021-09-02 PROCEDURE — 94729 DIFFUSING CAPACITY: CPT | Performed by: NURSE PRACTITIONER

## 2021-09-02 PROCEDURE — 99214 OFFICE O/P EST MOD 30 MIN: CPT | Performed by: NURSE PRACTITIONER

## 2021-09-02 RX ORDER — BUDESONIDE AND FORMOTEROL FUMARATE DIHYDRATE 160; 4.5 UG/1; UG/1
2 AEROSOL RESPIRATORY (INHALATION) 2 TIMES DAILY
Qty: 1 EACH | Refills: 5 | Status: SHIPPED | OUTPATIENT
Start: 2021-09-02 | End: 2021-12-30 | Stop reason: ALTCHOICE

## 2021-09-02 RX ORDER — ALBUTEROL SULFATE 2.5 MG/3ML
2.5 SOLUTION RESPIRATORY (INHALATION) 4 TIMES DAILY PRN
Qty: 90 EACH | Refills: 5 | Status: SHIPPED | OUTPATIENT
Start: 2021-09-02

## 2021-09-02 RX ORDER — CYCLOBENZAPRINE HCL 10 MG
10 TABLET ORAL AS NEEDED
COMMUNITY
Start: 2021-05-04 | End: 2022-05-06

## 2021-09-02 RX ORDER — ALPRAZOLAM 0.25 MG/1
0.25 TABLET ORAL 2 TIMES DAILY PRN
COMMUNITY
Start: 2021-08-19 | End: 2021-10-26

## 2021-09-02 RX ORDER — ESTRADIOL 2 MG/1
2 TABLET ORAL DAILY
Qty: 30 TABLET | Refills: 2 | Status: SHIPPED | OUTPATIENT
Start: 2021-09-02 | End: 2022-03-17

## 2021-09-02 RX ORDER — ALBUTEROL SULFATE 90 UG/1
2 AEROSOL, METERED RESPIRATORY (INHALATION) EVERY 4 HOURS PRN
Qty: 18 G | Refills: 5 | Status: SHIPPED | OUTPATIENT
Start: 2021-09-02 | End: 2022-09-28

## 2021-09-02 NOTE — PROGRESS NOTES
"[facility], [place] [facility] Follow up      Chief Complaint  Shortness of Breath (f/u)    Subjective          Megan Post presents to [facility] for evaluation of worsening shortness of breath.  She does have a history of asthma we last saw her here in the office in 2018.  She had been doing fairly well until a few months ago when she developed some worsening chest tightness and shortness of breath.    Then in June and again in August she had an episode of acute bronchitis was on a round of antibiotics and steroids.    She does continue to have some cough but it has improved since her last round of antibiotics.  She also has some continued chest tightness and pleuritic pain.    She continues to use her Qvar twice daily.  She is currently out of her albuterol rescue inhaler and albuterol nebulizers.    She also has a history of sinus polyps with surgery in the past.  She has had some issues with recurrent sinusitis.      Objective     Vital Signs:   /70 (BP Location: Right arm, Patient Position: Sitting, Cuff Size: Adult)   Pulse 98   Temp 98.4 °F (36.9 °C)   Resp 18   Ht 154.9 cm (61\")   Wt 82.1 kg (181 lb)   SpO2 98% Comment: room air at rest  BMI 34.20 kg/m²       Immunization History   Administered Date(s) Administered   • Flu Vaccine Quad PF >36MO 11/02/2017   • FluLaval >6 Months 11/02/2017, 10/22/2020   • Hepatitis A 05/13/2019   • Pneumococcal Polysaccharide (PPSV23) 06/16/2020   • Tdap 06/16/2020   • flucelvax quad pfs =>4 YRS 10/15/2019       Physical Exam  Vitals reviewed.   Constitutional:       General: She is not in acute distress.     Appearance: She is well-developed. She is obese. She is not ill-appearing.   HENT:      Head: Normocephalic and atraumatic.   Eyes:      Pupils: Pupils are equal, round, and reactive to light.   Cardiovascular:      Rate and Rhythm: Normal rate and regular rhythm.      Heart sounds: No murmur heard. "     Pulmonary:      Effort: Pulmonary effort is normal. No respiratory distress.      Breath sounds: Normal breath sounds. No wheezing or rales.   Abdominal:      General: Bowel sounds are normal. There is no distension.      Palpations: Abdomen is soft.   Musculoskeletal:         General: Normal range of motion.      Cervical back: Normal range of motion and neck supple.      Right lower leg: No edema.      Left lower leg: No edema.   Skin:     General: Skin is warm and dry.      Findings: No erythema.   Neurological:      Mental Status: She is alert and oriented to person, place, and time.   Psychiatric:         Behavior: Behavior normal.          Result Review :            PFTs done in the office today:  No significant obstruction or restriction FVC is 2.69, 80% predicted with total lung capacity 3.68, 83% predicted.  Decreased DLCO    PA and lateral chest x-ray done the office today reviewed by me  Awaiting final MD interpretation                 Assessment and Plan    Problem List Items Addressed This Visit        Allergies and Adverse Reactions    Allergic rhinitis       Endocrine and Metabolic    Morbid obesity with BMI of 40.0-44.9, adult (CMS/[facility])       Pulmonary and Pneumonias    Lung nodules - calcified granulomas      Other Visit Diagnoses     Moderate persistent asthma, unspecified whether complicated    -  Primary    Relevant Medications    budesonide-formoterol (SYMBICORT) 160-4.5 MCG/ACT inhaler    albuterol sulfate  (90 Base) MCG/ACT inhaler    albuterol (PROVENTIL) (2.5 MG/3ML) 0.083% nebulizer solution    Other Relevant Orders    XR Chest PA & Lateral    Pulmonary Function Test (Completed)          At this time we discussed that her asthma seems to be Not controlled at this time.  With her worsening shortness of breath and chest tightness as well as it a couple episodes of exacerbation recently.    I would like to switch her Qvar to a combination ICS/LABA to help with the long-acting  bronchodilator.  Go ahead and send in Symbicort 160 mg to use twice daily.  I did give her a spacer in the office today and instructions on how to use it.    I resume her albuterol rescue inhaler and albuterol nebulizer, I will refill those.    We did discuss with her history of nasal polyps if her asthma is not better controlled in the near future she may be a candidate for a biologic agent such as Nucala or Dupixent.  We will follow-up on labs after her next follow-up.      Follow Up     No follow-ups on file.  Patient was given instructions and counseling regarding her condition or for health maintenance advice. Please see specific information pulled into the AVS if appropriate.     I spent 35 minutes caring for Megan on this date of service. This time includes time spent by me in the following activities:preparing for the visit, reviewing tests, obtaining and/or reviewing a separately obtained history, performing a medically appropriate examination and/or evaluation , counseling and educating the patient/family/caregiver, ordering medications, tests, or procedures, referring and communicating with other health care professionals , documenting information in the medical record and independently interpreting results and communicating that information with the patient/family/caregiver      MAGGIE Etienne, ACNP  [facility]  Electronically signed

## 2021-09-09 ENCOUNTER — OFFICE VISIT (OUTPATIENT)
Dept: OBSTETRICS AND GYNECOLOGY | Facility: CLINIC | Age: 41
End: 2021-09-09

## 2021-09-09 VITALS
DIASTOLIC BLOOD PRESSURE: 80 MMHG | SYSTOLIC BLOOD PRESSURE: 122 MMHG | BODY MASS INDEX: 34.01 KG/M2 | WEIGHT: 180 LBS | RESPIRATION RATE: 14 BRPM

## 2021-09-09 DIAGNOSIS — R82.90 ABNORMAL URINE ODOR: ICD-10-CM

## 2021-09-09 DIAGNOSIS — N89.8 VAGINAL DISCHARGE: ICD-10-CM

## 2021-09-09 DIAGNOSIS — Z01.419 ENCNTR FOR GYN EXAM (GENERAL) (ROUTINE) W/O ABN FINDINGS: Primary | ICD-10-CM

## 2021-09-09 PROCEDURE — 99385 PREV VISIT NEW AGE 18-39: CPT | Performed by: OBSTETRICS & GYNECOLOGY

## 2021-09-09 PROCEDURE — 81003 URINALYSIS AUTO W/O SCOPE: CPT | Performed by: OBSTETRICS & GYNECOLOGY

## 2021-09-09 NOTE — PROGRESS NOTES
Subjective   Chief Complaint   Patient presents with   • Gynecologic Exam     Megan Post is a 41 y.o. year old  who is S/P hysterectomy presenting to be seen for her annual exam.  Patient reports she had a hysterectomy 13 years ago by Dr. Hernandez. She had a hysterectomy secondary to endometriosis. She reports that a month later she had a bilateral oophorectomy secondary to persistent pain. She currently is on po HRT prescribed by her PCP. She reports a history of abnormal pap smears at age 18. She reports normal pap smears following. Her last pap smear was in 2020 and was negative for cytology and HPV. She reports vaginal discharge for the last couple of days ago and also reports foul odor of her urine.     SEXUAL Hx:  She is currently sexually active.  In the past year there there has been NO new sexual partners.    Condoms are never used.  She would not like to be screened for STD's at today's exam.  HEALTH Hx:  She exercises regularly: yes.  She wears her seat belt: yes.  She has concerns about domestic violence: no.  OTHER THINGS SHE WANTS TO DISCUSS TODAY:  Nothing else    The following portions of the patient's history were reviewed and updated as appropriate:problem list, current medications, allergies, past family history, past medical history, past social history and past surgical history.    Social History    Tobacco Use      Smoking status: Former Smoker        Packs/day: 0.50        Types: Cigarettes        Start date: 2003        Quit date: 2021        Years since quittin.2      Smokeless tobacco: Never Used      Tobacco comment: nicotine patches for smoking cessation    Review of Systems  Constitutional POS: nothing reported    NEG: anorexia or night sweats   Genitourinary POS: see HPI    NEG: dysuria or hematuria      Gastointestinal POS: nothing reported    NEG: bloating, change in bowel habits, melena or reflux symptoms   Integument POS: nothing reported    NEG: moles that  are changing in size, shape, color or rashes   Breast POS: nothing reported    NEG: persistent breast lump, skin dimpling or nipple discharge        Objective   /80   Resp 14   Wt 81.6 kg (180 lb)   LMP  (LMP Unknown)   Breastfeeding No   BMI 34.01 kg/m²     General:  well developed; well nourished  no acute distress   Skin:  No suspicious lesions seen   Thyroid: normal to inspection and palpation   Breasts:  Examined in supine position  Symmetric without masses or skin dimpling  Nipples normal without inversion, lesions or discharge  There are no palpable axillary nodes   Abdomen: soft, non-tender; no masses  no umbilical or inguinal hernias are present  no hepato-splenomegaly   Pelvis: Clinical staff was present for exam  External genitalia:  normal appearance of the external genitalia including Bartholin's and [place]'s glands.  :  urethral meatus normal;  Vaginal:  normal pink mucosa without prolapse or lesions.  Cervix:  absent.  Uterus:  absent.  Adnexa:  non palpable bilaterally.        Assessment   1. Normal GYN s/p HYST  2. Vaginal Discharge  3. Foul Odor of Urine     Plan   Pap was not done today.  I explained to Megan that the Pap smears are no longer recommended in patient's after hysterectomy.   I stressed to eMgan that she still should be seen to be seen yearly for a full physical including breast and pelvic exam.  1. Recommend screening mammograms yearly beginning at age 40. Encouraged healthy diet and exercise.  The following tests were ordered today: STD swabs for GC, chlamydia and trichimoniasis, UA with culture if indicated and vaginitis panel.  It was explained to Megan that all lab test should be back within the one week after they are performed. She will be notified about the results, regardless of the findings. If she has not been contacted by the office within 2 weeks after the test has been performed, it is her responsibility to contact us to learn about her results.  Will  request records from prior GYN.  2. The importance of keeping all planned follow-up and taking all medications as prescribed was emphasized.  3. Follow up for annual exam in 1 year.      No orders of the defined types were placed in this encounter.         This note was electronically signed.    Rashmi Ruiz, DO  September 9, 2021    Note: Speech recognition transcription software may have been used to create portions of this document.  An attempt at proofreading has been made but errors in transcription could still be present.

## 2021-09-10 LAB
BILIRUB UR QL STRIP: NEGATIVE
CLARITY UR: CLEAR
COLOR UR: YELLOW
GLUCOSE UR STRIP-MCNC: NEGATIVE MG/DL
HGB UR QL STRIP.AUTO: NEGATIVE
KETONES UR QL STRIP: ABNORMAL
LEUKOCYTE ESTERASE UR QL STRIP.AUTO: NEGATIVE
NITRITE UR QL STRIP: NEGATIVE
PH UR STRIP.AUTO: 6 [PH] (ref 5–8)
PROT UR QL STRIP: ABNORMAL
SP GR UR STRIP: 1.03 (ref 1–1.03)
UROBILINOGEN UR QL STRIP: ABNORMAL

## 2021-09-14 ENCOUNTER — TELEPHONE (OUTPATIENT)
Dept: PHYSICAL THERAPY | Facility: CLINIC | Age: 41
End: 2021-09-14

## 2021-09-14 ENCOUNTER — TELEPHONE (OUTPATIENT)
Dept: INTERNAL MEDICINE | Facility: CLINIC | Age: 41
End: 2021-09-14

## 2021-09-14 NOTE — TELEPHONE ENCOUNTER
PATIENT CALLED AND REQUESTING IF REFERRAL CAN BE SENT TO  [facility]  [address] [place] [address] [place] [facility]# [phone]    PATIENT WAS PREVIOUSLY REFERRED TO [facility] WHICH IS TOO FAR OUT ON DATES AND LOCATION.    PATIENT IS REQUESTING A CALL BACK ONCE REFERRAL HAS BEEN SENT.    CALL BACK NUMBER [phone]

## 2021-09-16 ENCOUNTER — TELEMEDICINE (OUTPATIENT)
Dept: INTERNAL MEDICINE | Facility: CLINIC | Age: 41
End: 2021-09-16

## 2021-09-16 DIAGNOSIS — B96.89 ACUTE BACTERIAL RHINOSINUSITIS: Primary | ICD-10-CM

## 2021-09-16 DIAGNOSIS — R82.90 ABNORMAL FINDING ON URINALYSIS: ICD-10-CM

## 2021-09-16 DIAGNOSIS — J01.90 ACUTE BACTERIAL RHINOSINUSITIS: Primary | ICD-10-CM

## 2021-09-16 DIAGNOSIS — B37.31 VAGINAL CANDIDIASIS: ICD-10-CM

## 2021-09-16 PROCEDURE — 99214 OFFICE O/P EST MOD 30 MIN: CPT | Performed by: STUDENT IN AN ORGANIZED HEALTH CARE EDUCATION/TRAINING PROGRAM

## 2021-09-16 RX ORDER — CEFDINIR 300 MG/1
300 CAPSULE ORAL 2 TIMES DAILY
Qty: 14 CAPSULE | Refills: 0 | Status: SHIPPED | OUTPATIENT
Start: 2021-09-16 | End: 2021-09-23

## 2021-09-16 RX ORDER — FLUCONAZOLE 100 MG/1
150 TABLET ORAL
Qty: 3 TABLET | Refills: 0 | Status: SHIPPED | OUTPATIENT
Start: 2021-09-16 | End: 2021-09-20

## 2021-09-16 RX ORDER — DOXYCYCLINE HYCLATE 100 MG/1
100 CAPSULE ORAL 2 TIMES DAILY
Qty: 14 CAPSULE | Refills: 0 | Status: SHIPPED | OUTPATIENT
Start: 2021-09-16 | End: 2021-09-23

## 2021-09-16 RX ORDER — EPINASTINE HCL 0.05 %
1 DROPS OPHTHALMIC (EYE) 2 TIMES DAILY
Qty: 5 ML | Refills: 1 | Status: SHIPPED | OUTPATIENT
Start: 2021-09-16 | End: 2022-07-01

## 2021-09-16 RX ORDER — FLUTICASONE PROPIONATE 93 UG/1
1 SPRAY, METERED NASAL DAILY PRN
Qty: 16 ML | Refills: 2 | Status: SHIPPED | OUTPATIENT
Start: 2021-09-16 | End: 2021-10-01 | Stop reason: SDUPTHER

## 2021-09-16 RX ORDER — ESTRADIOL 0.1 MG/G
CREAM VAGINAL
Qty: 1 EACH | Refills: 12 | Status: SHIPPED | OUTPATIENT
Start: 2021-09-16 | End: 2022-05-31 | Stop reason: SDUPTHER

## 2021-09-16 NOTE — PROGRESS NOTES
TeleHealth Visit      Date: 2021     Patient Name: Megan Post  : 1980   MRN: [id number]     Chief Complaint:  Sinus pressure, congestion    History of Present Illness: Megan Post is a 41 y.o. female who presents via video.  She has consented to a video connection for her medical care. She gave her full name and birthday and is located in [place]. Provider is also in [place]. She reports over over 2 weeks of sinus pressure, cough, congestion, sore throat, rhinorrhea, itchy watery eyes.  She also has teeth pain for which she was seen by her dentist on 9/10.  X-rays were performed and were normal.  She was prescribed clindamycin for bacterial sinus infection but has not found benefit.  She also notes a fever of 100.9.  She was tested for Covid on  which is just after symptoms began.  This test was negative.  She has not had her Covid vaccines.  She is allergic to sulfa drugs and penicillins.  Asked about her penicillin allergy, she reports itchy skin but no trouble breathing or anaphylaxis.    Subjective     Review of System: Review of Systems   Constitutional: Positive for fever.   HENT: Positive for congestion, dental problem, facial swelling, postnasal drip, rhinorrhea, sinus pressure, sinus pain, sneezing and sore throat.    Eyes: Positive for itching.   Respiratory: Positive for cough and shortness of breath (related to asthma). Negative for chest tightness and wheezing.       I have reviewed the ROS documented by my clinical staff, updated appropriately and I agree. Mikaela Miller MD    I have reviewed and the following portions of the patient's history were updated as appropriate: past family history, past medical history, past social history, past surgical history and problem list.     Medications:     Current Outpatient Medications:   •  Acid Gone 160-105 MG chewable tablet chewable tablet, CHEW AND SWALLOW 2 TABLETS BY MOUTH TWICE DAILY TO FOUR TIMES DAILY IF NEEDED FOR  HEARTBURN. TAKE SEPARATELY FROM OTHER MEDICATIONS, Disp: , Rfl:   •  albuterol (PROVENTIL) (2.5 MG/3ML) 0.083% nebulizer solution, Take 2.5 mg by nebulization 4 (Four) Times a Day As Needed for Wheezing., Disp: 90 each, Rfl: 5  •  albuterol sulfate  (90 Base) MCG/ACT inhaler, Inhale 2 puffs Every 4 (Four) Hours As Needed for Wheezing., Disp: 18 g, Rfl: 5  •  ALPRAZolam (XANAX) 0.25 MG tablet, Take 0.25 mg by mouth 2 (Two) Times a Day As Needed. for anxiety, Disp: , Rfl:   •  azelastine (ASTELIN) 0.1 % nasal spray, 1 spray each nostril daily, Disp: 30 mL, Rfl: 2  •  BIOTIN PO, Take 1 tablet by mouth Daily., Disp: , Rfl:   •  budesonide-formoterol (SYMBICORT) 160-4.5 MCG/ACT inhaler, Inhale 2 puffs 2 (Two) Times a Day., Disp: 1 each, Rfl: 5  •  cefdinir (OMNICEF) 300 MG capsule, Take 1 capsule by mouth 2 (Two) Times a Day for 7 days., Disp: 14 capsule, Rfl: 0  •  cetirizine (zyrTEC) 10 MG tablet, Take 1 tablet by mouth Daily., Disp: , Rfl: 1  •  COLLAGEN PO, Take 1 tablet by mouth., Disp: , Rfl:   •  cyclobenzaprine (FLEXERIL) 10 MG tablet, Take 10 mg by mouth., Disp: , Rfl:   •  dicyclomine (Bentyl) 10 MG capsule, Take 1 capsule by mouth 4 (Four) Times a Day Before Meals & at Bedtime As Needed (abdominal cramping diarrhea)., Disp: 120 capsule, Rfl: 2  •  dilTIAZem CD (CARDIZEM CD) 120 MG 24 hr capsule, TAKE 1 CAPSULE BY MOUTH DAILY, Disp: 90 capsule, Rfl: 1  •  doxycycline (VIBRAMYCIN) 100 MG capsule, Take 1 capsule by mouth 2 (Two) Times a Day for 7 days., Disp: 14 capsule, Rfl: 0  •  Epinastine HCl 0.05 % ophthalmic solution, Administer 1 drop to both eyes 2 (Two) Times a Day., Disp: 5 mL, Rfl: 1  •  EPIPEN 2-JAREN 0.3 MG/0.3ML solution auto-injector injection, U UTD, Disp: , Rfl: 6  •  estradiol (ESTRACE) 2 MG tablet, Take 1 tablet by mouth Daily., Disp: 30 tablet, Rfl: 2  •  fluconazole (Diflucan) 100 MG tablet, Take 1.5 tablets by mouth Every 72 (Seventy-Two) Hours for 2 doses., Disp: 3 tablet, Rfl: 0  •   LORazepam (ATIVAN) 1 MG tablet, Take 1 mg by mouth Daily As Needed., Disp: , Rfl: 0  •  lubiprostone (AMITIZA) 8 MCG capsule, Take 8 mcg by mouth 2 (Two) Times a Day As Needed., Disp: , Rfl:   •  Magnesium 250 MG tablet, TK 1-2 TS PO D FOR CONSTIPATION IF TAKING MIRALAX IS NOT EFFECTIVE, Disp: , Rfl:   •  metFORMIN (GLUCOPHAGE) 500 MG tablet, TAKE 1 TABLET BY MOUTH DAILY WITH BREAKFAST, Disp: 90 tablet, Rfl: 0  •  montelukast (SINGULAIR) 10 MG tablet, TAKE 1 TABLET BY MOUTH EVERY NIGHT, Disp: 30 tablet, Rfl: 11  •  Multiple Vitamin (MULTI-VITAMIN DAILY PO), Take  by mouth Daily., Disp: , Rfl:   •  multivitamin with minerals (CENTROVITE) tablet tablet, Take  by mouth Daily., Disp: , Rfl:   •  naltrexone-bupropion ER (CONTRAVE) 8-90 MG tablet, Take 2 tablets by mouth 2 (Two) Times a Day., Disp: 120 tablet, Rfl: 0  •  omeprazole (priLOSEC) 40 MG capsule, Take 40 mg by mouth 2 (two) times a day., Disp: , Rfl:   •  ondansetron ODT (ZOFRAN-ODT) 4 MG disintegrating tablet, Place 1 tablet on the tongue Every 8 (Eight) Hours As Needed for Nausea or Vomiting., Disp: 30 tablet, Rfl: 1  •  pregabalin (LYRICA) 100 MG capsule, Take 1 capsule by mouth 2 (Two) Times a Day., Disp: 60 capsule, Rfl: 0  •  promethazine-dextromethorphan (PROMETHAZINE-DM) 6.25-15 MG/5ML syrup, Take 5 mL by mouth 4 (Four) Times a Day As Needed for Cough., Disp: 118 mL, Rfl: 0  •  tiZANidine (Zanaflex) 4 MG tablet, Take 1 tablet by mouth Every 8 (Eight) Hours As Needed for Muscle Spasms., Disp: 60 tablet, Rfl: 0  •  topiramate (TOPAMAX) 100 MG tablet, Take 100 mg by mouth 2 (Two) Times a Day., Disp: , Rfl:   •  traMADol (ULTRAM) 50 MG tablet, Take 1 tablet by mouth Every 8 (Eight) Hours As Needed for Moderate Pain ., Disp: 60 tablet, Rfl: 2  •  traZODone (DESYREL) 150 MG tablet, Take 1 tablet by mouth Every Night., Disp: , Rfl: 3  •  Xhance 93 MCG/ACT Exhaler Suspension, Inhale 1 puff Daily As Needed (congestion)., Disp: 16 mL, Rfl: 2    Allergies:    Allergies   Allergen Reactions   • Adhesive Tape Hives   • Latex Hives   • Sulfa Antibiotics Hives   • Sulfate Hives   • Wound Dressing Adhesive Hives and Unknown - High Severity   • Biaxin [Clarithromycin] Nausea Only   • Codeine Itching   • Penicillins Nausea Only   • Ciprofloxacin Unknown - High Severity   • Nuts Unknown - High Severity       Objective     Physical Exam:   Vital Signs: There were no vitals filed for this visit.  There is no height or weight on file to calculate BMI.     Physical Exam  Constitutional:       General: She is not in acute distress.     Appearance: Normal appearance. She is not ill-appearing.   Pulmonary:      Effort: Pulmonary effort is normal.   Neurological:      Mental Status: She is alert.   Psychiatric:         Mood and Affect: Mood normal.         Behavior: Behavior normal.          Assessment / Plan      Assessment/Plan:   Diagnoses and all orders for this visit:    1. Acute bacterial rhinosinusitis (Primary)  Patient has had symptoms for just over 2 weeks and she has not had improvement with clindamycin.  Cefdinir and doxycycline prescribed twice a day for 7 days.  She has a penicillin allergy so Augmentin and amoxicillin were not options.  Side effects were discussed with the patient including GI upset and sun sensitivity with doxycycline.  She was also prescribed antihistamine eyedrops for itchy eyes.  XHANCE was refilled.  Repeat Covid tested recommended.  -     cefdinir (OMNICEF) 300 MG capsule; Take 1 capsule by mouth 2 (Two) Times a Day for 7 days.  Dispense: 14 capsule; Refill: 0  -     doxycycline (VIBRAMYCIN) 100 MG capsule; Take 1 capsule by mouth 2 (Two) Times a Day for 7 days.  Dispense: 14 capsule; Refill: 0  -     Epinastine HCl 0.05 % ophthalmic solution; Administer 1 drop to both eyes 2 (Two) Times a Day.  Dispense: 5 mL; Refill: 1  -     Xhance 93 MCG/ACT Exhaler Suspension; Inhale 1 puff Daily As Needed (congestion).  Dispense: 16 mL; Refill: 2    2.  Vaginal candidiasis  Recurrent vaginal yeast infections with any use of antibiotics.  Fluconazole prescribed.  -     fluconazole (Diflucan) 100 MG tablet; Take 1.5 tablets by mouth Every 72 (Seventy-Two) Hours for 2 doses.  Dispense: 3 tablet; Refill: 0    3. Abnormal finding on urinalysis  Patient specifically asked about trace ketones and trace protein seen on recent urinalysis.  She is prediabetic does not seem to have uncontrolled hypertension.  Counseled patient that these changes do not represent urgent or emergent changes in her kidneys but is something that should be followed to make sure it either improves or does not progress.  She has a follow-up appointment with her primary care on 10/21/2021.     Follow Up:   Return if symptoms worsen or fail to improve.  A follow-up appointment on 10/21/2021 with Sugey Lizarraga.    Time:  I spent approximately 30 minutes providing clinical care for this patient; including review of patient's chart and provider documentation, face to face time spent with patient in examination room (obtaining history, performing physical exam, discussing diagnosis and management options), placing orders, and completing patient documentation.     Mikaela Miller MD  [facility] [place]

## 2021-09-17 ENCOUNTER — TELEMEDICINE (OUTPATIENT)
Dept: CARDIOLOGY | Facility: HOSPITAL | Age: 41
End: 2021-09-17

## 2021-09-17 ENCOUNTER — TELEPHONE (OUTPATIENT)
Dept: INTERNAL MEDICINE | Facility: CLINIC | Age: 41
End: 2021-09-17

## 2021-09-17 VITALS
BODY MASS INDEX: 33.23 KG/M2 | WEIGHT: 176 LBS | DIASTOLIC BLOOD PRESSURE: 99 MMHG | HEIGHT: 61 IN | SYSTOLIC BLOOD PRESSURE: 144 MMHG | HEART RATE: 96 BPM

## 2021-09-17 DIAGNOSIS — R00.0 INAPPROPRIATE SINUS TACHYCARDIA: ICD-10-CM

## 2021-09-17 DIAGNOSIS — R06.09 DOE (DYSPNEA ON EXERTION): ICD-10-CM

## 2021-09-17 DIAGNOSIS — G89.29 CHRONIC BILATERAL LOW BACK PAIN WITH SCIATICA, SCIATICA LATERALITY UNSPECIFIED: Primary | ICD-10-CM

## 2021-09-17 DIAGNOSIS — R07.9 CHEST PAIN, UNSPECIFIED TYPE: Primary | ICD-10-CM

## 2021-09-17 DIAGNOSIS — M54.40 CHRONIC BILATERAL LOW BACK PAIN WITH SCIATICA, SCIATICA LATERALITY UNSPECIFIED: Primary | ICD-10-CM

## 2021-09-17 DIAGNOSIS — R06.02 SHORTNESS OF BREATH: ICD-10-CM

## 2021-09-17 PROCEDURE — 99442 PR PHYS/QHP TELEPHONE EVALUATION 11-20 MIN: CPT | Performed by: NURSE PRACTITIONER

## 2021-09-17 NOTE — TELEPHONE ENCOUNTER
PRIOR AUTHORIZATION REQUIRED:    Caller: Megan Post    Relationship: Self    Best call back number: [phone] (H)    What test/procedure requested:   Xhance 93 MCG/ACT Exhaler Suspension  1 puff, Daily PRN 2 ordered         Summary: Inhale 1 puff Daily As Needed (congestion)., Starting Th 9/16/2021, Normal   Dose, Route, Frequency: 1 puff, Inhalation, Daily PRN        Epinastine HCl 0.05 % ophthalmic solution  1 drop, 2 Times Daily 1 ordered         Summary: Administer 1 drop to both eyes 2 (Two) Times a Day., Starting Th 9/16/2021, Normal   Dose, Route, Frequency: 1 drop, Both Eyes, 2 Times Daily        When is it needed: TODAY    Where is the test/procedure going to be performed:   [id number] - [place], [place] - [address] AT [facility] [address] - [phone] [facility] [phone] FX      Additional information or concerns: PATIENT STATES THAT THE PHARMACY INFORMED HER THAT A PRIOR AUTHORIZATION IS NEEDED IN ORDER TO FILL HER PRESCRIPTIONS FOR THE MEDICATION LISTED ABOVE.       PATIENT HAS BEEN ADVISED THAT THIS REQUEST HAS BEEN MARKED AS A HIGH PRIORITY, PLEASE ALLOW 48 HOURS FOR OUR CLINICAL TEAM TO FOLLOW UP ON THIS REQUEST.

## 2021-09-17 NOTE — PROGRESS NOTES
"Chief Complaint  Follow-up and Palpitations    [facility]  [facility]  Telemedicine note    This was an video enabled telemedicine encounter.    Subjective    History of Present Illness {CC  Problem List  Visit  Diagnosis   Encounters  Notes  Medications  Labs  Result Review Imaging  Media :23}     Megan Post, 41 y.o. female with inappropriate sinus tachycardia, bipolar disorder, fibromyalgia, asthma, anxiety presents to [facility] telemedicine visit for Follow-up and Palpitations.    She presents today feeling ill, and recently diagnosed with a sinus infection; she had a recent visit with her PCP and has been started on antibiotics. She notes she has been completing physical therapy for her back and notes heart rates are controlled/improved. She continues to experience sharp right sided chest pain; pain occurs at rest and with activity; pain worsens with exertion. She notes this is chronic and has been ongoing for over a year. Previous LHC was completed with no CAD.      She denies racing heart, She also notes increased CASAS recently.Recent pulmonary workup was negative for dyspnea etiology.   She is limited in her activity related to lumbar spondylosis, lumbar discogenic pain, and lumbar disc displacement.and syncope.  Reports SBPs generally less than 140.      Objective     Vital Signs:   Vitals:    09/17/21 1309   BP: 144/99   BP Location: Right arm   Patient Position: Sitting   Cuff Size: Adult   Pulse: 96   Weight: 79.8 kg (176 lb)   Height: 154.9 cm (61\")     Body mass index is 33.25 kg/m².  Physical Exam  Vitals and nursing note reviewed.   Constitutional:       Appearance: Normal appearance.   HENT:      Head: Normocephalic.   Pulmonary:      Effort: Pulmonary effort is normal. No respiratory distress.   Neurological:      Mental Status: She is alert and oriented to person, place, and time.   Psychiatric:         Mood and Affect: Mood normal.       "   Behavior: Behavior normal.         Thought Content: Thought content normal.       Data Reviewed:{ Labs  Result Review  Imaging  Med Tab  Media :23}     SCANNED - CARDIOLOGY (07/29/2019)  SCANNED - ECHOCARDIOGRAM (08/01/2019)  ECG 12 Lead (06/16/2020 15:45)  Holter Monitor - 72 Hour Up To 21 Days (07/29/2019 16:15)      Assessment and Plan {CC Problem List  Visit Diagnosis  ROS  Review (Popup)  Health Maintenance  Quality  BestPractice  Medications  SmartSets  SnapShot Encounters  Media :23}     This visit has been scheduled as a video visit to comply with patient safety concerns in accordance with CDC recommendations. Total time of discussion was 14 minutes.    1. Chest pain, unspecified type  -Ongoing right sided chest pain continues, worsens with exertion. Previous CTA denied by insurance. Given increased CASAS and limited mobility will re-order CTA for coronary evaluation.  - CT Angiogram Coronary; Future    2. CASAS (dyspnea on exertion)  -Worsened recently.   - Adult Transthoracic Echo Complete W/ Cont if Necessary Per Protocol; Future  - CT Angiogram Coronary; Future    3. Inappropriate sinus tachycardia  -Notes improvement of heart rates/response to exercise  -Continue prescribed diltiazem CD 120mg daily    4. Shortness of breath  - Adult Transthoracic Echo Complete W/ Cont if Necessary Per Protocol; Future      Follow Up {Instructions Charge Capture  Follow-up Communications :23}   Return in about 6 weeks (around 10/29/2021) for Office follow-up, Results follow-up. Ida patient..    Patient was given instructions and counseling regarding her condition or for health maintenance advice. Please see specific information pulled into the AVS if appropriate.  Patient was instructed to call the [facility] Center with any questions, concerns, or worsening symptoms.    *Please note that portions of this note were completed with a voice recognition program. Efforts were made to edit the  dictations, but occasionally words are mistranscribed.

## 2021-09-17 NOTE — TELEPHONE ENCOUNTER
PATIENT CALLED TO CHECK THE STATUS OF HER REFERRAL FOR [facility] FOR [facility] WITH HER BACK PAIN.     PATIENT WILL LIKE A CALL ONCE THE REFERRAL HAS BEEN PLACED.     PATIENT CONTACT: [phone] (H)    PATIENT HAS BEEN ADVISED TO PLEASE ALLOW 48 HOURS FOR OUR CLINICAL TEAM WILL FOLLOW UP ON THIS REQUEST.

## 2021-09-20 ENCOUNTER — TELEPHONE (OUTPATIENT)
Dept: INTERNAL MEDICINE | Facility: CLINIC | Age: 41
End: 2021-09-20

## 2021-09-20 NOTE — TELEPHONE ENCOUNTER
Called and spoke to pt and explained that the order was placed on Friday and it takes a couple days to get a phone call regarding the appt. Pt verbalized her understanding. I told her to call me back on Thursday if she had not heard anything by then.

## 2021-09-21 ENCOUNTER — CLINICAL SUPPORT (OUTPATIENT)
Dept: INTERNAL MEDICINE | Facility: CLINIC | Age: 41
End: 2021-09-21

## 2021-09-21 DIAGNOSIS — R05.9 COUGH: Primary | ICD-10-CM

## 2021-09-21 PROCEDURE — U0004 COV-19 TEST NON-CDC HGH THRU: HCPCS | Performed by: NURSE PRACTITIONER

## 2021-09-21 NOTE — TELEPHONE ENCOUNTER
"Hub to read:  \"lvmsg for pt; Last thing ordered by MAGGIE Clarke was a Physical Therapy.  Explained pt would need to call the PT office for those CPT Codes.  Other radiology test were ordered by Cardiology; Pt would need to [facility].\"  "

## 2021-09-22 ENCOUNTER — APPOINTMENT (OUTPATIENT)
Dept: CT IMAGING | Facility: HOSPITAL | Age: 41
End: 2021-09-22

## 2021-09-22 LAB — SARS-COV-2 RNA NOSE QL NAA+PROBE: NOT DETECTED

## 2021-09-23 ENCOUNTER — PRIOR AUTHORIZATION (OUTPATIENT)
Dept: INTERNAL MEDICINE | Facility: CLINIC | Age: 41
End: 2021-09-23

## 2021-09-27 ENCOUNTER — TELEMEDICINE (OUTPATIENT)
Dept: INTERNAL MEDICINE | Facility: CLINIC | Age: 41
End: 2021-09-27

## 2021-09-27 DIAGNOSIS — J34.89 SINUS PRESSURE: Primary | ICD-10-CM

## 2021-09-27 DIAGNOSIS — H92.09 EARACHE: ICD-10-CM

## 2021-09-27 PROCEDURE — 99213 OFFICE O/P EST LOW 20 MIN: CPT | Performed by: NURSE PRACTITIONER

## 2021-09-27 RX ORDER — METHYLPREDNISOLONE 4 MG/1
TABLET ORAL
Qty: 21 EACH | Refills: 0 | Status: SHIPPED | OUTPATIENT
Start: 2021-09-27 | End: 2021-10-26

## 2021-09-28 DIAGNOSIS — M51.26 LUMBAR DISCOGENIC PAIN SYNDROME: ICD-10-CM

## 2021-09-28 RX ORDER — TRAMADOL HYDROCHLORIDE 50 MG/1
50 TABLET ORAL EVERY 8 HOURS PRN
Qty: 60 TABLET | Refills: 2 | Status: SHIPPED | OUTPATIENT
Start: 2021-09-28 | End: 2022-06-01 | Stop reason: SDUPTHER

## 2021-09-28 NOTE — TELEPHONE ENCOUNTER
Last appointment: 9/27/21 Telemedicine  Next appointment: 11/8/21  Norris: 6/13/20  UDS: 3/24/21  CSA: 10/26/20    Last Refill: 3/24/21  Quantity: 60 tab  Refills: 2      Pharmacy: [facility] #[id number] [place], [place] - [address] AT [facility] [address] - [phone]  - [phone] FX

## 2021-09-29 ENCOUNTER — APPOINTMENT (OUTPATIENT)
Dept: CT IMAGING | Facility: HOSPITAL | Age: 41
End: 2021-09-29

## 2021-09-29 ENCOUNTER — TELEPHONE (OUTPATIENT)
Dept: INTERNAL MEDICINE | Facility: CLINIC | Age: 41
End: 2021-09-29

## 2021-09-30 NOTE — TELEPHONE ENCOUNTER
PT would like PA resubmitted PA was not submitted correctly. Eye drops need to be submitted for her polyps. Epinastine will need to be changed to alternative

## 2021-10-01 DIAGNOSIS — J01.90 ACUTE BACTERIAL RHINOSINUSITIS: ICD-10-CM

## 2021-10-01 DIAGNOSIS — B96.89 ACUTE BACTERIAL RHINOSINUSITIS: ICD-10-CM

## 2021-10-01 DIAGNOSIS — J33.9 NASAL POLYPS: Primary | ICD-10-CM

## 2021-10-01 RX ORDER — FLUTICASONE PROPIONATE 93 UG/1
1 SPRAY, METERED NASAL DAILY PRN
Qty: 16 ML | Refills: 2 | Status: SHIPPED | OUTPATIENT
Start: 2021-10-01 | End: 2022-05-17

## 2021-10-02 ENCOUNTER — PRIOR AUTHORIZATION (OUTPATIENT)
Dept: INTERNAL MEDICINE | Facility: CLINIC | Age: 41
End: 2021-10-02

## 2021-10-26 ENCOUNTER — LAB (OUTPATIENT)
Dept: LAB | Facility: HOSPITAL | Age: 41
End: 2021-10-26

## 2021-10-26 ENCOUNTER — OFFICE VISIT (OUTPATIENT)
Dept: NEUROLOGY | Facility: CLINIC | Age: 41
End: 2021-10-26

## 2021-10-26 VITALS
BODY MASS INDEX: 33.61 KG/M2 | HEART RATE: 111 BPM | DIASTOLIC BLOOD PRESSURE: 80 MMHG | WEIGHT: 178 LBS | OXYGEN SATURATION: 97 % | TEMPERATURE: 97.5 F | SYSTOLIC BLOOD PRESSURE: 110 MMHG | HEIGHT: 61 IN

## 2021-10-26 DIAGNOSIS — R40.0 DAYTIME SOMNOLENCE: ICD-10-CM

## 2021-10-26 DIAGNOSIS — G43.709 CHRONIC MIGRAINE WITHOUT AURA WITHOUT STATUS MIGRAINOSUS, NOT INTRACTABLE: Primary | ICD-10-CM

## 2021-10-26 DIAGNOSIS — R06.83 SNORING: ICD-10-CM

## 2021-10-26 DIAGNOSIS — R41.3 MEMORY LOSS: ICD-10-CM

## 2021-10-26 PROCEDURE — 36415 COLL VENOUS BLD VENIPUNCTURE: CPT

## 2021-10-26 PROCEDURE — 83921 ORGANIC ACID SINGLE QUANT: CPT

## 2021-10-26 PROCEDURE — 82746 ASSAY OF FOLIC ACID SERUM: CPT

## 2021-10-26 PROCEDURE — 99215 OFFICE O/P EST HI 40 MIN: CPT | Performed by: NURSE PRACTITIONER

## 2021-10-26 PROCEDURE — 82607 VITAMIN B-12: CPT

## 2021-10-26 RX ORDER — ALPRAZOLAM 0.5 MG/1
1 TABLET ORAL 2 TIMES DAILY PRN
COMMUNITY
Start: 2021-09-23 | End: 2022-02-09

## 2021-10-26 NOTE — PROGRESS NOTES
Subjective:     Patient ID: Megan Rodriguez is a 41 y.o. female.    CC:   Chief Complaint   Patient presents with   • Headache       HPI:   History of Present Illness     Ms. rodriguez is a very pleasant 41-year-old female here today for evaluation.  She previously saw Dr. Polanco back in 2018 for complaints of memory issues.  She was referred today by her previous primary care for migraines.  She tells me that she has been struggling with migraines for greater than 10 years.  At this point she is having about 7 significant headaches per month, 4 of them are severe migraines.  With her headache she complains of throbbing pain in the frontal region of her head or top of her head.  With migrainous headache she has significant photo and phonophobia as well as nausea, she typically just wants to lie down in a dark room and she finds it hard to go about her day.  She has been given a prescription for sumatriptan in the past which she really did not have much of a response with.  She is currently being worked up through cardiology and will be having a CT arteriogram of her heart next week.  She has been feeling fatigued in general.  She admits that she snores and has woke herself up gasping for breath in the past.  She has never had a sleep study  She continues to struggle with memory issues, previously Dr. Polanco felt her memory concerns were likely related to ADHD as well as polypharmacy.  She does currently take Topamax 100 mg twice a day through her psychiatrist for bipolar disorder, she also takes alprazolam, Flexeril, Lyrica 100 mg twice a day and trazodone among other medications, see list.  She basically has trouble staying on track and remembering short-term details of conversations etc.  She had a CT of her head over a year ago which was normal but has not had an MRI in many years.  She did previously see a neurologist at [facility] several years ago    She has an eye exam coming up next week  The following  portions of the patient's history were reviewed and updated as appropriate: allergies, current medications, past family history, past medical history, past social history, past surgical history and problem list.    Past Medical History:   Diagnosis Date   • Abdominal pain    • Abnormal breast exam    • Abnormal Pap smear of cervix    • Achilles tendinitis    • Acute sinusitis    • Anxiety    • Arthritis    • Asthma    • Tavera's syndrome    • Bipolar 1 disorder (HCC)    • Bowel trouble    • Breast cyst    • Colon polyps    • Constipation    • Depression    • Diverticulitis    • Diverticulitis of colon    • Endometriosis    • Eustachian tube dysfunction    • Extremity pain    • Fatty liver    • Female pelvic pain    • Fibromyalgia    • Gastric ulcer    • H/O bladder infections    • History of rashes as a child    • Irritable bowel syndrome    • Low back pain    • Lumbar radiculopathy    • Menopausal symptoms    • Muscle weakness    • Sexual problems    • Spinal stenosis of lumbar region        Past Surgical History:   Procedure Laterality Date   •  SECTION     • CHOLECYSTECTOMY     • COLONOSCOPY     • HYSTERECTOMY     • NASAL ENDOSCOPY     • OOPHORECTOMY Bilateral    • OTHER SURGICAL HISTORY      Laparoscopy (diagnostic) gynecologic with biopsy   • SHOULDER SURGERY     • UPPER GASTROINTESTINAL ENDOSCOPY  2019       Social History     Socioeconomic History   • Marital status: Single   Tobacco Use   • Smoking status: Former Smoker     Packs/day: 0.50     Types: Cigarettes     Start date: 2003     Quit date: 2021     Years since quittin.3   • Smokeless tobacco: Never Used   • Tobacco comment: nicotine patches for smoking cessation   Vaping Use   • Vaping Use: Never used   Substance and Sexual Activity   • Alcohol use: No   • Drug use: Never   • Sexual activity: Yes     Partners: Male     Birth control/protection: Surgical       Family History   Problem Relation Age of Onset   • Liver disease  "Mother    • Diabetes Mother    • Hyperlipidemia Mother    • Hypertension Mother    • Thyroid disease Mother    • Arthritis Father    • Mental illness Father    • Migraines Sister    • Stroke Other    • Diabetes Other    • Hyperlipidemia Other    • Hypertension Other    • Mental illness Other    • Migraines Other    • Tuberculosis Other    • Cancer Maternal Grandmother    • Cancer Maternal Grandfather    • Cancer Paternal Grandmother    • Cancer Paternal Grandfather    • No Known Problems Son    • Mental illness Daughter    • Breast cancer Neg Hx    • Endometrial cancer Neg Hx    • Ovarian cancer Neg Hx         Review of Systems   Constitutional: Negative.    Eyes: Negative.    Respiratory: Negative.    Cardiovascular: Negative.    Gastrointestinal: Negative.    Endocrine: Negative.    Genitourinary: Negative.    Musculoskeletal: Negative.    Skin: Negative.    Allergic/Immunologic: Negative.    Neurological: Positive for headaches. Negative for dizziness, tremors, seizures, syncope, facial asymmetry, speech difficulty, weakness, light-headedness and numbness.   Hematological: Negative.    Psychiatric/Behavioral:        Bipolar disorder, depression and anxiety        Objective:  /80   Pulse 111   Temp 97.5 °F (36.4 °C)   Ht 154.9 cm (61\")   Wt 80.7 kg (178 lb)   LMP  (LMP Unknown)   SpO2 97%   BMI 33.63 kg/m²     Neurologic Exam     Mental Status   Oriented to person, place, and time.   Attention: normal. Concentration: normal.   Speech: speech is normal   Level of consciousness: alert  Knowledge: consistent with education.   Able to read. Able to write. Normal comprehension.   MMSE score 27/30, she missed 1 on short-term recall, she missed the day of the week     Cranial Nerves   Cranial nerves II through XII intact.     CN II   Visual fields full to confrontation.   Right visual field deficit: none  Left visual field deficit: none     CN III, IV, VI   Pupils are equal, round, and reactive to " light.  Extraocular motions are normal.   Right pupil: Size: 3 mm. Shape: regular. Reactivity: brisk. Consensual response: intact. Accommodation: intact.   Left pupil: Size: 3 mm. Shape: regular. Reactivity: brisk. Consensual response: intact. Accommodation: intact.   CN III: no CN III palsy  CN VI: no CN VI palsy  Nystagmus: none   Upgaze: normal  Downgaze: normal    CN V   Facial sensation intact.     CN VII   Facial expression full, symmetric.     CN VIII   CN VIII normal.     CN IX, X   CN IX normal.   CN X normal.     CN XI   CN XI normal.     CN XII   CN XII normal.     Motor Exam   Muscle bulk: normal  Overall muscle tone: normal  Right arm tone: normal  Left arm tone: normal  Right arm pronator drift: absent  Left arm pronator drift: absent  Right leg tone: normal  Left leg tone: normal    Strength   Strength 5/5 throughout.     Sensory Exam   Light touch normal.     Gait, Coordination, and Reflexes     Gait  Gait: normal    Coordination   Romberg: negative  Finger to nose coordination: normal  Heel to shin coordination: normal  Tandem walking coordination: normal    Tremor   Resting tremor: absent  Intention tremor: absent  Action tremor: absent    Reflexes   Right Garduno: absent  Left Garduno: absent1+ DTRs, hard to assess due to body habitus       Physical Exam  Vitals reviewed.   Constitutional:       General: She is not in acute distress.     Appearance: She is well-developed. She is obese. She is not ill-appearing or toxic-appearing.   HENT:      Head: Normocephalic and atraumatic.      Mouth/Throat:      Mouth: Mucous membranes are moist.   Eyes:      General: No scleral icterus.     Extraocular Movements: Extraocular movements intact and EOM normal.      Conjunctiva/sclera: Conjunctivae normal.      Pupils: Pupils are equal, round, and reactive to light.   Pulmonary:      Effort: Pulmonary effort is normal. No respiratory distress.   Musculoskeletal:      Cervical back: Normal range of motion and  neck supple.   Skin:     General: Skin is warm.      Capillary Refill: Capillary refill takes less than 2 seconds.   Neurological:      General: No focal deficit present.      Mental Status: She is alert and oriented to person, place, and time.      Coordination: Finger-Nose-Finger Test, Heel to Shin Test and Romberg Test normal.      Gait: Gait is intact. Tandem walk normal.      Deep Tendon Reflexes: Strength normal.   Psychiatric:         Mood and Affect: Mood normal.         Speech: Speech normal.         Behavior: Behavior normal.         Thought Content: Thought content normal.         Judgment: Judgment normal.         Assessment/Plan:       Diagnoses and all orders for this visit:    1. Chronic migraine without aura without status migrainosus, not intractable (Primary)  -     Fremanezumab-vfrm 225 MG/1.5ML solution auto-injector; Inject 225 mg under the skin into the appropriate area as directed Every 30 (Thirty) Days.  Dispense: 1 pen; Refill: 11  -     MRI Brain Without Contrast; Future  -     ubrogepant (ubrogepant) 50 MG tablet; Take 1 PO at onset of migraine, may repeat in 2 hours  Dispense: 10 tablet; Refill: 11  -     Ambulatory Referral to Sleep Medicine    2. Snoring  -     Ambulatory Referral to Sleep Medicine    3. Daytime somnolence  -     Ambulatory Referral to Sleep Medicine    4. Memory loss  -     Ambulatory Referral to Sleep Medicine  -     Vitamin B12; Future  -     Folate; Future  -     Methylmalonic Acid, Serum; Future    We discussed options for headache management, she is on a great deal of medications orally for multiple things that can cause side effects such as memory issues.  I do feel like she would benefit more from CGRP injection and she is agreeable, will start Ajovy injections monthly, counseled on injection storage and procedure  I have given her some samples of Ubrelvy to use for acute headache treatment, she is not able to take triptans at the moment as she has been  undergoing some cardiac testing  She has not responded well to Imitrex in the past  We will check MRI of the brain without contrast rule out tumor lesion.  Labs pending for her memory, these have been normal in the past.  We did discuss that her memory issues likely are related to her bipolar disorder and polypharmacy and she is understanding  Referral to sleep medicine for sleep apnea evaluation     She takes NSAIDs but not regularly due to GI issues, I recommend that she hydrate well and limit caffeine use  We will see her back here in the clinic at next available clinic appointment but she is encouraged to notify us with any questions concerns or problems prior to follow-up appointment  I have given her our card to have her eye exam note faxed to our clinic after completion next week  We reviewed possible headache triggers, stress relief techniques, and alternative treatments for headaches. The patient was in understanding and all questions were addressed. We reviewed the diagnosis and treatment plan and medication side effect profile. The patient will follow up as scheduled.  Total time of visit face-to-face 40 minutes    Reviewed medications, potential side effects and signs and symptoms to report. Discussed risk versus benefits of treatment plan with patient and/or family-including medications, labs and radiology that may be ordered. Addressed questions and concerns during visit. Patient and/or family verbalized understanding and agree with plan.    AS THE PROVIDER, I PERSONALLY WORE PPE DURING ENTIRE FACE TO FACE ENCOUNTER IN CLINIC WITH THE PATIENT. PATIENT ALSO WORE PPE DURING ENTIRE FACE TO FACE ENCOUNTER EXCEPT FOR A MAX OF 30 SECONDS DURING NEUROLOGICAL EVALUATION OF CRANIAL NERVES AND THEN MASK WAS PLACED BACK OVER PATIENT FACE FOR REMAINDER OF VISIT. I WASHED MY HANDS BEFORE AND AFTER VISIT.    During this visit the following were done:  Labs Reviewed []    Labs Ordered []    Radiology Reports Reviewed  []    Radiology Ordered []    PCP Records Reviewed []    Referring Provider Records Reviewed []    ER Records Reviewed []    Hospital Records Reviewed []    History Obtained From Family []    Radiology Images Reviewed []    Other Reviewed []    Records Requested []      Jeni Snyder, APRN  10/26/2021

## 2021-10-27 LAB
FOLATE SERPL-MCNC: 15.2 NG/ML (ref 4.78–24.2)
VIT B12 BLD-MCNC: 291 PG/ML (ref 211–946)

## 2021-10-28 ENCOUNTER — HOSPITAL ENCOUNTER (OUTPATIENT)
Dept: CARDIOLOGY | Facility: HOSPITAL | Age: 41
Discharge: HOME | End: 2021-10-28
Admitting: NURSE PRACTITIONER

## 2021-10-28 ENCOUNTER — PRIOR AUTHORIZATION (OUTPATIENT)
Dept: NEUROLOGY | Facility: CLINIC | Age: 41
End: 2021-10-28

## 2021-10-28 VITALS — BODY MASS INDEX: 33.3 KG/M2 | HEIGHT: 61 IN | WEIGHT: 176.37 LBS

## 2021-10-28 DIAGNOSIS — R06.02 SHORTNESS OF BREATH: ICD-10-CM

## 2021-10-28 DIAGNOSIS — R06.09 DOE (DYSPNEA ON EXERTION): ICD-10-CM

## 2021-10-28 PROCEDURE — 93306 TTE W/DOPPLER COMPLETE: CPT | Performed by: INTERNAL MEDICINE

## 2021-10-28 PROCEDURE — 93306 TTE W/DOPPLER COMPLETE: CPT

## 2021-10-28 NOTE — TELEPHONE ENCOUNTER
PA SUBMITTED FOR UBRELVY 50MG KEY [id number]   AWAITING DETERMINATION      PA SUBMITTED FOR AJOVY TAYLOR [id number] (patient cannot take aimovig due to latex allergy)

## 2021-10-29 ENCOUNTER — PRIOR AUTHORIZATION (OUTPATIENT)
Dept: INTERNAL MEDICINE | Facility: CLINIC | Age: 41
End: 2021-10-29

## 2021-10-29 LAB
ASCENDING AORTA: 2 CM
BH CV ECHO MEAS - AO MAX PG (FULL): 4.5 MMHG
BH CV ECHO MEAS - AO MAX PG: 8 MMHG
BH CV ECHO MEAS - AO MEAN PG (FULL): 2 MMHG
BH CV ECHO MEAS - AO MEAN PG: 4 MMHG
BH CV ECHO MEAS - AO ROOT AREA (BSA CORRECTED): 1.1
BH CV ECHO MEAS - AO ROOT AREA: 2.8 CM^2
BH CV ECHO MEAS - AO ROOT DIAM: 1.9 CM
BH CV ECHO MEAS - AO V2 MAX: 142 CM/SEC
BH CV ECHO MEAS - AO V2 MEAN: 97.1 CM/SEC
BH CV ECHO MEAS - AO V2 VTI: 27.7 CM
BH CV ECHO MEAS - ASC AORTA: 2 CM
BH CV ECHO MEAS - AVA(I,A): 1.8 CM^2
BH CV ECHO MEAS - AVA(I,D): 1.8 CM^2
BH CV ECHO MEAS - AVA(V,A): 1.9 CM^2
BH CV ECHO MEAS - AVA(V,D): 1.9 CM^2
BH CV ECHO MEAS - BSA(HAYCOCK): 1.9 M^2
BH CV ECHO MEAS - BSA: 1.8 M^2
BH CV ECHO MEAS - BZI_BMI: 33.6 KILOGRAMS/M^2
BH CV ECHO MEAS - BZI_METRIC_HEIGHT: 154.9 CM
BH CV ECHO MEAS - BZI_METRIC_WEIGHT: 80.7 KG
BH CV ECHO MEAS - EDV(CUBED): 44 ML
BH CV ECHO MEAS - EDV(MOD-SP2): 32 ML
BH CV ECHO MEAS - EDV(MOD-SP4): 25 ML
BH CV ECHO MEAS - EDV(TEICH): 51.9 ML
BH CV ECHO MEAS - EF(CUBED): 69.4 %
BH CV ECHO MEAS - EF(MOD-BP): 59 %
BH CV ECHO MEAS - EF(MOD-SP2): 62.5 %
BH CV ECHO MEAS - EF(MOD-SP4): 60 %
BH CV ECHO MEAS - EF(TEICH): 62 %
BH CV ECHO MEAS - ESV(CUBED): 13.5 ML
BH CV ECHO MEAS - ESV(MOD-SP2): 12 ML
BH CV ECHO MEAS - ESV(MOD-SP4): 10 ML
BH CV ECHO MEAS - ESV(TEICH): 19.7 ML
BH CV ECHO MEAS - FS: 32.6 %
BH CV ECHO MEAS - IVS/LVPW: 1.1
BH CV ECHO MEAS - IVSD: 1 CM
BH CV ECHO MEAS - LA DIMENSION: 3.2 CM
BH CV ECHO MEAS - LA/AO: 1.7
BH CV ECHO MEAS - LAD MAJOR: 4.3 CM
BH CV ECHO MEAS - LAT PEAK E' VEL: 7.1 CM/SEC
BH CV ECHO MEAS - LATERAL E/E' RATIO: 7.8
BH CV ECHO MEAS - LV DIASTOLIC VOL/BSA (35-75): 13.9 ML/M^2
BH CV ECHO MEAS - LV IVRT: 0.09 SEC
BH CV ECHO MEAS - LV MASS(C)D: 106.6 GRAMS
BH CV ECHO MEAS - LV MASS(C)DI: 59.3 GRAMS/M^2
BH CV ECHO MEAS - LV MAX PG: 3.5 MMHG
BH CV ECHO MEAS - LV MEAN PG: 2 MMHG
BH CV ECHO MEAS - LV SYSTOLIC VOL/BSA (12-30): 5.6 ML/M^2
BH CV ECHO MEAS - LV V1 MAX: 94 CM/SEC
BH CV ECHO MEAS - LV V1 MEAN: 61.9 CM/SEC
BH CV ECHO MEAS - LV V1 VTI: 17.5 CM
BH CV ECHO MEAS - LVIDD: 3.5 CM
BH CV ECHO MEAS - LVIDS: 2.4 CM
BH CV ECHO MEAS - LVLD AP2: 5.5 CM
BH CV ECHO MEAS - LVLD AP4: 5.9 CM
BH CV ECHO MEAS - LVLS AP2: 5.7 CM
BH CV ECHO MEAS - LVLS AP4: 5.2 CM
BH CV ECHO MEAS - LVOT AREA (M): 2.8 CM^2
BH CV ECHO MEAS - LVOT AREA: 2.8 CM^2
BH CV ECHO MEAS - LVOT DIAM: 1.9 CM
BH CV ECHO MEAS - LVPWD: 0.99 CM
BH CV ECHO MEAS - MED PEAK E' VEL: 6.6 CM/SEC
BH CV ECHO MEAS - MEDIAL E/E' RATIO: 8.5
BH CV ECHO MEAS - MV A MAX VEL: 54.8 CM/SEC
BH CV ECHO MEAS - MV DEC SLOPE: 654 CM/SEC^2
BH CV ECHO MEAS - MV DEC TIME: 0.19 SEC
BH CV ECHO MEAS - MV E MAX VEL: 55.8 CM/SEC
BH CV ECHO MEAS - MV E/A: 1
BH CV ECHO MEAS - MV P1/2T MAX VEL: 99.1 CM/SEC
BH CV ECHO MEAS - MV P1/2T: 44.4 MSEC
BH CV ECHO MEAS - MVA P1/2T LCG: 2.2 CM^2
BH CV ECHO MEAS - MVA(P1/2T): 5 CM^2
BH CV ECHO MEAS - PA ACC SLOPE: 631 CM/SEC^2
BH CV ECHO MEAS - PA ACC TIME: 0.11 SEC
BH CV ECHO MEAS - PA PR(ACCEL): 29.7 MMHG
BH CV ECHO MEAS - RAP SYSTOLE: 3 MMHG
BH CV ECHO MEAS - RVSP: 20 MMHG
BH CV ECHO MEAS - SI(AO): 43.7 ML/M^2
BH CV ECHO MEAS - SI(CUBED): 17 ML/M^2
BH CV ECHO MEAS - SI(LVOT): 27.6 ML/M^2
BH CV ECHO MEAS - SI(MOD-SP2): 11.1 ML/M^2
BH CV ECHO MEAS - SI(MOD-SP4): 8.3 ML/M^2
BH CV ECHO MEAS - SI(TEICH): 17.9 ML/M^2
BH CV ECHO MEAS - SV(AO): 78.5 ML
BH CV ECHO MEAS - SV(CUBED): 30.5 ML
BH CV ECHO MEAS - SV(LVOT): 49.6 ML
BH CV ECHO MEAS - SV(MOD-SP2): 20 ML
BH CV ECHO MEAS - SV(MOD-SP4): 15 ML
BH CV ECHO MEAS - SV(TEICH): 32.2 ML
BH CV ECHO MEAS - TAPSE (>1.6): 1.8 CM
BH CV ECHO MEAS - TR MAX PG: 17 MMHG
BH CV ECHO MEAS - TR MAX VEL: 208 CM/SEC
BH CV ECHO MEASUREMENTS AVERAGE E/E' RATIO: 8.15
BH CV VAS BP RIGHT ARM: NORMAL MMHG
BH CV XLRA - RV BASE: 1.7 CM
BH CV XLRA - RV LENGTH: 4.7 CM
BH CV XLRA - RV MID: 1.7 CM
BH CV XLRA - TDI S': 10.6 CM/SEC
IVRT: 90 MSEC
LEFT ATRIUM VOLUME INDEX: 9.5 ML/M^2
LEFT ATRIUM VOLUME: 17 ML
MAXIMAL PREDICTED HEART RATE: 181 BPM
STRESS TARGET HR: 154 BPM

## 2021-10-31 LAB
Lab: NORMAL
METHYLMALONATE SERPL-SCNC: 108 NMOL/L (ref 0–378)

## 2021-11-03 ENCOUNTER — TELEPHONE (OUTPATIENT)
Dept: NEUROLOGY | Facility: CLINIC | Age: 41
End: 2021-11-03

## 2021-11-03 NOTE — TELEPHONE ENCOUNTER
----- Message from MAGGIE Navarro sent at 11/1/2021  7:32 AM EDT -----  Please let patient know her B12 level was borderline low, recommend B12 supplement if not already taking oral B12 recommend 1000 mcg daily, can be obtained over-the-counter but I can send a prescription if she would like

## 2021-11-04 ENCOUNTER — PATIENT ROUNDING (BHMG ONLY) (OUTPATIENT)
Dept: NEUROLOGY | Facility: CLINIC | Age: 41
End: 2021-11-04

## 2021-11-04 NOTE — PROGRESS NOTES
November 4, 2021    Hello, may I speak with Megan Post?    My name is TAMARA     I am  with [facility]  [address] [address] [phone].    Before we get started may I verify your date of birth? 1980    I am calling to officially welcome you to our practice and ask about your recent visit. Is this a good time to talk? LVM

## 2021-11-17 NOTE — PROGRESS NOTES
[facility]  [facility]    Chief Complaint  No chief complaint on file.    Subjective    History of Present Illness {CC  Problem List  Visit  Diagnosis   Encounters  Notes  Medications  Labs  Result Review Imaging  Media :23}     Megan Post is a 41 y.o. female with Inappropriate sinus tachycardia, bipolar disorder, fibromyalgia, chronic chest pain, asthma and anxiety who presents today to follow-up on sinus tachycardia.       Cardiac risks:     Objective     Vital Signs:   There were no vitals filed for this visit.  There is no height or weight on file to calculate BMI.  Physical Exam         Result Review  Data Reviewed:{ Labs  Result Review  Imaging  Med Tab  Media :23}   Adult Transthoracic Echo Complete W/ Cont if Necessary Per Protocol (10/28/2021 16:55)  Methylmalonic Acid, Serum (10/26/2021 15:50)  Folate (10/26/2021 15:50)  Vitamin B12 (10/26/2021 15:50)  Lipid Panel (03/18/2021 15:57)  Magnesium (03/18/2021 15:57)  CBC & Differential (03/18/2021 15:57)  Hemoglobin A1c (03/18/2021 15:57)  TSH Rfx On Abnormal To Free T4 (03/18/2021 15:57)  Comprehensive Metabolic Panel (03/18/2021 15:57)    Consultant notes Jose SERRANO            Assessment and Plan {CC Problem List  Visit Diagnosis  ROS  Review (Popup)  Health Maintenance  Quality  BestPractice  Medications  SmartSets  SnapShot Encounters  Media :23}   There are no diagnoses linked to this encounter.    {Time Spent (Optional):33484}    Follow Up {Instructions Charge Capture  Follow-up Communications :23}   No follow-ups on file.    Patient was given instructions and counseling regarding her condition or for health maintenance advice. Please see specific information pulled into the AVS if appropriate.  Advised to call the Heart and Valve Center with any questions, concerns, or worsening symptoms.    Dictated Utilizing Dragon Dictation

## 2021-11-18 ENCOUNTER — TELEMEDICINE (OUTPATIENT)
Dept: CARDIOLOGY | Facility: HOSPITAL | Age: 41
End: 2021-11-18

## 2021-11-18 VITALS
WEIGHT: 174 LBS | DIASTOLIC BLOOD PRESSURE: 77 MMHG | SYSTOLIC BLOOD PRESSURE: 112 MMHG | HEIGHT: 61 IN | BODY MASS INDEX: 32.85 KG/M2 | HEART RATE: 105 BPM

## 2021-11-18 DIAGNOSIS — R00.0 INAPPROPRIATE SINUS TACHYCARDIA: Primary | ICD-10-CM

## 2021-11-18 DIAGNOSIS — R07.9 CHEST PAIN, UNSPECIFIED TYPE: ICD-10-CM

## 2021-11-18 PROCEDURE — 99213 OFFICE O/P EST LOW 20 MIN: CPT | Performed by: NURSE PRACTITIONER

## 2021-11-18 RX ORDER — DUPILUMAB 300 MG/2ML
INJECTION, SOLUTION SUBCUTANEOUS
COMMUNITY
Start: 2021-11-10 | End: 2022-09-28

## 2021-11-18 RX ORDER — DILTIAZEM HYDROCHLORIDE 180 MG/1
180 CAPSULE, COATED, EXTENDED RELEASE ORAL DAILY
Qty: 30 CAPSULE | Refills: 3 | Status: SHIPPED | OUTPATIENT
Start: 2021-11-18 | End: 2022-05-06

## 2021-11-18 NOTE — PROGRESS NOTES
"[facility]  Heart and Valve Center      Mode of Visit: Video  Location of patient: home  You have chosen to receive care through a telehealth visit.  Does the patient consent to use a video/audio connection for your medical care today? Yes  The visit included audio and video interaction. No technical issues occurred during this visit.     Chief Complaint  Chest Pain, Follow-up, and Rapid Heart Rate    History of Present Illness    Megan Post is a 41 y.o. female with with Inappropriate sinus tachycardia, bipolar disorder, fibromyalgia, chronic chest pain, asthma and anxiety who presents today to follow-up on sinus tachycardia. Patient changed office appt to telemedicine because she currently has a GI infection. She says she still has daily chest pain and fast heart rate , describes it as a stabbing that is worse with anxiety and with activity. She says she cannot tolerate any physical activity around her house. She does report that the diltiazem helps some. She does not monitor her HRs regularly at home      Objective     Vital Signs:   Vitals:    11/18/21 1448   BP: 112/77   Pulse: 105   Weight: 78.9 kg (174 lb)   Height: 154.9 cm (61\")     Body mass index is 32.88 kg/m².    Virtual Visit Physical Exam  Physical Exam  Constitutional:       Appearance: Normal appearance.   HENT:      Head: Normocephalic.   Pulmonary:      Effort: Pulmonary effort is normal. No respiratory distress.   Neurological:      Mental Status: She is alert and oriented to person, place, and time.   Psychiatric:         Mood and Affect: Mood normal.         Behavior: Behavior normal.         Thought Content: Thought content normal.              Result Review  Data Reviewed:{ Labs  Result Review  Imaging  Med Tab  Media :23}   Adult Transthoracic Echo Complete W/ Cont if Necessary Per Protocol (10/28/2021 16:55)    Consultant notes Jose SERRANO            Assessment and Plan {CC Problem List  Visit Diagnosis  ROS "  Review (Popup)  Health Maintenance  Quality  BestPractice  Medications  SmartSets  SnapShot Encounters  Media :23}   1. Inappropriate sinus tachycardia  Increase cardizem to 180mg daily  Advised her to monitor her blood pressure and call if having episodes of dizziness and/or systolic blood pressures less than 100 on the higher dose    2. Chest pain, unspecified type  According to her that the CTA of the coronaries was denied, will look into this and get this rescheduled  She does have some atypical symptoms and anxiety certainly could be contributing.  However, due to exertional chest pain needs further ischemic evaluation.  In the past she has been unable to tolerate stress tests (cannot walk on treadmill and had near syncope with nuclear stress test)        Follow Up {Instructions Charge Capture  Follow-up Communications :23}   No follow-ups on file.    Patient was given instructions and counseling regarding her condition or for health maintenance advice. Please see specific information pulled into the AVS if appropriate.  Advised to call the [facility] Center with any questions, concerns, or worsening symptoms.    Dictated Utilizing Dragon Dictation

## 2021-11-19 ENCOUNTER — TELEPHONE (OUTPATIENT)
Dept: CARDIOLOGY | Facility: HOSPITAL | Age: 41
End: 2021-11-19

## 2021-11-19 NOTE — TELEPHONE ENCOUNTER
Received notification that patient was unsure as to why she was having CTA of the coronaries. Attempted to call. Left message that CTA was for further evaluation of persistent chest pain since she is unable to tolerate stress test. I advised her to call me if she has any further questions or concerns

## 2021-11-22 ENCOUNTER — TELEPHONE (OUTPATIENT)
Dept: INTERNAL MEDICINE | Facility: CLINIC | Age: 41
End: 2021-11-22

## 2021-11-22 NOTE — TELEPHONE ENCOUNTER
Caller: Megan Post    Relationship to patient: Self    Best call back number: [phone]    Chief complaint: SINUS ISSUES    Type of visit: MYCHART    Requested date: ASAP     If rescheduling, when is the original appointment: N/A     Additional notes: PATIENT STATED THAT SHE WOULD NEED A MYCHART VIDEO ASAP     PLEASE ADVISE

## 2021-11-23 ENCOUNTER — APPOINTMENT (OUTPATIENT)
Dept: MRI IMAGING | Facility: HOSPITAL | Age: 41
End: 2021-11-23

## 2021-11-23 ENCOUNTER — TELEMEDICINE (OUTPATIENT)
Dept: INTERNAL MEDICINE | Facility: CLINIC | Age: 41
End: 2021-11-23

## 2021-11-23 DIAGNOSIS — J01.41 ACUTE RECURRENT PANSINUSITIS: Primary | ICD-10-CM

## 2021-11-23 DIAGNOSIS — N76.0 ACUTE VAGINITIS: ICD-10-CM

## 2021-11-23 PROCEDURE — 99442 PR PHYS/QHP TELEPHONE EVALUATION 11-20 MIN: CPT | Performed by: NURSE PRACTITIONER

## 2021-11-23 RX ORDER — METHYLPREDNISOLONE 4 MG/1
TABLET ORAL
Qty: 21 TABLET | Refills: 0 | Status: SHIPPED | OUTPATIENT
Start: 2021-11-23 | End: 2021-12-30

## 2021-11-23 RX ORDER — FLUCONAZOLE 150 MG/1
150 TABLET ORAL ONCE
Qty: 1 TABLET | Refills: 0 | Status: SHIPPED | OUTPATIENT
Start: 2021-11-23 | End: 2021-11-23

## 2021-11-23 RX ORDER — CEFDINIR 300 MG/1
300 CAPSULE ORAL 2 TIMES DAILY
Qty: 14 CAPSULE | Refills: 0 | Status: SHIPPED | OUTPATIENT
Start: 2021-11-23 | End: 2021-11-30

## 2021-11-23 NOTE — PROGRESS NOTES
Chief Complaint   Patient presents with   • Sinusitis     You have chosen to receive care through a telephone visit. Do you consent to use a telephone visit for your medical care today? Yes    History of Present Illness    41 y.o.female presents for sinus problem.  sinus pressure rhinorrhea green nasal purulence; onset 1 week. No fever.  Review of Systems   Constitutional: Negative for chills and fever.   HENT: Positive for congestion, postnasal drip, rhinorrhea and sinus pressure. Negative for ear pain, sneezing and sore throat.    Respiratory: Positive for cough. Negative for shortness of breath.          [facility]  The following portions of the patient's history were reviewed and updated as appropriate: allergies, current medications, past family history, past medical history, past social history, past surgical history and problem list.     Past Medical History:   Diagnosis Date   • Abdominal pain    • Abnormal breast exam    • Abnormal Pap smear of cervix    • Achilles tendinitis    • Acute sinusitis    • Anxiety    • Arthritis    • Asthma    • Tavera's syndrome    • Bipolar 1 disorder (HCC)    • Bowel trouble    • Breast cyst    • Colon polyps    • Constipation    • Depression    • Diverticulitis    • Diverticulitis of colon    • Endometriosis    • Eustachian tube dysfunction    • Extremity pain    • Fatty liver    • Female pelvic pain    • Fibromyalgia    • Gastric ulcer    • H/O bladder infections    • History of rashes as a child    • Irritable bowel syndrome    • Low back pain    • Lumbar radiculopathy    • Menopausal symptoms    • Muscle weakness    • Sexual problems    • Spinal stenosis of lumbar region       Allergies   Allergen Reactions   • Adhesive Tape Hives   • Latex Hives   • Sulfa Antibiotics Hives   • Sulfate Hives   • Wound Dressing Adhesive Hives and Unknown - High Severity   • Biaxin [Clarithromycin] Nausea Only   • Codeine Itching   • Penicillins Nausea Only   • Ciprofloxacin Unknown - High  Severity   • Nuts Unknown - High Severity      Social History     Tobacco Use   • Smoking status: Former Smoker     Packs/day: 0.50     Types: Cigarettes     Start date: 2003     Quit date: 2021     Years since quittin.4   • Smokeless tobacco: Never Used   • Tobacco comment: nicotine patches for smoking cessation   Vaping Use   • Vaping Use: Never used   Substance Use Topics   • Alcohol use: No   • Drug use: Never     Past Surgical History:   Procedure Laterality Date   •  SECTION     • CHOLECYSTECTOMY     • COLONOSCOPY     • HYSTERECTOMY     • NASAL ENDOSCOPY     • OOPHORECTOMY Bilateral    • OTHER SURGICAL HISTORY      Laparoscopy (diagnostic) gynecologic with biopsy   • SHOULDER SURGERY     • UPPER GASTROINTESTINAL ENDOSCOPY  2019      Family History   Problem Relation Age of Onset   • Liver disease Mother    • Diabetes Mother    • Hyperlipidemia Mother    • Hypertension Mother    • Thyroid disease Mother    • Arthritis Father    • Mental illness Father    • Migraines Sister    • Stroke Other    • Diabetes Other    • Hyperlipidemia Other    • Hypertension Other    • Mental illness Other    • Migraines Other    • Tuberculosis Other    • Cancer Maternal Grandmother    • Cancer Maternal Grandfather    • Cancer Paternal Grandmother    • Cancer Paternal Grandfather    • No Known Problems Son    • Mental illness Daughter    • Breast cancer Neg Hx    • Endometrial cancer Neg Hx    • Ovarian cancer Neg Hx            Current Outpatient Medications:   •  Acid Gone 160-105 MG chewable tablet chewable tablet, CHEW AND SWALLOW 2 TABLETS BY MOUTH TWICE DAILY TO FOUR TIMES DAILY IF NEEDED FOR HEARTBURN. TAKE SEPARATELY FROM OTHER MEDICATIONS, Disp: , Rfl:   •  albuterol (PROVENTIL) (2.5 MG/3ML) 0.083% nebulizer solution, Take 2.5 mg by nebulization 4 (Four) Times a Day As Needed for Wheezing., Disp: 90 each, Rfl: 5  •  albuterol sulfate  (90 Base) MCG/ACT inhaler, Inhale 2 puffs Every 4 (Four)  Hours As Needed for Wheezing., Disp: 18 g, Rfl: 5  •  ALPRAZolam (XANAX) 0.5 MG tablet, Take 0.5 mg by mouth 2 (Two) Times a Day As Needed. for anxiety, Disp: , Rfl:   •  azelastine (ASTELIN) 0.1 % nasal spray, 1 spray each nostril daily, Disp: 30 mL, Rfl: 2  •  BIOTIN PO, Take 1 tablet by mouth Daily., Disp: , Rfl:   •  budesonide-formoterol (SYMBICORT) 160-4.5 MCG/ACT inhaler, Inhale 2 puffs 2 (Two) Times a Day., Disp: 1 each, Rfl: 5  •  cetirizine (zyrTEC) 10 MG tablet, Take 1 tablet by mouth Daily., Disp: , Rfl: 1  •  COLLAGEN PO, Take 1 tablet by mouth Daily., Disp: , Rfl:   •  cyanocobalamin ([employer] Vitamin B-12) 1000 MCG tablet, Take 1 tablet by mouth Daily., Disp: 100 tablet, Rfl: 0  •  cyclobenzaprine (FLEXERIL) 10 MG tablet, Take 10 mg by mouth As Needed., Disp: , Rfl:   •  dicyclomine (Bentyl) 10 MG capsule, Take 1 capsule by mouth 4 (Four) Times a Day Before Meals & at Bedtime As Needed (abdominal cramping diarrhea)., Disp: 120 capsule, Rfl: 2  •  dilTIAZem CD (CARDIZEM CD) 180 MG 24 hr capsule, Take 1 capsule by mouth Daily., Disp: 30 capsule, Rfl: 3  •  Dupixent 300 MG/2ML solution pen-injector, , Disp: , Rfl:   •  Epinastine HCl 0.05 % ophthalmic solution, Administer 1 drop to both eyes 2 (Two) Times a Day., Disp: 5 mL, Rfl: 1  •  EPIPEN 2-JAREN 0.3 MG/0.3ML solution auto-injector injection, U UTD, Disp: , Rfl: 6  •  estradiol (ESTRACE VAGINAL) 0.1 MG/GM vaginal cream, Insert 1 gm intravaginally 1-3 times each week, Disp: 1 each, Rfl: 12  •  estradiol (ESTRACE) 2 MG tablet, Take 1 tablet by mouth Daily., Disp: 30 tablet, Rfl: 2  •  Fremanezumab-vfrm 225 MG/1.5ML solution auto-injector, Inject 225 mg under the skin into the appropriate area as directed Every 30 (Thirty) Days., Disp: 1 pen, Rfl: 11  •  lubiprostone (AMITIZA) 8 MCG capsule, Take 8 mcg by mouth 2 (Two) Times a Day As Needed., Disp: , Rfl:   •  Magnesium 250 MG tablet, TK 1-2 TS PO D FOR CONSTIPATION IF TAKING MIRALAX IS NOT EFFECTIVE, Disp: ,  Rfl:   •  metFORMIN (GLUCOPHAGE) 500 MG tablet, TAKE 1 TABLET BY MOUTH DAILY WITH BREAKFAST, Disp: 90 tablet, Rfl: 0  •  montelukast (SINGULAIR) 10 MG tablet, TAKE 1 TABLET BY MOUTH EVERY NIGHT, Disp: 30 tablet, Rfl: 11  •  Multiple Vitamin (MULTI-VITAMIN DAILY PO), Take  by mouth Daily., Disp: , Rfl:   •  multivitamin with minerals (CENTROVITE) tablet tablet, Take  by mouth Daily., Disp: , Rfl:   •  omeprazole (priLOSEC) 40 MG capsule, Take 40 mg by mouth 2 (two) times a day., Disp: , Rfl:   •  ondansetron ODT (ZOFRAN-ODT) 4 MG disintegrating tablet, Place 1 tablet on the tongue Every 8 (Eight) Hours As Needed for Nausea or Vomiting., Disp: 30 tablet, Rfl: 1  •  pregabalin (LYRICA) 100 MG capsule, Take 1 capsule by mouth 2 (Two) Times a Day., Disp: 60 capsule, Rfl: 0  •  tiZANidine (Zanaflex) 4 MG tablet, Take 1 tablet by mouth Every 8 (Eight) Hours As Needed for Muscle Spasms., Disp: 60 tablet, Rfl: 0  •  topiramate (TOPAMAX) 100 MG tablet, Take 100 mg by mouth 2 (Two) Times a Day., Disp: , Rfl:   •  traMADol (ULTRAM) 50 MG tablet, Take 1 tablet by mouth Every 8 (Eight) Hours As Needed for Moderate Pain ., Disp: 60 tablet, Rfl: 2  •  traZODone (DESYREL) 150 MG tablet, Take 1 tablet by mouth Every Night., Disp: , Rfl: 3  •  ubrogepant (ubrogepant) 50 MG tablet, Take 1 PO at onset of migraine, may repeat in 2 hours, Disp: 10 tablet, Rfl: 11  •  Xhance 93 MCG/ACT Exhaler Suspension, Inhale 1 puff Daily As Needed (congestion)., Disp: 16 mL, Rfl: 2    Physical Exam  Telehealth; able to carry on conversation without difficulties.  Result Review :            Assessment and Plan    Diagnoses and all orders for this visit:    1. Acute recurrent pansinusitis (Primary)  -     cefdinir (OMNICEF) 300 MG capsule; Take 1 capsule by mouth 2 (Two) Times a Day for 7 days.  Dispense: 14 capsule; Refill: 0  -     methylPREDNISolone (MEDROL) 4 MG dose pack; Take as directed on package instructions.  Dispense: 21 tablet; Refill:  0    2. Acute vaginitis  -     fluconazole (Diflucan) 150 MG tablet; Take 1 tablet by mouth 1 (One) Time for 1 dose.  Dispense: 1 tablet; Refill: 0         This visit has been rescheduled as a phone visit to comply with patient safety concerns in accordance with CDC recommendations. Total time of discussion was 12 minutes.        I discussed the patients findings and my recommendations with patient.  Patient was encouraged to keep me informed of any acute changes, lack of improvement, or any new concerning symptoms.  Patient voiced understanding of all instructions and denied further questions.      Follow Up   Return if symptoms worsen or fail to improve.      Electronically signed by:    MAGGIE Chacon  11/23/2021

## 2021-11-24 ENCOUNTER — TELEPHONE (OUTPATIENT)
Dept: INTERNAL MEDICINE | Facility: CLINIC | Age: 41
End: 2021-11-24

## 2021-11-24 ENCOUNTER — PRIOR AUTHORIZATION (OUTPATIENT)
Dept: INTERNAL MEDICINE | Facility: CLINIC | Age: 41
End: 2021-11-24

## 2021-11-26 DIAGNOSIS — R73.03 PREDIABETES: ICD-10-CM

## 2021-12-20 ENCOUNTER — APPOINTMENT (OUTPATIENT)
Dept: MRI IMAGING | Facility: HOSPITAL | Age: 41
End: 2021-12-20

## 2021-12-20 ENCOUNTER — TELEPHONE (OUTPATIENT)
Dept: NEUROLOGY | Facility: CLINIC | Age: 41
End: 2021-12-20

## 2021-12-20 NOTE — TELEPHONE ENCOUNTER
Provider: MAGGIE LANDA  Caller: JUAN JOSÉ ZIEGLER  Relationship to Patient: SELF  Pharmacy: [facility]        Reason for Call: PT CALLING STATING THAT TAVON WAS TO CALL HER IN A PRESCRIPTION FOR A MIGRAINE MEDICATION. SHE STATES THAT SHE WAS SUPPOSE TO HAVE A SLEEP STUDY DONE BUT HAS NOT HEARD FROM ANYONE ON THAT.SHE ALSO WANTS TO TALK ABOUT THE AJOVY SHOT.      PLEASE CALL HER BACK [phone]

## 2021-12-21 DIAGNOSIS — M54.41 ACUTE BILATERAL LOW BACK PAIN WITH BILATERAL SCIATICA: ICD-10-CM

## 2021-12-21 DIAGNOSIS — M54.42 ACUTE BILATERAL LOW BACK PAIN WITH BILATERAL SCIATICA: ICD-10-CM

## 2021-12-21 NOTE — TELEPHONE ENCOUNTER
PT IS AWARE SHE IS SCHEDULED FOR SLEEP STUDY WITH DR. CONNIE MATTA AND GAVE HER THE NUMBER TO CALL AND CHECK ON THE DETAILS ALSO EXPLAINED HOW TO TAKE THE AJOVY AND THE UBRELVY WAS SENT TO PHARMACY

## 2021-12-22 RX ORDER — TIZANIDINE 4 MG/1
4 TABLET ORAL EVERY 8 HOURS PRN
Qty: 60 TABLET | Refills: 0 | Status: SHIPPED | OUTPATIENT
Start: 2021-12-22 | End: 2022-02-23 | Stop reason: SDUPTHER

## 2021-12-30 ENCOUNTER — OFFICE VISIT (OUTPATIENT)
Dept: PULMONOLOGY | Facility: CLINIC | Age: 41
End: 2021-12-30

## 2021-12-30 VITALS
HEART RATE: 136 BPM | OXYGEN SATURATION: 97 % | DIASTOLIC BLOOD PRESSURE: 88 MMHG | TEMPERATURE: 97.9 F | WEIGHT: 186 LBS | SYSTOLIC BLOOD PRESSURE: 130 MMHG | BODY MASS INDEX: 35.12 KG/M2 | HEIGHT: 61 IN

## 2021-12-30 DIAGNOSIS — R91.8 LUNG NODULES: ICD-10-CM

## 2021-12-30 DIAGNOSIS — J01.11 ACUTE RECURRENT FRONTAL SINUSITIS: ICD-10-CM

## 2021-12-30 DIAGNOSIS — J45.40 MODERATE PERSISTENT ASTHMA WITHOUT COMPLICATION: Primary | ICD-10-CM

## 2021-12-30 PROCEDURE — 99214 OFFICE O/P EST MOD 30 MIN: CPT | Performed by: NURSE PRACTITIONER

## 2021-12-30 RX ORDER — BUDESONIDE AND FORMOTEROL FUMARATE DIHYDRATE 160; 4.5 UG/1; UG/1
2 AEROSOL RESPIRATORY (INHALATION)
COMMUNITY

## 2021-12-30 RX ORDER — OXCARBAZEPINE 300 MG/1
300 TABLET, FILM COATED ORAL 2 TIMES DAILY
COMMUNITY
Start: 2021-12-13 | End: 2022-09-28

## 2022-01-12 ENCOUNTER — HOSPITAL ENCOUNTER (OUTPATIENT)
Dept: CT IMAGING | Facility: HOSPITAL | Age: 42
Discharge: HOME | End: 2022-01-12
Admitting: NURSE PRACTITIONER

## 2022-01-12 VITALS
BODY MASS INDEX: 34.36 KG/M2 | SYSTOLIC BLOOD PRESSURE: 109 MMHG | HEIGHT: 61 IN | OXYGEN SATURATION: 98 % | WEIGHT: 182 LBS | DIASTOLIC BLOOD PRESSURE: 72 MMHG | TEMPERATURE: 97.8 F | HEART RATE: 85 BPM | RESPIRATION RATE: 18 BRPM

## 2022-01-12 DIAGNOSIS — R06.09 DOE (DYSPNEA ON EXERTION): ICD-10-CM

## 2022-01-12 DIAGNOSIS — R07.9 CHEST PAIN, UNSPECIFIED TYPE: ICD-10-CM

## 2022-01-12 PROCEDURE — 82565 ASSAY OF CREATININE: CPT

## 2022-01-12 PROCEDURE — A9270 NON-COVERED ITEM OR SERVICE: HCPCS | Performed by: RADIOLOGY

## 2022-01-12 PROCEDURE — 75574 CT ANGIO HRT W/3D IMAGE: CPT

## 2022-01-12 PROCEDURE — 63710000001 METOPROLOL TARTRATE 50 MG TABLET: Performed by: RADIOLOGY

## 2022-01-12 PROCEDURE — 0 IOPAMIDOL PER 1 ML: Performed by: NURSE PRACTITIONER

## 2022-01-12 RX ORDER — METOPROLOL TARTRATE 50 MG/1
100 TABLET, FILM COATED ORAL ONCE AS NEEDED
Status: COMPLETED | OUTPATIENT
Start: 2022-01-12 | End: 2022-01-12

## 2022-01-12 RX ORDER — LIDOCAINE HYDROCHLORIDE 10 MG/ML
5 INJECTION, SOLUTION EPIDURAL; INFILTRATION; INTRACAUDAL; PERINEURAL AS NEEDED
Status: DISCONTINUED | OUTPATIENT
Start: 2022-01-12 | End: 2022-01-13 | Stop reason: HOSPADM

## 2022-01-12 RX ORDER — SODIUM CHLORIDE 0.9 % (FLUSH) 0.9 %
10 SYRINGE (ML) INJECTION AS NEEDED
Status: DISCONTINUED | OUTPATIENT
Start: 2022-01-12 | End: 2022-01-13 | Stop reason: HOSPADM

## 2022-01-12 RX ORDER — SODIUM CHLORIDE 0.9 % (FLUSH) 0.9 %
3 SYRINGE (ML) INJECTION EVERY 12 HOURS SCHEDULED
Status: DISCONTINUED | OUTPATIENT
Start: 2022-01-12 | End: 2022-01-13 | Stop reason: HOSPADM

## 2022-01-12 RX ORDER — METOPROLOL TARTRATE 50 MG/1
50 TABLET, FILM COATED ORAL ONCE AS NEEDED
Status: COMPLETED | OUTPATIENT
Start: 2022-01-12 | End: 2022-01-12

## 2022-01-12 RX ORDER — RISPERIDONE 0.25 MG/1
TABLET ORAL 2 TIMES DAILY
COMMUNITY
End: 2022-07-01

## 2022-01-12 RX ORDER — BUPROPION HYDROCHLORIDE 150 MG/1
150 TABLET, EXTENDED RELEASE ORAL 2 TIMES DAILY
COMMUNITY
End: 2022-02-09

## 2022-01-12 RX ADMIN — METOPROLOL TARTRATE 100 MG: 50 TABLET, FILM COATED ORAL at 12:22

## 2022-01-12 RX ADMIN — IOPAMIDOL 65 ML: 755 INJECTION, SOLUTION INTRAVENOUS at 15:27

## 2022-01-14 ENCOUNTER — HOSPITAL ENCOUNTER (OUTPATIENT)
Dept: MRI IMAGING | Facility: HOSPITAL | Age: 42
End: 2022-01-14

## 2022-01-16 LAB — CREAT BLDA-MCNC: 0.7 MG/DL (ref 0.6–1.3)

## 2022-01-19 ENCOUNTER — TELEPHONE (OUTPATIENT)
Dept: CARDIOLOGY | Facility: HOSPITAL | Age: 42
End: 2022-01-19

## 2022-01-19 ENCOUNTER — HOSPITAL ENCOUNTER (OUTPATIENT)
Dept: MRI IMAGING | Facility: HOSPITAL | Age: 42
End: 2022-01-19

## 2022-01-20 NOTE — TELEPHONE ENCOUNTER
I left her a message and also sent a message in [employer]. Her coronary CTA was normal. No evidence of blockage in her arteries. Recommend that she follow up with her PCP for evaluation of noncardiac causes of chest pain. If she has any further questions regarding test results, I can speak with her

## 2022-01-20 NOTE — TELEPHONE ENCOUNTER
Left message and recognizable voice mail to let pt know that you left test results on her Mychart and if she had further questions to call.

## 2022-01-21 ENCOUNTER — TELEMEDICINE (OUTPATIENT)
Dept: INTERNAL MEDICINE | Facility: CLINIC | Age: 42
End: 2022-01-21

## 2022-01-21 DIAGNOSIS — R11.0 NAUSEA: Primary | ICD-10-CM

## 2022-01-21 DIAGNOSIS — T78.40XD ALLERGIC DISORDER, SUBSEQUENT ENCOUNTER: ICD-10-CM

## 2022-01-21 PROCEDURE — 99213 OFFICE O/P EST LOW 20 MIN: CPT | Performed by: NURSE PRACTITIONER

## 2022-01-21 RX ORDER — BROMPHENIRAMINE MALEATE, PSEUDOEPHEDRINE HYDROCHLORIDE, AND DEXTROMETHORPHAN HYDROBROMIDE 2; 30; 10 MG/5ML; MG/5ML; MG/5ML
5 SYRUP ORAL 3 TIMES DAILY PRN
Qty: 118 ML | Refills: 0 | Status: SHIPPED | OUTPATIENT
Start: 2022-01-21 | End: 2022-02-09

## 2022-01-21 RX ORDER — PROMETHAZINE HYDROCHLORIDE 12.5 MG/1
12.5 TABLET ORAL EVERY 6 HOURS PRN
Qty: 21 TABLET | Refills: 0 | Status: SHIPPED | OUTPATIENT
Start: 2022-01-21 | End: 2022-07-01

## 2022-01-21 NOTE — PROGRESS NOTES
Chief Complaint   Patient presents with   • Nausea     The use of a video visit has been reviewed with the patient and verbal informed consent has been obtained.    History of Present Illness    41 y.o.female presents for nausea.  Not feeling well; has gi illness. Positive Diarrhea. N/v. No fever. Need phenergan refill. Tried zofran not work as well.  Also feel like her allergies are bothering her with cough congestion.  Review of Systems   Constitutional: Negative for chills and fever.   HENT: Positive for congestion, postnasal drip and rhinorrhea. Negative for sinus pressure, sneezing and sore throat.    Respiratory: Positive for cough.    Gastrointestinal: Positive for diarrhea, nausea and vomiting.       [facility]  The following portions of the patient's history were reviewed and updated as appropriate: allergies, current medications, past family history, past medical history, past social history, past surgical history and problem list.     Past Medical History:   Diagnosis Date   • Abdominal pain    • Abnormal breast exam    • Abnormal Pap smear of cervix    • Achilles tendinitis    • Acute sinusitis    • Anxiety    • Arthritis    • Asthma    • Tavera's syndrome    • Bipolar 1 disorder (HCC)    • Bowel trouble    • Breast cyst    • Colon polyps    • Constipation    • Depression    • Diverticulitis    • Diverticulitis of colon    • Endometriosis    • Eustachian tube dysfunction    • Extremity pain    • Fatty liver    • Female pelvic pain    • Fibromyalgia    • Gastric ulcer    • H/O bladder infections    • History of rashes as a child    • Irritable bowel syndrome    • Low back pain    • Lumbar radiculopathy    • Menopausal symptoms    • Muscle weakness    • Sexual problems    • Spinal stenosis of lumbar region       Allergies   Allergen Reactions   • Adhesive Tape Hives   • Latex Hives   • Sulfa Antibiotics Hives   • Sulfate Hives   • Wound Dressing Adhesive Hives and Unknown - High Severity   • Biaxin  [Clarithromycin] Nausea Only   • Codeine Itching   • Penicillins Nausea Only   • Ciprofloxacin Unknown - High Severity   • Nuts Unknown - High Severity     Red heated face      Social History     Tobacco Use   • Smoking status: Former Smoker     Packs/day: 0.50     Types: Cigarettes     Start date: 2003     Quit date: 2021     Years since quittin.6   • Smokeless tobacco: Never Used   • Tobacco comment: nicotine patches for smoking cessation   Vaping Use   • Vaping Use: Never used   Substance Use Topics   • Alcohol use: No   • Drug use: Never     Past Surgical History:   Procedure Laterality Date   •  SECTION     • CHOLECYSTECTOMY     • COLONOSCOPY  2017   • HYSTERECTOMY     • NASAL ENDOSCOPY     • OOPHORECTOMY Bilateral    • OTHER SURGICAL HISTORY      Laparoscopy (diagnostic) gynecologic with biopsy   • SHOULDER SURGERY     • UPPER GASTROINTESTINAL ENDOSCOPY  2019      Family History   Problem Relation Age of Onset   • Liver disease Mother    • Diabetes Mother    • Hyperlipidemia Mother    • Hypertension Mother    • Thyroid disease Mother    • Arthritis Father    • Mental illness Father    • Migraines Sister    • Stroke Other    • Diabetes Other    • Hyperlipidemia Other    • Hypertension Other    • Mental illness Other    • Migraines Other    • Tuberculosis Other    • Cancer Maternal Grandmother    • Cancer Maternal Grandfather    • Cancer Paternal Grandmother    • Cancer Paternal Grandfather    • No Known Problems Son    • Mental illness Daughter    • Breast cancer Neg Hx    • Endometrial cancer Neg Hx    • Ovarian cancer Neg Hx            Current Outpatient Medications:   •  Acid Gone 160-105 MG chewable tablet chewable tablet, CHEW AND SWALLOW 2 TABLETS BY MOUTH TWICE DAILY TO FOUR TIMES DAILY IF NEEDED FOR HEARTBURN. TAKE SEPARATELY FROM OTHER MEDICATIONS, Disp: , Rfl:   •  albuterol (PROVENTIL) (2.5 MG/3ML) 0.083% nebulizer solution, Take 2.5 mg by nebulization 4 (Four) Times a Day As  Needed for Wheezing., Disp: 90 each, Rfl: 5  •  albuterol sulfate  (90 Base) MCG/ACT inhaler, Inhale 2 puffs Every 4 (Four) Hours As Needed for Wheezing., Disp: 18 g, Rfl: 5  •  ALPRAZolam (XANAX) 0.5 MG tablet, Take 1 mg by mouth 2 (Two) Times a Day As Needed. for anxiety, Disp: , Rfl:   •  azelastine (ASTELIN) 0.1 % nasal spray, 1 spray each nostril daily, Disp: 30 mL, Rfl: 2  •  BIOTIN PO, Take 1 tablet by mouth Daily., Disp: , Rfl:   •  budesonide-formoterol (SYMBICORT) 160-4.5 MCG/ACT inhaler, Inhale 2 puffs 2 (Two) Times a Day., Disp: , Rfl:   •  buPROPion SR (WELLBUTRIN SR) 150 MG 12 hr tablet, Take 150 mg by mouth 2 (Two) Times a Day., Disp: , Rfl:   •  cetirizine (zyrTEC) 10 MG tablet, Take 1 tablet by mouth Daily., Disp: , Rfl: 1  •  COLLAGEN PO, Take 1 tablet by mouth Daily., Disp: , Rfl:   •  cyanocobalamin ([employer] Vitamin B-12) 1000 MCG tablet, Take 1 tablet by mouth Daily., Disp: 100 tablet, Rfl: 0  •  cyclobenzaprine (FLEXERIL) 10 MG tablet, Take 10 mg by mouth As Needed., Disp: , Rfl:   •  dicyclomine (Bentyl) 10 MG capsule, Take 1 capsule by mouth 4 (Four) Times a Day Before Meals & at Bedtime As Needed (abdominal cramping diarrhea)., Disp: 120 capsule, Rfl: 2  •  dilTIAZem CD (CARDIZEM CD) 180 MG 24 hr capsule, Take 1 capsule by mouth Daily., Disp: 30 capsule, Rfl: 3  •  Dupixent 300 MG/2ML solution pen-injector, , Disp: , Rfl:   •  Epinastine HCl 0.05 % ophthalmic solution, Administer 1 drop to both eyes 2 (Two) Times a Day., Disp: 5 mL, Rfl: 1  •  EPIPEN 2-JAREN 0.3 MG/0.3ML solution auto-injector injection, U UTD, Disp: , Rfl: 6  •  estradiol (ESTRACE VAGINAL) 0.1 MG/GM vaginal cream, Insert 1 gm intravaginally 1-3 times each week, Disp: 1 each, Rfl: 12  •  estradiol (ESTRACE) 2 MG tablet, Take 1 tablet by mouth Daily., Disp: 30 tablet, Rfl: 2  •  Fremanezumab-vfrm 225 MG/1.5ML solution auto-injector, Inject 225 mg under the skin into the appropriate area as directed Every 30 (Thirty) Days.,  Disp: 1 pen, Rfl: 11  •  lubiprostone (AMITIZA) 8 MCG capsule, Take 8 mcg by mouth 2 (Two) Times a Day As Needed., Disp: , Rfl:   •  Magnesium 250 MG tablet, TK 1-2 TS PO D FOR CONSTIPATION IF TAKING MIRALAX IS NOT EFFECTIVE, Disp: , Rfl:   •  metFORMIN (GLUCOPHAGE) 500 MG tablet, TAKE 1 TABLET BY MOUTH DAILY WITH BREAKFAST, Disp: 90 tablet, Rfl: 0  •  montelukast (SINGULAIR) 10 MG tablet, TAKE 1 TABLET BY MOUTH EVERY NIGHT, Disp: 30 tablet, Rfl: 11  •  Multiple Vitamin (MULTI-VITAMIN DAILY PO), Take  by mouth Daily., Disp: , Rfl:   •  omeprazole (priLOSEC) 40 MG capsule, Take 40 mg by mouth 2 (two) times a day., Disp: , Rfl:   •  ondansetron ODT (ZOFRAN-ODT) 4 MG disintegrating tablet, Place 1 tablet on the tongue Every 8 (Eight) Hours As Needed for Nausea or Vomiting., Disp: 30 tablet, Rfl: 1  •  OXcarbazepine (TRILEPTAL) 300 MG tablet, Take 300 mg by mouth 2 (Two) Times a Day., Disp: , Rfl:   •  risperiDONE (risperDAL) 0.25 MG tablet, Take  by mouth 2 (Two) Times a Day., Disp: , Rfl:   •  tiZANidine (Zanaflex) 4 MG tablet, Take 1 tablet by mouth Every 8 (Eight) Hours As Needed for Muscle Spasms., Disp: 60 tablet, Rfl: 0  •  traMADol (ULTRAM) 50 MG tablet, Take 1 tablet by mouth Every 8 (Eight) Hours As Needed for Moderate Pain ., Disp: 60 tablet, Rfl: 2  •  traZODone (DESYREL) 150 MG tablet, Take 1 tablet by mouth Every Night., Disp: , Rfl: 3  •  ubrogepant (ubrogepant) 50 MG tablet, Take 1 PO at onset of migraine, may repeat in 2 hours, Disp: 10 tablet, Rfl: 11  •  Xhance 93 MCG/ACT Exhaler Suspension, Inhale 1 puff Daily As Needed (congestion)., Disp: 16 mL, Rfl: 2      Physical Exam  Pulmonary:      Effort: Pulmonary effort is normal. No respiratory distress.   Neurological:      General: No focal deficit present.      Mental Status: She is alert and oriented to person, place, and time.   Psychiatric:         Mood and Affect: Mood normal.         Result Review :            Assessment and Plan    Diagnoses and  all orders for this visit:    1. Nausea (Primary)  -     promethazine (PHENERGAN) 12.5 MG tablet; Take 1 tablet by mouth Every 6 (Six) Hours As Needed for Nausea or Vomiting.  Dispense: 21 tablet; Refill: 0    2. Allergic disorder, subsequent encounter  -     brompheniramine-pseudoephedrine-DM 30-2-10 MG/5ML syrup; Take 5 mL by mouth 3 (Three) Times a Day As Needed for Cough or Allergies.  Dispense: 118 mL; Refill: 0        I discussed the patients findings and my recommendations with patient.  Patient was encouraged to keep me informed of any acute changes, lack of improvement, or any new concerning symptoms.  Patient voiced understanding of all instructions and denied further questions.      Follow Up   Return if symptoms worsen or fail to improve.      Electronically signed by:    MAGGIE Chacon  01/21/2022

## 2022-02-09 ENCOUNTER — OFFICE VISIT (OUTPATIENT)
Dept: INTERNAL MEDICINE | Facility: CLINIC | Age: 42
End: 2022-02-09

## 2022-02-09 VITALS
WEIGHT: 186 LBS | DIASTOLIC BLOOD PRESSURE: 88 MMHG | BODY MASS INDEX: 35.14 KG/M2 | SYSTOLIC BLOOD PRESSURE: 116 MMHG | OXYGEN SATURATION: 97 % | HEART RATE: 108 BPM | TEMPERATURE: 98.2 F

## 2022-02-09 DIAGNOSIS — R73.03 PREDIABETES: Primary | ICD-10-CM

## 2022-02-09 DIAGNOSIS — R05.9 COUGH: ICD-10-CM

## 2022-02-09 DIAGNOSIS — E66.01 CLASS 2 SEVERE OBESITY DUE TO EXCESS CALORIES WITH SERIOUS COMORBIDITY AND BODY MASS INDEX (BMI) OF 35.0 TO 35.9 IN ADULT: ICD-10-CM

## 2022-02-09 DIAGNOSIS — N76.0 ACUTE VAGINITIS: ICD-10-CM

## 2022-02-09 DIAGNOSIS — R11.0 NAUSEA: ICD-10-CM

## 2022-02-09 LAB
EXPIRATION DATE: NORMAL
HBA1C MFR BLD: 5.6 %
Lab: NORMAL

## 2022-02-09 PROCEDURE — 83036 HEMOGLOBIN GLYCOSYLATED A1C: CPT | Performed by: NURSE PRACTITIONER

## 2022-02-09 PROCEDURE — 99214 OFFICE O/P EST MOD 30 MIN: CPT | Performed by: NURSE PRACTITIONER

## 2022-02-09 PROCEDURE — 3044F HG A1C LEVEL LT 7.0%: CPT | Performed by: NURSE PRACTITIONER

## 2022-02-09 RX ORDER — ALPRAZOLAM 1 MG/1
TABLET ORAL
COMMUNITY
Start: 2022-01-06

## 2022-02-09 RX ORDER — SEMAGLUTIDE 1.34 MG/ML
0.25 INJECTION, SOLUTION SUBCUTANEOUS WEEKLY
Qty: 1 PEN | Refills: 0 | COMMUNITY
Start: 2022-02-09 | End: 2022-05-06

## 2022-02-09 RX ORDER — CEFUROXIME AXETIL 500 MG/1
500 TABLET ORAL 2 TIMES DAILY
Qty: 14 TABLET | Refills: 0 | Status: SHIPPED | OUTPATIENT
Start: 2022-02-09 | End: 2022-02-16

## 2022-02-09 RX ORDER — FLUCONAZOLE 150 MG/1
TABLET ORAL
Qty: 2 TABLET | Refills: 0 | Status: SHIPPED | OUTPATIENT
Start: 2022-02-09 | End: 2022-05-06

## 2022-02-09 RX ORDER — BUPROPION HYDROCHLORIDE 150 MG/1
150 TABLET ORAL EVERY MORNING
COMMUNITY
Start: 2022-01-10

## 2022-02-09 RX ORDER — ONDANSETRON 4 MG/1
4 TABLET, ORALLY DISINTEGRATING ORAL EVERY 8 HOURS PRN
Qty: 30 TABLET | Refills: 1 | Status: SHIPPED | OUTPATIENT
Start: 2022-02-09 | End: 2022-07-01 | Stop reason: SDUPTHER

## 2022-02-09 NOTE — PROGRESS NOTES
Chief Complaint   Patient presents with   • Prediabetes     follow up   • Cough   • Weight Check     obesity       History of Present Illness    41 y.o.female presents for prediabetes cough and obesity.  Prediabetes chronic taking Metformin tolerating well.  No side effects.  Has not been able to lose weight.  Chronic obesity.  Brings in copy of outside record with what patient describes as being told worsening fatty liver disease.  She wants to try to lose weight and wants to discuss medication options.  She continues to have sinus pressure sinus drainage productive cough recurrent no fevers.    Review of Systems   Constitutional: Negative for appetite change, chills, fever and unexpected weight gain.   HENT: Positive for congestion, postnasal drip, rhinorrhea and sinus pressure. Negative for ear pain, sneezing, sore throat and swollen glands.    Eyes: Negative for blurred vision and visual disturbance.   Respiratory: Positive for cough. Negative for shortness of breath.    Cardiovascular: Negative for chest pain, palpitations and leg swelling.   Gastrointestinal: Negative for abdominal pain.   Endocrine: Negative for cold intolerance, heat intolerance, polydipsia, polyphagia and polyuria.   Genitourinary: Negative for difficulty urinating.   Skin: Negative for rash and skin lesions.   Neurological: Negative for dizziness and light-headedness.         [facility]  The following portions of the patient's history were reviewed and updated as appropriate: allergies, current medications, past family history, past medical history, past social history, past surgical history and problem list.     Past Medical History:   Diagnosis Date   • Abdominal pain    • Abnormal breast exam    • Abnormal Pap smear of cervix    • Achilles tendinitis    • Acute sinusitis    • Anxiety    • Arthritis    • Asthma    • Tavera's syndrome    • Bipolar 1 disorder (HCC)    • Bowel trouble    • Breast cyst    • Colon polyps    • Constipation    •  Depression    • Diverticulitis    • Diverticulitis of colon    • Endometriosis    • Eustachian tube dysfunction    • Extremity pain    • Fatty liver    • Female pelvic pain    • Fibromyalgia    • Gastric ulcer    • H/O bladder infections    • History of rashes as a child    • Irritable bowel syndrome    • Low back pain    • Lumbar radiculopathy    • Menopausal symptoms    • Muscle weakness    • Sexual problems    • Spinal stenosis of lumbar region       Allergies   Allergen Reactions   • Adhesive Tape Hives   • Latex Hives   • Sulfa Antibiotics Hives   • Sulfate Hives   • Wound Dressing Adhesive Hives and Unknown - High Severity   • Biaxin [Clarithromycin] Nausea Only   • Codeine Itching   • Penicillins Nausea Only   • Ciprofloxacin Unknown - High Severity   • Nuts Unknown - High Severity     Red heated face      Social History     Tobacco Use   • Smoking status: Former Smoker     Packs/day: 0.50     Types: Cigarettes     Start date: 2003     Quit date: 2021     Years since quittin.6   • Smokeless tobacco: Never Used   • Tobacco comment: nicotine patches for smoking cessation   Vaping Use   • Vaping Use: Never used   Substance Use Topics   • Alcohol use: No   • Drug use: Never     Past Surgical History:   Procedure Laterality Date   •  SECTION     • CHOLECYSTECTOMY     • COLONOSCOPY  2017   • HYSTERECTOMY     • NASAL ENDOSCOPY     • OOPHORECTOMY Bilateral    • OTHER SURGICAL HISTORY      Laparoscopy (diagnostic) gynecologic with biopsy   • SHOULDER SURGERY     • UPPER GASTROINTESTINAL ENDOSCOPY  2019      Family History   Problem Relation Age of Onset   • Liver disease Mother    • Diabetes Mother    • Hyperlipidemia Mother    • Hypertension Mother    • Thyroid disease Mother    • Arthritis Father    • Mental illness Father    • Migraines Sister    • Stroke Other    • Diabetes Other    • Hyperlipidemia Other    • Hypertension Other    • Mental illness Other    • Migraines Other    •  Tuberculosis Other    • Cancer Maternal Grandmother    • Cancer Maternal Grandfather    • Cancer Paternal Grandmother    • Cancer Paternal Grandfather    • No Known Problems Son    • Mental illness Daughter    • Breast cancer Neg Hx    • Endometrial cancer Neg Hx    • Ovarian cancer Neg Hx            Current Outpatient Medications:   •  Acid Gone 160-105 MG chewable tablet chewable tablet, CHEW AND SWALLOW 2 TABLETS BY MOUTH TWICE DAILY TO FOUR TIMES DAILY IF NEEDED FOR HEARTBURN. TAKE SEPARATELY FROM OTHER MEDICATIONS, Disp: , Rfl:   •  albuterol (PROVENTIL) (2.5 MG/3ML) 0.083% nebulizer solution, Take 2.5 mg by nebulization 4 (Four) Times a Day As Needed for Wheezing., Disp: 90 each, Rfl: 5  •  albuterol sulfate  (90 Base) MCG/ACT inhaler, Inhale 2 puffs Every 4 (Four) Hours As Needed for Wheezing., Disp: 18 g, Rfl: 5  •  ALPRAZolam (XANAX) 1 MG tablet, TAKE 1/2 TO 1 TABLET BY MOUTH UP TO TWICE DAILY FOR SEVERE ANXIETY AND PANIC ATTACKS, Disp: , Rfl:   •  azelastine (ASTELIN) 0.1 % nasal spray, 1 spray each nostril daily, Disp: 30 mL, Rfl: 2  •  BIOTIN PO, Take 1 tablet by mouth Daily., Disp: , Rfl:   •  budesonide-formoterol (SYMBICORT) 160-4.5 MCG/ACT inhaler, Inhale 2 puffs 2 (Two) Times a Day., Disp: , Rfl:   •  buPROPion XL (WELLBUTRIN XL) 150 MG 24 hr tablet, Take 150 mg by mouth Every Morning., Disp: , Rfl:   •  cetirizine (zyrTEC) 10 MG tablet, Take 1 tablet by mouth Daily., Disp: , Rfl: 1  •  COLLAGEN PO, Take 1 tablet by mouth Daily., Disp: , Rfl:   •  cyanocobalamin ([employer] Vitamin B-12) 1000 MCG tablet, Take 1 tablet by mouth Daily., Disp: 100 tablet, Rfl: 0  •  cyclobenzaprine (FLEXERIL) 10 MG tablet, Take 10 mg by mouth As Needed., Disp: , Rfl:   •  dicyclomine (Bentyl) 10 MG capsule, Take 1 capsule by mouth 4 (Four) Times a Day Before Meals & at Bedtime As Needed (abdominal cramping diarrhea)., Disp: 120 capsule, Rfl: 2  •  dilTIAZem CD (CARDIZEM CD) 180 MG 24 hr capsule, Take 1 capsule by mouth  Daily., Disp: 30 capsule, Rfl: 3  •  Dupixent 300 MG/2ML solution pen-injector, , Disp: , Rfl:   •  Epinastine HCl 0.05 % ophthalmic solution, Administer 1 drop to both eyes 2 (Two) Times a Day., Disp: 5 mL, Rfl: 1  •  EPIPEN 2-JAREN 0.3 MG/0.3ML solution auto-injector injection, U UTD, Disp: , Rfl: 6  •  estradiol (ESTRACE VAGINAL) 0.1 MG/GM vaginal cream, Insert 1 gm intravaginally 1-3 times each week, Disp: 1 each, Rfl: 12  •  estradiol (ESTRACE) 2 MG tablet, Take 1 tablet by mouth Daily., Disp: 30 tablet, Rfl: 2  •  Fremanezumab-vfrm 225 MG/1.5ML solution auto-injector, Inject 225 mg under the skin into the appropriate area as directed Every 30 (Thirty) Days., Disp: 1 pen, Rfl: 11  •  Gentle Laxative 5 MG EC tablet, TAKE 1 TABLET BY MOUTH ONCE DAILY AS NEEDED FOR CONSTIPATION, Disp: , Rfl:   •  lubiprostone (AMITIZA) 8 MCG capsule, Take 8 mcg by mouth 2 (Two) Times a Day As Needed., Disp: , Rfl:   •  Magnesium 250 MG tablet, TK 1-2 TS PO D FOR CONSTIPATION IF TAKING MIRALAX IS NOT EFFECTIVE, Disp: , Rfl:   •  metFORMIN (GLUCOPHAGE) 500 MG tablet, TAKE 1 TABLET BY MOUTH DAILY WITH BREAKFAST, Disp: 90 tablet, Rfl: 0  •  montelukast (SINGULAIR) 10 MG tablet, TAKE 1 TABLET BY MOUTH EVERY NIGHT, Disp: 30 tablet, Rfl: 11  •  Multiple Vitamin (MULTI-VITAMIN DAILY PO), Take  by mouth Daily., Disp: , Rfl:   •  omeprazole (priLOSEC) 40 MG capsule, Take 40 mg by mouth 2 (two) times a day., Disp: , Rfl:   •  ondansetron ODT (ZOFRAN-ODT) 4 MG disintegrating tablet, Place 1 tablet on the tongue Every 8 (Eight) Hours As Needed for Nausea or Vomiting., Disp: 30 tablet, Rfl: 1  •  OXcarbazepine (TRILEPTAL) 300 MG tablet, Take 300 mg by mouth 2 (Two) Times a Day., Disp: , Rfl:   •  promethazine (PHENERGAN) 12.5 MG tablet, Take 1 tablet by mouth Every 6 (Six) Hours As Needed for Nausea or Vomiting., Disp: 21 tablet, Rfl: 0  •  risperiDONE (risperDAL) 0.25 MG tablet, Take  by mouth 2 (Two) Times a Day., Disp: , Rfl:   •  tiZANidine  (Zanaflex) 4 MG tablet, Take 1 tablet by mouth Every 8 (Eight) Hours As Needed for Muscle Spasms., Disp: 60 tablet, Rfl: 0  •  traMADol (ULTRAM) 50 MG tablet, Take 1 tablet by mouth Every 8 (Eight) Hours As Needed for Moderate Pain ., Disp: 60 tablet, Rfl: 2  •  traZODone (DESYREL) 150 MG tablet, Take 1 tablet by mouth Every Night., Disp: , Rfl: 3  •  ubrogepant (ubrogepant) 50 MG tablet, Take 1 PO at onset of migraine, may repeat in 2 hours, Disp: 10 tablet, Rfl: 11  •  Xhance 93 MCG/ACT Exhaler Suspension, Inhale 1 puff Daily As Needed (congestion)., Disp: 16 mL, Rfl: 2    VITALS:  /88   Pulse 108   Temp 98.2 °F (36.8 °C)   Wt 84.4 kg (186 lb)   LMP  (LMP Unknown)   SpO2 97%   BMI 35.14 kg/m²     Physical Exam  Constitutional:       General: She is not in acute distress.     Appearance: She is obese.   HENT:      Head: Normocephalic.      Right Ear: Tympanic membrane, ear canal and external ear normal.      Left Ear: Tympanic membrane, ear canal and external ear normal.      Nose: Congestion and rhinorrhea present.      Mouth/Throat:      Mouth: Mucous membranes are moist.      Pharynx: No oropharyngeal exudate or posterior oropharyngeal erythema.   Eyes:      Extraocular Movements: Extraocular movements intact.   Cardiovascular:      Rate and Rhythm: Normal rate and regular rhythm.      Heart sounds: Normal heart sounds.   Pulmonary:      Effort: Pulmonary effort is normal. No respiratory distress.      Breath sounds: Normal breath sounds.   Skin:     General: Skin is warm and dry.   Neurological:      General: No focal deficit present.      Mental Status: She is alert and oriented to person, place, and time.      Motor: No weakness.   Psychiatric:         Mood and Affect: Mood normal.         Result Review :            Assessment and Plan    Diagnoses and all orders for this visit:    1. Prediabetes (Primary)  -     POC Glycosylated Hemoglobin (Hb A1C)    2. Nausea  -     ondansetron ODT (ZOFRAN-ODT)  4 MG disintegrating tablet; Place 1 tablet on the tongue Every 8 (Eight) Hours As Needed for Nausea or Vomiting.  Dispense: 30 tablet; Refill: 1    3. Class 2 severe obesity due to excess calories with serious comorbidity and body mass index (BMI) of 35.0 to 35.9 in adult (HCC)  -     Semaglutide,0.25 or 0.5MG/DOS, (Ozempic, 0.25 or 0.5 MG/DOSE,) 2 MG/1.5ML solution pen-injector; Inject 0.25 mg under the skin into the appropriate area as directed 1 (One) Time Per Week.  Dispense: 1 pen; Refill: 0  -     Semaglutide,0.25 or 0.5MG/DOS, (OZEMPIC) 2 MG/1.5ML solution pen-injector; Inject 0.25 mg under the skin into the appropriate area as directed 1 (One) Time Per Week.  Dispense: 1 pen; Refill: 2  I provided patient with a sample of Ozempic to try.  Reviewed potential risk factors and potential side effects.  If she is able to tolerate okay without making her nausea problems she is going to try to get the prescription filled with her pharmacy to see if covered.  I did explain to patient that this medication also decreases blood sugar and she has to eat on this medication.  We may need to hold her low-dose Metformin.  Encouraged low carbohydrate low calorie diet around 1400 davida/day increase exercise.  Increase fruits and vegetables.  4. Cough  -     cefuroxime (CEFTIN) 500 MG tablet; Take 1 tablet by mouth 2 (Two) Times a Day for 7 days.  Dispense: 14 tablet; Refill: 0    5. Acute vaginitis  -     fluconazole (Diflucan) 150 MG tablet; Take 1 tab by mouth now and repeat dose in 3 days.  Dispense: 2 tablet; Refill: 0        I discussed the patients findings and my recommendations with patient.  Patient was encouraged to keep me informed of any acute changes, lack of improvement, or any new concerning symptoms.  Patient voiced understanding of all instructions and denied further questions.      Follow Up   Return in about 6 weeks (around 3/23/2022), or if symptoms worsen or fail to improve.      Electronically signed by:     Sugey Burrows, APRN  02/09/2022

## 2022-02-17 ENCOUNTER — APPOINTMENT (OUTPATIENT)
Dept: MRI IMAGING | Facility: HOSPITAL | Age: 42
End: 2022-02-17

## 2022-02-23 DIAGNOSIS — M54.41 ACUTE BILATERAL LOW BACK PAIN WITH BILATERAL SCIATICA: ICD-10-CM

## 2022-02-23 DIAGNOSIS — M54.42 ACUTE BILATERAL LOW BACK PAIN WITH BILATERAL SCIATICA: ICD-10-CM

## 2022-02-24 RX ORDER — TIZANIDINE 4 MG/1
4 TABLET ORAL EVERY 8 HOURS PRN
Qty: 60 TABLET | Refills: 0 | Status: SHIPPED | OUTPATIENT
Start: 2022-02-24 | End: 2022-05-31 | Stop reason: SDUPTHER

## 2022-02-28 ENCOUNTER — HOSPITAL ENCOUNTER (OUTPATIENT)
Dept: MRI IMAGING | Facility: HOSPITAL | Age: 42
Discharge: HOME | End: 2022-02-28
Admitting: NURSE PRACTITIONER

## 2022-02-28 DIAGNOSIS — G43.709 CHRONIC MIGRAINE WITHOUT AURA WITHOUT STATUS MIGRAINOSUS, NOT INTRACTABLE: ICD-10-CM

## 2022-02-28 PROCEDURE — 70551 MRI BRAIN STEM W/O DYE: CPT

## 2022-03-01 NOTE — PROGRESS NOTES
Please let patient know that MRI of the brain was read as no acute intracranial findings, there is no abnormality in the brain tissue per radiology there was evidence of possible sinusitis or sinus infection, if she having any sinus symptoms? hard copy, drawn during this pregnancy

## 2022-03-03 ENCOUNTER — TELEPHONE (OUTPATIENT)
Dept: NEUROLOGY | Facility: CLINIC | Age: 42
End: 2022-03-03

## 2022-03-03 NOTE — TELEPHONE ENCOUNTER
----- Message from MAGGIE Navarro sent at 3/1/2022  3:26 PM EST -----  Please let patient know that MRI of the brain was read as no acute intracranial findings, there is no abnormality in the brain tissue per radiology there was evidence of possible sinusitis or sinus infection, if she having any sinus symptoms?

## 2022-03-04 NOTE — TELEPHONE ENCOUNTER
There were no abnormalities read on her MRI per radiology  Please fax a copy of this MRI report to her ENT

## 2022-03-04 NOTE — TELEPHONE ENCOUNTER
SPOKE TO CRYSTAL AND INFORMED HER OF MESSAGE. SHE SEES ENT SPECIALIST DR CAMERON SALDIVAR [facility]. I WILL FAX THEM THE MRI

## 2022-03-04 NOTE — TELEPHONE ENCOUNTER
Patient states she is going to ENT this month and has severe allergies. I did let her know MRI of the brain was read as no acute intracranial findings, there is no abnormality in the brain tissue per radiology there was evidence of possible sinusitis or sinus infection. Patient also asking if there was anything on scan that showed issues with memory so she can let her psychiatrist know. Please advise.

## 2022-03-17 DIAGNOSIS — R73.03 PREDIABETES: ICD-10-CM

## 2022-03-17 DIAGNOSIS — Z79.890 HORMONE REPLACEMENT THERAPY (HRT): ICD-10-CM

## 2022-03-17 RX ORDER — ESTRADIOL 2 MG/1
2 TABLET ORAL DAILY
Qty: 30 TABLET | Refills: 0 | Status: SHIPPED | OUTPATIENT
Start: 2022-03-17 | End: 2022-03-18

## 2022-03-18 RX ORDER — ESTRADIOL 2 MG/1
2 TABLET ORAL DAILY
Qty: 90 TABLET | Refills: 0 | Status: SHIPPED | OUTPATIENT
Start: 2022-03-18 | End: 2022-05-31 | Stop reason: SDUPTHER

## 2022-03-21 ENCOUNTER — TELEPHONE (OUTPATIENT)
Dept: INTERNAL MEDICINE | Facility: CLINIC | Age: 42
End: 2022-03-21

## 2022-03-21 DIAGNOSIS — R73.03 PREDIABETES: ICD-10-CM

## 2022-03-21 NOTE — TELEPHONE ENCOUNTER
Please advise.   This request for refill was denied on 3/17/22. Pt missed appt on 3/18/22.  Last A1C was done on 2/9/22 at appt and result was 5.6

## 2022-03-21 NOTE — TELEPHONE ENCOUNTER
Caller: Megan Post    Relationship: Self    Best call back number: [phone]    What is the best time to reach you: ANY TIME    Who are you requesting to speak with (clinical staff, provider,  specific staff member): KYLAH SOOD    What was the call regarding: PATIENT STATED THAT SHE NOT ON THE SHOT THAT CONTAINS METFORMIN FOR SOME TIME. PLEASE CONTACT HER TO DISCUSS APPROVAL OF HER METFORMIN PRESCRIPTION.     Do you require a callback: YES

## 2022-03-22 DIAGNOSIS — R73.03 PREDIABETES: ICD-10-CM

## 2022-04-18 DIAGNOSIS — Z79.890 HORMONE REPLACEMENT THERAPY (HRT): ICD-10-CM

## 2022-04-22 RX ORDER — ESTRADIOL 2 MG/1
2 TABLET ORAL DAILY
Qty: 90 TABLET | Refills: 0 | OUTPATIENT
Start: 2022-04-22

## 2022-05-04 ENCOUNTER — TELEPHONE (OUTPATIENT)
Dept: INTERNAL MEDICINE | Facility: CLINIC | Age: 42
End: 2022-05-04

## 2022-05-06 ENCOUNTER — TELEMEDICINE (OUTPATIENT)
Dept: NEUROLOGY | Facility: CLINIC | Age: 42
End: 2022-05-06

## 2022-05-06 DIAGNOSIS — R41.3 MEMORY LOSS: ICD-10-CM

## 2022-05-06 DIAGNOSIS — G43.709 CHRONIC MIGRAINE WITHOUT AURA WITHOUT STATUS MIGRAINOSUS, NOT INTRACTABLE: Primary | ICD-10-CM

## 2022-05-06 DIAGNOSIS — F31.9 BIPOLAR AFFECTIVE DISORDER, REMISSION STATUS UNSPECIFIED: ICD-10-CM

## 2022-05-06 PROCEDURE — 99213 OFFICE O/P EST LOW 20 MIN: CPT | Performed by: NURSE PRACTITIONER

## 2022-05-06 NOTE — PROGRESS NOTES
Subjective:     Patient ID: Megan Post is a 42 y.o. female.    CC:   Chief Complaint   Patient presents with   • Headache       HPI:   History of Present Illness     This visit was completed face to face via VIDEO by [employer] with verbal consent to treat obtained from patient and/or family.  Provider confirmed patient's name and  at start of visit. Patient and/or family confirms that they are located in [place] during visit and Provider is located in [facility] in [place], [place] during visit.    Ms. day is today via [employer] video visit at her request due to illness for follow-up.  I first saw her in 2021 for headaches.  She previously saw Dr. Polanco back in 2018 for complaints of memory issues.  She was referred last visit by her previous primary care for migraines.  She told me that she has been struggling with migraines for greater than 10 years.  At that point she is having about 7 significant headaches per month, 4 of them are severe migraines.  With her headache she complained of throbbing pain in the frontal region of her head or top of her head.  With migrainous headache she has significant photo and phonophobia as well as nausea, she typically just wants to lie down in a dark room and she finds it hard to go about her day.  She had been given a prescription for sumatriptan in the past which she really did not have much of a response with.  She was at that time being worked up through cardiology as well  She had been feeling fatigued in general.  She admitted that she snores and has woke herself up gasping for breath in the past.  She had never had a sleep study  She said that she continued to struggle with memory issues, previously Dr. Polanco felt her memory concerns were likely related to ADHD as well as polypharmacy.  She at that time was taking Topamax 100 mg twice a day through her psychiatrist for bipolar disorder, she also takes alprazolam, Flexeril, Lyrica 100 mg twice a day and  trazodone among other medications, see list.  She basically has trouble staying on track and remembering short-term details of conversations etc.  She had a CT of her head over a year ago which was normal but has not had an MRI in many years.  She did previously see a neurologist at [facility] several years ago    After last visit we did get an MRI of her brain which was read as normal with the exception of some sinus issues.  We discussed medication options and chose to start Emgality injections which she tells me have worked great for her, she is only had 1 headache or migraine since she started the Emgality injections, she is very happy with these medications  She has stopped the Topamax that was ordered through psychiatry, they have switched her to Risperdal, Wellbutrin and oxcarbazepine for bipolar and mood disorder.  She continues to have problems with her memory           The following portions of the patient's history were reviewed and updated as appropriate: allergies, current medications, past family history, past medical history, past social history, past surgical history and problem list.    Past Medical History:   Diagnosis Date   • Abdominal pain    • Abnormal breast exam    • Abnormal Pap smear of cervix    • Achilles tendinitis    • Acute sinusitis    • Anxiety    • Arthritis    • Asthma    • Tavera's syndrome    • Bipolar 1 disorder (HCC)    • Bowel trouble    • Breast cyst    • Colon polyps    • Constipation    • Depression    • Diverticulitis    • Diverticulitis of colon    • Endometriosis    • Eustachian tube dysfunction    • Extremity pain    • Fatty liver    • Female pelvic pain    • Fibromyalgia    • Gastric ulcer    • H/O bladder infections    • History of rashes as a child    • Irritable bowel syndrome    • Low back pain    • Lumbar radiculopathy    • Menopausal symptoms    • Muscle weakness    • Sexual problems    • Spinal stenosis of lumbar region        Past Surgical History:    Procedure Laterality Date   •  SECTION     • CHOLECYSTECTOMY     • COLONOSCOPY     • HYSTERECTOMY     • NASAL ENDOSCOPY     • OOPHORECTOMY Bilateral    • OTHER SURGICAL HISTORY      Laparoscopy (diagnostic) gynecologic with biopsy   • SHOULDER SURGERY     • UPPER GASTROINTESTINAL ENDOSCOPY  2019       Social History     Socioeconomic History   • Marital status: Single   Tobacco Use   • Smoking status: Former Smoker     Packs/day: 0.50     Types: Cigarettes     Start date: 2003     Quit date: 2021     Years since quittin.9   • Smokeless tobacco: Never Used   • Tobacco comment: nicotine patches for smoking cessation   Vaping Use   • Vaping Use: Never used   Substance and Sexual Activity   • Alcohol use: No   • Drug use: Never   • Sexual activity: Yes     Partners: Male     Birth control/protection: Surgical       Family History   Problem Relation Age of Onset   • Liver disease Mother    • Diabetes Mother    • Hyperlipidemia Mother    • Hypertension Mother    • Thyroid disease Mother    • Arthritis Father    • Mental illness Father    • Migraines Sister    • Stroke Other    • Diabetes Other    • Hyperlipidemia Other    • Hypertension Other    • Mental illness Other    • Migraines Other    • Tuberculosis Other    • Cancer Maternal Grandmother    • Cancer Maternal Grandfather    • Cancer Paternal Grandmother    • Cancer Paternal Grandfather    • No Known Problems Son    • Mental illness Daughter    • Breast cancer Neg Hx    • Endometrial cancer Neg Hx    • Ovarian cancer Neg Hx         Review of Systems   Constitutional: Negative.    HENT:        Has chronic sinus issues, sees ENT    Eyes: Negative.    Respiratory: Negative.    Cardiovascular: Negative.    Neurological: Negative for dizziness, tremors, seizures, syncope, facial asymmetry, speech difficulty, light-headedness, numbness and headaches.   Psychiatric/Behavioral:        Bipolar disorder, depression, anxiety, mood disorder, see  psychiatry        Objective:  LMP  (LMP Unknown)     Neurologic Exam     Mental Status   Exam limited as this is a video visit.  Patient is alert and oriented, speech clear and articulate.  Observe cranial nerves intact       Physical Exam    Assessment/Plan:       Diagnoses and all orders for this visit:    1. Chronic migraine without aura without status migrainosus, not intractable (Primary)  Comments:  Emgality working very well, rare migraines at this point, continue monthly injections    2. Memory loss  Comments:  Likely multifactorial, will recheck B12, last was borderline low  Neuropsych testing ordered  Orders:  -     Ambulatory Referral to Neuropsychology  -     Vitamin B12; Future  -     Folate; Future    3. Bipolar affective disorder, remission status unspecified (HCC)  -     Ambulatory Referral to Neuropsychology    Ms. rodriguez reports her migraines are significantly improved with Emgality, she is only had 1 significant headache since starting the shots.  She is very happy with injections, continue these monthly.  She remains concerned with her memory, see previous memory clinic note with Dr. Polanco.  I did discuss with her possibility of her mood disorder, PTSD, depression and anxiety playing a role with her memory issues as well as polypharmacy.  MRI of the brain reassuring.  Will send for neuropsychological testing  Return to clinic 3 to 4 months, we will repeat MMSE and [place] at that time           Reviewed medications, potential side effects and signs and symptoms to report. Discussed risk versus benefits of treatment plan with patient and/or family-including medications, labs and radiology that may be ordered. Addressed questions and concerns during visit. Patient and/or family verbalized understanding and agree with plan.    AS THE PROVIDER, I PERSONALLY WORE PPE DURING ENTIRE FACE TO FACE ENCOUNTER IN CLINIC WITH THE PATIENT. PATIENT ALSO WORE PPE DURING ENTIRE FACE TO FACE ENCOUNTER EXCEPT FOR A MAX  OF 30 SECONDS DURING NEUROLOGICAL EVALUATION OF CRANIAL NERVES AND THEN MASK WAS PLACED BACK OVER PATIENT FACE FOR REMAINDER OF VISIT. I WASHED MY HANDS BEFORE AND AFTER VISIT.    During this visit the following were done:  Labs Reviewed []    Labs Ordered []    Radiology Reports Reviewed []    Radiology Ordered []    PCP Records Reviewed []    Referring Provider Records Reviewed []    ER Records Reviewed []    Hospital Records Reviewed []    History Obtained From Family []    Radiology Images Reviewed []    Other Reviewed []    Records Requested []      Jeni Snyder, APRN  5/6/2022

## 2022-05-16 RX ORDER — DILTIAZEM HYDROCHLORIDE 180 MG/1
180 CAPSULE, COATED, EXTENDED RELEASE ORAL DAILY
Qty: 90 CAPSULE | OUTPATIENT
Start: 2022-05-16

## 2022-05-17 ENCOUNTER — TELEMEDICINE (OUTPATIENT)
Dept: PULMONOLOGY | Facility: CLINIC | Age: 42
End: 2022-05-17

## 2022-05-17 DIAGNOSIS — R91.8 LUNG NODULES: ICD-10-CM

## 2022-05-17 DIAGNOSIS — Z00.6 EXAMINATION FOR NORMAL COMPARISON OR CONTROL IN CLINICAL RESEARCH: Primary | ICD-10-CM

## 2022-05-17 DIAGNOSIS — J33.9 NASAL POLYPS: ICD-10-CM

## 2022-05-17 DIAGNOSIS — J45.40 MODERATE PERSISTENT ASTHMA WITHOUT COMPLICATION: Primary | ICD-10-CM

## 2022-05-17 PROCEDURE — 99213 OFFICE O/P EST LOW 20 MIN: CPT | Performed by: NURSE PRACTITIONER

## 2022-05-17 RX ORDER — FLUTICASONE PROPIONATE 93 UG/1
SPRAY, METERED NASAL
Qty: 16 ML | Refills: 2 | OUTPATIENT
Start: 2022-05-17

## 2022-05-17 RX ORDER — FLUTICASONE PROPIONATE 93 UG/1
SPRAY, METERED NASAL
Qty: 16 ML | Refills: 0 | Status: SHIPPED | OUTPATIENT
Start: 2022-05-17 | End: 2022-06-27

## 2022-05-17 NOTE — PROGRESS NOTES
[facility] Pulmonary Video Visit    CHIEF COMPLAINT    Asthma follow up     Consent for Video Visit was obtained via Zaplee    Patient presents during the COVID-19 pandemic and federally declared state of public emergency.  The service was conducted via Zaplee.  Patient is worried about exposure therefore telehealth services were necessary.    Subjective   HISTORY OF PRESENT ILLNESS    Megan Post is a 42 y.o.female was seen via Video Visit today for follow-up on her asthma.  She has not been feeling well lately she has been following up with GI at .  She has been having some ongoing GI issues as well as some midsternal chest pain.    She does plan on having an EGD at  for further evaluation.    For her asthma she uses Symbicort, she usually uses it once a day.  She will occasionally use her albuterol for shortness of breath.  She continues on her Singulair and antihistamine.  No recent acute exacerbation.    She is also still on Dupixent with her ENT.        MEDICATION LIST AND ALLERGIES REVIEWED.    FAMILY AND SOCIAL HISTORY REVIEWED.      Objective       Immunization History   Administered Date(s) Administered   • Flu Vaccine Quad PF >36MO 11/02/2017   • FluLaval/Fluarix/Fluzone >6 11/02/2017, 10/22/2020   • Hepatitis A 05/13/2019   • Pneumococcal Polysaccharide (PPSV23) 06/16/2020   • Tdap 06/16/2020   • flucelvax quad pfs =>4 YRS 10/15/2019         Physical exam unable to be completed secondary to nature visit  Appeared in no acute distress, no difficulty with conversation.    Oriented to person, place and time.   Normal respiratory effort.        RESULTS    CT of the abdomen and pelvis done at  showed:  FINDINGS:   Lower Chest: Calcified nodule identified within the right middle lobe with some scarring at the right middle lobe and left lung base. There is a focal area of pleural thickening identified at the left lung base measuring approximately 4 mm..         Assessment & Plan     PROBLEM  LIST    Problem List Items Addressed This Visit        Pulmonary and Pneumonias    Moderate persistent asthma without complication - Primary    Lung nodules - calcified granulomas            DISCUSSION    We did review her CT scan report from , as well as prior imaging done at [facility].  These changes on her CT are likely chronic, similar findings were seen in her CT in 2020 we will get the films from  to assure stability.    She states she is can have an EGD in the near future with her ongoing GI issues.  She states she needed pulmonary clearance for sedation.  At this time there is no indications of any acute pulmonary issues.      She said no recent acute exacerbations of her asthma.  She has occasional shortness of breath and occasional use of her albuterol but only taking her Symbicort daily.  I encouraged her to increase her Symbicort to twice daily.    Follow-up in 6 months with full PFTs    The patient was located at her home in [place], [place] and I, the provider was located in the [facility] pulmonary and critical care office.    I spent 25 minutes caring for Megan on this date of service. This time includes time spent by me in the following activities:preparing for the visit, reviewing tests, obtaining and/or reviewing a separately obtained history, performing a medically appropriate examination and/or evaluation , counseling and educating the patient/family/caregiver, ordering medications, tests, or procedures, referring and communicating with other health care professionals , documenting information in the medical record and independently interpreting results and communicating that information with the patient/family/caregiver          Millicent Pantoja, APRKELSEY  05/17/202210:48 EDT  Electronically signed     Please note that portions of this note were completed with a voice recognition program. Efforts were made to edit the dictations, but occasionally words are mistranscribed.      CC: Sugey Ovalles,  APRN

## 2022-05-24 ENCOUNTER — TELEPHONE (OUTPATIENT)
Dept: NEUROLOGY | Age: 42
End: 2022-05-24

## 2022-05-24 ENCOUNTER — PATIENT MESSAGE (OUTPATIENT)
Dept: NEUROLOGY | Facility: CLINIC | Age: 42
End: 2022-05-24

## 2022-05-24 NOTE — TELEPHONE ENCOUNTER
SENT PT [facility] REGARDING REFERRALS TO HER MY CHART.. DR BLOUNT IN [place] DID NOT TAKE HER INS AND [place] REFERRAL SENT THROUGH PORTAL BUT WILL TAKE A WHILE TO GET SCHEDULED.

## 2022-05-24 NOTE — TELEPHONE ENCOUNTER
Provider: HUTCHINSON  Caller: PATIENT  Relationship to Patient: SELF  Pharmacy: N/A  Phone Number: [phone]  Reason for Call: PATIENT TELEPHONED TO ADVISE NO ONE HAS CONTACTED HER RE: HER REFERRAL TO [facility].    PLEASE REVIEW & ADVISE PATIENT.    THANK YOU.

## 2022-05-31 DIAGNOSIS — Z79.890 HORMONE REPLACEMENT THERAPY (HRT): ICD-10-CM

## 2022-05-31 DIAGNOSIS — M54.41 ACUTE BILATERAL LOW BACK PAIN WITH BILATERAL SCIATICA: ICD-10-CM

## 2022-05-31 DIAGNOSIS — M54.42 ACUTE BILATERAL LOW BACK PAIN WITH BILATERAL SCIATICA: ICD-10-CM

## 2022-05-31 DIAGNOSIS — M51.26 LUMBAR DISCOGENIC PAIN SYNDROME: ICD-10-CM

## 2022-05-31 RX ORDER — TRAMADOL HYDROCHLORIDE 50 MG/1
50 TABLET ORAL EVERY 8 HOURS PRN
Qty: 60 TABLET | Refills: 0 | OUTPATIENT
Start: 2022-05-31

## 2022-05-31 RX ORDER — ESTRADIOL 0.1 MG/G
CREAM VAGINAL
Qty: 1 EACH | Refills: 1 | Status: SHIPPED | OUTPATIENT
Start: 2022-05-31 | End: 2022-09-28 | Stop reason: SDUPTHER

## 2022-05-31 RX ORDER — TIZANIDINE 4 MG/1
4 TABLET ORAL EVERY 8 HOURS PRN
Qty: 60 TABLET | Refills: 0 | Status: SHIPPED | OUTPATIENT
Start: 2022-05-31 | End: 2022-07-01

## 2022-05-31 RX ORDER — ESTRADIOL 2 MG/1
2 TABLET ORAL DAILY
Qty: 90 TABLET | OUTPATIENT
Start: 2022-05-31

## 2022-05-31 RX ORDER — ESTRADIOL 2 MG/1
2 TABLET ORAL DAILY
Qty: 30 TABLET | Refills: 0 | Status: SHIPPED | OUTPATIENT
Start: 2022-05-31 | End: 2022-06-15

## 2022-05-31 NOTE — TELEPHONE ENCOUNTER
Needs refill Estradiol 2mg tablet, tizanidine 4 mg and tramadol 50mg tablet. Called into [facility]. Patient has appointment wit Dr. Pandey 6/29/22 at 10:30 so will need enough until her appointment.     Patient called back and needs just the Estridol 2mg tablet not the vaginal.   Also needs enough tramadol 50mg tablets until appointment with suhas 6/29/22.

## 2022-06-01 RX ORDER — TRAMADOL HYDROCHLORIDE 50 MG/1
50 TABLET ORAL EVERY 8 HOURS PRN
Qty: 60 TABLET | Refills: 0 | Status: SHIPPED | OUTPATIENT
Start: 2022-06-01 | End: 2022-10-05 | Stop reason: SDUPTHER

## 2022-06-15 DIAGNOSIS — R73.03 PREDIABETES: ICD-10-CM

## 2022-06-15 DIAGNOSIS — Z79.890 HORMONE REPLACEMENT THERAPY (HRT): ICD-10-CM

## 2022-06-15 RX ORDER — ESTRADIOL 2 MG/1
2 TABLET ORAL DAILY
Qty: 30 TABLET | Refills: 0 | Status: SHIPPED | OUTPATIENT
Start: 2022-06-15 | End: 2022-08-16 | Stop reason: SDUPTHER

## 2022-06-15 NOTE — TELEPHONE ENCOUNTER
Last Office Visit: 02/09/22  Next Office Visit:07/01/2022-establish care.  Previous florentino patient    Labs completed in past 6 months? yes  Labs completed in past year? yes    Last Refill Date: 03/22/2022  Quantity:90  Refills:0    Pharmacy:     Please review pended refill request for any changes needed on refills or quantities. Thank you!

## 2022-06-23 ENCOUNTER — OFFICE VISIT (OUTPATIENT)
Dept: SLEEP MEDICINE | Facility: HOSPITAL | Age: 42
End: 2022-06-23

## 2022-06-23 VITALS
OXYGEN SATURATION: 95 % | DIASTOLIC BLOOD PRESSURE: 72 MMHG | HEIGHT: 61 IN | HEART RATE: 116 BPM | SYSTOLIC BLOOD PRESSURE: 99 MMHG | BODY MASS INDEX: 33.72 KG/M2 | WEIGHT: 178.6 LBS

## 2022-06-23 DIAGNOSIS — G47.21 CIRCADIAN RHYTHM SLEEP DISORDER, DELAYED SLEEP PHASE TYPE: ICD-10-CM

## 2022-06-23 DIAGNOSIS — R06.83 SNORING: Primary | ICD-10-CM

## 2022-06-23 DIAGNOSIS — G47.33 OBSTRUCTIVE SLEEP APNEA, ADULT: ICD-10-CM

## 2022-06-23 DIAGNOSIS — E66.09 CLASS 1 OBESITY DUE TO EXCESS CALORIES WITHOUT SERIOUS COMORBIDITY WITH BODY MASS INDEX (BMI) OF 33.0 TO 33.9 IN ADULT: ICD-10-CM

## 2022-06-23 PROCEDURE — 99214 OFFICE O/P EST MOD 30 MIN: CPT | Performed by: INTERNAL MEDICINE

## 2022-06-27 DIAGNOSIS — J33.9 NASAL POLYPS: ICD-10-CM

## 2022-06-27 RX ORDER — FLUTICASONE PROPIONATE 93 UG/1
SPRAY, METERED NASAL
Qty: 16 ML | Refills: 0 | Status: SHIPPED | OUTPATIENT
Start: 2022-06-27 | End: 2022-08-10

## 2022-06-27 RX ORDER — FLUTICASONE PROPIONATE 93 UG/1
SPRAY, METERED NASAL
Qty: 16 ML | Refills: 0 | OUTPATIENT
Start: 2022-06-27

## 2022-06-27 NOTE — TELEPHONE ENCOUNTER
Last Office Visit: 09/27/21Leonardo  Next Office Visit:07/01/2022    Labs completed in past 6 months? yes  Labs completed in past year? yes    Last Refill Date:05/17/2022  Quantity:1  Refills:0    Pharmacy:     Please review pended refill request for any changes needed on refills or quantities. Thank you!

## 2022-07-01 ENCOUNTER — OFFICE VISIT (OUTPATIENT)
Dept: INTERNAL MEDICINE | Facility: CLINIC | Age: 42
End: 2022-07-01

## 2022-07-01 VITALS
HEART RATE: 108 BPM | DIASTOLIC BLOOD PRESSURE: 66 MMHG | SYSTOLIC BLOOD PRESSURE: 114 MMHG | TEMPERATURE: 98.7 F | HEIGHT: 61 IN | OXYGEN SATURATION: 98 % | BODY MASS INDEX: 33.61 KG/M2 | WEIGHT: 178 LBS

## 2022-07-01 DIAGNOSIS — R73.03 PREDIABETES: ICD-10-CM

## 2022-07-01 DIAGNOSIS — M79.7 FIBROMYALGIA: ICD-10-CM

## 2022-07-01 DIAGNOSIS — F60.0 PARANOID PERSONALITY (DISORDER): ICD-10-CM

## 2022-07-01 DIAGNOSIS — F43.10 PTSD (POST-TRAUMATIC STRESS DISORDER): ICD-10-CM

## 2022-07-01 DIAGNOSIS — Z79.890 HORMONE REPLACEMENT THERAPY (HRT): ICD-10-CM

## 2022-07-01 DIAGNOSIS — K21.00 GASTROESOPHAGEAL REFLUX DISEASE WITH ESOPHAGITIS WITHOUT HEMORRHAGE: Primary | ICD-10-CM

## 2022-07-01 DIAGNOSIS — E66.09 CLASS 1 OBESITY DUE TO EXCESS CALORIES WITH SERIOUS COMORBIDITY AND BODY MASS INDEX (BMI) OF 33.0 TO 33.9 IN ADULT: ICD-10-CM

## 2022-07-01 DIAGNOSIS — K76.0 NAFLD (NONALCOHOLIC FATTY LIVER DISEASE): ICD-10-CM

## 2022-07-01 DIAGNOSIS — M50.30 DDD (DEGENERATIVE DISC DISEASE), CERVICAL: ICD-10-CM

## 2022-07-01 DIAGNOSIS — M51.26 LUMBAR DISCOGENIC PAIN SYNDROME: ICD-10-CM

## 2022-07-01 DIAGNOSIS — R41.3 MEMORY LOSS: ICD-10-CM

## 2022-07-01 DIAGNOSIS — F31.9 BIPOLAR AFFECTIVE DISORDER, REMISSION STATUS UNSPECIFIED: ICD-10-CM

## 2022-07-01 DIAGNOSIS — J45.40 MODERATE PERSISTENT ASTHMA WITHOUT COMPLICATION: ICD-10-CM

## 2022-07-01 DIAGNOSIS — F41.9 ANXIETY: ICD-10-CM

## 2022-07-01 DIAGNOSIS — Z13.220 SCREENING, LIPID: ICD-10-CM

## 2022-07-01 PROBLEM — E66.811 CLASS 1 OBESITY DUE TO EXCESS CALORIES WITH SERIOUS COMORBIDITY AND BODY MASS INDEX (BMI) OF 33.0 TO 33.9 IN ADULT: Status: ACTIVE | Noted: 2022-07-01

## 2022-07-01 PROBLEM — R00.0 INAPPROPRIATE SINUS TACHYCARDIA: Status: RESOLVED | Noted: 2020-01-22 | Resolved: 2022-07-01

## 2022-07-01 PROBLEM — I47.11 INAPPROPRIATE SINUS TACHYCARDIA: Status: RESOLVED | Noted: 2020-01-22 | Resolved: 2022-07-01

## 2022-07-01 PROBLEM — A49.02 MRSA INFECTION: Status: RESOLVED | Noted: 2020-03-10 | Resolved: 2022-07-01

## 2022-07-01 PROBLEM — R00.2 PALPITATIONS: Status: RESOLVED | Noted: 2019-07-29 | Resolved: 2022-07-01

## 2022-07-01 PROCEDURE — 99215 OFFICE O/P EST HI 40 MIN: CPT | Performed by: INTERNAL MEDICINE

## 2022-07-01 RX ORDER — IPRATROPIUM BROMIDE AND ALBUTEROL SULFATE 2.5; .5 MG/3ML; MG/3ML
SOLUTION RESPIRATORY (INHALATION)
COMMUNITY
Start: 2022-06-30

## 2022-07-01 RX ORDER — ONDANSETRON 4 MG/1
4 TABLET, ORALLY DISINTEGRATING ORAL EVERY 8 HOURS PRN
Qty: 30 TABLET | Refills: 1 | Status: SHIPPED | OUTPATIENT
Start: 2022-07-01 | End: 2022-12-12 | Stop reason: SDUPTHER

## 2022-07-01 RX ORDER — CYCLOBENZAPRINE HCL 10 MG
5-10 TABLET ORAL 3 TIMES DAILY PRN
Qty: 90 TABLET | Refills: 1 | Status: SHIPPED | OUTPATIENT
Start: 2022-07-01 | End: 2022-12-29 | Stop reason: SDUPTHER

## 2022-07-01 RX ORDER — RISPERIDONE 0.5 MG/1
0.5 TABLET ORAL 2 TIMES DAILY
COMMUNITY
End: 2022-09-28
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
## 2022-07-05 NOTE — PROGRESS NOTES
"Chief Complaint  Snoring and nonrestorative sleep    Subjective        Megan Post presents to [facility] for the evaluation of snoring and nonrestorative sleep.  She is referred by MAGGIE Márquez.  Her primary care physician is Dr. Pandey.  She is seen in person in the sleep clinic.  History of Present Illness  Patient states she has had snoring noted for about 5 years.  She has occasional apneas noted.  She occasionally awakens gasping for breath.  She awakens with a headache almost every day.  She had a previous study few years ago but her weight has increased since then.  She awakens with a dry mouth.  She denies ever breaking her nose.  She has had surgery for nasal polyps.    She has a history of reflux symptoms.  She denies kicking or jerking her legs at night.  She does have chronic back and neck pain.  She takes trazodone to help her sleep.  She usually does give difficulty getting to sleep she \"cannot turn off her mind\".    She goes to bed about 3 or 4 AM.  It takes about an hour to go to sleep.  She says she generally does not awaken when she goes to sleep.  She gets about 5 hours of sleep but is not rested.  She denies any history of hypertension.  She has been told she is prediabetic.  She denies any history of coronary artery disease.  She does have a history of asthma.    Past medical history:    Allergies: She has environmental allergies and is allergic to sulfa, penicillin, codeine, tape, latex.    Habits: Smoking: Without at this time she is a former smoker    Alcohol: Without    Caffeine: She has 2 kristofer per day    Medical illnesses: Positive for palpitations, arthritis, pain, ulcers, depression anxiety.    Medications:Acid Gone 160-105 MG chewable tablet chewable tablet    albuterol (PROVENTIL) (2.5 MG/3ML) 0.083% nebulizer solution    albuterol sulfate  (90 Base) MCG/ACT inhaler    ALPRAZolam (XANAX) 1 MG tablet    BIOTIN " PO    budesonide-formoterol (SYMBICORT) 160-4.5 MCG/ACT inhaler    buPROPion XL (WELLBUTRIN XL) 150 MG 24 hr tablet    cetirizine (zyrTEC) 10 MG tablet    COLLAGEN PO    cyanocobalamin ([employer] Vitamin B-12) 1000 MCG tablet    dicyclomine (Bentyl) 10 MG capsule    Dupixent 300 MG/2ML solution pen-injector    EPIPEN 2-JAREN 0.3 MG/0.3ML solution auto-injector injection    estradiol (ESTRACE VAGINAL) 0.1 MG/GM vaginal cream    estradiol (ESTRACE) 2 MG tablet    Fremanezumab-vfrm 225 MG/1.5ML solution auto-injector    Gentle Laxative 5 MG EC tablet    lubiprostone (AMITIZA) 8 MCG capsule    Magnesium 250 MG tablet    montelukast (SINGULAIR) 10 MG tablet    Multiple Vitamin (MULTI-VITAMIN DAILY PO)    omeprazole (priLOSEC) 40 MG capsule    OXcarbazepine (TRILEPTAL) 300 MG tablet    traMADol (ULTRAM) 50 MG tablet    traZODone (DESYREL) 150 MG tablet    ubrogepant (ubrogepant) 50 MG tablet    cyclobenzaprine (FLEXERIL) 10 MG tablet    ipratropium-albuterol (DUO-NEB) 0.5-2.5 mg/3 ml nebulizer    ondansetron ODT (ZOFRAN-ODT) 4 MG disintegrating tablet    risperiDONE (risperDAL) 0.5 MG tablet    Xhance 93 MCG/ACT Exhaler Suspension      Surgeries: She had wisdom teeth extraction, nasal polypectomy, sinus surgery, and tonsillectomy, left arm surgery, laparoscopy, hysterectomy.    Family history: Positives include diabetes, sleep apnea, COPD, chronic bronchitis, asthma, thyroid disease, cancer, restless leg syndrome, insomnia.    Review of systems: Positives include fatigue, congestion, postnasal drip, sinus pain, sinus pressure, sneezing, eye itching, chest tightness, cough, chest pain, abdominal pain, constipation, diarrhea, nausea, vomiting, cold intolerance, heat intolerance, back pain, joint swelling, neck pain, neck stiffness, environmental allergies, food allergies, headaches, numbness, behavioral problem, confusion, decreased concentration, nervousness and anxiety.  Other systems are reviewed and reported as  "negative.    [place] score is 5/24  Objective   Vital Signs:  BP 99/72   Pulse 116   Ht 154.9 cm (61\")   Wt 81 kg (178 lb 9.6 oz)   SpO2 95%   BMI 33.75 kg/m²   Estimated body mass index is 33.75 kg/m² as calculated from the following:    Height as of this encounter: 154.9 cm (61\").    Weight as of this encounter: 81 kg (178 lb 9.6 oz).          Physical Exam patient appears to be awake and alert.  She does not appear to be in acute respiratory distress.    She is normocephalic.  She has Mallampati class IV anatomy.    Lung exam reveals a few expiratory wheeze bilaterally    Cardiac exam revealed normal S1-S2.    Extremities showed no edema.  Result Review :           Assessment and Plan   Diagnoses and all orders for this visit:    1. Snoring (Primary)  -     Home Sleep Study; Future    2. Obstructive sleep apnea, adult  -     Home Sleep Study; Future    3. Circadian rhythm sleep disorder, delayed sleep phase type    4. Class 1 obesity due to excess calories without serious comorbidity with body mass index (BMI) of 33.0 to 33.9 in adult    Patient has a history of snoring and observed apneas.  She gives an excellent story for obstructive sleep apnea.  We will plan to proceed to home sleep testing.  We have discussed potential therapies including CPAP, weight control, oral appliance, and surgery.  We have discussed the consequences of long-term untreated obstructive sleep apnea.  These include hypertension, diabetes, heart disease, stroke, and dementia.  She is encouraged to lose weight.  She is encouraged avoid alcohol and sedatives close to bedtime.  She is encouraged to practice lateral position sleep.  She will be seen back in clinic after her study.       I spent 35 minutes caring for Megan on this date of service. This time includes time spent by me in the following activities:obtaining and/or reviewing a separately obtained history, performing a medically appropriate examination and/or evaluation , " counseling and educating the patient/family/caregiver, ordering medications, tests, or procedures and documenting information in the medical record  Follow Up   Return for Follow up after study, Next scheduled follow-up.  Patient was given instructions and counseling regarding her condition or for health maintenance advice. Please see specific information pulled into the AVS if appropriate.   Zia Guido MD [facility]  Sleep Medicine  Pulmonary and Critical Care Medicine

## 2022-07-12 ENCOUNTER — TELEPHONE (OUTPATIENT)
Dept: INTERNAL MEDICINE | Facility: CLINIC | Age: 42
End: 2022-07-12

## 2022-07-12 NOTE — TELEPHONE ENCOUNTER
Caller: Megan Post    Relationship: Self    Best call back number: [phone] (H)    What orders are you requesting (i.e. lab or imaging): ARSENIC, LEAD AND MERCURY     In what timeframe would the patient need to come in: ASAP    Where will you receive your lab/imaging services: WITH IN [facility]     Additional notes: PATIENT STATED THERE WHERE TWO PEOPLE IN HER HOME THAT GOT THOSE LABS DONE AND THEY WHERE GREATER THEN ONE.

## 2022-07-13 NOTE — TELEPHONE ENCOUNTER
She will need an appointment to discuss. High levels of heavy metals does not always indicate toxicity and symptoms must be assessed as well.

## 2022-07-15 DIAGNOSIS — J30.9 ALLERGIC RHINITIS, UNSPECIFIED SEASONALITY, UNSPECIFIED TRIGGER: ICD-10-CM

## 2022-07-15 RX ORDER — MONTELUKAST SODIUM 10 MG/1
10 TABLET ORAL NIGHTLY
Qty: 30 TABLET | Refills: 3 | Status: SHIPPED | OUTPATIENT
Start: 2022-07-15

## 2022-08-10 DIAGNOSIS — E66.01 CLASS 2 SEVERE OBESITY DUE TO EXCESS CALORIES WITH SERIOUS COMORBIDITY AND BODY MASS INDEX (BMI) OF 35.0 TO 35.9 IN ADULT: ICD-10-CM

## 2022-08-10 DIAGNOSIS — J33.9 NASAL POLYPS: ICD-10-CM

## 2022-08-10 RX ORDER — FLUTICASONE PROPIONATE 93 UG/1
SPRAY, METERED NASAL
Qty: 16 ML | Refills: 0 | Status: SHIPPED | OUTPATIENT
Start: 2022-08-10

## 2022-08-10 RX ORDER — SEMAGLUTIDE 1.34 MG/ML
INJECTION, SOLUTION SUBCUTANEOUS
Qty: 1.5 ML | OUTPATIENT
Start: 2022-08-10

## 2022-08-15 ENCOUNTER — PATIENT MESSAGE (OUTPATIENT)
Dept: INTERNAL MEDICINE | Facility: CLINIC | Age: 42
End: 2022-08-15

## 2022-08-15 ENCOUNTER — LAB (OUTPATIENT)
Dept: LAB | Facility: HOSPITAL | Age: 42
End: 2022-08-15

## 2022-08-15 DIAGNOSIS — R41.3 MEMORY LOSS: ICD-10-CM

## 2022-08-15 DIAGNOSIS — R73.03 PREDIABETES: ICD-10-CM

## 2022-08-15 DIAGNOSIS — Z13.220 SCREENING, LIPID: ICD-10-CM

## 2022-08-15 DIAGNOSIS — Z79.890 HORMONE REPLACEMENT THERAPY (HRT): ICD-10-CM

## 2022-08-15 LAB
CHOLEST SERPL-MCNC: 248 MG/DL (ref 0–200)
FOLATE SERPL-MCNC: 4.72 NG/ML (ref 4.78–24.2)
HDLC SERPL-MCNC: 41 MG/DL (ref 40–60)
LDLC SERPL CALC-MCNC: 116 MG/DL (ref 0–100)
LDLC/HDLC SERPL: 2.52 {RATIO}
TRIGL SERPL-MCNC: 518 MG/DL (ref 0–150)
VIT B12 BLD-MCNC: 308 PG/ML (ref 211–946)
VLDLC SERPL-MCNC: 91 MG/DL (ref 5–40)

## 2022-08-15 PROCEDURE — 80061 LIPID PANEL: CPT

## 2022-08-15 PROCEDURE — 82746 ASSAY OF FOLIC ACID SERUM: CPT

## 2022-08-15 PROCEDURE — 83036 HEMOGLOBIN GLYCOSYLATED A1C: CPT

## 2022-08-15 PROCEDURE — 82607 VITAMIN B-12: CPT

## 2022-08-15 PROCEDURE — 36415 COLL VENOUS BLD VENIPUNCTURE: CPT

## 2022-08-15 RX ORDER — ESTRADIOL 2 MG/1
2 TABLET ORAL DAILY
Qty: 30 TABLET | Refills: 0 | OUTPATIENT
Start: 2022-08-15

## 2022-08-15 NOTE — TELEPHONE ENCOUNTER
Caller: Megan Post    Relationship: Self    Best call back number: [phone]    Requested Prescriptions:   Requested Prescriptions     Pending Prescriptions Disp Refills   • estradiol (ESTRACE) 2 MG tablet 30 tablet 0     Sig: Take 1 tablet by mouth Daily.        Pharmacy where request should be sent: [facility] [phone]     Additional details provided by patient: PATIENT IS OUT OF MEDICATION    Does the patient have less than a 3 day supply:  [x] Yes  [] No    Felipe Johnson Rep   08/15/22 14:43 EDT

## 2022-08-15 NOTE — TELEPHONE ENCOUNTER
Has she met with her GYN yet? I had advised her last visit to discuss whether this should be continued with GYN.

## 2022-08-15 NOTE — TELEPHONE ENCOUNTER
Last office visit                7/1/22  Next office visit                10/5/22    Lab completed in past 6 months? Yes  Labs completed in past year? Yes    Last refill Date:                6/15/22  Quantity:                          30    Pharmacy:     [id number] - [place], [place] - [address] AT [facility], [place] [address] - [phone]  - [phone] FX    Please review pended refill request for any changes needed on refills or quantities.  Thank you!

## 2022-08-16 ENCOUNTER — TELEPHONE (OUTPATIENT)
Dept: INTERNAL MEDICINE | Facility: CLINIC | Age: 42
End: 2022-08-16

## 2022-08-16 DIAGNOSIS — M50.30 DDD (DEGENERATIVE DISC DISEASE), CERVICAL: ICD-10-CM

## 2022-08-16 DIAGNOSIS — Z79.890 HORMONE REPLACEMENT THERAPY (HRT): ICD-10-CM

## 2022-08-16 DIAGNOSIS — M79.7 FIBROMYALGIA: Primary | ICD-10-CM

## 2022-08-16 DIAGNOSIS — M51.26 LUMBAR DISCOGENIC PAIN SYNDROME: ICD-10-CM

## 2022-08-16 LAB — HBA1C MFR BLD: 5.9 % (ref 4.8–5.6)

## 2022-08-16 RX ORDER — ESTRADIOL 2 MG/1
2 TABLET ORAL DAILY
Qty: 30 TABLET | Refills: 0 | Status: SHIPPED | OUTPATIENT
Start: 2022-08-16 | End: 2022-09-28 | Stop reason: DRUGHIGH

## 2022-08-16 RX ORDER — ESTRADIOL 2 MG/1
2 TABLET ORAL DAILY
Qty: 90 TABLET | OUTPATIENT
Start: 2022-08-16

## 2022-08-16 NOTE — TELEPHONE ENCOUNTER
The referral was issued on July 1 & their office will require a new order as this is over 30 days old.  Please advise.

## 2022-08-16 NOTE — PROGRESS NOTES
Please let patient know her B12 is borderline low and her folic acid is just a tad bit low, would recommend supplement.  She can get these over-the-counter or I can send these in prescription, whichever she prefers

## 2022-08-16 NOTE — TELEPHONE ENCOUNTER
From: Megan Post  To: Kalpana Pandey MD  Sent: 8/15/2022 6:08 PM EDT  Subject: Estradiol    I need my hormone medicine because I'm completely out of it. You all denied it saying, it's an inappropriate refill. I'd like to know how it's inappropriate?

## 2022-08-16 NOTE — TELEPHONE ENCOUNTER
----- Message from Judy Lee MA sent at 8/15/2022  5:41 PM EDT -----  Regarding: FW: [facility]    ----- Message -----  From: Crystal Krysta Day  Sent: 8/15/2022   5:34 PM EDT  To: [facility]  Subject: [facility]    I called earlier to see if my referral got sent for my neck and back. The doctors office said, they sent it out June 1st 2022. I called [facility] in [facility] and they said, they don't have anything so I need a new referral to them. Thank you.

## 2022-08-19 ENCOUNTER — PATIENT MESSAGE (OUTPATIENT)
Dept: INTERNAL MEDICINE | Facility: CLINIC | Age: 42
End: 2022-08-19

## 2022-08-19 ENCOUNTER — TELEPHONE (OUTPATIENT)
Dept: INTERNAL MEDICINE | Facility: CLINIC | Age: 42
End: 2022-08-19

## 2022-08-19 NOTE — TELEPHONE ENCOUNTER
Caller: Megan Post    Relationship: Self    Best call back number: [phone]    Caller requesting test results: PATIENT    What test was performed: LAB WORK    When was the test performed: 08/12/22    Where was the test performed: IN OFFICE     Additional notes: PATIENT HAS QUESTIONS ABOUT HER LAB RESULTS      ”

## 2022-08-19 NOTE — TELEPHONE ENCOUNTER
Please let her know that labs show elevated triglycerides and cholesterol. I would recommend she start fenofibrate to lower the triglycerides reducing risk for pancreatitis. This will not have as much effect on the LDL cholesterol which I suggest she manage with a low saturated fat, high fiber diet and regular physical activity. She is prediabetic which is relatively stable and I recommend she limit carbs in her diet as well to help with this.

## 2022-08-23 ENCOUNTER — TELEPHONE (OUTPATIENT)
Dept: NEUROLOGY | Facility: CLINIC | Age: 42
End: 2022-08-23

## 2022-08-23 NOTE — TELEPHONE ENCOUNTER
LEFT DETAILED MESSAGE REGARDING RESULTS MESSAGE. RECOMMENDED SHE CALL BACK IF SHE WANT THE SUPPLEMENTS SENT IN AS A RX. OK FOR BEVERLY TO GET INFORMATION FROM PATIENT IF SHE CALLS BACK FOR A RX.

## 2022-08-23 NOTE — TELEPHONE ENCOUNTER
----- Message from MAGGIE Navarro sent at 8/16/2022  7:48 AM EDT -----  Please let patient know her B12 is borderline low and her folic acid is just a tad bit low, would recommend supplement.  She can get these over-the-counter or I can send these in prescription, whichever she prefers

## 2022-08-24 ENCOUNTER — TELEPHONE (OUTPATIENT)
Dept: NEUROLOGY | Facility: CLINIC | Age: 42
End: 2022-08-24

## 2022-08-24 NOTE — TELEPHONE ENCOUNTER
Provider: HUTCHINSON  Caller: PATIENT  Relationship to Patient: SELF  Pharmacy: N/A  Phone Number: [phone]  Reason for Call: PATIENT TELEPHONED RE: THE PROVIDER SHE WILL NEED TO SEE AFTER TAVON JACKSON IS GONE.    IN ADDITION TO MIGRAINE & MEMORY LOSS, PATIENT WOULD LIKE FOR SOMEONE TO SEE HER FOR HER PINCHED NERVE.    PLEASE CALL & ADVISE.    THANK YOU.

## 2022-09-09 ENCOUNTER — APPOINTMENT (OUTPATIENT)
Dept: SLEEP MEDICINE | Facility: HOSPITAL | Age: 42
End: 2022-09-09

## 2022-09-12 ENCOUNTER — TELEPHONE (OUTPATIENT)
Dept: OBSTETRICS AND GYNECOLOGY | Facility: CLINIC | Age: 42
End: 2022-09-12

## 2022-09-12 NOTE — TELEPHONE ENCOUNTER
Caller: Megan Post    Relationship to patient: Self    Best call back number: [phone]    Patient is needing: PT CANCELLED SAME DAY APPT DUE TO NOT FEELING WELL AND BEING EXPOSED TO COVID. APPT RESCHEDULED MORE THAN 10 DAYS FOR 9/28/22.

## 2022-09-28 ENCOUNTER — OFFICE VISIT (OUTPATIENT)
Dept: OBSTETRICS AND GYNECOLOGY | Facility: CLINIC | Age: 42
End: 2022-09-28

## 2022-09-28 VITALS
DIASTOLIC BLOOD PRESSURE: 70 MMHG | BODY MASS INDEX: 32.88 KG/M2 | SYSTOLIC BLOOD PRESSURE: 118 MMHG | WEIGHT: 174 LBS | RESPIRATION RATE: 16 BRPM

## 2022-09-28 DIAGNOSIS — N95.2 VAGINAL ATROPHY: ICD-10-CM

## 2022-09-28 DIAGNOSIS — N95.1 MENOPAUSAL SYMPTOMS: ICD-10-CM

## 2022-09-28 DIAGNOSIS — Z12.31 ENCOUNTER FOR SCREENING MAMMOGRAM FOR MALIGNANT NEOPLASM OF BREAST: ICD-10-CM

## 2022-09-28 DIAGNOSIS — Z01.419 ENCOUNTER FOR GYNECOLOGICAL EXAMINATION WITHOUT ABNORMAL FINDING: Primary | ICD-10-CM

## 2022-09-28 DIAGNOSIS — R68.82 DECREASED LIBIDO: ICD-10-CM

## 2022-09-28 PROCEDURE — 99396 PREV VISIT EST AGE 40-64: CPT | Performed by: NURSE PRACTITIONER

## 2022-09-28 RX ORDER — FENOFIBRATE 160 MG/1
160 TABLET ORAL
COMMUNITY
Start: 2022-08-20

## 2022-09-28 RX ORDER — FLUTICASONE PROPIONATE 50 MCG
2 SPRAY, SUSPENSION (ML) NASAL DAILY
COMMUNITY
Start: 2022-08-15

## 2022-09-28 RX ORDER — ESTRADIOL 1 MG/1
TABLET ORAL
Qty: 45 TABLET | Refills: 11 | Status: SHIPPED | OUTPATIENT
Start: 2022-09-28 | End: 2023-02-21 | Stop reason: SDUPTHER

## 2022-09-28 RX ORDER — AZITHROMYCIN 250 MG/1
TABLET, FILM COATED ORAL
COMMUNITY
Start: 2022-09-19 | End: 2022-10-03

## 2022-09-28 RX ORDER — BUDESONIDE 0.5 MG/2ML
INHALANT ORAL
COMMUNITY
Start: 2022-07-18

## 2022-09-28 RX ORDER — ESTRADIOL 0.1 MG/G
CREAM VAGINAL
Qty: 1 EACH | Refills: 3 | Status: SHIPPED | OUTPATIENT
Start: 2022-09-28

## 2022-09-28 NOTE — PROGRESS NOTES
Annual Visit     Patient Name: Megan Post  : 1980   MRN: [id number]   Care Team: Patient Care Team:  Kalpana Pandey MD as PCP - General (Internal Medicine)  Paramjit Leahy MD as Consulting Physician (Otolaryngology)  Jasiel Sanchez MD as Consulting Physician (Pain Medicine)  Trudy Vela RD as Dietitian (Nutrition)  Dian oRbb APRN as Nurse Practitioner (Nurse Practitioner)  Millicent Pantoaj APRN as Nurse Practitioner (Pulmonary Disease)  Rafaela Chavira PT as Physical Therapist (Physical Therapy)  Jason Frazier MD as Consulting Physician (Anesthesiology)  Judy Ryan APRN as Nurse Practitioner (Nurse Practitioner)    Chief Complaint:    Chief Complaint   Patient presents with   • Annual Exam       HPI: Megan Post is a 42 y.o. year old  presenting to be seen for her gynecologic exam.   S/p supracervical hysterectomy d/t endometriosis with Dr. Hernandez at  16 yrs ago   Pap smear 2020 WNL     BSO done 1 month after hysterectomy d/t persistent pelvic pain     Taking Estrace 2mg qd - still with hot flashes and nt sweats   Estradiol level checked 2020 = 99.9     Uses estrace vaginal cream - states vaginal dryness and dyspareunia were very bothersome before she started using the cream, which has been helpful     Reports decreased libido   Asking if there are txment options   Feels this has been an issue since the hysterectomy   Hx fibromyalgia and chronic pain d/t DDD and several other back issues     DEXA 2020 osteopenia - lowest t score -1.6   She takes calcium and vit d but not always daily     C/o urinary frequency   No dysuria or urgency   States she has increased her water intake recently     Bilateral dx mammogram birads 1 10/2020 - begin routine screening at age 42     Hx fatty liver and elevated LFTs   At last check in 3/2022 LFTs WNL   Hx pancreatitis   Hx hyperlipidemia - labs done 2022  total cholesterol 248 and triglycerides 518        Subjective      /70   Resp 16   Wt 78.9 kg (174 lb)   LMP  (LMP Unknown)   BMI 32.88 kg/m²     BMI reviewed: Body mass index is 32.88 kg/m².      Objective     Physical Exam    Neuro: alert and oriented to person, place and time   General:  alert; cooperative; well developed; well nourished   Skin:  No suspicious lesions seen   Thyroid: normal to inspection and palpation   Lungs:  breathing is unlabored  clear to auscultation bilaterally   Heart:  regular rate and rhythm, S1, S2 normal, no murmur, click, rub or gallop  normal apical impulse   Breasts:  Examined in supine position  Symmetric without masses or skin dimpling  Nipples normal without inversion, lesions or discharge  There are no palpable axillary nodes   Abdomen: soft, non-tender; no masses  no umbilical or inguinal hernias are present  no hepato-splenomegaly   Pelvis: Clinical staff was present for exam  External genitalia:  normal appearance of the external genitalia including Bartholin's and [place]'s glands.  :  urethral meatus normal;  Vaginal:  normal pink mucosa without prolapse or lesions.  Cervix:  normal appearance.  Uterus:  absent.  Adnexa:  absent, bilateral.  Rectal:  digital rectal exam not performed; anus visually normal appearing.         Assessment / Plan      Assessment  Problems Addressed This Visit    ICD-10-CM ICD-9-CM   1. Encounter for gynecological examination without abnormal finding  Z01.419 V72.31   2. Menopausal symptoms  N95.1 627.2   3. Vaginal atrophy  N95.2 627.3   4. Decreased libido  R68.82 799.81   5. Encounter for screening mammogram for malignant neoplasm of breast  Z12.31 V76.12       Plan    Pap smear not indicated   Discussed monthly SBEs and importance of annual imaging especially considering ERT   Mammogram ordered     In depth discussion of ERT and risks including blood clot, stroke, and MI and possible increased risk of breast cancer   She is agreeable to reducing dose to 1.5mg qd   If  bothersome menopausal sx develop, she will let me know   Discussed testosterone replacement, but considering her health hx will avoid this at this time - discussed possible effects on LFTs and cholesterol   Discussed multiple factors effecting libido and other ways to address these     Cont with estrace vaginal cream twice weekly for vaginal atrophy and dyspareunia     Discussed Calcium, 600 mg/ Vit. D, 400 IU daily for bone health     Unable to leave urine sample today for urine cx   Instructed if dysuria or urgency develop to let me know, and I will order urine cx - pt v/u     AV 1 yr         40 to 64: Counseling/Anticipatory Guidance Discussed: injury prevention, screenings and self-breast exam    Follow Up  Return for Annual physical.  Patient was given instructions and counseling regarding her condition or for health maintenance advice. Please see specific information pulled into the AVS if appropriate.     Judy Ryan, APRN  September 28, 2022  16:52 EDT     2.36

## 2022-09-29 ENCOUNTER — TELEPHONE (OUTPATIENT)
Dept: OBSTETRICS AND GYNECOLOGY | Facility: CLINIC | Age: 42
End: 2022-09-29

## 2022-09-29 NOTE — TELEPHONE ENCOUNTER
Ava,  Please let her know that I looked up the medication Addyi that we discussed for decreased libido and it interacts with multiple medications that she is currently taking, and is also contraindicated with fatty liver, so we won't be able to use that one at this time.      Thanks!

## 2022-10-03 ENCOUNTER — OFFICE VISIT (OUTPATIENT)
Dept: NEUROLOGY | Facility: CLINIC | Age: 42
End: 2022-10-03

## 2022-10-03 VITALS
HEIGHT: 61 IN | OXYGEN SATURATION: 97 % | HEART RATE: 100 BPM | WEIGHT: 177 LBS | SYSTOLIC BLOOD PRESSURE: 110 MMHG | DIASTOLIC BLOOD PRESSURE: 78 MMHG | TEMPERATURE: 97.5 F | BODY MASS INDEX: 33.42 KG/M2

## 2022-10-03 DIAGNOSIS — G43.709 CHRONIC MIGRAINE WITHOUT AURA WITHOUT STATUS MIGRAINOSUS, NOT INTRACTABLE: Primary | ICD-10-CM

## 2022-10-03 DIAGNOSIS — M54.2 NECK PAIN: ICD-10-CM

## 2022-10-03 DIAGNOSIS — R41.3 MEMORY LOSS: ICD-10-CM

## 2022-10-03 PROCEDURE — 99214 OFFICE O/P EST MOD 30 MIN: CPT | Performed by: NURSE PRACTITIONER

## 2022-10-03 NOTE — PROGRESS NOTES
Neuro Office Visit      Encounter Date: 10/03/2022   Patient Name: Megan Post  : 1980   MRN: [id number]   PCP:  Kalpana Pandey MD     Chief Complaint:    Chief Complaint   Patient presents with   • Migraine       History of Present Illness: Megan Post is a 42 y.o. female who is here today in Neurology for migraine management.     Last visit with MAGGIE Hsu via telemedicine on 22. Continued on Emgality.    Migraines for 10 years.       Headache Symptoms:   Days per month: 2-3 times/week  Location: Occiput  and Frontal       Quality: Sharp and Throbbing        Duration: hours to days  Severity: 10/10  Triggers: Scents, stress, movements  Associated Symptoms: Nausea, Vomiting, Photophobia, Phonophobia, Scent sensitivity , Dizziness and Tinnitus  Aura: None  Visual Changes: None    Abortives  Preventives: Ubrelvy, Ajovy                                                                            Previous Treatments: Imitrex, TPM    Memory Loss  Continues to have issues with short term memory. She has started taking vitamin B12 supplements. We discussed that a few of her medications can affect memory. She is going to  for a neuropsychology evaluation.       Subjective        Past Medical History:   Past Medical History:   Diagnosis Date   • Abdominal pain    • Abnormal breast exam    • Abnormal Pap smear of cervix    • Achilles tendinitis    • Acute sinusitis    • Anxiety    • Arthritis    • Asthma    • Tavera's syndrome    • Bipolar 1 disorder (HCC)    • Bowel trouble    • Breast cyst    • Colon polyps    • Constipation    • Depression    • Diverticulitis    • Diverticulitis of colon    • Endometriosis    • Eustachian tube dysfunction    • Extremity pain    • Fatty liver    • Female pelvic pain    • Fibromyalgia    • Gastric ulcer    • H/O bladder infections    • History of rashes as a child    • Irritable bowel syndrome    • Low back pain    • Lumbar radiculopathy    •  Menopausal symptoms    • Muscle weakness    • Sexual problems    • Spinal stenosis of lumbar region        Past Surgical History:   Past Surgical History:   Procedure Laterality Date   •  SECTION     • CHOLECYSTECTOMY     • COLONOSCOPY  2017   • HYSTERECTOMY      endometriosis   • NASAL ENDOSCOPY     • OOPHORECTOMY Bilateral    • OTHER SURGICAL HISTORY      Laparoscopy (diagnostic) gynecologic with biopsy   • SHOULDER SURGERY     • UPPER GASTROINTESTINAL ENDOSCOPY  2019       Family History:   Family History   Problem Relation Age of Onset   • Liver disease Mother    • Diabetes Mother    • Hyperlipidemia Mother    • Hypertension Mother    • Thyroid disease Mother    • Arthritis Father    • Mental illness Father    • Migraines Sister    • Lung cancer Maternal Grandmother    • Cancer Maternal Grandfather         not certain which type, abdominal or pelvic   • Cancer Paternal Grandmother    • Tuberculosis Paternal Grandmother    • Liver cancer Paternal Grandfather    • Mental illness Daughter    • No Known Problems Son    • Stroke Other    • Diabetes Other    • Hyperlipidemia Other    • Hypertension Other    • Mental illness Other    • Migraines Other    • Breast cancer Neg Hx    • Endometrial cancer Neg Hx    • Ovarian cancer Neg Hx        Social History:   Social History     Socioeconomic History   • Marital status: Single   Tobacco Use   • Smoking status: Former Smoker     Packs/day: 0.25     Years: 10.00     Pack years: 2.50     Types: Cigarettes     Start date: 2003     Quit date: 2021     Years since quittin.3   • Smokeless tobacco: Never Used   • Tobacco comment: was a social smoker, did not smoke daily   Vaping Use   • Vaping Use: Never used   Substance and Sexual Activity   • Alcohol use: No   • Drug use: Never   • Sexual activity: Yes     Partners: Male     Birth control/protection: Surgical       Medications:     Current Outpatient Medications:   •  albuterol (PROVENTIL) (2.5 MG/3ML)  0.083% nebulizer solution, Take 2.5 mg by nebulization 4 (Four) Times a Day As Needed for Wheezing., Disp: 90 each, Rfl: 5  •  ALPRAZolam (XANAX) 1 MG tablet, TAKE 1/2 TO 1 TABLET BY MOUTH UP TO TWICE DAILY FOR SEVERE ANXIETY AND PANIC ATTACKS, Disp: , Rfl:   •  budesonide (PULMICORT) 0.5 MG/2ML nebulizer solution, , Disp: , Rfl:   •  budesonide-formoterol (SYMBICORT) 160-4.5 MCG/ACT inhaler, Inhale 2 puffs 2 (Two) Times a Day., Disp: , Rfl:   •  buPROPion XL (WELLBUTRIN XL) 150 MG 24 hr tablet, Take 150 mg by mouth Every Morning., Disp: , Rfl:   •  cetirizine (zyrTEC) 10 MG tablet, Take 1 tablet by mouth Daily., Disp: , Rfl: 1  •  COLLAGEN PO, Take 1 tablet by mouth Daily., Disp: , Rfl:   •  cyanocobalamin ([employer] Vitamin B-12) 1000 MCG tablet, Take 1 tablet by mouth Daily., Disp: 100 tablet, Rfl: 0  •  cyclobenzaprine (FLEXERIL) 10 MG tablet, Take 0.5-1 tablets by mouth 3 (Three) Times a Day As Needed for Muscle Spasms., Disp: 90 tablet, Rfl: 1  •  dicyclomine (Bentyl) 10 MG capsule, Take 1 capsule by mouth 4 (Four) Times a Day Before Meals & at Bedtime As Needed (abdominal cramping diarrhea)., Disp: 120 capsule, Rfl: 2  •  EPIPEN 2-JAREN 0.3 MG/0.3ML solution auto-injector injection, U UTD, Disp: , Rfl: 6  •  estradiol (ESTRACE VAGINAL) 0.1 MG/GM vaginal cream, Insert 1 gm intravaginally 1-2 times each week, Disp: 1 each, Rfl: 3  •  estradiol (Estrace) 1 MG tablet, Take 1.5 tablets po qd., Disp: 45 tablet, Rfl: 11  •  fenofibrate 160 MG tablet, Take 160 mg by mouth Daily With Dinner., Disp: , Rfl:   •  fluticasone (FLONASE) 50 MCG/ACT nasal spray, 2 sprays by Each Nare route Daily. Shake liquid, Disp: , Rfl:   •  Fremanezumab-vfrm 225 MG/1.5ML solution auto-injector, Inject 225 mg under the skin into the appropriate area as directed Every 30 (Thirty) Days., Disp: 1 pen, Rfl: 11  •  Gentle Laxative 5 MG EC tablet, TAKE 1 TABLET BY MOUTH ONCE DAILY AS NEEDED FOR CONSTIPATION, Disp: , Rfl:   •  ipratropium-albuterol  (DUO-NEB) 0.5-2.5 mg/3 ml nebulizer, , Disp: , Rfl:   •  lubiprostone (AMITIZA) 8 MCG capsule, Take 8 mcg by mouth 2 (Two) Times a Day As Needed., Disp: , Rfl:   •  Magnesium 250 MG tablet, TK 1-2 TS PO D FOR CONSTIPATION IF TAKING MIRALAX IS NOT EFFECTIVE, Disp: , Rfl:   •  montelukast (SINGULAIR) 10 MG tablet, TAKE 1 TABLET BY MOUTH EVERY NIGHT, Disp: 30 tablet, Rfl: 3  •  Multiple Vitamin (MULTI-VITAMIN DAILY PO), Take  by mouth Daily., Disp: , Rfl:   •  omeprazole (priLOSEC) 40 MG capsule, Take 40 mg by mouth 2 (two) times a day., Disp: , Rfl:   •  ondansetron ODT (ZOFRAN-ODT) 4 MG disintegrating tablet, Place 1 tablet on the tongue Every 8 (Eight) Hours As Needed for Nausea or Vomiting., Disp: 30 tablet, Rfl: 1  •  traMADol (ULTRAM) 50 MG tablet, Take 1 tablet by mouth Every 8 (Eight) Hours As Needed for Moderate Pain ., Disp: 60 tablet, Rfl: 0  •  traZODone (DESYREL) 150 MG tablet, Take 1 tablet by mouth Every Night., Disp: , Rfl: 3  •  ubrogepant (ubrogepant) 50 MG tablet, Take 1 PO at onset of migraine, may repeat in 2 hours, Disp: 10 tablet, Rfl: 11  •  Xhance 93 MCG/ACT Exhaler Suspension, USE ONE SPRAY IN EACH NOSTRIL EVERY DAY AS NEEDED FOR CONGESTION, Disp: 16 mL, Rfl: 0    Allergies:   Allergies   Allergen Reactions   • Adhesive Tape Hives   • Latex Hives   • Sulfa Antibiotics Hives   • Biaxin [Clarithromycin] Nausea Only   • Codeine Itching   • Penicillins Nausea Only   • Ciprofloxacin Unknown - High Severity   • Nuts Unknown - High Severity     Red heated face       PHQ-9 Total Score:     STEADI Fall Risk Assessment has not been completed.    Objective     Physical Exam:   Physical Exam  Vitals reviewed.   Eyes:      Extraocular Movements: EOM normal.      Pupils: Pupils are equal, round, and reactive to light.   Neurological:      Mental Status: She is oriented to person, place, and time.      Gait: Gait is intact.      Deep Tendon Reflexes:      Reflex Scores:       Tricep reflexes are 2+ on the  right side and 2+ on the left side.       Bicep reflexes are 2+ on the right side and 2+ on the left side.       Brachioradialis reflexes are 2+ on the right side and 2+ on the left side.       Patellar reflexes are 2+ on the right side and 2+ on the left side.       Achilles reflexes are 2+ on the right side and 2+ on the left side.  Psychiatric:         Speech: Speech normal.         Neurologic Exam     Mental Status   Oriented to person, place, and time.   Attention: normal. Concentration: normal.   Speech: speech is normal   Level of consciousness: alert    Cranial Nerves     CN II   Visual fields full to confrontation.     CN III, IV, VI   Pupils are equal, round, and reactive to light.  Extraocular motions are normal.   Right pupil: Shape: regular. Reactivity: brisk.   Left pupil: Shape: regular. Reactivity: brisk.   CN III: no CN III palsy  CN VI: no CN VI palsy  Nystagmus: none     CN V   Facial sensation intact.     CN VII   Facial expression full, symmetric.     CN VIII   CN VIII normal.     CN IX, X   CN IX normal.   CN X normal.     CN XI   CN XI normal.     CN XII   CN XII normal.     Motor Exam     Strength   Right neck flexion: 5/5  Left neck flexion: 5/5  Right neck extension: 5/5  Left neck extension: 5/5  Right deltoid: 5/5  Left deltoid: 5/5  Right biceps: 5/5  Left biceps: 5/5  Right triceps: 5/5  Left triceps: 5/5  Right wrist flexion: 5/5  Left wrist flexion: 5/5  Right wrist extension: 5/5  Left wrist extension: 5/5  Right interossei: 5/5  Left interossei: 5/5  Right abdominals: 5/5  Left abdominals: 5/5  Right iliopsoas: 5/5  Left iliopsoas: 5/5  Right quadriceps: 5/5  Left quadriceps: 5/5  Right hamstrin/5  Left hamstrin/5  Right glutei: 5/5  Left glutei: 5/5  Right anterior tibial: 5/5  Left anterior tibial: 5/5  Right posterior tibial: 5/5  Left posterior tibial: 5/5  Right peroneal: 5/5  Left peroneal: 5/5  Right gastroc: 5/5  Left gastroc: 5/5    Sensory Exam   Light touch  "normal.     Gait, Coordination, and Reflexes     Gait  Gait: normal    Reflexes   Right brachioradialis: 2+  Left brachioradialis: 2+  Right biceps: 2+  Left biceps: 2+  Right triceps: 2+  Left triceps: 2+  Right patellar: 2+  Left patellar: 2+  Right achilles: 2+  Left achilles: 2+  Right : 2+  Left : 2+       Vital Signs:   Vitals:    10/03/22 1359   BP: 110/78   Pulse: 100   Temp: 97.5 °F (36.4 °C)   SpO2: 97%   Weight: 80.3 kg (177 lb)   Height: 154.9 cm (60.98\")     Body mass index is 33.46 kg/m².     Results:   Imaging:   No Images in the past 120 days found..     Labs:    No results found for: CMP, PROTEIN, ANTIMOGAB, YRYOMA7NMPK, JCVRESULT, QUANTTBGOLD, CBCDIF, IGGALBSER     Assessment / Plan      Assessment/Plan:   Diagnoses and all orders for this visit:    1. Chronic migraine without aura without status migrainosus, not intractable (Primary)  Comments:  Continue Ajovy and Ubrevley    2. Neck pain  Comments:  PT evaluation  Orders:  -     Ambulatory Referral to Physical Therapy Evaluate and treat    3. Memory loss  Comments:  Most likely secondary to polypharmacy.  Encouraged to try supplement such as Neuriva to see if that will help.  Has appt for neuropsychology eval           Patient Education:     Reviewed medications, potential side effects and signs and symptoms to report. Discussed risk versus benefits of treatment plan with patient and/or family-including medications, labs and radiology that may be ordered. Addressed questions and concerns during visit. Patient and/or family verbalized understanding and agree with plan. Instructed to call the office with any questions and report to ER with any life-threatening symptoms.     Follow Up:   Return in about 3 months (around 1/3/2023).    I spent 45 minutes face to face with the patient and family. I personally spent 50 percent of this time counseling and discussing diagnosis, treatment options and management .       During this visit the " following were done:  Labs Reviewed [x]    Labs Ordered []    Radiology Reports Reviewed []    Radiology Ordered []    PCP Records Reviewed []    Referring Provider Records Reviewed [x]    ER Records Reviewed []    Hospital Records Reviewed []    History Obtained From Family []    Radiology Images Reviewed []    Other Reviewed [x]    Records Requested []      MAGGIE Wills  [facility]  [address]  [facility] [id number] [phone]

## 2022-10-04 ENCOUNTER — SPECIALTY PHARMACY (OUTPATIENT)
Dept: ONCOLOGY | Facility: HOSPITAL | Age: 42
End: 2022-10-04

## 2022-10-04 ENCOUNTER — HOSPITAL ENCOUNTER (OUTPATIENT)
Dept: SLEEP MEDICINE | Facility: HOSPITAL | Age: 42
Discharge: HOME | End: 2022-10-04
Admitting: INTERNAL MEDICINE

## 2022-10-04 VITALS — BODY MASS INDEX: 33.71 KG/M2 | HEIGHT: 61 IN | WEIGHT: 178.57 LBS

## 2022-10-04 DIAGNOSIS — R06.83 SNORING: ICD-10-CM

## 2022-10-04 DIAGNOSIS — G43.709 CHRONIC MIGRAINE WITHOUT AURA WITHOUT STATUS MIGRAINOSUS, NOT INTRACTABLE: ICD-10-CM

## 2022-10-04 DIAGNOSIS — G47.33 OBSTRUCTIVE SLEEP APNEA, ADULT: ICD-10-CM

## 2022-10-04 PROCEDURE — 95806 SLEEP STUDY UNATT&RESP EFFT: CPT | Performed by: INTERNAL MEDICINE

## 2022-10-04 PROCEDURE — 95806 SLEEP STUDY UNATT&RESP EFFT: CPT

## 2022-10-04 NOTE — PROGRESS NOTES
Specialty Pharmacy Patient Management Program  Neurology Initial Assessment     Megan Post is a 42 y.o. female with migraines seen by a Neurology provider and enrolled in the Neurology Patient Management program offered by [facility].  An initial outreach was conducted, including assessment of therapy appropriateness and specialty medication education for Ajovy and Ubrelvy.  Patient is currently on both Ajovy and Ubrelvy from an outside provider, and will now be followed by our clinic.  The patient was introduced to services offered by [facility], including: regular assessments, refill coordination, curbside pick-up or mail order delivery options, prior authorization maintenance, and financial assistance programs as applicable. The patient was also provided with contact information for the pharmacy team.     Insurance Coverage & Financial Support  [employer]    Relevant Past Medical History and Comorbidities  Relevant medical history and concomitant health conditions were discussed with the patient. The patient's chart has been reviewed for relevant past medical history and comorbid health conditions and updated as necessary.   Past Medical History:   Diagnosis Date   • Abdominal pain    • Abnormal breast exam    • Abnormal Pap smear of cervix    • Achilles tendinitis    • Acute sinusitis    • Anxiety    • Arthritis    • Asthma    • Tavera's syndrome    • Bipolar 1 disorder (HCC)    • Bowel trouble    • Breast cyst    • Colon polyps    • Constipation    • Depression    • Diverticulitis    • Diverticulitis of colon    • Endometriosis    • Eustachian tube dysfunction    • Extremity pain    • Fatty liver    • Female pelvic pain    • Fibromyalgia    • Gastric ulcer    • H/O bladder infections    • History of rashes as a child    • Irritable bowel syndrome    • Low back pain    • Lumbar radiculopathy    • Menopausal symptoms    • Muscle weakness    • Sexual  problems    • Spinal stenosis of lumbar region      Social History     Socioeconomic History   • Marital status: Single   Tobacco Use   • Smoking status: Former Smoker     Packs/day: 0.25     Years: 10.00     Pack years: 2.50     Types: Cigarettes     Start date: 2003     Quit date: 2021     Years since quittin.3   • Smokeless tobacco: Never Used   • Tobacco comment: was a social smoker, did not smoke daily   Vaping Use   • Vaping Use: Never used   Substance and Sexual Activity   • Alcohol use: No   • Drug use: Never   • Sexual activity: Yes     Partners: Male     Birth control/protection: Surgical       Problem list reviewed by Deanna Kingsley, PharmD on 10/4/2022 at 11:11 AM    Allergies  Known allergies and reactions were discussed with the patient. The patient's chart has been reviewed for  allergy information and updated as necessary.   Adhesive tape, Latex, Sulfa antibiotics, Biaxin [clarithromycin], Codeine, Penicillins, Ciprofloxacin, and Nuts    Allergies reviewed by Deanna Kingsley, PharmD on 10/4/2022 at 11:07 AM    Current Medication List  This medication list has been reviewed with the patient and evaluated for any interactions or necessary modifications/recommendations, and updated to include all prescription medications, OTC medications, and supplements the patient is currently taking.  This list reflects what is contained in the patient's profile, which has also been marked as reviewed to communicate to other providers it is the most up to date version of the patient's current medication therapy.     Current Outpatient Medications:   •  Fremanezumab-vfrm 225 MG/1.5ML solution auto-injector, Inject 225 mg under the skin into the appropriate area as directed Every 28 (Twenty-Eight) Days., Disp: 1.5 mL, Rfl: 11  •  ubrogepant (ubrogepant) 50 MG tablet, Take 1 tab by mouth at onset of migraine, may repeat in 2 hours.  Max of 200 mg/ 24 hrs, Disp: 10 tablet, Rfl: 11  •  albuterol (PROVENTIL)  (2.5 MG/3ML) 0.083% nebulizer solution, Take 2.5 mg by nebulization 4 (Four) Times a Day As Needed for Wheezing., Disp: 90 each, Rfl: 5  •  ALPRAZolam (XANAX) 1 MG tablet, TAKE 1/2 TO 1 TABLET BY MOUTH UP TO TWICE DAILY FOR SEVERE ANXIETY AND PANIC ATTACKS, Disp: , Rfl:   •  budesonide (PULMICORT) 0.5 MG/2ML nebulizer solution, , Disp: , Rfl:   •  budesonide-formoterol (SYMBICORT) 160-4.5 MCG/ACT inhaler, Inhale 2 puffs 2 (Two) Times a Day., Disp: , Rfl:   •  buPROPion XL (WELLBUTRIN XL) 150 MG 24 hr tablet, Take 150 mg by mouth Every Morning., Disp: , Rfl:   •  cetirizine (zyrTEC) 10 MG tablet, Take 1 tablet by mouth Daily., Disp: , Rfl: 1  •  COLLAGEN PO, Take 1 tablet by mouth Daily., Disp: , Rfl:   •  cyanocobalamin ([employer] Vitamin B-12) 1000 MCG tablet, Take 1 tablet by mouth Daily., Disp: 100 tablet, Rfl: 0  •  cyclobenzaprine (FLEXERIL) 10 MG tablet, Take 0.5-1 tablets by mouth 3 (Three) Times a Day As Needed for Muscle Spasms., Disp: 90 tablet, Rfl: 1  •  dicyclomine (Bentyl) 10 MG capsule, Take 1 capsule by mouth 4 (Four) Times a Day Before Meals & at Bedtime As Needed (abdominal cramping diarrhea)., Disp: 120 capsule, Rfl: 2  •  EPIPEN 2-JAREN 0.3 MG/0.3ML solution auto-injector injection, U UTD, Disp: , Rfl: 6  •  estradiol (ESTRACE VAGINAL) 0.1 MG/GM vaginal cream, Insert 1 gm intravaginally 1-2 times each week, Disp: 1 each, Rfl: 3  •  estradiol (Estrace) 1 MG tablet, Take 1.5 tablets po qd., Disp: 45 tablet, Rfl: 11  •  fenofibrate 160 MG tablet, Take 160 mg by mouth Daily With Dinner., Disp: , Rfl:   •  fluticasone (FLONASE) 50 MCG/ACT nasal spray, 2 sprays by Each Nare route Daily. Shake liquid, Disp: , Rfl:   •  Gentle Laxative 5 MG EC tablet, TAKE 1 TABLET BY MOUTH ONCE DAILY AS NEEDED FOR CONSTIPATION, Disp: , Rfl:   •  ipratropium-albuterol (DUO-NEB) 0.5-2.5 mg/3 ml nebulizer, , Disp: , Rfl:   •  lubiprostone (AMITIZA) 8 MCG capsule, Take 8 mcg by mouth 2 (Two) Times a Day As Needed., Disp: , Rfl:   •   Magnesium 250 MG tablet, TK 1-2 TS PO D FOR CONSTIPATION IF TAKING MIRALAX IS NOT EFFECTIVE, Disp: , Rfl:   •  montelukast (SINGULAIR) 10 MG tablet, TAKE 1 TABLET BY MOUTH EVERY NIGHT, Disp: 30 tablet, Rfl: 3  •  Multiple Vitamin (MULTI-VITAMIN DAILY PO), Take  by mouth Daily., Disp: , Rfl:   •  omeprazole (priLOSEC) 40 MG capsule, Take 40 mg by mouth 2 (two) times a day., Disp: , Rfl:   •  ondansetron ODT (ZOFRAN-ODT) 4 MG disintegrating tablet, Place 1 tablet on the tongue Every 8 (Eight) Hours As Needed for Nausea or Vomiting., Disp: 30 tablet, Rfl: 1  •  traMADol (ULTRAM) 50 MG tablet, Take 1 tablet by mouth Every 8 (Eight) Hours As Needed for Moderate Pain ., Disp: 60 tablet, Rfl: 0  •  traZODone (DESYREL) 150 MG tablet, Take 1 tablet by mouth Every Night., Disp: , Rfl: 3  •  Xhance 93 MCG/ACT Exhaler Suspension, USE ONE SPRAY IN EACH NOSTRIL EVERY DAY AS NEEDED FOR CONGESTION, Disp: 16 mL, Rfl: 0    Medicines reviewed by Deanna Kingsley, PharmD on 10/4/2022 at 11:08 AM    Drug Interactions  none     Recommended Medications Assessment    None      Relevant Laboratory Values    Common labs    Common Labs 3/8/22 3/8/22 4/29/22 4/29/22 8/15/22 8/15/22    1111 1111 1643 1643 1352 1352   Glucose  101 (A)  115 (A)     BUN  10  14     Creatinine  0.72  0.78     eGFR Non African Am  >60  >60     eGFR  Am  >60  >60     Sodium  140  139     Potassium  3.9  4.0     Chloride  103  103     Calcium  9.2  9.0     Albumin  4.6       Total Bilirubin  <0.2 (A)       Alkaline Phosphatase  67       AST (SGOT)  16       ALT (SGPT)  16       WBC 6.50  7.03      Hemoglobin 13.9  13.5      Hematocrit 42.9  43.0      Platelets 324  318      Total Cholesterol     248 (A)    Triglycerides     518 (A)    HDL Cholesterol     41    LDL Cholesterol      116 (A)    Hemoglobin A1C      5.90 (A)   (A) Abnormal value       Comments are available for some flowsheets but are not being displayed.             Initial Education Provided for  Specialty Medication  The patient has been provided with the following education and any applicable administration techniques (i.e. self-injection) have been demonstrated for the therapies indicated. All questions and concerns have been addressed prior to the patient receiving the medication, and the patient has verbalized understanding of the education and any materials provided.  Additional patient education shall be provided and documented upon request by the patient, provider or payer.                   Ajovy (fremanezumab-vfrm) 225 mg subQ every 28 days           Medication Expectations   Why am I taking this medication? You are taking this medication for migraine prophylaxis.   What should I expect while on this medication? You should expect to a decrease in the frequency and severity of your migraines.   How does the medication work? Ajovy is a monoclonal antibody that binds to calcitonin gene-related peptide (CGRP) and blocks its binding to the receptor decreasing the severity of migraines.   How long will I be on this medication for? The amount of time you will be on this medication will be determined by your doctor and your response to the medication.    How do I take this medication? Take as directed on your prescription label. This medication is a self-injection given every 28 days.    What are some possible side effects? Injection site reactions and hypersensitivity reactions.   What happens if I miss a dose? If you miss a dose, take it as soon as you remember, and time next dose 28 days from last dose.                  Medication Safety   What are things I should warn my doctor immediately about? Cases of anaphylaxis and angioedema have been reported in the postmarketing setting. If a reaction occurs, notify your doctor immediately.   What are things that I should be cautious of? Injection site reaction and hypersensitivity reactions, including rash, pruritus, drug hypersensitivity, and urticaria    What are some medications that can interact with this one? No drug interactions identified.            Medication Storage/Handling   How should I handle this medication? Keep this medication our of reach of pets/children in original container.  On the day your Ajovy is due let it set at room temperature for 30 minutes prior to injection. (do NOT warm using a heat source such as hot water or a microwave).  Administer in the abdomen, thigh, back of the upper arm, or buttocks.  Do not inject where the skin is tender, bruised, red or hard.  Rotate injection sites.   How does this medication need to be stored? Store in refrigerator and keep dry.   How should I dispose of this medication? You can dispose of the empty syringe in a sharps container, and if this is not available you may use an empty hard plastic container such as a milk jug or tide container.            Resources/Support   How can I remind myself to take this medication? You can download a reminder apolinar on your phone or use a calandar  to help with your monthly injection.   Is financial support available?  Yes, [employer] can provide co-pay cards if you have commercial insurance or patient assistance if you have Medicare or no insurance.    Which vaccines are recommended for me? Talk to your doctor about these vaccines: Flu, Coronavirus (COVID-19), Pneumococcal (pneumonia), Tdap, Hepatitis B, Zoster (shingles)               Ubrelvy (Ubrogepant)  Medication Expectations   Why am I taking this medication? You are taking this medication to treat acute migraines.   What should I expect while on this medication? You should expect to see a decrease in the frequency and severity of your migraines.   How does the medication work? Ubrelvy is a monoclonal antibody that binds to calcitonin gene-related peptide (CGRP) and blocks its binding to the receptor decreasing the severity of migraines.   How long will I be on this medication for? The amount of time  you will be on this medication will be determined by your doctor and your response to the medication.    How do I take this medication? Take as directed on your prescription label.  Reviewed plan for Ubrelvy 50mg (1 tablet) PO daily prn; may repeat x 1 in 2 hours, if needed. Max dosage = 200mg/24 hours.    What are some possible side effects? Potential side effects including, but not limited to nausea and somnolence. Pt verbalized understanding.   What happens if I miss a dose? This is taken as needed for migraines.     Medication Safety   What are things I should warn my doctor immediately about? Allergic reaction: Itching or hives, swelling in your face or hands, swelling or tingling in your mouth or throat, chest tightness, trouble breathing     What are things that I should be cautious of? Tell your doctor if you are pregnant or breastfeeding, or if you have kidney disease or liver disease.   What are some medications that can interact with this one? Concomitant use of strong CY inhibitors, check with your pharmacist or provider before starting any new medications, avoid grapefruit juice.     Medication Storage/Handling   How should I handle this medication? Keep this medication out of reach of pets/children.   How does this medication need to be stored? Store at a controlled room temperature between 20 and 25 degrees C (68 and 77 degrees F), with excursions permitted between 15 and 30 degrees C (59 and 86 degrees F) away from direct sunlight and moisture.   How should I dispose of this medication? There should not be a need to dispose of this medication unless your provider decides to change the dose or therapy. If that is the case, take to your local police station for proper disposal. Some pharmacies also have take-back bins for medication drop-off.      Resources/Support   How can I remind myself to take this medication? This is taken as needed for migraines.   Is financial support available?  Yes,  [employer] can provide co-pay cards if you have commercial insurance or patient assistance if you have Medicare or no insurance.    Which vaccines are recommended for me? Talk to your doctor about these vaccines: Flu, Coronavirus (COVID-19), Pneumococcal (pneumonia), Tdap, Hepatitis B, Zoster (shingles)           Adherence and Self-Administration  • Barriers to Patient Adherence and/or Self-Administration: none    • Methods for Supporting Patient Adherence and/or Self-Administration: Patient is adept with Ajovy self-injections.     Goals of Therapy   Goals     • Specialty Pharmacy General Goal      Maintain adherence of greater than 80%.             Reassessment Plan & Follow-Up  1. Medication Therapy Changes: Continue Ajovy 225 mg SubQ every 28 days, and Ubrelvy 50 mg po once daily at onset of migraine, may repeat in 2 hours if needed. - max of 200 mg/ 24 hrs  2. Additional Plans, Therapy Recommendations, or Therapy Problems to Be Addressed: none  3. Pharmacist to perform regular reassessments no more than (6) months from the previous assessment.  4. Welcome information and patient satisfaction survey to be sent by retail team with patient's initial fill.  5. Care Coordinator to set up future refill outreaches, coordinate prescription delivery, and escalate clinical questions to pharmacist.     Attestation  I attest that the initiated specialty medication(s) are appropriate for the patient based on my assessment.  If the prescribed therapy is at any point deemed not appropriate based on the current or future assessments, a consultation will be initiated with the patient's specialty care provider to determine the best course of action. The revised plan of therapy will be documented along with any additional patient education provided.     Deanna Kingsley, PharmD  10/4/2022  11:29 EDT

## 2022-10-04 NOTE — PROGRESS NOTES
Specialty Pharmacy Refill Coordination Note     Megan is a 42 y.o. female contacted today regarding refills of  Ajovy and Ubrelvy specialty medication(s).    Patient's last Ajovy injection was on 10/2/2022, and next injection due on 10/30/2022.    Reviewed and verified with patient:  Allergies  Meds  Problems       Specialty medication(s) and dose(s) confirmed: yes        Delivery Questions    Flowsheet Row Most Recent Value   Delivery method FedEx   Delivery address correct? Yes   Preferred delivery time? Anytime   Number of medications in delivery 2   Medication being filled and delivered Ubrelvy and Ajovy   Doses left of specialty medications none   Is there any medication that is due not being filled? No   Supplies needed? No supplies needed   Cooler needed? Yes   Do any medications need mixed or dated? No   Copay form of payment Payment plan already set up   Questions or concerns for the pharmacist? No   Are any medications first time fills? Yes  [New Ajovy and Ubrelvy dispense]                 Follow-up: 28 day(s)     Deanna Kingsley, PharmD  10/4/2022

## 2022-10-05 ENCOUNTER — OFFICE VISIT (OUTPATIENT)
Dept: INTERNAL MEDICINE | Facility: CLINIC | Age: 42
End: 2022-10-05

## 2022-10-05 VITALS
TEMPERATURE: 98.2 F | HEIGHT: 60 IN | HEART RATE: 92 BPM | BODY MASS INDEX: 34.36 KG/M2 | DIASTOLIC BLOOD PRESSURE: 70 MMHG | SYSTOLIC BLOOD PRESSURE: 102 MMHG | OXYGEN SATURATION: 98 % | WEIGHT: 175 LBS

## 2022-10-05 DIAGNOSIS — E53.8 FOLIC ACID DEFICIENCY: ICD-10-CM

## 2022-10-05 DIAGNOSIS — Z12.31 ENCOUNTER FOR SCREENING MAMMOGRAM FOR MALIGNANT NEOPLASM OF BREAST: ICD-10-CM

## 2022-10-05 DIAGNOSIS — Z79.890 HORMONE REPLACEMENT THERAPY (HRT): ICD-10-CM

## 2022-10-05 DIAGNOSIS — M50.30 DDD (DEGENERATIVE DISC DISEASE), CERVICAL: ICD-10-CM

## 2022-10-05 DIAGNOSIS — R06.83 SNORING: ICD-10-CM

## 2022-10-05 DIAGNOSIS — E78.1 HYPERTRIGLYCERIDEMIA: Primary | ICD-10-CM

## 2022-10-05 DIAGNOSIS — M51.26 LUMBAR DISCOGENIC PAIN SYNDROME: ICD-10-CM

## 2022-10-05 PROCEDURE — 99214 OFFICE O/P EST MOD 30 MIN: CPT | Performed by: INTERNAL MEDICINE

## 2022-10-05 RX ORDER — TRAMADOL HYDROCHLORIDE 50 MG/1
50 TABLET ORAL EVERY 8 HOURS PRN
Qty: 60 TABLET | Refills: 0 | OUTPATIENT
Start: 2022-10-05 | End: 2022-12-12

## 2022-10-05 RX ORDER — FOLIC ACID 1 MG/1
1 TABLET ORAL DAILY
Qty: 90 TABLET | Refills: 3 | Status: SHIPPED | OUTPATIENT
Start: 2022-10-05
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
## 2022-10-07 DIAGNOSIS — G47.33 OSA (OBSTRUCTIVE SLEEP APNEA): Primary | ICD-10-CM

## 2022-10-07 DIAGNOSIS — E66.01 MORBID OBESITY WITH BMI OF 40.0-44.9, ADULT: ICD-10-CM

## 2022-10-13 ENCOUNTER — TELEPHONE (OUTPATIENT)
Dept: NEUROLOGY | Facility: CLINIC | Age: 42
End: 2022-10-13

## 2022-10-13 DIAGNOSIS — J30.9 ALLERGIC RHINITIS, UNSPECIFIED SEASONALITY, UNSPECIFIED TRIGGER: ICD-10-CM

## 2022-10-13 DIAGNOSIS — M54.2 NECK PAIN: Primary | ICD-10-CM

## 2022-10-13 DIAGNOSIS — M54.50 CHRONIC BILATERAL LOW BACK PAIN WITHOUT SCIATICA: ICD-10-CM

## 2022-10-13 DIAGNOSIS — G89.29 CHRONIC BILATERAL LOW BACK PAIN WITHOUT SCIATICA: ICD-10-CM

## 2022-10-13 RX ORDER — MONTELUKAST SODIUM 10 MG/1
10 TABLET ORAL NIGHTLY
Qty: 30 TABLET | Refills: 3 | OUTPATIENT
Start: 2022-10-13

## 2022-10-13 NOTE — TELEPHONE ENCOUNTER
Provider: LYSSA OLIVO  Caller: JUAN JOSÉ ZIEGLER  Relationship to Patient: SELF  Pharmacy: N/A  Phone Number: [phone]  Reason for Call: PATIENT CALLING TO ADVISE SHE CAN'T DO PT AT HOSPITAL.    WOULD LIKE TO HAVE NEW REFERRAL SENT TO  [facility] [place] [address].    ALSO, LAST REFERRAL ONLY INCLUDED HER NECK.  SHE ADVISING THAT IT SHOULD BE FOR HER NECK AND BACK.    PLEASE ADVISE PATIENT.    THANK YOU

## 2022-10-17 ENCOUNTER — LAB (OUTPATIENT)
Dept: LAB | Facility: HOSPITAL | Age: 42
End: 2022-10-17

## 2022-10-17 DIAGNOSIS — L71.8 ACNE ROSACEA, ERYTHEMATOUS TELANGIECTATIC TYPE: Primary | ICD-10-CM

## 2022-10-17 PROCEDURE — 36415 COLL VENOUS BLD VENIPUNCTURE: CPT

## 2022-10-17 PROCEDURE — 86038 ANTINUCLEAR ANTIBODIES: CPT

## 2022-10-19 LAB — ANA SER QL: NEGATIVE

## 2022-10-20 ENCOUNTER — LAB (OUTPATIENT)
Dept: LAB | Facility: HOSPITAL | Age: 42
End: 2022-10-20

## 2022-10-20 ENCOUNTER — TRANSCRIBE ORDERS (OUTPATIENT)
Dept: LAB | Facility: HOSPITAL | Age: 42
End: 2022-10-20

## 2022-10-20 DIAGNOSIS — J33.9 NOSE POLYP: ICD-10-CM

## 2022-10-20 DIAGNOSIS — J30.1 ALLERGIC RHINITIS DUE TO POLLEN, UNSPECIFIED SEASONALITY: ICD-10-CM

## 2022-10-20 DIAGNOSIS — Z91.018 ALLERGY TO OTHER FOODS: ICD-10-CM

## 2022-10-20 DIAGNOSIS — R94.2 NONSPECIFIC ABNORMAL RESULTS OF PULMONARY SYSTEM FUNCTION STUDY: ICD-10-CM

## 2022-10-20 DIAGNOSIS — J30.89 NON-SEASONAL ALLERGIC RHINITIS, UNSPECIFIED TRIGGER: ICD-10-CM

## 2022-10-20 DIAGNOSIS — J45.40 MODERATE PERSISTENT ASTHMA, UNSPECIFIED WHETHER COMPLICATED: ICD-10-CM

## 2022-10-20 DIAGNOSIS — R94.2 NONSPECIFIC ABNORMAL RESULTS OF PULMONARY SYSTEM FUNCTION STUDY: Primary | ICD-10-CM

## 2022-10-20 PROCEDURE — 85025 COMPLETE CBC W/AUTO DIFF WBC: CPT

## 2022-10-20 PROCEDURE — 36415 COLL VENOUS BLD VENIPUNCTURE: CPT

## 2022-10-20 PROCEDURE — 82785 ASSAY OF IGE: CPT

## 2022-10-21 LAB
BASOPHILS # BLD AUTO: 0.05 10*3/MM3 (ref 0–0.2)
BASOPHILS NFR BLD AUTO: 0.8 % (ref 0–1.5)
DEPRECATED RDW RBC AUTO: 38 FL (ref 37–54)
EOSINOPHIL # BLD AUTO: 0.13 10*3/MM3 (ref 0–0.4)
EOSINOPHIL NFR BLD AUTO: 2 % (ref 0.3–6.2)
ERYTHROCYTE [DISTWIDTH] IN BLOOD BY AUTOMATED COUNT: 12.5 % (ref 12.3–15.4)
HCT VFR BLD AUTO: 40.8 % (ref 34–46.6)
HGB BLD-MCNC: 13.7 G/DL (ref 12–15.9)
IMM GRANULOCYTES # BLD AUTO: 0.02 10*3/MM3 (ref 0–0.05)
IMM GRANULOCYTES NFR BLD AUTO: 0.3 % (ref 0–0.5)
LYMPHOCYTES # BLD AUTO: 2.33 10*3/MM3 (ref 0.7–3.1)
LYMPHOCYTES NFR BLD AUTO: 35.1 % (ref 19.6–45.3)
MCH RBC QN AUTO: 28.2 PG (ref 26.6–33)
MCHC RBC AUTO-ENTMCNC: 33.6 G/DL (ref 31.5–35.7)
MCV RBC AUTO: 84.1 FL (ref 79–97)
MONOCYTES # BLD AUTO: 0.37 10*3/MM3 (ref 0.1–0.9)
MONOCYTES NFR BLD AUTO: 5.6 % (ref 5–12)
NEUTROPHILS NFR BLD AUTO: 3.73 10*3/MM3 (ref 1.7–7)
NEUTROPHILS NFR BLD AUTO: 56.2 % (ref 42.7–76)
NRBC BLD AUTO-RTO: 0 /100 WBC (ref 0–0.2)
PLATELET # BLD AUTO: 348 10*3/MM3 (ref 140–450)
PMV BLD AUTO: 10.7 FL (ref 6–12)
RBC # BLD AUTO: 4.85 10*6/MM3 (ref 3.77–5.28)
WBC NRBC COR # BLD: 6.63 10*3/MM3 (ref 3.4–10.8)

## 2022-10-31 LAB — IGE SERPL-ACNC: 168 IU/ML (ref 6–495)

## 2022-11-28 RX ORDER — DILTIAZEM HYDROCHLORIDE 180 MG/1
180 CAPSULE, COATED, EXTENDED RELEASE ORAL DAILY
Qty: 30 CAPSULE | Refills: 3 | OUTPATIENT
Start: 2022-11-28

## 2022-12-02 ENCOUNTER — SPECIALTY PHARMACY (OUTPATIENT)
Dept: ONCOLOGY | Facility: HOSPITAL | Age: 42
End: 2022-12-02

## 2022-12-02 NOTE — PROGRESS NOTES
Specialty Pharmacy Refill Coordination Note     Megan is a 42 y.o. female contacted today regarding refills of  Ajovy and Nurtec specialty medication(s). Patient is due for injection on 12/6.      Reviewed and verified with patient:       Specialty medication(s) and dose(s) confirmed: yes    Refill Questions    Flowsheet Row Most Recent Value   Changes to allergies? No   Changes to medications? No   New conditions since last clinic visit No   Unplanned office visit, urgent care, ED, or hospital admission in the last 4 weeks  No   How does patient/caregiver feel medication is working? Very good   Financial problems or insurance changes  No   Since the previous refill, were any specialty medication doses or scheduled injections missed or delayed?  Yes   If yes, please provide the amount Patient was due for injection on 11/27 but will not take until 12/6. She will be 9 days late   Why were doses missed? Patient just lost her  and has been preoccupied with that   Does this patient require a clinical escalation to a pharmacist? Yes          Delivery Questions    Flowsheet Row Most Recent Value   Delivery method FedEx   Delivery address correct? Yes   Preferred delivery time? Anytime   Number of medications in delivery 2   Medication being filled and delivered Ubrelvy and Ajovy   Doses left of specialty medications Ajovy=0   Is there any medication that is due not being filled? No   Supplies needed? No supplies needed   Cooler needed? Yes   Do any medications need mixed or dated? No   Copay form of payment Payment plan already set up   Questions or concerns for the pharmacist? No   Are any medications first time fills? No                 Follow-up:  28 day(s)     Lacy North, Pharmacy Technician  Specialty Pharmacy Technician

## 2022-12-12 ENCOUNTER — APPOINTMENT (OUTPATIENT)
Dept: CT IMAGING | Facility: HOSPITAL | Age: 42
End: 2022-12-12

## 2022-12-12 ENCOUNTER — HOSPITAL ENCOUNTER (EMERGENCY)
Facility: HOSPITAL | Age: 42
Discharge: HOME | End: 2022-12-12
Attending: EMERGENCY MEDICINE | Admitting: EMERGENCY MEDICINE

## 2022-12-12 VITALS
HEIGHT: 61 IN | TEMPERATURE: 98.4 F | DIASTOLIC BLOOD PRESSURE: 68 MMHG | OXYGEN SATURATION: 96 % | SYSTOLIC BLOOD PRESSURE: 107 MMHG | HEART RATE: 92 BPM | WEIGHT: 167 LBS | BODY MASS INDEX: 31.53 KG/M2 | RESPIRATION RATE: 18 BRPM

## 2022-12-12 DIAGNOSIS — N23 RENAL COLIC ON RIGHT SIDE: Primary | ICD-10-CM

## 2022-12-12 DIAGNOSIS — K21.00 GASTROESOPHAGEAL REFLUX DISEASE WITH ESOPHAGITIS WITHOUT HEMORRHAGE: ICD-10-CM

## 2022-12-12 DIAGNOSIS — R10.9 RIGHT FLANK PAIN: ICD-10-CM

## 2022-12-12 LAB
ALBUMIN SERPL-MCNC: 4.6 G/DL (ref 3.5–5.2)
ALBUMIN/GLOB SERPL: 1.5 G/DL
ALP SERPL-CCNC: 60 U/L (ref 39–117)
ALT SERPL W P-5'-P-CCNC: 24 U/L (ref 1–33)
ANION GAP SERPL CALCULATED.3IONS-SCNC: 14 MMOL/L (ref 5–15)
AST SERPL-CCNC: 21 U/L (ref 1–32)
BACTERIA UR QL AUTO: ABNORMAL /HPF
BASOPHILS # BLD AUTO: 0.03 10*3/MM3 (ref 0–0.2)
BASOPHILS NFR BLD AUTO: 0.4 % (ref 0–1.5)
BILIRUB SERPL-MCNC: 0.4 MG/DL (ref 0–1.2)
BILIRUB UR QL STRIP: NEGATIVE
BUN SERPL-MCNC: 13 MG/DL (ref 6–20)
BUN/CREAT SERPL: 17.6 (ref 7–25)
CALCIUM SPEC-SCNC: 9 MG/DL (ref 8.6–10.5)
CHLORIDE SERPL-SCNC: 104 MMOL/L (ref 98–107)
CLARITY UR: ABNORMAL
CO2 SERPL-SCNC: 21 MMOL/L (ref 22–29)
COLOR UR: YELLOW
CREAT SERPL-MCNC: 0.74 MG/DL (ref 0.57–1)
DEPRECATED RDW RBC AUTO: 44.3 FL (ref 37–54)
EGFRCR SERPLBLD CKD-EPI 2021: 105 ML/MIN/1.73
EOSINOPHIL # BLD AUTO: 0.06 10*3/MM3 (ref 0–0.4)
EOSINOPHIL NFR BLD AUTO: 0.7 % (ref 0.3–6.2)
ERYTHROCYTE [DISTWIDTH] IN BLOOD BY AUTOMATED COUNT: 13.5 % (ref 12.3–15.4)
GLOBULIN UR ELPH-MCNC: 3 GM/DL
GLUCOSE SERPL-MCNC: 105 MG/DL (ref 65–99)
GLUCOSE UR STRIP-MCNC: NEGATIVE MG/DL
HCT VFR BLD AUTO: 43.2 % (ref 34–46.6)
HGB BLD-MCNC: 13.8 G/DL (ref 12–15.9)
HGB UR QL STRIP.AUTO: NEGATIVE
HOLD SPECIMEN: NORMAL
HYALINE CASTS UR QL AUTO: ABNORMAL /LPF
IMM GRANULOCYTES # BLD AUTO: 0.02 10*3/MM3 (ref 0–0.05)
IMM GRANULOCYTES NFR BLD AUTO: 0.2 % (ref 0–0.5)
KETONES UR QL STRIP: ABNORMAL
LEUKOCYTE ESTERASE UR QL STRIP.AUTO: NEGATIVE
LIPASE SERPL-CCNC: 30 U/L (ref 13–60)
LYMPHOCYTES # BLD AUTO: 2.2 10*3/MM3 (ref 0.7–3.1)
LYMPHOCYTES NFR BLD AUTO: 26.4 % (ref 19.6–45.3)
MCH RBC QN AUTO: 28.5 PG (ref 26.6–33)
MCHC RBC AUTO-ENTMCNC: 31.9 G/DL (ref 31.5–35.7)
MCV RBC AUTO: 89.3 FL (ref 79–97)
MONOCYTES # BLD AUTO: 0.4 10*3/MM3 (ref 0.1–0.9)
MONOCYTES NFR BLD AUTO: 4.8 % (ref 5–12)
NEUTROPHILS NFR BLD AUTO: 5.63 10*3/MM3 (ref 1.7–7)
NEUTROPHILS NFR BLD AUTO: 67.5 % (ref 42.7–76)
NITRITE UR QL STRIP: NEGATIVE
NRBC BLD AUTO-RTO: 0 /100 WBC (ref 0–0.2)
PH UR STRIP.AUTO: 5.5 [PH] (ref 5–8)
PLATELET # BLD AUTO: 332 10*3/MM3 (ref 140–450)
PMV BLD AUTO: 10.1 FL (ref 6–12)
POTASSIUM SERPL-SCNC: 3.9 MMOL/L (ref 3.5–5.2)
PROT SERPL-MCNC: 7.6 G/DL (ref 6–8.5)
PROT UR QL STRIP: NEGATIVE
RBC # BLD AUTO: 4.84 10*6/MM3 (ref 3.77–5.28)
RBC # UR STRIP: ABNORMAL /HPF
REF LAB TEST METHOD: ABNORMAL
SODIUM SERPL-SCNC: 139 MMOL/L (ref 136–145)
SP GR UR STRIP: 1.02 (ref 1–1.03)
SQUAMOUS #/AREA URNS HPF: ABNORMAL /HPF
UROBILINOGEN UR QL STRIP: ABNORMAL
WBC # UR STRIP: ABNORMAL /HPF
WBC NRBC COR # BLD: 8.34 10*3/MM3 (ref 3.4–10.8)
WHOLE BLOOD HOLD COAG: NORMAL
WHOLE BLOOD HOLD SPECIMEN: NORMAL

## 2022-12-12 PROCEDURE — 80053 COMPREHEN METABOLIC PANEL: CPT | Performed by: EMERGENCY MEDICINE

## 2022-12-12 PROCEDURE — 83690 ASSAY OF LIPASE: CPT | Performed by: EMERGENCY MEDICINE

## 2022-12-12 PROCEDURE — 85025 COMPLETE CBC W/AUTO DIFF WBC: CPT | Performed by: EMERGENCY MEDICINE

## 2022-12-12 PROCEDURE — 96375 TX/PRO/DX INJ NEW DRUG ADDON: CPT

## 2022-12-12 PROCEDURE — 36415 COLL VENOUS BLD VENIPUNCTURE: CPT

## 2022-12-12 PROCEDURE — 25010000002 MORPHINE PER 10 MG: Performed by: EMERGENCY MEDICINE

## 2022-12-12 PROCEDURE — 25010000002 KETOROLAC TROMETHAMINE PER 15 MG: Performed by: PHYSICIAN ASSISTANT

## 2022-12-12 PROCEDURE — 74176 CT ABD & PELVIS W/O CONTRAST: CPT

## 2022-12-12 PROCEDURE — 25010000002 ONDANSETRON PER 1 MG: Performed by: PHYSICIAN ASSISTANT

## 2022-12-12 PROCEDURE — 96374 THER/PROPH/DIAG INJ IV PUSH: CPT

## 2022-12-12 PROCEDURE — 99283 EMERGENCY DEPT VISIT LOW MDM: CPT

## 2022-12-12 PROCEDURE — 81001 URINALYSIS AUTO W/SCOPE: CPT | Performed by: EMERGENCY MEDICINE

## 2022-12-12 RX ORDER — HYDROCODONE BITARTRATE AND ACETAMINOPHEN 7.5; 325 MG/1; MG/1
1 TABLET ORAL EVERY 6 HOURS PRN
Qty: 12 TABLET | Refills: 0 | Status: SHIPPED | OUTPATIENT
Start: 2022-12-12 | End: 2022-12-21 | Stop reason: SDUPTHER

## 2022-12-12 RX ORDER — MORPHINE SULFATE 4 MG/ML
4 INJECTION, SOLUTION INTRAMUSCULAR; INTRAVENOUS ONCE
Status: COMPLETED | OUTPATIENT
Start: 2022-12-12 | End: 2022-12-12

## 2022-12-12 RX ORDER — SODIUM CHLORIDE 9 MG/ML
10 INJECTION INTRAVENOUS AS NEEDED
Status: DISCONTINUED | OUTPATIENT
Start: 2022-12-12 | End: 2022-12-12 | Stop reason: HOSPADM

## 2022-12-12 RX ORDER — KETOROLAC TROMETHAMINE 30 MG/ML
15 INJECTION, SOLUTION INTRAMUSCULAR; INTRAVENOUS ONCE
Status: COMPLETED | OUTPATIENT
Start: 2022-12-12 | End: 2022-12-12

## 2022-12-12 RX ORDER — KETOROLAC TROMETHAMINE 10 MG/1
10 TABLET, FILM COATED ORAL EVERY 6 HOURS PRN
Qty: 12 TABLET | Refills: 0 | Status: SHIPPED | OUTPATIENT
Start: 2022-12-12 | End: 2022-12-21 | Stop reason: SDUPTHER

## 2022-12-12 RX ORDER — ONDANSETRON 2 MG/ML
4 INJECTION INTRAMUSCULAR; INTRAVENOUS ONCE
Status: COMPLETED | OUTPATIENT
Start: 2022-12-12 | End: 2022-12-12

## 2022-12-12 RX ORDER — ONDANSETRON 4 MG/1
4 TABLET, ORALLY DISINTEGRATING ORAL EVERY 8 HOURS PRN
Qty: 12 TABLET | Refills: 0 | Status: SHIPPED | OUTPATIENT
Start: 2022-12-12 | End: 2022-12-21 | Stop reason: SDUPTHER

## 2022-12-12 RX ADMIN — KETOROLAC TROMETHAMINE 15 MG: 30 INJECTION, SOLUTION INTRAMUSCULAR; INTRAVENOUS at 09:21

## 2022-12-12 RX ADMIN — MORPHINE SULFATE 4 MG: 4 INJECTION, SOLUTION INTRAMUSCULAR; INTRAVENOUS at 11:01

## 2022-12-12 RX ADMIN — SODIUM CHLORIDE 1000 ML: 9 INJECTION, SOLUTION INTRAVENOUS at 09:22

## 2022-12-12 RX ADMIN — ONDANSETRON 4 MG: 2 INJECTION INTRAMUSCULAR; INTRAVENOUS at 09:21

## 2022-12-20 ENCOUNTER — APPOINTMENT (OUTPATIENT)
Dept: MAMMOGRAPHY | Facility: HOSPITAL | Age: 42
End: 2022-12-20

## 2022-12-21 ENCOUNTER — OFFICE VISIT (OUTPATIENT)
Dept: UROLOGY | Facility: CLINIC | Age: 42
End: 2022-12-21

## 2022-12-21 VITALS — BODY MASS INDEX: 31.55 KG/M2 | HEIGHT: 61 IN

## 2022-12-21 DIAGNOSIS — N20.1 RIGHT URETERAL STONE: Primary | ICD-10-CM

## 2022-12-21 DIAGNOSIS — R10.9 RIGHT FLANK PAIN: ICD-10-CM

## 2022-12-21 DIAGNOSIS — K21.00 GASTROESOPHAGEAL REFLUX DISEASE WITH ESOPHAGITIS WITHOUT HEMORRHAGE: ICD-10-CM

## 2022-12-21 DIAGNOSIS — N23 RENAL COLIC ON RIGHT SIDE: ICD-10-CM

## 2022-12-21 LAB
BILIRUB BLD-MCNC: NEGATIVE MG/DL
CLARITY, POC: CLEAR
COLOR UR: ABNORMAL
EXPIRATION DATE: ABNORMAL
GLUCOSE UR STRIP-MCNC: NEGATIVE MG/DL
KETONES UR QL: NEGATIVE
LEUKOCYTE EST, POC: NEGATIVE
Lab: ABNORMAL
NITRITE UR-MCNC: NEGATIVE MG/ML
PH UR: 6 [PH] (ref 5–8)
PROT UR STRIP-MCNC: ABNORMAL MG/DL
RBC # UR STRIP: NEGATIVE /UL
SP GR UR: 1.02 (ref 1–1.03)
UROBILINOGEN UR QL: NORMAL

## 2022-12-21 PROCEDURE — 87086 URINE CULTURE/COLONY COUNT: CPT | Performed by: STUDENT IN AN ORGANIZED HEALTH CARE EDUCATION/TRAINING PROGRAM

## 2022-12-21 PROCEDURE — 99204 OFFICE O/P NEW MOD 45 MIN: CPT | Performed by: STUDENT IN AN ORGANIZED HEALTH CARE EDUCATION/TRAINING PROGRAM

## 2022-12-21 RX ORDER — HYDROCODONE BITARTRATE AND ACETAMINOPHEN 7.5; 325 MG/1; MG/1
1 TABLET ORAL EVERY 6 HOURS PRN
Qty: 20 TABLET | Refills: 0 | Status: ON HOLD | OUTPATIENT
Start: 2022-12-21 | End: 2022-12-27 | Stop reason: SDUPTHER

## 2022-12-21 RX ORDER — ONDANSETRON 4 MG/1
4 TABLET, ORALLY DISINTEGRATING ORAL EVERY 8 HOURS PRN
Qty: 20 TABLET | Refills: 0 | Status: SHIPPED | OUTPATIENT
Start: 2022-12-21

## 2022-12-21 RX ORDER — KETOROLAC TROMETHAMINE 10 MG/1
10 TABLET, FILM COATED ORAL EVERY 6 HOURS PRN
Qty: 15 TABLET | Refills: 0 | Status: SHIPPED | OUTPATIENT
Start: 2022-12-21

## 2022-12-21 NOTE — PROGRESS NOTES
Follow Up Office Visit      Patient Name: Megan Post  : 1980   MRN: [id number]     Chief Complaint:    Chief Complaint   Patient presents with   • Renal Colic, Right       Referring Provider: Quique Stoddard MD    History of Present Illness: Megan Post is a 42 y.o. female who presents today for follow up of  ***    Subjective      Review of System: Review of Systems   Genitourinary: Positive for difficulty urinating, flank pain and pelvic pain.      I have reviewed the ROS documented by my clinical staff, I have updated appropriately and I agree. Felicia Hardy MA    I have reviewed and the following portions of the patient's history were updated as appropriate: past family history, past medical history, past social history, past surgical history and problem list.    Medications:     Current Outpatient Medications:   •  albuterol (PROVENTIL) (2.5 MG/3ML) 0.083% nebulizer solution, Take 2.5 mg by nebulization 4 (Four) Times a Day As Needed for Wheezing., Disp: 90 each, Rfl: 5  •  ALPRAZolam (XANAX) 1 MG tablet, TAKE 1/2 TO 1 TABLET BY MOUTH UP TO TWICE DAILY FOR SEVERE ANXIETY AND PANIC ATTACKS, Disp: , Rfl:   •  budesonide (PULMICORT) 0.5 MG/2ML nebulizer solution, , Disp: , Rfl:   •  budesonide-formoterol (SYMBICORT) 160-4.5 MCG/ACT inhaler, Inhale 2 puffs 2 (Two) Times a Day., Disp: , Rfl:   •  buPROPion XL (WELLBUTRIN XL) 150 MG 24 hr tablet, Take 150 mg by mouth Every Morning., Disp: , Rfl:   •  cetirizine (zyrTEC) 10 MG tablet, Take 1 tablet by mouth Daily., Disp: , Rfl: 1  •  COLLAGEN PO, Take 1 tablet by mouth Daily., Disp: , Rfl:   •  cyclobenzaprine (FLEXERIL) 10 MG tablet, Take 0.5-1 tablets by mouth 3 (Three) Times a Day As Needed for Muscle Spasms., Disp: 90 tablet, Rfl: 1  •  dicyclomine (Bentyl) 10 MG capsule, Take 1 capsule by mouth 4 (Four) Times a Day Before Meals & at Bedtime As Needed (abdominal cramping diarrhea)., Disp: 120 capsule, Rfl: 2  •  EPIPEN 2-JAREN 0.3 MG/0.3ML  solution auto-injector injection, U UTD, Disp: , Rfl: 6  •  estradiol (ESTRACE VAGINAL) 0.1 MG/GM vaginal cream, Insert 1 gm intravaginally 1-2 times each week, Disp: 1 each, Rfl: 3  •  estradiol (Estrace) 1 MG tablet, Take 1.5 tablets po qd., Disp: 45 tablet, Rfl: 11  •  fenofibrate 160 MG tablet, Take 160 mg by mouth Daily With Dinner., Disp: , Rfl:   •  fluticasone (FLONASE) 50 MCG/ACT nasal spray, 2 sprays by Each Nare route Daily. Shake liquid, Disp: , Rfl:   •  folic acid (FOLVITE) 1 MG tablet, Take 1 tablet by mouth Daily., Disp: 90 tablet, Rfl: 3  •  Fremanezumab-vfrm 225 MG/1.5ML solution auto-injector, Inject 225 mg under the skin into the appropriate area as directed Every 28 (Twenty-Eight) Days., Disp: 1.5 mL, Rfl: 11  •  Gentle Laxative 5 MG EC tablet, TAKE 1 TABLET BY MOUTH ONCE DAILY AS NEEDED FOR CONSTIPATION, Disp: , Rfl:   •  HYDROcodone-acetaminophen (NORCO) 7.5-325 MG per tablet, Take 1 tablet by mouth Every 6 (Six) Hours As Needed for Moderate Pain., Disp: 12 tablet, Rfl: 0  •  ipratropium-albuterol (DUO-NEB) 0.5-2.5 mg/3 ml nebulizer, , Disp: , Rfl:   •  ketorolac (TORADOL) 10 MG tablet, Take 1 tablet by mouth Every 6 (Six) Hours As Needed for Moderate Pain., Disp: 12 tablet, Rfl: 0  •  lubiprostone (AMITIZA) 8 MCG capsule, Take 8 mcg by mouth 2 (Two) Times a Day As Needed., Disp: , Rfl:   •  Magnesium 250 MG tablet, TK 1-2 TS PO D FOR CONSTIPATION IF TAKING MIRALAX IS NOT EFFECTIVE, Disp: , Rfl:   •  montelukast (SINGULAIR) 10 MG tablet, TAKE 1 TABLET BY MOUTH EVERY NIGHT, Disp: 30 tablet, Rfl: 3  •  Multiple Vitamin (MULTI-VITAMIN DAILY PO), Take  by mouth Daily., Disp: , Rfl:   •  omeprazole (priLOSEC) 40 MG capsule, Take 40 mg by mouth 2 (two) times a day., Disp: , Rfl:   •  ondansetron ODT (ZOFRAN-ODT) 4 MG disintegrating tablet, Place 1 tablet on the tongue Every 8 (Eight) Hours As Needed for Nausea or Vomiting., Disp: 12 tablet, Rfl: 0  •  traZODone (DESYREL) 150 MG tablet, Take 1 tablet  by mouth Every Night., Disp: , Rfl: 3  •  ubrogepant 50 MG tablet, Take 1 tablet by mouth at onset of migraine, may repeat in 2 hours.  Max of 200 mg/ 24 hrs, Disp: 10 tablet, Rfl: 11  •  Xhance 93 MCG/ACT Exhaler Suspension, USE ONE SPRAY IN EACH NOSTRIL EVERY DAY AS NEEDED FOR CONGESTION, Disp: 16 mL, Rfl: 0    Allergies:   Allergies   Allergen Reactions   • Adhesive Tape Hives   • Latex Hives   • Sulfa Antibiotics Hives   • Biaxin [Clarithromycin] Nausea Only   • Codeine Itching   • Penicillins Nausea Only   • Ciprofloxacin Unknown - High Severity   • Nuts Unknown - High Severity     Red heated face       IPSS Questionnaire (AUA-7):  Over the past month…    1)  Incomplete Emptying:       How often have you had a sensation of not emptying you had the sensation of not emptying your bladder completely after you finished urinating?  {Ratin}   2)  Frequency:       How often have you had the urinate again less than two hours after you finished urinating?  {Ratin}   3)  Intermittency:       How often have you found you stopped and started again several times when you urinated?   {Ratin}   4) Urgency:      How often have you found it difficult to postpone urination?  {Ratin}   5) Weak Stream:      How often have you had a weak urinary stream?  {Ratin}   6) Straining:       How often have you had to push or strain to begin urination?  {Ratin}   7) Nocturia:      How many times did you most typically get up to urinate from the time you went to bed at night until the time you got up in the morning?  {Ratin}   Total Score:  {0-35:46216}   The International Prostate Symptom Score (IPSS) is used to screen, diagnose, track symptoms of benign prostatic hyperplasia (BPH).   0-7 (Mild Symptoms) 8-19 (Moderate) 20-35 (Severe)   Quality of Life (QoL):  If you were to spend the rest of your life with your urinary condition just the way it is now, how would you feel about that?  "{Ratin}   Urine Leakage (Incontinence) {Ratin}     Sexual Health Inventory for Men (NUPUR)   Over the past 6 months:     1. How do you rate your confidence that you could get and keep an erection?  {NUPUR Score 1:79159}   2. When you had erections with sexual  stimulation, how often were your erections hard enough for penetration (entering your partner)?  {NUPUR Scores 2:16029}   3. During sexual intercourse, how often were you able to maintain your erection after you had penetrated (entered) your partner?  {NUPUR Scores 3:53495}   4. During sexual intercourse, how difficult was it to maintain your erection to completion of intercourse?  {NUPUR Scores 4:38347}   5. When you attempted sexual intercourse, how often was it satisfactory for you?  {NUPUR Scores 5:47071}    Total Score: {NUPUR Total:29127}   The Sexual Health Inventory for Men further classifies ED severity with the following breakpoints:   1-7 (Severe ED) 8-11 (Moderate ED) 12-16 (Mild to Moderate ED) 17-21 (Mild ED)      Post void residual bladder scan:   ***     Objective     Physical Exam:   Vital Signs:   Vitals:    22 1421   Height: 154.9 cm (61\")     Body mass index is 31.55 kg/m².     Physical Exam    Labs:   Brief Urine Lab Results  (Last result in the past 365 days)      Color   Clarity   Blood   Leuk Est   Nitrite   Protein   CREAT   Urine HCG        22 1059 Yellow   Cloudy   Negative   Negative   Negative   Negative                      Lab Results   Component Value Date    GLUCOSE 105 (H) 2022    CALCIUM 9.0 2022     2022    K 3.9 2022    CO2 21.0 (L) 2022     2022    BUN 13 2022    CREATININE 0.74 2022    EGFRIFAFRI >60 2022    EGFRIFNONA >60 2022    BCR 17.6 2022    ANIONGAP 14.0 2022       Lab Results   Component Value Date    WBC 8.34 2022    HGB 13.8 2022    HCT 43.2 2022    MCV 89.3 2022     2022 " "      Images:   CT Abdomen Pelvis Without Contrast    Result Date: 12/12/2022  2 x 3 mm stone within the distal right ureter at the UVJ. No significant right-sided hydroureteronephrosis at this time.  This report was finalized on 12/12/2022 11:21 AM by Quique Silvestre MD.      Home Sleep Study    Result Date: 10/26/2022         1.  Mild obstructive sleep apnea.      2.  Nocturnal hypoxemia.      3.  Snoring.     RECOMMENDATIONS: 1.  Available data is suggestive of mild obstructive sleep apnea. Consider auto CPAP trial 4 to 20 cm of water pressure and CPAP download on therapy to make sure we are not missing any other pathology and to determine the adequacy of the pressure settings.  2.  Discuss good sleep hygiene habits, including but not limited to fixed wake up and sleep time, avoid alcohol 4 hours before sleep and not driving while drowsy. Maintain ideal body weight.      I have conducted an epoch by epoch review of the raw data and agree with the interpretation. Electronically signed by:  Saqib Lewis MD 10/26/22 08:31 EDT       Measures:   Tobacco:   Megan Post  reports that she quit smoking about 18 months ago. Her smoking use included cigarettes. She started smoking about 19 years ago. She has a 2.50 pack-year smoking history. She has never used smokeless tobacco.. I have educated her on the risk of diseases from using tobacco products such as {Tobacco Cessation Diseases:59898::\"cancer\",\"COPD\",\"heart disease\"}.     I advised her to quit and she is {Willing/Not Willing to Quit Tobacco Products:72750}.    I spent {Time Spent Tobacco :17055} minutes counseling the patient.           Urine Incontinence: ( NOUI)  ***     Assessment / Plan      Assessment/Plan:   42 y.o. female who presented today for follow up of ***    There are no diagnoses linked to this encounter.       Follow Up:   No follow-ups on file.    I spent approximately *** minutes providing clinical care for this patient; including review of " patient's chart and provider documentation, face to face time spent with patient in examination room (obtaining history, performing physical exam, discussing diagnosis and management options), placing orders, and completing patient documentation.     Angelo Silvestre MD  [facility] [place]

## 2022-12-21 NOTE — PROGRESS NOTES
Office Note Kidney Stone      Patient Name: Megan Post  : 1980   MRN: [id number]     Chief Complaint: History of Kidney Stones.    Chief Complaint   Patient presents with   • Renal Colic, Right       Referring Provider: Quique Stoddard MD    History of Present Illness: eMgan Post is a 42 y.o. female who presents today for right ureteral stone.  The patient has past medical history of bipolar 1, constipation, depression, diverticulitis, fibromyalgia, lumbar radiculopathy, who was recently evaluated in the emergency department 2022 with right-sided flank pain associated with nausea and vomiting.  CT scan for evaluation at that time demonstrated a small 2 to 3 mm right distal ureteral stone with minimal to mild right-sided hydronephrosis at that time.  Patient has never had a kidney stone episode before.  In the clinic today she is crying in pain.  She states she ran out of pain medications and nausea medications.  She is requesting ASAP treatment for this stone.  She has been urinating through a strainer and has not seen her stone come out.  Her pain begins at the right flank and radiates to her right lower abdomen.  She denies fevers or chills.  Denies gross hematuria.    Creatinine 2022 was preserved 0.74, she had no significant leukocytosis at that time.    Urinalysis today is positive for trace protein, otherwise negative.  We will send urine culture prior to surgery.    The patient will be added on for definitive right-sided stone treatment 2022.  I will refill her pain medications and nausea medications.  She was encouraged to go to the emergency department as needed if refractory symptoms over the weekend.      Subjective      Review of System: Review of Systems   Genitourinary: Positive for difficulty urinating, dysuria, flank pain and pelvic pain.      I have reviewed the ROS documented by my clinical staff, I have updated appropriately and I agree. Angelo SOSA  MD [place]    Past Medical History:   Past Medical History:   Diagnosis Date   • Abdominal pain    • Abnormal breast exam    • Abnormal Pap smear of cervix    • Achilles tendinitis    • Acute sinusitis    • Anxiety    • Arthritis    • Asthma    • Tavera's syndrome    • Bipolar 1 disorder (HCC)    • Bowel trouble    • Breast cyst    • Colon polyps    • Constipation    • Depression    • Diverticulitis    • Diverticulitis of colon    • Endometriosis    • Eustachian tube dysfunction    • Extremity pain    • Fatty liver    • Female pelvic pain    • Fibromyalgia    • Gastric ulcer    • H/O bladder infections    • History of rashes as a child    • Irritable bowel syndrome    • Low back pain    • Lumbar radiculopathy    • Menopausal symptoms    • Muscle weakness    • Sexual problems    • Spinal stenosis of lumbar region        Past Surgical History:   Past Surgical History:   Procedure Laterality Date   •  SECTION     • CHOLECYSTECTOMY     • COLONOSCOPY  2017   • HYSTERECTOMY      endometriosis   • NASAL ENDOSCOPY     • OOPHORECTOMY Bilateral    • OTHER SURGICAL HISTORY      Laparoscopy (diagnostic) gynecologic with biopsy   • SHOULDER SURGERY     • UPPER GASTROINTESTINAL ENDOSCOPY  2019       Family History:   Family History   Problem Relation Age of Onset   • Liver disease Mother    • Diabetes Mother    • Hyperlipidemia Mother    • Hypertension Mother    • Thyroid disease Mother    • Arthritis Father    • Mental illness Father    • Migraines Sister    • Lung cancer Maternal Grandmother    • Cancer Maternal Grandfather         not certain which type, abdominal or pelvic   • Cancer Paternal Grandmother    • Tuberculosis Paternal Grandmother    • Liver cancer Paternal Grandfather    • Mental illness Daughter    • No Known Problems Son    • Stroke Other    • Diabetes Other    • Hyperlipidemia Other    • Hypertension Other    • Mental illness Other    • Migraines Other    • Breast cancer Neg Hx    • Endometrial  cancer Neg Hx    • Ovarian cancer Neg Hx        Social History:   Social History     Socioeconomic History   • Marital status: Single   Tobacco Use   • Smoking status: Former     Packs/day: 0.25     Years: 10.00     Pack years: 2.50     Types: Cigarettes     Start date: 2003     Quit date: 2021     Years since quittin.5   • Smokeless tobacco: Never   • Tobacco comments:     was a social smoker, did not smoke daily   Vaping Use   • Vaping Use: Never used   Substance and Sexual Activity   • Alcohol use: No   • Drug use: Never   • Sexual activity: Yes     Partners: Male     Birth control/protection: Surgical       Medications:     Current Outpatient Medications:   •  albuterol (PROVENTIL) (2.5 MG/3ML) 0.083% nebulizer solution, Take 2.5 mg by nebulization 4 (Four) Times a Day As Needed for Wheezing., Disp: 90 each, Rfl: 5  •  ALPRAZolam (XANAX) 1 MG tablet, TAKE 1/2 TO 1 TABLET BY MOUTH UP TO TWICE DAILY FOR SEVERE ANXIETY AND PANIC ATTACKS, Disp: , Rfl:   •  budesonide (PULMICORT) 0.5 MG/2ML nebulizer solution, , Disp: , Rfl:   •  budesonide-formoterol (SYMBICORT) 160-4.5 MCG/ACT inhaler, Inhale 2 puffs 2 (Two) Times a Day., Disp: , Rfl:   •  buPROPion XL (WELLBUTRIN XL) 150 MG 24 hr tablet, Take 150 mg by mouth Every Morning., Disp: , Rfl:   •  cetirizine (zyrTEC) 10 MG tablet, Take 1 tablet by mouth Daily., Disp: , Rfl: 1  •  COLLAGEN PO, Take 1 tablet by mouth Daily., Disp: , Rfl:   •  cyclobenzaprine (FLEXERIL) 10 MG tablet, Take 0.5-1 tablets by mouth 3 (Three) Times a Day As Needed for Muscle Spasms., Disp: 90 tablet, Rfl: 1  •  dicyclomine (Bentyl) 10 MG capsule, Take 1 capsule by mouth 4 (Four) Times a Day Before Meals & at Bedtime As Needed (abdominal cramping diarrhea)., Disp: 120 capsule, Rfl: 2  •  EPIPEN 2-JAREN 0.3 MG/0.3ML solution auto-injector injection, U UTD, Disp: , Rfl: 6  •  estradiol (ESTRACE VAGINAL) 0.1 MG/GM vaginal cream, Insert 1 gm intravaginally 1-2 times each week, Disp: 1  each, Rfl: 3  •  estradiol (Estrace) 1 MG tablet, Take 1.5 tablets po qd., Disp: 45 tablet, Rfl: 11  •  fenofibrate 160 MG tablet, Take 160 mg by mouth Daily With Dinner., Disp: , Rfl:   •  fluticasone (FLONASE) 50 MCG/ACT nasal spray, 2 sprays by Each Nare route Daily. Shake liquid, Disp: , Rfl:   •  folic acid (FOLVITE) 1 MG tablet, Take 1 tablet by mouth Daily., Disp: 90 tablet, Rfl: 3  •  Fremanezumab-vfrm 225 MG/1.5ML solution auto-injector, Inject 225 mg under the skin into the appropriate area as directed Every 28 (Twenty-Eight) Days., Disp: 1.5 mL, Rfl: 11  •  Gentle Laxative 5 MG EC tablet, TAKE 1 TABLET BY MOUTH ONCE DAILY AS NEEDED FOR CONSTIPATION, Disp: , Rfl:   •  HYDROcodone-acetaminophen (NORCO) 7.5-325 MG per tablet, Take 1 tablet by mouth Every 6 (Six) Hours As Needed for Moderate Pain., Disp: 20 tablet, Rfl: 0  •  ipratropium-albuterol (DUO-NEB) 0.5-2.5 mg/3 ml nebulizer, , Disp: , Rfl:   •  ketorolac (TORADOL) 10 MG tablet, Take 1 tablet by mouth Every 6 (Six) Hours As Needed for Moderate Pain., Disp: 15 tablet, Rfl: 0  •  lubiprostone (AMITIZA) 8 MCG capsule, Take 8 mcg by mouth 2 (Two) Times a Day As Needed., Disp: , Rfl:   •  Magnesium 250 MG tablet, TK 1-2 TS PO D FOR CONSTIPATION IF TAKING MIRALAX IS NOT EFFECTIVE, Disp: , Rfl:   •  montelukast (SINGULAIR) 10 MG tablet, TAKE 1 TABLET BY MOUTH EVERY NIGHT, Disp: 30 tablet, Rfl: 3  •  Multiple Vitamin (MULTI-VITAMIN DAILY PO), Take  by mouth Daily., Disp: , Rfl:   •  omeprazole (priLOSEC) 40 MG capsule, Take 40 mg by mouth 2 (two) times a day., Disp: , Rfl:   •  ondansetron ODT (ZOFRAN-ODT) 4 MG disintegrating tablet, Place 1 tablet on the tongue Every 8 (Eight) Hours As Needed for Nausea or Vomiting., Disp: 20 tablet, Rfl: 0  •  traZODone (DESYREL) 150 MG tablet, Take 1 tablet by mouth Every Night., Disp: , Rfl: 3  •  ubrogepant 50 MG tablet, Take 1 tablet by mouth at onset of migraine, may repeat in 2 hours.  Max of 200 mg/ 24 hrs, Disp: 10  "tablet, Rfl: 11  •  Xhance 93 MCG/ACT Exhaler Suspension, USE ONE SPRAY IN EACH NOSTRIL EVERY DAY AS NEEDED FOR CONGESTION, Disp: 16 mL, Rfl: 0    Allergies:   Allergies   Allergen Reactions   • Adhesive Tape Hives   • Latex Hives   • Sulfa Antibiotics Hives   • Biaxin [Clarithromycin] Nausea Only   • Codeine Itching   • Penicillins Nausea Only   • Ciprofloxacin Unknown - High Severity   • Nuts Unknown - High Severity     Red heated face         Objective     Physical Exam:   Vital Signs:   Vitals:    12/21/22 1421   Height: 154.9 cm (61\")     Body mass index is 31.55 kg/m².     Physical Exam  Constitutional:       Appearance: Normal appearance.   HENT:      Head: Normocephalic and atraumatic.      Nose: Nose normal.      Mouth/Throat:      Mouth: Mucous membranes are moist.      Pharynx: Oropharynx is clear.   Eyes:      Extraocular Movements: Extraocular movements intact.      Pupils: Pupils are equal, round, and reactive to light.   Pulmonary:      Effort: Pulmonary effort is normal. No respiratory distress.   Abdominal:      Tenderness: There is abdominal tenderness. There is right CVA tenderness.   Musculoskeletal:         General: No swelling or deformity. Normal range of motion.      Cervical back: Normal range of motion and neck supple.   Skin:     General: Skin is warm and dry.   Neurological:      General: No focal deficit present.      Mental Status: She is alert and oriented to person, place, and time. Mental status is at baseline.   Psychiatric:         Mood and Affect: Mood normal.         Behavior: Behavior normal.         Labs:   Brief Urine Lab Results  (Last result in the past 365 days)      Color   Clarity   Blood   Leuk Est   Nitrite   Protein   CREAT   Urine HCG        12/21/22 1507 Janie   Clear   Negative   Negative   Negative   Trace                 Urine Culture    Urine Culture 12/21/22   Urine Culture Culture in progress (P)              Color, UA   Date Value Ref Range Status   12/12/2022 " Yellow Yellow, Straw Final     Color   Date Value Ref Range Status   12/21/2022 Janie Yellow, Straw, Dark Yellow, Janie Final     Bilirubin, UA   Date Value Ref Range Status   12/12/2022 Negative Negative Final     Bilirubin   Date Value Ref Range Status   12/21/2022 Negative Negative Final     Urobilinogen, UA   Date Value Ref Range Status   12/21/2022 Normal Normal, 0.2 E.U./dL Final   12/12/2022 0.2 E.U./dL 0.2 - 1.0 E.U./dL Final     Blood, UA   Date Value Ref Range Status   12/21/2022 Negative Negative Final     Leuk Esterase, UA   Date Value Ref Range Status   12/12/2022 Negative Negative Final     Leukocytes   Date Value Ref Range Status   12/21/2022 Negative Negative Final       Lab Results   Component Value Date    GLUCOSE 105 (H) 12/12/2022    CALCIUM 9.0 12/12/2022     12/12/2022    K 3.9 12/12/2022    CO2 21.0 (L) 12/12/2022     12/12/2022    BUN 13 12/12/2022    CREATININE 0.74 12/12/2022    EGFRIFAFRI >60 04/29/2022    EGFRIFNONA >60 04/29/2022    BCR 17.6 12/12/2022    ANIONGAP 14.0 12/12/2022       Lab Results   Component Value Date    WBC 8.34 12/12/2022    HGB 13.8 12/12/2022    HCT 43.2 12/12/2022    MCV 89.3 12/12/2022     12/12/2022       Stone Analysis  No components found for: [id number], [id number], [id number]    Images:   CT Abdomen Pelvis Without Contrast    Result Date: 12/12/2022  2 x 3 mm stone within the distal right ureter at the UVJ. No significant right-sided hydroureteronephrosis at this time.  This report was finalized on 12/12/2022 11:21 AM by Quique Silvestre MD.      Home Sleep Study    Result Date: 10/26/2022         1.  Mild obstructive sleep apnea.      2.  Nocturnal hypoxemia.      3.  Snoring.     RECOMMENDATIONS: 1.  Available data is suggestive of mild obstructive sleep apnea. Consider auto CPAP trial 4 to 20 cm of water pressure and CPAP download on therapy to make sure we are not missing any other pathology and to determine the adequacy of the  pressure settings.  2.  Discuss good sleep hygiene habits, including but not limited to fixed wake up and sleep time, avoid alcohol 4 hours before sleep and not driving while drowsy. Maintain ideal body weight.      I have conducted an epoch by epoch review of the raw data and agree with the interpretation. Electronically signed by:  Saqib Lewis MD 10/26/22 08:31 EDT       Measures:   Tobacco:   Megan Post  reports that she quit smoking about 18 months ago. Her smoking use included cigarettes. She started smoking about 19 years ago. She has a 2.50 pack-year smoking history. She has never used smokeless tobacco.    Assessment / Plan      Assessment/Plan:   Mr. Post is a 42 y.o. male who presented today for kidney stones.  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  She would like to move forward with definitive stone removal as soon as possible.  We discussed ureteroscopy and laser lithotripsy with stent placement.  Risks include infection, bleeding, ureteral injury, need for additional procedure etc.    I discussed with the patient that I do not have any operative availability within the next few days.  If her pain becomes too persistent she should go to the emergency department for evaluation and treatment.  I will plan to add her on during my clinic hours next Tuesday, 12/27/2022.  If she passes her stone in the meantime she will call me and we will cancel her procedure.  She has no additional stones bilaterally    Diagnoses and all orders for this visit:    1. Right ureteral stone (Primary)  -     ketorolac (TORADOL) 10 MG tablet; Take 1 tablet by mouth Every 6 (Six) Hours As Needed for Moderate Pain.  Dispense: 15 tablet;  Refill: 0  -     Case Request; Standing  -     ceFAZolin (ANCEF) 2 g in sodium chloride 0.9 % 100 mL IVPB  -     Case Request  -     Urine Culture - Urine, Urine, Clean Catch; Future  -     POC Urinalysis Dipstick, Automated  -     Urine Culture - Urine, Urine, Clean Catch  -     ondansetron ODT (ZOFRAN-ODT) 4 MG disintegrating tablet; Place 1 tablet on the tongue Every 8 (Eight) Hours As Needed for Nausea or Vomiting.  Dispense: 20 tablet; Refill: 0  -     POC Urinalysis Dipstick, Automated    3. Renal colic on right side  -     HYDROcodone-acetaminophen (NORCO) 7.5-325 MG per tablet; Take 1 tablet by mouth Every 6 (Six) Hours As Needed for Moderate Pain.  Dispense: 20 tablet; Refill: 0  -     POC Urinalysis Dipstick, Automated    4. Right flank pain  -     HYDROcodone-acetaminophen (NORCO) 7.5-325 MG per tablet; Take 1 tablet by mouth Every 6 (Six) Hours As Needed for Moderate Pain.  Dispense: 20 tablet; Refill: 0  -     POC Urinalysis Dipstick, Automated    Other orders  -     Follow Anesthesia Guidelines / Protocol; Standing  -     Verify NPO Status; Standing  -     Verify the Time Patient Completed [place] / G2; Standing  -     Obtain Informed Consent; Standing            Follow Up:   No follow-ups on file.    I spent approximately 30 minutes providing clinical care for this patient; including review of patient's chart and provider documentation, face to face time spent with patient in examination room (obtaining history, performing physical exam, discussing diagnosis and management options), placing orders, and completing patient documentation.     Angelo Silvestre MD  [facility] [place]

## 2022-12-21 NOTE — H&P (VIEW-ONLY)
Office Note Kidney Stone      Patient Name: Megan Post  : 1980   MRN: [id number]     Chief Complaint: History of Kidney Stones.    Chief Complaint   Patient presents with   • Renal Colic, Right       Referring Provider: Quique Stoddard MD    History of Present Illness: Megan Post is a 42 y.o. female who presents today for right ureteral stone.  The patient has past medical history of bipolar 1, constipation, depression, diverticulitis, fibromyalgia, lumbar radiculopathy, who was recently evaluated in the emergency department 2022 with right-sided flank pain associated with nausea and vomiting.  CT scan for evaluation at that time demonstrated a small 2 to 3 mm right distal ureteral stone with minimal to mild right-sided hydronephrosis at that time.  Patient has never had a kidney stone episode before.  In the clinic today she is crying in pain.  She states she ran out of pain medications and nausea medications.  She is requesting ASAP treatment for this stone.  She has been urinating through a strainer and has not seen her stone come out.  Her pain begins at the right flank and radiates to her right lower abdomen.  She denies fevers or chills.  Denies gross hematuria.    Creatinine 2022 was preserved 0.74, she had no significant leukocytosis at that time.    Urinalysis today is positive for trace protein, otherwise negative.  We will send urine culture prior to surgery.    The patient will be added on for definitive right-sided stone treatment 2022.  I will refill her pain medications and nausea medications.  She was encouraged to go to the emergency department as needed if refractory symptoms over the weekend.      Subjective      Review of System: Review of Systems   Genitourinary: Positive for difficulty urinating, dysuria, flank pain and pelvic pain.      I have reviewed the ROS documented by my clinical staff, I have updated appropriately and I agree. Angelo SOSA  MD [place]    Past Medical History:   Past Medical History:   Diagnosis Date   • Abdominal pain    • Abnormal breast exam    • Abnormal Pap smear of cervix    • Achilles tendinitis    • Acute sinusitis    • Anxiety    • Arthritis    • Asthma    • Tavera's syndrome    • Bipolar 1 disorder (HCC)    • Bowel trouble    • Breast cyst    • Colon polyps    • Constipation    • Depression    • Diverticulitis    • Diverticulitis of colon    • Endometriosis    • Eustachian tube dysfunction    • Extremity pain    • Fatty liver    • Female pelvic pain    • Fibromyalgia    • Gastric ulcer    • H/O bladder infections    • History of rashes as a child    • Irritable bowel syndrome    • Low back pain    • Lumbar radiculopathy    • Menopausal symptoms    • Muscle weakness    • Sexual problems    • Spinal stenosis of lumbar region        Past Surgical History:   Past Surgical History:   Procedure Laterality Date   •  SECTION     • CHOLECYSTECTOMY     • COLONOSCOPY  2017   • HYSTERECTOMY      endometriosis   • NASAL ENDOSCOPY     • OOPHORECTOMY Bilateral    • OTHER SURGICAL HISTORY      Laparoscopy (diagnostic) gynecologic with biopsy   • SHOULDER SURGERY     • UPPER GASTROINTESTINAL ENDOSCOPY  2019       Family History:   Family History   Problem Relation Age of Onset   • Liver disease Mother    • Diabetes Mother    • Hyperlipidemia Mother    • Hypertension Mother    • Thyroid disease Mother    • Arthritis Father    • Mental illness Father    • Migraines Sister    • Lung cancer Maternal Grandmother    • Cancer Maternal Grandfather         not certain which type, abdominal or pelvic   • Cancer Paternal Grandmother    • Tuberculosis Paternal Grandmother    • Liver cancer Paternal Grandfather    • Mental illness Daughter    • No Known Problems Son    • Stroke Other    • Diabetes Other    • Hyperlipidemia Other    • Hypertension Other    • Mental illness Other    • Migraines Other    • Breast cancer Neg Hx    • Endometrial  cancer Neg Hx    • Ovarian cancer Neg Hx        Social History:   Social History     Socioeconomic History   • Marital status: Single   Tobacco Use   • Smoking status: Former     Packs/day: 0.25     Years: 10.00     Pack years: 2.50     Types: Cigarettes     Start date: 2003     Quit date: 2021     Years since quittin.5   • Smokeless tobacco: Never   • Tobacco comments:     was a social smoker, did not smoke daily   Vaping Use   • Vaping Use: Never used   Substance and Sexual Activity   • Alcohol use: No   • Drug use: Never   • Sexual activity: Yes     Partners: Male     Birth control/protection: Surgical       Medications:     Current Outpatient Medications:   •  albuterol (PROVENTIL) (2.5 MG/3ML) 0.083% nebulizer solution, Take 2.5 mg by nebulization 4 (Four) Times a Day As Needed for Wheezing., Disp: 90 each, Rfl: 5  •  ALPRAZolam (XANAX) 1 MG tablet, TAKE 1/2 TO 1 TABLET BY MOUTH UP TO TWICE DAILY FOR SEVERE ANXIETY AND PANIC ATTACKS, Disp: , Rfl:   •  budesonide (PULMICORT) 0.5 MG/2ML nebulizer solution, , Disp: , Rfl:   •  budesonide-formoterol (SYMBICORT) 160-4.5 MCG/ACT inhaler, Inhale 2 puffs 2 (Two) Times a Day., Disp: , Rfl:   •  buPROPion XL (WELLBUTRIN XL) 150 MG 24 hr tablet, Take 150 mg by mouth Every Morning., Disp: , Rfl:   •  cetirizine (zyrTEC) 10 MG tablet, Take 1 tablet by mouth Daily., Disp: , Rfl: 1  •  COLLAGEN PO, Take 1 tablet by mouth Daily., Disp: , Rfl:   •  cyclobenzaprine (FLEXERIL) 10 MG tablet, Take 0.5-1 tablets by mouth 3 (Three) Times a Day As Needed for Muscle Spasms., Disp: 90 tablet, Rfl: 1  •  dicyclomine (Bentyl) 10 MG capsule, Take 1 capsule by mouth 4 (Four) Times a Day Before Meals & at Bedtime As Needed (abdominal cramping diarrhea)., Disp: 120 capsule, Rfl: 2  •  EPIPEN 2-JAREN 0.3 MG/0.3ML solution auto-injector injection, U UTD, Disp: , Rfl: 6  •  estradiol (ESTRACE VAGINAL) 0.1 MG/GM vaginal cream, Insert 1 gm intravaginally 1-2 times each week, Disp: 1  each, Rfl: 3  •  estradiol (Estrace) 1 MG tablet, Take 1.5 tablets po qd., Disp: 45 tablet, Rfl: 11  •  fenofibrate 160 MG tablet, Take 160 mg by mouth Daily With Dinner., Disp: , Rfl:   •  fluticasone (FLONASE) 50 MCG/ACT nasal spray, 2 sprays by Each Nare route Daily. Shake liquid, Disp: , Rfl:   •  folic acid (FOLVITE) 1 MG tablet, Take 1 tablet by mouth Daily., Disp: 90 tablet, Rfl: 3  •  Fremanezumab-vfrm 225 MG/1.5ML solution auto-injector, Inject 225 mg under the skin into the appropriate area as directed Every 28 (Twenty-Eight) Days., Disp: 1.5 mL, Rfl: 11  •  Gentle Laxative 5 MG EC tablet, TAKE 1 TABLET BY MOUTH ONCE DAILY AS NEEDED FOR CONSTIPATION, Disp: , Rfl:   •  HYDROcodone-acetaminophen (NORCO) 7.5-325 MG per tablet, Take 1 tablet by mouth Every 6 (Six) Hours As Needed for Moderate Pain., Disp: 20 tablet, Rfl: 0  •  ipratropium-albuterol (DUO-NEB) 0.5-2.5 mg/3 ml nebulizer, , Disp: , Rfl:   •  ketorolac (TORADOL) 10 MG tablet, Take 1 tablet by mouth Every 6 (Six) Hours As Needed for Moderate Pain., Disp: 15 tablet, Rfl: 0  •  lubiprostone (AMITIZA) 8 MCG capsule, Take 8 mcg by mouth 2 (Two) Times a Day As Needed., Disp: , Rfl:   •  Magnesium 250 MG tablet, TK 1-2 TS PO D FOR CONSTIPATION IF TAKING MIRALAX IS NOT EFFECTIVE, Disp: , Rfl:   •  montelukast (SINGULAIR) 10 MG tablet, TAKE 1 TABLET BY MOUTH EVERY NIGHT, Disp: 30 tablet, Rfl: 3  •  Multiple Vitamin (MULTI-VITAMIN DAILY PO), Take  by mouth Daily., Disp: , Rfl:   •  omeprazole (priLOSEC) 40 MG capsule, Take 40 mg by mouth 2 (two) times a day., Disp: , Rfl:   •  ondansetron ODT (ZOFRAN-ODT) 4 MG disintegrating tablet, Place 1 tablet on the tongue Every 8 (Eight) Hours As Needed for Nausea or Vomiting., Disp: 20 tablet, Rfl: 0  •  traZODone (DESYREL) 150 MG tablet, Take 1 tablet by mouth Every Night., Disp: , Rfl: 3  •  ubrogepant 50 MG tablet, Take 1 tablet by mouth at onset of migraine, may repeat in 2 hours.  Max of 200 mg/ 24 hrs, Disp: 10  "tablet, Rfl: 11  •  Xhance 93 MCG/ACT Exhaler Suspension, USE ONE SPRAY IN EACH NOSTRIL EVERY DAY AS NEEDED FOR CONGESTION, Disp: 16 mL, Rfl: 0    Allergies:   Allergies   Allergen Reactions   • Adhesive Tape Hives   • Latex Hives   • Sulfa Antibiotics Hives   • Biaxin [Clarithromycin] Nausea Only   • Codeine Itching   • Penicillins Nausea Only   • Ciprofloxacin Unknown - High Severity   • Nuts Unknown - High Severity     Red heated face         Objective     Physical Exam:   Vital Signs:   Vitals:    12/21/22 1421   Height: 154.9 cm (61\")     Body mass index is 31.55 kg/m².     Physical Exam  Constitutional:       Appearance: Normal appearance.   HENT:      Head: Normocephalic and atraumatic.      Nose: Nose normal.      Mouth/Throat:      Mouth: Mucous membranes are moist.      Pharynx: Oropharynx is clear.   Eyes:      Extraocular Movements: Extraocular movements intact.      Pupils: Pupils are equal, round, and reactive to light.   Pulmonary:      Effort: Pulmonary effort is normal. No respiratory distress.   Abdominal:      Tenderness: There is abdominal tenderness. There is right CVA tenderness.   Musculoskeletal:         General: No swelling or deformity. Normal range of motion.      Cervical back: Normal range of motion and neck supple.   Skin:     General: Skin is warm and dry.   Neurological:      General: No focal deficit present.      Mental Status: She is alert and oriented to person, place, and time. Mental status is at baseline.   Psychiatric:         Mood and Affect: Mood normal.         Behavior: Behavior normal.         Labs:   Brief Urine Lab Results  (Last result in the past 365 days)      Color   Clarity   Blood   Leuk Est   Nitrite   Protein   CREAT   Urine HCG        12/21/22 1507 Janie   Clear   Negative   Negative   Negative   Trace                 Urine Culture    Urine Culture 12/21/22   Urine Culture Culture in progress (P)              Color, UA   Date Value Ref Range Status   12/12/2022 " Yellow Yellow, Straw Final     Color   Date Value Ref Range Status   12/21/2022 Janie Yellow, Straw, Dark Yellow, Janie Final     Bilirubin, UA   Date Value Ref Range Status   12/12/2022 Negative Negative Final     Bilirubin   Date Value Ref Range Status   12/21/2022 Negative Negative Final     Urobilinogen, UA   Date Value Ref Range Status   12/21/2022 Normal Normal, 0.2 E.U./dL Final   12/12/2022 0.2 E.U./dL 0.2 - 1.0 E.U./dL Final     Blood, UA   Date Value Ref Range Status   12/21/2022 Negative Negative Final     Leuk Esterase, UA   Date Value Ref Range Status   12/12/2022 Negative Negative Final     Leukocytes   Date Value Ref Range Status   12/21/2022 Negative Negative Final       Lab Results   Component Value Date    GLUCOSE 105 (H) 12/12/2022    CALCIUM 9.0 12/12/2022     12/12/2022    K 3.9 12/12/2022    CO2 21.0 (L) 12/12/2022     12/12/2022    BUN 13 12/12/2022    CREATININE 0.74 12/12/2022    EGFRIFAFRI >60 04/29/2022    EGFRIFNONA >60 04/29/2022    BCR 17.6 12/12/2022    ANIONGAP 14.0 12/12/2022       Lab Results   Component Value Date    WBC 8.34 12/12/2022    HGB 13.8 12/12/2022    HCT 43.2 12/12/2022    MCV 89.3 12/12/2022     12/12/2022       Stone Analysis  No components found for: [id number], XZRGV6ZWQLQQ, FMBPG6VHNLPK    Images:   CT Abdomen Pelvis Without Contrast    Result Date: 12/12/2022  2 x 3 mm stone within the distal right ureter at the UVJ. No significant right-sided hydroureteronephrosis at this time.  This report was finalized on 12/12/2022 11:21 AM by Quique Silvestre MD.      Home Sleep Study    Result Date: 10/26/2022         1.  Mild obstructive sleep apnea.      2.  Nocturnal hypoxemia.      3.  Snoring.     RECOMMENDATIONS: 1.  Available data is suggestive of mild obstructive sleep apnea. Consider auto CPAP trial 4 to 20 cm of water pressure and CPAP download on therapy to make sure we are not missing any other pathology and to determine the adequacy of the  pressure settings.  2.  Discuss good sleep hygiene habits, including but not limited to fixed wake up and sleep time, avoid alcohol 4 hours before sleep and not driving while drowsy. Maintain ideal body weight.      I have conducted an epoch by epoch review of the raw data and agree with the interpretation. Electronically signed by:  Saqib Lewis MD 10/26/22 08:31 EDT       Measures:   Tobacco:   Megan Post  reports that she quit smoking about 18 months ago. Her smoking use included cigarettes. She started smoking about 19 years ago. She has a 2.50 pack-year smoking history. She has never used smokeless tobacco.    Assessment / Plan      Assessment/Plan:   Mr. Post is a 42 y.o. male who presented today for kidney stones.  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  She would like to move forward with definitive stone removal as soon as possible.  We discussed ureteroscopy and laser lithotripsy with stent placement.  Risks include infection, bleeding, ureteral injury, need for additional procedure etc.    I discussed with the patient that I do not have any operative availability within the next few days.  If her pain becomes too persistent she should go to the emergency department for evaluation and treatment.  I will plan to add her on during my clinic hours next Tuesday, 12/27/2022.  If she passes her stone in the meantime she will call me and we will cancel her procedure.  She has no additional stones bilaterally    Diagnoses and all orders for this visit:    1. Right ureteral stone (Primary)  -     ketorolac (TORADOL) 10 MG tablet; Take 1 tablet by mouth Every 6 (Six) Hours As Needed for Moderate Pain.  Dispense: 15 tablet;  Refill: 0  -     Case Request; Standing  -     ceFAZolin (ANCEF) 2 g in sodium chloride 0.9 % 100 mL IVPB  -     Case Request  -     Urine Culture - Urine, Urine, Clean Catch; Future  -     POC Urinalysis Dipstick, Automated  -     Urine Culture - Urine, Urine, Clean Catch  -     ondansetron ODT (ZOFRAN-ODT) 4 MG disintegrating tablet; Place 1 tablet on the tongue Every 8 (Eight) Hours As Needed for Nausea or Vomiting.  Dispense: 20 tablet; Refill: 0  -     POC Urinalysis Dipstick, Automated    3. Renal colic on right side  -     HYDROcodone-acetaminophen (NORCO) 7.5-325 MG per tablet; Take 1 tablet by mouth Every 6 (Six) Hours As Needed for Moderate Pain.  Dispense: 20 tablet; Refill: 0  -     POC Urinalysis Dipstick, Automated    4. Right flank pain  -     HYDROcodone-acetaminophen (NORCO) 7.5-325 MG per tablet; Take 1 tablet by mouth Every 6 (Six) Hours As Needed for Moderate Pain.  Dispense: 20 tablet; Refill: 0  -     POC Urinalysis Dipstick, Automated    Other orders  -     Follow Anesthesia Guidelines / Protocol; Standing  -     Verify NPO Status; Standing  -     Verify the Time Patient Completed Gatorade / G2; Standing  -     Obtain Informed Consent; Standing            Follow Up:   No follow-ups on file.    I spent approximately 30 minutes providing clinical care for this patient; including review of patient's chart and provider documentation, face to face time spent with patient in examination room (obtaining history, performing physical exam, discussing diagnosis and management options), placing orders, and completing patient documentation.     Angelo Silvestre MD  [facility] [place]

## 2022-12-23 LAB — BACTERIA SPEC AEROBE CULT: NORMAL

## 2022-12-26 ENCOUNTER — ANESTHESIA EVENT (OUTPATIENT)
Dept: PERIOP | Facility: HOSPITAL | Age: 42
End: 2022-12-26

## 2022-12-26 RX ORDER — SODIUM CHLORIDE 0.9 % (FLUSH) 0.9 %
10 SYRINGE (ML) INJECTION EVERY 12 HOURS SCHEDULED
Status: CANCELLED | OUTPATIENT
Start: 2022-12-26

## 2022-12-26 RX ORDER — FAMOTIDINE 10 MG/ML
20 INJECTION, SOLUTION INTRAVENOUS ONCE
Status: CANCELLED | OUTPATIENT
Start: 2022-12-26 | End: 2022-12-26

## 2022-12-27 ENCOUNTER — HOSPITAL ENCOUNTER (OUTPATIENT)
Facility: HOSPITAL | Age: 42
Setting detail: HOSPITAL OUTPATIENT SURGERY
Discharge: HOME | End: 2022-12-27
Attending: STUDENT IN AN ORGANIZED HEALTH CARE EDUCATION/TRAINING PROGRAM | Admitting: STUDENT IN AN ORGANIZED HEALTH CARE EDUCATION/TRAINING PROGRAM

## 2022-12-27 ENCOUNTER — SPECIALTY PHARMACY (OUTPATIENT)
Dept: ONCOLOGY | Facility: HOSPITAL | Age: 42
End: 2022-12-27

## 2022-12-27 ENCOUNTER — TELEPHONE (OUTPATIENT)
Dept: UROLOGY | Facility: CLINIC | Age: 42
End: 2022-12-27

## 2022-12-27 ENCOUNTER — ANESTHESIA (OUTPATIENT)
Dept: PERIOP | Facility: HOSPITAL | Age: 42
End: 2022-12-27

## 2022-12-27 ENCOUNTER — APPOINTMENT (OUTPATIENT)
Dept: GENERAL RADIOLOGY | Facility: HOSPITAL | Age: 42
End: 2022-12-27

## 2022-12-27 VITALS
DIASTOLIC BLOOD PRESSURE: 58 MMHG | TEMPERATURE: 97.8 F | BODY MASS INDEX: 31.55 KG/M2 | SYSTOLIC BLOOD PRESSURE: 114 MMHG | HEIGHT: 61 IN | OXYGEN SATURATION: 95 % | RESPIRATION RATE: 14 BRPM | WEIGHT: 167.11 LBS | HEART RATE: 81 BPM

## 2022-12-27 DIAGNOSIS — N20.1 RIGHT URETERAL STONE: ICD-10-CM

## 2022-12-27 DIAGNOSIS — N23 RENAL COLIC ON RIGHT SIDE: ICD-10-CM

## 2022-12-27 DIAGNOSIS — R10.9 RIGHT FLANK PAIN: ICD-10-CM

## 2022-12-27 PROCEDURE — 25010000002 KETOROLAC TROMETHAMINE PER 15 MG: Performed by: NURSE ANESTHETIST, CERTIFIED REGISTERED

## 2022-12-27 PROCEDURE — 76000 FLUOROSCOPY <1 HR PHYS/QHP: CPT

## 2022-12-27 PROCEDURE — C1758 CATHETER, URETERAL: HCPCS | Performed by: STUDENT IN AN ORGANIZED HEALTH CARE EDUCATION/TRAINING PROGRAM

## 2022-12-27 PROCEDURE — 25010000002 FENTANYL CITRATE (PF) 100 MCG/2ML SOLUTION: Performed by: NURSE ANESTHETIST, CERTIFIED REGISTERED

## 2022-12-27 PROCEDURE — 25010000002 PROPOFOL 10 MG/ML EMULSION: Performed by: NURSE ANESTHETIST, CERTIFIED REGISTERED

## 2022-12-27 PROCEDURE — 52332 CYSTOSCOPY AND TREATMENT: CPT | Performed by: STUDENT IN AN ORGANIZED HEALTH CARE EDUCATION/TRAINING PROGRAM

## 2022-12-27 PROCEDURE — 25010000002 HYDROMORPHONE 1 MG/ML SOLUTION

## 2022-12-27 PROCEDURE — 25010000002 DEXAMETHASONE PER 1 MG: Performed by: NURSE ANESTHETIST, CERTIFIED REGISTERED

## 2022-12-27 PROCEDURE — 74420 UROGRAPHY RTRGR +-KUB: CPT | Performed by: STUDENT IN AN ORGANIZED HEALTH CARE EDUCATION/TRAINING PROGRAM

## 2022-12-27 PROCEDURE — C1769 GUIDE WIRE: HCPCS | Performed by: STUDENT IN AN ORGANIZED HEALTH CARE EDUCATION/TRAINING PROGRAM

## 2022-12-27 PROCEDURE — 25010000002 MIDAZOLAM PER 1 MG: Performed by: ANESTHESIOLOGY

## 2022-12-27 PROCEDURE — C2617 STENT, NON-COR, TEM W/O DEL: HCPCS | Performed by: STUDENT IN AN ORGANIZED HEALTH CARE EDUCATION/TRAINING PROGRAM

## 2022-12-27 PROCEDURE — 25010000002 CEFAZOLIN IN DEXTROSE 2-4 GM/100ML-% SOLUTION: Performed by: STUDENT IN AN ORGANIZED HEALTH CARE EDUCATION/TRAINING PROGRAM

## 2022-12-27 PROCEDURE — 25010000002 ONDANSETRON PER 1 MG: Performed by: NURSE ANESTHETIST, CERTIFIED REGISTERED

## 2022-12-27 PROCEDURE — 52351 CYSTOURETERO & OR PYELOSCOPE: CPT | Performed by: STUDENT IN AN ORGANIZED HEALTH CARE EDUCATION/TRAINING PROGRAM

## 2022-12-27 DEVICE — URETERAL STENT
Type: IMPLANTABLE DEVICE | Site: URETER | Status: FUNCTIONAL
Brand: PERCUFLEX™ PLUS

## 2022-12-27 RX ORDER — ONDANSETRON 2 MG/ML
INJECTION INTRAMUSCULAR; INTRAVENOUS AS NEEDED
Status: DISCONTINUED | OUTPATIENT
Start: 2022-12-27 | End: 2022-12-27 | Stop reason: SURG

## 2022-12-27 RX ORDER — FAMOTIDINE 20 MG/1
20 TABLET, FILM COATED ORAL ONCE
Status: COMPLETED | OUTPATIENT
Start: 2022-12-27 | End: 2022-12-27

## 2022-12-27 RX ORDER — PROPOFOL 10 MG/ML
VIAL (ML) INTRAVENOUS AS NEEDED
Status: DISCONTINUED | OUTPATIENT
Start: 2022-12-27 | End: 2022-12-27 | Stop reason: SURG

## 2022-12-27 RX ORDER — FENTANYL CITRATE 50 UG/ML
50 INJECTION, SOLUTION INTRAMUSCULAR; INTRAVENOUS
Status: DISCONTINUED | OUTPATIENT
Start: 2022-12-27 | End: 2022-12-27 | Stop reason: HOSPADM

## 2022-12-27 RX ORDER — FENTANYL CITRATE 50 UG/ML
INJECTION, SOLUTION INTRAMUSCULAR; INTRAVENOUS AS NEEDED
Status: DISCONTINUED | OUTPATIENT
Start: 2022-12-27 | End: 2022-12-27 | Stop reason: SURG

## 2022-12-27 RX ORDER — KETOROLAC TROMETHAMINE 30 MG/ML
INJECTION, SOLUTION INTRAMUSCULAR; INTRAVENOUS AS NEEDED
Status: DISCONTINUED | OUTPATIENT
Start: 2022-12-27 | End: 2022-12-27 | Stop reason: SURG

## 2022-12-27 RX ORDER — DEXAMETHASONE SODIUM PHOSPHATE 4 MG/ML
INJECTION, SOLUTION INTRA-ARTICULAR; INTRALESIONAL; INTRAMUSCULAR; INTRAVENOUS; SOFT TISSUE AS NEEDED
Status: DISCONTINUED | OUTPATIENT
Start: 2022-12-27 | End: 2022-12-27 | Stop reason: SURG

## 2022-12-27 RX ORDER — LIDOCAINE HYDROCHLORIDE 10 MG/ML
INJECTION, SOLUTION EPIDURAL; INFILTRATION; INTRACAUDAL; PERINEURAL AS NEEDED
Status: DISCONTINUED | OUTPATIENT
Start: 2022-12-27 | End: 2022-12-27 | Stop reason: SURG

## 2022-12-27 RX ORDER — SCOLOPAMINE TRANSDERMAL SYSTEM 1 MG/1
1 PATCH, EXTENDED RELEASE TRANSDERMAL ONCE
Status: DISCONTINUED | OUTPATIENT
Start: 2022-12-27 | End: 2022-12-27 | Stop reason: HOSPADM

## 2022-12-27 RX ORDER — HYOSCYAMINE SULFATE 0.12 MG/1
0.12 TABLET SUBLINGUAL EVERY 4 HOURS PRN
Qty: 30 EACH | Refills: 0 | Status: SHIPPED | OUTPATIENT
Start: 2022-12-27

## 2022-12-27 RX ORDER — NITROFURANTOIN 25; 75 MG/1; MG/1
100 CAPSULE ORAL 2 TIMES DAILY
Qty: 6 CAPSULE | Refills: 0 | Status: SHIPPED | OUTPATIENT
Start: 2022-12-27 | End: 2022-12-30

## 2022-12-27 RX ORDER — MIDAZOLAM HYDROCHLORIDE 1 MG/ML
1 INJECTION INTRAMUSCULAR; INTRAVENOUS
Status: COMPLETED | OUTPATIENT
Start: 2022-12-27 | End: 2022-12-27

## 2022-12-27 RX ORDER — SODIUM CHLORIDE 9 MG/ML
40 INJECTION, SOLUTION INTRAVENOUS AS NEEDED
Status: DISCONTINUED | OUTPATIENT
Start: 2022-12-27 | End: 2022-12-27 | Stop reason: HOSPADM

## 2022-12-27 RX ORDER — CEFAZOLIN SODIUM 2 G/100ML
2 INJECTION, SOLUTION INTRAVENOUS ONCE
Status: COMPLETED | OUTPATIENT
Start: 2022-12-27 | End: 2022-12-27

## 2022-12-27 RX ORDER — SODIUM CHLORIDE, SODIUM LACTATE, POTASSIUM CHLORIDE, CALCIUM CHLORIDE 600; 310; 30; 20 MG/100ML; MG/100ML; MG/100ML; MG/100ML
9 INJECTION, SOLUTION INTRAVENOUS CONTINUOUS
Status: DISCONTINUED | OUTPATIENT
Start: 2022-12-27 | End: 2022-12-27 | Stop reason: HOSPADM

## 2022-12-27 RX ORDER — HYDROMORPHONE HYDROCHLORIDE 1 MG/ML
0.5 INJECTION, SOLUTION INTRAMUSCULAR; INTRAVENOUS; SUBCUTANEOUS
Status: DISCONTINUED | OUTPATIENT
Start: 2022-12-27 | End: 2022-12-27 | Stop reason: HOSPADM

## 2022-12-27 RX ORDER — SODIUM CHLORIDE 0.9 % (FLUSH) 0.9 %
10 SYRINGE (ML) INJECTION AS NEEDED
Status: DISCONTINUED | OUTPATIENT
Start: 2022-12-27 | End: 2022-12-27 | Stop reason: HOSPADM

## 2022-12-27 RX ORDER — HYDROCODONE BITARTRATE AND ACETAMINOPHEN 7.5; 325 MG/1; MG/1
1 TABLET ORAL EVERY 6 HOURS PRN
Qty: 12 TABLET | Refills: 0 | Status: SHIPPED | OUTPATIENT
Start: 2022-12-27 | End: 2022-12-30

## 2022-12-27 RX ORDER — LIDOCAINE HYDROCHLORIDE 10 MG/ML
0.5 INJECTION, SOLUTION EPIDURAL; INFILTRATION; INTRACAUDAL; PERINEURAL ONCE AS NEEDED
Status: COMPLETED | OUTPATIENT
Start: 2022-12-27 | End: 2022-12-27

## 2022-12-27 RX ORDER — PHENAZOPYRIDINE HYDROCHLORIDE 200 MG/1
200 TABLET, FILM COATED ORAL 3 TIMES DAILY PRN
Qty: 15 TABLET | Refills: 0 | Status: SHIPPED | OUTPATIENT
Start: 2022-12-27

## 2022-12-27 RX ADMIN — CEFAZOLIN SODIUM 2 G: 2 INJECTION, SOLUTION INTRAVENOUS at 09:25

## 2022-12-27 RX ADMIN — HYDROMORPHONE HYDROCHLORIDE 0.5 MG: 1 INJECTION, SOLUTION INTRAMUSCULAR; INTRAVENOUS; SUBCUTANEOUS at 09:57

## 2022-12-27 RX ADMIN — LIDOCAINE HYDROCHLORIDE 100 MG: 10 INJECTION, SOLUTION EPIDURAL; INFILTRATION; INTRACAUDAL; PERINEURAL at 09:21

## 2022-12-27 RX ADMIN — FAMOTIDINE 20 MG: 20 TABLET, FILM COATED ORAL at 08:23

## 2022-12-27 RX ADMIN — SCOPALAMINE 1 PATCH: 1 PATCH, EXTENDED RELEASE TRANSDERMAL at 08:24

## 2022-12-27 RX ADMIN — KETOROLAC TROMETHAMINE 30 MG: 30 INJECTION, SOLUTION INTRAMUSCULAR; INTRAVENOUS at 09:32

## 2022-12-27 RX ADMIN — MIDAZOLAM HYDROCHLORIDE 1 MG: 1 INJECTION, SOLUTION INTRAMUSCULAR; INTRAVENOUS at 09:10

## 2022-12-27 RX ADMIN — ONDANSETRON 4 MG: 2 INJECTION INTRAMUSCULAR; INTRAVENOUS at 09:31

## 2022-12-27 RX ADMIN — FENTANYL CITRATE 100 MCG: 50 INJECTION, SOLUTION INTRAMUSCULAR; INTRAVENOUS at 09:21

## 2022-12-27 RX ADMIN — SODIUM CHLORIDE, POTASSIUM CHLORIDE, SODIUM LACTATE AND CALCIUM CHLORIDE 9 ML/HR: 600; 310; 30; 20 INJECTION, SOLUTION INTRAVENOUS at 08:22

## 2022-12-27 RX ADMIN — LIDOCAINE HYDROCHLORIDE 0.5 ML: 10 INJECTION, SOLUTION EPIDURAL; INFILTRATION; INTRACAUDAL; PERINEURAL at 08:22

## 2022-12-27 RX ADMIN — MIDAZOLAM HYDROCHLORIDE 1 MG: 1 INJECTION, SOLUTION INTRAMUSCULAR; INTRAVENOUS at 08:23

## 2022-12-27 RX ADMIN — MIDAZOLAM HYDROCHLORIDE 2 MG: 1 INJECTION, SOLUTION INTRAMUSCULAR; INTRAVENOUS at 09:16

## 2022-12-27 RX ADMIN — DEXAMETHASONE SODIUM PHOSPHATE 4 MG: 4 INJECTION, SOLUTION INTRAMUSCULAR; INTRAVENOUS at 09:31

## 2022-12-27 RX ADMIN — PROPOFOL 200 MG: 10 INJECTION, EMULSION INTRAVENOUS at 09:21

## 2022-12-27 NOTE — ANESTHESIA POSTPROCEDURE EVALUATION
Patient: Megan Post    Procedure Summary     Date: 12/27/22 Room / Location:  [facility]    Anesthesia Start: 0915 Anesthesia Stop: 0952    Procedure: CYSTOSCOPY URETEROSCOPY WITH RETROGRADE PYELOGRAMS,  AND STENT PLACEMENT- RIGHT (Right) Diagnosis:       Right ureteral stone      (Right ureteral stone [N20.1])    Surgeons: Angelo Silvestre MD Provider: Reinaldo Jordan MD    Anesthesia Type: general ASA Status: 3          Anesthesia Type: general    Vitals  Vitals Value Taken Time   /84 12/27/22 0948   Temp     Pulse 110 12/27/22 0950   Resp     SpO2 90 % 12/27/22 0950   Vitals shown include unvalidated device data.        Post Anesthesia Care and Evaluation    Patient location during evaluation: PACU  Patient participation: complete - patient participated  Level of consciousness: responsive to light touch  Pain management: adequate    Airway patency: patent  Anesthetic complications: No anesthetic complications  PONV Status: none  Cardiovascular status: hemodynamically stable and acceptable  Respiratory status: nonlabored ventilation, acceptable, nasal cannula and oral airway  Hydration status: acceptable

## 2022-12-27 NOTE — PROGRESS NOTES
Refill outreach escalated to pharmacist:  Patient had a scheduled surgery for kidney stones and was prescribed norco 7.5mg, hyoscamine 0.125mg, nitrofurantoin 100mg, and phenazopyridin 200mg temporarily - patient's med profile updated and dur ran - ok to proceed with Ajovy and Ubrelvy refills.  Deanna Kingsley, PharmD  12/27/2022    
Specialty Pharmacy Refill Coordination Note     Megan is a 42 y.o. female contacted today regarding refills of  Ajovy and Ubrelvy specialty medication(s). Patient is due for injection on 1/3.      Reviewed and verified with patient:       Specialty medication(s) and dose(s) confirmed: yes    Refill Questions    Flowsheet Row Most Recent Value   Changes to allergies? No  [Patient is temporarly taking Norco 7.5mg, hyoscamine 0.125mg, nitrofurantoin 100mg, and phenazopyridum 200mg due to a scheduled surgery.]   Changes to medications? Yes  [Patient is temporarly taking Norco 7.5mg, hyoscamine 0.125mg, nitrofurantoin 100mg, and phenazopyridine 200mg due to a scheduled surgery.]   New conditions since last clinic visit Yes  [Kidney stones]   Unplanned office visit, urgent care, ED, or hospital admission in the last 4 weeks  No  [Scheduled kidney stone surgery]   How does patient/caregiver feel medication is working? Good   Financial problems or insurance changes  No   Since the previous refill, were any specialty medication doses or scheduled injections missed or delayed?  No   If yes, please provide the amount N/A   Why were doses missed? N/A   Does this patient require a clinical escalation to a pharmacist? Yes          Delivery Questions    Flowsheet Row Most Recent Value   Delivery method FedEx   Delivery address correct? Yes   Preferred delivery time? Anytime   Number of medications in delivery 2   Medication being filled and delivered Ubrelvy and Ajovy   Doses left of specialty medications Ajovy=0   Is there any medication that is due not being filled? No   Supplies needed? No supplies needed   Cooler needed? Yes   Do any medications need mixed or dated? No   Copay form of payment Payment plan already set up   Questions or concerns for the pharmacist? No   Are any medications first time fills? No                 Follow-up: 28 day(s)     Lacy North, Pharmacy Technician  Specialty Pharmacy Technician      
[phone]

## 2022-12-27 NOTE — DISCHARGE INSTRUCTIONS
Ureteroscopy + Stent Post-Operative Care/Expectations    Follow these guidelines after your procedure in order to assist with your recovery.    Anesthesia Precautions and Expectations  - Rest for 24 hours after receiving general anesthesia, make sure you have someone at home with you that can monitor you  - Do not operate a vehicle, drink alcohol, or make 'important decisions'/sign legal documentation during the immediate recovery period if you received sedation for your procedure  - You may experience a sore throat, jaw discomfort, or muscle aches related to anesthesia, these symptoms may last a few days    Activity  - You may resume your normal home activities immediately post-operatively; however, light activity is encouraged for 24 hours to prevent urinary bleeding  - Do not operate a vehicle or drink alcohol if you were prescribed narcotic pain medications     Bathing/Showering  - You may resume normal bathing and showering post-procedure    Pain/Urinary Symptoms  - You may experience burning urinary pain for a few days, and/or increased urinary urgency/frequency post-procedure for a few weeks which is expected (sometimes longer if a ureteral stent was left in place)   - A medication to prevent burning urinary pain (Phenazopyridine) may be prescribed by your doctor, take as directed  - A medication to prevent urinary urgency/frequency (sometimes referred to as “bladder spasms”) (Hyoscyamine, Oxybutynin, Mirabegron, Solifenacin, etc.) may be prescribed by your doctor for up to 1 month, take as directed     Urinary Bleeding (Hematuria)   - Some degree of light urinary bleeding (hematuria) is expected for up to 1-2 weeks (this may be as light as pink lemonade or somewhat darker like clear/pale red Gatorade); a good rule of thumb is that your urine should remain see-through    - If you experience heavy urinary bleeding (like the color and consistency of tomato juice, or red wine), large blood clots, or you are  "unable to urinate for more than 8 hours you should contact your doctor and present to the nearest Emergency Department  - Drink plenty of water at home and stay hydrated, as this will help naturally flush out your bladder and urethra    Antibiotics  - Complete the antibiotic course (if) prescribed as directed to prevent urinary infection     When to call your doctor:   - Pain that is not controlled with oral medications  - Signs of significant infection: Fever 101F, shaking chills, profuse sweating, persistent nausea or vomiting, unable to tolerate food or drink   - Severe urinary bleeding or large blood clots in urine  - Inability to urinate for more than 8 hours post-surgery         STENT REMOVAL INSTRUCTIONS    A ureteral stent was left in place after your procedure, the ureteral stent is a flexible plastic tube roughly 9 to 10 inches in length which allows urine to drain from the kidney to the bladder while the inflammation in the ureter is settling down after surgery.  Without the stent in place, the ureter could be blocked due to this ureteral inflammation which could result in severe flank pain.    The ureteral stent is attached to black strings which are externalized at your urethra (\"pee tube\") and are taped to your inner thigh or suprapubic region.    To remove the ureteral stent, remove the overlying tape, grasp the black strings, pull gently but also firmly until the entire stent has been removed.  This should slide out easily.  The stent is roughly 9 to 10 inches in length and usually blue in color.    If you go home and you notice that your stent has accidentally been pulled out (either partially or completely) this is okay. If the stent is partially removed, go ahead and remove the stent entirely.  Contact your urologist's office to notify your urologist regarding the incidental stent removal.    You may notice some urinary bleeding and some residual flank pain after the stent is removed, this is " entirely normal and can last for a few days.  Drink plenty of fluid to flush out any bleeding, and take Tylenol or ibuprofen for residual flank pain.      Your physician has asked you to remove your stent on: Thursday 12/29/22

## 2022-12-27 NOTE — INTERVAL H&P NOTE
"[facility] Pre-op    Full history and physical note from office is attached.    /83 (BP Location: Right arm, Patient Position: Lying)   Pulse 95   Temp 98.1 °F (36.7 °C) (Temporal)   Resp 18   Ht 154.9 cm (60.98\")   Wt 75.8 kg (167 lb 1.7 oz)   LMP  (LMP Unknown)   SpO2 98%   BMI 31.59 kg/m²     Immunizations:  Influenza:  No  Pneumococcal:  No  Tetanus:  No  Covid : No      LAB Results:  Lab Results   Component Value Date    WBC 8.34 12/12/2022    HGB 13.8 12/12/2022    HCT 43.2 12/12/2022    MCV 89.3 12/12/2022     12/12/2022    NEUTROABS 5.63 12/12/2022    GLUCOSE 105 (H) 12/12/2022    BUN 13 12/12/2022    CREATININE 0.74 12/12/2022    EGFRIFNONA >60 04/29/2022    EGFRIFAFRI >60 04/29/2022     12/12/2022    K 3.9 12/12/2022     12/12/2022    CO2 21.0 (L) 12/12/2022    MG 2.1 03/18/2021    CALCIUM 9.0 12/12/2022    ALBUMIN 4.60 12/12/2022    AST 21 12/12/2022    ALT 24 12/12/2022    BILITOT 0.4 12/12/2022    INR 0.96 11/12/2017 12/12/22 CT abd:  FINDINGS:  The lung bases are clear. Unremarkable appearance of the liver. The  gallbladder is surgically absent. Normal appearance of the bile ducts.  Normal appearance of the spleen, pancreas, and adrenal glands.     There is a 3 x 2 mm stone within the right ureter at the ureterovesical  junction (series 2 image 130). There is minimal periureteral fat  stranding at the site, however without evidence of right-sided  hydronephrosis or hydroureter. No additional renal or ureteral calculi  are identified. Unremarkable appearance of the left kidney and left  ureter. Normal appearance of the bladder.     The uterus is surgically absent. There is sigmoid colonic diverticulosis  without diverticulitis. No evidence of bowel obstruction or inflammatory  change of the GI tract. Unremarkable noncontrast appearance of the  vasculature. No lymphadenopathy. No abdominopelvic free fluid or  pneumoperitoneum. The body wall soft tissues " are unremarkable. No acute  osseous findings.     IMPRESSION:  2 x 3 mm stone within the distal right ureter at the UVJ. No significant  right-sided hydroureteronephrosis at this time.    Cancer Staging (if applicable)  Cancer Patient: __ yes __no __unknown__N/A; If yes, clinical stage T:__ N:__M:__, stage group or __N/A      Impression: Right ureteral stone      Plan: URETEROSCOPY LASER LITHOTRIPSY WITH STENT INSERTION      MAGGIE Curry   12/27/2022   08:10 EST

## 2022-12-27 NOTE — TELEPHONE ENCOUNTER
Patient stated she is currently bleeding since her surgery this morning. She stated its not just when she urinates.

## 2022-12-27 NOTE — BRIEF OP NOTE
URETEROSCOPY LASER LITHOTRIPSY WITH STENT INSERTION  Progress Note    Crystal Krysta Day  12/27/2022    Pre-op Diagnosis:   Right ureteral stone [N20.1]       Post-Op Diagnosis Codes:     * Right ureteral stone [N20.1]       Procedure(s):  CYSTOSCOPY URETEROSCOPY WITH RETROGRADE PYELOGRAMS,  AND STENT PLACEMENT- RIGHT        Surgeon(s):  Angelo Silvestre MD    Anesthesia: General    Staff:   Circulator: Rosey Pantoja RN  Laser Staff: Kiera Loen RN  Scrub Person: Philly Martinez         Estimated Blood Loss: none    Urine Voided: * No values recorded between 12/27/2022  9:15 AM and 12/27/2022  9:36 AM *    Specimens:                None          Drains: * No LDAs found *    Findings: Right ureteroscopy demonstrated no stone fragments, patient had successfully passed her stone, bilateral retrograde pyelograms normal. Right 4.8 Fr x 24 cm stent on strings on right.         Complications: None            Angelo Silvestre MD     Date: 12/27/2022  Time: 09:45 EST

## 2022-12-27 NOTE — ANESTHESIA PROCEDURE NOTES
Airway  Urgency: elective    Date/Time: 12/27/2022 9:22 AM  Airway not difficult    General Information and Staff    Patient location during procedure: OR    Indications and Patient Condition  Indications for airway management: airway protection    Preoxygenated: yes  Mask difficulty assessment: 1 - vent by mask    Final Airway Details  Final airway type: supraglottic airway      Successful airway: I-gel  Size 4     Number of attempts at approach: 1  Assessment: lips, teeth, and gum same as pre-op    Additional Comments  LMA placed without difficulty, ventilation with assist, equal breath sounds and symmetric chest rise and fall

## 2022-12-27 NOTE — ANESTHESIA PREPROCEDURE EVALUATION
Anesthesia Evaluation     Patient summary reviewed and Nursing notes reviewed                Airway   Mallampati: II  TM distance: >3 FB  Neck ROM: full  No difficulty expected  Dental - normal exam     Pulmonary - normal exam   (+) asthma,sleep apnea,   Cardiovascular - normal exam    (+) hyperlipidemia,       Neuro/Psych  (+) headaches, numbness, psychiatric history Anxiety, Depression, Bipolar and PTSD,    GI/Hepatic/Renal/Endo    (+) obesity,  GERD, PUD,  liver disease fatty liver disease,     Musculoskeletal     Abdominal  - normal exam    Bowel sounds: normal.   Substance History - negative use     OB/GYN negative ob/gyn ROS         Other   arthritis,                      Anesthesia Plan    ASA 3     general     intravenous induction     Anesthetic plan, risks, benefits, and alternatives have been provided, discussed and informed consent has been obtained with: patient.    Plan discussed with CRNA.        CODE STATUS:

## 2022-12-28 NOTE — OP NOTE
URETEROSCOPY LASER LITHOTRIPSY WITH STENT INSERTION  Procedure Report    Patient Name:  Megan Post  YOB: 1980    Date of Surgery:  12/27/2022     Indications: 42-year-old female with right ureteral stone on trial of passage, with persistent pain who elects to proceed to the operating room today for definitive stone removal.    Pre-op Diagnosis:   Right ureteral stone [N20.1]       Post-Op Diagnosis Codes:     * Right ureteral stone [N20.1]        Procedure(s):  CYSTOSCOPY  RIGHT DIAGNOSTIC URETEROSCOPY  BILATERAL RETROGRADE PYELOGRAMS (MODIFIER 50)  RIGHT STENT PLACEMENT    Staff:  Surgeon(s):  Angelo Silvestre MD        Anesthesia: General    Estimated Blood Loss: none    Implants:    Implant Name Type Inv. Item Serial No.  Lot No. LRB No. Used Action   STNT PERCUFLX NO GW 4.8X24 - [id number] Stent STNT PERCUFLX NO GW 4.8X24  [employer] RUBA [id number] Right 1 Implanted       Specimen:          None        Findings: Fairly narrow distal right ureter however no evidence of any obstructing stones were present, ureteroscope advanced to the right ureteropelvic junction and no stones were identified.  Normal bilateral retrograde pyelograms.  Right 4.8 Khmer by 24 cm double-J stent on the right.    Complications: None    Description of Procedure:     After informed consent, the patient was brought back to the operating suite and moved over to the operating table. General anesthesia was smoothly induced, IV antibiotics were administered, and the patient was placed in the dorsal lithotomy position with careful attention focused on padding all pressure points. The patient was prepped and draped in standard fashion. A timeout was performed to ensure the correct patient and procedure    A 22Fr cystoscope was used to cannulate the urethra. The urethra was of normal course and caliber.  Upon entering the bladder, pan-cystoscopy revealed no bladder abnormalities.  The bilateral ureteral  orifices were visualized in their orthotopic positions. Attention was then turned to the right ureteral orifice which was cannulated with a 5 Fr open ended ureteral catheter. A retrograde pyelogram was performed demonstrating normal course and caliber of the ureter, outlining the location of the renal pelvis and collecting system, with no obvious hydronephrosis.     A sensor wire was advanced up the right ureter and into the collecting system on fluoroscopy to serve as a safety wire which was clamped to the surgical drapes.     The semi-rigid ureteroscope was then advanced into the bladder. The right ureter was cannulated demonstrating mild stenosis, a second wire was advanced and the ureteroscope was able to be advanced into the right distal ureter.  No obvious obstructing stones or ureteral injuries were identified.  The ureteroscope was advanced into the mid and proximal ureter up into the right ureteropelvic junction and no stones were identified.    A 5 Indonesian open-ended ureteral catheter was used to perform a left retrograde pyelogram given the patient's complaint of bilateral lower pelvic pain in preop.  This demonstrated normal course caliber of the ureter with no obvious obstruction or hydronephrosis.    Using fluoroscopic guidance, a ureteral stent was then placed. A 4.8 F x 24 cm double J ureteral stent was advanced up the right ureter over our safety wire. Fluoroscopy confirmed good curl in both the kidney and the bladder. The bladder was drained, and this concluded our procedure.    Stent strings were then externalized.     The patient was brought back to the PACU in stable condition. All scopes and instruments were in good working order at the end of the case. There were no complications.      Disposition:  Discharge home today, follow-up in 8 weeks with renal ultrasound prior, remove stent on strings Thursday, 12/29/2022          Angelo Silvestre MD     Date: 12/27/2022  Time: 20:05 EST

## 2022-12-29 ENCOUNTER — TELEPHONE (OUTPATIENT)
Dept: UROLOGY | Facility: CLINIC | Age: 42
End: 2022-12-29

## 2022-12-29 DIAGNOSIS — M79.7 FIBROMYALGIA: ICD-10-CM

## 2022-12-29 DIAGNOSIS — M50.30 DDD (DEGENERATIVE DISC DISEASE), CERVICAL: ICD-10-CM

## 2022-12-29 DIAGNOSIS — M51.26 LUMBAR DISCOGENIC PAIN SYNDROME: ICD-10-CM

## 2022-12-29 RX ORDER — CYCLOBENZAPRINE HCL 10 MG
5-10 TABLET ORAL 3 TIMES DAILY PRN
Qty: 90 TABLET | Refills: 1 | Status: SHIPPED | OUTPATIENT
Start: 2022-12-29

## 2022-12-29 NOTE — TELEPHONE ENCOUNTER
Received fax from [facility], Ketorolac 10 mg tablet is not cover by patient's insurance.    The following are cover by insurance:    Naproxen tab  Ibuprofen tab  Meloxicam tab  Diclofenac tab  Nabumetonet tab

## 2022-12-30 ENCOUNTER — TELEPHONE (OUTPATIENT)
Dept: UROLOGY | Facility: CLINIC | Age: 42
End: 2022-12-30

## 2022-12-30 ENCOUNTER — HOSPITAL ENCOUNTER (EMERGENCY)
Facility: HOSPITAL | Age: 42
Discharge: HOME | End: 2022-12-31
Attending: STUDENT IN AN ORGANIZED HEALTH CARE EDUCATION/TRAINING PROGRAM | Admitting: STUDENT IN AN ORGANIZED HEALTH CARE EDUCATION/TRAINING PROGRAM
Payer: MEDICARE

## 2022-12-30 ENCOUNTER — APPOINTMENT (OUTPATIENT)
Dept: GENERAL RADIOLOGY | Facility: HOSPITAL | Age: 42
End: 2022-12-30
Payer: MEDICARE

## 2022-12-30 ENCOUNTER — APPOINTMENT (OUTPATIENT)
Dept: CT IMAGING | Facility: HOSPITAL | Age: 42
End: 2022-12-30
Payer: MEDICARE

## 2022-12-30 ENCOUNTER — TELEPHONE (OUTPATIENT)
Dept: INTERNAL MEDICINE | Facility: CLINIC | Age: 42
End: 2022-12-30

## 2022-12-30 VITALS
HEART RATE: 110 BPM | DIASTOLIC BLOOD PRESSURE: 85 MMHG | OXYGEN SATURATION: 98 % | RESPIRATION RATE: 18 BRPM | HEIGHT: 61 IN | WEIGHT: 172 LBS | SYSTOLIC BLOOD PRESSURE: 123 MMHG | TEMPERATURE: 99.1 F | BODY MASS INDEX: 32.47 KG/M2

## 2022-12-30 DIAGNOSIS — N39.0 ACUTE UTI (URINARY TRACT INFECTION): Primary | ICD-10-CM

## 2022-12-30 DIAGNOSIS — M51.26 LUMBAR DISCOGENIC PAIN SYNDROME: ICD-10-CM

## 2022-12-30 DIAGNOSIS — J06.9 VIRAL URI: ICD-10-CM

## 2022-12-30 DIAGNOSIS — N23 RENAL COLIC: Primary | ICD-10-CM

## 2022-12-30 DIAGNOSIS — M79.7 FIBROMYALGIA: ICD-10-CM

## 2022-12-30 DIAGNOSIS — G89.18 POSTOPERATIVE PAIN: ICD-10-CM

## 2022-12-30 DIAGNOSIS — N30.00 ACUTE CYSTITIS WITHOUT HEMATURIA: ICD-10-CM

## 2022-12-30 DIAGNOSIS — M50.30 DDD (DEGENERATIVE DISC DISEASE), CERVICAL: Primary | ICD-10-CM

## 2022-12-30 DIAGNOSIS — Z98.890 HISTORY OF LITHOTRIPSY: ICD-10-CM

## 2022-12-30 LAB
ALBUMIN SERPL-MCNC: 3.9 G/DL (ref 3.5–5.2)
ALBUMIN/GLOB SERPL: 1.3 G/DL
ALP SERPL-CCNC: 64 U/L (ref 39–117)
ALT SERPL W P-5'-P-CCNC: 48 U/L (ref 1–33)
ANION GAP SERPL CALCULATED.3IONS-SCNC: 12 MMOL/L (ref 5–15)
AST SERPL-CCNC: 25 U/L (ref 1–32)
BACTERIA UR QL AUTO: NORMAL /HPF
BASOPHILS # BLD AUTO: 0.03 10*3/MM3 (ref 0–0.2)
BASOPHILS NFR BLD AUTO: 0.3 % (ref 0–1.5)
BILIRUB SERPL-MCNC: 0.3 MG/DL (ref 0–1.2)
BILIRUB UR QL STRIP: ABNORMAL
BUN SERPL-MCNC: 8 MG/DL (ref 6–20)
BUN/CREAT SERPL: 14.3 (ref 7–25)
CALCIUM SPEC-SCNC: 9 MG/DL (ref 8.6–10.5)
CHLORIDE SERPL-SCNC: 102 MMOL/L (ref 98–107)
CLARITY UR: ABNORMAL
CO2 SERPL-SCNC: 25 MMOL/L (ref 22–29)
COLOR UR: ABNORMAL
CREAT SERPL-MCNC: 0.56 MG/DL (ref 0.57–1)
D-LACTATE SERPL-SCNC: 0.6 MMOL/L (ref 0.5–2)
DEPRECATED RDW RBC AUTO: 44 FL (ref 37–54)
EGFRCR SERPLBLD CKD-EPI 2021: 118.5 ML/MIN/1.73
EOSINOPHIL # BLD AUTO: 0.22 10*3/MM3 (ref 0–0.4)
EOSINOPHIL NFR BLD AUTO: 2.3 % (ref 0.3–6.2)
ERYTHROCYTE [DISTWIDTH] IN BLOOD BY AUTOMATED COUNT: 13.2 % (ref 12.3–15.4)
GLOBULIN UR ELPH-MCNC: 2.9 GM/DL
GLUCOSE SERPL-MCNC: 105 MG/DL (ref 65–99)
GLUCOSE UR STRIP-MCNC: NEGATIVE MG/DL
HCT VFR BLD AUTO: 38.1 % (ref 34–46.6)
HGB BLD-MCNC: 11.9 G/DL (ref 12–15.9)
HGB UR QL STRIP.AUTO: NEGATIVE
HOLD SPECIMEN: NORMAL
HYALINE CASTS UR QL AUTO: NORMAL /LPF
IMM GRANULOCYTES # BLD AUTO: 0.03 10*3/MM3 (ref 0–0.05)
IMM GRANULOCYTES NFR BLD AUTO: 0.3 % (ref 0–0.5)
KETONES UR QL STRIP: ABNORMAL
LEUKOCYTE ESTERASE UR QL STRIP.AUTO: ABNORMAL
LIPASE SERPL-CCNC: 14 U/L (ref 13–60)
LYMPHOCYTES # BLD AUTO: 1.78 10*3/MM3 (ref 0.7–3.1)
LYMPHOCYTES NFR BLD AUTO: 18.8 % (ref 19.6–45.3)
MCH RBC QN AUTO: 28.4 PG (ref 26.6–33)
MCHC RBC AUTO-ENTMCNC: 31.2 G/DL (ref 31.5–35.7)
MCV RBC AUTO: 90.9 FL (ref 79–97)
MONOCYTES # BLD AUTO: 0.4 10*3/MM3 (ref 0.1–0.9)
MONOCYTES NFR BLD AUTO: 4.2 % (ref 5–12)
NEUTROPHILS NFR BLD AUTO: 7.03 10*3/MM3 (ref 1.7–7)
NEUTROPHILS NFR BLD AUTO: 74.1 % (ref 42.7–76)
NITRITE UR QL STRIP: POSITIVE
NRBC BLD AUTO-RTO: 0 /100 WBC (ref 0–0.2)
PH UR STRIP.AUTO: 7.5 [PH] (ref 5–8)
PLATELET # BLD AUTO: 336 10*3/MM3 (ref 140–450)
PMV BLD AUTO: 9.7 FL (ref 6–12)
POTASSIUM SERPL-SCNC: 3.6 MMOL/L (ref 3.5–5.2)
PROT SERPL-MCNC: 6.8 G/DL (ref 6–8.5)
PROT UR QL STRIP: NEGATIVE
RBC # BLD AUTO: 4.19 10*6/MM3 (ref 3.77–5.28)
RBC # UR STRIP: NORMAL /HPF
REF LAB TEST METHOD: NORMAL
SODIUM SERPL-SCNC: 139 MMOL/L (ref 136–145)
SP GR UR STRIP: 1.02 (ref 1–1.03)
SQUAMOUS #/AREA URNS HPF: NORMAL /HPF
UROBILINOGEN UR QL STRIP: ABNORMAL
WBC # UR STRIP: NORMAL /HPF
WBC NRBC COR # BLD: 9.49 10*3/MM3 (ref 3.4–10.8)
WHOLE BLOOD HOLD COAG: NORMAL
WHOLE BLOOD HOLD SPECIMEN: NORMAL

## 2022-12-30 PROCEDURE — 71045 X-RAY EXAM CHEST 1 VIEW: CPT

## 2022-12-30 PROCEDURE — 80053 COMPREHEN METABOLIC PANEL: CPT

## 2022-12-30 PROCEDURE — 99283 EMERGENCY DEPT VISIT LOW MDM: CPT

## 2022-12-30 PROCEDURE — 87040 BLOOD CULTURE FOR BACTERIA: CPT | Performed by: STUDENT IN AN ORGANIZED HEALTH CARE EDUCATION/TRAINING PROGRAM

## 2022-12-30 PROCEDURE — 81001 URINALYSIS AUTO W/SCOPE: CPT | Performed by: STUDENT IN AN ORGANIZED HEALTH CARE EDUCATION/TRAINING PROGRAM

## 2022-12-30 PROCEDURE — 25010000002 IOPAMIDOL 61 % SOLUTION: Performed by: STUDENT IN AN ORGANIZED HEALTH CARE EDUCATION/TRAINING PROGRAM

## 2022-12-30 PROCEDURE — 85025 COMPLETE CBC W/AUTO DIFF WBC: CPT

## 2022-12-30 PROCEDURE — 87086 URINE CULTURE/COLONY COUNT: CPT | Performed by: STUDENT IN AN ORGANIZED HEALTH CARE EDUCATION/TRAINING PROGRAM

## 2022-12-30 PROCEDURE — 96375 TX/PRO/DX INJ NEW DRUG ADDON: CPT

## 2022-12-30 PROCEDURE — 0202U NFCT DS 22 TRGT SARS-COV-2: CPT | Performed by: STUDENT IN AN ORGANIZED HEALTH CARE EDUCATION/TRAINING PROGRAM

## 2022-12-30 PROCEDURE — 36415 COLL VENOUS BLD VENIPUNCTURE: CPT

## 2022-12-30 PROCEDURE — 83605 ASSAY OF LACTIC ACID: CPT | Performed by: STUDENT IN AN ORGANIZED HEALTH CARE EDUCATION/TRAINING PROGRAM

## 2022-12-30 PROCEDURE — 25010000002 KETOROLAC TROMETHAMINE PER 15 MG: Performed by: STUDENT IN AN ORGANIZED HEALTH CARE EDUCATION/TRAINING PROGRAM

## 2022-12-30 PROCEDURE — 96361 HYDRATE IV INFUSION ADD-ON: CPT

## 2022-12-30 PROCEDURE — 83690 ASSAY OF LIPASE: CPT

## 2022-12-30 PROCEDURE — 25010000002 HYDROMORPHONE PER 4 MG: Performed by: STUDENT IN AN ORGANIZED HEALTH CARE EDUCATION/TRAINING PROGRAM

## 2022-12-30 PROCEDURE — 74177 CT ABD & PELVIS W/CONTRAST: CPT

## 2022-12-30 RX ORDER — OXYCODONE HYDROCHLORIDE AND ACETAMINOPHEN 5; 325 MG/1; MG/1
1-2 TABLET ORAL EVERY 6 HOURS PRN
Qty: 15 TABLET | Refills: 0 | Status: SHIPPED | OUTPATIENT
Start: 2022-12-30

## 2022-12-30 RX ORDER — HYDROMORPHONE HYDROCHLORIDE 1 MG/ML
0.25 INJECTION, SOLUTION INTRAMUSCULAR; INTRAVENOUS; SUBCUTANEOUS ONCE
Status: COMPLETED | OUTPATIENT
Start: 2022-12-30 | End: 2022-12-30

## 2022-12-30 RX ORDER — SODIUM CHLORIDE 9 MG/ML
10 INJECTION INTRAVENOUS AS NEEDED
Status: DISCONTINUED | OUTPATIENT
Start: 2022-12-30 | End: 2022-12-31 | Stop reason: HOSPADM

## 2022-12-30 RX ORDER — LEVOFLOXACIN 500 MG/1
500 TABLET, FILM COATED ORAL DAILY
Qty: 7 TABLET | Refills: 0 | Status: SHIPPED | OUTPATIENT
Start: 2022-12-30 | End: 2023-01-06

## 2022-12-30 RX ORDER — KETOROLAC TROMETHAMINE 15 MG/ML
15 INJECTION, SOLUTION INTRAMUSCULAR; INTRAVENOUS ONCE
Status: COMPLETED | OUTPATIENT
Start: 2022-12-30 | End: 2022-12-30

## 2022-12-30 RX ADMIN — KETOROLAC TROMETHAMINE 15 MG: 15 INJECTION, SOLUTION INTRAMUSCULAR; INTRAVENOUS at 23:04

## 2022-12-30 RX ADMIN — SODIUM CHLORIDE, POTASSIUM CHLORIDE, SODIUM LACTATE AND CALCIUM CHLORIDE 1000 ML: 600; 310; 30; 20 INJECTION, SOLUTION INTRAVENOUS at 23:05

## 2022-12-30 RX ADMIN — IOPAMIDOL 100 ML: 612 INJECTION, SOLUTION INTRAVENOUS at 22:33

## 2022-12-30 RX ADMIN — HYDROMORPHONE HYDROCHLORIDE 0.25 MG: 1 INJECTION, SOLUTION INTRAMUSCULAR; INTRAVENOUS; SUBCUTANEOUS at 23:04

## 2022-12-30 NOTE — TELEPHONE ENCOUNTER
The patient called in with fevers, nausea and abdominal pain.  She was recently treated for right ureteral stone 12/27/22, underwent right ureteroscopy and this demonstrated that the patient had successfully passed her stone.     I performed bilateral retrograde pyelograms which demonstrated no obstruction.  I put a temporary stent in on her right side with strings to prevent ureteral inflammation and hydronephrosis.  The stent was inadvertently removed by the patient the next day, 2 days earlier than anticipated.  She has been on Macrobid daily to prevent infection however despite Macrobid she called in today calling in with severe fatigue, malaise, nausea and vomiting, suprapubic abdominal pain, and fever to 102 Fahrenheit.  I discussed with the patient the safest option will be to present to the emergency department for evaluation and repeat imaging to determine if her kidney is obstructed or needs stent replacement, she would also benefit from cultures and broad-spectrum antibiotics given her current clinical scenario. Discussed risks of sepsis if she does not get evaluated, she understands.     The patient reports understanding will present to the ED.    Angelo Silvestre MD

## 2022-12-30 NOTE — TELEPHONE ENCOUNTER
Tramadol not on current medication list. Does patient need to be seen first? Last ordered 10/5/2022 with an end date of 12/12/2022 no d/c reason

## 2022-12-30 NOTE — TELEPHONE ENCOUNTER
Patient called and stated that she is having a lot of pain and is running a fever of 102-101 and wanted to know what to do.  I put her on hold and called Dr. Silvestre and he is going to call her and determine if she needs to go to the ER or if she needs antibiotics.

## 2022-12-30 NOTE — TELEPHONE ENCOUNTER
----- Message from eMgan Post sent at 12/29/2022  7:38 PM EST -----  Regarding: Refill on Tramadol  Contact: [phone]  I don't see a refill on my Tramadol in my refill list and I need one. Thank you

## 2022-12-31 PROCEDURE — 25010000002 HYDROMORPHONE 1 MG/ML SOLUTION: Performed by: STUDENT IN AN ORGANIZED HEALTH CARE EDUCATION/TRAINING PROGRAM

## 2022-12-31 PROCEDURE — 96376 TX/PRO/DX INJ SAME DRUG ADON: CPT

## 2022-12-31 PROCEDURE — 96365 THER/PROPH/DIAG IV INF INIT: CPT

## 2022-12-31 PROCEDURE — 87040 BLOOD CULTURE FOR BACTERIA: CPT | Performed by: STUDENT IN AN ORGANIZED HEALTH CARE EDUCATION/TRAINING PROGRAM

## 2022-12-31 RX ORDER — FLUCONAZOLE 150 MG/1
150 TABLET ORAL DAILY
Qty: 2 TABLET | Refills: 0 | Status: SHIPPED | OUTPATIENT
Start: 2022-12-31

## 2022-12-31 RX ORDER — HYDROMORPHONE HYDROCHLORIDE 1 MG/ML
0.25 INJECTION, SOLUTION INTRAMUSCULAR; INTRAVENOUS; SUBCUTANEOUS ONCE
Status: COMPLETED | OUTPATIENT
Start: 2022-12-31 | End: 2022-12-31

## 2022-12-31 RX ADMIN — HYDROMORPHONE HYDROCHLORIDE 0.25 MG: 1 INJECTION, SOLUTION INTRAMUSCULAR; INTRAVENOUS; SUBCUTANEOUS at 00:48

## 2022-12-31 NOTE — TELEPHONE ENCOUNTER
"Patient called me while she was awaiting to be evaluated in the ED. She was transferred through the call center. She states she is leaving the ER due to wait times and states that \"she would rather die in her own bed than stay in the ED waiting\". I recommended to patient she should stay in ED and await evaluation given her symptoms.  Patient states she \"is stubborn\" and wants to go home. She states she wants stronger pain medications and antibiotics and she \"will keep me updated\" over the weekend. Return precautions discussed, patient accepts risks of sepsis and elects to leave ED.     Angelo Silvestre MD    "

## 2022-12-31 NOTE — DISCHARGE INSTRUCTIONS
Use the antibiotic and pain medication that Dr. Silvestre has called in for you.  I have also called in the Diflucan to prophylax against yeast infection.  Continue to monitor your symptoms and follow-up with urology.  If you feel like your pain is worsening or fevers persist please return to the ED or seek other medical care.

## 2023-01-01 LAB — BACTERIA SPEC AEROBE CULT: NO GROWTH

## 2023-01-02 RX ORDER — TRAMADOL HYDROCHLORIDE 50 MG/1
50 TABLET ORAL 2 TIMES DAILY PRN
Qty: 60 TABLET | Refills: 0 | Status: SHIPPED | OUTPATIENT
Start: 2023-01-02 | End: 2023-02-07 | Stop reason: SDUPTHER

## 2023-01-03 NOTE — ED PROVIDER NOTES
EMERGENCY DEPARTMENT ENCOUNTER    Pt Name: Megan Post  MRN: [id number]  Pt :   1980  Room Number:    Date of encounter:  2022  PCP: Kalpana Pandey MD  ED Provider: Gilberto Ledesma MD    Historian: Patient      HPI:  Chief Complaint: Flank pain, fever        Context: Megan Post is a 42-year-old female who presents because of worsening pain and fever earlier today after a lithotripsy on the .  She has been experiencing right flank pain and suprapubic pain ever since the surgery and has had persistent hematuria but says today her pain has gotten significantly worse.  She has mild right-sided flank pain but severe suprapubic pain.  She says she spiked a fever of 102.5 at home and took ibuprofen around 7 PM.  She has also been taking pain pills with no improvement in her symptoms.  She says she has bipolar and anxiety and this has been extremely stressful for her.  She contacted her urologist who told her to come here for imaging and to rule out infection.  Currently rates her pain as severe and gets worse with urination.  No other complaints at this time.    PAST MEDICAL HISTORY  Past Medical History:   Diagnosis Date   • Abdominal pain    • Abnormal breast exam    • Abnormal Pap smear of cervix    • Achilles tendinitis    • Acute sinusitis    • Anxiety    • Arthritis    • Asthma    • Tavera's syndrome    • Bipolar 1 disorder (HCC)    • Bowel trouble    • Breast cyst    • Colon polyps    • Constipation    • Depression    • Diverticulitis    • Diverticulitis of colon    • Endometriosis    • Eustachian tube dysfunction    • Extremity pain    • Fatty liver    • Female pelvic pain    • Fibromyalgia    • Gastric ulcer    • H/O bladder infections    • History of rashes as a child    • Irritable bowel syndrome    • Low back pain    • Lumbar radiculopathy    • Menopausal symptoms    • Muscle weakness    • Sexual problems    • Spinal stenosis of lumbar region          PAST SURGICAL  HISTORY  Past Surgical History:   Procedure Laterality Date   •  SECTION     • CHOLECYSTECTOMY     • COLONOSCOPY  2017   • HYSTERECTOMY      endometriosis   • NASAL ENDOSCOPY     • OOPHORECTOMY Bilateral    • OTHER SURGICAL HISTORY      Laparoscopy (diagnostic) gynecologic with biopsy   • SHOULDER SURGERY     • UPPER GASTROINTESTINAL ENDOSCOPY  2019   • URETEROSCOPY LASER LITHOTRIPSY WITH STENT INSERTION Right 2022    Procedure: CYSTOSCOPY URETEROSCOPY WITH RETROGRADE PYELOGRAMS,  AND STENT PLACEMENT- RIGHT;  Surgeon: Angelo Silvestre MD;  Location: [facility];  Service: Urology;  Laterality: Right;         FAMILY HISTORY  Family History   Problem Relation Age of Onset   • Liver disease Mother    • Diabetes Mother    • Hyperlipidemia Mother    • Hypertension Mother    • Thyroid disease Mother    • Arthritis Father    • Mental illness Father    • Migraines Sister    • Lung cancer Maternal Grandmother    • Cancer Maternal Grandfather         not certain which type, abdominal or pelvic   • Cancer Paternal Grandmother    • Tuberculosis Paternal Grandmother    • Liver cancer Paternal Grandfather    • Mental illness Daughter    • No Known Problems Son    • Stroke Other    • Diabetes Other    • Hyperlipidemia Other    • Hypertension Other    • Mental illness Other    • Migraines Other    • Breast cancer Neg Hx    • Endometrial cancer Neg Hx    • Ovarian cancer Neg Hx          SOCIAL HISTORY  Social History     Socioeconomic History   • Marital status: Single   Tobacco Use   • Smoking status: Former     Packs/day: 0.25     Years: 10.00     Pack years: 2.50     Types: Cigarettes     Start date: 2003     Quit date: 2021     Years since quittin.5   • Smokeless tobacco: Never   • Tobacco comments:     was a social smoker, did not smoke daily   Vaping Use   • Vaping Use: Never used   Substance and Sexual Activity   • Alcohol use: No   • Drug use: Never   • Sexual activity: Yes     Partners: Male      Birth control/protection: Surgical         ALLERGIES  Adhesive tape, Latex, Sulfa antibiotics, Biaxin [clarithromycin], Codeine, Penicillins, and Nuts        REVIEW OF SYSTEMS  Review of Systems     All systems reviewed and negative except for those discussed in HPI.       PHYSICAL EXAM    I have reviewed the triage vital signs and nursing notes.    ED Triage Vitals [12/30/22 1921]   Temp Heart Rate Resp BP SpO2   99.1 °F (37.3 °C) 110 18 123/85 98 %      Temp src Heart Rate Source Patient Position BP Location FiO2 (%)   Oral Monitor Sitting Right arm --       Physical Exam  GENERAL:   Appears in obvious discomfort and emotional distress  HENT: Nares patent.  EYES: No scleral icterus.  CV: Regular rhythm, regular rate.  RESPIRATORY: Normal effort.  No audible wheezes, rales or rhonchi.  ABDOMEN: Soft, endorses tenderness to palpation in the right lower quadrant and suprapubic area  MUSCULOSKELETAL: No deformities.   NEURO: Alert, moves all extremities, follows commands.  SKIN: Warm, dry, no rash visualized.      LAB RESULTS  No results found for this or any previous visit (from the past 24 hour(s)).    If labs were ordered, I independently reviewed the results and considered them in treating the patient.        RADIOLOGY  No Radiology Exams Resulted Within Past 24 Hours    I ordered and independently reviewed the above noted radiographic studies.      I viewed images of chest x-ray which is clear.  Contrasted CT scan of the abdomen pelvis which shows no evidence of obstruction or hydronephrosis she does have inflammation around the distal ureter consistent with ureteritis but also potentially normal postoperative changes.  Discussed with Dr. Sangeetha virk her urologist who seems this is normal postoperative changes.  CT scan formal over read does appreciate possible viral pneumonia in the lower lung fields.  See radiologist's dictation for official interpretation.        PROCEDURES    Procedures    No orders to  display       MEDICATIONS GIVEN IN ER    Medications   lactated ringers bolus 1,000 mL (0 mL Intravenous Stopped 12/31/22 0049)   ketorolac (TORADOL) injection 15 mg (15 mg Intravenous Given 12/30/22 2304)   HYDROmorphone (DILAUDID) injection 0.25 mg (0.25 mg Intravenous Given 12/30/22 2304)   iopamidol (ISOVUE-300) 61 % injection 100 mL (100 mL Intravenous Given 12/30/22 2233)   cefTRIAXone (ROCEPHIN) 2 g/100 mL 0.9% NS IVPB (MBP) (0 g Intravenous Stopped 12/31/22 0138)   HYDROmorphone (DILAUDID) injection 0.25 mg (0.25 mg Intravenous Given 12/31/22 0048)         MEDICAL DECISION MAKING, PROGRESS, and CONSULTS    All labs have been independently reviewed by me.  All radiology studies have been reviewed by me and the radiologist dictating the report.  All EKG's have been independently viewed and interpreted by me.      Discussion below represents my analysis of pertinent findings related to patient's condition, differential diagnosis, treatment plan and final disposition.      Differential diagnosis:    Cystitis, pyelonephritis, ureteritis.  Hydronephrosis and urinary obstruction.  Pneumonia, pneumothorax, anemia, electrolyte abnormality, bacteremia      Additional sources:        - External (non-ED) record review: Recent urology operative notes    - Chronic or social conditions impacting care: Psychiatric stressors          Orders placed during this visit:  Orders Placed This Encounter   Procedures   • Blood Culture - Blood,   • Blood Culture - Blood,   • COVID PRE-OP / PRE-PROCEDURE SCREENING ORDER (NO ISOLATION) - Swab, Nasopharynx   • Respiratory Panel PCR w/COVID-19(SARS-CoV-2) JEN/MARINA/ADIEL/PAD/COR/MAD/JESSICA In-House, NP Swab in UTM/VTM, 3-4 HR TAT - Swab, Nasopharynx   • Urine Culture - Urine,   • CT Abdomen Pelvis With Contrast   • XR Chest 1 View   • [place] Draw   • Comprehensive Metabolic Panel   • Lipase   • Urinalysis With Microscopic If Indicated (No Culture) - Urine, Clean Catch   • CBC Auto Differential    • Lactic Acid, Plasma   • Urinalysis, Microscopic Only - Urine, Clean Catch   • Undress & Gown   • CBC & Differential   • Green Top (Gel)   • Lavender Top   • Gold Top - SST   • Gray Top   • Light Blue Top         Additional orders considered but not ordered:  Considered hospital admission for IV antibiotics but discussed with Dr. Silvestre her urologist who is reassured by the findings and absence of obstruction.  He will schedule her for close follow-up.    ED Course:    Consultants:      ED Course as of 01/03/23 1419   Fri Dec 30, 2022   2132 In summary this is a very nice 42-year-old female who presents because of worsening pain and fever earlier today after a lithotripsy on the 27th.  She has been experiencing right flank pain and suprapubic pain ever since the surgery and has had persistent hematuria but says today her pain has gotten significantly worse.  She has mild right-sided flank pain but severe suprapubic pain.  She says she spiked a fever of 102.5 at home and took ibuprofen around 7 PM.  She has also been taking pain pills with no improvement in her symptoms.  She says she has bipolar and anxiety and this has been extremely stressful for her.  She contacted her urologist who told her to come here for imaging and to rule out infection.  Currently rates her pain as severe and gets worse with urination.  No other complaints at this time. [CC]      ED Course User Index  [CC] Gilberto Ledesma MD     She arrived awake and alert she was tachycardic initially in triage this had resolved by the time I evaluated her.  Concern for postoperative urinary obstruction and pyelonephritis.  Chest x-ray is clear.  CT scan of abdomen pelvis does not show obstruction but she does have some inflammation in the distal ureter postoperative changes versus ureteritis.  CBC shows mild anemia but no leukocytosis.  Blood cultures have been sent.  No actionable items on CMP.  Urinalysis is consistent with urinary tract  infection with positive nitrite and leukocytes.  No elevation in lactate or lipase.  She complains of cough, full viral panel is negative but she does have findings possibly representing viral pneumonia on CT scan of the abdomen.  Urine cultures have been sent.  Discussed with urology who is reassured by the findings think she can safely discharge.  They have already called her in pain medication and ciprofloxacin.  She is requesting Diflucan for history of yeast infection following antibiotic treatment.  Counseled on strict return precautions verbally expressed understanding of these.             AS OF 14:19 EST VITALS:    BP - 123/85  HR - 110  TEMP - 99.1 °F (37.3 °C) (Oral)  O2 SATS - 98%                  DIAGNOSIS  Final diagnoses:   Acute UTI (urinary tract infection)   Postoperative pain   Viral URI   History of lithotripsy         DISPOSITION  DISCHARGE    Patient discharged in stable condition.    Reviewed implications of results, diagnosis, meds, responsibility to follow up, warning signs and symptoms of possible worsening, potential complications and reasons to return to ER.    Patient/Family voiced understanding of above instructions.    Discussed plan for discharge, as there is no emergent indication for admission.  Pt/family is agreeable and understands need for follow up and possible repeat testing.  Pt/family is aware that discharge does not mean that nothing is wrong but that it indicates no emergency is currently present that requires admission and they must continue care with follow-up as given below or with a physician of their choice.     FOLLOW-UP  Angelo Silvestre MD  [address] [place], [place] [id number] [phone]    Call   For follow-up.         Medication List      New Prescriptions    fluconazole 150 MG tablet  Commonly known as: DIFLUCAN  Take 1 tablet by mouth Daily.     levoFLOXacin 500 MG tablet  Commonly known as: Levaquin  Take 1 tablet by mouth Daily for 7  days.     oxyCODONE-acetaminophen 5-325 MG per tablet  Commonly known as: PERCOCET  Take 1-2 tablets by mouth Every 6 (Six) Hours As Needed for Severe Pain.           Where to Get Your Medications      These medications were sent to [id number] - [place], [place] - [address] AT [facility] - [phone]  - [phone]   [address], [facility] [address]    Phone: [phone]   · fluconazole 150 MG tablet  · levoFLOXacin 500 MG tablet  · oxyCODONE-acetaminophen 5-325 MG per tablet             Please note that portions of this document were completed with voice recognition software.        Gilberto Ledesma MD  01/03/23 2499

## 2023-01-04 ENCOUNTER — TELEPHONE (OUTPATIENT)
Dept: UROLOGY | Facility: CLINIC | Age: 43
End: 2023-01-04
Payer: MEDICARE

## 2023-01-04 DIAGNOSIS — N20.0 RIGHT NEPHROLITHIASIS: Primary | ICD-10-CM

## 2023-01-04 LAB — BACTERIA SPEC AEROBE CULT: NORMAL

## 2023-01-04 NOTE — TELEPHONE ENCOUNTER
----- Message from Megan Post sent at 1/4/2023 10:26 AM EST -----  Regarding: I'm still sick  Contact: [phone]  Can a nurse call me because I'm still running fever and not feeling well. Thanks

## 2023-01-04 NOTE — TELEPHONE ENCOUNTER
Patient states she has a \"bump\" and believes her urethra is swollen, has abdomin pain, dysuria, temp of 100.      Patient was informed to go to the ER, per Dr. Sutherland, earlier when she call our office.     Patient was at ER on 12/30/22, pt does not want to back to the ER and wants for Dr. Sutherland to see her today.    Patient is upset and states \"Dr. Sutherland is not going to blow me off, if he can't see me then I want another doctor in the office to see me today.\"      Patient refuses to go to the ER and is requesting for Dr. Sutherland to see her today.    Dr. sutherland please advise.  Thank you.

## 2023-01-04 NOTE — TELEPHONE ENCOUNTER
S/p right ureteroscopy, patient was found to have no stones.  Patient called in  today with some residual pain in her urethra after cystoscopy.  I called the patient to discuss, she will start taking Pyridium and tramadol as needed for her pain.  I encouraged her to increase her water intake.  We will need to see her back in 8 weeks with renal ultrasound prior which does not appear to be scheduled yet.  Patient reports understanding.  She denies any fevers or flank pain, no significant infection signs or symptoms.  She will monitor her discomfort for now.     PLAN  - renal US and f/u in 8-10 weeks    Angelo Silvestre MD

## 2023-01-04 NOTE — TELEPHONE ENCOUNTER
Patient called and stated that she is still running a fever and is still having dysuria.  She also stated that her urethra is swollen like a bump and is very sore to the touch.  I spoke with Dr. Silvestre and he instructed for her to go to the ER, but she said that she spent 9 hours in the ER the last time she had to go and is not going to go.  She asked if she could come here to have it checked?

## 2023-01-05 LAB — BACTERIA SPEC AEROBE CULT: NORMAL

## 2023-01-09 ENCOUNTER — OFFICE VISIT (OUTPATIENT)
Dept: INTERNAL MEDICINE | Facility: CLINIC | Age: 43
End: 2023-01-09
Payer: MEDICARE

## 2023-01-09 ENCOUNTER — LAB (OUTPATIENT)
Dept: LAB | Facility: HOSPITAL | Age: 43
End: 2023-01-09
Payer: MEDICARE

## 2023-01-09 VITALS
SYSTOLIC BLOOD PRESSURE: 112 MMHG | HEIGHT: 61 IN | BODY MASS INDEX: 32.28 KG/M2 | TEMPERATURE: 97 F | WEIGHT: 171 LBS | DIASTOLIC BLOOD PRESSURE: 78 MMHG | OXYGEN SATURATION: 96 % | HEART RATE: 103 BPM

## 2023-01-09 DIAGNOSIS — E78.1 HYPERTRIGLYCERIDEMIA: ICD-10-CM

## 2023-01-09 DIAGNOSIS — M79.7 FIBROMYALGIA: ICD-10-CM

## 2023-01-09 DIAGNOSIS — Z12.31 ENCOUNTER FOR SCREENING MAMMOGRAM FOR MALIGNANT NEOPLASM OF BREAST: ICD-10-CM

## 2023-01-09 DIAGNOSIS — R73.03 PREDIABETES: ICD-10-CM

## 2023-01-09 DIAGNOSIS — M51.26 LUMBAR DISCOGENIC PAIN SYNDROME: ICD-10-CM

## 2023-01-09 DIAGNOSIS — F43.21 GRIEF: ICD-10-CM

## 2023-01-09 DIAGNOSIS — J18.9 PNEUMONIA OF RIGHT LOWER LOBE DUE TO INFECTIOUS ORGANISM: ICD-10-CM

## 2023-01-09 DIAGNOSIS — E53.8 FOLIC ACID DEFICIENCY: ICD-10-CM

## 2023-01-09 DIAGNOSIS — M50.30 DDD (DEGENERATIVE DISC DISEASE), CERVICAL: ICD-10-CM

## 2023-01-09 DIAGNOSIS — N20.1 RIGHT URETERAL STONE: Primary | ICD-10-CM

## 2023-01-09 PROCEDURE — 80061 LIPID PANEL: CPT

## 2023-01-09 PROCEDURE — 83036 HEMOGLOBIN GLYCOSYLATED A1C: CPT

## 2023-01-09 PROCEDURE — 99214 OFFICE O/P EST MOD 30 MIN: CPT | Performed by: INTERNAL MEDICINE

## 2023-01-09 PROCEDURE — 82746 ASSAY OF FOLIC ACID SERUM: CPT

## 2023-01-09 RX ORDER — DOXYCYCLINE HYCLATE 50 MG/1
50 CAPSULE ORAL 2 TIMES DAILY WITH MEALS
COMMUNITY
Start: 2022-12-29

## 2023-01-09 RX ORDER — TOPIRAMATE 25 MG/1
TABLET ORAL
COMMUNITY
Start: 2022-11-03

## 2023-01-09 RX ORDER — METRONIDAZOLE 7.5 MG/G
GEL TOPICAL
COMMUNITY
Start: 2022-12-08

## 2023-01-09 RX ORDER — AZELAIC ACID 0.15 G/G
GEL TOPICAL
COMMUNITY
Start: 2022-12-08

## 2023-01-09 RX ORDER — ALBUTEROL SULFATE 90 UG/1
AEROSOL, METERED RESPIRATORY (INHALATION)
COMMUNITY
Start: 2022-10-20
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
## 2023-01-10 LAB
CHOLEST SERPL-MCNC: 224 MG/DL (ref 0–200)
FOLATE SERPL-MCNC: 10.8 NG/ML (ref 4.78–24.2)
HBA1C MFR BLD: 5.6 % (ref 4.8–5.6)
HDLC SERPL-MCNC: 38 MG/DL (ref 40–60)
LDLC SERPL CALC-MCNC: 152 MG/DL (ref 0–100)
LDLC/HDLC SERPL: 3.93 {RATIO}
TRIGL SERPL-MCNC: 184 MG/DL (ref 0–150)
VLDLC SERPL-MCNC: 34 MG/DL (ref 5–40)

## 2023-01-20 ENCOUNTER — SPECIALTY PHARMACY (OUTPATIENT)
Dept: ONCOLOGY | Facility: HOSPITAL | Age: 43
End: 2023-01-20
Payer: MEDICARE

## 2023-01-20 NOTE — PROGRESS NOTES
Specialty Pharmacy Refill Coordination Note     Megan is a 42 y.o. female contacted today regarding refills of  Ajovy and Ubrelvy specialty medication(s). Patient is due for injection on 1/31.      Reviewed and verified with patient:       Specialty medication(s) and dose(s) confirmed: yes    Refill Questions    Flowsheet Row Most Recent Value   Changes to allergies? No   Changes to medications? No   New conditions since last clinic visit No   Unplanned office visit, urgent care, ED, or hospital admission in the last 4 weeks  No   How does patient/caregiver feel medication is working? Very good   Financial problems or insurance changes  No   Since the previous refill, were any specialty medication doses or scheduled injections missed or delayed?  No   If yes, please provide the amount N/A   Why were doses missed? N/A   Does this patient require a clinical escalation to a pharmacist? No          Delivery Questions    Flowsheet Row Most Recent Value   Delivery method FedEx   Delivery address correct? Yes   Preferred delivery time? Anytime   Number of medications in delivery 2   Medication being filled and delivered Ubrelvy and Ajovy   Doses left of specialty medications Ajovy=0   Is there any medication that is due not being filled? No   Supplies needed? No supplies needed   Cooler needed? Yes   Do any medications need mixed or dated? No   Copay form of payment Payment plan already set up   Questions or concerns for the pharmacist? No   Are any medications first time fills? No                 Follow-up: 28 day(s)     Lacy North, Pharmacy Technician  Specialty Pharmacy Technician

## 2023-01-24 ENCOUNTER — TELEPHONE (OUTPATIENT)
Dept: INTERNAL MEDICINE | Facility: CLINIC | Age: 43
End: 2023-01-24

## 2023-01-24 NOTE — TELEPHONE ENCOUNTER
Spoke w/ pt, let know appt needed to discuss. No available until next week, pt stated she is having a lot of pain and current medication for this issue isn't working. Scheduled w/ AD tomorrow.

## 2023-01-24 NOTE — TELEPHONE ENCOUNTER
Caller: Megan Post    Relationship: Self    Best call back number: [phone]    What is the best time to reach you: ANY    Who are you requesting to speak with (clinical staff, provider,  specific staff member): FRANCY LOMAX OR HER NURSE     What was the call regarding: PATIENT'S FIBROMYALGIA IS HAVING A FLAIR UP AND SHE IS IN A LOT OF PAIN. SHE DOES NOT WANT TO GO TO THE ER. PLEASE CALL PATIENT TO LET HER KNOW WHAT SHE CAN DO . HER CURRENT MEDICATION IS NOT WORKING AT ALL. PATIENT CAN HARDLY GET OUT OF BED RIGHT NOW     Do you require a callback: YES ASAP

## 2023-01-26 ENCOUNTER — TELEPHONE (OUTPATIENT)
Dept: INTERNAL MEDICINE | Facility: CLINIC | Age: 43
End: 2023-01-26
Payer: MEDICARE

## 2023-01-26 ENCOUNTER — OFFICE VISIT (OUTPATIENT)
Dept: INTERNAL MEDICINE | Facility: CLINIC | Age: 43
End: 2023-01-26
Payer: MEDICARE

## 2023-01-26 VITALS
HEIGHT: 61 IN | SYSTOLIC BLOOD PRESSURE: 110 MMHG | BODY MASS INDEX: 32.74 KG/M2 | DIASTOLIC BLOOD PRESSURE: 72 MMHG | OXYGEN SATURATION: 97 % | HEART RATE: 112 BPM | WEIGHT: 173.4 LBS

## 2023-01-26 DIAGNOSIS — F43.9 STRESS AT HOME: ICD-10-CM

## 2023-01-26 DIAGNOSIS — M79.7 FIBROMYALGIA: ICD-10-CM

## 2023-01-26 DIAGNOSIS — R11.0 NAUSEA: ICD-10-CM

## 2023-01-26 DIAGNOSIS — R52 ACUTE PAIN: Primary | ICD-10-CM

## 2023-01-26 DIAGNOSIS — R05.1 ACUTE COUGH: ICD-10-CM

## 2023-01-26 LAB
EXPIRATION DATE: NORMAL
FLUAV AG UPPER RESP QL IA.RAPID: NOT DETECTED
FLUBV AG UPPER RESP QL IA.RAPID: NOT DETECTED
INTERNAL CONTROL: NORMAL
Lab: NORMAL
SARS-COV-2 AG UPPER RESP QL IA.RAPID: NOT DETECTED

## 2023-01-26 PROCEDURE — 99213 OFFICE O/P EST LOW 20 MIN: CPT | Performed by: NURSE PRACTITIONER

## 2023-01-26 PROCEDURE — 87428 SARSCOV & INF VIR A&B AG IA: CPT | Performed by: NURSE PRACTITIONER

## 2023-01-26 PROCEDURE — 96372 THER/PROPH/DIAG INJ SC/IM: CPT | Performed by: NURSE PRACTITIONER

## 2023-01-26 RX ORDER — KETOROLAC TROMETHAMINE 30 MG/ML
30 INJECTION, SOLUTION INTRAMUSCULAR; INTRAVENOUS ONCE
Status: COMPLETED | OUTPATIENT
Start: 2023-01-26 | End: 2023-01-26

## 2023-01-26 RX ORDER — ONDANSETRON 2 MG/ML
4 INJECTION INTRAMUSCULAR; INTRAVENOUS ONCE
Status: COMPLETED | OUTPATIENT
Start: 2023-01-26 | End: 2023-01-26

## 2023-01-26 RX ADMIN — KETOROLAC TROMETHAMINE 30 MG: 30 INJECTION, SOLUTION INTRAMUSCULAR; INTRAVENOUS at 15:15

## 2023-01-26 RX ADMIN — ONDANSETRON 4 MG: 2 INJECTION INTRAMUSCULAR; INTRAVENOUS at 15:16

## 2023-01-26 NOTE — PROGRESS NOTES
Immunization  Immunization performed in left ventrogluteal and right ventrogluteal by Mikaela Gonzalez MA. Patient tolerated the procedure well without complications.  01/26/23   Mikaela Gonzalez MA

## 2023-01-26 NOTE — TELEPHONE ENCOUNTER
Pt stated that the toradol injection that she received in office today did not help with her pain at all, pt stated she is not going to go to the emergency room and wants to know what she can do for the pain.

## 2023-01-26 NOTE — PROGRESS NOTES
Office Note     Name: Megan Post    : 1980     MRN: [id number]     Chief Complaint  Pain, Stress, and Nausea    Subjective     History of Present Illness:  Megan Post is a 42 y.o. female who presents today for a few concerns    Her main concern today is her pain.  She thinks she is having a flare of her fibromyalgia.  She states she feels like what she would assume an 82-year-old woman feels like.  She feels this flare is due to losing her fiancé in November.  Her pain is worse in the morning.  It is all her joints.  She did note some specific examples of her left ankle and hands.  She feels like her pain is moderate to severe.  She states she did find some pain medication at home from her recent hospitalization.  She is unsure if the milligram is 5 mg or 7.5 mg.  Based on her medication list, I assume that the patient is [facility].  Patient was offered a Toradol injection today.  She did provide verbal consent.  She states she is not able to tolerate ibuprofen but can tolerate Toradol    Patient would also appreciate a COVID and flu test as she has had some nausea.  That is why she had to reschedule her appointment yesterday to today was due to pain and nausea.  She wants to rule these conditions out    Patient states she was supposed to bring paperwork from a recent pain management referral to have us fill it out.  Patient has been to pain management before and had ablations and a nerve block.  Overall she does not want any more injections    Patient is requesting a follow-up x-ray of her lungs.  She states that November/December time.  She had pneumonia and was in the hospital she received IV antibiotics.  At that time she felt very sick.  It was told to her that she needed to have a follow-up chest x-ray.  Patient does report an acute cough today    Nov/dec- pna. Had iv abx. Felt very sick     Review of Systems   Constitutional: Negative for chills, fatigue and fever.         Pain   HENT: Negative for sore throat.    Eyes: Negative for visual disturbance.   Respiratory: Negative for cough and shortness of breath.    Cardiovascular: Negative for chest pain.   Gastrointestinal: Positive for nausea. Negative for abdominal pain.   Skin: Negative for color change.   Allergic/Immunologic: Negative for immunocompromised state.   Neurological: Negative for headaches.   Psychiatric/Behavioral: Negative for behavioral problems.        Stress       Past Medical History:   Diagnosis Date   • Abdominal pain    • Abnormal breast exam    • Abnormal Pap smear of cervix    • Achilles tendinitis    • Acute sinusitis    • Allergic    • Anxiety    • Arthritis    • Asthma    • Tavera's syndrome    • Bipolar 1 disorder (HCC)    • Bowel trouble    • Breast cyst    • Colon polyps    • Constipation    • Depression    • Diverticulitis    • Diverticulitis of colon    • Diverticulosis    • Endometriosis    • Eustachian tube dysfunction    • Extremity pain    • Fatty liver    • Female pelvic pain    • Fibromyalgia    • Fibromyalgia, primary    • Gastric ulcer    • GERD (gastroesophageal reflux disease)    • Glaucoma    • H/O bladder infections    • Headache    • History of medical problems    • History of rashes as a child    • Irritable bowel syndrome    • Kidney stone    • Low back pain    • Lumbar radiculopathy    • Menopausal symptoms    • Muscle weakness    • Obesity    • Sexual problems    • Spinal stenosis of lumbar region    • Visual impairment        Past Surgical History:   Procedure Laterality Date   • ADENOIDECTOMY     •  SECTION     • CHOLECYSTECTOMY     • COLONOSCOPY     • ENDOMETRIAL ABLATION     • HYSTERECTOMY      endometriosis   • NASAL ENDOSCOPY     • OOPHORECTOMY Bilateral    • OTHER SURGICAL HISTORY      Laparoscopy (diagnostic) gynecologic with biopsy   • SHOULDER SURGERY     • TONSILLECTOMY     • UPPER GASTROINTESTINAL ENDOSCOPY  2019   • URETEROSCOPY LASER LITHOTRIPSY WITH  STENT INSERTION Right 2022    Procedure: CYSTOSCOPY URETEROSCOPY WITH RETROGRADE PYELOGRAMS,  AND STENT PLACEMENT- RIGHT;  Surgeon: Angelo Silvestre MD;  Location: [facility];  Service: Urology;  Laterality: Right;       Social History     Socioeconomic History   • Marital status: Single   Tobacco Use   • Smoking status: Former     Packs/day: 0.25     Years: 10.00     Pack years: 2.50     Types: Cigarettes     Start date: 2003     Quit date: 2021     Years since quittin.6   • Smokeless tobacco: Never   • Tobacco comments:     was a social smoker, did not smoke daily   Vaping Use   • Vaping Use: Never used   Substance and Sexual Activity   • Alcohol use: No   • Drug use: Never   • Sexual activity: Not Currently     Partners: Male     Birth control/protection: Surgical, None         Current Outpatient Medications:   •  albuterol (PROVENTIL) (2.5 MG/3ML) 0.083% nebulizer solution, Take 2.5 mg by nebulization 4 (Four) Times a Day As Needed for Wheezing., Disp: 90 each, Rfl: 5  •  albuterol sulfate  (90 Base) MCG/ACT inhaler, , Disp: , Rfl:   •  ALPRAZolam (XANAX) 1 MG tablet, TAKE 1/2 TO 1 TABLET BY MOUTH UP TO TWICE DAILY FOR SEVERE ANXIETY AND PANIC ATTACKS, Disp: , Rfl:   •  azelaic acid (AZELEX) 15 % gel, , Disp: , Rfl:   •  budesonide (PULMICORT) 0.5 MG/2ML nebulizer solution, , Disp: , Rfl:   •  budesonide-formoterol (SYMBICORT) 160-4.5 MCG/ACT inhaler, Inhale 2 puffs 2 (Two) Times a Day., Disp: , Rfl:   •  buPROPion XL (WELLBUTRIN XL) 150 MG 24 hr tablet, Take 150 mg by mouth Every Morning., Disp: , Rfl:   •  cetirizine (zyrTEC) 10 MG tablet, Take 1 tablet by mouth Daily., Disp: , Rfl: 1  •  COLLAGEN PO, Take 1 tablet by mouth Daily., Disp: , Rfl:   •  cyclobenzaprine (FLEXERIL) 10 MG tablet, Take 0.5-1 tablets by mouth 3 (Three) Times a Day As Needed for Muscle Spasms., Disp: 90 tablet, Rfl: 1  •  dicyclomine (Bentyl) 10 MG capsule, Take 1 capsule by mouth 4 (Four) Times a Day Before  Meals & at Bedtime As Needed (abdominal cramping diarrhea)., Disp: 120 capsule, Rfl: 2  •  doxycycline (VIBRAMYCIN) 50 MG capsule, Take 50 mg by mouth 2 (Two) Times a Day With Meals., Disp: , Rfl:   •  EPIPEN 2-JAREN 0.3 MG/0.3ML solution auto-injector injection, U UTD, Disp: , Rfl: 6  •  estradiol (ESTRACE VAGINAL) 0.1 MG/GM vaginal cream, Insert 1 gm intravaginally 1-2 times each week, Disp: 1 each, Rfl: 3  •  estradiol (Estrace) 1 MG tablet, Take 1.5 tablets po qd., Disp: 45 tablet, Rfl: 11  •  fenofibrate 160 MG tablet, Take 160 mg by mouth Daily With Dinner., Disp: , Rfl:   •  fluconazole (DIFLUCAN) 150 MG tablet, Take 1 tablet by mouth Daily., Disp: 2 tablet, Rfl: 0  •  fluticasone (FLONASE) 50 MCG/ACT nasal spray, 2 sprays by Each Nare route Daily. Shake liquid, Disp: , Rfl:   •  folic acid (FOLVITE) 1 MG tablet, Take 1 tablet by mouth Daily., Disp: 90 tablet, Rfl: 3  •  Fremanezumab-vfrm 225 MG/1.5ML solution auto-injector, Inject 225 mg under the skin into the appropriate area as directed Every 28 (Twenty-Eight) Days., Disp: 1.5 mL, Rfl: 11  •  Gentle Laxative 5 MG EC tablet, TAKE 1 TABLET BY MOUTH ONCE DAILY AS NEEDED FOR CONSTIPATION, Disp: , Rfl:   •  Hyoscyamine Sulfate SL (Levsin/SL) 0.125 MG sublingual tablet, Place 0.125 mg under the tongue Every 4 (Four) Hours As Needed (bladder spasms)., Disp: 30 each, Rfl: 0  •  ipratropium-albuterol (DUO-NEB) 0.5-2.5 mg/3 ml nebulizer, , Disp: , Rfl:   •  ketorolac (TORADOL) 10 MG tablet, Take 1 tablet by mouth Every 6 (Six) Hours As Needed for Moderate Pain., Disp: 15 tablet, Rfl: 0  •  lubiprostone (AMITIZA) 8 MCG capsule, Take 8 mcg by mouth 2 (Two) Times a Day As Needed., Disp: , Rfl:   •  Magnesium 250 MG tablet, TK 1-2 TS PO D FOR CONSTIPATION IF TAKING MIRALAX IS NOT EFFECTIVE, Disp: , Rfl:   •  metroNIDAZOLE (METROGEL) 0.75 % gel, , Disp: , Rfl:   •  montelukast (SINGULAIR) 10 MG tablet, TAKE 1 TABLET BY MOUTH EVERY NIGHT, Disp: 30 tablet, Rfl: 3  •   "Multiple Vitamin (MULTI-VITAMIN DAILY PO), Take  by mouth Daily., Disp: , Rfl:   •  omeprazole (priLOSEC) 40 MG capsule, Take 40 mg by mouth 2 (two) times a day., Disp: , Rfl:   •  ondansetron ODT (ZOFRAN-ODT) 4 MG disintegrating tablet, Place 1 tablet on the tongue Every 8 (Eight) Hours As Needed for Nausea or Vomiting., Disp: 20 tablet, Rfl: 0  •  oxyCODONE-acetaminophen (PERCOCET) 5-325 MG per tablet, Take 1-2 tablets by mouth Every 6 (Six) Hours As Needed for Severe Pain., Disp: 15 tablet, Rfl: 0  •  phenazopyridine (Pyridium) 200 MG tablet, Take 1 tablet by mouth 3 (Three) Times a Day As Needed (Urinary pain)., Disp: 15 tablet, Rfl: 0  •  topiramate (TOPAMAX) 25 MG tablet, , Disp: , Rfl:   •  traMADol (ULTRAM) 50 MG tablet, Take 1 tablet by mouth 2 (Two) Times a Day As Needed for Moderate Pain., Disp: 60 tablet, Rfl: 0  •  traZODone (DESYREL) 150 MG tablet, Take 1 tablet by mouth Every Night., Disp: , Rfl: 3  •  ubrogepant 50 MG tablet, Take 1 tablet by mouth at onset of migraine, may repeat in 2 hours.  Max of 200 mg/ 24 hrs, Disp: 10 tablet, Rfl: 11  •  Xhance 93 MCG/ACT Exhaler Suspension, USE ONE SPRAY IN EACH NOSTRIL EVERY DAY AS NEEDED FOR CONGESTION, Disp: 16 mL, Rfl: 0  No current facility-administered medications for this visit.    Objective     Vital Signs  /72   Pulse 112   Ht 154.9 cm (61\")   Wt 78.7 kg (173 lb 6.4 oz)   SpO2 97%   BMI 32.76 kg/m²   Estimated body mass index is 32.76 kg/m² as calculated from the following:    Height as of this encounter: 154.9 cm (61\").    Weight as of this encounter: 78.7 kg (173 lb 6.4 oz).    BMI is >= 30 and <35. (Class 1 Obesity). The following options were offered after discussion;: Address at next visit           Physical Exam  Vitals and nursing note reviewed.   Constitutional:       Appearance: Normal appearance.      Comments: There are no nonverbal indicators of pain today   HENT:      Head: Normocephalic and atraumatic.   Eyes:      " Extraocular Movements: Extraocular movements intact.      Pupils: Pupils are equal, round, and reactive to light.   Cardiovascular:      Rate and Rhythm: Normal rate and regular rhythm.      Pulses: Normal pulses.      Heart sounds: Normal heart sounds.   Pulmonary:      Effort: Pulmonary effort is normal. No respiratory distress.      Breath sounds: Normal breath sounds.      Comments: No cough noted on exam today  Abdominal:      General: Abdomen is flat.      Palpations: Abdomen is soft.   Musculoskeletal:         General: Normal range of motion.   Skin:     General: Skin is warm and dry.   Neurological:      Mental Status: She is alert and oriented to person, place, and time.   Psychiatric:         Mood and Affect: Mood normal.         Behavior: Behavior normal.          Lab Review:   Latest Reference Range & Units 01/26/23 15:34   SARS Antigen Not Detected, Presumptive Negative  Not Detected   Expiration Date  02/08/23   Lot Number  [id number]   Influenza A Antigen HELIO Not Detected  Not Detected   Influenza B Antigen HELIO Not Detected  Not Detected            Assessment and Plan     Diagnoses and all orders for this visit:    1. Acute pain (Primary)  -     ketorolac (TORADOL) injection 30 mg    2. Fibromyalgia    3. Stress at home    4. Nausea  -     ondansetron (ZOFRAN) injection 4 mg  -     POCT SARS-CoV-2 Antigen HELIO + Flu    5. Acute cough  -     XR Chest PA & Lateral; Future    Plan  Regarding her acute pain, patient would appreciate a Toradol injection.  She did provide verbal consent for this medication today.  We did discuss side effects of medication and side effects of injection.  Patient is aware and voiced understanding.  She states she is not able to tolerate ibuprofen but can tolerate Toradol injections    Regarding her nausea, patient requested Phenergan.  Discussed with patient that due to her already being on controlled substances there is increased risk of sedation with adding Phenergan.  She  would appreciate an injection of Zofran.  She did provide verbal consent for this injection    Regarding her cough, chest x-ray was placed.  Please have this completed at your earliest convenience.  Patient will be notified of results    Please keep follow-up appointment with Dr. Pandey as scheduled    Go to ER if any condition worsens or severe    Follow Up  Return for as scheduled Dr. Pandey.    MAGGIE Faye    Part of this note may be an electronic transcription/translation of spoken language to printed text using the Dragon Dictation System.

## 2023-02-06 ENCOUNTER — OFFICE VISIT (OUTPATIENT)
Dept: INTERNAL MEDICINE | Facility: CLINIC | Age: 43
End: 2023-02-06
Payer: MEDICARE

## 2023-02-06 VITALS
WEIGHT: 168 LBS | BODY MASS INDEX: 31.72 KG/M2 | TEMPERATURE: 96.8 F | OXYGEN SATURATION: 99 % | HEART RATE: 98 BPM | DIASTOLIC BLOOD PRESSURE: 72 MMHG | SYSTOLIC BLOOD PRESSURE: 110 MMHG | HEIGHT: 61 IN

## 2023-02-06 DIAGNOSIS — R11.0 NAUSEA: ICD-10-CM

## 2023-02-06 DIAGNOSIS — R05.2 SUBACUTE COUGH: Primary | ICD-10-CM

## 2023-02-06 DIAGNOSIS — R51.9 NONINTRACTABLE HEADACHE, UNSPECIFIED CHRONICITY PATTERN, UNSPECIFIED HEADACHE TYPE: ICD-10-CM

## 2023-02-06 PROCEDURE — 99214 OFFICE O/P EST MOD 30 MIN: CPT | Performed by: NURSE PRACTITIONER

## 2023-02-06 PROCEDURE — 96372 THER/PROPH/DIAG INJ SC/IM: CPT | Performed by: NURSE PRACTITIONER

## 2023-02-06 PROCEDURE — 87428 SARSCOV & INF VIR A&B AG IA: CPT | Performed by: NURSE PRACTITIONER

## 2023-02-06 RX ORDER — KETOROLAC TROMETHAMINE 30 MG/ML
30 INJECTION, SOLUTION INTRAMUSCULAR; INTRAVENOUS ONCE
Status: COMPLETED | OUTPATIENT
Start: 2023-02-06 | End: 2023-02-06

## 2023-02-06 RX ORDER — PROMETHAZINE HYDROCHLORIDE 12.5 MG/1
12.5 TABLET ORAL EVERY 8 HOURS PRN
Qty: 21 TABLET | Refills: 0 | Status: SHIPPED | OUTPATIENT
Start: 2023-02-06

## 2023-02-06 RX ADMIN — KETOROLAC TROMETHAMINE 30 MG: 30 INJECTION, SOLUTION INTRAMUSCULAR; INTRAVENOUS at 16:56

## 2023-02-06 NOTE — PROGRESS NOTES
Immunization  Immunization performed in left ventrogluteal by Hi Schwartz MA. Patient tolerated the procedure well without complications.  02/06/23   Hi Schwartz MA

## 2023-02-07 DIAGNOSIS — M51.26 LUMBAR DISCOGENIC PAIN SYNDROME: ICD-10-CM

## 2023-02-07 DIAGNOSIS — M50.30 DDD (DEGENERATIVE DISC DISEASE), CERVICAL: ICD-10-CM

## 2023-02-07 DIAGNOSIS — M79.7 FIBROMYALGIA: ICD-10-CM

## 2023-02-07 RX ORDER — TRAMADOL HYDROCHLORIDE 50 MG/1
50 TABLET ORAL 2 TIMES DAILY PRN
Qty: 60 TABLET | Refills: 0 | Status: SHIPPED | OUTPATIENT
Start: 2023-02-07

## 2023-02-07 NOTE — TELEPHONE ENCOUNTER
Called pt and she stated she needs tramadol and estradiol. I informed pt we don't do her estradiol and she will contact Dr Ryan's office for refill.

## 2023-02-07 NOTE — PROGRESS NOTES
Office Note     Name: Megan Post    : 1980     MRN: [id number]     Chief Complaint  Fibromyalgia (Having fever for 3 days.) and Nausea    Subjective     History of Present Illness:  Megan Post is a 42 y.o. female who presents today for complaints of nausea, fever, chills, and fibromyalgia flare.  Patient was seen a few weeks ago in this office for a fibromyalgia flare which she reports has not gotten any better.  She reports a fever for the past 3 days.  She has not been sleeping well.  She is requesting Zofran to take during the day and Phenergan to take at night.  She reports she was started on fenofibrate but GI had stopped that.  She is also requesting medication refills.  She reports she is trying to get established with a pain clinic and is waiting on paperwork to be completed by her PCP.  She is also complaining of a migraine headache today across the frontal area that is somewhat relieved with applying pressure.  She is requesting a Toradol injection for her headache today.  Patient denies further complaints or concerns at this time.    Review of Systems   Constitutional: Positive for chills and fever.   Gastrointestinal: Positive for nausea.   Neurological: Positive for headaches.       Past Medical History:   Diagnosis Date   • Abdominal pain    • Abnormal breast exam    • Abnormal Pap smear of cervix    • Achilles tendinitis    • Acute sinusitis    • Allergic    • Anxiety    • Arthritis    • Asthma    • Tavera's syndrome    • Bipolar 1 disorder (HCC)    • Bowel trouble    • Breast cyst    • Colon polyps    • Constipation    • Depression    • Diverticulitis    • Diverticulitis of colon    • Diverticulosis    • Endometriosis    • Eustachian tube dysfunction    • Extremity pain    • Fatty liver    • Female pelvic pain    • Fibromyalgia    • Fibromyalgia, primary    • Gastric ulcer    • GERD (gastroesophageal reflux disease)    • Glaucoma    • H/O bladder infections    • Headache    •  History of medical problems    • History of rashes as a child    • Irritable bowel syndrome    • Kidney stone    • Low back pain    • Lumbar radiculopathy    • Menopausal symptoms    • Muscle weakness    • Obesity    • Sexual problems    • Spinal stenosis of lumbar region    • Visual impairment        Past Surgical History:   Procedure Laterality Date   • ADENOIDECTOMY     •  SECTION     • CHOLECYSTECTOMY     • COLONOSCOPY     • ENDOMETRIAL ABLATION     • HYSTERECTOMY      endometriosis   • NASAL ENDOSCOPY     • OOPHORECTOMY Bilateral    • OTHER SURGICAL HISTORY      Laparoscopy (diagnostic) gynecologic with biopsy   • SHOULDER SURGERY     • TONSILLECTOMY     • UPPER GASTROINTESTINAL ENDOSCOPY  2019   • URETEROSCOPY LASER LITHOTRIPSY WITH STENT INSERTION Right 2022    Procedure: CYSTOSCOPY URETEROSCOPY WITH RETROGRADE PYELOGRAMS,  AND STENT PLACEMENT- RIGHT;  Surgeon: Angelo Silvestre MD;  [facility];  Service: Urology;  Laterality: Right;       Social History     Socioeconomic History   • Marital status: Single   Tobacco Use   • Smoking status: Former     Packs/day: 0.25     Years: 10.00     Pack years: 2.50     Types: Cigarettes     Start date: 2003     Quit date: 2021     Years since quittin.6   • Smokeless tobacco: Never   • Tobacco comments:     was a social smoker, did not smoke daily   Vaping Use   • Vaping Use: Never used   Substance and Sexual Activity   • Alcohol use: No   • Drug use: Never   • Sexual activity: Not Currently     Partners: Male     Birth control/protection: Surgical, None         Current Outpatient Medications:   •  albuterol (PROVENTIL) (2.5 MG/3ML) 0.083% nebulizer solution, Take 2.5 mg by nebulization 4 (Four) Times a Day As Needed for Wheezing., Disp: 90 each, Rfl: 5  •  albuterol sulfate  (90 Base) MCG/ACT inhaler, , Disp: , Rfl:   •  ALPRAZolam (XANAX) 1 MG tablet, TAKE 1/2 TO 1 TABLET BY MOUTH UP TO TWICE DAILY FOR SEVERE ANXIETY  AND PANIC ATTACKS, Disp: , Rfl:   •  azelaic acid (AZELEX) 15 % gel, , Disp: , Rfl:   •  budesonide (PULMICORT) 0.5 MG/2ML nebulizer solution, , Disp: , Rfl:   •  budesonide-formoterol (SYMBICORT) 160-4.5 MCG/ACT inhaler, Inhale 2 puffs 2 (Two) Times a Day., Disp: , Rfl:   •  buPROPion XL (WELLBUTRIN XL) 150 MG 24 hr tablet, Take 150 mg by mouth Every Morning., Disp: , Rfl:   •  cetirizine (zyrTEC) 10 MG tablet, Take 1 tablet by mouth Daily., Disp: , Rfl: 1  •  COLLAGEN PO, Take 1 tablet by mouth Daily., Disp: , Rfl:   •  cyclobenzaprine (FLEXERIL) 10 MG tablet, Take 0.5-1 tablets by mouth 3 (Three) Times a Day As Needed for Muscle Spasms., Disp: 90 tablet, Rfl: 1  •  dicyclomine (Bentyl) 10 MG capsule, Take 1 capsule by mouth 4 (Four) Times a Day Before Meals & at Bedtime As Needed (abdominal cramping diarrhea)., Disp: 120 capsule, Rfl: 2  •  doxycycline (VIBRAMYCIN) 50 MG capsule, Take 50 mg by mouth 2 (Two) Times a Day With Meals., Disp: , Rfl:   •  EPIPEN 2-JAREN 0.3 MG/0.3ML solution auto-injector injection, U UTD, Disp: , Rfl: 6  •  estradiol (ESTRACE VAGINAL) 0.1 MG/GM vaginal cream, Insert 1 gm intravaginally 1-2 times each week, Disp: 1 each, Rfl: 3  •  estradiol (Estrace) 1 MG tablet, Take 1.5 tablets po qd., Disp: 45 tablet, Rfl: 11  •  fenofibrate 160 MG tablet, Take 160 mg by mouth Daily With Dinner., Disp: , Rfl:   •  fluconazole (DIFLUCAN) 150 MG tablet, Take 1 tablet by mouth Daily., Disp: 2 tablet, Rfl: 0  •  fluticasone (FLONASE) 50 MCG/ACT nasal spray, 2 sprays by Each Nare route Daily. Shake liquid, Disp: , Rfl:   •  folic acid (FOLVITE) 1 MG tablet, Take 1 tablet by mouth Daily., Disp: 90 tablet, Rfl: 3  •  Fremanezumab-vfrm 225 MG/1.5ML solution auto-injector, Inject 225 mg under the skin into the appropriate area as directed Every 28 (Twenty-Eight) Days., Disp: 1.5 mL, Rfl: 11  •  Gentle Laxative 5 MG EC tablet, TAKE 1 TABLET BY MOUTH ONCE DAILY AS NEEDED FOR CONSTIPATION, Disp: , Rfl:   •   Hyoscyamine Sulfate SL (Levsin/SL) 0.125 MG sublingual tablet, Place 0.125 mg under the tongue Every 4 (Four) Hours As Needed (bladder spasms)., Disp: 30 each, Rfl: 0  •  ipratropium-albuterol (DUO-NEB) 0.5-2.5 mg/3 ml nebulizer, , Disp: , Rfl:   •  ketorolac (TORADOL) 10 MG tablet, Take 1 tablet by mouth Every 6 (Six) Hours As Needed for Moderate Pain., Disp: 15 tablet, Rfl: 0  •  lubiprostone (AMITIZA) 8 MCG capsule, Take 8 mcg by mouth 2 (Two) Times a Day As Needed., Disp: , Rfl:   •  Magnesium 250 MG tablet, TK 1-2 TS PO D FOR CONSTIPATION IF TAKING MIRALAX IS NOT EFFECTIVE, Disp: , Rfl:   •  metroNIDAZOLE (METROGEL) 0.75 % gel, , Disp: , Rfl:   •  montelukast (SINGULAIR) 10 MG tablet, TAKE 1 TABLET BY MOUTH EVERY NIGHT, Disp: 30 tablet, Rfl: 3  •  Multiple Vitamin (MULTI-VITAMIN DAILY PO), Take  by mouth Daily., Disp: , Rfl:   •  omeprazole (priLOSEC) 40 MG capsule, Take 40 mg by mouth 2 (two) times a day., Disp: , Rfl:   •  ondansetron ODT (ZOFRAN-ODT) 4 MG disintegrating tablet, Place 1 tablet on the tongue Every 8 (Eight) Hours As Needed for Nausea or Vomiting., Disp: 20 tablet, Rfl: 0  •  oxyCODONE-acetaminophen (PERCOCET) 5-325 MG per tablet, Take 1-2 tablets by mouth Every 6 (Six) Hours As Needed for Severe Pain., Disp: 15 tablet, Rfl: 0  •  phenazopyridine (Pyridium) 200 MG tablet, Take 1 tablet by mouth 3 (Three) Times a Day As Needed (Urinary pain)., Disp: 15 tablet, Rfl: 0  •  topiramate (TOPAMAX) 25 MG tablet, , Disp: , Rfl:   •  traMADol (ULTRAM) 50 MG tablet, Take 1 tablet by mouth 2 (Two) Times a Day As Needed for Moderate Pain., Disp: 60 tablet, Rfl: 0  •  traZODone (DESYREL) 150 MG tablet, Take 1 tablet by mouth Every Night., Disp: , Rfl: 3  •  ubrogepant 50 MG tablet, Take 1 tablet by mouth at onset of migraine, may repeat in 2 hours.  Max of 200 mg/ 24 hrs, Disp: 10 tablet, Rfl: 11  •  Xhance 93 MCG/ACT Exhaler Suspension, USE ONE SPRAY IN EACH NOSTRIL EVERY DAY AS NEEDED FOR CONGESTION, Disp:  "16 mL, Rfl: 0  •  promethazine (PHENERGAN) 12.5 MG tablet, Take 1 tablet by mouth Every 8 (Eight) Hours As Needed for Nausea or Vomiting., Disp: 21 tablet, Rfl: 0  No current facility-administered medications for this visit.    Objective     Vital Signs  /72   Pulse 98   Temp 96.8 °F (36 °C)   Ht 154.9 cm (61\")   Wt 76.2 kg (168 lb)   SpO2 99%   BMI 31.74 kg/m²   Estimated body mass index is 31.74 kg/m² as calculated from the following:    Height as of this encounter: 154.9 cm (61\").    Weight as of this encounter: 76.2 kg (168 lb).    BMI is >= 30 and <35. (Class 1 Obesity). The following options were offered after discussion;: Addressed at this visit      Physical Exam  Constitutional:       Appearance: Normal appearance.   HENT:      Head: Normocephalic and atraumatic.      Nose: Nose normal.   Eyes:      Extraocular Movements: Extraocular movements intact.      Conjunctiva/sclera: Conjunctivae normal.      Pupils: Pupils are equal, round, and reactive to light.   Cardiovascular:      Rate and Rhythm: Normal rate and regular rhythm.   Pulmonary:      Effort: Pulmonary effort is normal. No respiratory distress.      Breath sounds: Normal breath sounds.   Musculoskeletal:         General: Normal range of motion.      Cervical back: Neck supple.   Skin:     General: Skin is warm and dry.   Neurological:      General: No focal deficit present.      Mental Status: She is alert and oriented to person, place, and time. Mental status is at baseline.   Psychiatric:         Mood and Affect: Mood normal.         Behavior: Behavior normal.         Thought Content: Thought content normal.         Judgment: Judgment normal.          Assessment and Plan     Diagnoses and all orders for this visit:    1. Subacute cough (Primary)  -     XR Chest PA & Lateral; Future    2. Nausea  -     promethazine (PHENERGAN) 12.5 MG tablet; Take 1 tablet by mouth Every 8 (Eight) Hours As Needed for Nausea or Vomiting.  Dispense: 21 " tablet; Refill: 0  -     POCT SARS-CoV-2 Antigen HELIO + Flu    3. Nonintractable headache, unspecified chronicity pattern, unspecified headache type  -     ketorolac (TORADOL) injection 30 mg    Plan:  COVID and flu swab in the office today negative.  Will order chest x-ray to evaluate ongoing cough.  Will give Phenergan to use at bedtime for nausea.  Will give Toradol 30 mg injection x1 in the office today for migraine headache.  Patient is requesting medication refills.  Patient is on multiple medications.  She has a follow-up appointment in place with her PCP Dr. Pandey on 2/27.  Recommend establishing with pain clinic for management of pain and fibromyalgia.    Follow Up  Return if symptoms worsen or fail to improve.    MAGGIE Parks    Part of this note may be an electronic transcription/translation of spoken language to printed text using the Dragon Dictation System.

## 2023-02-07 NOTE — TELEPHONE ENCOUNTER
----- Message from Kalpana Pandey MD sent at 2/7/2023 12:36 PM EST -----  Regarding: Med refills  This patient has requested refills from Joy but she did not refill. I do not prescribe most of her medications. Could you please call her and clarify what she needs refilled because I am not going to refill all medications. Thanks.  ----- Message -----  From: Joy Avila APRN  Sent: 2/7/2023  10:22 AM EST  To: Kalpana Pandey MD    I saw this lady yesterday for an acute visit.  She was asking for refills on ALL of her medications.  I see that she is coming to see you on 2/27.  Just wanted to let you know that I did not refill her medications at this time as she is seeing you in a few weeks.  Thanks.

## 2023-02-08 ENCOUNTER — HOSPITAL ENCOUNTER (OUTPATIENT)
Dept: GENERAL RADIOLOGY | Facility: HOSPITAL | Age: 43
Discharge: HOME | End: 2023-02-08
Admitting: NURSE PRACTITIONER
Payer: MEDICARE

## 2023-02-08 DIAGNOSIS — R05.2 SUBACUTE COUGH: ICD-10-CM

## 2023-02-08 PROCEDURE — 71046 X-RAY EXAM CHEST 2 VIEWS: CPT

## 2023-02-21 ENCOUNTER — HOSPITAL ENCOUNTER (OUTPATIENT)
Dept: ULTRASOUND IMAGING | Facility: HOSPITAL | Age: 43
Discharge: HOME | End: 2023-02-21
Admitting: STUDENT IN AN ORGANIZED HEALTH CARE EDUCATION/TRAINING PROGRAM
Payer: MEDICARE

## 2023-02-21 DIAGNOSIS — N20.0 RIGHT NEPHROLITHIASIS: ICD-10-CM

## 2023-02-21 PROCEDURE — 76775 US EXAM ABDO BACK WALL LIM: CPT

## 2023-02-22 ENCOUNTER — TELEPHONE (OUTPATIENT)
Dept: UROLOGY | Facility: CLINIC | Age: 43
End: 2023-02-22
Payer: MEDICARE

## 2023-02-22 ENCOUNTER — SPECIALTY PHARMACY (OUTPATIENT)
Dept: ONCOLOGY | Facility: HOSPITAL | Age: 43
End: 2023-02-22
Payer: MEDICARE

## 2023-02-22 NOTE — TELEPHONE ENCOUNTER
I called the patient with the results of her renal ultrasound post right ureteroscopy, no stones were identified, she has no hydronephrosis or residual stones, she has no symptoms. She reports understanding.  She can follow-up as needed.    Angelo Silvestre MD

## 2023-02-22 NOTE — PROGRESS NOTES
Specialty Pharmacy Refill Coordination Note     Megan is a 42 y.o. female contacted today regarding refills of  Ajovy and Ubrelvy specialty medication(s). Patient is due for injection on 2/27.      Reviewed and verified with patient:       Specialty medication(s) and dose(s) confirmed: yes    Refill Questions    Flowsheet Row Most Recent Value   Changes to allergies? No   Changes to medications? No   New conditions since last clinic visit No   Unplanned office visit, urgent care, ED, or hospital admission in the last 4 weeks  No   How does patient/caregiver feel medication is working? Very good   Financial problems or insurance changes  No   Since the previous refill, were any specialty medication doses or scheduled injections missed or delayed?  No   If yes, please provide the amount N/A   Why were doses missed? N/A   Does this patient require a clinical escalation to a pharmacist? No          Delivery Questions    Flowsheet Row Most Recent Value   Delivery method FedEx   Delivery address correct? Yes   Preferred delivery time? Anytime   Number of medications in delivery 2   Medication being filled and delivered Ubrelvy and Ajovy   Doses left of specialty medications Ajovy=0   Is there any medication that is due not being filled? No   Supplies needed? No supplies needed   Cooler needed? Yes   Do any medications need mixed or dated? No   Copay form of payment Payment plan already set up   Questions or concerns for the pharmacist? No   Are any medications first time fills? No                 Follow-up: 28 day(s)     Lacy North, Pharmacy Technician  Specialty Pharmacy Technician

## 2023-02-27 ENCOUNTER — TELEPHONE (OUTPATIENT)
Dept: INTERNAL MEDICINE | Facility: CLINIC | Age: 43
End: 2023-02-27

## 2023-02-27 NOTE — TELEPHONE ENCOUNTER
Called pt and clarified the reason for today's visit and the need for another chest xray as noted for today's visit. Pt was given unclear information from [facility] when she called to cancel visit and was offended that it was listed as reason being mood. I explained why that is listed that way and she verbalized her understanding. Pt will get the other xray when she feels better from her stomach bug and will keep May appt unless Dannie advises otherwise after xray.

## 2023-02-27 NOTE — TELEPHONE ENCOUNTER
Caller: Megan Post    Relationship to patient: Self    Best call back number: [phone]    Patient is needing: CALL BACK FROM NURSE IN [facility] TO NOTES ON 4 WEEK FOLLOW UP CANCELED APPOINTMENT 2/27. PATIENT STATES SHE FEELS THIS IS INCORRECT.

## 2023-03-22 ENCOUNTER — SPECIALTY PHARMACY (OUTPATIENT)
Dept: ONCOLOGY | Facility: HOSPITAL | Age: 43
End: 2023-03-22
Payer: MEDICARE

## 2023-03-22 NOTE — PROGRESS NOTES
Specialty Pharmacy Refill Coordination Note     Megan is a 42 y.o. female contacted today regarding refills of  Ajovy and Ubrelvy specialty medication(s). Patient is due for injection on 3/27.      Reviewed and verified with patient:  Allergies  Meds  Problems       Specialty medication(s) and dose(s) confirmed: yes    Refill Questions    Flowsheet Row Most Recent Value   Changes to allergies? No   Changes to medications? No   New conditions since last clinic visit No   Unplanned office visit, urgent care, ED, or hospital admission in the last 4 weeks  No   How does patient/caregiver feel medication is working? Very good   Financial problems or insurance changes  No   Since the previous refill, were any specialty medication doses or scheduled injections missed or delayed?  No   If yes, please provide the amount N/A   Why were doses missed? N/A   Does this patient require a clinical escalation to a pharmacist? No          Delivery Questions    Flowsheet Row Most Recent Value   Delivery method FedEx   Delivery address correct? Yes   Preferred delivery time? Anytime   Number of medications in delivery 2   Medication being filled and delivered Ubrelvy and Ajovy   Doses left of specialty medications Ajovy=0   Is there any medication that is due not being filled? No   Supplies needed? No supplies needed   Cooler needed? Yes   Do any medications need mixed or dated? No   Copay form of payment Payment plan already set up   Questions or concerns for the pharmacist? No   Are any medications first time fills? No                 Follow-up: 28 day(s)     Lacy North, Pharmacy Technician  Specialty Pharmacy Technician

## 2023-03-22 NOTE — PROGRESS NOTES
Specialty Pharmacy Patient Management Program  Neurology Reassessment     Megan Post is a 42 y.o. female with migraines seen by a Neurology provider and enrolled in the Neurology Patient Management program offered by [facility].  A follow-up outreach was conducted, including assessment of continued therapy appropriateness, medication adherence, and side effect incidence and management for  Ajovy and Ubrelvy.     Changes to Insurance Coverage or Financial Support  [employer] [place]     Relevant Past Medical History and Comorbidities  Relevant medical history and concomitant health conditions were discussed with the patient. The patient's chart has been reviewed for relevant past medical history and comorbid health conditions and updated as necessary.   Past Medical History:   Diagnosis Date   • Abdominal pain    • Abnormal breast exam    • Abnormal Pap smear of cervix    • Achilles tendinitis    • Acute sinusitis    • Allergic    • Anxiety    • Arthritis    • Asthma    • Tavera's syndrome    • Bipolar 1 disorder (HCC)    • Bowel trouble    • Breast cyst    • Colon polyps    • Constipation    • Depression    • Diverticulitis    • Diverticulitis of colon    • Diverticulosis    • Endometriosis    • Eustachian tube dysfunction    • Extremity pain    • Fatty liver    • Female pelvic pain    • Fibromyalgia    • Fibromyalgia, primary    • Gastric ulcer    • GERD (gastroesophageal reflux disease)    • Glaucoma    • H/O bladder infections    • Headache    • History of medical problems    • History of rashes as a child    • Irritable bowel syndrome    • Kidney stone    • Low back pain    • Lumbar radiculopathy    • Menopausal symptoms    • Muscle weakness    • Obesity    • Sexual problems    • Spinal stenosis of lumbar region    • Visual impairment      Social History     Socioeconomic History   • Marital status: Single   Tobacco Use   • Smoking status: Former     Packs/day: 0.25     Years:  10.00     Pack years: 2.50     Types: Cigarettes     Start date: 2003     Quit date: 2021     Years since quittin.7   • Smokeless tobacco: Never   • Tobacco comments:     was a social smoker, did not smoke daily   Vaping Use   • Vaping Use: Never used   Substance and Sexual Activity   • Alcohol use: No   • Drug use: Never   • Sexual activity: Not Currently     Partners: Male     Birth control/protection: Surgical, None       Problem list reviewed by Deanna Kingsley, PharmD on 3/22/2023 at 11:17 AM    Allergies  Known allergies and reactions were discussed with the patient. The patient's chart has been reviewed for allergy information and updated as necessary.   Allergies   Allergen Reactions   • Adhesive Tape Hives   • Latex Hives   • Sulfa Antibiotics Hives   • Biaxin [Clarithromycin] Nausea Only   • Codeine Itching   • Penicillins Nausea Only   • Nuts Unknown - High Severity     Red heated face         Allergies reviewed by Deanna Kingsley, PharmD on 3/22/2023 at 11:15 AM  Allergies reviewed by Deanna Kingsley, PharmD on 3/22/2023 at 11:16 AM    Relevant Laboratory Values    Common labs    Common Labs 22    [id number]   Glucose  105 (A)  105 (A)     BUN  13  8     Creatinine  0.74  0.56 (A)     Sodium  139  139     Potassium  3.9  3.6     Chloride  104  102     Calcium  9.0  9.0     Albumin  4.60  3.9     Total Bilirubin  0.4  0.3     Alkaline Phosphatase  60  64     AST (SGOT)  21  25     ALT (SGPT)  24  48 (A)     WBC 8.34  9.49      Hemoglobin 13.8  11.9 (A)      Hematocrit 43.2  38.1      Platelets 332  336      Total Cholesterol      224 (A)   Triglycerides      184 (A)   HDL Cholesterol      38 (A)   LDL Cholesterol       152 (A)   Hemoglobin A1C     5.60    (A) Abnormal value       Comments are available for some flowsheets but are not being displayed.               Current Medication List  This medication list has been reviewed  with the patient and evaluated for any interactions or necessary modifications/recommendations, and updated to include all prescription medications, OTC medications, and supplements the patient is currently taking.  This list reflects what is contained in the patient's profile, which has also been marked as reviewed to communicate to other providers it is the most up to date version of the patient's current medication therapy.     Current Outpatient Medications:   •  albuterol (PROVENTIL) (2.5 MG/3ML) 0.083% nebulizer solution, Take 2.5 mg by nebulization 4 (Four) Times a Day As Needed for Wheezing., Disp: 90 each, Rfl: 5  •  albuterol sulfate  (90 Base) MCG/ACT inhaler, , Disp: , Rfl:   •  ALPRAZolam (XANAX) 1 MG tablet, TAKE 1/2 TO 1 TABLET BY MOUTH UP TO TWICE DAILY FOR SEVERE ANXIETY AND PANIC ATTACKS, Disp: , Rfl:   •  azelaic acid (AZELEX) 15 % gel, , Disp: , Rfl:   •  budesonide (PULMICORT) 0.5 MG/2ML nebulizer solution, , Disp: , Rfl:   •  budesonide-formoterol (SYMBICORT) 160-4.5 MCG/ACT inhaler, Inhale 2 puffs 2 (Two) Times a Day., Disp: , Rfl:   •  buPROPion XL (WELLBUTRIN XL) 150 MG 24 hr tablet, Take 150 mg by mouth Every Morning., Disp: , Rfl:   •  cetirizine (zyrTEC) 10 MG tablet, Take 1 tablet by mouth Daily., Disp: , Rfl: 1  •  COLLAGEN PO, Take 1 tablet by mouth Daily., Disp: , Rfl:   •  cyclobenzaprine (FLEXERIL) 10 MG tablet, Take 0.5-1 tablets by mouth 3 (Three) Times a Day As Needed for Muscle Spasms., Disp: 90 tablet, Rfl: 1  •  dicyclomine (Bentyl) 10 MG capsule, Take 1 capsule by mouth 4 (Four) Times a Day Before Meals & at Bedtime As Needed (abdominal cramping diarrhea)., Disp: 120 capsule, Rfl: 2  •  doxycycline (VIBRAMYCIN) 50 MG capsule, Take 50 mg by mouth 2 (Two) Times a Day With Meals., Disp: , Rfl:   •  EPIPEN 2-JAREN 0.3 MG/0.3ML solution auto-injector injection, U UTD, Disp: , Rfl: 6  •  estradiol (ESTRACE VAGINAL) 0.1 MG/GM vaginal cream, Insert 1 gm intravaginally 1-2 times  each week, Disp: 1 each, Rfl: 3  •  estradiol (Estrace) 1 MG tablet, Take 1.5 tablets po qd., Disp: 45 tablet, Rfl: 8  •  fenofibrate 160 MG tablet, Take 160 mg by mouth Daily With Dinner., Disp: , Rfl:   •  fluconazole (DIFLUCAN) 150 MG tablet, Take 1 tablet by mouth Daily., Disp: 2 tablet, Rfl: 0  •  fluticasone (FLONASE) 50 MCG/ACT nasal spray, 2 sprays by Each Nare route Daily. Shake liquid, Disp: , Rfl:   •  folic acid (FOLVITE) 1 MG tablet, Take 1 tablet by mouth Daily., Disp: 90 tablet, Rfl: 3  •  Fremanezumab-vfrm 225 MG/1.5ML solution auto-injector, Inject 225 mg under the skin into the appropriate area as directed Every 28 (Twenty-Eight) Days., Disp: 1.5 mL, Rfl: 11  •  Gentle Laxative 5 MG EC tablet, TAKE 1 TABLET BY MOUTH ONCE DAILY AS NEEDED FOR CONSTIPATION, Disp: , Rfl:   •  Hyoscyamine Sulfate SL (Levsin/SL) 0.125 MG sublingual tablet, Place 0.125 mg under the tongue Every 4 (Four) Hours As Needed (bladder spasms)., Disp: 30 each, Rfl: 0  •  ipratropium-albuterol (DUO-NEB) 0.5-2.5 mg/3 ml nebulizer, , Disp: , Rfl:   •  ketorolac (TORADOL) 10 MG tablet, Take 1 tablet by mouth Every 6 (Six) Hours As Needed for Moderate Pain., Disp: 15 tablet, Rfl: 0  •  lubiprostone (AMITIZA) 8 MCG capsule, Take 8 mcg by mouth 2 (Two) Times a Day As Needed., Disp: , Rfl:   •  Magnesium 250 MG tablet, TK 1-2 TS PO D FOR CONSTIPATION IF TAKING MIRALAX IS NOT EFFECTIVE, Disp: , Rfl:   •  metroNIDAZOLE (METROGEL) 0.75 % gel, , Disp: , Rfl:   •  montelukast (SINGULAIR) 10 MG tablet, TAKE 1 TABLET BY MOUTH EVERY NIGHT, Disp: 30 tablet, Rfl: 3  •  Multiple Vitamin (MULTI-VITAMIN DAILY PO), Take  by mouth Daily., Disp: , Rfl:   •  omeprazole (priLOSEC) 40 MG capsule, Take 40 mg by mouth 2 (two) times a day., Disp: , Rfl:   •  ondansetron ODT (ZOFRAN-ODT) 4 MG disintegrating tablet, Place 1 tablet on the tongue Every 8 (Eight) Hours As Needed for Nausea or Vomiting., Disp: 20 tablet, Rfl: 0  •  oxyCODONE-acetaminophen  (PERCOCET) 5-325 MG per tablet, Take 1-2 tablets by mouth Every 6 (Six) Hours As Needed for Severe Pain., Disp: 15 tablet, Rfl: 0  •  phenazopyridine (Pyridium) 200 MG tablet, Take 1 tablet by mouth 3 (Three) Times a Day As Needed (Urinary pain)., Disp: 15 tablet, Rfl: 0  •  promethazine (PHENERGAN) 12.5 MG tablet, Take 1 tablet by mouth Every 8 (Eight) Hours As Needed for Nausea or Vomiting., Disp: 21 tablet, Rfl: 0  •  topiramate (TOPAMAX) 25 MG tablet, , Disp: , Rfl:   •  traMADol (ULTRAM) 50 MG tablet, Take 1 tablet by mouth 2 (Two) Times a Day As Needed for Moderate Pain., Disp: 60 tablet, Rfl: 0  •  traZODone (DESYREL) 150 MG tablet, Take 1 tablet by mouth Every Night., Disp: , Rfl: 3  •  ubrogepant 50 MG tablet, Take 1 tablet by mouth at onset of migraine, may repeat in 2 hours.  Max of 200 mg/ 24 hrs, Disp: 10 tablet, Rfl: 11  •  Xhance 93 MCG/ACT Exhaler Suspension, USE ONE SPRAY IN EACH NOSTRIL EVERY DAY AS NEEDED FOR CONGESTION, Disp: 16 mL, Rfl: 0    Medicines reviewed by Deanna Kingsley, PharmD on 3/22/2023 at 11:16 AM    Drug Interactions  none     Adverse Drug Reactions  • Adverse Reactions Experienced: none   • Plan for ADR Management: not required    Hospitalizations and Urgent Care Since Last Assessment  • Hospitalizations or Admissions: none   • ED Visits: none  • Urgent Office Visits: none     Adherence and Self-Administration  Adherence related patient's specialty therapy was discussed with the patient. The Adherence segment of this outreach has been reviewed and updated.        • Ongoing or New Barriers to Patient Adherence and/or Self-Administration: none   • Methods for Supporting Patient Adherence and/or Self-Administration: patient adept with Ajovy self-injections.     Goals of Therapy  Goals related to the patient's specialty therapy was discussed with the patient. The Patient Goals segment of this outreach has been reviewed and updated.    Goals     • Specialty Pharmacy General Goal       Reduction in severity and frequency of migraines.             Quality of Life Assessment   Quality of Life related to the patient's specialty therapy was discussed with the patient. The QOL segment of this outreach has been reviewed and updated.     Quality of Life Assessment  Quality of Life Improvement Scale: Somewhat better    Reassessment Plan & Follow-Up  1. Medication Therapy Changes: none  2. Additional Plans, Therapy Recommendations, or Therapy Problems to Be Addressed: none  3. Pharmacist to perform regular reassessments no more than (6) months from the previous assessment.  4. Care Coordinator to set up future refill outreaches, coordinate prescription delivery, and escalate clinical questions to pharmacist.     Attestation  I attest that the specialty medication(s) addressed above are appropriate for the patient based on my reassessment.  If the prescribed therapy is at any point deemed not appropriate based on the current or future assessments, a consultation will be initiated with the patient's specialty care provider to determine the best course of action. The revised plan of therapy will be documented along with any additional patient education provided.     Deanna Kingsley, PharmD  3/22/2023  11:20 EDT

## 2023-03-27 ENCOUNTER — TELEPHONE (OUTPATIENT)
Dept: NEUROLOGY | Facility: CLINIC | Age: 43
End: 2023-03-27

## 2023-03-27 NOTE — TELEPHONE ENCOUNTER
Provider: LYSSA SERRANO  Caller: SERGIO  Pharmacy: [facility]  Phone Number: [phone]  Reason for Call: SERGIO WITH [facility] IN CALLING TO GET A REFILL FOR CRYSTAL DAY OF THE AJOVY. SHE STATED THAT THE OFFICE HAS BEEN CONTACTED 10 DAYS AGO.     THEY ASK IF A NEW RX COULD BE FAXED IN SO THEY CAN MAIL OUT THE AJOVY.   [phone]

## 2023-03-30 ENCOUNTER — HOSPITAL ENCOUNTER (OUTPATIENT)
Dept: MAMMOGRAPHY | Facility: HOSPITAL | Age: 43
Discharge: HOME | End: 2023-03-30
Admitting: INTERNAL MEDICINE
Payer: MEDICARE

## 2023-03-30 DIAGNOSIS — Z12.31 ENCOUNTER FOR SCREENING MAMMOGRAM FOR MALIGNANT NEOPLASM OF BREAST: ICD-10-CM

## 2023-03-30 PROCEDURE — 77063 BREAST TOMOSYNTHESIS BI: CPT | Performed by: RADIOLOGY

## 2023-03-30 PROCEDURE — 77067 SCR MAMMO BI INCL CAD: CPT | Performed by: RADIOLOGY

## 2023-03-30 PROCEDURE — 77067 SCR MAMMO BI INCL CAD: CPT

## 2023-03-30 PROCEDURE — 77063 BREAST TOMOSYNTHESIS BI: CPT

## 2023-04-05 DIAGNOSIS — M79.7 FIBROMYALGIA: ICD-10-CM

## 2023-04-05 DIAGNOSIS — M51.26 LUMBAR DISCOGENIC PAIN SYNDROME: ICD-10-CM

## 2023-04-05 DIAGNOSIS — M50.30 DDD (DEGENERATIVE DISC DISEASE), CERVICAL: ICD-10-CM

## 2023-04-05 NOTE — TELEPHONE ENCOUNTER
LOV with PCP 1/9/23  Order to return in 4 weeks (2/6/23) for follow up CXR and mood.  Pt seen on 1/26 with HALEY Duarte for acute issues.  Pt saw Joy Avila on 2/6/23 for acute issues.  Next scheduled appt with PCP 5/5/23   -History of diabetes mellitus noted and managed at home with metformin and sitagliptin; insulin sliding scale to be administered on inpatient stay -History of hypertension noted and managed at home with metoprolol and with amlodipine; holding at this time in context of acute presentation

## 2023-04-07 RX ORDER — CYCLOBENZAPRINE HCL 10 MG
5-10 TABLET ORAL 3 TIMES DAILY PRN
Qty: 90 TABLET | Refills: 1 | Status: SHIPPED | OUTPATIENT
Start: 2023-04-07

## 2023-04-07 RX ORDER — TRAMADOL HYDROCHLORIDE 50 MG/1
50 TABLET ORAL 2 TIMES DAILY PRN
Qty: 60 TABLET | Refills: 0 | OUTPATIENT
Start: 2023-04-07

## 2023-04-18 ENCOUNTER — SPECIALTY PHARMACY (OUTPATIENT)
Dept: ONCOLOGY | Facility: HOSPITAL | Age: 43
End: 2023-04-18
Payer: MEDICARE

## 2023-04-18 NOTE — PROGRESS NOTES
Specialty Pharmacy Refill Coordination Note     Megan is a 43 y.o. female contacted today regarding refills of  Ajovy and Ubrelvy specialty medication(s). Patient is due for injection on 4/24.      Reviewed and verified with patient:       Specialty medication(s) and dose(s) confirmed: yes    Refill Questions    Flowsheet Row Most Recent Value   Changes to allergies? No   Changes to medications? No   New conditions since last clinic visit No   Unplanned office visit, urgent care, ED, or hospital admission in the last 4 weeks  No   How does patient/caregiver feel medication is working? Very good   Financial problems or insurance changes  No   Since the previous refill, were any specialty medication doses or scheduled injections missed or delayed?  No   If yes, please provide the amount N/A   Why were doses missed? N/A   Does this patient require a clinical escalation to a pharmacist? No          Delivery Questions    Flowsheet Row Most Recent Value   Delivery method FedEx   Delivery address correct? Yes   Preferred delivery time? Anytime   Number of medications in delivery 2   Medication being filled and delivered Ubrelvy and Ajovy   Doses left of specialty medications Ajovy=0   Supplies needed? No supplies needed   Cooler needed? Yes   Do any medications need mixed or dated? No   Copay form of payment Payment plan already set up   Questions or concerns for the pharmacist? No   Are any medications first time fills? No                 Follow-up: 28 day(s)     Lacy North, Pharmacy Technician  Specialty Pharmacy Technician

## 2023-04-30 NOTE — TELEPHONE ENCOUNTER
Patient thinks that she was supposed to get an antibiotic for her sinus' but nothing was sent to her pharmacy. Flovent was sent but she states that it is not going to help.   Can you please advise since selina wont be back till Monday.    no

## 2023-05-08 ENCOUNTER — TELEMEDICINE (OUTPATIENT)
Dept: INTERNAL MEDICINE | Facility: CLINIC | Age: 43
End: 2023-05-08
Payer: MEDICARE

## 2023-05-08 DIAGNOSIS — R05.1 ACUTE COUGH: Primary | ICD-10-CM

## 2023-05-08 DIAGNOSIS — K21.00 GASTROESOPHAGEAL REFLUX DISEASE WITH ESOPHAGITIS WITHOUT HEMORRHAGE: ICD-10-CM

## 2023-05-08 DIAGNOSIS — R11.0 NAUSEA: ICD-10-CM

## 2023-05-08 DIAGNOSIS — J20.8 ACUTE BRONCHITIS DUE TO OTHER SPECIFIED ORGANISMS: ICD-10-CM

## 2023-05-08 RX ORDER — HYDROCODONE BITARTRATE AND ACETAMINOPHEN 5; 325 MG/1; MG/1
1 TABLET ORAL EVERY 12 HOURS SCHEDULED
COMMUNITY
Start: 2023-04-30

## 2023-05-08 RX ORDER — BENZONATATE 100 MG/1
100 CAPSULE ORAL 3 TIMES DAILY PRN
Qty: 30 CAPSULE | Refills: 0 | Status: SHIPPED | OUTPATIENT
Start: 2023-05-08 | End: 2023-05-18

## 2023-05-08 RX ORDER — DOXYCYCLINE HYCLATE 100 MG/1
100 CAPSULE ORAL 2 TIMES DAILY
Qty: 10 CAPSULE | Refills: 0 | Status: SHIPPED | OUTPATIENT
Start: 2023-05-08 | End: 2023-05-13

## 2023-05-08 RX ORDER — BROMPHENIRAMINE MALEATE, PSEUDOEPHEDRINE HYDROCHLORIDE, AND DEXTROMETHORPHAN HYDROBROMIDE 2; 30; 10 MG/5ML; MG/5ML; MG/5ML
5 SYRUP ORAL 4 TIMES DAILY PRN
Qty: 200 ML | Refills: 0 | Status: SHIPPED | OUTPATIENT
Start: 2023-05-08 | End: 2023-05-18

## 2023-05-08 RX ORDER — ONDANSETRON 4 MG/1
4 TABLET, ORALLY DISINTEGRATING ORAL EVERY 8 HOURS PRN
Qty: 20 TABLET | Refills: 0 | Status: SHIPPED | OUTPATIENT
Start: 2023-05-08

## 2023-05-08 NOTE — PROGRESS NOTES
Telehealth Visit     Date: 2023   Patient Name: Megan Pots  : 1980   MRN: [id number]     Chief Complaint:    Chief Complaint   Patient presents with   • Cough   • Nasal Congestion   • Vomiting   • Fever       This provider is located at the [facility] in [place], [place]. The patient is being seen remotely via telehealth at their home address in [place], and stated they are in a secure environment for this session. The patient's condition being diagnosed/treated is appropriate for telemedicine. The provider identified herself as well as her credentials. The patient, and/or patients guardian, consent to be seen remotely, and when consent is given they understand that the consent allows for patient identifiable information to be sent to a third party as needed. They may refuse to be seen remotely at any time. The electronic data is encrypted and password protected, and the patient and/or guardian has been advised of the potential risks to privacy not withstanding such measures.    You have chosen to receive care through a telehealth visit. Do you consent to use a video/audio connection for your medical care today? Yes    History of Present Illness: Megan Post is a 43 y.o. female who is here today to follow up with acute symptoms    Patient regularly sees Dr. Pandey for chronic conditions    Patient has had the symptoms since 1 May.  She reports that she is having nasal congestion and cough with mucus production.  She has had fever with chills but no recorded fever.  She is also had some nausea and vomiting with all the mucus that is being produced.  She has been using her inhaler and doing treatments for her asthma.  She does not feel it is related to her asthma.  The cough is worse at night.  She just feels that her symptoms are not getting any better.  Patient is not able to tolerate oral steroids but she is able to tolerate injectable steroids.  She feels like she could be  having a bronchitis illness.    He is also requesting a refill of Zofran      Subjective      Review of Systems:   Review of Systems   Constitutional: Positive for fever. Negative for chills and fatigue.   HENT: Positive for congestion. Negative for sore throat.    Eyes: Negative for visual disturbance.   Respiratory: Positive for cough. Negative for shortness of breath.    Cardiovascular: Negative for chest pain.   Gastrointestinal: Negative for abdominal pain.   Skin: Negative for color change.   Allergic/Immunologic: Negative for immunocompromised state.   Neurological: Negative for headaches.   Psychiatric/Behavioral: Negative for behavioral problems.       I have reviewed and the following portions of the patient's history were updated as appropriate: past family history, past medical history, past social history, past surgical history and problem list.    Medications:     Current Outpatient Medications:   •  albuterol (PROVENTIL) (2.5 MG/3ML) 0.083% nebulizer solution, Take 2.5 mg by nebulization 4 (Four) Times a Day As Needed for Wheezing., Disp: 90 each, Rfl: 5  •  albuterol sulfate  (90 Base) MCG/ACT inhaler, , Disp: , Rfl:   •  ALPRAZolam (XANAX) 1 MG tablet, TAKE 1/2 TO 1 TABLET BY MOUTH UP TO TWICE DAILY FOR SEVERE ANXIETY AND PANIC ATTACKS, Disp: , Rfl:   •  azelaic acid (AZELEX) 15 % gel, , Disp: , Rfl:   •  budesonide (PULMICORT) 0.5 MG/2ML nebulizer solution, , Disp: , Rfl:   •  budesonide-formoterol (SYMBICORT) 160-4.5 MCG/ACT inhaler, Inhale 2 puffs 2 (Two) Times a Day., Disp: , Rfl:   •  buPROPion XL (WELLBUTRIN XL) 150 MG 24 hr tablet, Take 150 mg by mouth Every Morning., Disp: , Rfl:   •  cetirizine (zyrTEC) 10 MG tablet, Take 1 tablet by mouth Daily., Disp: , Rfl: 1  •  COLLAGEN PO, Take 1 tablet by mouth Daily., Disp: , Rfl:   •  cyclobenzaprine (FLEXERIL) 10 MG tablet, Take 0.5-1 tablets by mouth 3 (Three) Times a Day As Needed for Muscle Spasms., Disp: 90 tablet, Rfl: 1  •   dicyclomine (Bentyl) 10 MG capsule, Take 1 capsule by mouth 4 (Four) Times a Day Before Meals & at Bedtime As Needed (abdominal cramping diarrhea)., Disp: 120 capsule, Rfl: 2  •  doxycycline (VIBRAMYCIN) 50 MG capsule, Take 50 mg by mouth 2 (Two) Times a Day With Meals., Disp: , Rfl:   •  EPIPEN 2-JAREN 0.3 MG/0.3ML solution auto-injector injection, U UTD, Disp: , Rfl: 6  •  estradiol (ESTRACE VAGINAL) 0.1 MG/GM vaginal cream, Insert 1 gm intravaginally 1-2 times each week, Disp: 1 each, Rfl: 3  •  estradiol (Estrace) 1 MG tablet, Take 1.5 tablets po qd., Disp: 45 tablet, Rfl: 8  •  fenofibrate 160 MG tablet, Take 160 mg by mouth Daily With Dinner., Disp: , Rfl:   •  fluconazole (DIFLUCAN) 150 MG tablet, Take 1 tablet by mouth Daily., Disp: 2 tablet, Rfl: 0  •  fluticasone (FLONASE) 50 MCG/ACT nasal spray, 2 sprays by Each Nare route Daily. Shake liquid, Disp: , Rfl:   •  folic acid (FOLVITE) 1 MG tablet, Take 1 tablet by mouth Daily., Disp: 90 tablet, Rfl: 3  •  Fremanezumab-vfrm 225 MG/1.5ML solution auto-injector, Inject 225 mg under the skin into the appropriate area as directed Every 28 (Twenty-Eight) Days., Disp: 1.5 mL, Rfl: 11  •  Gentle Laxative 5 MG EC tablet, TAKE 1 TABLET BY MOUTH ONCE DAILY AS NEEDED FOR CONSTIPATION, Disp: , Rfl:   •  HYDROcodone-acetaminophen (NORCO) 5-325 MG per tablet, Take 1 tablet by mouth Every 12 (Twelve) Hours., Disp: , Rfl:   •  Hyoscyamine Sulfate SL (Levsin/SL) 0.125 MG sublingual tablet, Place 0.125 mg under the tongue Every 4 (Four) Hours As Needed (bladder spasms)., Disp: 30 each, Rfl: 0  •  ipratropium-albuterol (DUO-NEB) 0.5-2.5 mg/3 ml nebulizer, , Disp: , Rfl:   •  ketorolac (TORADOL) 10 MG tablet, Take 1 tablet by mouth Every 6 (Six) Hours As Needed for Moderate Pain., Disp: 15 tablet, Rfl: 0  •  lubiprostone (AMITIZA) 8 MCG capsule, Take 8 mcg by mouth 2 (Two) Times a Day As Needed., Disp: , Rfl:   •  Magnesium 250 MG tablet, TK 1-2 TS PO D FOR CONSTIPATION IF TAKING  MIRALAX IS NOT EFFECTIVE, Disp: , Rfl:   •  metroNIDAZOLE (METROGEL) 0.75 % gel, , Disp: , Rfl:   •  montelukast (SINGULAIR) 10 MG tablet, TAKE 1 TABLET BY MOUTH EVERY NIGHT, Disp: 30 tablet, Rfl: 3  •  Multiple Vitamin (MULTI-VITAMIN DAILY PO), Take  by mouth Daily., Disp: , Rfl:   •  omeprazole (priLOSEC) 40 MG capsule, Take 40 mg by mouth 2 (two) times a day., Disp: , Rfl:   •  ondansetron ODT (ZOFRAN-ODT) 4 MG disintegrating tablet, Place 1 tablet on the tongue Every 8 (Eight) Hours As Needed for Nausea or Vomiting., Disp: 20 tablet, Rfl: 0  •  oxyCODONE-acetaminophen (PERCOCET) 5-325 MG per tablet, Take 1-2 tablets by mouth Every 6 (Six) Hours As Needed for Severe Pain., Disp: 15 tablet, Rfl: 0  •  phenazopyridine (Pyridium) 200 MG tablet, Take 1 tablet by mouth 3 (Three) Times a Day As Needed (Urinary pain)., Disp: 15 tablet, Rfl: 0  •  promethazine (PHENERGAN) 12.5 MG tablet, Take 1 tablet by mouth Every 8 (Eight) Hours As Needed for Nausea or Vomiting., Disp: 21 tablet, Rfl: 0  •  topiramate (TOPAMAX) 25 MG tablet, , Disp: , Rfl:   •  traMADol (ULTRAM) 50 MG tablet, Take 1 tablet by mouth 2 (Two) Times a Day As Needed for Moderate Pain., Disp: 60 tablet, Rfl: 0  •  traZODone (DESYREL) 150 MG tablet, Take 1 tablet by mouth Every Night., Disp: , Rfl: 3  •  ubrogepant (Ubrelvy) 50 MG tablet, Take 1 tablet by mouth at onset of migraine, may repeat in 2 hours.  Max of 200 mg/ 24 hrs, Disp: 10 tablet, Rfl: 11  •  Xhance 93 MCG/ACT Exhaler Suspension, USE ONE SPRAY IN EACH NOSTRIL EVERY DAY AS NEEDED FOR CONGESTION, Disp: 16 mL, Rfl: 0  •  benzonatate (Tessalon Perles) 100 MG capsule, Take 1 capsule by mouth 3 (Three) Times a Day As Needed for Cough for up to 10 days., Disp: 30 capsule, Rfl: 0  •  brompheniramine-pseudoephedrine-DM 30-2-10 MG/5ML syrup, Take 5 mL by mouth 4 (Four) Times a Day As Needed for Cough for up to 10 days., Disp: 200 mL, Rfl: 0  •  doxycycline (VIBRAMYCIN) 100 MG capsule, Take 1 capsule by  mouth 2 (Two) Times a Day for 5 days., Disp: 10 capsule, Rfl: 0    Allergies:   Allergies   Allergen Reactions   • Adhesive Tape Hives   • Latex Hives   • Sulfa Antibiotics Hives   • Biaxin [Clarithromycin] Nausea Only   • Codeine Itching   • Penicillins Nausea Only   • Nuts Unknown - High Severity     Red heated face       Objective     Physical Exam:  Vital Signs: There were no vitals filed for this visit.  There is no height or weight on file to calculate BMI.         Physical Exam  Constitutional:       Appearance: Normal appearance.   HENT:      Head: Normocephalic and atraumatic.   Eyes:      Extraocular Movements: Extraocular movements intact.   Pulmonary:      Effort: No respiratory distress.   Skin:     General: Skin is dry.   Neurological:      Mental Status: She is alert.   Psychiatric:         Mood and Affect: Mood normal.         Behavior: Behavior normal.         Assessment / Plan      Assessment/Plan:   Diagnoses and all orders for this visit:    1. Acute cough (Primary)    2. Acute bronchitis due to other specified organisms  -     doxycycline (VIBRAMYCIN) 100 MG capsule; Take 1 capsule by mouth 2 (Two) Times a Day for 5 days.  Dispense: 10 capsule; Refill: 0  -     brompheniramine-pseudoephedrine-DM 30-2-10 MG/5ML syrup; Take 5 mL by mouth 4 (Four) Times a Day As Needed for Cough for up to 10 days.  Dispense: 200 mL; Refill: 0  -     benzonatate (Tessalon Perles) 100 MG capsule; Take 1 capsule by mouth 3 (Three) Times a Day As Needed for Cough for up to 10 days.  Dispense: 30 capsule; Refill: 0    3. Nausea    4. Gastroesophageal reflux disease with esophagitis without hemorrhage  -     ondansetron ODT (ZOFRAN-ODT) 4 MG disintegrating tablet; Place 1 tablet on the tongue Every 8 (Eight) Hours As Needed for Nausea or Vomiting.  Dispense: 20 tablet; Refill: 0    Plan  Patient will be treated with doxycycline 100 mg twice a day for 5 days.  She will stop her current dose of doxycycline that she is  using for her rosacea.  She has tolerated doxycycline well in the past.  Patient is not able to tolerate oral steroids.  We will send in additional medication to the pharmacy to assist with symptoms.  Bromfed cough syrup we discussed is better use for nighttime as it may make her sleepy.  She can use the Tessalon Perles for daytime as this should not cause side effects of sleepiness.  Refill of Zofran was sent to the pharmacy.  Continue with coughing and deep breathing as well as staying well-hydrated.  Go to ER if any condition worsens or severe.  Keep upcoming appointment with Dr. Pandey on June 26    Follow Up:   Return for as scheduled with Dannie.    Any medications prescribed have been sent electronically to   [id number] - [place], [place] - [address]  AT [facility] [address] [facility] - [phone]  - [phone] FX  [address]  [facility] [address]  Phone: [phone] Fax: [phone]    [id number] - [place], [place] - [address] [facility] [phone]  - [phone]   [address] [facility] [address]  Phone: [phone] Fax: [phone]    [facility]  [address] [place] [address]  Phone: [phone] Fax: [phone]      15 minutes were spent reviewing the patient's questionnaire, formulating a treatment plan, and relaying information to the patient via Well.    MAGGIE Faye    Part of this note may be an electronic transcription/translation of spoken language to printed text using the Dragon Dictation System.   No

## 2023-05-26 ENCOUNTER — SPECIALTY PHARMACY (OUTPATIENT)
Dept: ONCOLOGY | Facility: HOSPITAL | Age: 43
End: 2023-05-26
Payer: MEDICARE
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
## 2023-06-22 PROBLEM — J45.41 MODERATE PERSISTENT ASTHMA WITH ACUTE EXACERBATION: Status: ACTIVE | Noted: 2017-05-18

## 2023-06-22 PROBLEM — J18.9 PNEUMONIA OF RIGHT LOWER LOBE DUE TO INFECTIOUS ORGANISM: Status: RESOLVED | Noted: 2023-01-09 | Resolved: 2023-06-22

## 2023-06-26 PROBLEM — E78.2 MIXED HYPERLIPIDEMIA: Status: ACTIVE | Noted: 2022-10-05

## 2023-07-31 RX ORDER — PREDNISONE 20 MG/1
20 TABLET ORAL DAILY
Qty: 5 TABLET | Refills: 0 | Status: SHIPPED | OUTPATIENT
Start: 2023-07-31

## 2023-08-10 ENCOUNTER — SPECIALTY PHARMACY (OUTPATIENT)
Dept: ONCOLOGY | Facility: HOSPITAL | Age: 43
End: 2023-08-10
Payer: MEDICARE

## 2023-08-10 NOTE — PROGRESS NOTES
Specialty Pharmacy Refill Coordination Note     Megan is a 43 y.o. female contacted today regarding refills of Ajovy and Ubrelvy specialty medication(s).. patient Injection is due 8/20    Reviewed and verified with patient:       Specialty medication(s) and dose(s) confirmed: yes    Refill Questions      Flowsheet Row Most Recent Value   Changes to allergies? No   Changes to medications? No   New conditions since last clinic visit No   Unplanned office visit, urgent care, ED, or hospital admission in the last 4 weeks  No   How does patient/caregiver feel medication is working? Very good   Financial problems or insurance changes  No   Since the previous refill, were any specialty medication doses or scheduled injections missed or delayed?  No   If yes, please provide the amount patient can't check she at work   Why were doses missed? N/A   Does this patient require a clinical escalation to a pharmacist? No            Delivery Questions      Flowsheet Row Most Recent Value   Delivery method [employer]Ex   Delivery address correct? Yes   Delivery phone number mobine phone   Preferred delivery time? Anytime   Number of medications in delivery 2   Medication being filled and delivered Ubrelvy =$0 and Ajovy =$0   Doses left of specialty medications N/A   Is there any medication that is due not being filled? No   Supplies needed? No supplies needed   Cooler needed? Yes   Do any medications need mixed or dated? No   Copay form of payment Payment plan already set up   Additional comments N/A   Questions or concerns for the pharmacist? No   Explain any questions or concerns for the pharmacist N/A   Are any medications first time fills? No                   Follow-up: 30 day(s)     Cecelia Anderson  Specialty Pharmacy Technician

## 2023-08-14 ENCOUNTER — OFFICE VISIT (OUTPATIENT)
Dept: UROLOGY | Facility: CLINIC | Age: 43
End: 2023-08-14
Payer: MEDICARE

## 2023-08-14 VITALS
HEART RATE: 112 BPM | DIASTOLIC BLOOD PRESSURE: 76 MMHG | WEIGHT: 188 LBS | HEIGHT: 61 IN | OXYGEN SATURATION: 98 % | SYSTOLIC BLOOD PRESSURE: 124 MMHG | BODY MASS INDEX: 35.5 KG/M2

## 2023-08-14 DIAGNOSIS — M62.89 PELVIC FLOOR DYSFUNCTION IN FEMALE: ICD-10-CM

## 2023-08-14 DIAGNOSIS — R39.89 SENSATION OF PRESSURE IN BLADDER AREA: Primary | ICD-10-CM

## 2023-08-14 LAB
BILIRUB BLD-MCNC: NEGATIVE MG/DL
CLARITY, POC: CLEAR
COLOR UR: YELLOW
EXPIRATION DATE: NORMAL
GLUCOSE UR STRIP-MCNC: NEGATIVE MG/DL
KETONES UR QL: NEGATIVE
LEUKOCYTE EST, POC: NEGATIVE
Lab: NORMAL
NITRITE UR-MCNC: NEGATIVE MG/ML
PH UR: 6 [PH] (ref 5–8)
PROT UR STRIP-MCNC: NEGATIVE MG/DL
RBC # UR STRIP: NEGATIVE /UL
SP GR UR: 1.01 (ref 1–1.03)
UROBILINOGEN UR QL: NORMAL

## 2023-08-14 PROCEDURE — 99214 OFFICE O/P EST MOD 30 MIN: CPT | Performed by: STUDENT IN AN ORGANIZED HEALTH CARE EDUCATION/TRAINING PROGRAM

## 2023-08-14 PROCEDURE — 51798 US URINE CAPACITY MEASURE: CPT | Performed by: STUDENT IN AN ORGANIZED HEALTH CARE EDUCATION/TRAINING PROGRAM

## 2023-08-14 PROCEDURE — 1159F MED LIST DOCD IN RCRD: CPT | Performed by: STUDENT IN AN ORGANIZED HEALTH CARE EDUCATION/TRAINING PROGRAM

## 2023-08-14 PROCEDURE — 1160F RVW MEDS BY RX/DR IN RCRD: CPT | Performed by: STUDENT IN AN ORGANIZED HEALTH CARE EDUCATION/TRAINING PROGRAM
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
## 2023-08-15 DIAGNOSIS — M50.30 DDD (DEGENERATIVE DISC DISEASE), CERVICAL: ICD-10-CM

## 2023-08-15 DIAGNOSIS — M51.26 LUMBAR DISCOGENIC PAIN SYNDROME: ICD-10-CM

## 2023-08-15 DIAGNOSIS — M79.7 FIBROMYALGIA: ICD-10-CM

## 2023-08-15 NOTE — TELEPHONE ENCOUNTER
Please advise on Rx, your note states needs to have pain management to manage pain for fibromyalgia

## 2023-08-16 RX ORDER — CYCLOBENZAPRINE HCL 10 MG
5-10 TABLET ORAL 3 TIMES DAILY PRN
Qty: 90 TABLET | Refills: 1 | Status: SHIPPED | OUTPATIENT
Start: 2023-08-16

## 2023-08-18 NOTE — TELEPHONE ENCOUNTER
Pt says she has been waiting sometime for prior auth for 7mg Nicotine patch and hasn't heard anything.  Pt is requesting a call back to find out status of authorization.    If you are a smoker, it is important for your health to stop smoking. Please be aware that second hand smoke is also harmful.

## 2023-09-01 ENCOUNTER — SPECIALTY PHARMACY (OUTPATIENT)
Dept: ONCOLOGY | Facility: HOSPITAL | Age: 43
End: 2023-09-01
Payer: MEDICARE

## 2023-09-01 NOTE — PROGRESS NOTES
Specialty Pharmacy Refill Coordination Note     Megan is a 43 y.o. female contacted today regarding refills of Ajovy specialty medication(s). Last Ajovy injection taken on 8/20, next injection due on 9/17. Ubrelvy did not need to be refilled at this time.    Specialty medication(s) and dose(s) confirmed: yes  Changes to medications: no  Changes to insurance: no  Reviewed and verified with patient: yes    Refill Questions      Flowsheet Row Most Recent Value   Changes to allergies? No   Changes to medications? No   New conditions since last clinic visit No   Unplanned office visit, urgent care, ED, or hospital admission in the last 4 weeks  No   How does patient/caregiver feel medication is working? Very good   Financial problems or insurance changes  No   Since the previous refill, were any specialty medication doses or scheduled injections missed or delayed?  No   If yes, please provide the amount N/A   Why were doses missed? N/A   Does this patient require a clinical escalation to a pharmacist? No            Delivery Questions      Flowsheet Row Most Recent Value   Delivery method FedEx   Delivery address correct? Yes   Delivery phone number mobine phone   Preferred delivery time? Anytime   Number of medications in delivery 1   Medication being filled and delivered Ajovy   Doses left of specialty medications N/A   Is there any medication that is due not being filled? No   Supplies needed? No supplies needed   Cooler needed? Yes   Do any medications need mixed or dated? No   Copay form of payment Payment plan already set up   Additional comments Ajovy - $0   Questions or concerns for the pharmacist? No   Explain any questions or concerns for the pharmacist N/A   Are any medications first time fills? No                 Follow-up: 28 day(s)     Brneda Duval, PharmD, MPA, BCPS, MSCS, CSP  9/1/2023   18:22 EDT

## 2023-09-01 NOTE — PROGRESS NOTES
Specialty Pharmacy Patient Management Program  Neurology Reassessment     Megna Post is a 43 y.o. female with migraines seen by a Neurology provider and enrolled in the Neurology Patient Management program offered by [facility].  A follow-up outreach was conducted, including assessment of continued therapy appropriateness, medication adherence, and side effect incidence and management for  Ajovy and Ubrelvy.     Changes to Insurance Coverage or Financial Support  None     Relevant Past Medical History and Comorbidities  Relevant medical history and concomitant health conditions were discussed with the patient. The patient's chart has been reviewed for relevant past medical history and comorbid health conditions and updated as necessary.   Past Medical History:   Diagnosis Date    Abdominal pain     Abnormal breast exam     Abnormal Pap smear of cervix     Achilles tendinitis     Acute sinusitis     Allergic     Anxiety     Arthritis     Asthma     Tavera's syndrome     Bipolar 1 disorder     Bowel trouble     Breast cyst     Colon polyps     Constipation     COPD (chronic obstructive pulmonary disease)     Depression     Diverticulitis     Diverticulitis of colon     Diverticulosis     Endometriosis     Eustachian tube dysfunction     Extremity pain     Fatty liver     Female pelvic pain     Fibromyalgia     Fibromyalgia, primary     Gastric ulcer     GERD (gastroesophageal reflux disease)     Glaucoma     H/O bladder infections     Headache     History of medical problems     History of rashes as a child     Inflammatory bowel disease     Irritable bowel syndrome     Kidney stone     Low back pain     Lumbar radiculopathy     Menopausal symptoms     Muscle weakness     Obesity     Sexual problems     Spinal stenosis of lumbar region     Visual impairment      Social History     Socioeconomic History    Marital status: Single   Tobacco Use    Smoking status: Former     Packs/day: 0.25      Years: 10.00     Pack years: 2.50     Types: Cigarettes     Start date: 2003     Quit date: 2021     Years since quittin.2    Smokeless tobacco: Never    Tobacco comments:     I stopped smoking a while ago   Vaping Use    Vaping Use: Never used   Substance and Sexual Activity    Alcohol use: No    Drug use: Never    Sexual activity: Not Currently     Partners: Male     Birth control/protection: Surgical, None     Problem list reviewed by Brenda Duval PharmD on 2023 at  6:15 PM    Hospitalizations and Urgent Care Since Last Assessment  ED Visits, Admissions, or Hospitalizations: None   Urgent Office Visits: None     Allergies  Known allergies and reactions were discussed with the patient. The patient's chart has been reviewed for allergy information and updated as necessary.   Allergies   Allergen Reactions    Adhesive Tape Hives    Latex Hives    Sulfa Antibiotics Hives    Biaxin [Clarithromycin] Nausea Only    Codeine Itching    Penicillins Nausea Only    Nuts Unknown - High Severity     Red heated face     Allergies reviewed by Brenda Duval PharmD on 2023 at  6:11 PM    Current Medication List  This medication list has been reviewed with the patient and evaluated for any interactions or necessary modifications/recommendations, and updated to include all prescription medications, OTC medications, and supplements the patient is currently taking.  This list reflects what is contained in the patient's profile, which has also been marked as reviewed to communicate to other providers it is the most up to date version of the patient's current medication therapy.     Current Outpatient Medications:     albuterol (PROVENTIL) (2.5 MG/3ML) 0.083% nebulizer solution, Take 2.5 mg by nebulization 4 (Four) Times a Day As Needed for Wheezing., Disp: 90 each, Rfl: 5    albuterol sulfate  (90 Base) MCG/ACT inhaler, Inhale 2 puffs Every 4 (Four) Hours As Needed for Wheezing., Disp: 18 g,  Rfl: 5    azelaic acid (AZELEX) 15 % gel, , Disp: , Rfl:     buPROPion XL (WELLBUTRIN XL) 150 MG 24 hr tablet, Take 1 tablet by mouth Every Morning., Disp: , Rfl:     cetirizine (zyrTEC) 10 MG tablet, Take 1 tablet by mouth Daily., Disp: , Rfl: 1    COLLAGEN PO, Take 1 tablet by mouth Daily., Disp: , Rfl:     cyclobenzaprine (FLEXERIL) 10 MG tablet, Take 0.5-1 tablets by mouth 3 (Three) Times a Day As Needed for Muscle Spasms., Disp: 90 tablet, Rfl: 1    doxycycline (VIBRAMYCIN) 50 MG capsule, Take 1 capsule by mouth 2 (Two) Times a Day., Disp: , Rfl:     EPIPEN 2-JAREN 0.3 MG/0.3ML solution auto-injector injection, U UTD, Disp: , Rfl: 6    estradiol (ESTRACE VAGINAL) 0.1 MG/GM vaginal cream, Insert 1 gm intravaginally 1-2 times each week, Disp: 1 each, Rfl: 3    estradiol (Estrace) 1 MG tablet, Take 1.5 tablets po qd., Disp: 45 tablet, Rfl: 8    Fremanezumab-vfrm 225 MG/1.5ML solution auto-injector, Inject 225 mg under the skin into the appropriate area as directed Every 28 (Twenty-Eight) Days., Disp: 1.5 mL, Rfl: 5    Gentle Laxative 5 MG EC tablet, TAKE 1 TABLET BY MOUTH ONCE DAILY AS NEEDED FOR CONSTIPATION, Disp: , Rfl:     HYDROcodone-acetaminophen (NORCO) 5-325 MG per tablet, Take 1 tablet by mouth Every 12 (Twelve) Hours., Disp: , Rfl:     ipratropium-albuterol (DUO-NEB) 0.5-2.5 mg/3 ml nebulizer, Take 3 mL by nebulization 4 (Four) Times a Day., Disp: 360 mL, Rfl: 5    metroNIDAZOLE (METROGEL) 0.75 % gel, , Disp: , Rfl:     montelukast (SINGULAIR) 10 MG tablet, TAKE 1 TABLET BY MOUTH EVERY NIGHT, Disp: 30 tablet, Rfl: 3    Multiple Vitamin (MULTI-VITAMIN DAILY PO), Take  by mouth Daily., Disp: , Rfl:     omeprazole (priLOSEC) 40 MG capsule, Take 1 capsule by mouth 2 (two) times a day., Disp: , Rfl:     traZODone (DESYREL) 150 MG tablet, Take 1 tablet by mouth Every Night., Disp: , Rfl: 3    ubrogepant (Ubrelvy) 50 MG tablet, Take 1 tablet by mouth at onset of migraine, may repeat in 2 hours.  Max of 200 mg/ 24  hrs, Disp: 10 tablet, Rfl: 11    Xhance 93 MCG/ACT Exhaler Suspension, USE ONE SPRAY IN EACH NOSTRIL EVERY DAY AS NEEDED FOR CONGESTION, Disp: 16 mL, Rfl: 0    Medicines reviewed by Brenda Duval, PharmD on 9/1/2023 at  6:15 PM    Drug Interactions  None identified     Adverse Drug Reactions  Medication tolerability: Tolerating with no to minimal ADRs  Medication plan: Continue therapy with normal follow-up  Plan for ADR Management: N/A      Adherence, Self-Administration, and Current Therapy Problems  Adherence related patient's specialty therapy was discussed with the patient. The Adherence segment of this outreach has been reviewed and updated.   Adherence Questions  Medication(s) assessed: Ajovy and Ubrelvy  On average, how many doses/injections does the patient miss per month?: 0  What are the identified reasons for non-adherence or missed doses? : no problems identfied  What is the estimated medication adherence level?: % (Ajovy PDC - 95%)  Based on the patient/caregiver response and refill history, does this patient require an MTP to track adherence improvements?: no    Additional Barriers to Patient Self-Administration: None  Methods for Supporting Patient Self-Administration: N/A    Goals of Therapy  Goals related to the patient's specialty therapy was discussed with the patient. The Patient Goals segment of this outreach has been reviewed and updated.    Goals        Specialty Pharmacy General Goal      Reduction in severity and frequency of migraines.             Quality of Life Assessment   Quality of Life related to the patient's enrollment in the patient management program and services provided was discussed with the patient. The QOL segment of this outreach has been reviewed and updated.   Quality of Life Improvement Scale: Significantly better    Reassessment Plan & Follow-Up  Medication Therapy Changes: Continue Ajovy 225mg SQ Q28 days for preventative migraine therapy and Ubrelvy 50mg po  daily prn for abortive migraine therapy.   Related Plans, Therapy Recommendations, or Issues to Be Addressed: N/A.  Pharmacist to perform regular reassessments no more than (6) months from the previous assessment.  Care Coordinator to set up future refill outreaches, coordinate prescription delivery, and escalate clinical questions to pharmacist.     Attestation  Therapeutic appropriateness: Appropriate  I attest the patient was actively involved in and has agreed to the above plan of care. If the prescribed therapy is at any point deemed not appropriate based on the current or future assessments, a consultation will be initiated with the patient's specialty care provider to determine the best course of action. The revised plan of therapy will be documented along with any additional patient education provided. Discussed aforementioned material with patient by phone.    Brenda Duval, PharmD, MPA, BCPS, MSCS, CSP  Clinic Specialty Pharmacist, Neurology  9/1/2023  18:18 EDT

## 2023-09-15 ENCOUNTER — TELEPHONE (OUTPATIENT)
Dept: UROLOGY | Facility: CLINIC | Age: 43
End: 2023-09-15
Payer: MEDICARE

## 2023-09-15 NOTE — TELEPHONE ENCOUNTER
Caller: MISSY HERNANDEZ    Relationship: PHYSICAL THERAPIST    Best call back number: [phone]    What form or medical record are you requesting: PROG NOTE AND REF FTP PHYSICAL THERAPY    Who is requesting this form or medical record from you: PT    How would you like to receive the form or medical records (pick-up, mail, fax): FAX  If fax, what is the fax number: [phone]    Timeframe paperwork needed: ASAP    Additional notes: PT IS BEING SEEN ON MONDAY BY PHYSICAL THERAPIST

## 2023-09-19 ENCOUNTER — OFFICE VISIT (OUTPATIENT)
Dept: INTERNAL MEDICINE | Facility: CLINIC | Age: 43
End: 2023-09-19
Payer: MEDICARE

## 2023-09-19 VITALS
HEART RATE: 115 BPM | OXYGEN SATURATION: 95 % | BODY MASS INDEX: 35.3 KG/M2 | DIASTOLIC BLOOD PRESSURE: 72 MMHG | SYSTOLIC BLOOD PRESSURE: 120 MMHG | TEMPERATURE: 98.6 F | WEIGHT: 187 LBS | HEIGHT: 61 IN

## 2023-09-19 DIAGNOSIS — Z20.822 EXPOSURE TO COVID-19 VIRUS: Primary | ICD-10-CM

## 2023-09-19 DIAGNOSIS — R05.9 COUGH IN ADULT: ICD-10-CM

## 2023-09-19 DIAGNOSIS — H92.01 RIGHT EAR PAIN: ICD-10-CM

## 2023-09-19 DIAGNOSIS — R52 BODY ACHES: ICD-10-CM

## 2023-09-19 RX ORDER — DEXAMETHASONE SODIUM PHOSPHATE 10 MG/ML
10 INJECTION INTRAMUSCULAR; INTRAVENOUS ONCE
Status: COMPLETED | OUTPATIENT
Start: 2023-09-19 | End: 2023-09-19

## 2023-09-19 RX ORDER — FLUTICASONE FUROATE, UMECLIDINIUM BROMIDE AND VILANTEROL TRIFENATATE 100; 62.5; 25 UG/1; UG/1; UG/1
1 POWDER RESPIRATORY (INHALATION)
Qty: 1 EACH | Refills: 0 | COMMUNITY
Start: 2023-09-19 | End: 2023-10-05

## 2023-09-19 RX ADMIN — DEXAMETHASONE SODIUM PHOSPHATE 10 MG: 10 INJECTION INTRAMUSCULAR; INTRAVENOUS at 16:32

## 2023-09-20 ENCOUNTER — SPECIALTY PHARMACY (OUTPATIENT)
Dept: ONCOLOGY | Facility: HOSPITAL | Age: 43
End: 2023-09-20
Payer: MEDICARE

## 2023-09-20 NOTE — PROGRESS NOTES
Specialty Pharmacy Refill Coordination Note     Megan is a 43 y.o. female contacted today regarding refills of Ajovy and Ubrelvy specialty medication(s).. patient Injection is due on 10/17    Reviewed and verified with patient:       Specialty medication(s) and dose(s) confirmed: yes    Refill Questions      Flowsheet Row Most Recent Value   Changes to allergies? No   Changes to medications? No   New conditions since last clinic visit No   Unplanned office visit, urgent care, ED, or hospital admission in the last 4 weeks  No   How does patient/caregiver feel medication is working? Very good   Financial problems or insurance changes  No   Since the previous refill, were any specialty medication doses or scheduled injections missed or delayed?  No   If yes, please provide the amount N/A   Why were doses missed? N/A   Does this patient require a clinical escalation to a pharmacist? No            Delivery Questions      Flowsheet Row Most Recent Value   Delivery method FedEx   Delivery address correct? Yes   Delivery phone number mobile phone   Preferred delivery time? Anytime   Number of medications in delivery 2   Medication being filled and delivered Ajovy and Ubrelvy =$0   Doses left of specialty medications N/A   Is there any medication that is due not being filled? No   Supplies needed? No supplies needed   Cooler needed? Yes   Do any medications need mixed or dated? No   Copay form of payment Payment plan already set up   Additional comments N/A   Questions or concerns for the pharmacist? No   Explain any questions or concerns for the pharmacist N/A   Are any medications first time fills? No   Tracking number for delivery N/A                   Follow-up: 30 day(s)     Cecelia Anderson  Specialty Pharmacy Technician

## 2023-10-03 NOTE — PROGRESS NOTES
"    Office Note     Name: Megan Post    : 1980     MRN: [id number]     Chief Complaint  Generalized Body Aches (Sx began on 2023/), Cough, and Exposure To Known Illness    Subjective     History of Present Illness:  Megan Post is a 43 y.o. female who presents today for complaints of cough, fever, and body aches.  Patient would also like to be checked for COVID as she was exposed recently.  She reports symptom onset was on .  She reports the cough is productive with \"mucus\" she could not describe the appearance of the mucus.  She does report fever and chills accompanied by body aches.  She was exposed to COVID as her daughter was COVID-positive.  She has been taking DayQuil, NyQuil, and using an albuterol inhaler as needed.  She follows with Dr. Pandey for chronic conditions.  She denies further complaints or concerns at this time.  She presents today for evaluation of the above acute complaints.      Past Medical History:   Diagnosis Date    Abdominal pain     Abnormal breast exam     Abnormal Pap smear of cervix     Achilles tendinitis     Acute sinusitis     Allergic     Anxiety     Arthritis     Asthma     Tavera's syndrome     Bipolar 1 disorder     Bowel trouble     Breast cyst     Colon polyps     Constipation     COPD (chronic obstructive pulmonary disease)     Depression     Diverticulitis     Diverticulitis of colon     Diverticulosis     Endometriosis     Eustachian tube dysfunction     Extremity pain     Fatty liver     Female pelvic pain     Fibromyalgia     Fibromyalgia, primary     Gastric ulcer     GERD (gastroesophageal reflux disease)     Glaucoma     H/O bladder infections     Headache     History of medical problems     History of rashes as a child     Inflammatory bowel disease     Irritable bowel syndrome     Kidney stone     Low back pain     Lumbar radiculopathy     Menopausal symptoms     Muscle weakness     Obesity     Sexual problems     Spinal stenosis of lumbar " region     Visual impairment        Past Surgical History:   Procedure Laterality Date    ADENOIDECTOMY       SECTION      CHOLECYSTECTOMY      COLONOSCOPY  2017    ENDOMETRIAL ABLATION      HYSTERECTOMY      endometriosis    NASAL ENDOSCOPY      OOPHORECTOMY Bilateral     OTHER SURGICAL HISTORY      Laparoscopy (diagnostic) gynecologic with biopsy    SHOULDER SURGERY      SINUS SURGERY      TONSILLECTOMY      UPPER GASTROINTESTINAL ENDOSCOPY  2019    URETEROSCOPY LASER LITHOTRIPSY WITH STENT INSERTION Right 2022    Procedure: CYSTOSCOPY URETEROSCOPY WITH RETROGRADE PYELOGRAMS,  AND STENT PLACEMENT- RIGHT;  Surgeon: Angelo Silvestre MD;  Location: [facility];  Service: Urology;  Laterality: Right;       Social History     Socioeconomic History    Marital status: Single   Tobacco Use    Smoking status: Former     Packs/day: 0.25     Years: 10.00     Pack years: 2.50     Types: Cigarettes     Start date: 2003     Quit date: 2021     Years since quittin.3    Smokeless tobacco: Never    Tobacco comments:     I stopped smoking a while ago   Vaping Use    Vaping Use: Never used   Substance and Sexual Activity    Alcohol use: No    Drug use: Never    Sexual activity: Not Currently     Partners: Male     Birth control/protection: Surgical, None         Current Outpatient Medications:     albuterol (PROVENTIL) (2.5 MG/3ML) 0.083% nebulizer solution, Take 2.5 mg by nebulization 4 (Four) Times a Day As Needed for Wheezing., Disp: 90 each, Rfl: 5    albuterol sulfate  (90 Base) MCG/ACT inhaler, Inhale 2 puffs Every 4 (Four) Hours As Needed for Wheezing., Disp: 18 g, Rfl: 5    azelaic acid (AZELEX) 15 % gel, , Disp: , Rfl:     buPROPion XL (WELLBUTRIN XL) 150 MG 24 hr tablet, Take 1 tablet by mouth Every Morning., Disp: , Rfl:     cetirizine (zyrTEC) 10 MG tablet, Take 1 tablet by mouth Daily., Disp: , Rfl: 1    COLLAGEN PO, Take 1 tablet by mouth Daily., Disp: , Rfl:     cyclobenzaprine  (FLEXERIL) 10 MG tablet, Take 0.5-1 tablets by mouth 3 (Three) Times a Day As Needed for Muscle Spasms., Disp: 90 tablet, Rfl: 1    doxycycline (VIBRAMYCIN) 50 MG capsule, Take 1 capsule by mouth 2 (Two) Times a Day., Disp: , Rfl:     EPIPEN 2-JAREN 0.3 MG/0.3ML solution auto-injector injection, U UTD, Disp: , Rfl: 6    estradiol (ESTRACE VAGINAL) 0.1 MG/GM vaginal cream, Insert 1 gm intravaginally 1-2 times each week, Disp: 1 each, Rfl: 3    estradiol (Estrace) 1 MG tablet, Take 1.5 tablets po qd., Disp: 45 tablet, Rfl: 8    Fremanezumab-vfrm 225 MG/1.5ML solution auto-injector, Inject 225 mg under the skin into the appropriate area as directed Every 28 (Twenty-Eight) Days., Disp: 1.5 mL, Rfl: 5    Gentle Laxative 5 MG EC tablet, TAKE 1 TABLET BY MOUTH ONCE DAILY AS NEEDED FOR CONSTIPATION, Disp: , Rfl:     HYDROcodone-acetaminophen (NORCO) 5-325 MG per tablet, Take 1 tablet by mouth Every 12 (Twelve) Hours., Disp: , Rfl:     ipratropium-albuterol (DUO-NEB) 0.5-2.5 mg/3 ml nebulizer, Take 3 mL by nebulization 4 (Four) Times a Day., Disp: 360 mL, Rfl: 5    metroNIDAZOLE (METROGEL) 0.75 % gel, , Disp: , Rfl:     montelukast (SINGULAIR) 10 MG tablet, TAKE 1 TABLET BY MOUTH EVERY NIGHT, Disp: 30 tablet, Rfl: 3    Multiple Vitamin (MULTI-VITAMIN DAILY PO), Take  by mouth Daily., Disp: , Rfl:     omeprazole (priLOSEC) 40 MG capsule, Take 1 capsule by mouth 2 (two) times a day., Disp: , Rfl:     traZODone (DESYREL) 150 MG tablet, Take 1 tablet by mouth Every Night., Disp: , Rfl: 3    ubrogepant (Ubrelvy) 50 MG tablet, Take 1 tablet by mouth at onset of migraine, may repeat in 2 hours.  Max of 200 mg/ 24 hrs, Disp: 10 tablet, Rfl: 11    Xhance 93 MCG/ACT Exhaler Suspension, USE ONE SPRAY IN EACH NOSTRIL EVERY DAY AS NEEDED FOR CONGESTION, Disp: 16 mL, Rfl: 0    Fluticasone-Umeclidin-Vilant (Trelegy Ellipta) 100-62.5-25 MCG/ACT inhaler, Inhale 1 puff Daily., Disp: 1 each, Rfl: 0    Objective     Vital Signs  /72    "Pulse 115   Temp 98.6 °F (37 °C)   Ht 154.9 cm (60.98\")   Wt 84.8 kg (187 lb)   SpO2 95%   BMI 35.35 kg/m²   Estimated body mass index is 35.35 kg/m² as calculated from the following:    Height as of this encounter: 154.9 cm (60.98\").    Weight as of this encounter: 84.8 kg (187 lb).           Physical Exam  Constitutional:       General: She is not in acute distress.     Appearance: Normal appearance. She is not ill-appearing.   HENT:      Head: Normocephalic and atraumatic.      Right Ear: Tympanic membrane, ear canal and external ear normal.      Left Ear: Tympanic membrane, ear canal and external ear normal.      Nose: Nose normal.      Mouth/Throat:      Mouth: Mucous membranes are moist.      Pharynx: Oropharynx is clear. No oropharyngeal exudate or posterior oropharyngeal erythema.   Eyes:      Extraocular Movements: Extraocular movements intact.      Conjunctiva/sclera: Conjunctivae normal.      Pupils: Pupils are equal, round, and reactive to light.   Cardiovascular:      Rate and Rhythm: Normal rate and regular rhythm.      Heart sounds: Normal heart sounds.   Pulmonary:      Effort: Pulmonary effort is normal. No respiratory distress.      Breath sounds: Normal breath sounds.   Musculoskeletal:         General: Normal range of motion.      Cervical back: Neck supple.   Skin:     General: Skin is warm and dry.   Neurological:      General: No focal deficit present.      Mental Status: She is alert and oriented to person, place, and time. Mental status is at baseline.   Psychiatric:         Mood and Affect: Mood normal.         Behavior: Behavior normal.         Thought Content: Thought content normal.         Judgment: Judgment normal.        Assessment and Plan     Diagnoses and all orders for this visit:    1. Exposure to COVID-19 virus (Primary)  -     POCT SARS-CoV-2 Antigen HELIO + Flu    2. Body aches  -     POCT SARS-CoV-2 Antigen HELIO + Flu    3. Cough in adult  -     POCT SARS-CoV-2 Antigen HELIO " + Flu  -     Fluticasone-Umeclidin-Vilant (Trelegy Ellipta) 100-62.5-25 MCG/ACT inhaler; Inhale 1 puff Daily.  Dispense: 1 each; Refill: 0  -     dexAMETHasone (DECADRON) injection 10 mg    4. Right ear pain  -     dexAMETHasone (DECADRON) injection 10 mg    Plan:  COVID and flu swab in the office today negative.  Patient declines Medrol Dosepak.  Will give dexamethasone 10 mg IM x1 in the office today.  Sample of Trelegy inhaler provided to the patient.  Instructed on use.  Advised patient to rinse mouth out after using.  May continue to use DayQuil, NyQuil, and albuterol inhaler as needed for symptoms.  Continue with adequate oral hydration and rest.  Return to clinic if symptoms worsen or fail to improve with current plan of care.  Keep scheduled follow-up appointment with Dr. Pandey.    Follow Up  Return if symptoms worsen or fail to improve.    MAGGIE Parks    Part of this note may be an electronic transcription/translation of spoken language to printed text using the Dragon Dictation System.

## 2023-10-05 ENCOUNTER — LAB (OUTPATIENT)
Dept: LAB | Facility: HOSPITAL | Age: 43
End: 2023-10-05
Payer: MEDICARE

## 2023-10-05 ENCOUNTER — OFFICE VISIT (OUTPATIENT)
Dept: INTERNAL MEDICINE | Facility: CLINIC | Age: 43
End: 2023-10-05
Payer: MEDICARE

## 2023-10-05 VITALS
HEIGHT: 60 IN | BODY MASS INDEX: 36.52 KG/M2 | HEART RATE: 118 BPM | TEMPERATURE: 100 F | SYSTOLIC BLOOD PRESSURE: 124 MMHG | DIASTOLIC BLOOD PRESSURE: 84 MMHG | OXYGEN SATURATION: 98 %

## 2023-10-05 DIAGNOSIS — R73.03 PREDIABETES: ICD-10-CM

## 2023-10-05 DIAGNOSIS — E78.2 MIXED HYPERLIPIDEMIA: ICD-10-CM

## 2023-10-05 DIAGNOSIS — F31.9 BIPOLAR AFFECTIVE DISORDER, REMISSION STATUS UNSPECIFIED: ICD-10-CM

## 2023-10-05 DIAGNOSIS — F60.0 PARANOID PERSONALITY (DISORDER): ICD-10-CM

## 2023-10-05 DIAGNOSIS — F43.10 PTSD (POST-TRAUMATIC STRESS DISORDER): ICD-10-CM

## 2023-10-05 DIAGNOSIS — J01.40 SUBACUTE PANSINUSITIS: Primary | ICD-10-CM

## 2023-10-05 DIAGNOSIS — F41.9 ANXIETY: ICD-10-CM

## 2023-10-05 DIAGNOSIS — Z79.890 HORMONE REPLACEMENT THERAPY (HRT): ICD-10-CM

## 2023-10-05 DIAGNOSIS — M85.89 OSTEOPENIA OF MULTIPLE SITES: ICD-10-CM

## 2023-10-05 LAB
CHOLEST SERPL-MCNC: 267 MG/DL (ref 0–200)
EXPIRATION DATE: NORMAL
EXPIRATION DATE: NORMAL
FLUAV AG UPPER RESP QL IA.RAPID: NOT DETECTED
FLUBV AG UPPER RESP QL IA.RAPID: NOT DETECTED
HBA1C MFR BLD: 5.8 % (ref 4.8–5.6)
HDLC SERPL-MCNC: 55 MG/DL (ref 40–60)
INTERNAL CONTROL: NORMAL
INTERNAL CONTROL: NORMAL
LDLC SERPL CALC-MCNC: 167 MG/DL (ref 0–100)
LDLC/HDLC SERPL: 2.97 {RATIO}
Lab: NORMAL
Lab: NORMAL
S PYO AG THROAT QL: NEGATIVE
SARS-COV-2 AG UPPER RESP QL IA.RAPID: NOT DETECTED
TRIGL SERPL-MCNC: 242 MG/DL (ref 0–150)
VLDLC SERPL-MCNC: 45 MG/DL (ref 5–40)

## 2023-10-05 PROCEDURE — 80061 LIPID PANEL: CPT

## 2023-10-05 PROCEDURE — 87428 SARSCOV & INF VIR A&B AG IA: CPT | Performed by: INTERNAL MEDICINE

## 2023-10-05 PROCEDURE — 83036 HEMOGLOBIN GLYCOSYLATED A1C: CPT

## 2023-10-05 PROCEDURE — 87880 STREP A ASSAY W/OPTIC: CPT | Performed by: INTERNAL MEDICINE

## 2023-10-05 PROCEDURE — 99214 OFFICE O/P EST MOD 30 MIN: CPT | Performed by: INTERNAL MEDICINE

## 2023-10-05 RX ORDER — DOXYCYCLINE 100 MG/1
100 TABLET ORAL 2 TIMES DAILY
Qty: 14 TABLET | Refills: 0 | Status: SHIPPED | OUTPATIENT
Start: 2023-10-05 | End: 2023-10-12

## 2023-10-05 RX ORDER — ONDANSETRON 4 MG/1
4 TABLET, ORALLY DISINTEGRATING ORAL EVERY 8 HOURS PRN
Qty: 15 TABLET | Refills: 0 | Status: SHIPPED | OUTPATIENT
Start: 2023-10-05

## 2023-10-05 NOTE — PROGRESS NOTES
Internal Medicine Acute Visit    Chief Complaint   Patient presents with    Fever    Agitation    Nausea    Vomiting    Sore Throat    Cough    Headache        HPI  Ms. Post comes in today due to illness since ~9/19. Was seen that day in office and COVID and flu testing were negative. She was given steroid injection. Symptoms have worsened since then. She is having fever to 101, headache, congestion, rhinorrhea, slight sore throat, post nasal drainage causing N/V, cough. She is using her allergy medications and OTC remedies without relief. Her son has similar illness and has improved with abx. She additionally notes that she is due for DEXA and would like recheck of labs including lipid and A1c. She reports menopausal sxs uncontrolled. Using estrace. Needs new GYN. She notably has stopped many of her medications prescribed by her psychiatrist and she reports irritability.    Fever   Associated symptoms include congestion, coughing, headaches, nausea, a sore throat and vomiting. Pertinent negatives include no ear pain.   Agitation  Associated symptoms include congestion, coughing, fatigue, a fever, headaches, nausea, a sore throat and vomiting.   Nausea  Associated symptoms include congestion, coughing, fatigue, a fever, headaches, nausea, a sore throat and vomiting.   Vomiting   Associated symptoms include coughing, a fever and headaches.   Sore Throat   Associated symptoms include congestion, coughing, headaches and vomiting. Pertinent negatives include no ear pain.   Cough  Associated symptoms include a fever, headaches, postnasal drip, rhinorrhea and a sore throat. Pertinent negatives include no ear pain.   Headache     Review of Systems  Review of Systems   Constitutional:  Positive for fatigue and fever.   HENT:  Positive for congestion, facial swelling, postnasal drip, rhinorrhea and sore throat. Negative for ear pain and nosebleeds.    Respiratory:  Positive for cough.    Gastrointestinal:  Positive for nausea  and vomiting.   Neurological:  Positive for headache.   Psychiatric/Behavioral:  Positive for agitation.       Medications  Current Outpatient Medications on File Prior to Visit   Medication Sig Dispense Refill    albuterol (PROVENTIL) (2.5 MG/3ML) 0.083% nebulizer solution Take 2.5 mg by nebulization 4 (Four) Times a Day As Needed for Wheezing. 90 each 5    albuterol sulfate  (90 Base) MCG/ACT inhaler Inhale 2 puffs Every 4 (Four) Hours As Needed for Wheezing. 18 g 5    azelaic acid (AZELEX) 15 % gel       cetirizine (zyrTEC) 10 MG tablet Take 1 tablet by mouth Daily.  1    COLLAGEN PO Take 1 tablet by mouth Daily.      cyclobenzaprine (FLEXERIL) 10 MG tablet Take 0.5-1 tablets by mouth 3 (Three) Times a Day As Needed for Muscle Spasms. 90 tablet 1    EPIPEN 2-JAREN 0.3 MG/0.3ML solution auto-injector injection U UTD  6    estradiol (Estrace) 1 MG tablet Take 1.5 tablets po qd. 45 tablet 8    Fremanezumab-vfrm 225 MG/1.5ML solution auto-injector Inject 225 mg under the skin into the appropriate area as directed Every 28 (Twenty-Eight) Days. 1.5 mL 5    Gentle Laxative 5 MG EC tablet TAKE 1 TABLET BY MOUTH ONCE DAILY AS NEEDED FOR CONSTIPATION      HYDROcodone-acetaminophen (NORCO) 5-325 MG per tablet Take 1 tablet by mouth Every 12 (Twelve) Hours.      ipratropium-albuterol (DUO-NEB) 0.5-2.5 mg/3 ml nebulizer Take 3 mL by nebulization 4 (Four) Times a Day. 360 mL 5    metroNIDAZOLE (METROGEL) 0.75 % gel       montelukast (SINGULAIR) 10 MG tablet TAKE 1 TABLET BY MOUTH EVERY NIGHT 30 tablet 3    Multiple Vitamin (MULTI-VITAMIN DAILY PO) Take  by mouth Daily.      omeprazole (priLOSEC) 40 MG capsule Take 1 capsule by mouth 2 (two) times a day.      traZODone (DESYREL) 150 MG tablet Take 1 tablet by mouth Every Night.  3    ubrogepant (Ubrelvy) 50 MG tablet Take 1 tablet by mouth at onset of migraine, may repeat in 2 hours.  Max of 200 mg/ 24 hrs 10 tablet 11    Xhance 93 MCG/ACT Exhaler Suspension USE ONE SPRAY  IN EACH NOSTRIL EVERY DAY AS NEEDED FOR CONGESTION 16 mL 0    [DISCONTINUED] buPROPion XL (WELLBUTRIN XL) 150 MG 24 hr tablet Take 1 tablet by mouth Every Morning.      [DISCONTINUED] doxycycline (VIBRAMYCIN) 50 MG capsule Take 1 capsule by mouth 2 (Two) Times a Day.      [DISCONTINUED] estradiol (ESTRACE VAGINAL) 0.1 MG/GM vaginal cream Insert 1 gm intravaginally 1-2 times each week 1 each 3    [DISCONTINUED] Fluticasone-Umeclidin-Vilant (Trelegy Ellipta) 100-62.5-25 MCG/ACT inhaler Inhale 1 puff Daily. 1 each 0     No current facility-administered medications on file prior to visit.        Allergies  Allergies   Allergen Reactions    Adhesive Tape Hives    Latex Hives    Sulfa Antibiotics Hives    Biaxin [Clarithromycin] Nausea Only    Codeine Itching    Penicillins Nausea Only    Nuts Unknown - High Severity     Red heated face       PMH  Past Medical History:   Diagnosis Date    Abdominal pain     Abnormal breast exam     Abnormal Pap smear of cervix     Achilles tendinitis     Acute sinusitis     Allergic     Anxiety     Arthritis     Asthma     Tavera's syndrome     Bipolar 1 disorder     Bowel trouble     Breast cyst     Colon polyps     Constipation     COPD (chronic obstructive pulmonary disease)     Depression     Diverticulitis     Diverticulitis of colon     Diverticulosis     Endometriosis     Eustachian tube dysfunction     Extremity pain     Fatty liver     Female pelvic pain     Fibromyalgia     Fibromyalgia, primary     Gastric ulcer     GERD (gastroesophageal reflux disease)     Glaucoma     H/O bladder infections     Headache     History of medical problems     History of rashes as a child     Inflammatory bowel disease     Irritable bowel syndrome     Kidney stone     Low back pain     Lumbar radiculopathy     Menopausal symptoms     Muscle weakness     Obesity     Sexual problems     Spinal stenosis of lumbar region     Visual impairment        Objective  Visit Vitals  /84   Pulse 118  "  Temp 100 °F (37.8 °C)   Ht 152.4 cm (60\")   LMP  (LMP Unknown)   SpO2 98%   BMI 36.52 kg/m²        Physical Exam  Physical Exam  Vitals and nursing note reviewed.   Constitutional:       General: She is not in acute distress.     Appearance: She is well-developed. She is obese. She is ill-appearing.   HENT:      Head: Normocephalic and atraumatic.      Nose: Congestion present. No rhinorrhea.      Mouth/Throat:      Mouth: Mucous membranes are moist.      Pharynx: Oropharynx is clear. Posterior oropharyngeal erythema (mild) present. No oropharyngeal exudate.   Eyes:      Conjunctiva/sclera: Conjunctivae normal.   Cardiovascular:      Rate and Rhythm: Normal rate and regular rhythm.      Heart sounds: Normal heart sounds. No murmur heard.  Pulmonary:      Effort: Pulmonary effort is normal. No respiratory distress.      Breath sounds: Normal breath sounds. No wheezing, rhonchi or rales.   Lymphadenopathy:      Cervical: No cervical adenopathy.   Skin:     General: Skin is warm and dry.      Findings: Erythema (facial flushing) present.   Neurological:      Mental Status: She is alert and oriented to person, place, and time.   Psychiatric:         Mood and Affect: Affect is angry.         Behavior: Behavior is agitated and aggressive.       Results  Results for orders placed or performed in visit on 10/05/23   POCT SARS-CoV-2 Antigen HELIO + Flu    Specimen: Swab   Result Value Ref Range    SARS Antigen Not Detected Not Detected, Presumptive Negative    Influenza A Antigen HELIO Not Detected Not Detected    Influenza B Antigen HELIO Not Detected Not Detected    Internal Control Passed Passed    Lot Number [id number]     Expiration Date 4/10/2024    POCT rapid strep A    Specimen: Swab   Result Value Ref Range    Rapid Strep A Screen Negative Negative, VALID, INVALID, Not Performed    Internal Control Passed Passed    Lot Number [id number]     Expiration Date 3/30/2026      *Note: Due to a large number of results and/or " encounters for the requested time period, some results have not been displayed. A complete set of results can be found in Results Review.        Assessment and Plan  Diagnoses and all orders for this visit:    Subacute pansinusitis  - POC testing negative  - will start doxycycline 100mg BID, has penicillin allergy/intolerance  - zofran PRN for N/V due to post nasal drainage  - continue supportive care. Get a neti pot.    Mixed hyperlipidemia  - off fenofibrate  - recheck lipid panel    Prediabetes  - update A1c    Osteopenia of multiple sites  - DEXA due, will order    Hormone replacement therapy (HRT)  - s/p hysterectomy and on estrace  - will refer to new GYN for management    PTSD (post-traumatic stress disorder), Anxiety, Bipolar affective disorder, remission status unspecified, Paranoid personality (disorder)  - in counseling and following with psychiatry, has stopped many of her medications and is only taking trazodone. Mood uncontrolled. She has contacted her psychiatrist.        Return if symptoms worsen or fail to improve.

## 2023-10-12 NOTE — PROGRESS NOTES
Neuro Office Visit      Encounter Date: 10/13/2023   Patient Name: Megan Post  : 1980   MRN: [id number]   PCP:  Kalpana Pandey MD     Chief Complaint:    Chief Complaint   Patient presents with    Migraine       History of Present Illness: Megan Pots is a 43 y.o. female who is here today in Neurology for migraine.    You have chosen to receive care through a telehealth visit.  Do you consent to use a video/audio connection for your medical care today? Yes    Last visit with me on 10/3/2022 continued Ajovy and Ubrelvy, referred to physical therapy.    Migraine:  Last injection of Ajovy in September.  Due for the next injection on .    Does not feel as though Ajovy is working as well as it had been.    She has had at least 4 or more migraines lasting for at least 1 day.    Migraines associated with photophobia, phonophobia, nausea/vomiting, dizziness.    Has a family member who is a physician and recommended Botox.  Megan is interested in this option if insurance would be able to cover medication.    Memory loss:  Memory is essentially the same.    Canceled physical therapy and neuropsychology evaluation as she has not been feeling well enough to set up for that period of time.      Subjective      Past Medical History:   Past Medical History:   Diagnosis Date    Abdominal pain     Abnormal breast exam     Abnormal Pap smear of cervix     Achilles tendinitis     Acute sinusitis     Allergic     Anxiety     Arthritis     Asthma     Tavera's syndrome     Bipolar 1 disorder     Bowel trouble     Breast cyst     Colon polyps     Constipation     COPD (chronic obstructive pulmonary disease)     Depression     Diverticulitis     Diverticulitis of colon     Diverticulosis     Endometriosis     Eustachian tube dysfunction     Extremity pain     Fatty liver     Female pelvic pain     Fibromyalgia     Fibromyalgia, primary     Gastric ulcer     GERD (gastroesophageal reflux disease)      Glaucoma     H/O bladder infections     Headache     History of medical problems     History of rashes as a child     Inflammatory bowel disease     Irritable bowel syndrome     Kidney stone     Low back pain     Lumbar radiculopathy     Menopausal symptoms     Muscle weakness     Obesity     Sexual problems     Spinal stenosis of lumbar region     Visual impairment        Past Surgical History:   Past Surgical History:   Procedure Laterality Date    ADENOIDECTOMY       SECTION      CHOLECYSTECTOMY      COLONOSCOPY  2017    ENDOMETRIAL ABLATION      HYSTERECTOMY      endometriosis    NASAL ENDOSCOPY      OOPHORECTOMY Bilateral     OTHER SURGICAL HISTORY      Laparoscopy (diagnostic) gynecologic with biopsy    SHOULDER SURGERY      SINUS SURGERY      TONSILLECTOMY      UPPER GASTROINTESTINAL ENDOSCOPY  2019    URETEROSCOPY LASER LITHOTRIPSY WITH STENT INSERTION Right 2022    Procedure: CYSTOSCOPY URETEROSCOPY WITH RETROGRADE PYELOGRAMS,  AND STENT PLACEMENT- RIGHT;  Surgeon: Angelo Silvestre MD;  Location: [facility];  Service: Urology;  Laterality: Right;       Family History:   Family History   Problem Relation Age of Onset    Liver disease Mother     Diabetes Mother     Hyperlipidemia Mother     Hypertension Mother     Thyroid disease Mother     Arthritis Father     Mental illness Father     Depression Father     Migraines Sister     Lung cancer Maternal Grandmother     Cancer Maternal Grandfather         not certain which type, abdominal or pelvic    Cancer Paternal Grandmother     Tuberculosis Paternal Grandmother     Liver cancer Paternal Grandfather     Mental illness Daughter     No Known Problems Son     Stroke Other     Diabetes Other     Hyperlipidemia Other     Hypertension Other     Mental illness Other     Migraines Other     Hypertension Paternal Aunt     Kidney disease Maternal Aunt     Breast cancer Neg Hx     Endometrial cancer Neg Hx     Ovarian cancer Neg Hx        Social  History:   Social History     Socioeconomic History    Marital status: Single   Tobacco Use    Smoking status: Former     Packs/day: 0.25     Years: 10.00     Additional pack years: 0.00     Total pack years: 2.50     Types: Cigarettes     Start date: 2003     Quit date: 2021     Years since quittin.3    Smokeless tobacco: Never    Tobacco comments:     I stopped smoking a while ago   Vaping Use    Vaping Use: Never used   Substance and Sexual Activity    Alcohol use: No    Drug use: Never    Sexual activity: Not Currently     Partners: Male     Birth control/protection: Surgical, None       Medications:     Current Outpatient Medications:     albuterol (PROVENTIL) (2.5 MG/3ML) 0.083% nebulizer solution, Take 2.5 mg by nebulization 4 (Four) Times a Day As Needed for Wheezing., Disp: 90 each, Rfl: 5    albuterol sulfate  (90 Base) MCG/ACT inhaler, Inhale 2 puffs Every 4 (Four) Hours As Needed for Wheezing., Disp: 18 g, Rfl: 5    azelaic acid (AZELEX) 15 % gel, , Disp: , Rfl:     cetirizine (zyrTEC) 10 MG tablet, Take 1 tablet by mouth Daily., Disp: , Rfl: 1    COLLAGEN PO, Take 1 tablet by mouth Daily., Disp: , Rfl:     cyclobenzaprine (FLEXERIL) 10 MG tablet, Take 0.5-1 tablets by mouth 3 (Three) Times a Day As Needed for Muscle Spasms., Disp: 90 tablet, Rfl: 1    EPIPEN 2-JAREN 0.3 MG/0.3ML solution auto-injector injection, U UTD, Disp: , Rfl: 6    estradiol (Estrace) 1 MG tablet, Take 1.5 tablets po qd., Disp: 45 tablet, Rfl: 8    Fremanezumab-vfrm 225 MG/1.5ML solution auto-injector, Inject 225 mg under the skin into the appropriate area as directed Every 28 (Twenty-Eight) Days., Disp: 1.5 mL, Rfl: 5    Gentle Laxative 5 MG EC tablet, TAKE 1 TABLET BY MOUTH ONCE DAILY AS NEEDED FOR CONSTIPATION, Disp: , Rfl:     HYDROcodone-acetaminophen (NORCO) 5-325 MG per tablet, Take 1 tablet by mouth Every 12 (Twelve) Hours., Disp: , Rfl:     ipratropium-albuterol (DUO-NEB) 0.5-2.5 mg/3 ml nebulizer, Take 3  mL by nebulization 4 (Four) Times a Day., Disp: 360 mL, Rfl: 5    metroNIDAZOLE (METROGEL) 0.75 % gel, , Disp: , Rfl:     montelukast (SINGULAIR) 10 MG tablet, TAKE 1 TABLET BY MOUTH EVERY NIGHT, Disp: 30 tablet, Rfl: 3    Multiple Vitamin (MULTI-VITAMIN DAILY PO), Take  by mouth Daily., Disp: , Rfl:     omeprazole (priLOSEC) 40 MG capsule, Take 1 capsule by mouth 2 (two) times a day., Disp: , Rfl:     ondansetron ODT (ZOFRAN-ODT) 4 MG disintegrating tablet, Place 1 tablet on the tongue Every 8 (Eight) Hours As Needed for Nausea or Vomiting., Disp: 15 tablet, Rfl: 0    traZODone (DESYREL) 150 MG tablet, Take 1 tablet by mouth Every Night., Disp: , Rfl: 3    ubrogepant (Ubrelvy) 50 MG tablet, Take 1 tablet by mouth at onset of migraine, may repeat in 2 hours.  Max of 200 mg/ 24 hrs, Disp: 10 tablet, Rfl: 11    Xhance 93 MCG/ACT Exhaler Suspension, USE ONE SPRAY IN EACH NOSTRIL EVERY DAY AS NEEDED FOR CONGESTION, Disp: 16 mL, Rfl: 0    Allergies:   Allergies   Allergen Reactions    Adhesive Tape Hives    Latex Hives    Sulfa Antibiotics Hives    Biaxin [Clarithromycin] Nausea Only    Codeine Itching    Penicillins Nausea Only    Nuts Unknown - High Severity     Red heated face       PHQ-9 Total Score:     STEADI Fall Risk Assessment was completed, and patient is at LOW risk for falls.Assessment completed on:6/26/2023    Objective     Physical Exam:   Physical Exam  Neurological:      Mental Status: She is oriented to person, place, and time.   Psychiatric:         Speech: Speech normal.         Neurologic Exam     Mental Status   Oriented to person, place, and time.   Attention: normal. Concentration: normal.   Speech: speech is normal   Level of consciousness: alert  Knowledge: good.        Vital Signs: There were no vitals filed for this visit.  There is no height or weight on file to calculate BMI.     Results:   Imaging:   XR Chest PA & Lateral    Result Date: 6/22/2023  Impression: No acute process.  "Electronically Signed: Joby Pulido  6/22/2023 5:11 PM EDT  Workstation ID: [id number]       Labs:    No results found for: \"CMP\", \"PROTEIN\", \"ANTIMOGAB\", \"SCIMWG4QLGW\", \"JCVRESULT\", \"QUANTTBGOLD\", \"CBCDIF\", \"IGGALBSER\"     Assessment / Plan      Assessment/Plan:   Diagnoses and all orders for this visit:    1. Chronic migraine without aura without status migrainosus, not intractable (Primary)  Comments:  Stop Ajovy  Start Aimovig  Consider Botox    2. Memory loss           Patient Education:     Reviewed medications, potential side effects and signs and symptoms to report. Discussed risk versus benefits of treatment plan with patient and/or family-including medications, labs and radiology that may be ordered. Addressed questions and concerns during visit. Patient and/or family verbalized understanding and agree with plan. Instructed to call the office with any questions and report to ER with any life-threatening symptoms.     Follow Up:   Return in about 3 months (around 1/13/2024).    I spent 30  minutes face to face with the patient. I personally spent 50 percent of this time counseling and discussing diagnosis, diagnostic testing, driving, treatment options, and management .       During this visit the following were done:  Labs Reviewed []    Labs Ordered []    Radiology Reports Reviewed []    Radiology Ordered []    PCP Records Reviewed []    Referring Provider Records Reviewed []    ER Records Reviewed []    Hospital Records Reviewed []    History Obtained From Family []    Radiology Images Reviewed []    Other Reviewed []    Records Requested []      MAGGIE Wills  [facility]  [address]  [facility] [address] [phone]  "

## 2023-10-13 ENCOUNTER — TELEMEDICINE (OUTPATIENT)
Dept: NEUROLOGY | Facility: CLINIC | Age: 43
End: 2023-10-13
Payer: MEDICARE

## 2023-10-13 DIAGNOSIS — G43.709 CHRONIC MIGRAINE WITHOUT AURA WITHOUT STATUS MIGRAINOSUS, NOT INTRACTABLE: Primary | ICD-10-CM

## 2023-10-13 DIAGNOSIS — R41.3 MEMORY LOSS: ICD-10-CM

## 2023-10-13 PROCEDURE — 99213 OFFICE O/P EST LOW 20 MIN: CPT | Performed by: NURSE PRACTITIONER

## 2023-10-16 ENCOUNTER — TELEMEDICINE (OUTPATIENT)
Dept: PULMONOLOGY | Facility: CLINIC | Age: 43
End: 2023-10-16
Payer: MEDICARE

## 2023-10-16 ENCOUNTER — SPECIALTY PHARMACY (OUTPATIENT)
Dept: ONCOLOGY | Facility: HOSPITAL | Age: 43
End: 2023-10-16
Payer: MEDICARE

## 2023-10-16 DIAGNOSIS — J01.90 ACUTE SINUSITIS, RECURRENCE NOT SPECIFIED, UNSPECIFIED LOCATION: ICD-10-CM

## 2023-10-16 DIAGNOSIS — J45.41 MODERATE PERSISTENT ASTHMA WITH ACUTE EXACERBATION: Primary | ICD-10-CM

## 2023-10-16 PROCEDURE — 1159F MED LIST DOCD IN RCRD: CPT | Performed by: NURSE PRACTITIONER

## 2023-10-16 PROCEDURE — 1160F RVW MEDS BY RX/DR IN RCRD: CPT | Performed by: NURSE PRACTITIONER

## 2023-10-16 PROCEDURE — 99214 OFFICE O/P EST MOD 30 MIN: CPT | Performed by: NURSE PRACTITIONER

## 2023-10-16 RX ORDER — ERENUMAB-AOOE 140 MG/ML
140 INJECTION, SOLUTION SUBCUTANEOUS
Qty: 1 ML | Refills: 11 | Status: SHIPPED | OUTPATIENT
Start: 2023-10-16

## 2023-10-16 RX ORDER — AMOXICILLIN AND CLAVULANATE POTASSIUM 875; 125 MG/1; MG/1
1 TABLET, FILM COATED ORAL 2 TIMES DAILY
Qty: 20 TABLET | Refills: 0 | Status: SHIPPED | OUTPATIENT
Start: 2023-10-16

## 2023-10-16 NOTE — PROGRESS NOTES
[facility] [place], operated by [facility] Pulmonary Video Visit    CHIEF COMPLAINT    Cough and congestion    Consent for Video Visit was obtained via [employer]    Patient presents during the COVID-19 pandemic and federally declared state of public emergency.  The service was conducted via [employer].  Patient is worried about exposure therefore telehealth services were necessary.    Subjective   HISTORY OF PRESENT ILLNESS    Crystal A Day is a 43 y.o.female was seen via Video Visit today for acute exacerbation of her asthma.  Both her and her son have been acutely ill recently.  She was seen in urgent treatment center and received a Solu-Medrol shot she not able to tolerate oral prednisone secondary to anxiety and mood changes.  She is also started on doxycycline, she does use chronic doxycycline for her rosacea.  She generally feels like it is helped much yet.    She does have a bit of cough and congestion with shortness of breath.  She also has sinus pressure and headaches and hoarseness.    She has not been using her Qvar.  She has been using her albuterol rescue inhaler frequently.          MEDICATION LIST AND ALLERGIES REVIEWED.    FAMILY AND SOCIAL HISTORY REVIEWED.      Objective       Immunization History   Administered Date(s) Administered    Flu Vaccine Quad PF >36MO 11/02/2017    Fluzone (or Fluarix & Flulaval for VFC) >6mos 11/02/2017, 10/22/2020    Hepatitis A 05/13/2019    Pneumococcal Polysaccharide (PPSV23) 06/16/2020    Tdap 06/16/2020    flucelvax quad pfs =>4 YRS 10/15/2019         Physical exam unable to be completed secondary to nature visit  Appeared in no acute distress, no difficulty with conversation.    Oriented to person, place and time.   Normal respiratory effort.        RESULTS        Assessment & Plan     PROBLEM LIST    Problem List Items Addressed This Visit          Pulmonary and Pneumonias    Moderate persistent asthma with acute exacerbation - Primary     Other Visit Diagnoses       Acute sinusitis, recurrence not  specified, unspecified location                  DISCUSSION    Currently she is having acute exacerbation of her asthma.  She had been on some doxycycline, but she is on chronic doxycycline due to her rosacea.  Going to change her over to Augmentin says she is having a bit more sinus issues and congestion.    I did ask her to resume her Qvar twice daily to help with the congestion and wheezing.  Continue on her nebulizers and or HFA as needed.  We also discussed that she can use Mucinex as needed to help with her congestion.          The patient was located at her home in [place], [place] and I, the provider was located in the [facility] pulmonary and critical care office.        Moderate decision making secondary acute exacerbation of chronic illnesses and prescription drug management.        Millicent Pantoja, APRN  10/16/602784:[id number] EDT  Electronically signed     Please note that portions of this note were completed with a voice recognition program. Efforts were made to edit the dictations, but occasionally words are mistranscribed.      CC: Kalpana Pandey MD

## 2023-10-17 RX ORDER — FLUCONAZOLE 150 MG/1
150 TABLET ORAL ONCE
Qty: 1 TABLET | Refills: 0 | Status: SHIPPED | OUTPATIENT
Start: 2023-10-17 | End: 2023-10-17

## 2023-10-18 ENCOUNTER — DOCUMENTATION (OUTPATIENT)
Dept: ONCOLOGY | Facility: HOSPITAL | Age: 43
End: 2023-10-18
Payer: MEDICARE

## 2023-10-18 NOTE — TELEPHONE ENCOUNTER
PATIENT STATES THAT SHE NEEDS A NEW ORDER FOR HER MAMMOGRAM.  SHE GET THESE AT [facility]   normal...

## 2023-10-19 ENCOUNTER — TELEPHONE (OUTPATIENT)
Dept: INTERNAL MEDICINE | Facility: CLINIC | Age: 43
End: 2023-10-19
Payer: MEDICARE

## 2023-10-19 ENCOUNTER — SPECIALTY PHARMACY (OUTPATIENT)
Dept: ONCOLOGY | Facility: HOSPITAL | Age: 43
End: 2023-10-19
Payer: MEDICARE

## 2023-10-19 DIAGNOSIS — Z13.29 SCREENING FOR THYROID DISORDER: Primary | ICD-10-CM

## 2023-10-19 DIAGNOSIS — G43.709 CHRONIC MIGRAINE WITHOUT AURA WITHOUT STATUS MIGRAINOSUS, NOT INTRACTABLE: ICD-10-CM

## 2023-10-19 NOTE — PROGRESS NOTES
Specialty Pharmacy Refill Coordination Note     Megan is a 43 y.o. female contacted today regarding refills of  Aimovig and Botox specialty medication(s). Patient is due for her injection on 10/24.     Reviewed and verified with patient:  Allergies  Meds  Problems       Specialty medication(s) and dose(s) confirmed: yes    Refill Questions      Flowsheet Row Most Recent Value   Changes to allergies? No   Changes to medications? Yes  [Patient is stopping Ajovy and starting Aimovig. She will also start Botox injections.]   New conditions since last clinic visit No   Unplanned office visit, urgent care, ED, or hospital admission in the last 4 weeks  No   How does patient/caregiver feel medication is working? Very good   Financial problems or insurance changes  No   Since the previous refill, were any specialty medication doses or scheduled injections missed or delayed?  No   If yes, please provide the amount N/A   Why were doses missed? N/A   Does this patient require a clinical escalation to a pharmacist? Yes            Delivery Questions      Flowsheet Row Most Recent Value   Delivery method FedEx   Delivery address correct? Yes   Preferred delivery time? Anytime   Number of medications in delivery 3   Medication(s) being filled and delivered Ubrogepant, Erenumab-Aooe, Onabotulinumtoxina   Doses left of specialty medications Aimovig/Botox=New Start   Is there any medication that is due not being filled? No   Supplies needed? No supplies needed   Cooler needed? Yes   Do any medications need mixed or dated? No   Copay form of payment Payment plan already set up   Additional comments Aimovig/Ubrelvy=$0   Questions or concerns for the pharmacist? No   Explain any questions or concerns for the pharmacist N/A   Are any medications first time fills? Yes   Tracking number for delivery N/A                   Follow-up: 28 day(s)     Lacy North, Pharmacy Technician  Specialty Pharmacy Technician

## 2023-10-19 NOTE — PROGRESS NOTES
Specialty Pharmacy Patient Management Program  Neurology Medication Addition Assessment     Megan Post is a 43 y.o. female with migraines seen by a Neurology provider and enrolled in the Neurology Patient Management program offered by [facility].  This assessment was conducted as a result of a specialty medication addition or substitution. The patient was previously introduced to services offered by [facility], including: regular assessments, refill coordination, curbside pick-up or mail order delivery options, prior authorization maintenance, and financial assistance programs as applicable. The patient was also provided with contact information for the pharmacy team.     An initial outreach was conducted, including assessment of therapy appropriateness and specialty medication education for Aimovig and Botox.  Pt does not feel as though Ajovy is working as well as it had been - changing to Aimovig    A follow-up outreach was conducted, including assessment of continued therapy appropriateness, medication adherence, and side effect incidence and management for Ubrelvy.    Insurance Coverage & Financial Support  [facility]/[employer]    Relevant Past Medical History and Comorbidities  Relevant medical history and concomitant health conditions were discussed with the patient. The patient's chart has been reviewed for relevant past medical history and comorbid health conditions and updated as necessary.   Past Medical History:   Diagnosis Date    Abdominal pain     Abnormal breast exam     Abnormal Pap smear of cervix     Achilles tendinitis     Acute sinusitis     Allergic     Anxiety     Arthritis     Asthma     Tavera's syndrome     Bipolar 1 disorder     Bowel trouble     Breast cyst     Colon polyps     Constipation     COPD (chronic obstructive pulmonary disease)     Depression     Diverticulitis     Diverticulitis of colon     Diverticulosis     Endometriosis      Eustachian tube dysfunction     Extremity pain     Fatty liver     Female pelvic pain     Fibromyalgia     Fibromyalgia, primary     Gastric ulcer     GERD (gastroesophageal reflux disease)     Glaucoma     H/O bladder infections     Headache     History of medical problems     History of rashes as a child     Inflammatory bowel disease     Irritable bowel syndrome     Kidney stone     Low back pain     Lumbar radiculopathy     Menopausal symptoms     Muscle weakness     Obesity     Sexual problems     Spinal stenosis of lumbar region     Visual impairment      Social History     Socioeconomic History    Marital status: Single   Tobacco Use    Smoking status: Former     Packs/day: 0.25     Years: 10.00     Additional pack years: 0.00     Total pack years: 2.50     Types: Cigarettes     Start date: 2003     Quit date: 2021     Years since quittin.3    Smokeless tobacco: Never    Tobacco comments:     I stopped smoking a while ago   Vaping Use    Vaping Use: Never used   Substance and Sexual Activity    Alcohol use: No    Drug use: Never    Sexual activity: Not Currently     Partners: Male     Birth control/protection: Surgical, None     Problem list reviewed by Deanna Kingsley, PharmD on 10/19/2023 at 11:37 AM    Hospitalizations and Urgent Care Since Last Assessment  ED Visits, Admissions, or Hospitalizations: none   Urgent Office Visits: none     Allergies  Known allergies and reactions were discussed with the patient. The patient's chart has been reviewed for  allergy information and updated as necessary.   Allergies   Allergen Reactions    Adhesive Tape Hives    Latex Hives    Sulfa Antibiotics Hives    Biaxin [Clarithromycin] Nausea Only    Codeine Itching    Penicillins Nausea Only    Nuts Unknown - High Severity     Red heated face     Allergies reviewed by Deanna Kingsley, PharmD on 10/19/2023 at 11:37 AM    Relevant Laboratory Values  Common labs          2023    11:48 10/5/2023    09:37  10/9/2023    09:16   Common Labs   WBC 7.46      7.23       Hemoglobin 14.0      13.9       Hematocrit 44.3      43.3       Platelets 316      309       Total Cholesterol  267     Triglycerides  242     HDL Cholesterol  55     LDL Cholesterol   167     Hemoglobin A1C 5.5     5.80        Details          This result is from an external source.               Lab Assessment  N/A     Current Medication List  This medication list has been reviewed with the patient and evaluated for any interactions or necessary modifications/recommendations, and updated to include all prescription medications, OTC medications, and supplements the patient is currently taking.  This list reflects what is contained in the patient's profile, which has also been marked as reviewed to communicate to other providers it is the most up to date version of the patient's current medication therapy.     Current Outpatient Medications:     albuterol (PROVENTIL) (2.5 MG/3ML) 0.083% nebulizer solution, Take 2.5 mg by nebulization 4 (Four) Times a Day As Needed for Wheezing., Disp: 90 each, Rfl: 5    albuterol sulfate  (90 Base) MCG/ACT inhaler, Inhale 2 puffs Every 4 (Four) Hours As Needed for Wheezing., Disp: 18 g, Rfl: 5    amoxicillin-clavulanate (AUGMENTIN) 875-125 MG per tablet, Take 1 tablet by mouth 2 (Two) Times a Day., Disp: 20 tablet, Rfl: 0    azelaic acid (AZELEX) 15 % gel, , Disp: , Rfl:     cetirizine (zyrTEC) 10 MG tablet, Take 1 tablet by mouth Daily., Disp: , Rfl: 1    COLLAGEN PO, Take 1 tablet by mouth Daily., Disp: , Rfl:     cyclobenzaprine (FLEXERIL) 10 MG tablet, Take 0.5-1 tablets by mouth 3 (Three) Times a Day As Needed for Muscle Spasms., Disp: 90 tablet, Rfl: 1    EPIPEN 2-JAREN 0.3 MG/0.3ML solution auto-injector injection, U UTD, Disp: , Rfl: 6    Erenumab-aooe (Aimovig) 140 MG/ML auto-injector, Inject 1 mL under the skin into the appropriate area as directed Every 28 (Twenty-Eight) Days., Disp: 1 mL, Rfl: 11     estradiol (Estrace) 1 MG tablet, Take 1.5 tablets po qd., Disp: 45 tablet, Rfl: 8    Gentle Laxative 5 MG EC tablet, TAKE 1 TABLET BY MOUTH ONCE DAILY AS NEEDED FOR CONSTIPATION, Disp: , Rfl:     HYDROcodone-acetaminophen (NORCO) 5-325 MG per tablet, Take 1 tablet by mouth Every 12 (Twelve) Hours., Disp: , Rfl:     ipratropium-albuterol (DUO-NEB) 0.5-2.5 mg/3 ml nebulizer, Take 3 mL by nebulization 4 (Four) Times a Day., Disp: 360 mL, Rfl: 5    metroNIDAZOLE (METROGEL) 0.75 % gel, , Disp: , Rfl:     montelukast (SINGULAIR) 10 MG tablet, TAKE 1 TABLET BY MOUTH EVERY NIGHT, Disp: 30 tablet, Rfl: 3    Multiple Vitamin (MULTI-VITAMIN DAILY PO), Take  by mouth Daily., Disp: , Rfl:     omeprazole (priLOSEC) 40 MG capsule, Take 1 capsule by mouth 2 (two) times a day., Disp: , Rfl:     OnabotulinumtoxinA 200 units reconstituted solution, FOR . PHYSICIAN TO INJECT 155 UNITS INTRAMUSCULARLY INTO HEAD, NECK AND SHOULDERS EVERY 12 WEEKS Per FDA PROTOCOL, Disp: 1 each, Rfl: 3    ondansetron ODT (ZOFRAN-ODT) 4 MG disintegrating tablet, Place 1 tablet on the tongue Every 8 (Eight) Hours As Needed for Nausea or Vomiting., Disp: 15 tablet, Rfl: 0    traZODone (DESYREL) 150 MG tablet, Take 1 tablet by mouth Every Night., Disp: , Rfl: 3    ubrogepant (Ubrelvy) 50 MG tablet, Take 1 tablet by mouth at onset of migraine, may repeat in 2 hours.  Max of 200 mg/ 24 hrs, Disp: 10 tablet, Rfl: 11    Xhance 93 MCG/ACT Exhaler Suspension, USE ONE SPRAY IN EACH NOSTRIL EVERY DAY AS NEEDED FOR CONGESTION, Disp: 16 mL, Rfl: 0  No current facility-administered medications for this visit.    Medicines reviewed by Deanna Kingsley, PharmD on 10/19/2023 at 11:37 AM    Drug Interactions  None noted     Initial Education Provided for Specialty Medication  The patient has been provided with the following education and any applicable administration techniques (i.e. self-injection) have been demonstrated for the therapies indicated. All  questions and concerns have been addressed prior to the patient receiving the medication, and the patient has verbalized comprehension of the education and any materials provided.  Additional patient education shall be provided and documented upon request by the patient, provider or payer.            Botox (onabotulinumtoxinA)      Medication Expectations   Why am I taking this medication? For prevention and relief of migraines.   What should I expect while on this medication? You should expect to see a decrease in the severity and frequency of migraines..     How does the medication work? Botox enters the nerve endings around where it is injected and blocks the release of chemicals involved in pain transmission.  This prevents activation of pain networks in the brain.   How long will I be on this medication for? The amount of time you will be on this medication will be determined by your doctor based your response.    How do I take this medication? This medication will be given in the office.  It will be given IM into each of 31 sites divided across 7 specific head/neck muscle areas.     What are some possible side effects? Potential side effects including, but not limited to: neck pain and stiffness, neck weakness,  temporary drooping of the eye, rare flu-like symptoms (such as muscle aches and fever), dry eyes, injections site pain, bleeding, infection, failure of the procedure to help.  More serious side effect such as allergic reaction, dysphagia, respiratory distress, .  Discussed that it may take 10-14 days after first treatment to see improvement of headaches, and that the in-office treatments are done every 12 weeks.  I gave the patient my name and contact information for any additional questions or concerns.       What happens if I miss a dose? N/A                Medication Safety   What are things I should warn my doctor immediately about? Let the provider know immediately if you have difficulty breathing  or swallowing, weakness of neck muscles.   What are things that I should be cautious of?  Injections near the eye: This medicine may reduce blinking, which can raise the risk of eye problems, including corneal exposure and ulcers. Tell your doctor right away if you notice that you are blinking less than usual or your eyes feel dry   What are some medications that can interact with this one? N/A          Medication Storage/Handling   How should I handle this medication? N/A   How does this medication need to be stored? N/A   How should I dispose of this medication? N/A          Resources/Support   How can I remind myself to take this medication? Patient will be scheduled every 3 months in clinic.   Is financial support available?  [employer] Botox savings program.   Which vaccines are recommended for me? Talk to your doctor about these vaccines: Flu, Coronavirus (COVID-19), Pneumococcal (pneumonia), Tdap, Hepatitis B, Zoster (shingles)                Aimovig (Erenumab-aoee)        Medication Expectations   Why am I taking this medication? You are taking this medication for migraine prophylaxis.   What should I expect while on this medication? You should expect to a decrease in the frequency and severity of your migraines.   How does the medication work? Aimovig is a monoclonal antibody that binds to calcitonin gene-related peptide (CGRP) and blocks its binding to the receptor decreasing the severity of migraines.   How long will I be on this medication for? The amount of time you will be on this medication will be determined by your doctor and your response to the medication.    How do I take this medication? Take as directed on your prescription label. This medication is a self-injection given every 28 days.    What are some possible side effects? Injection site reactions and hypersensitivity reactions, constipation, new or worsening hypertension.   What happens if I miss a dose? If you miss a dose, take it as soon as  you remember, and time next dose 28 days from last dose.                  Medication Safety   What are things I should warn my doctor immediately about? Hypersensitivity reactions, development or worsening of hypertension that may occur anytime during treatment. Tell you doctor if you develop new or worsening hypertension    What are things that I should be cautious of? Injection site reaction, constipation, new or worsening hypertension.   What are some medications that can interact with this one? No drug interactions identified.            Medication Storage/Handling   How should I handle this medication? Keep this medication our of reach of pets/children in original container.  On the day your Aimovig is due let it set at room temperature for 30 minutes prior to injection. (do NOT warm using a heat source such as hot water or a microwave).  Administer in the abdomen, thigh, back of the upper arm, or buttocks.  Do not inject where the skin is tender, bruised, red or hard.  Rotate injection sites.   How does this medication need to be stored? Store in refrigerator and keep dry.   How should I dispose of this medication? You can dispose of the empty syringe in a sharps container, and if this is not available you may use an empty hard plastic container such as a milk jug or Tide laundry container.            Resources/Support   How can I remind myself to take this medication? You can download a reminder apolinar on your phone or use a calandar  to help with your monthly injection.   Is financial support available?  Yes, [employer] and [employer] can provide co-pay cards if you have commercial insurance or patient assistance if you have Medicare or no insurance.    Which vaccines are recommended for me? Talk to your doctor about these vaccines: Flu, Coronavirus (COVID-19), Pneumococcal (pneumonia), Tdap, Hepatitis B, Zoster (shingles)                 Adherence, Self-Administration, and Current Therapy Problems  Adherence related  to the patient's specialty therapy was discussed with the patient. The Adherence segment of this outreach has been reviewed and updated.     Is there a concern with patient's ability to self administer the medication correctly and without issue?: No  Were any potential barriers to adherence identified during the initial assessment or patient education?: No  Are there any concerns regarding the patient's understanding of the importance of medication adherence?: No    Adherence Questions  Linked Medication(s) Assessed: Ubrogepant  On average, how many doses/injections does the patient miss per month?: 0 (prn med)  What are the identified reasons for non-adherence or missed doses? : no problems identfied  What is the estimated medication adherence level?: %  Based on the patient/caregiver response and refill history, does this patient require an MTP to track adherence improvements?: no    Additional Barriers to Patient Self-Administration: none  Methods for Supporting Patient Self-Administration: Aimovig self-injection training during assessment    Recently Close Medication Therapy Problems  No medication therapy recommendations to display  Open Medication Therapy Problems  No medication therapy recommendations to display     Adverse Drug Reactions  Medication tolerability: Tolerating with no to minimal ADRs          Medication plan: Continue therapy with normal follow-up  Plan for ADR Management: not required    Goals of Therapy  Goals related to the patient's specialty therapy were discussed with the patient. The Patient Goals segment of this outreach has been reviewed and updated.   Goals Addressed Today        Specialty Pharmacy General Goal      Reduction in severity and frequency of migraines.               Quality of Life Assessment   Quality of Life related to the patient's enrollment in the patient management program and services provided was discussed with the patient. The QOL segment of this outreach  has been reviewed and updated.   Quality of Life Improvement Scale: A good deal better    Reassessment Plan & Follow-Up  Medication Therapy Changes: D/C Ajovy and start Aimovig 140mg subq monthly, Botox 155 units every 3 months per provider, and continue Ubrelvy 50mg po once daily as needed for migraines -Take at onset of headache - May repeat x 1 in 2 hours if needed (max of 200 mg/ 24 hrs)  Related Plans, Therapy Recommendations, or Issues to Be Addressed: none   Pharmacist to perform regular reassessments no more than (6) months from the previous assessment.  Care Coordinator to set up future refill outreaches, coordinate prescription delivery, and escalate clinical questions to pharmacist.     Attestation  Therapeutic appropriateness: Appropriate  I attest the patient was actively involved in and has agreed to the above plan of care. If the prescribed therapy is at any point deemed not appropriate based on the current or future assessments, a consultation will be initiated with the patient's specialty care provider to determine the best course of action. The revised plan of therapy will be documented along with any additional patient education provided. Discussed aforementioned material with patient via telemedicine.    Deanna Kingsley, PharmD, [facility]  [facility] Specialty Pharmacist, Neurology  10/19/2023  11:40 EDT

## 2023-10-19 NOTE — TELEPHONE ENCOUNTER
Patient called she would like her Thyroid added to her current blood work.   The patient believes her current symptoms may be related to her thyroid.   Phone: [phone]

## 2023-11-02 ENCOUNTER — PROCEDURE VISIT (OUTPATIENT)
Dept: NEUROLOGY | Facility: CLINIC | Age: 43
End: 2023-11-02
Payer: MEDICARE

## 2023-11-02 DIAGNOSIS — G43.709 CHRONIC MIGRAINE WITHOUT AURA WITHOUT STATUS MIGRAINOSUS, NOT INTRACTABLE: Primary | ICD-10-CM

## 2023-11-02 DIAGNOSIS — G43.719 INTRACTABLE CHRONIC MIGRAINE WITHOUT AURA AND WITHOUT STATUS MIGRAINOSUS: ICD-10-CM

## 2023-11-02 NOTE — PROGRESS NOTES
Botox Procedure Note       Patient Name: Megan Post  : 1980   MRN: [id number]     Chief Complaint:    Chief Complaint   Patient presents with    Botulinum Toxin Injection     Patient Supplied-Do Not Bill Patient (200 UNITS)       History of Present Illness: Megan Post is a 43 y.o. female who is here today for Botox injections for migraines.    We have discussed risk and benefits of this Botox procedure and common side effects including headache, bleeding, infection, worsening of pain, neck pain, neck stiffness or weakness, damage to the areas being injected, weakness of muscles, loss of muscle control, ptosis, flu-like symptoms as well as more serious possible adverse effects including possible dysphagia, facial droop if injecting the facial area, painful injection, respiratory distress or even death (death has only been reported once with adults for Botox for migraines in another state when mixed with lidocaine solution which we do not use lidocaine solution in our practice for mixing Botox). The patient verbally understands and accepts risks and agrees with moving forward with Botox injections for chronic migraine prevention.    Verbal and written consent has been obtained from the patient prior to beginning treatment injections.     Pertinent Medical History:  Response to treatment has reduced headaches at least 7 fewer days a month and / or 100 hours fewer each month.     Subjective      Review of Systems:   Review of Systems    The following portions of the patient's history were reviewed an updated as appropriate: past family history, past medical history, past social history, past surgical history.     Medications:     Current Outpatient Medications:     albuterol (PROVENTIL) (2.5 MG/3ML) 0.083% nebulizer solution, Take 2.5 mg by nebulization 4 (Four) Times a Day As Needed for Wheezing., Disp: 90 each, Rfl: 5    albuterol sulfate  (90 Base) MCG/ACT inhaler, Inhale 2 puffs Every 4 (Four)  Hours As Needed for Wheezing., Disp: 18 g, Rfl: 5    amoxicillin-clavulanate (AUGMENTIN) 875-125 MG per tablet, Take 1 tablet by mouth 2 (Two) Times a Day., Disp: 20 tablet, Rfl: 0    azelaic acid (AZELEX) 15 % gel, , Disp: , Rfl:     cetirizine (zyrTEC) 10 MG tablet, Take 1 tablet by mouth Daily., Disp: , Rfl: 1    COLLAGEN PO, Take 1 tablet by mouth Daily., Disp: , Rfl:     cyclobenzaprine (FLEXERIL) 10 MG tablet, Take 0.5-1 tablets by mouth 3 (Three) Times a Day As Needed for Muscle Spasms., Disp: 90 tablet, Rfl: 1    EPIPEN 2-JAREN 0.3 MG/0.3ML solution auto-injector injection, U UTD, Disp: , Rfl: 6    Erenumab-aooe (Aimovig) 140 MG/ML auto-injector, Inject 1 mL under the skin into the appropriate area as directed Every 28 (Twenty-Eight) Days., Disp: 1 mL, Rfl: 11    estradiol (Estrace) 1 MG tablet, Take 1.5 tablets po qd., Disp: 45 tablet, Rfl: 8    Gentle Laxative 5 MG EC tablet, TAKE 1 TABLET BY MOUTH ONCE DAILY AS NEEDED FOR CONSTIPATION, Disp: , Rfl:     HYDROcodone-acetaminophen (NORCO) 5-325 MG per tablet, Take 1 tablet by mouth Every 12 (Twelve) Hours., Disp: , Rfl:     ipratropium-albuterol (DUO-NEB) 0.5-2.5 mg/3 ml nebulizer, Take 3 mL by nebulization 4 (Four) Times a Day., Disp: 360 mL, Rfl: 5    metroNIDAZOLE (METROGEL) 0.75 % gel, , Disp: , Rfl:     montelukast (SINGULAIR) 10 MG tablet, TAKE 1 TABLET BY MOUTH EVERY NIGHT, Disp: 30 tablet, Rfl: 3    Multiple Vitamin (MULTI-VITAMIN DAILY PO), Take  by mouth Daily., Disp: , Rfl:     omeprazole (priLOSEC) 40 MG capsule, Take 1 capsule by mouth 2 (two) times a day., Disp: , Rfl:     OnabotulinumtoxinA 200 units reconstituted solution, FOR . PHYSICIAN TO INJECT 155 UNITS INTRAMUSCULARLY INTO HEAD, NECK AND SHOULDERS EVERY 12 WEEKS Per FDA PROTOCOL, Disp: 1 each, Rfl: 3    ondansetron ODT (ZOFRAN-ODT) 4 MG disintegrating tablet, Place 1 tablet on the tongue Every 8 (Eight) Hours As Needed for Nausea or Vomiting., Disp: 15 tablet, Rfl:  0    traZODone (DESYREL) 150 MG tablet, Take 1 tablet by mouth Every Night., Disp: , Rfl: 3    ubrogepant (Ubrelvy) 50 MG tablet, Take 1 tablet by mouth at onset of migraine, may repeat in 2 hours.  Max of 200 mg/ 24 hrs, Disp: 10 tablet, Rfl: 11    Xhance 93 MCG/ACT Exhaler Suspension, USE ONE SPRAY IN EACH NOSTRIL EVERY DAY AS NEEDED FOR CONGESTION, Disp: 16 mL, Rfl: 0    Allergies:   Allergies   Allergen Reactions    Adhesive Tape Hives    Latex Hives    Sulfa Antibiotics Hives    Biaxin [Clarithromycin] Nausea Only    Codeine Itching    Penicillins Nausea Only    Nuts Unknown - High Severity     Red heated face       Objective     Physical Exam:  Vital Signs: There were no vitals filed for this visit.  There is no height or weight on file to calculate BMI.     Physical Exam  Neurological:      Mental Status: She is oriented to person, place, and time.      Gait: Gait is intact.   Psychiatric:         Speech: Speech normal.         Neurologic Exam     Mental Status   Oriented to person, place, and time.   Attention: normal. Concentration: normal.   Speech: speech is normal   Level of consciousness: alert  Knowledge: good.     Gait, Coordination, and Reflexes     Gait  Gait: normal      BOTOX PROCEDURE:     The patient was placed in a comfortable area and the sites to be treated were identified. A time out was called and performed. The area to be treated was prepped three times with alcohol and the alcohol allowed to dry. Next, a 30 gauge needle was used to inject the medication in the area to be treated.     Date of Last Injection: N/A  Response: N/A  Onset of response: N/A  Wearing off: N/A  Side Effects: N/A  : WowOwow  Lot #: [id number]  Expiration Date: 2/2026  NDC: 04989IA93    Botox was injected using FDA approved protocol for chronic migraine prevention.   200 unit vial Botox was re-constituted with 4 mL 0.9 NS to 5 unit/0.1 mL using standard techniques.  10 units were given at the   muscle in 2 divided sites  5 units were given at the Procerus muscle  20 units were given at the Frontalis muscle in 4 divided sites  40 units were given at the Temporalis muscle in 8 divided sites  30 units were given at the Occipitalis muscle in 6 divided sites  20 units were given at the Cervical paraspinal muscle in 4 divided sites  30 units were given at the Trapezius muscle in 6 divided sites    The total amount injected in units is 155  The total amount wasted in units is 45  The total amount submitted in units is 200.   Botox was administered by Nurse practitioner.     Patient tolerated procedure well with no immediate complications.     Assessment / Plan      Assessment/Plan:   Diagnoses and all orders for this visit:    1. Chronic migraine without aura without status migrainosus, not intractable (Primary)  Comments:  Continue BTX         Patient Education:       Reviewed medications, potential side effects and signs and symptoms to report. Discussed risk versus benefits of treatment plan with patient and/or family-including medications, labs and radiology that may be ordered. Addressed questions and concerns during visit. Patient and/or family verbalized understanding and agree with plan. Instructed to call the office with any questions and report to ER with any life-threatening symptoms.     Follow Up:   It has been determined that Crystal A Day has chronic migraine headaches. We will proceed with a 12 week regimen of 200 units of Botox injections, to treat the patient's chronic migraines.  No follow-ups on file.    During this visit the following were done:  Labs Reviewed []    Labs Ordered []    Radiology Reports Reviewed []    Radiology Ordered []    PCP Records Reviewed []    Referring Provider Records Reviewed []    ER Records Reviewed []    Hospital Records Reviewed []    History Obtained From Family []    Radiology Images Reviewed []    Other Reviewed []    Records Requested []        Nicolasa Mayberry  MAGGIE Gonzalez  [facility]  [address]  [place] [address] [phone]

## 2023-11-15 ENCOUNTER — SPECIALTY PHARMACY (OUTPATIENT)
Dept: ONCOLOGY | Facility: HOSPITAL | Age: 43
End: 2023-11-15
Payer: MEDICARE

## 2023-11-15 NOTE — PROGRESS NOTES
Specialty Pharmacy Refill Coordination Note     Megan is a 43 y.o. female contacted today regarding refills of  Aimovig and Ubrelvy specialty medication(s).. patient Injection is due on 11/24    Reviewed and verified with patient:       Specialty medication(s) and dose(s) confirmed: yes    Refill Questions      Flowsheet Row Most Recent Value   Changes to allergies? No   Changes to medications? No   New conditions since last clinic visit No   Unplanned office visit, urgent care, ED, or hospital admission in the last 4 weeks  No   How does patient/caregiver feel medication is working? Very good   Financial problems or insurance changes  No   Since the previous refill, were any specialty medication doses or scheduled injections missed or delayed?  No   If yes, please provide the amount N/A   Why were doses missed? N/A   Does this patient require a clinical escalation to a pharmacist? No            Delivery Questions      Flowsheet Row Most Recent Value   Delivery method [employer]   Delivery address correct? Yes   Delivery phone number mobile phone   Preferred delivery time? Anytime   Number of medications in delivery 1   Medication(s) being filled and delivered Erenumab-Aooe, Ubrogepant   Doses left of specialty medications N/A   Is there any medication that is due not being filled? Yes   Why are other medications not being filled? N/A   Supplies needed? No supplies needed   Cooler needed? Yes   Do any medications need mixed or dated? No   Copay form of payment Payment plan already set up   Additional comments N/A   Questions or concerns for the pharmacist? No   Explain any questions or concerns for the pharmacist N/A   Are any medications first time fills? No   Tracking number for delivery N/A                   Follow-up: 30 day(s)     Cecelia Anderson  Specialty Pharmacy Technician

## 2023-12-12 ENCOUNTER — SPECIALTY PHARMACY (OUTPATIENT)
Dept: ONCOLOGY | Facility: HOSPITAL | Age: 43
End: 2023-12-12
Payer: MEDICARE

## 2023-12-12 NOTE — PROGRESS NOTES
Specialty Pharmacy Refill Coordination Note     Megan is a 43 y.o. female contacted today regarding refills of Aimovig and Ubrelvy specialty medication(s).. patient injection is due on 12/24    Reviewed and verified with patient:       Specialty medication(s) and dose(s) confirmed: yes    Refill Questions      Flowsheet Row Most Recent Value   Changes to allergies? No   Changes to medications? No   New conditions or infections since last clinic visit No   Unplanned office visit, urgent care, ED, or hospital admission in the last 4 weeks  No   How does patient/caregiver feel medication is working? Very good   Financial problems or insurance changes  No   Since the previous refill, were any specialty medication doses or scheduled injections missed or delayed?  No   If yes, please provide the amount N/A   Why were doses missed? N/A   Does this patient require a clinical escalation to a pharmacist? No            Delivery Questions      Flowsheet Row Most Recent Value   Delivery method [employer]Ex   Delivery address verified with patient/caregiver? Yes   Delivery address Home   Number of medications in delivery 2   Medication(s) being filled and delivered Erenumab-Aooe, Ubrogepant   Doses left of specialty medications N/A   Copay verified? Yes   Copay amount N/A   Copay form of payment No copayment ($0)                   Follow-up: 30 day(s)     Cecelia Anderson  Specialty Pharmacy Technician

## 2023-12-28 ENCOUNTER — OFFICE VISIT (OUTPATIENT)
Dept: INTERNAL MEDICINE | Facility: CLINIC | Age: 43
End: 2023-12-28
Payer: MEDICARE

## 2023-12-28 ENCOUNTER — LAB (OUTPATIENT)
Dept: LAB | Facility: HOSPITAL | Age: 43
End: 2023-12-28
Payer: MEDICARE

## 2023-12-28 VITALS
BODY MASS INDEX: 35.73 KG/M2 | DIASTOLIC BLOOD PRESSURE: 74 MMHG | SYSTOLIC BLOOD PRESSURE: 114 MMHG | HEART RATE: 119 BPM | OXYGEN SATURATION: 97 % | TEMPERATURE: 96.9 F | WEIGHT: 182 LBS | HEIGHT: 60 IN

## 2023-12-28 DIAGNOSIS — M54.50 LOW BACK PAIN, UNSPECIFIED BACK PAIN LATERALITY, UNSPECIFIED CHRONICITY, UNSPECIFIED WHETHER SCIATICA PRESENT: Primary | ICD-10-CM

## 2023-12-28 DIAGNOSIS — Z11.3 ROUTINE SCREENING FOR STI (SEXUALLY TRANSMITTED INFECTION): ICD-10-CM

## 2023-12-28 DIAGNOSIS — R30.0 DYSURIA: ICD-10-CM

## 2023-12-28 DIAGNOSIS — M79.7 FIBROMYALGIA: ICD-10-CM

## 2023-12-28 DIAGNOSIS — N89.8 VAGINAL ITCHING: ICD-10-CM

## 2023-12-28 DIAGNOSIS — M51.26 LUMBAR DISCOGENIC PAIN SYNDROME: ICD-10-CM

## 2023-12-28 DIAGNOSIS — M50.30 DDD (DEGENERATIVE DISC DISEASE), CERVICAL: ICD-10-CM

## 2023-12-28 LAB
BILIRUB BLD-MCNC: NEGATIVE MG/DL
CLARITY, POC: CLEAR
COLOR UR: YELLOW
EXPIRATION DATE: ABNORMAL
GLUCOSE UR STRIP-MCNC: NEGATIVE MG/DL
KETONES UR QL: NEGATIVE
LEUKOCYTE EST, POC: NEGATIVE
Lab: ABNORMAL
NITRITE UR-MCNC: NEGATIVE MG/ML
PH UR: 6 [PH] (ref 5–8)
PROT UR STRIP-MCNC: ABNORMAL MG/DL
RBC # UR STRIP: NEGATIVE /UL
SP GR UR: 1.03 (ref 1–1.03)
UROBILINOGEN UR QL: NORMAL

## 2023-12-28 PROCEDURE — 87661 TRICHOMONAS VAGINALIS AMPLIF: CPT | Performed by: NURSE PRACTITIONER

## 2023-12-28 PROCEDURE — 87086 URINE CULTURE/COLONY COUNT: CPT | Performed by: NURSE PRACTITIONER

## 2023-12-28 PROCEDURE — 36415 COLL VENOUS BLD VENIPUNCTURE: CPT | Performed by: NURSE PRACTITIONER

## 2023-12-28 PROCEDURE — 87798 DETECT AGENT NOS DNA AMP: CPT | Performed by: NURSE PRACTITIONER

## 2023-12-28 PROCEDURE — G0432 EIA HIV-1/HIV-2 SCREEN: HCPCS | Performed by: NURSE PRACTITIONER

## 2023-12-28 PROCEDURE — 87801 DETECT AGNT MULT DNA AMPLI: CPT | Performed by: NURSE PRACTITIONER

## 2023-12-28 PROCEDURE — 87591 N.GONORRHOEAE DNA AMP PROB: CPT | Performed by: NURSE PRACTITIONER

## 2023-12-28 PROCEDURE — 86592 SYPHILIS TEST NON-TREP QUAL: CPT | Performed by: NURSE PRACTITIONER

## 2023-12-28 PROCEDURE — 87491 CHLMYD TRACH DNA AMP PROBE: CPT | Performed by: NURSE PRACTITIONER

## 2023-12-28 PROCEDURE — 80074 ACUTE HEPATITIS PANEL: CPT | Performed by: NURSE PRACTITIONER

## 2023-12-28 RX ORDER — CYCLOBENZAPRINE HCL 10 MG
5-10 TABLET ORAL 3 TIMES DAILY PRN
Qty: 90 TABLET | Refills: 1 | Status: SHIPPED | OUTPATIENT
Start: 2023-12-28

## 2023-12-28 NOTE — PROGRESS NOTES
Office Note     Name: Megan Post    : 1980     MRN: [id number]     Chief Complaint  STD Check and Back Pain (Low back pain/)    Subjective     History of Present Illness:  Megan Post is a 43 y.o. female who presents today for routine STI screening.  Patient has been abstinent from sexual intercourse for the past 3 years.  She recently had intercourse with a new partner about 1 month ago.  She is not currently having any symptoms.  She denies any abdominal pain, lower urinary tract symptoms, or abnormal vaginal discharge.  She has been complaining of some low back pain predominantly to the right side.  She does take cyclobenzaprine as needed.  She denies any further concerns at this time.  She would like to have routine STI screening done.  She follows with Dr. Pandey for chronic conditions.  Pleasant visit with patient today.      Past Medical History:   Diagnosis Date    Abdominal pain     Abnormal breast exam     Abnormal Pap smear of cervix     Achilles tendinitis     Acute sinusitis     Allergic     Anxiety     Arthritis     Asthma     Tavera's syndrome     Bipolar 1 disorder     Bowel trouble     Breast cyst     Colon polyps     Constipation     COPD (chronic obstructive pulmonary disease)     Depression     Diverticulitis     Diverticulitis of colon     Diverticulosis     Endometriosis     Eustachian tube dysfunction     Extremity pain     Fatty liver     Female pelvic pain     Fibromyalgia     Fibromyalgia, primary     Gastric ulcer     GERD (gastroesophageal reflux disease)     Glaucoma     H/O bladder infections     Headache     History of medical problems     History of rashes as a child     Inflammatory bowel disease     Irritable bowel syndrome     Kidney stone     Low back pain     Lumbar radiculopathy     Menopausal symptoms     Muscle weakness     Obesity     Sexual problems     Spinal stenosis of lumbar region     Visual impairment        Past Surgical History:   Procedure  Laterality Date    ADENOIDECTOMY       SECTION      CHOLECYSTECTOMY      COLONOSCOPY  2017    ENDOMETRIAL ABLATION      HYSTERECTOMY      endometriosis    NASAL ENDOSCOPY      OOPHORECTOMY Bilateral     OTHER SURGICAL HISTORY      Laparoscopy (diagnostic) gynecologic with biopsy    SHOULDER SURGERY      SINUS SURGERY      TONSILLECTOMY      UPPER GASTROINTESTINAL ENDOSCOPY  2019    URETEROSCOPY LASER LITHOTRIPSY WITH STENT INSERTION Right 2022    Procedure: CYSTOSCOPY URETEROSCOPY WITH RETROGRADE PYELOGRAMS,  AND STENT PLACEMENT- RIGHT;  Surgeon: Angelo Silvestre MD;  Location: [facility];  Service: Urology;  Laterality: Right;       Social History     Socioeconomic History    Marital status: Single   Tobacco Use    Smoking status: Former     Packs/day: 0.25     Years: 10.00     Additional pack years: 0.00     Total pack years: 2.50     Types: Cigarettes     Start date: 2003     Quit date: 2021     Years since quittin.5    Smokeless tobacco: Never    Tobacco comments:     I stopped smoking a while ago   Vaping Use    Vaping Use: Never used   Substance and Sexual Activity    Alcohol use: No    Drug use: Never    Sexual activity: Not Currently     Partners: Male     Birth control/protection: Surgical, None         Current Outpatient Medications:     albuterol (PROVENTIL) (2.5 MG/3ML) 0.083% nebulizer solution, Take 2.5 mg by nebulization 4 (Four) Times a Day As Needed for Wheezing., Disp: 90 each, Rfl: 5    albuterol sulfate  (90 Base) MCG/ACT inhaler, Inhale 2 puffs Every 4 (Four) Hours As Needed for Wheezing., Disp: 18 g, Rfl: 5    azelaic acid (AZELEX) 15 % gel, , Disp: , Rfl:     cetirizine (zyrTEC) 10 MG tablet, Take 1 tablet by mouth Daily., Disp: , Rfl: 1    COLLAGEN PO, Take 1 tablet by mouth Daily., Disp: , Rfl:     cyclobenzaprine (FLEXERIL) 10 MG tablet, Take 0.5-1 tablets by mouth 3 (Three) Times a Day As Needed for Muscle Spasms., Disp: 90 tablet, Rfl: 1    EPIPEN  2-JAREN 0.3 MG/0.3ML solution auto-injector injection, U UTD, Disp: , Rfl: 6    Erenumab-aooe (Aimovig) 140 MG/ML auto-injector, Inject 1 mL under the skin into the appropriate area as directed Every 28 (Twenty-Eight) Days., Disp: 1 mL, Rfl: 11    estradiol (Estrace) 1 MG tablet, Take 1.5 tablets po qd., Disp: 45 tablet, Rfl: 8    Gentle Laxative 5 MG EC tablet, TAKE 1 TABLET BY MOUTH ONCE DAILY AS NEEDED FOR CONSTIPATION, Disp: , Rfl:     HYDROcodone-acetaminophen (NORCO) 5-325 MG per tablet, Take 1 tablet by mouth Every 12 (Twelve) Hours., Disp: , Rfl:     ipratropium-albuterol (DUO-NEB) 0.5-2.5 mg/3 ml nebulizer, Take 3 mL by nebulization 4 (Four) Times a Day., Disp: 360 mL, Rfl: 5    metroNIDAZOLE (METROGEL) 0.75 % gel, , Disp: , Rfl:     montelukast (SINGULAIR) 10 MG tablet, TAKE 1 TABLET BY MOUTH EVERY NIGHT, Disp: 30 tablet, Rfl: 3    Multiple Vitamin (MULTI-VITAMIN DAILY PO), Take  by mouth Daily., Disp: , Rfl:     omeprazole (priLOSEC) 40 MG capsule, Take 1 capsule by mouth 2 (two) times a day., Disp: , Rfl:     OnabotulinumtoxinA 200 units reconstituted solution, FOR . PHYSICIAN TO INJECT 155 UNITS INTRAMUSCULARLY INTO HEAD, NECK AND SHOULDERS EVERY 12 WEEKS Per FDA PROTOCOL, Disp: 1 each, Rfl: 3    ondansetron ODT (ZOFRAN-ODT) 4 MG disintegrating tablet, Place 1 tablet on the tongue Every 8 (Eight) Hours As Needed for Nausea or Vomiting., Disp: 15 tablet, Rfl: 0    traZODone (DESYREL) 150 MG tablet, Take 1 tablet by mouth Every Night., Disp: , Rfl: 3    ubrogepant (Ubrelvy) 50 MG tablet, Take 1 tablet by mouth at onset of migraine, may repeat in 2 hours.  Max of 200 mg/ 24 hrs, Disp: 10 tablet, Rfl: 11    Xhance 93 MCG/ACT Exhaler Suspension, USE ONE SPRAY IN EACH NOSTRIL EVERY DAY AS NEEDED FOR CONGESTION, Disp: 16 mL, Rfl: 0    Current Facility-Administered Medications:     OnabotulinumtoxinA 200 Units, 200 Units, Intramuscular, Q3 Months, Nicolasa Gonzalez APRN, 200 Units at  "11/02/23 1357    Objective     Vital Signs  /74   Pulse 119   Temp 96.9 °F (36.1 °C)   Ht 152.4 cm (60\")   Wt 82.6 kg (182 lb)   SpO2 97%   BMI 35.54 kg/m²   Estimated body mass index is 35.54 kg/m² as calculated from the following:    Height as of this encounter: 152.4 cm (60\").    Weight as of this encounter: 82.6 kg (182 lb).           Physical Exam  Constitutional:       General: She is not in acute distress.     Appearance: Normal appearance. She is not ill-appearing.   HENT:      Head: Normocephalic and atraumatic.      Nose: Nose normal.   Eyes:      Extraocular Movements: Extraocular movements intact.      Conjunctiva/sclera: Conjunctivae normal.      Pupils: Pupils are equal, round, and reactive to light.   Cardiovascular:      Rate and Rhythm: Normal rate.   Pulmonary:      Effort: Pulmonary effort is normal. No respiratory distress.   Musculoskeletal:         General: Normal range of motion.      Cervical back: Neck supple.   Skin:     General: Skin is warm and dry.   Neurological:      General: No focal deficit present.      Mental Status: She is alert and oriented to person, place, and time. Mental status is at baseline.   Psychiatric:         Mood and Affect: Mood normal.         Behavior: Behavior normal.         Thought Content: Thought content normal.         Judgment: Judgment normal.          Assessment and Plan     Diagnoses and all orders for this visit:    1. Low back pain, unspecified back pain laterality, unspecified chronicity, unspecified whether sciatica present (Primary)  -     POCT urinalysis dipstick, automated  -     Urine Culture - Urine, Urine, Clean Catch; Future  -     Urine Culture - Urine, Urine, Clean Catch    2. Dysuria  -     Urine Culture - Urine, Urine, Clean Catch; Future  -     Urine Culture - Urine, Urine, Clean Catch    3. Routine screening for STI (sexually transmitted infection)  -     [facility]  -     Hepatitis panel, acute  -     HIV-1/O/2 " ANTIGEN/ANTIBODY, 4TH GENERATION  -     RPR    4. Vaginal itching  -     NuSwab VG+ - Swab, Vagina    5. Lumbar discogenic pain syndrome  -     cyclobenzaprine (FLEXERIL) 10 MG tablet; Take 0.5-1 tablets by mouth 3 (Three) Times a Day As Needed for Muscle Spasms.  Dispense: 90 tablet; Refill: 1    6. Fibromyalgia  -     cyclobenzaprine (FLEXERIL) 10 MG tablet; Take 0.5-1 tablets by mouth 3 (Three) Times a Day As Needed for Muscle Spasms.  Dispense: 90 tablet; Refill: 1    7. DDD (degenerative disc disease), cervical  -     cyclobenzaprine (FLEXERIL) 10 MG tablet; Take 0.5-1 tablets by mouth 3 (Three) Times a Day As Needed for Muscle Spasms.  Dispense: 90 tablet; Refill: 1    Plan:  UA with trace protein.  Urine sent for culture.  NuSwab obtained in the office today.  Orders placed for STI labs.  Further plan of care based on lab result findings.  Refill of cyclobenzaprine sent to the pharmacy on file.  Return to clinic as needed.  Keep scheduled follow-up appointment with Dr. Pandey.    Follow Up  Return if symptoms worsen or fail to improve, for Follow up with Dr. Pandey.    MAGGIE Parks    Part of this note may be an electronic transcription/translation of spoken language to printed text using the Dragon Dictation System.

## 2023-12-29 LAB
HAV IGM SERPL QL IA: NORMAL
HBV CORE IGM SERPL QL IA: NORMAL
HBV SURFACE AG SERPL QL IA: NORMAL
HCV AB SER DONR QL: NORMAL
HIV 1+2 AB+HIV1 P24 AG SERPL QL IA: NORMAL
RPR SER QL: NORMAL

## 2023-12-30 LAB — BACTERIA SPEC AEROBE CULT: NO GROWTH

## 2024-01-03 ENCOUNTER — HOSPITAL ENCOUNTER (OUTPATIENT)
Dept: BONE DENSITY | Facility: HOSPITAL | Age: 44
Discharge: HOME | End: 2024-01-03
Admitting: INTERNAL MEDICINE
Payer: MEDICARE

## 2024-01-03 DIAGNOSIS — M85.89 OSTEOPENIA OF MULTIPLE SITES: ICD-10-CM

## 2024-01-03 LAB
A VAGINAE DNA VAG QL NAA+PROBE: NORMAL SCORE
BVAB2 DNA VAG QL NAA+PROBE: NORMAL SCORE
C ALBICANS DNA VAG QL NAA+PROBE: NEGATIVE
C GLABRATA DNA VAG QL NAA+PROBE: NEGATIVE
C TRACH DNA VAG QL NAA+PROBE: NEGATIVE
MEGA1 DNA VAG QL NAA+PROBE: NORMAL SCORE
N GONORRHOEA DNA VAG QL NAA+PROBE: NEGATIVE
T VAGINALIS DNA VAG QL NAA+PROBE: NEGATIVE

## 2024-01-03 PROCEDURE — 77080 DXA BONE DENSITY AXIAL: CPT

## 2024-01-15 ENCOUNTER — SPECIALTY PHARMACY (OUTPATIENT)
Dept: ONCOLOGY | Facility: HOSPITAL | Age: 44
End: 2024-01-15
Payer: MEDICARE

## 2024-01-17 ENCOUNTER — SPECIALTY PHARMACY (OUTPATIENT)
Dept: ONCOLOGY | Facility: HOSPITAL | Age: 44
End: 2024-01-17
Payer: MEDICARE

## 2024-01-17 NOTE — PROGRESS NOTES
Specialty Pharmacy Refill Coordination Note     Megan is a 43 y.o. female contacted today regarding refills of Aimovig and Ubrelvy specialty medication(s).. patient Injection is due on 01/24    Reviewed and verified with patient:       Specialty medication(s) and dose(s) confirmed: yes    Refill Questions      Flowsheet Row Most Recent Value   Changes to allergies? No   Changes to medications? No   New conditions or infections since last clinic visit No   Unplanned office visit, urgent care, ED, or hospital admission in the last 4 weeks  No   How does patient/caregiver feel medication is working? Very good   Financial problems or insurance changes  No   Since the previous refill, were any specialty medication doses or scheduled injections missed or delayed?  No   If yes, please provide the amount N/A   Why were doses missed? N/A   Does this patient require a clinical escalation to a pharmacist? No            Delivery Questions      Flowsheet Row Most Recent Value   Delivery method [employer]   Delivery address verified with patient/caregiver? Yes   Delivery address Home   Number of medications in delivery 3   Medication(s) being filled and delivered Onabotulinumtoxina, Erenumab-Aooe, Ubrogepant   Doses left of specialty medications N/A   Copay verified? Yes   Copay amount N/A   Copay form of payment No copayment ($0)                   Follow-up: 30 day(s)     Cecelia Anderson  Specialty Pharmacy Technician

## 2024-01-26 NOTE — PROGRESS NOTES
Botox Procedure Note       Patient Name: Megan Post  : 1980   MRN: [id number]     Chief Complaint:    Chief Complaint   Patient presents with    Botulinum Toxin Injection     Patient Supplied-Do Not Bill Patient (200 UNITS)       History of Present Illness: Megan Post is a 43 y.o. female who is here today for Botox injections for migraines.     We have discussed risk and benefits of this Botox procedure and common side effects including headache, bleeding, infection, worsening of pain, neck pain, neck stiffness or weakness, damage to the areas being injected, weakness of muscles, loss of muscle control, ptosis, flu-like symptoms as well as more serious possible adverse effects including possible dysphagia, facial droop if injecting the facial area, painful injection, respiratory distress or even death (death has only been reported once with adults for Botox for migraines in another state when mixed with lidocaine solution which we do not use lidocaine solution in our practice for mixing Botox). The patient verbally understands and accepts risks and agrees with moving forward with Botox injections for chronic migraine prevention.    Verbal and written consent has been obtained from the patient prior to beginning treatment injections.     Pertinent Medical History:  Response to treatment has reduced headaches at least 7 fewer days a month and / or 100 hours fewer each month.       Headaches have been fewer and less severe since last Botox.    Subjective      Review of Systems:   Review of Systems    The following portions of the patient's history were reviewed an updated as appropriate: past family history, past medical history, past social history, past surgical history.     Medications:     Current Outpatient Medications:     albuterol (PROVENTIL) (2.5 MG/3ML) 0.083% nebulizer solution, Take 2.5 mg by nebulization 4 (Four) Times a Day As Needed for Wheezing., Disp: 90 each, Rfl: 5    albuterol  sulfate  (90 Base) MCG/ACT inhaler, Inhale 2 puffs Every 4 (Four) Hours As Needed for Wheezing., Disp: 18 g, Rfl: 5    azelaic acid (AZELEX) 15 % gel, , Disp: , Rfl:     cetirizine (zyrTEC) 10 MG tablet, Take 1 tablet by mouth Daily., Disp: , Rfl: 1    COLLAGEN PO, Take 1 tablet by mouth Daily., Disp: , Rfl:     cyclobenzaprine (FLEXERIL) 10 MG tablet, Take 0.5-1 tablets by mouth 3 (Three) Times a Day As Needed for Muscle Spasms., Disp: 90 tablet, Rfl: 1    EPIPEN 2-JAREN 0.3 MG/0.3ML solution auto-injector injection, U UTD, Disp: , Rfl: 6    Erenumab-aooe (Aimovig) 140 MG/ML auto-injector, Inject 1 mL under the skin into the appropriate area as directed Every 28 (Twenty-Eight) Days., Disp: 1 mL, Rfl: 11    estradiol (Estrace) 1 MG tablet, Take 1.5 tablets po qd., Disp: 45 tablet, Rfl: 8    Gentle Laxative 5 MG EC tablet, TAKE 1 TABLET BY MOUTH ONCE DAILY AS NEEDED FOR CONSTIPATION, Disp: , Rfl:     HYDROcodone-acetaminophen (NORCO) 5-325 MG per tablet, Take 1 tablet by mouth Every 12 (Twelve) Hours., Disp: , Rfl:     ipratropium-albuterol (DUO-NEB) 0.5-2.5 mg/3 ml nebulizer, Take 3 mL by nebulization 4 (Four) Times a Day., Disp: 360 mL, Rfl: 5    metroNIDAZOLE (METROGEL) 0.75 % gel, , Disp: , Rfl:     montelukast (SINGULAIR) 10 MG tablet, TAKE 1 TABLET BY MOUTH EVERY NIGHT, Disp: 30 tablet, Rfl: 3    Multiple Vitamin (MULTI-VITAMIN DAILY PO), Take  by mouth Daily., Disp: , Rfl:     omeprazole (priLOSEC) 40 MG capsule, Take 1 capsule by mouth 2 (two) times a day., Disp: , Rfl:     OnabotulinumtoxinA 200 units reconstituted solution, FOR . PHYSICIAN TO INJECT 155 UNITS INTRAMUSCULARLY INTO HEAD, NECK AND SHOULDERS EVERY 12 WEEKS Per FDA PROTOCOL, Disp: 1 each, Rfl: 3    ondansetron ODT (ZOFRAN-ODT) 4 MG disintegrating tablet, Place 1 tablet on the tongue Every 8 (Eight) Hours As Needed for Nausea or Vomiting., Disp: 15 tablet, Rfl: 0    traZODone (DESYREL) 150 MG tablet, Take 1 tablet by  mouth Every Night., Disp: , Rfl: 3    ubrogepant (Ubrelvy) 50 MG tablet, Take 1 tablet by mouth at onset of migraine, may repeat in 2 hours.  Max of 200 mg/ 24 hrs, Disp: 10 tablet, Rfl: 11    Xhance 93 MCG/ACT Exhaler Suspension, USE ONE SPRAY IN EACH NOSTRIL EVERY DAY AS NEEDED FOR CONGESTION, Disp: 16 mL, Rfl: 0    Current Facility-Administered Medications:     OnabotulinumtoxinA 200 Units, 200 Units, Intramuscular, Q3 Months, Gonzalez Nicolasa Mayberry, MAGGIE, 200 Units at 11/02/23 1357    Allergies:   Allergies   Allergen Reactions    Adhesive Tape Hives    Latex Hives    Sulfa Antibiotics Hives    Biaxin [Clarithromycin] Nausea Only    Codeine Itching    Penicillins Nausea Only    Nuts Unknown - High Severity     Red heated face       Objective     Physical Exam:  Vital Signs: There were no vitals filed for this visit.  There is no height or weight on file to calculate BMI.     Physical Exam  Neurological:      Mental Status: She is oriented to person, place, and time.      Gait: Gait is intact.   Psychiatric:         Speech: Speech normal.         Neurologic Exam     Mental Status   Oriented to person, place, and time.   Speech: speech is normal   Level of consciousness: alert    Gait, Coordination, and Reflexes     Gait  Gait: normal      BOTOX PROCEDURE:     The patient was placed in a comfortable area and the sites to be treated were identified. A time out was called and performed. The area to be treated was prepped three times with alcohol and the alcohol allowed to dry. Next, a 30 gauge needle was used to inject the medication in the area to be treated.     Date of Last Injection: 11/2/2023  Response: Good  Onset of response: Within a couple of weeks  Wearing off: 2 weeks prior tp next injection  Side Effects: None  : Allergan  Lot #: [id number]  Expiration Date: 5/2026  NDC: 90090  10    Botox was injected using FDA approved protocol for chronic migraine prevention.   200 unit vial Botox was  re-constituted with 4 mL 0.9 NS to 5 unit/0.1 mL using standard techniques.  10 units were given at the  muscle in 2 divided sites  5 units were given at the Procerus muscle  20 units were given at the Frontalis muscle in 4 divided sites  40 units were given at the Temporalis muscle in 8 divided sites  30 units were given at the Occipitalis muscle in 6 divided sites  20 units were given at the Cervical paraspinal muscle in 4 divided sites  30 units were given at the Trapezius muscle in 6 divided sites    The total amount injected in units is 155  The total amount wasted in units is 45  The total amount submitted in units is 200.   Botox was administered by Nurse practitioner.     Patient tolerated procedure well with no immediate complications.     Assessment / Plan      Assessment/Plan:   Diagnoses and all orders for this visit:    1. Chronic migraine without aura without status migrainosus, not intractable (Primary)  Comments:  Continue Aimovig, BTX, and Ubrelvy         Patient Education:       Reviewed medications, potential side effects and signs and symptoms to report. Discussed risk versus benefits of treatment plan with patient and/or family-including medications, labs and radiology that may be ordered. Addressed questions and concerns during visit. Patient and/or family verbalized understanding and agree with plan. Instructed to call the office with any questions and report to ER with any life-threatening symptoms.     Follow Up:   It has been determined that Crystal A Day has chronic migraine headaches. We will proceed with a 12 week regimen of 200 units of Botox injections, to treat the patient's chronic migraines.  Return in about 3 months (around 4/29/2024).    During this visit the following were done:  Labs Reviewed []    Labs Ordered []    Radiology Reports Reviewed []    Radiology Ordered []    PCP Records Reviewed []    Referring Provider Records Reviewed []    ER Records Reviewed []     Hospital Records Reviewed []    History Obtained From Family []    Radiology Images Reviewed []    Other Reviewed []    Records Requested []        MAGGIE Wills  [facility]  [address] [place] [id number] [phone]

## 2024-01-29 ENCOUNTER — PROCEDURE VISIT (OUTPATIENT)
Dept: NEUROLOGY | Facility: CLINIC | Age: 44
End: 2024-01-29
Payer: MEDICARE

## 2024-01-29 DIAGNOSIS — G43.709 CHRONIC MIGRAINE WITHOUT AURA WITHOUT STATUS MIGRAINOSUS, NOT INTRACTABLE: Primary | ICD-10-CM

## 2024-02-12 ENCOUNTER — SPECIALTY PHARMACY (OUTPATIENT)
Dept: ONCOLOGY | Facility: HOSPITAL | Age: 44
End: 2024-02-12
Payer: MEDICARE

## 2024-02-15 ENCOUNTER — TELEPHONE (OUTPATIENT)
Dept: INTERNAL MEDICINE | Facility: CLINIC | Age: 44
End: 2024-02-15
Payer: MEDICARE

## 2024-02-15 DIAGNOSIS — R11.2 NAUSEA AND VOMITING, UNSPECIFIED VOMITING TYPE: Primary | ICD-10-CM

## 2024-02-15 RX ORDER — PROMETHAZINE HYDROCHLORIDE 12.5 MG/1
12.5 TABLET ORAL EVERY 6 HOURS PRN
Qty: 15 TABLET | Refills: 0 | Status: SHIPPED | OUTPATIENT
Start: 2024-02-15

## 2024-02-29 ENCOUNTER — TRANSCRIBE ORDERS (OUTPATIENT)
Dept: ADMINISTRATIVE | Facility: HOSPITAL | Age: 44
End: 2024-02-29
Payer: MEDICARE

## 2024-02-29 DIAGNOSIS — Z12.31 VISIT FOR SCREENING MAMMOGRAM: Primary | ICD-10-CM

## 2024-03-08 ENCOUNTER — SPECIALTY PHARMACY (OUTPATIENT)
Dept: ONCOLOGY | Facility: HOSPITAL | Age: 44
End: 2024-03-08
Payer: MEDICARE

## 2024-03-08 NOTE — PROGRESS NOTES
Specialty Pharmacy Refill Coordination Note     Megan is a 43 y.o. female contacted today regarding refills of Aimovig and Ubrelvy specialty medication(s).. patient injection is due on 03/24    Reviewed and verified with patient:       Specialty medication(s) and dose(s) confirmed: yes    Refill Questions      Flowsheet Row Most Recent Value   Changes to allergies? No   Changes to medications? No   New conditions or infections since last clinic visit No   Unplanned office visit, urgent care, ED, or hospital admission in the last 4 weeks  No   How does patient/caregiver feel medication is working? Very good   Financial problems or insurance changes  No   Since the previous refill, were any specialty medication doses or scheduled injections missed or delayed?  No   If yes, please provide the amount N/A   Why were doses missed? N/A   Does this patient require a clinical escalation to a pharmacist? No            Delivery Questions      Flowsheet Row Most Recent Value   Delivery method FedEx   Delivery address verified with patient/caregiver? Yes   Delivery address Home   Number of medications in delivery 2   Medication(s) being filled and delivered Ubrogepant, Erenumab-Aooe   Doses left of specialty medications Ubrelvy 4 tablets left as of 03/08   Copay verified? Yes   Copay amount N/A   Copay form of payment No copayment ($0)                   Follow-up: 28 day(s)     Cecelia Anderson  Specialty Pharmacy Technician

## 2024-03-18 ENCOUNTER — LAB (OUTPATIENT)
Dept: LAB | Facility: HOSPITAL | Age: 44
End: 2024-03-18
Payer: MEDICARE

## 2024-03-18 ENCOUNTER — OFFICE VISIT (OUTPATIENT)
Dept: INTERNAL MEDICINE | Facility: CLINIC | Age: 44
End: 2024-03-18
Payer: MEDICARE

## 2024-03-18 VITALS
TEMPERATURE: 97.8 F | SYSTOLIC BLOOD PRESSURE: 136 MMHG | BODY MASS INDEX: 35.53 KG/M2 | DIASTOLIC BLOOD PRESSURE: 84 MMHG | OXYGEN SATURATION: 100 % | HEART RATE: 108 BPM | WEIGHT: 181 LBS | HEIGHT: 60 IN | RESPIRATION RATE: 18 BRPM

## 2024-03-18 DIAGNOSIS — J01.10 ACUTE NON-RECURRENT FRONTAL SINUSITIS: ICD-10-CM

## 2024-03-18 DIAGNOSIS — Z01.818 PREOP EXAMINATION: ICD-10-CM

## 2024-03-18 DIAGNOSIS — Z01.818 PREOP EXAMINATION: Primary | ICD-10-CM

## 2024-03-18 DIAGNOSIS — Z13.29 SCREENING FOR THYROID DISORDER: ICD-10-CM

## 2024-03-18 LAB
ALBUMIN SERPL-MCNC: 4.3 G/DL (ref 3.5–5.2)
ALBUMIN/GLOB SERPL: 1.5 G/DL
ALP SERPL-CCNC: 57 U/L (ref 39–117)
ALT SERPL W P-5'-P-CCNC: 39 U/L (ref 1–33)
ANION GAP SERPL CALCULATED.3IONS-SCNC: 11.9 MMOL/L (ref 5–15)
AST SERPL-CCNC: 38 U/L (ref 1–32)
BASOPHILS # BLD AUTO: 0.04 10*3/MM3 (ref 0–0.2)
BASOPHILS NFR BLD AUTO: 0.6 % (ref 0–1.5)
BILIRUB SERPL-MCNC: <0.2 MG/DL (ref 0–1.2)
BUN SERPL-MCNC: 10 MG/DL (ref 6–20)
BUN/CREAT SERPL: 13.9 (ref 7–25)
CALCIUM SPEC-SCNC: 9.4 MG/DL (ref 8.6–10.5)
CHLORIDE SERPL-SCNC: 103 MMOL/L (ref 98–107)
CO2 SERPL-SCNC: 21.1 MMOL/L (ref 22–29)
CREAT SERPL-MCNC: 0.72 MG/DL (ref 0.57–1)
DEPRECATED RDW RBC AUTO: 40.9 FL (ref 37–54)
EGFRCR SERPLBLD CKD-EPI 2021: 107.9 ML/MIN/1.73
EOSINOPHIL # BLD AUTO: 0.18 10*3/MM3 (ref 0–0.4)
EOSINOPHIL NFR BLD AUTO: 2.5 % (ref 0.3–6.2)
ERYTHROCYTE [DISTWIDTH] IN BLOOD BY AUTOMATED COUNT: 12.8 % (ref 12.3–15.4)
GLOBULIN UR ELPH-MCNC: 2.9 GM/DL
GLUCOSE SERPL-MCNC: 112 MG/DL (ref 65–99)
HBA1C MFR BLD: 5.6 % (ref 4.8–5.6)
HCT VFR BLD AUTO: 43.6 % (ref 34–46.6)
HGB BLD-MCNC: 14.1 G/DL (ref 12–15.9)
IMM GRANULOCYTES # BLD AUTO: 0.02 10*3/MM3 (ref 0–0.05)
IMM GRANULOCYTES NFR BLD AUTO: 0.3 % (ref 0–0.5)
LYMPHOCYTES # BLD AUTO: 2 10*3/MM3 (ref 0.7–3.1)
LYMPHOCYTES NFR BLD AUTO: 28.2 % (ref 19.6–45.3)
Lab: NORMAL
MCH RBC QN AUTO: 28.5 PG (ref 26.6–33)
MCHC RBC AUTO-ENTMCNC: 32.3 G/DL (ref 31.5–35.7)
MCV RBC AUTO: 88.1 FL (ref 79–97)
MONOCYTES # BLD AUTO: 0.34 10*3/MM3 (ref 0.1–0.9)
MONOCYTES NFR BLD AUTO: 4.8 % (ref 5–12)
NEUTROPHILS NFR BLD AUTO: 4.51 10*3/MM3 (ref 1.7–7)
NEUTROPHILS NFR BLD AUTO: 63.6 % (ref 42.7–76)
NRBC BLD AUTO-RTO: 0 /100 WBC (ref 0–0.2)
PLATELET # BLD AUTO: 305 10*3/MM3 (ref 140–450)
PMV BLD AUTO: 11.2 FL (ref 6–12)
POTASSIUM SERPL-SCNC: 4.3 MMOL/L (ref 3.5–5.2)
PROT SERPL-MCNC: 7.2 G/DL (ref 6–8.5)
RBC # BLD AUTO: 4.95 10*6/MM3 (ref 3.77–5.28)
SODIUM SERPL-SCNC: 136 MMOL/L (ref 136–145)
TSH SERPL DL<=0.05 MIU/L-ACNC: 1.19 UIU/ML (ref 0.27–4.2)
WBC NRBC COR # BLD AUTO: 7.09 10*3/MM3 (ref 3.4–10.8)

## 2024-03-18 PROCEDURE — 80053 COMPREHEN METABOLIC PANEL: CPT

## 2024-03-18 PROCEDURE — 85025 COMPLETE CBC W/AUTO DIFF WBC: CPT

## 2024-03-18 PROCEDURE — 83036 HEMOGLOBIN GLYCOSYLATED A1C: CPT

## 2024-03-18 PROCEDURE — 84443 ASSAY THYROID STIM HORMONE: CPT

## 2024-03-18 RX ORDER — AZITHROMYCIN 250 MG/1
TABLET, FILM COATED ORAL
Qty: 6 TABLET | Refills: 0 | Status: SHIPPED | OUTPATIENT
Start: 2024-03-18

## 2024-03-18 NOTE — PROGRESS NOTES
[facility]   PREOPERATIVE HISTORY AND PHYSICAL    Patient Name:Megan Post  : 1980  MRN: [id number]  Primary Care Physician: Kalpana Pandey MD  Date of admission: (Not on file)    Subjective   Subjective     Chief Complaint: preoperative evaluation    History of Present Illness  Megan Post is a 43 y.o. female who presents for preoperative evaluation. She is scheduled for Abdominoplasty with liposuction, liposuction of flanks and liposuction of chin/jaw on date TBD with Dr. Dakota Malloy at [facility].     PMH- Allergic rhinitis, HPL, GERD, Anxiety, bipolar, PTSD, Fibromyalgia, Osteopenia, DDD, JOSUE and  HRT    HPI: Cosmetic surgery for patient     Denies any steroid use in the last 6 months   Denies any reactions to anesthesia, Denies any family members with complications to anesthesia       Review of Systems   Constitutional:  Negative for chills and fever.   HENT:  Negative for dental problem, trouble swallowing and voice change.    Eyes:  Negative for visual disturbance.   Respiratory:  Positive for cough. Negative for chest tightness, shortness of breath and wheezing.         Dry cough    Cardiovascular:  Negative for chest pain, palpitations and leg swelling.   Gastrointestinal:  Positive for constipation and diarrhea. Negative for abdominal pain, blood in stool, nausea and vomiting.        IBS    Genitourinary:  Negative for dysuria.   Musculoskeletal:  Positive for back pain. Negative for gait problem.        Chronic back pain    Skin:  Negative for rash.   Allergic/Immunologic: Positive for environmental allergies.   Neurological:  Negative for dizziness, seizures, syncope, speech difficulty, weakness and headaches.   Hematological:  Bruises/bleeds easily.   Psychiatric/Behavioral:  Positive for dysphoric mood and sleep disturbance. Negative for self-injury and suicidal ideas. The patient is nervous/anxious.         Anxiety, depression, insomnia, PTSD         Personal History      Past Medical History:   Diagnosis Date    Abdominal pain     Abnormal breast exam     Abnormal Pap smear of cervix     Achilles tendinitis     Acute sinusitis     Allergic     Anxiety     Arthritis     Asthma     Tavera's syndrome     Bipolar 1 disorder     Bowel trouble     Breast cyst     Colon polyps     Constipation     COPD (chronic obstructive pulmonary disease)     Depression     Diverticulitis     Diverticulitis of colon     Diverticulosis     Endometriosis     Eustachian tube dysfunction     Extremity pain     Fatty liver     Female pelvic pain     Fibromyalgia     Fibromyalgia, primary     Gastric ulcer     GERD (gastroesophageal reflux disease)     Glaucoma     H/O bladder infections     Headache     History of medical problems     History of rashes as a child     Inflammatory bowel disease     Irritable bowel syndrome     Kidney stone     Low back pain     Lumbar radiculopathy     Menopausal symptoms     Muscle weakness     Obesity     Sexual problems     Spinal stenosis of lumbar region     Visual impairment        Past Surgical History:   Procedure Laterality Date    ADENOIDECTOMY       SECTION      CHOLECYSTECTOMY      COLONOSCOPY  2017    ENDOMETRIAL ABLATION      HYSTERECTOMY      endometriosis    NASAL ENDOSCOPY      OOPHORECTOMY Bilateral     OTHER SURGICAL HISTORY      Laparoscopy (diagnostic) gynecologic with biopsy    SHOULDER SURGERY      SINUS SURGERY      TONSILLECTOMY      UPPER GASTROINTESTINAL ENDOSCOPY  2019    URETEROSCOPY LASER LITHOTRIPSY WITH STENT INSERTION Right 2022    Procedure: CYSTOSCOPY URETEROSCOPY WITH RETROGRADE PYELOGRAMS,  AND STENT PLACEMENT- RIGHT;  Surgeon: Angelo Silvestre MD;  Location: [facility];  Service: Urology;  Laterality: Right;       Family History: Her family history includes Arthritis in her father; Cancer in her maternal grandfather and paternal grandmother; Depression in her father; Diabetes in her mother and another family  member; Hyperlipidemia in her mother and another family member; Hypertension in her mother, paternal aunt, and another family member; Kidney disease in her maternal aunt; Liver cancer in her paternal grandfather; Liver disease in her mother; Lung cancer in her maternal grandmother; Mental illness in her daughter, father, and another family member; Migraines in her sister and another family member; No Known Problems in her son; Stroke in an other family member; Thyroid disease in her mother; Tuberculosis in her paternal grandmother.     Social History: She  reports that she quit smoking about 2 years ago. Her smoking use included cigarettes. She started smoking about 20 years ago. She has a 4.5 pack-year smoking history. She has never used smokeless tobacco. She reports that she does not drink alcohol and does not use drugs.    Home Medications:  Collagen, EPINEPHrine, Erenumab-aooe, Fluticasone Propionate, HYDROcodone-acetaminophen, OnabotulinumtoxinA, albuterol, albuterol sulfate HFA, azelaic acid, azithromycin, bisacodyl, cetirizine, cyclobenzaprine, estradiol, ipratropium-albuterol, montelukast, multivitamin, omeprazole, ondansetron ODT, promethazine, traZODone, and ubrogepant    Allergies:  She is allergic to adhesive tape, latex, sulfa antibiotics, biaxin [clarithromycin], codeine, penicillins, and nuts.    Objective    Objective     Vitals:    Temp:  [97.8 °F (36.6 °C)] 97.8 °F (36.6 °C)  Heart Rate:  [108] 108  Resp:  [18] 18  BP: (136)/(84) 136/84    Physical Exam  Vitals and nursing note reviewed.   Constitutional:       General: She is not in acute distress.     Appearance: Normal appearance.   HENT:      Head: Normocephalic and atraumatic.      Right Ear: Tympanic membrane normal.      Left Ear: Tympanic membrane normal.      Nose: Rhinorrhea present.      Mouth/Throat:      Mouth: Mucous membranes are moist.      Comments: Clear drainage   Eyes:      Extraocular Movements: Extraocular movements intact.       Conjunctiva/sclera: Conjunctivae normal.      Pupils: Pupils are equal, round, and reactive to light.   Cardiovascular:      Rate and Rhythm: Normal rate and regular rhythm.      Heart sounds: Normal heart sounds.   Pulmonary:      Effort: Pulmonary effort is normal. No respiratory distress.      Breath sounds: Normal breath sounds.   Abdominal:      General: Bowel sounds are normal.      Palpations: Abdomen is soft.      Tenderness: There is no abdominal tenderness.   Musculoskeletal:         General: Normal range of motion.      Comments: Moving all extremities    Skin:     General: Skin is warm and dry.   Neurological:      General: No focal deficit present.      Mental Status: She is alert and oriented to person, place, and time.   Psychiatric:         Mood and Affect: Mood is anxious and depressed.         Behavior: Behavior normal.         Thought Content: Thought content normal.         Judgment: Judgment normal.       EKG: normal EKG, normal sinus rhythm- some nonspecific T-wave abnormality.   Stable EKG.     Assessment & Plan   Assessment / Plan     Brief Patient Summary:  Megan Post is a 43 y.o. female who presents for preoperative evaluation.    Abdominoplasty with liposuction, liposuction of flanks and liposuction of chin/jaw on date TBD with Dr. Dakota Malloy at [facility].      Plan:   Cleared for scheduled surgery as planned[facility]     Kimmy Newby MD

## 2024-03-18 NOTE — PATIENT INSTRUCTIONS
Diagnosis Discussed   Continue to monitor   Plenty of fluids, monitor diet and exercise   Cleared for surgery as directed   Take medications as instructed  Follow up as directed   If symptoms worsen or persist please seek further evaluation

## 2024-04-08 ENCOUNTER — SPECIALTY PHARMACY (OUTPATIENT)
Dept: PHARMACY | Facility: TELEHEALTH | Age: 44
End: 2024-04-08
Payer: MEDICARE

## 2024-04-08 NOTE — PROGRESS NOTES
Specialty Pharmacy Patient Management Program  Call Center Refill Outreach      Megan is a 44 y.o. female contacted today regarding refills of her medication(s).    Specialty medication(s) and dose(s) confirmed: Aimovig and Ubrelvy  Other medications being refilled: None    Refill Questions      Flowsheet Row Most Recent Value   Changes to allergies? No   Changes to medications? No   New conditions or infections since last clinic visit No   Unplanned office visit, urgent care, ED, or hospital admission in the last 4 weeks  No   How does patient/caregiver feel medication is working? Very good   Financial problems or insurance changes  No   Since the previous refill, were any specialty medication doses or scheduled injections missed or delayed?  No   If yes, please provide the amount None   Why were doses missed? N/A   Does this patient require a clinical escalation to a pharmacist? No            Delivery Questions      Flowsheet Row Most Recent Value   Delivery method [employer]Ex   Delivery address verified with patient/caregiver? Yes   Delivery address Home   Number of medications in delivery 2   Medication(s) being filled and delivered Erenumab-Aooe, Ubrogepant   Doses left of specialty medications Few Ubrelvy   Copay verified? Yes   Copay amount $0.00   Copay form of payment No copayment ($0)                   Follow-up: 3 weeks     Zia Lyn LYDIA  Specialty Pharmacist  4/8/2024  10:35 EDT

## 2024-04-08 NOTE — PROGRESS NOTES
Specialty Pharmacy Patient Management Program  Reassessment     Megan Post is a 44 y.o. female with Chronic Migraines and enrolled in the Patient Management program offered by [facility]. A follow-up outreach was conducted, including assessment of continued therapy appropriateness, medication adherence, and side effect incidence and management for Aimovig 140mg/mL, Ubrelvy 50mg, and Botox.     Changes to Insurance Coverage or Financial Support  none    Relevant Past Medical History and Comorbidities  Relevant medical history and concomitant health conditions were discussed with the patient. The patient's chart has been reviewed for relevant past medical history and comorbid health conditions and updated as necessary.   Past Medical History:   Diagnosis Date    Abdominal pain     Abnormal breast exam     Abnormal Pap smear of cervix     Achilles tendinitis     Acute sinusitis     Allergic     Anxiety     Arthritis     Asthma     Tavera's syndrome     Bipolar 1 disorder     Bowel trouble     Breast cyst     Colon polyps     Constipation     COPD (chronic obstructive pulmonary disease)     Depression     Diverticulitis     Diverticulitis of colon     Diverticulosis     Endometriosis     Eustachian tube dysfunction     Extremity pain     Fatty liver     Female pelvic pain     Fibromyalgia     Fibromyalgia, primary     Gastric ulcer     GERD (gastroesophageal reflux disease)     Glaucoma     H/O bladder infections     Headache     History of medical problems     History of rashes as a child     Inflammatory bowel disease     Irritable bowel syndrome     Kidney stone     Low back pain     Lumbar radiculopathy     Menopausal symptoms     Muscle weakness     Obesity     Sexual problems     Spinal stenosis of lumbar region     Visual impairment      Social History     Socioeconomic History    Marital status: Single   Tobacco Use    Smoking status: Former     Current packs/day: 0.00     Average  packs/day: 0.3 packs/day for 18.0 years (4.5 ttl pk-yrs)     Types: Cigarettes     Start date: 2003     Quit date: 2021     Years since quittin.8    Smokeless tobacco: Never    Tobacco comments:     I stopped smoking a while ago   Vaping Use    Vaping status: Never Used   Substance and Sexual Activity    Alcohol use: No    Drug use: Never    Sexual activity: Not Currently     Partners: Male     Birth control/protection: Surgical, None     Problem list reviewed by Zia Lyn RPH on 2024 at 10:31 AM    Allergies  Known allergies and reactions were discussed with the patient. The patient's chart has been reviewed for allergy information and updated as necessary.   Allergies   Allergen Reactions    Adhesive Tape Hives    Latex Hives    Sulfa Antibiotics Hives    Biaxin [Clarithromycin] Nausea Only    Codeine Itching    Penicillins Nausea Only    Nuts Unknown - High Severity     Red heated face     Allergies reviewed by Zia Lyn RPH on 2024 at 10:31 AM    Relevant Laboratory Values  Lab Results   Component Value Date    GLUCOSE 112 (H) 2024    CALCIUM 9.4 2024     2024    K 4.3 2024    CO2 21.1 (L) 2024     2024    BUN 10 2024    CREATININE 0.72 2024    BCR 13.9 2024    ANIONGAP 11.9 2024     Lab Results   Component Value Date    WBC 7.09 2024    HGB 14.1 2024    HCT 43.6 2024    MCV 88.1 2024     2024    INR 0.96 2017     Lab Value Review  The above lab values have been reviewed; the following specialty medication(s) dose adjustment(s) are recommended: none.    Current Medication List  This medication list has been reviewed with the patient and evaluated for any interactions or necessary modifications/recommendations, and updated to include all prescription medications, OTC medications, and supplements the patient is currently taking. This list reflects what is contained in  the patient's profile, which has also been marked as reviewed to communicate to other providers it is the most up to date version of the patient's current medication therapy.     Current Outpatient Medications:     albuterol (PROVENTIL) (2.5 MG/3ML) 0.083% nebulizer solution, Take 2.5 mg by nebulization 4 (Four) Times a Day As Needed for Wheezing., Disp: 90 each, Rfl: 5    albuterol sulfate  (90 Base) MCG/ACT inhaler, Inhale 2 puffs Every 4 (Four) Hours As Needed for Wheezing., Disp: 18 g, Rfl: 5    azelaic acid (AZELEX) 15 % gel, , Disp: , Rfl:     azithromycin (Zithromax Z-Jaren) 250 MG tablet, Take 2 tablets by mouth on day 1, then 1 tablet daily on days 2-5, Disp: 6 tablet, Rfl: 0    cetirizine (zyrTEC) 10 MG tablet, Take 1 tablet by mouth Daily., Disp: , Rfl: 1    COLLAGEN PO, Take 1 tablet by mouth Daily., Disp: , Rfl:     cyclobenzaprine (FLEXERIL) 10 MG tablet, Take 0.5-1 tablets by mouth 3 (Three) Times a Day As Needed for Muscle Spasms., Disp: 90 tablet, Rfl: 1    EPIPEN 2-JAREN 0.3 MG/0.3ML solution auto-injector injection, U UTD, Disp: , Rfl: 6    Erenumab-aooe (Aimovig) 140 MG/ML auto-injector, Inject 1 mL under the skin into the appropriate area as directed Every 28 (Twenty-Eight) Days., Disp: 1 mL, Rfl: 11    estradiol (Estrace) 1 MG tablet, Take 1.5 tablets po qd., Disp: 45 tablet, Rfl: 8    Gentle Laxative 5 MG EC tablet, TAKE 1 TABLET BY MOUTH ONCE DAILY AS NEEDED FOR CONSTIPATION, Disp: , Rfl:     HYDROcodone-acetaminophen (NORCO) 5-325 MG per tablet, Take 1 tablet by mouth Every 12 (Twelve) Hours., Disp: , Rfl:     ipratropium-albuterol (DUO-NEB) 0.5-2.5 mg/3 ml nebulizer, Take 3 mL by nebulization 4 (Four) Times a Day., Disp: 360 mL, Rfl: 5    montelukast (SINGULAIR) 10 MG tablet, TAKE 1 TABLET BY MOUTH EVERY NIGHT, Disp: 30 tablet, Rfl: 3    Multiple Vitamin (MULTI-VITAMIN DAILY PO), Take  by mouth Daily., Disp: , Rfl:     omeprazole (priLOSEC) 40 MG capsule, Take 1 capsule by mouth 2 (two)  times a day., Disp: , Rfl:     OnabotulinumtoxinA 200 units reconstituted solution, FOR . PHYSICIAN TO INJECT 155 UNITS INTRAMUSCULARLY INTO HEAD, NECK AND SHOULDERS EVERY 12 WEEKS Per FDA PROTOCOL, Disp: 1 each, Rfl: 3    ondansetron ODT (ZOFRAN-ODT) 4 MG disintegrating tablet, Place 1 tablet on the tongue Every 8 (Eight) Hours As Needed for Nausea or Vomiting., Disp: 15 tablet, Rfl: 0    promethazine (PHENERGAN) 12.5 MG tablet, Take 1 tablet by mouth Every 6 (Six) Hours As Needed for Nausea or Vomiting., Disp: 15 tablet, Rfl: 0    traZODone (DESYREL) 150 MG tablet, Take 1 tablet by mouth Every Night., Disp: , Rfl: 3    ubrogepant (Ubrelvy) 50 MG tablet, Take 1 tablet by mouth at onset of migraine, may repeat in 2 hours.  Max of 200 mg/ 24 hrs, Disp: 10 tablet, Rfl: 11    Xhance 93 MCG/ACT Exhaler Suspension, USE ONE SPRAY IN EACH NOSTRIL EVERY DAY AS NEEDED FOR CONGESTION, Disp: 16 mL, Rfl: 0    Current Facility-Administered Medications:     OnabotulinumtoxinA 200 Units, 200 Units, Intramuscular, Q3 Months, Nicolasa Gonzalez APRN, 200 Units at 01/29/24 1342  Medicines reviewed by Zia Lyn [facility] on 4/8/2024 at 10:31 AM    Drug Interactions  none     Adverse Drug Reactions  Medication tolerability: Tolerating with no to minimal ADRs  Medication plan: Continue therapy with normal follow-up  Plan for ADR Management: None    Hospitalizations and Urgent Care Since Last Assessment  Hospitalizations or Admissions: none  ED Visits: none  Urgent Office Visits: none     Adherence, Self-Administration, and Current Therapy Problems  Adherence related to the patient's specialty therapy was discussed with the patient. The Adherence segment of this outreach has been reviewed and updated.     Adherence Questions  Linked Medication(s) Assessed: Erenumab-Aooe, Ubrogepant  On average, how many doses/injections does the patient miss per month?: 0 (Ubrelvy PRN)  What are the identified reasons for  non-adherence or missed doses? : no problems identified  What is the estimated medication adherence level?: %  Based on the patient/caregiver response and refill history, does this patient require an MTP to track adherence improvements?: no    Additional Barriers to Patient Self-Administration: None  Methods for Supporting Patient Self-Administration: None    Open Medication Therapy Problems  No medication therapy recommendations to display    Goals of Therapy  Goals related to the patient's specialty therapy were discussed with the patient. The Patient Goals segment of this outreach has been reviewed and updated.   Goals Addressed Today        Specialty Pharmacy General Goal      Reduction in severity and frequency of migraines.              Progress Toward Meeting Patient-Identified Goals of Therapy: On Track  New Patient-Identified Goals, If Applicable:     Progress Toward Meeting Clinical Goals or Therapeutic Targets: On Track  New Clinical Goals or Therapeutic Targets, If Applicable:     Quality of Life Assessment   Quality of Life related to the patient's enrollment in the patient management program and services provided was discussed with the patient. The QOL segment of this outreach has been reviewed and updated.  Quality of Life Improvement Scale: 7-Somewhat better    Reassessment Plan & Follow-Up  Medication Therapy Changes: none  Additional Plans, Therapy Recommendations, or Therapy Problems to Be Addressed: none   Pharmacist to perform regular reassessments no more than (6) months from the previous assessment.  Care Coordinator to set up future refill outreaches, coordinate prescription delivery, and escalate clinical questions to pharmacist.     Attestation  I attest the patient was actively involved in and has agreed to the above plan of care. I attest that the specialty medication(s) addressed above are appropriate for the patient based on my reassessment. If the prescribed therapy is at any  point deemed not appropriate based on the current or future assessments, a consultation will be initiated with the patient's specialty care provider to determine the best course of action. The revised plan of therapy will be documented along with any required assessments and/or additional patient education provided.     Electronically signed by Zia Lyn RPH, 04/08/24, 10:34 AM EDT.

## 2024-04-09 DIAGNOSIS — M79.7 FIBROMYALGIA: ICD-10-CM

## 2024-04-09 DIAGNOSIS — M50.30 DDD (DEGENERATIVE DISC DISEASE), CERVICAL: ICD-10-CM

## 2024-04-09 DIAGNOSIS — M51.26 LUMBAR DISCOGENIC PAIN SYNDROME: ICD-10-CM

## 2024-04-09 RX ORDER — CYCLOBENZAPRINE HCL 10 MG
5-10 TABLET ORAL 3 TIMES DAILY PRN
Qty: 90 TABLET | Refills: 3 | Status: SHIPPED | OUTPATIENT
Start: 2024-04-09

## 2024-04-11 ENCOUNTER — SPECIALTY PHARMACY (OUTPATIENT)
Dept: ONCOLOGY | Facility: HOSPITAL | Age: 44
End: 2024-04-11
Payer: MEDICARE

## 2024-04-11 NOTE — PROGRESS NOTES
Specialty Pharmacy Refill Coordination Note     Megan is a 44 y.o. female contacted today regarding refills of  Botox specialty medication(s). Patient is scheduled for her botox injection on 4/24.     Reviewed and verified with patient:       Specialty medication(s) and dose(s) confirmed: yes    Refill Questions      Flowsheet Row Most Recent Value   Changes to allergies? No   Changes to medications? No   New conditions or infections since last clinic visit No   Unplanned office visit, urgent care, ED, or hospital admission in the last 4 weeks  No   How does patient/caregiver feel medication is working? Very good   Financial problems or insurance changes  No   Since the previous refill, were any specialty medication doses or scheduled injections missed or delayed?  No   If yes, please provide the amount N/A   Why were doses missed? N/A   Does this patient require a clinical escalation to a pharmacist? No            Delivery Questions      Flowsheet Row Most Recent Value   Delivery method [employer]Ex   Delivery address verified with patient/caregiver? Yes   Delivery address Home   Number of medications in delivery 1   Medication(s) being filled and delivered Onabotulinumtoxina   Doses left of specialty medications Botox=0   Copay verified? Yes   Copay amount $0.00   Copay form of payment No copayment ($0)                   Follow-up: 3 month(s)     Lacy North, Pharmacy Technician  Specialty Pharmacy Technician

## 2024-04-24 ENCOUNTER — PROCEDURE VISIT (OUTPATIENT)
Dept: NEUROLOGY | Facility: CLINIC | Age: 44
End: 2024-04-24
Payer: MEDICARE

## 2024-04-24 DIAGNOSIS — G43.709 CHRONIC MIGRAINE WITHOUT AURA WITHOUT STATUS MIGRAINOSUS, NOT INTRACTABLE: Primary | ICD-10-CM

## 2024-04-24 NOTE — PROGRESS NOTES
Botox Procedure Note       Patient Name: Megan Post  : 1980   MRN: [id number]     Chief Complaint:    Chief Complaint   Patient presents with    Botulinum Toxin Injection     Patient Supplied-Do not Bill (200 UNITS)       History of Present Illness: Megan Post is a 44 y.o. female who is here today for Botox injections for migraines.    We have discussed risk and benefits of this Botox procedure and common side effects including headache, bleeding, infection, worsening of pain, neck pain, neck stiffness or weakness, damage to the areas being injected, weakness of muscles, loss of muscle control, ptosis, flu-like symptoms as well as more serious possible adverse effects including possible dysphagia, facial droop if injecting the facial area, painful injection, respiratory distress or even death (death has only been reported once with adults for Botox for migraines in another state when mixed with lidocaine solution which we do not use lidocaine solution in our practice for mixing Botox). The patient verbally understands and accepts risks and agrees with moving forward with Botox injections for chronic migraine prevention.    Verbal and written consent has been obtained from the patient prior to beginning treatment injections.     Pertinent Medical History:  Response to treatment has reduced headaches at least 7 fewer days a month and / or 100 hours fewer each month.       Subjective      Review of Systems:   Review of Systems    The following portions of the patient's history were reviewed an updated as appropriate: past family history, past medical history, past social history, past surgical history.     Medications:     Current Outpatient Medications:     albuterol (PROVENTIL) (2.5 MG/3ML) 0.083% nebulizer solution, Take 2.5 mg by nebulization 4 (Four) Times a Day As Needed for Wheezing., Disp: 90 each, Rfl: 5    albuterol sulfate  (90 Base) MCG/ACT inhaler, Inhale 2 puffs Every 4 (Four) Hours  As Needed for Wheezing., Disp: 18 g, Rfl: 5    azelaic acid (AZELEX) 15 % gel, , Disp: , Rfl:     azithromycin (Zithromax Z-Jaren) 250 MG tablet, Take 2 tablets by mouth on day 1, then 1 tablet daily on days 2-5, Disp: 6 tablet, Rfl: 0    cetirizine (zyrTEC) 10 MG tablet, Take 1 tablet by mouth Daily., Disp: , Rfl: 1    COLLAGEN PO, Take 1 tablet by mouth Daily., Disp: , Rfl:     cyclobenzaprine (FLEXERIL) 10 MG tablet, Take 0.5-1 tablets by mouth 3 (Three) Times a Day As Needed for Muscle Spasms., Disp: 90 tablet, Rfl: 3    EPIPEN 2-JAREN 0.3 MG/0.3ML solution auto-injector injection, U UTD, Disp: , Rfl: 6    Erenumab-aooe (Aimovig) 140 MG/ML auto-injector, Inject 1 mL under the skin into the appropriate area as directed Every 28 (Twenty-Eight) Days., Disp: 1 mL, Rfl: 11    estradiol (Estrace) 1 MG tablet, Take 1.5 tablets po qd., Disp: 45 tablet, Rfl: 8    Gentle Laxative 5 MG EC tablet, TAKE 1 TABLET BY MOUTH ONCE DAILY AS NEEDED FOR CONSTIPATION, Disp: , Rfl:     HYDROcodone-acetaminophen (NORCO) 5-325 MG per tablet, Take 1 tablet by mouth Every 12 (Twelve) Hours., Disp: , Rfl:     ipratropium-albuterol (DUO-NEB) 0.5-2.5 mg/3 ml nebulizer, Take 3 mL by nebulization 4 (Four) Times a Day., Disp: 360 mL, Rfl: 5    montelukast (SINGULAIR) 10 MG tablet, TAKE 1 TABLET BY MOUTH EVERY NIGHT, Disp: 30 tablet, Rfl: 3    Multiple Vitamin (MULTI-VITAMIN DAILY PO), Take  by mouth Daily., Disp: , Rfl:     omeprazole (priLOSEC) 40 MG capsule, Take 1 capsule by mouth 2 (two) times a day., Disp: , Rfl:     OnabotulinumtoxinA 200 units reconstituted solution, FOR . PHYSICIAN TO INJECT 155 UNITS INTRAMUSCULARLY INTO HEAD, NECK AND SHOULDERS EVERY 12 WEEKS Per FDA PROTOCOL, Disp: 1 each, Rfl: 3    ondansetron ODT (ZOFRAN-ODT) 4 MG disintegrating tablet, Place 1 tablet on the tongue Every 8 (Eight) Hours As Needed for Nausea or Vomiting., Disp: 15 tablet, Rfl: 0    promethazine (PHENERGAN) 12.5 MG tablet, Take 1  tablet by mouth Every 6 (Six) Hours As Needed for Nausea or Vomiting., Disp: 15 tablet, Rfl: 0    traZODone (DESYREL) 150 MG tablet, Take 1 tablet by mouth Every Night., Disp: , Rfl: 3    ubrogepant (Ubrelvy) 50 MG tablet, Take 1 tablet by mouth at onset of migraine, may repeat in 2 hours.  Max of 200 mg/ 24 hrs, Disp: 10 tablet, Rfl: 11    Xhance 93 MCG/ACT Exhaler Suspension, USE ONE SPRAY IN EACH NOSTRIL EVERY DAY AS NEEDED FOR CONGESTION, Disp: 16 mL, Rfl: 0    Current Facility-Administered Medications:     OnabotulinumtoxinA 200 Units, 200 Units, Intramuscular, Q3 Months, Nicolasa Gonzalez APRN, 200 Units at 01/29/24 1342    Allergies:   Allergies   Allergen Reactions    Adhesive Tape Hives    Latex Hives    Sulfa Antibiotics Hives    Biaxin [Clarithromycin] Nausea Only    Codeine Itching    Penicillins Nausea Only    Nuts Unknown - High Severity     Red heated face       Objective     Physical Exam:  Vital Signs: There were no vitals filed for this visit.  There is no height or weight on file to calculate BMI.     Physical Exam  Neurological:      Mental Status: She is oriented to person, place, and time.   Psychiatric:         Speech: Speech normal.         Neurologic Exam     Mental Status   Oriented to person, place, and time.   Speech: speech is normal   Level of consciousness: alert      BOTOX PROCEDURE:     The patient was placed in a comfortable area and the sites to be treated were identified. A time out was called and performed. The area to be treated was prepped three times with alcohol and the alcohol allowed to dry. Next, a 30 gauge needle was used to inject the medication in the area to be treated.     Date of Last Injection: 1/29/2024  Response: Good  Onset of response: Immediate  Wearing off: 1 month  Side Effects: None   : Allergan  Lot #: [id number]  Expiration Date: 06/2026  NDC: 47801  10    Botox was injected using FDA approved protocol for chronic migraine prevention.    200 unit vial Botox was re-constituted with 4 mL 0.9 NS to 5 unit/0.1 mL using standard techniques.  10 units were given at the  muscle in 2 divided sites  5 units were given at the Procerus muscle  20 units were given at the Frontalis muscle in 4 divided sites  40 units were given at the Temporalis muscle in 8 divided sites  30 units were given at the Occipitalis muscle in 6 divided sites  20 units were given at the Cervical paraspinal muscle in 4 divided sites  30 units were given at the Trapezius muscle in 6 divided sites    The total amount injected in units is 155  The total amount wasted in units is 45  The total amount submitted in units is 200.   Botox was administered by Nurse practitioner.     Patient tolerated procedure well with no immediate complications.     Assessment / Plan      Assessment/Plan:   Diagnoses and all orders for this visit:    1. Chronic migraine without aura without status migrainosus, not intractable (Primary)  Comments:  Continue Aimovig, Botox, Ubrelvy         Patient Education:       Reviewed medications, potential side effects and signs and symptoms to report. Discussed risk versus benefits of treatment plan with patient and/or family-including medications, labs and radiology that may be ordered. Addressed questions and concerns during visit. Patient and/or family verbalized understanding and agree with plan. Instructed to call the office with any questions and report to ER with any life-threatening symptoms.     Follow Up:   It has been determined that Crystal A Day has chronic migraine headaches. We will proceed with a 12 week regimen of 200 units of Botox injections, to treat the patient's chronic migraines.  No follow-ups on file.    During this visit the following were done:  Labs Reviewed []    Labs Ordered []    Radiology Reports Reviewed []    Radiology Ordered []    PCP Records Reviewed []    Referring Provider Records Reviewed []    ER Records Reviewed []     Hospital Records Reviewed []    History Obtained From Family []    Radiology Images Reviewed []    Other Reviewed []    Records Requested []        MAGGIE Wills  [facility]  [address] [place] [id number] [phone]

## 2024-04-25 ENCOUNTER — TELEPHONE (OUTPATIENT)
Dept: OBSTETRICS AND GYNECOLOGY | Facility: CLINIC | Age: 44
End: 2024-04-25

## 2024-04-25 NOTE — TELEPHONE ENCOUNTER
Caller: Megan Post    Relationship to patient: Self    Best call back number: [phone]    Patient is needing:       PATIENT CALLED AND CANCELED SAME DAY APPT FOR TODAY.

## 2024-05-01 ENCOUNTER — SPECIALTY PHARMACY (OUTPATIENT)
Dept: ONCOLOGY | Facility: HOSPITAL | Age: 44
End: 2024-05-01
Payer: MEDICARE

## 2024-05-01 NOTE — PROGRESS NOTES
Specialty Pharmacy Refill Coordination Note     Megan is a 44 y.o. female contacted today regarding refills of Aimovig and Ubrelvy specialty medication(s).Patient is due for injection on 05/20.    Reviewed and verified with patient:       Specialty medication(s) and dose(s) confirmed: yes    Refill Questions      Flowsheet Row Most Recent Value   Changes to allergies? No   Changes to medications? No   New conditions or infections since last clinic visit No   Unplanned office visit, urgent care, ED, or hospital admission in the last 4 weeks  No   How does patient/caregiver feel medication is working? Good   Financial problems or insurance changes  No   Since the previous refill, were any specialty medication doses or scheduled injections missed or delayed?  No   If yes, please provide the amount N/A   Why were doses missed? N/A   Does this patient require a clinical escalation to a pharmacist? No            Delivery Questions      Flowsheet Row Most Recent Value   Delivery method [employer]Ex   Delivery address verified with patient/caregiver? Yes   Delivery address Home   Number of medications in delivery 2   Medication(s) being filled and delivered Erenumab-Aooe, Ubrogepant   Doses left of specialty medications Ubrelvy 4 tablets left as of 05/01   Copay verified? Yes   Copay amount N/A   Copay form of payment No copayment ($0)                   Follow-up: 28 day(s)     Cecelia Anderson  Specialty Pharmacy Technician

## 2024-05-02 ENCOUNTER — PRE-ADMISSION TESTING (OUTPATIENT)
Dept: PREADMISSION TESTING | Facility: HOSPITAL | Age: 44
End: 2024-05-02

## 2024-05-02 VITALS — WEIGHT: 175.71 LBS | BODY MASS INDEX: 33.17 KG/M2 | HEIGHT: 61 IN

## 2024-05-02 LAB
ANION GAP SERPL CALCULATED.3IONS-SCNC: 15 MMOL/L (ref 5–15)
BUN SERPL-MCNC: 16 MG/DL (ref 6–20)
BUN/CREAT SERPL: 18.6 (ref 7–25)
CALCIUM SPEC-SCNC: 9.6 MG/DL (ref 8.6–10.5)
CHLORIDE SERPL-SCNC: 100 MMOL/L (ref 98–107)
CO2 SERPL-SCNC: 21 MMOL/L (ref 22–29)
CREAT SERPL-MCNC: 0.86 MG/DL (ref 0.57–1)
DEPRECATED RDW RBC AUTO: 45.8 FL (ref 37–54)
EGFRCR SERPLBLD CKD-EPI 2021: 86.6 ML/MIN/1.73
ERYTHROCYTE [DISTWIDTH] IN BLOOD BY AUTOMATED COUNT: 13.9 % (ref 12.3–15.4)
GLUCOSE SERPL-MCNC: 105 MG/DL (ref 65–99)
HBA1C MFR BLD: 5.1 % (ref 4.8–5.6)
HCT VFR BLD AUTO: 44.1 % (ref 34–46.6)
HGB BLD-MCNC: 14 G/DL (ref 12–15.9)
MCH RBC QN AUTO: 28.6 PG (ref 26.6–33)
MCHC RBC AUTO-ENTMCNC: 31.7 G/DL (ref 31.5–35.7)
MCV RBC AUTO: 90 FL (ref 79–97)
PLATELET # BLD AUTO: 348 10*3/MM3 (ref 140–450)
PMV BLD AUTO: 9.9 FL (ref 6–12)
POTASSIUM SERPL-SCNC: 3.9 MMOL/L (ref 3.5–5.2)
RBC # BLD AUTO: 4.9 10*6/MM3 (ref 3.77–5.28)
SODIUM SERPL-SCNC: 136 MMOL/L (ref 136–145)
WBC NRBC COR # BLD AUTO: 7.82 10*3/MM3 (ref 3.4–10.8)

## 2024-05-02 PROCEDURE — 85027 COMPLETE CBC AUTOMATED: CPT

## 2024-05-02 PROCEDURE — 83036 HEMOGLOBIN GLYCOSYLATED A1C: CPT

## 2024-05-02 PROCEDURE — 80048 BASIC METABOLIC PNL TOTAL CA: CPT

## 2024-05-02 PROCEDURE — 36415 COLL VENOUS BLD VENIPUNCTURE: CPT

## 2024-05-02 RX ORDER — CLINDAMYCIN HYDROCHLORIDE 300 MG/1
300 CAPSULE ORAL 3 TIMES DAILY
COMMUNITY
Start: 2024-04-30

## 2024-05-02 NOTE — PAT
Patient to apply Chlorhexadine wipes  to surgical area (as instructed) the night before procedure and the AM of procedure. Wipes provided.    Patient instructed to drink 20 ounces of Gatorade or Gatorlyte (if diabetic) and it needs to be completed 1 hour (for Main OR patients) or 2 hours (scheduled  section & [facility] patients) before given arrival time for procedure (NO RED Gatorade and NO Gatorade Zero).    Patient verbalized understanding.    Patient viewed general PAT education video as instructed in their preoperative information received from their surgeon.  Patient stated the general PAT education video was viewed in its entirety and survey completed.  Copies of PAT general education handouts (Incentive Spirometry, Meds to Beds Program, Patient Belongings, Pre-op skin preparation instructions, Blood Glucose testing, Visitor policy, Surgery FAQ, Code H) distributed to patient if not printed. Education related to the PAT pass and skin preparation for surgery (if applicable) completed in PAT as a reinforcement to PAT education video. Patient instructed to return PAT pass provided today as well as completed skin preparation sheet (if applicable) on the day of procedure.     Additionally if patient had not viewed video yet but intended to view it at home or in our waiting area, then referred them to the handout with QR code/link provided during PAT visit.  Instructed patient to complete survey after viewing the video in its entirety.  Encouraged patient/family to read PAT general education handouts thoroughly and notify PAT staff with any questions or concerns. Patient verbalized understanding of all information and priority content.    Dr Malloy's office notified of patient taking Clindamycin for sinus infection, started on 24

## 2024-05-08 ENCOUNTER — ANESTHESIA EVENT (OUTPATIENT)
Dept: PERIOP | Facility: HOSPITAL | Age: 44
End: 2024-05-08

## 2024-05-09 ENCOUNTER — HOSPITAL ENCOUNTER (OUTPATIENT)
Facility: HOSPITAL | Age: 44
Discharge: HOME | End: 2024-05-10
Attending: PLASTIC SURGERY | Admitting: PLASTIC SURGERY

## 2024-05-09 ENCOUNTER — ANESTHESIA (OUTPATIENT)
Dept: PERIOP | Facility: HOSPITAL | Age: 44
End: 2024-05-09

## 2024-05-09 PROBLEM — Z41.1 ENCOUNTER FOR COSMETIC PROCEDURE: Status: ACTIVE | Noted: 2024-05-09

## 2024-05-09 PROCEDURE — 25010000002 FENTANYL CITRATE (PF) 100 MCG/2ML SOLUTION

## 2024-05-09 PROCEDURE — A9270 NON-COVERED ITEM OR SERVICE: HCPCS | Performed by: PLASTIC SURGERY

## 2024-05-09 PROCEDURE — 25810000003 LACTATED RINGERS PER 1000 ML: Performed by: ANESTHESIOLOGY

## 2024-05-09 PROCEDURE — 25010000002 ONDANSETRON PER 1 MG

## 2024-05-09 PROCEDURE — 25010000002 MORPHINE PER 10 MG: Performed by: PLASTIC SURGERY

## 2024-05-09 PROCEDURE — P9041 ALBUMIN (HUMAN),5%, 50ML: HCPCS

## 2024-05-09 PROCEDURE — 63710000001 TRAZODONE 50 MG TABLET: Performed by: PLASTIC SURGERY

## 2024-05-09 PROCEDURE — 25010000002 MIDAZOLAM PER 1 MG: Performed by: ANESTHESIOLOGY

## 2024-05-09 PROCEDURE — 94799 UNLISTED PULMONARY SVC/PX: CPT

## 2024-05-09 PROCEDURE — 63710000001 ACETAMINOPHEN 325 MG TABLET: Performed by: PLASTIC SURGERY

## 2024-05-09 PROCEDURE — 25010000002 ALBUMIN HUMAN 5% PER 50 ML

## 2024-05-09 PROCEDURE — 63710000001 MONTELUKAST 10 MG TABLET: Performed by: PLASTIC SURGERY

## 2024-05-09 PROCEDURE — 63710000001 TRAZODONE 100 MG TABLET: Performed by: PLASTIC SURGERY

## 2024-05-09 PROCEDURE — 25010000002 DEXAMETHASONE PER 1 MG

## 2024-05-09 PROCEDURE — 63710000001 PROMETHAZINE PER 12.5 MG: Performed by: PLASTIC SURGERY

## 2024-05-09 PROCEDURE — 63710000001 OXYCODONE 5 MG TABLET: Performed by: PLASTIC SURGERY

## 2024-05-09 PROCEDURE — 25010000002 EPINEPHRINE PER 0.1 MG: Performed by: PLASTIC SURGERY

## 2024-05-09 PROCEDURE — 25010000002 PROPOFOL 10 MG/ML EMULSION

## 2024-05-09 PROCEDURE — 25810000003 LACTATED RINGERS PER 1000 ML: Performed by: PLASTIC SURGERY

## 2024-05-09 PROCEDURE — 25010000002 CEFAZOLIN PER 500 MG

## 2024-05-09 PROCEDURE — 25010000002 MIDAZOLAM PER 1 MG

## 2024-05-09 PROCEDURE — 25010000002 LIDOCAINE 1 % SOLUTION 20 ML VIAL: Performed by: PLASTIC SURGERY

## 2024-05-09 PROCEDURE — 0 BUPIVACAINE LIPOSOME 1.3 % SUSPENSION 20 ML VIAL: Performed by: PLASTIC SURGERY

## 2024-05-09 PROCEDURE — C9290 INJ, BUPIVACAINE LIPOSOME: HCPCS | Performed by: PLASTIC SURGERY

## 2024-05-09 PROCEDURE — 25010000002 HYDROMORPHONE 1 MG/ML SOLUTION

## 2024-05-09 PROCEDURE — 94640 AIRWAY INHALATION TREATMENT: CPT

## 2024-05-09 PROCEDURE — 25010000002 BUPIVACAINE (PF) 0.25 % SOLUTION 30 ML VIAL: Performed by: PLASTIC SURGERY

## 2024-05-09 PROCEDURE — 25810000003 SODIUM CHLORIDE 0.9 % SOLUTION: Performed by: PLASTIC SURGERY

## 2024-05-09 PROCEDURE — 25010000002 CEFAZOLIN PER 500 MG: Performed by: PLASTIC SURGERY

## 2024-05-09 PROCEDURE — 25010000002 SUGAMMADEX 200 MG/2ML SOLUTION

## 2024-05-09 PROCEDURE — 63710000001 CYCLOBENZAPRINE 10 MG TABLET: Performed by: PLASTIC SURGERY

## 2024-05-09 PROCEDURE — 63710000001 DOCUSATE SODIUM 100 MG CAPSULE: Performed by: PLASTIC SURGERY

## 2024-05-09 PROCEDURE — 25010000002 FENTANYL CITRATE (PF) 50 MCG/ML SOLUTION

## 2024-05-09 PROCEDURE — 25010000002 MEPERIDINE PER 100 MG

## 2024-05-09 DEVICE — KNOTLESS TISSUE CONTROL DEVICE, UNDYED UNIDIRECTIONAL (ANTIBACTERIAL) SYNTHETIC ABSORBABLE DEVICE
Type: IMPLANTABLE DEVICE | Site: ABDOMEN | Status: FUNCTIONAL
Brand: STRATAFIX

## 2024-05-09 DEVICE — KNOTLESS TISSUE CONTROL DEVICE, UNDYED UNIDIRECTIONAL (ANTIBACTERIAL) SYNTHETIC ABSORBABLE DEVICE
Type: IMPLANTABLE DEVICE | Site: BACK | Status: FUNCTIONAL
Brand: STRATAFIX

## 2024-05-09 RX ORDER — SODIUM CHLORIDE 0.9 % (FLUSH) 0.9 %
10 SYRINGE (ML) INJECTION EVERY 12 HOURS SCHEDULED
Status: DISCONTINUED | OUTPATIENT
Start: 2024-05-09 | End: 2024-05-09 | Stop reason: HOSPADM

## 2024-05-09 RX ORDER — TEMAZEPAM 15 MG/1
15 CAPSULE ORAL NIGHTLY PRN
Status: DISCONTINUED | OUTPATIENT
Start: 2024-05-09 | End: 2024-05-10 | Stop reason: HOSPADM

## 2024-05-09 RX ORDER — IPRATROPIUM BROMIDE AND ALBUTEROL SULFATE 2.5; .5 MG/3ML; MG/3ML
3 SOLUTION RESPIRATORY (INHALATION) ONCE AS NEEDED
Status: DISCONTINUED | OUTPATIENT
Start: 2024-05-09 | End: 2024-05-09 | Stop reason: HOSPADM

## 2024-05-09 RX ORDER — SODIUM CHLORIDE 0.9 % (FLUSH) 0.9 %
10 SYRINGE (ML) INJECTION AS NEEDED
Status: DISCONTINUED | OUTPATIENT
Start: 2024-05-09 | End: 2024-05-09 | Stop reason: HOSPADM

## 2024-05-09 RX ORDER — HYDROCODONE BITARTRATE AND ACETAMINOPHEN 5; 325 MG/1; MG/1
1 TABLET ORAL ONCE AS NEEDED
Status: DISCONTINUED | OUTPATIENT
Start: 2024-05-09 | End: 2024-05-09 | Stop reason: HOSPADM

## 2024-05-09 RX ORDER — PANTOPRAZOLE SODIUM 40 MG/1
40 TABLET, DELAYED RELEASE ORAL
Status: DISCONTINUED | OUTPATIENT
Start: 2024-05-10 | End: 2024-05-10 | Stop reason: HOSPADM

## 2024-05-09 RX ORDER — LABETALOL HYDROCHLORIDE 5 MG/ML
5 INJECTION, SOLUTION INTRAVENOUS
Status: DISCONTINUED | OUTPATIENT
Start: 2024-05-09 | End: 2024-05-09 | Stop reason: HOSPADM

## 2024-05-09 RX ORDER — CEFAZOLIN SODIUM 1 G/3ML
INJECTION, POWDER, FOR SOLUTION INTRAMUSCULAR; INTRAVENOUS AS NEEDED
Status: DISCONTINUED | OUTPATIENT
Start: 2024-05-09 | End: 2024-05-09 | Stop reason: SURG

## 2024-05-09 RX ORDER — HYDRALAZINE HYDROCHLORIDE 20 MG/ML
5 INJECTION INTRAMUSCULAR; INTRAVENOUS
Status: DISCONTINUED | OUTPATIENT
Start: 2024-05-09 | End: 2024-05-09 | Stop reason: HOSPADM

## 2024-05-09 RX ORDER — NALOXONE HCL 0.4 MG/ML
0.4 VIAL (ML) INJECTION AS NEEDED
Status: DISCONTINUED | OUTPATIENT
Start: 2024-05-09 | End: 2024-05-09 | Stop reason: HOSPADM

## 2024-05-09 RX ORDER — SODIUM CHLORIDE 9 MG/ML
40 INJECTION, SOLUTION INTRAVENOUS AS NEEDED
Status: DISCONTINUED | OUTPATIENT
Start: 2024-05-09 | End: 2024-05-09 | Stop reason: HOSPADM

## 2024-05-09 RX ORDER — CEFAZOLIN SODIUM 2 G/100ML
2000 INJECTION, SOLUTION INTRAVENOUS EVERY 8 HOURS
Qty: 200 ML | Refills: 0 | Status: DISCONTINUED | OUTPATIENT
Start: 2024-05-09 | End: 2024-05-09 | Stop reason: SDUPTHER

## 2024-05-09 RX ORDER — FAMOTIDINE 20 MG/1
20 TABLET, FILM COATED ORAL ONCE
Status: COMPLETED | OUTPATIENT
Start: 2024-05-09 | End: 2024-05-09

## 2024-05-09 RX ORDER — SCOLOPAMINE TRANSDERMAL SYSTEM 1 MG/1
1 PATCH, EXTENDED RELEASE TRANSDERMAL ONCE
Status: DISCONTINUED | OUTPATIENT
Start: 2024-05-09 | End: 2024-05-09

## 2024-05-09 RX ORDER — MONTELUKAST SODIUM 10 MG/1
10 TABLET ORAL NIGHTLY
Status: DISCONTINUED | OUTPATIENT
Start: 2024-05-09 | End: 2024-05-10 | Stop reason: HOSPADM

## 2024-05-09 RX ORDER — ONDANSETRON 2 MG/ML
INJECTION INTRAMUSCULAR; INTRAVENOUS AS NEEDED
Status: DISCONTINUED | OUTPATIENT
Start: 2024-05-09 | End: 2024-05-09

## 2024-05-09 RX ORDER — FENTANYL CITRATE 50 UG/ML
INJECTION, SOLUTION INTRAMUSCULAR; INTRAVENOUS
Status: COMPLETED
Start: 2024-05-09 | End: 2024-05-09

## 2024-05-09 RX ORDER — SODIUM CHLORIDE 9 MG/ML
50 INJECTION, SOLUTION INTRAVENOUS CONTINUOUS
Status: DISCONTINUED | OUTPATIENT
Start: 2024-05-09 | End: 2024-05-10 | Stop reason: HOSPADM

## 2024-05-09 RX ORDER — MEPERIDINE HYDROCHLORIDE 25 MG/ML
12.5 INJECTION INTRAMUSCULAR; INTRAVENOUS; SUBCUTANEOUS
Status: DISCONTINUED | OUTPATIENT
Start: 2024-05-09 | End: 2024-05-09 | Stop reason: HOSPADM

## 2024-05-09 RX ORDER — ROCURONIUM BROMIDE 10 MG/ML
INJECTION, SOLUTION INTRAVENOUS AS NEEDED
Status: DISCONTINUED | OUTPATIENT
Start: 2024-05-09 | End: 2024-05-09 | Stop reason: SURG

## 2024-05-09 RX ORDER — OXYCODONE HYDROCHLORIDE 5 MG/1
5 TABLET ORAL EVERY 4 HOURS PRN
Status: DISCONTINUED | OUTPATIENT
Start: 2024-05-09 | End: 2024-05-10 | Stop reason: HOSPADM

## 2024-05-09 RX ORDER — CYCLOBENZAPRINE HCL 10 MG
10 TABLET ORAL 3 TIMES DAILY PRN
Status: DISCONTINUED | OUTPATIENT
Start: 2024-05-09 | End: 2024-05-10 | Stop reason: HOSPADM

## 2024-05-09 RX ORDER — MORPHINE SULFATE 4 MG/ML
4 INJECTION, SOLUTION INTRAMUSCULAR; INTRAVENOUS
Status: DISCONTINUED | OUTPATIENT
Start: 2024-05-09 | End: 2024-05-10 | Stop reason: HOSPADM

## 2024-05-09 RX ORDER — MAGNESIUM HYDROXIDE 1200 MG/15ML
LIQUID ORAL AS NEEDED
Status: DISCONTINUED | OUTPATIENT
Start: 2024-05-09 | End: 2024-05-09 | Stop reason: HOSPADM

## 2024-05-09 RX ORDER — IPRATROPIUM BROMIDE AND ALBUTEROL SULFATE 2.5; .5 MG/3ML; MG/3ML
3 SOLUTION RESPIRATORY (INHALATION) 4 TIMES DAILY
Status: DISCONTINUED | OUTPATIENT
Start: 2024-05-09 | End: 2024-05-09

## 2024-05-09 RX ORDER — MIDAZOLAM HYDROCHLORIDE 1 MG/ML
1 INJECTION INTRAMUSCULAR; INTRAVENOUS
Status: DISCONTINUED | OUTPATIENT
Start: 2024-05-09 | End: 2024-05-09 | Stop reason: HOSPADM

## 2024-05-09 RX ORDER — ONDANSETRON 4 MG/1
4 TABLET, ORALLY DISINTEGRATING ORAL EVERY 8 HOURS PRN
Status: DISCONTINUED | OUTPATIENT
Start: 2024-05-09 | End: 2024-05-10 | Stop reason: HOSPADM

## 2024-05-09 RX ORDER — FENTANYL CITRATE 50 UG/ML
INJECTION, SOLUTION INTRAMUSCULAR; INTRAVENOUS AS NEEDED
Status: DISCONTINUED | OUTPATIENT
Start: 2024-05-09 | End: 2024-05-09 | Stop reason: SURG

## 2024-05-09 RX ORDER — SODIUM CHLORIDE, SODIUM LACTATE, POTASSIUM CHLORIDE, CALCIUM CHLORIDE 600; 310; 30; 20 MG/100ML; MG/100ML; MG/100ML; MG/100ML
9 INJECTION, SOLUTION INTRAVENOUS CONTINUOUS
Status: DISCONTINUED | OUTPATIENT
Start: 2024-05-09 | End: 2024-05-09

## 2024-05-09 RX ORDER — DEXAMETHASONE SODIUM PHOSPHATE 4 MG/ML
INJECTION, SOLUTION INTRA-ARTICULAR; INTRALESIONAL; INTRAMUSCULAR; INTRAVENOUS; SOFT TISSUE AS NEEDED
Status: DISCONTINUED | OUTPATIENT
Start: 2024-05-09 | End: 2024-05-09 | Stop reason: SURG

## 2024-05-09 RX ORDER — LIDOCAINE HYDROCHLORIDE 10 MG/ML
INJECTION, SOLUTION EPIDURAL; INFILTRATION; INTRACAUDAL; PERINEURAL AS NEEDED
Status: DISCONTINUED | OUTPATIENT
Start: 2024-05-09 | End: 2024-05-09 | Stop reason: SURG

## 2024-05-09 RX ORDER — DOCUSATE SODIUM 100 MG/1
100 CAPSULE, LIQUID FILLED ORAL 2 TIMES DAILY
Status: DISCONTINUED | OUTPATIENT
Start: 2024-05-09 | End: 2024-05-10 | Stop reason: HOSPADM

## 2024-05-09 RX ORDER — ALBUTEROL SULFATE 90 UG/1
2 AEROSOL, METERED RESPIRATORY (INHALATION) EVERY 4 HOURS PRN
Status: DISCONTINUED | OUTPATIENT
Start: 2024-05-09 | End: 2024-05-10 | Stop reason: HOSPADM

## 2024-05-09 RX ORDER — PROPOFOL 10 MG/ML
VIAL (ML) INTRAVENOUS AS NEEDED
Status: DISCONTINUED | OUTPATIENT
Start: 2024-05-09 | End: 2024-05-09 | Stop reason: SURG

## 2024-05-09 RX ORDER — SODIUM CHLORIDE 0.9 % (FLUSH) 0.9 %
3-10 SYRINGE (ML) INJECTION AS NEEDED
Status: DISCONTINUED | OUTPATIENT
Start: 2024-05-09 | End: 2024-05-09 | Stop reason: HOSPADM

## 2024-05-09 RX ORDER — ONDANSETRON 2 MG/ML
4 INJECTION INTRAMUSCULAR; INTRAVENOUS ONCE AS NEEDED
Status: DISCONTINUED | OUTPATIENT
Start: 2024-05-09 | End: 2024-05-09 | Stop reason: HOSPADM

## 2024-05-09 RX ORDER — HYDROMORPHONE HYDROCHLORIDE 1 MG/ML
0.5 INJECTION, SOLUTION INTRAMUSCULAR; INTRAVENOUS; SUBCUTANEOUS
Status: DISCONTINUED | OUTPATIENT
Start: 2024-05-09 | End: 2024-05-09 | Stop reason: HOSPADM

## 2024-05-09 RX ORDER — ACETAMINOPHEN 325 MG/1
650 TABLET ORAL EVERY 4 HOURS PRN
Status: DISCONTINUED | OUTPATIENT
Start: 2024-05-09 | End: 2024-05-10 | Stop reason: HOSPADM

## 2024-05-09 RX ORDER — MIDAZOLAM HYDROCHLORIDE 1 MG/ML
INJECTION INTRAMUSCULAR; INTRAVENOUS AS NEEDED
Status: DISCONTINUED | OUTPATIENT
Start: 2024-05-09 | End: 2024-05-09 | Stop reason: SURG

## 2024-05-09 RX ORDER — PROMETHAZINE HYDROCHLORIDE 12.5 MG/1
12.5 TABLET ORAL EVERY 6 HOURS PRN
Status: DISCONTINUED | OUTPATIENT
Start: 2024-05-09 | End: 2024-05-10 | Stop reason: HOSPADM

## 2024-05-09 RX ORDER — IPRATROPIUM BROMIDE AND ALBUTEROL SULFATE 2.5; .5 MG/3ML; MG/3ML
3 SOLUTION RESPIRATORY (INHALATION) 4 TIMES DAILY
Status: DISCONTINUED | OUTPATIENT
Start: 2024-05-09 | End: 2024-05-10 | Stop reason: HOSPADM

## 2024-05-09 RX ORDER — MIDAZOLAM HYDROCHLORIDE 1 MG/ML
1 INJECTION INTRAMUSCULAR; INTRAVENOUS
Status: DISCONTINUED | OUTPATIENT
Start: 2024-05-09 | End: 2024-05-09 | Stop reason: SDUPTHER

## 2024-05-09 RX ORDER — PROMETHAZINE HYDROCHLORIDE 25 MG/1
25 SUPPOSITORY RECTAL ONCE AS NEEDED
Status: DISCONTINUED | OUTPATIENT
Start: 2024-05-09 | End: 2024-05-09 | Stop reason: HOSPADM

## 2024-05-09 RX ORDER — DROPERIDOL 2.5 MG/ML
0.62 INJECTION, SOLUTION INTRAMUSCULAR; INTRAVENOUS ONCE AS NEEDED
Status: DISCONTINUED | OUTPATIENT
Start: 2024-05-09 | End: 2024-05-09 | Stop reason: HOSPADM

## 2024-05-09 RX ORDER — PROMETHAZINE HYDROCHLORIDE 25 MG/1
25 TABLET ORAL ONCE AS NEEDED
Status: DISCONTINUED | OUTPATIENT
Start: 2024-05-09 | End: 2024-05-09 | Stop reason: HOSPADM

## 2024-05-09 RX ORDER — ALBUTEROL SULFATE 2.5 MG/3ML
2.5 SOLUTION RESPIRATORY (INHALATION) 4 TIMES DAILY PRN
Status: DISCONTINUED | OUTPATIENT
Start: 2024-05-09 | End: 2024-05-10 | Stop reason: HOSPADM

## 2024-05-09 RX ORDER — ONDANSETRON 2 MG/ML
INJECTION INTRAMUSCULAR; INTRAVENOUS AS NEEDED
Status: DISCONTINUED | OUTPATIENT
Start: 2024-05-09 | End: 2024-05-09 | Stop reason: SURG

## 2024-05-09 RX ORDER — LIDOCAINE HYDROCHLORIDE 10 MG/ML
0.5 INJECTION, SOLUTION EPIDURAL; INFILTRATION; INTRACAUDAL; PERINEURAL ONCE AS NEEDED
Status: COMPLETED | OUTPATIENT
Start: 2024-05-09 | End: 2024-05-09

## 2024-05-09 RX ORDER — FENTANYL CITRATE 50 UG/ML
50 INJECTION, SOLUTION INTRAMUSCULAR; INTRAVENOUS
Status: DISCONTINUED | OUTPATIENT
Start: 2024-05-09 | End: 2024-05-09 | Stop reason: HOSPADM

## 2024-05-09 RX ORDER — NALOXONE HCL 0.4 MG/ML
0.4 VIAL (ML) INJECTION
Status: DISCONTINUED | OUTPATIENT
Start: 2024-05-09 | End: 2024-05-10 | Stop reason: HOSPADM

## 2024-05-09 RX ORDER — ALBUMIN, HUMAN INJ 5% 5 %
SOLUTION INTRAVENOUS CONTINUOUS PRN
Status: DISCONTINUED | OUTPATIENT
Start: 2024-05-09 | End: 2024-05-09 | Stop reason: SURG

## 2024-05-09 RX ORDER — FAMOTIDINE 10 MG/ML
20 INJECTION, SOLUTION INTRAVENOUS ONCE
Status: CANCELLED | OUTPATIENT
Start: 2024-05-09 | End: 2024-05-09

## 2024-05-09 RX ORDER — PHENYLEPHRINE HCL IN 0.9% NACL 1 MG/10 ML
SYRINGE (ML) INTRAVENOUS AS NEEDED
Status: DISCONTINUED | OUTPATIENT
Start: 2024-05-09 | End: 2024-05-09 | Stop reason: SURG

## 2024-05-09 RX ORDER — MEPERIDINE HYDROCHLORIDE 25 MG/ML
INJECTION INTRAMUSCULAR; INTRAVENOUS; SUBCUTANEOUS
Status: COMPLETED
Start: 2024-05-09 | End: 2024-05-09

## 2024-05-09 RX ORDER — DROPERIDOL 2.5 MG/ML
0.62 INJECTION, SOLUTION INTRAMUSCULAR; INTRAVENOUS
Status: DISCONTINUED | OUTPATIENT
Start: 2024-05-09 | End: 2024-05-09 | Stop reason: HOSPADM

## 2024-05-09 RX ORDER — SODIUM CHLORIDE 0.9 % (FLUSH) 0.9 %
3 SYRINGE (ML) INJECTION EVERY 12 HOURS SCHEDULED
Status: DISCONTINUED | OUTPATIENT
Start: 2024-05-09 | End: 2024-05-09 | Stop reason: HOSPADM

## 2024-05-09 RX ADMIN — FENTANYL CITRATE 50 MCG: 50 INJECTION, SOLUTION INTRAMUSCULAR; INTRAVENOUS at 09:44

## 2024-05-09 RX ADMIN — FAMOTIDINE 20 MG: 20 TABLET ORAL at 07:06

## 2024-05-09 RX ADMIN — SUGAMMADEX 200 MG: 100 INJECTION, SOLUTION INTRAVENOUS at 12:19

## 2024-05-09 RX ADMIN — CYCLOBENZAPRINE HYDROCHLORIDE 10 MG: 10 TABLET, FILM COATED ORAL at 15:43

## 2024-05-09 RX ADMIN — FENTANYL CITRATE 50 MCG: 50 INJECTION, SOLUTION INTRAMUSCULAR; INTRAVENOUS at 13:39

## 2024-05-09 RX ADMIN — LIDOCAINE HYDROCHLORIDE 0.5 ML: 10 INJECTION, SOLUTION EPIDURAL; INFILTRATION; INTRACAUDAL; PERINEURAL at 07:01

## 2024-05-09 RX ADMIN — MORPHINE SULFATE 4 MG: 4 INJECTION, SOLUTION INTRAMUSCULAR; INTRAVENOUS at 18:44

## 2024-05-09 RX ADMIN — Medication 50 MCG: at 11:32

## 2024-05-09 RX ADMIN — PROPOFOL 25 MCG/KG/MIN: 10 INJECTION, EMULSION INTRAVENOUS at 07:58

## 2024-05-09 RX ADMIN — MIDAZOLAM HYDROCHLORIDE 2 MG: 1 INJECTION, SOLUTION INTRAMUSCULAR; INTRAVENOUS at 07:39

## 2024-05-09 RX ADMIN — SODIUM CHLORIDE 2000 MG: 900 INJECTION INTRAVENOUS at 07:48

## 2024-05-09 RX ADMIN — TRAZODONE HYDROCHLORIDE 450 MG: 50 TABLET ORAL at 20:07

## 2024-05-09 RX ADMIN — SODIUM CHLORIDE 50 ML/HR: 9 INJECTION, SOLUTION INTRAVENOUS at 15:06

## 2024-05-09 RX ADMIN — CEFAZOLIN 2 G: 1 INJECTION, POWDER, FOR SOLUTION INTRAMUSCULAR; INTRAVENOUS at 10:46

## 2024-05-09 RX ADMIN — MONTELUKAST 10 MG: 10 TABLET, FILM COATED ORAL at 20:07

## 2024-05-09 RX ADMIN — SCOPOLAMINE 1 PATCH: 1.5 PATCH, EXTENDED RELEASE TRANSDERMAL at 07:05

## 2024-05-09 RX ADMIN — Medication 50 MCG: at 12:24

## 2024-05-09 RX ADMIN — SODIUM CHLORIDE 2000 MG: 900 INJECTION INTRAVENOUS at 16:39

## 2024-05-09 RX ADMIN — ROCURONIUM BROMIDE 50 MG: 10 INJECTION INTRAVENOUS at 07:43

## 2024-05-09 RX ADMIN — LIDOCAINE HYDROCHLORIDE 50 MG: 10 INJECTION, SOLUTION EPIDURAL; INFILTRATION; INTRACAUDAL; PERINEURAL at 07:43

## 2024-05-09 RX ADMIN — IPRATROPIUM BROMIDE AND ALBUTEROL SULFATE 3 ML: 2.5; .5 SOLUTION RESPIRATORY (INHALATION) at 20:03

## 2024-05-09 RX ADMIN — FENTANYL CITRATE 25 MCG: 50 INJECTION, SOLUTION INTRAMUSCULAR; INTRAVENOUS at 08:26

## 2024-05-09 RX ADMIN — SODIUM CHLORIDE, POTASSIUM CHLORIDE, SODIUM LACTATE AND CALCIUM CHLORIDE: 600; 310; 30; 20 INJECTION, SOLUTION INTRAVENOUS at 11:17

## 2024-05-09 RX ADMIN — ROCURONIUM BROMIDE 20 MG: 10 INJECTION INTRAVENOUS at 08:35

## 2024-05-09 RX ADMIN — MIDAZOLAM HYDROCHLORIDE 1 MG: 1 INJECTION, SOLUTION INTRAMUSCULAR; INTRAVENOUS at 07:35

## 2024-05-09 RX ADMIN — SODIUM CHLORIDE, POTASSIUM CHLORIDE, SODIUM LACTATE AND CALCIUM CHLORIDE 9 ML/HR: 600; 310; 30; 20 INJECTION, SOLUTION INTRAVENOUS at 07:01

## 2024-05-09 RX ADMIN — MEPERIDINE HYDROCHLORIDE 12.5 MG: 25 INJECTION INTRAMUSCULAR; INTRAVENOUS; SUBCUTANEOUS at 13:24

## 2024-05-09 RX ADMIN — FENTANYL CITRATE 50 MCG: 50 INJECTION, SOLUTION INTRAMUSCULAR; INTRAVENOUS at 13:12

## 2024-05-09 RX ADMIN — FENTANYL CITRATE 100 MCG: 50 INJECTION, SOLUTION INTRAMUSCULAR; INTRAVENOUS at 07:43

## 2024-05-09 RX ADMIN — ROCURONIUM BROMIDE 10 MG: 10 INJECTION INTRAVENOUS at 10:53

## 2024-05-09 RX ADMIN — ROCURONIUM BROMIDE 10 MG: 10 INJECTION INTRAVENOUS at 09:38

## 2024-05-09 RX ADMIN — PROPOFOL 150 MG: 10 INJECTION, EMULSION INTRAVENOUS at 07:43

## 2024-05-09 RX ADMIN — DOCUSATE SODIUM 100 MG: 100 CAPSULE, LIQUID FILLED ORAL at 20:07

## 2024-05-09 RX ADMIN — MORPHINE SULFATE 4 MG: 4 INJECTION, SOLUTION INTRAMUSCULAR; INTRAVENOUS at 20:48

## 2024-05-09 RX ADMIN — ONDANSETRON 4 MG: 2 INJECTION INTRAMUSCULAR; INTRAVENOUS at 12:10

## 2024-05-09 RX ADMIN — SODIUM CHLORIDE, POTASSIUM CHLORIDE, SODIUM LACTATE AND CALCIUM CHLORIDE: 600; 310; 30; 20 INJECTION, SOLUTION INTRAVENOUS at 09:34

## 2024-05-09 RX ADMIN — ROCURONIUM BROMIDE 10 MG: 10 INJECTION INTRAVENOUS at 11:33

## 2024-05-09 RX ADMIN — IPRATROPIUM BROMIDE AND ALBUTEROL SULFATE 3 ML: 2.5; .5 SOLUTION RESPIRATORY (INHALATION) at 16:12

## 2024-05-09 RX ADMIN — ALBUMIN (HUMAN): 12.5 INJECTION, SOLUTION INTRAVENOUS at 10:01

## 2024-05-09 RX ADMIN — SODIUM CHLORIDE, POTASSIUM CHLORIDE, SODIUM LACTATE AND CALCIUM CHLORIDE: 600; 310; 30; 20 INJECTION, SOLUTION INTRAVENOUS at 12:19

## 2024-05-09 RX ADMIN — MORPHINE SULFATE 4 MG: 4 INJECTION, SOLUTION INTRAMUSCULAR; INTRAVENOUS at 15:43

## 2024-05-09 RX ADMIN — ACETAMINOPHEN 650 MG: 325 TABLET ORAL at 20:07

## 2024-05-09 RX ADMIN — DEXAMETHASONE SODIUM PHOSPHATE 8 MG: 4 INJECTION INTRA-ARTICULAR; INTRALESIONAL; INTRAMUSCULAR; INTRAVENOUS; SOFT TISSUE at 07:50

## 2024-05-09 RX ADMIN — HYDROMORPHONE HYDROCHLORIDE 0.5 MG: 1 INJECTION, SOLUTION INTRAMUSCULAR; INTRAVENOUS; SUBCUTANEOUS at 13:59

## 2024-05-09 RX ADMIN — HYDROMORPHONE HYDROCHLORIDE 0.5 MG: 1 INJECTION, SOLUTION INTRAMUSCULAR; INTRAVENOUS; SUBCUTANEOUS at 13:32

## 2024-05-09 RX ADMIN — PROMETHAZINE HYDROCHLORIDE 12.5 MG: 12.5 TABLET ORAL at 16:37

## 2024-05-09 RX ADMIN — Medication 100 MCG: at 11:44

## 2024-05-09 RX ADMIN — OXYCODONE HYDROCHLORIDE 5 MG: 5 TABLET ORAL at 16:35

## 2024-05-09 RX ADMIN — FENTANYL CITRATE 25 MCG: 50 INJECTION, SOLUTION INTRAMUSCULAR; INTRAVENOUS at 08:12

## 2024-05-09 RX ADMIN — OXYCODONE HYDROCHLORIDE 5 MG: 5 TABLET ORAL at 23:08

## 2024-05-09 RX ADMIN — Medication 50 MCG: at 11:25

## 2024-05-09 NOTE — OP NOTE
Preoperative diagnosis: 1.  Encounter for cosmetic surgery     Postoperative diagnosis: 1.  Encounter for cosmetic surgery     Surgeon: Dakota Malloy MD     Assistant: Chani MCCLENDON was responsible for performing the following activities: Retraction, Suction, Irrigation, Suturing, Closing and Placing Dressing and their skilled assistance was necessary for the success of this case.        Anesthesia: General     Procedure: 1.    Abdominoplasty  2. Liposuction trunk  3. Liposuction neck     Indication: The patient is a  44-year-old female presented to my office desiring neck and truncal enhancement.  I recommended an abdominoplasty with liposuction of her trunk and neck.  All techniques, potential complications and typical postoperative course were discussed the patient.    She has indicated his understanding and wishes to proceed.     Findings: 1.  Excess skin and subcutaneous tissue of her abdomen flanks and lower posterior trunk  2.  Excess subcutaneous tissue neck     Description: The patient was taken from preoperative holding to the operating room after informed consent was signed on the chart and placed under general anesthesia successfully.  Prior to induction of anesthesia, the patient received prophylactic dose of antibiotics and had bilateral lower extremity sequential compression devices in place and operational.    She was placed prone on the operating table first.    She had the appropriate padding for her trunk and extremities.    Her back and flanks were prepped and draped in the usual sterile fashion.  After properly identifying the patient and the patient's problem, bilateral upper buttock and a lower midline stab incision was made with a 15 blade.  Tumescent solution consisting of 1 L of LR, 1 ampoule of epinephrine, and 30 cc of 1% plain lidocaine were injected into her lower flanks.  A stab incision was also made in a paramedian position bilaterally within the bra line.  Tumescent solution was  also injected into her upper flank region.  Next, utilizing the same access sites, a 3 mm accelerator cannula was used to harvest fat.  A total of 1000 cc was harvested from each Vicente flank region.  The access sites were closed with 5-0 fast-absorbing plain gut suture in a simple interrupted fashion.  The patient was then placed supine on the operating room table.    She had a pillow placed beneath his knees.    She had a Barreto catheter placed.    Her arms were gently abducted to 90 degrees and she had ulnar nerve padding.    Her head neck was prepped and draped in the usual sterile fashion.  Bilateral medial and lateral submandibular stab incisions were made with a 15 blade.  Tumescent solution was injected into the neck.  Next, a 2 mm Mercedes tip cannula was used to harvest fat from her neck.  Crosshatching was utilized for a smooth contour.  Total amount of Lipo aspirate was 100 cc's.  All sites were equalized.  The sites were closed with 6-0 nylon suture in a simple interrupted fashion.  Next, my attention turned to her trunk.  Her abdomen and flanks were prepped and draped in the usual sterile fashion.   Next, bilateral lower quadrant stab incisions were made.  Liposuction was carried out in a similar fashion.  A total of 450 cc of Lipo aspirate was harvested from each side.  Next, the abdominal planned incision was reinforced and checked for symmetry.  The planned incision was injected with the same quarter percent bupivacaine and epinephrine solution.  A 10 blade was used to incise her lower abdominal wall incision.  The subcutaneous tissue was dissected with electrocautery.  This was carried down to the abdominal wall.  A skin flap was then developed dissecting just superficial to deep fascia.  My dissection stopped at the umbilicus.  The umbilicus was incised with an 11 blade.  The lower abdominal wall skin flap was then split down the middle with a 10 blade and electrocautery.  The umbilicus was then  gently dissected free from the surrounding tissue with electrocautery.  In the supraumbilical region, my dissection was tapered towards the midline up to the level of the xiphoid process.  Hemostasis was achieved with electrocautery.  The diastasis was repaired with 0 Ethibond suture in an interrupted figure of eight fashion and 0 nylon suture in a running fashion.  Exparel was then injected into her abdominal wall.  Next 215 Wolof Partha drains were placed in the subcutaneous space.  They were brought out the anterior thigh and secured in a standard fashion.  The posterior drains were brought out adjacent to these drains and secured in a similar fashion.  The skin flap in the supraumbilical region was then plicated to the deep fascia in 2 locations with 2-0 Vicryl suture in horizontal mattress fashion.  Next, the patient was sat in the beachchair position.  An estimation for the amount of skin to be resected as well as the position of the damaris umbilical opening was made.  This was checked multiple times.  A epinepherine solution was then injected into these marks.  After waiting appropriate amount time for hemostasis, a 10 blade was used to incise the lower abdominal wall skin flaps.  The total weight on the left was 1220 g and total weight on the right was 1120 g.  The umbilicus was incised with a 11 blade.  The umbilicus was tagged with a 3-0 monocryl suture and brought out the damaris umbilical opening.  The remainder of the skin flap was then plicated to the deep fascia with 2-0 Vicryl suture in horizontal mattress fashion.  The superficial fascia was closed with 2-0 Vicryl suture in a simple interrupted fashion with one suture throw through the deep fascia.  The skin was closed with a dermal stapler and 3-0 stratafix suture in a intracuticular fashion.  The umbilicus was closed with 3-0 Monocryl suture in a deep dermal buried interrupted fashion and 5-0 Monocryl suture in intracuticular fashion.  Skin affix tissue  glue was applied.  The drains were dressed with a Biopatch, 4 x 4's, and tape.  The abdomen was dressed with ABD pads and abdominal binder.  The case was turned over to anesthesia which point patient was awoken from general anesthesia successfully taken to PACU in stable condition.  I was present for the entire procedure.  All counts were correct.     Estimated blood loss: 100 cc     Drains: 2     Complications: None immediate

## 2024-05-09 NOTE — BRIEF OP NOTE
ABDOMINOPLASTY, LIPOSUCTION FLANK HIP THIGH, LIPOSUCTION CHIN  Progress Note    Crystal Krysta Day  5/9/2024    Pre-op Diagnosis:   * Encounter for cosmetic procedure [Z41.1]       Post-Op Diagnosis Codes:     * Encounter for cosmetic procedure [Z41.1]    Procedure/CPT® Codes:  ME EXCISION EXCESSIVE SKIN & SUBQ TISSUE ABDOMEN [92221]  ME SUCTION ASSISTED LIPECTOMY TRUNK [96614]  ME SUCTION ASSISTED LIPECTOMY HEAD & NECK [31533]      Procedure(s):  ABDOMINOPLASTY WITH LIPOSUCTION  LIPOSUCTION OF BILATERAL FLANKS  LIPOSUCTION CHIN/JAW              Surgeon(s):  Dakota Malloy MD    Anesthesia: General    Staff:   Circulator: Tayler Castañeda RN  Scrub Person: Brenda Anthony         Estimated Blood Loss: minimal    Urine Voided: * No values recorded between 5/9/2024  7:39 AM and 5/9/2024  7:48 AM *    Specimens:                None          Drains:   [REMOVED] Ureteral Drain/Stent Right ureter (Removed)       Findings: excess tissue trunk        Complications: none immediate          Dakota Malloy MD     Date: 5/9/2024  Time: 07:48 EDT

## 2024-05-09 NOTE — PLAN OF CARE
Goal Outcome Evaluation:  Plan of Care Reviewed With: patient        Progress: improving  Outcome Evaluation: Pt arrived to 5B and ambulated to bathroom and voided 950ml. Incisions/punctures had scant drainage to abd pads. JPs stripped and emptied; bloody. SCDs in place; using IS. Tachycardic upon arrival. On continuous pulse ox.
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
## 2024-05-10 VITALS
OXYGEN SATURATION: 95 % | RESPIRATION RATE: 18 BRPM | TEMPERATURE: 97.4 F | DIASTOLIC BLOOD PRESSURE: 67 MMHG | BODY MASS INDEX: 33.04 KG/M2 | WEIGHT: 175 LBS | HEART RATE: 102 BPM | SYSTOLIC BLOOD PRESSURE: 99 MMHG | HEIGHT: 61 IN

## 2024-05-10 PROCEDURE — A9270 NON-COVERED ITEM OR SERVICE: HCPCS | Performed by: PLASTIC SURGERY

## 2024-05-10 PROCEDURE — 63710000001 DOCUSATE SODIUM 100 MG CAPSULE: Performed by: PLASTIC SURGERY

## 2024-05-10 PROCEDURE — 25010000002 MORPHINE PER 10 MG: Performed by: PLASTIC SURGERY

## 2024-05-10 PROCEDURE — 63710000001 CYCLOBENZAPRINE 10 MG TABLET: Performed by: PLASTIC SURGERY

## 2024-05-10 PROCEDURE — 63710000001 PANTOPRAZOLE 40 MG TABLET DELAYED-RELEASE: Performed by: PLASTIC SURGERY

## 2024-05-10 PROCEDURE — 25010000002 CEFAZOLIN PER 500 MG

## 2024-05-10 PROCEDURE — 63710000001 OXYCODONE 5 MG TABLET: Performed by: PLASTIC SURGERY

## 2024-05-10 PROCEDURE — 63710000001 ACETAMINOPHEN 325 MG TABLET: Performed by: PLASTIC SURGERY

## 2024-05-10 RX ADMIN — MORPHINE SULFATE 4 MG: 4 INJECTION, SOLUTION INTRAMUSCULAR; INTRAVENOUS at 00:27

## 2024-05-10 RX ADMIN — ACETAMINOPHEN 650 MG: 325 TABLET ORAL at 00:28

## 2024-05-10 RX ADMIN — DOCUSATE SODIUM 100 MG: 100 CAPSULE, LIQUID FILLED ORAL at 08:18

## 2024-05-10 RX ADMIN — MORPHINE SULFATE 4 MG: 4 INJECTION, SOLUTION INTRAMUSCULAR; INTRAVENOUS at 02:46

## 2024-05-10 RX ADMIN — PANTOPRAZOLE SODIUM 40 MG: 40 TABLET, DELAYED RELEASE ORAL at 06:14

## 2024-05-10 RX ADMIN — CYCLOBENZAPRINE HYDROCHLORIDE 10 MG: 10 TABLET, FILM COATED ORAL at 00:28

## 2024-05-10 RX ADMIN — CYCLOBENZAPRINE HYDROCHLORIDE 10 MG: 10 TABLET, FILM COATED ORAL at 08:18

## 2024-05-10 RX ADMIN — OXYCODONE HYDROCHLORIDE 5 MG: 5 TABLET ORAL at 04:40

## 2024-05-10 RX ADMIN — SODIUM CHLORIDE 2000 MG: 900 INJECTION INTRAVENOUS at 00:17

## 2024-05-10 RX ADMIN — OXYCODONE HYDROCHLORIDE 5 MG: 5 TABLET ORAL at 08:18

## 2024-05-10 NOTE — PLAN OF CARE
Goal Outcome Evaluation:  Pt VSS on RA. Reviewed drain care and had pt teach back drain stripping and recording on charts for follow up visit. Reinstalled chin elastic bandage. Reviewed POC, medications, weight limits, no showers until 5/11/24. Went over D/C instructions. Provided clean catch cups, abdominal pads, drain apron holders, and additional abdominal binder for home D/C. Pt states she has not other questions, IV removed, transported pt to care with father. Pt discharged.

## 2024-05-10 NOTE — CASE MANAGEMENT/SOCIAL WORK
Case Management Discharge Note      Final Note: Home with family transporting         Selected Continued Care - Admitted Since 5/9/2024       Destination    No services have been selected for the patient.                Durable Medical Equipment    No services have been selected for the patient.                Dialysis/Infusion    No services have been selected for the patient.                Home Medical Care    No services have been selected for the patient.                Therapy    No services have been selected for the patient.                Community Resources    No services have been selected for the patient.                Community & DME    No services have been selected for the patient.                    Selected Continued Care - Episodes Includes continued care and service providers with selected services from the active episodes listed below      Chronic Migraine Episode start date: 10/4/2022   There are no active outsourced providers for this episode.                      Final Discharge Disposition Code: 01 - home or self-care
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
## 2024-06-13 ENCOUNTER — OFFICE VISIT (OUTPATIENT)
Dept: INTERNAL MEDICINE | Facility: CLINIC | Age: 44
End: 2024-06-13
Payer: COMMERCIAL

## 2024-06-13 ENCOUNTER — LAB (OUTPATIENT)
Dept: LAB | Facility: HOSPITAL | Age: 44
End: 2024-06-13
Payer: MEDICARE

## 2024-06-13 VITALS
BODY MASS INDEX: 32.47 KG/M2 | DIASTOLIC BLOOD PRESSURE: 84 MMHG | HEIGHT: 61 IN | OXYGEN SATURATION: 97 % | WEIGHT: 172 LBS | TEMPERATURE: 97.6 F | SYSTOLIC BLOOD PRESSURE: 126 MMHG | HEART RATE: 130 BPM

## 2024-06-13 DIAGNOSIS — R10.11 RIGHT UPPER QUADRANT PAIN: ICD-10-CM

## 2024-06-13 DIAGNOSIS — Z98.890 STATUS POST ABDOMINOPLASTY: ICD-10-CM

## 2024-06-13 DIAGNOSIS — M51.26 LUMBAR DISCOGENIC PAIN SYNDROME: ICD-10-CM

## 2024-06-13 DIAGNOSIS — Z98.890: ICD-10-CM

## 2024-06-13 DIAGNOSIS — Z79.890 HORMONE REPLACEMENT THERAPY (HRT): ICD-10-CM

## 2024-06-13 DIAGNOSIS — R82.998 BROWN-COLORED URINE: Primary | ICD-10-CM

## 2024-06-13 DIAGNOSIS — R10.30 LOWER ABDOMINAL PAIN: ICD-10-CM

## 2024-06-13 LAB
ALBUMIN SERPL-MCNC: 4.7 G/DL (ref 3.5–5.2)
ALBUMIN/GLOB SERPL: 1.6 G/DL
ALP SERPL-CCNC: 50 U/L (ref 39–117)
ALT SERPL W P-5'-P-CCNC: 26 U/L (ref 1–33)
ANION GAP SERPL CALCULATED.3IONS-SCNC: 15 MMOL/L (ref 5–15)
AST SERPL-CCNC: 19 U/L (ref 1–32)
BILIRUB BLD-MCNC: NEGATIVE MG/DL
BILIRUB SERPL-MCNC: 0.3 MG/DL (ref 0–1.2)
BUN SERPL-MCNC: 9 MG/DL (ref 6–20)
BUN/CREAT SERPL: 14.3 (ref 7–25)
CALCIUM SPEC-SCNC: 9.6 MG/DL (ref 8.6–10.5)
CHLORIDE SERPL-SCNC: 104 MMOL/L (ref 98–107)
CLARITY, POC: ABNORMAL
CO2 SERPL-SCNC: 19 MMOL/L (ref 22–29)
COLOR UR: ABNORMAL
CREAT SERPL-MCNC: 0.63 MG/DL (ref 0.57–1)
DEPRECATED RDW RBC AUTO: 41.8 FL (ref 37–54)
EGFRCR SERPLBLD CKD-EPI 2021: 113.7 ML/MIN/1.73
ERYTHROCYTE [DISTWIDTH] IN BLOOD BY AUTOMATED COUNT: 13 % (ref 12.3–15.4)
EXPIRATION DATE: ABNORMAL
GLOBULIN UR ELPH-MCNC: 2.9 GM/DL
GLUCOSE SERPL-MCNC: 87 MG/DL (ref 65–99)
GLUCOSE UR STRIP-MCNC: NEGATIVE MG/DL
HAV IGM SERPL QL IA: NORMAL
HBV CORE IGM SERPL QL IA: NORMAL
HBV SURFACE AG SERPL QL IA: NORMAL
HCT VFR BLD AUTO: 42 % (ref 34–46.6)
HCV AB SER QL: NORMAL
HGB BLD-MCNC: 13.6 G/DL (ref 12–15.9)
KETONES UR QL: NEGATIVE
LEUKOCYTE EST, POC: NEGATIVE
Lab: ABNORMAL
MCH RBC QN AUTO: 28.8 PG (ref 26.6–33)
MCHC RBC AUTO-ENTMCNC: 32.4 G/DL (ref 31.5–35.7)
MCV RBC AUTO: 88.8 FL (ref 79–97)
NITRITE UR-MCNC: NEGATIVE MG/ML
PH UR: 6 [PH] (ref 5–8)
PLATELET # BLD AUTO: 337 10*3/MM3 (ref 140–450)
PMV BLD AUTO: 11.1 FL (ref 6–12)
POTASSIUM SERPL-SCNC: 3.8 MMOL/L (ref 3.5–5.2)
PROT SERPL-MCNC: 7.6 G/DL (ref 6–8.5)
PROT UR STRIP-MCNC: ABNORMAL MG/DL
RBC # BLD AUTO: 4.73 10*6/MM3 (ref 3.77–5.28)
RBC # UR STRIP: NEGATIVE /UL
SODIUM SERPL-SCNC: 138 MMOL/L (ref 136–145)
SP GR UR: 10.25 (ref 1–1.03)
UROBILINOGEN UR QL: ABNORMAL
WBC NRBC COR # BLD AUTO: 7.39 10*3/MM3 (ref 3.4–10.8)

## 2024-06-13 PROCEDURE — 80053 COMPREHEN METABOLIC PANEL: CPT | Performed by: NURSE PRACTITIONER

## 2024-06-13 PROCEDURE — 85027 COMPLETE CBC AUTOMATED: CPT | Performed by: NURSE PRACTITIONER

## 2024-06-13 PROCEDURE — 36415 COLL VENOUS BLD VENIPUNCTURE: CPT | Performed by: NURSE PRACTITIONER

## 2024-06-13 PROCEDURE — 80074 ACUTE HEPATITIS PANEL: CPT | Performed by: NURSE PRACTITIONER

## 2024-06-13 PROCEDURE — 1126F AMNT PAIN NOTED NONE PRSNT: CPT | Performed by: NURSE PRACTITIONER

## 2024-06-13 PROCEDURE — 99214 OFFICE O/P EST MOD 30 MIN: CPT | Performed by: NURSE PRACTITIONER

## 2024-06-13 RX ORDER — BACLOFEN 10 MG/1
10 TABLET ORAL 2 TIMES DAILY
Qty: 60 TABLET | Refills: 1 | Status: SHIPPED | OUTPATIENT
Start: 2024-06-13

## 2024-06-13 RX ORDER — ESTRADIOL 1 MG/1
1.5 TABLET ORAL DAILY
Qty: 45 TABLET | Refills: 2 | Status: SHIPPED | OUTPATIENT
Start: 2024-06-13
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
## 2024-06-14 ENCOUNTER — TELEPHONE (OUTPATIENT)
Dept: INTERNAL MEDICINE | Facility: CLINIC | Age: 44
End: 2024-06-14

## 2024-06-14 NOTE — TELEPHONE ENCOUNTER
Caller: Megan Post    Relationship: Self    Best call back number: [phone]     Caller requesting test results: CRYSTAL    What test was performed: URINE TEST    When was the test performed: 6/13/24    Where was the test performed: OFFICE    Additional notes: PLEASE CALL WITH RESULTS.

## 2024-06-18 ENCOUNTER — SPECIALTY PHARMACY (OUTPATIENT)
Dept: ONCOLOGY | Facility: HOSPITAL | Age: 44
End: 2024-06-18
Payer: MEDICARE

## 2024-06-18 NOTE — PROGRESS NOTES
Specialty Pharmacy Refill Coordination Note     Megan is a 44 y.o. female contacted today regarding refills of  Aimovig, Botox, and Ubrelvy specialty medication(s). Patient is due for injection on 6/20. She is scheduled for her botox injection on 7/19 and did not need the ubrelvy refilled at this time.       Reviewed and verified with patient:       Specialty medication(s) and dose(s) confirmed: yes    Refill Questions      Flowsheet Row Most Recent Value   Changes to allergies? No   Changes to medications? No   New conditions or infections since last clinic visit No   Unplanned office visit, urgent care, ED, or hospital admission in the last 4 weeks  No   How does patient/caregiver feel medication is working? Good   Financial problems or insurance changes  No   Since the previous refill, were any specialty medication doses or scheduled injections missed or delayed?  Yes   If yes, please provide the amount Patient will take injection 2 days late   Why were doses missed? did not respond to phone calls or messages in time for injection   Does this patient require a clinical escalation to a pharmacist? No            Delivery Questions      Flowsheet Row Most Recent Value   Delivery method [employer]   Delivery address verified with patient/caregiver? Yes   Delivery address Home  [Botox[facility]hip to [facility]]   Number of medications in delivery 2   Medication(s) being filled and delivered Erenumab-Aooe, Onabotulinumtoxina   Doses left of specialty medications Aimovig=0 Botox=0   Copay verified? Yes   Copay amount Aimovig=$0   Copay form of payment No copayment ($0)                   Follow-up: 28 day(s)     Lacy North, Pharmacy Technician  Specialty Pharmacy Technician

## 2024-06-18 NOTE — TELEPHONE ENCOUNTER
Left VM x 2 to call office, if she is still having symptoms will need to come in for a urine culture to be collected. No need for an appt since she was already seen. OK for HUB to relay.

## 2024-06-19 ENCOUNTER — TELEPHONE (OUTPATIENT)
Dept: INTERNAL MEDICINE | Facility: CLINIC | Age: 44
End: 2024-06-19
Payer: MEDICARE

## 2024-06-19 NOTE — TELEPHONE ENCOUNTER
"LVM x3 for patient to call office. Left brief message passing along providers message.     \"  Please let the patient know that we were unable to send the urine culture.  If she is continuing to have symptoms, she may stop by the office and leave another urine sample for culture.     "

## 2024-07-16 ENCOUNTER — SPECIALTY PHARMACY (OUTPATIENT)
Dept: ONCOLOGY | Facility: HOSPITAL | Age: 44
End: 2024-07-16
Payer: MEDICARE

## 2024-07-16 ENCOUNTER — OFFICE VISIT (OUTPATIENT)
Dept: INTERNAL MEDICINE | Facility: CLINIC | Age: 44
End: 2024-07-16
Payer: MEDICARE

## 2024-07-16 ENCOUNTER — LAB (OUTPATIENT)
Dept: LAB | Facility: HOSPITAL | Age: 44
End: 2024-07-16
Payer: MEDICARE

## 2024-07-16 VITALS
WEIGHT: 159 LBS | HEART RATE: 110 BPM | DIASTOLIC BLOOD PRESSURE: 84 MMHG | OXYGEN SATURATION: 91 % | BODY MASS INDEX: 30.02 KG/M2 | SYSTOLIC BLOOD PRESSURE: 128 MMHG | RESPIRATION RATE: 18 BRPM | HEIGHT: 61 IN

## 2024-07-16 DIAGNOSIS — K76.0 NAFLD (NONALCOHOLIC FATTY LIVER DISEASE): ICD-10-CM

## 2024-07-16 DIAGNOSIS — K21.00 GASTROESOPHAGEAL REFLUX DISEASE WITH ESOPHAGITIS WITHOUT HEMORRHAGE: ICD-10-CM

## 2024-07-16 DIAGNOSIS — F41.9 ANXIETY: ICD-10-CM

## 2024-07-16 DIAGNOSIS — R53.83 OTHER FATIGUE: ICD-10-CM

## 2024-07-16 DIAGNOSIS — E66.09 CLASS 1 OBESITY DUE TO EXCESS CALORIES WITH BODY MASS INDEX (BMI) OF 30.0 TO 30.9 IN ADULT, UNSPECIFIED WHETHER SERIOUS COMORBIDITY PRESENT: ICD-10-CM

## 2024-07-16 DIAGNOSIS — G47.33 OSA (OBSTRUCTIVE SLEEP APNEA): ICD-10-CM

## 2024-07-16 DIAGNOSIS — Z00.00 MEDICARE ANNUAL WELLNESS VISIT, SUBSEQUENT: Primary | ICD-10-CM

## 2024-07-16 DIAGNOSIS — J45.41 MODERATE PERSISTENT ASTHMA WITH ACUTE EXACERBATION: ICD-10-CM

## 2024-07-16 DIAGNOSIS — E78.2 MIXED HYPERLIPIDEMIA: ICD-10-CM

## 2024-07-16 DIAGNOSIS — G43.709 CHRONIC MIGRAINE WITHOUT AURA WITHOUT STATUS MIGRAINOSUS, NOT INTRACTABLE: ICD-10-CM

## 2024-07-16 DIAGNOSIS — J30.2 SEASONAL ALLERGIC RHINITIS, UNSPECIFIED TRIGGER: ICD-10-CM

## 2024-07-16 DIAGNOSIS — F43.10 PTSD (POST-TRAUMATIC STRESS DISORDER): ICD-10-CM

## 2024-07-16 DIAGNOSIS — R73.03 PREDIABETES: ICD-10-CM

## 2024-07-16 PROCEDURE — 1160F RVW MEDS BY RX/DR IN RCRD: CPT

## 2024-07-16 PROCEDURE — 1159F MED LIST DOCD IN RCRD: CPT

## 2024-07-16 PROCEDURE — 36415 COLL VENOUS BLD VENIPUNCTURE: CPT

## 2024-07-16 PROCEDURE — 83036 HEMOGLOBIN GLYCOSYLATED A1C: CPT

## 2024-07-16 PROCEDURE — 85025 COMPLETE CBC W/AUTO DIFF WBC: CPT

## 2024-07-16 PROCEDURE — G0439 PPPS, SUBSEQ VISIT: HCPCS

## 2024-07-16 PROCEDURE — 80053 COMPREHEN METABOLIC PANEL: CPT

## 2024-07-16 PROCEDURE — 1125F AMNT PAIN NOTED PAIN PRSNT: CPT

## 2024-07-16 PROCEDURE — 84443 ASSAY THYROID STIM HORMONE: CPT

## 2024-07-16 PROCEDURE — 80061 LIPID PANEL: CPT

## 2024-07-16 PROCEDURE — 1170F FXNL STATUS ASSESSED: CPT

## 2024-07-16 RX ORDER — DOXYCYCLINE 100 MG/1
1 CAPSULE ORAL EVERY 12 HOURS SCHEDULED
COMMUNITY
Start: 2024-07-12

## 2024-07-16 RX ORDER — FEXOFENADINE HYDROCHLORIDE 180 MG/1
180 TABLET, FILM COATED ORAL EVERY MORNING
COMMUNITY
Start: 2024-06-12

## 2024-07-16 RX ORDER — CYCLOBENZAPRINE HCL 10 MG
5-10 TABLET ORAL 3 TIMES DAILY PRN
COMMUNITY
Start: 2024-07-08

## 2024-07-16 RX ORDER — HYDROXYZINE HYDROCHLORIDE 25 MG/1
25 TABLET, FILM COATED ORAL EVERY 8 HOURS PRN
COMMUNITY
Start: 2024-07-08

## 2024-07-16 RX ORDER — DULOXETIN HYDROCHLORIDE 60 MG/1
1 CAPSULE, DELAYED RELEASE ORAL DAILY
COMMUNITY
Start: 2024-04-30

## 2024-07-16 NOTE — ASSESSMENT & PLAN NOTE
-following with Neurology  -Has appointment Friday  -doing well with her Botox injections and Ubrelvy.

## 2024-07-16 NOTE — PATIENT INSTRUCTIONS
Follow up with Neurology as scheduled.   Mammogram scheduled for next week.   Blood work today. Will contact with results.

## 2024-07-16 NOTE — ASSESSMENT & PLAN NOTE
History of elevated lipid panel 9 months ago. Will recheck today to evaluate if any improvement.   -Doing well with current diet and exercise regimen. Encouraged to continue healthy habits such as 150 minutes of exercise weekly as well as incorporating a low fat and high fiber diet.

## 2024-07-16 NOTE — ASSESSMENT & PLAN NOTE
-On OTC allergy medication, Singulair, xhance nasal spray. Recommended adding Mucinex to her regimen as needed for any flares.

## 2024-07-16 NOTE — PROGRESS NOTES
Subjective   The ABCs of the Annual Wellness Visit  Medicare Wellness Visit      Megan Post is a 44 y.o. patient who presents for a Medicare Wellness Visit. She does not have any acute concerns today.     The following portions of the patient's history were reviewed and   updated as appropriate: allergies, current medications, past family history, past medical history, past social history, past surgical history, and problem list.    Compared to one year ago, the patient's physical   health is the same.  Compared to one year ago, the patient's mental   health is the same.    Recent Hospitalizations:  She was admitted within the past 365 days at [facility] for abdominoplasty and liposuction.    Current Medical Providers:  Patient Care Team:  Kalpana Pandey MD as PCP - General (Internal Medicine)  Paramjit Leahy MD as Consulting Physician (Otolaryngology)  Jasiel Sanchez MD as Consulting Physician (Pain Medicine)  Trudy Vela RD as Dietitian (Nutrition)  Dian Robb APRN as Nurse Practitioner (Nurse Practitioner)  Millicent Pantoja APRN as Nurse Practitioner (Pulmonary Disease)  aRfaela Chavira PT as Physical Therapist (Physical Therapy)  Jason Frazier MD as Consulting Physician (Anesthesiology)  Judy Ryan APRN as Nurse Practitioner (Nurse Practitioner)    Outpatient Medications Prior to Visit   Medication Sig Dispense Refill    albuterol (PROVENTIL) (2.5 MG/3ML) 0.083% nebulizer solution Take 2.5 mg by nebulization 4 (Four) Times a Day As Needed for Wheezing. 90 each 5    albuterol sulfate  (90 Base) MCG/ACT inhaler Inhale 2 puffs Every 4 (Four) Hours As Needed for Wheezing. 18 g 5    Allergy Relief 180 MG tablet Take 1 tablet by mouth Every Morning.      azelaic acid (AZELEX) 15 % gel Apply 1 Application topically to the appropriate area as directed 2 (Two) Times a Day As Needed (facial reddness). Face for roscea      baclofen (LIORESAL) 10 MG  tablet Take 1 tablet by mouth 2 (Two) Times a Day. 60 tablet 1    COLLAGEN PO Take 1 tablet by mouth Daily.      cyclobenzaprine (FLEXERIL) 10 MG tablet Take 0.5-1 tablets by mouth 3 (Three) Times a Day As Needed for Muscle Spasms.      doxycycline (MONODOX) 100 MG capsule Take 1 capsule by mouth Every 12 (Twelve) Hours.      DULoxetine (CYMBALTA) 60 MG capsule Take 1 capsule by mouth Daily.      EPIPEN 2-JAREN 0.3 MG/0.3ML solution auto-injector injection As Needed (allergic reaction).  6    Erenumab-aooe (Aimovig) 140 MG/ML auto-injector Inject 1 mL under the skin into the appropriate area as directed Every 28 (Twenty-Eight) Days. 1 mL 11    estradiol (Estrace) 1 MG tablet Take 1.5 tablets by mouth Daily. Take 1.5 tablets po qd. 45 tablet 2    HYDROcodone-acetaminophen (NORCO) 5-325 MG per tablet Take 1 tablet by mouth Every 8 (Eight) Hours As Needed for Moderate Pain (back and neck pain).      hydrOXYzine (ATARAX) 25 MG tablet Take 1 tablet by mouth Every 8 (Eight) Hours As Needed.      ipratropium-albuterol (DUO-NEB) 0.5-2.5 mg/3 ml nebulizer Take 3 mL by nebulization 4 (Four) Times a Day. (Patient taking differently: Take 3 mL by nebulization 4 (Four) Times a Day As Needed for Wheezing or Shortness of Air.) 360 mL 5    montelukast (SINGULAIR) 10 MG tablet TAKE 1 TABLET BY MOUTH EVERY NIGHT 30 tablet 3    Multiple Vitamin (MULTI-VITAMIN DAILY PO) Take 1 tablet by mouth Daily.      omeprazole (priLOSEC) 40 MG capsule Take 1 capsule by mouth 2 (two) times a day.      OnabotulinumtoxinA 200 units reconstituted solution FOR . PHYSICIAN TO INJECT 155 UNITS INTRAMUSCULARLY INTO HEAD, NECK AND SHOULDERS EVERY 12 WEEKS Per FDA PROTOCOL 1 each 3    ondansetron ODT (ZOFRAN-ODT) 4 MG disintegrating tablet Place 1 tablet on the tongue Every 8 (Eight) Hours As Needed for Nausea or Vomiting. 15 tablet 0    promethazine (PHENERGAN) 12.5 MG tablet Take 1 tablet by mouth Every 6 (Six) Hours As Needed for  Nausea or Vomiting. 15 tablet 0    traZODone (DESYREL) 150 MG tablet Take 3 tablets by mouth Every Night.  3    ubrogepant (Ubrelvy) 50 MG tablet Take 1 tablet by mouth at onset of migraine, may repeat in 2 hours.  Max of 200 mg/ 24 hrs (Patient taking differently: 1 tablet by Nasogastric route 1 (One) Time As Needed (migraine). Take 1 tablet by mouth at onset of migraine, may repeat in 2 hours.  Max of 200 mg/ 24 hrs) 10 tablet 11    Xhance 93 MCG/ACT Exhaler Suspension USE ONE SPRAY IN EACH NOSTRIL EVERY DAY AS NEEDED FOR CONGESTION (Patient taking differently: 2 sprays into the nostril(s) as directed by provider Daily As Needed (nasal congestion).) 16 mL 0    clindamycin (CLEOCIN) 300 MG capsule Take 1 capsule by mouth 3 (Three) Times a Day. Sinus infection      Gentle Laxative 5 MG EC tablet Take 1 tablet by mouth Daily As Needed for Constipation.      cetirizine (zyrTEC) 10 MG tablet Take 1 tablet by mouth Daily.  1     Facility-Administered Medications Prior to Visit   Medication Dose Route Frequency Provider Last Rate Last Admin    OnabotulinumtoxinA 200 Units  200 Units Intramuscular Q3 Months Nicolasa Gonzalez, MAGGIE   200 Units at 04/24/24 1421     Opioid medication/s are on active medication list.  and I have evaluated her active treatment plan and pain score trends (see table).  Vitals:    07/16/24 0951   PainSc:   4   PainLoc: Back     I have reviewed the chart for potential of high risk medication and harmful drug interactions in the elderly.        Aspirin is not on active medication list.  Aspirin use is not indicated based on review of current medical condition/s. Risk of harm outweighs potential benefits.  .    Patient Active Problem List   Diagnosis    Allergic rhinitis    Tavera's esophagus    Bipolar disorder    Fibromyalgia    Gastroesophageal reflux disease    Moderate persistent asthma with acute exacerbation    Lung nodules - calcified granulomas    Hypercalcemia    Displacement of  "intervertebral disc of lumbosacral region    Lumbar discogenic pain syndrome    Spondylosis of lumbar region without myelopathy or radiculopathy    Memory loss    Morbid obesity with BMI of 40.0-44.9, adult    Anxiety    PTSD (post-traumatic stress disorder)    Paranoid personality (disorder)    Prediabetes    Class 1 obesity due to excess calories with serious comorbidity and body mass index (BMI) of 33.0 to 33.9 in adult    DDD (degenerative disc disease), cervical    NAFLD (nonalcoholic fatty liver disease)    Hormone replacement therapy (HRT)    Chronic migraine without aura without status migrainosus, not intractable    Mixed hyperlipidemia    Folic acid deficiency    JOSUE (obstructive sleep apnea)    Right ureteral stone    Osteopenia of multiple sites    Encounter for cosmetic procedure     Advance Care Planning (Click this link to access ACP Navigator)  Advance Directive is not on file.  ACP discussion was declined by the patient. Patient does not have an advance directive, declines further assistance.        Objective   Vitals:    07/16/24 0951   BP: 128/84   BP Location: Left arm   Patient Position: Sitting   Cuff Size: Adult   Pulse: 110   Resp: 18   SpO2: 91%   Weight: 72.1 kg (159 lb)   Height: 154.9 cm (61\")   PainSc:   4   PainLoc: Back       Estimated body mass index is 30.04 kg/m² as calculated from the following:    Height as of this encounter: 154.9 cm (61\").    Weight as of this encounter: 72.1 kg (159 lb).             Does the patient have evidence of cognitive impairment? No  Lab Results   Component Value Date    HGBA1C 5.10 05/02/2024                                                                                                Health  Risk Assessment    Smoking Status:  Social History     Tobacco Use   Smoking Status Former    Current packs/day: 0.00    Average packs/day: 0.3 packs/day for 18.0 years (4.5 ttl pk-yrs)    Types: Cigarettes    Start date: 6/11/2003    Quit date: 6/5/2021    Years " since quitting: 3.1    Passive exposure: Past   Smokeless Tobacco Never     Alcohol Consumption:  Social History     Substance and Sexual Activity   Alcohol Use No     Fall Risk Screen:  JEFFREYADI Fall Risk Assessment was completed, and patient is at LOW risk for falls.Assessment completed on:2024    Depression Screenin/16/2024     9:53 AM   PHQ-2/PHQ-9 Depression Screening   Little Interest or Pleasure in Doing Things 1-->several days   Feeling Down, Depressed or Hopeless 1-->several days   Trouble Falling or Staying Asleep, or Sleeping Too Much 1-->several days   Feeling Tired or Having Little Energy 3-->nearly every day   Poor Appetite or Overeating 0-->not at all   Feeling Bad about Yourself - or that You are a Failure or Have Let Yourself or Your Family Down 1-->several days   Trouble Concentrating on Things, Such as Reading the Newspaper or Watching Television 2-->more than half the days   Moving or Speaking So Slowly that Other People Could Have Noticed? Or the Opposite - Being So Fidgety 0-->not at all   Thoughts that You Would be Better Off Dead or of Hurting Yourself in Some Way 0-->not at all   PHQ-9: Brief Depression Severity Measure Score 9   If You Checked Off Any Problems, How Difficult Have These Problems Made It For You to Do Your Work, Take Care of Things at Home, or Get Along with Other People? somewhat difficult     Health Habits and Functional and Cognitive Screenin/16/2024     9:52 AM   Functional & Cognitive Status   Do you have difficulty preparing food and eating? No   Do you have difficulty bathing yourself, getting dressed or grooming yourself? No   Do you have difficulty using the toilet? No   Do you have difficulty moving around from place to place? No   Do you have trouble with steps or getting out of a bed or a chair? No   Current Diet Well Balanced Diet   Dental Exam Not up to date   Eye Exam Not up to date   Exercise (times per week) 3 times per week   Current  Exercises Include Weightlifting;Home Exercise Program (TV, Computer, Etc.)   Do you need help using the phone?  No   Are you deaf or do you have serious difficulty hearing?  No   Do you need help to go to places out of walking distance? No   Do you need help shopping? No   Do you need help preparing meals?  Yes   Do you need help with housework?  Yes   Do you need help with laundry? Yes   Do you need help taking your medications? No   Do you need help managing money? No   Do you ever drive or ride in a car without wearing a seat belt? No   Have you felt unusual stress, anger or loneliness in the last month? Yes   Who do you live with? Child   If you need help, do you have trouble finding someone available to you? No   Have you been bothered in the last four weeks by sexual problems? No   Do you have difficulty concentrating, remembering or making decisions? Yes             Age-appropriate Screening Schedule:  Refer to the list below for future screening recommendations based on patient's age, sex and/or medical conditions. Orders for these recommended tests are listed in the plan section. The patient has been provided with a written plan.    Health Maintenance List  Health Maintenance   Topic Date Due    MAMMOGRAM  03/30/2024    ANNUAL WELLNESS VISIT  06/26/2024    Pneumococcal Vaccine 0-64 (2 of 2 - PCV) 07/16/2025 (Originally 6/16/2021)    INFLUENZA VACCINE  08/01/2024    LIPID PANEL  10/05/2024    BMI FOLLOWUP  07/16/2025    TDAP/TD VACCINES (2 - Td or Tdap) 06/16/2030    HEPATITIS C SCREENING  Completed    COVID-19 Vaccine  Discontinued    PAP SMEAR  Discontinued                                                                                                                                                CMS Preventative Services Quick Reference  Risk Factors Identified During Encounter  Immunizations Discussed/Encouraged:  Discussed all immunizations and that she is UTD.  Dental Screening Recommended: has  appointment in August.   Vision Screening Recommended: Recommend scheduling.     The above risks/problems have been discussed with the patient.  Pertinent information has been shared with the patient in the After Visit Summary.  An After Visit Summary and PPPS were made available to the patient.    Follow Up:   Next Medicare Wellness visit to be scheduled in 1 year.         Additional E&M Note during same encounter follows:  Patient has additional, significant, and separately identifiable condition(s)/problem(s) that require work above and beyond the Medicare Wellness Visit     Chief Complaint  Medicare Wellness-subsequent (Annual MWV)    Subjective   HPI  Megan is also being seen today for an annual adult preventative physical exam.   Overall she is doing well at this time. She has lost 13 lbs since her visit in June 2024. She is increasing her exercise regimen and is exercising every other day. She is eating better but occasionally incorporates sweets in her diet. She states her goal weight is around 145lb.     She is currently being treated for sinusitis and was prescribed Doxycycline for this. Denies any side effects with this.     She is following with Neurology for her migraines and has a follow up appointment this Friday.     She states she is having trouble with fatigue lately and is unsure if this is related to her sinus infection. Denies any chest pain, shortness of breath, nausea, vomiting, abdominal pain, fever, chills.         Review of Systems   Constitutional:  Positive for fatigue. Negative for chills and fever.   HENT:  Positive for rhinorrhea and sinus pressure. Negative for congestion, ear pain, sore throat and trouble swallowing.    Respiratory:  Negative for cough, shortness of breath and wheezing.    Cardiovascular:  Negative for chest pain and leg swelling.   Gastrointestinal:  Negative for abdominal pain, blood in stool, constipation, diarrhea, nausea and vomiting.      Objective   Vital  "Signs:  /84 (BP Location: Left arm, Patient Position: Sitting, Cuff Size: Adult)   Pulse 110   Resp 18   Ht 154.9 cm (61\")   Wt 72.1 kg (159 lb)   SpO2 91%   BMI 30.04 kg/m²   Physical Exam  Constitutional:       General: She is not in acute distress.     Appearance: She is not ill-appearing or toxic-appearing.   HENT:      Head: Normocephalic and atraumatic.      Right Ear: Tympanic membrane and external ear normal. There is no impacted cerumen.      Left Ear: Tympanic membrane and external ear normal. There is no impacted cerumen.      Ears:      Comments: Bilateral clear effusions noted.      Nose: Nose normal. Rhinorrhea present. No congestion.      Mouth/Throat:      Mouth: Mucous membranes are moist.      Pharynx: No oropharyngeal exudate or posterior oropharyngeal erythema.   Eyes:      Extraocular Movements: Extraocular movements intact.      Pupils: Pupils are equal, round, and reactive to light.   Cardiovascular:      Rate and Rhythm: Normal rate and regular rhythm.      Pulses: Normal pulses.      Heart sounds: Normal heart sounds. No murmur heard.  Pulmonary:      Effort: Pulmonary effort is normal. No respiratory distress.      Breath sounds: Normal breath sounds.   Abdominal:      General: Abdomen is flat. There is no distension.      Tenderness: There is no abdominal tenderness.   Musculoskeletal:         General: Normal range of motion.      Cervical back: Normal range of motion.      Right lower leg: No edema.      Left lower leg: No edema.   Skin:     General: Skin is warm and dry.      Capillary Refill: Capillary refill takes less than 2 seconds.      Findings: No lesion.   Neurological:      General: No focal deficit present.      Mental Status: She is alert and oriented to person, place, and time. Mental status is at baseline.   Psychiatric:         Mood and Affect: Mood normal.                 Assessment and Plan             Medicare annual wellness visit, subsequent    Mixed " hyperlipidemia    History of elevated lipid panel 9 months ago. Will recheck today to evaluate if any improvement.   -Doing well with current diet and exercise regimen. Encouraged to continue healthy habits such as 150 minutes of exercise weekly as well as incorporating a low fat and high fiber diet.   Chronic migraine without aura without status migrainosus, not intractable    -following with Neurology  -Has appointment Friday  -doing well with her Botox injections and Ubrelvy.   Class 1 obesity due to excess calories with body mass index (BMI) of 30.0 to 30.9 in adult, unspecified whether serious comorbidity present  Patient's (Body mass index is 30.04 kg/m².) indicates that they are obese (BMI >30) with health conditions that include obstructive sleep apnea, dyslipidemias, and GERD . Weight is improving with lifestyle modifications. BMI  is above average; no BMI management plan is appropriate. We discussed portion control, increasing exercise, and Information on healthy weight added to patient's after visit summary.   -Has lost 13 lbs since her last appointment 06/13/24. Doing well with current diet and exercise regimen. Encouraged to continue healthy habits such as 150 minutes of exercise weekly as well as incorporating a low fat and high fiber diet.   -Will check labs today  -Her goal weight is 145 lb  Prediabetes  -Last A1c in office was 5.8 in October 2023. Will recheck today.     Other fatigue  -Discussed will check her thyroid panel today to evaluate if there are any abnormalities. Discussed her CBC will show if any anemia is present. Discussed that if abnormalities are noted, we can do further testing with blood work. She is agreeable to this.   Gastroesophageal reflux disease with esophagitis without hemorrhage  -Follows with [facility]. Doing well on Omeprazole 40 mg BID, Zofran and Phenergan PRN.   JOSUE (obstructive sleep apnea)  -Reports she has sleep apnea but could not tolerate CPAP.   Seasonal allergic  rhinitis, unspecified trigger  -On OTC allergy medication, Singulair, xhance nasal spray. Recommended adding Mucinex to her regimen as needed for any flares.   Moderate persistent asthma with acute exacerbation    -Follows with pulmonary and Dr. Dukes. On Singulair, duonebs, and albuterol prn.       PTSD (post-traumatic stress disorder)    Following with psychiatry. On Trazodone, Cymbalta and Hydroxyzine PRN.   Anxiety  Following with psychiatry. On Trazodone, Cymbalta and Hydroxyzine PRN.   NAFLD (nonalcoholic fatty liver disease)  Following with  hepatology. Last LFTs 2/2023 normal. Will recheck today.     Orders Placed This Encounter   Procedures    TSH Rfx On Abnormal To Free T4     Order Specific Question:   Release to patient     Answer:   Routine Release [[id number]]    Comprehensive Metabolic Panel     Order Specific Question:   Release to patient     Answer:   Routine Release [[id number]]    Hemoglobin A1c     Order Specific Question:   Release to patient     Answer:   Routine Release [[id number]]    Lipid Panel     Order Specific Question:   Release to patient     Answer:   Routine Release [[id number]]    CBC & Differential     Order Specific Question:   Manual Differential     Answer:   No     Order Specific Question:   Release to patient     Answer:   Routine Release [[id number]]      Will contact with blood work results when they become available. If any abnormalities noted, will determine next follow up. She verbalized understanding of plan of care.     Past medical and surgical history as well as allergies, family history and social history were reviewed, and discussed with patient.  Chronic conditions were reviewed as well as medications.   Anticipatory guidance handouts including healthy diet, health maintenance, as well as regular exercise and general instructions were given via Jirafet, and patient was able to ask questions and discuss any concerns.          Follow Up   Return if symptoms  worsen or fail to improve, for Annual physical.  Patient was given instructions and counseling regarding her condition or for health maintenance advice. Please see specific information pulled into the AVS if appropriate.

## 2024-07-16 NOTE — PROGRESS NOTES
Specialty Pharmacy Refill Coordination Note     Megan is a 44 y.o. female contacted today regarding refills of  Aimovig and Ubrelvy specialty medication(s). Patient is due for injection on 7/18.      Reviewed and verified with patient:       Specialty medication(s) and dose(s) confirmed: yes    Refill Questions      Flowsheet Row Most Recent Value   Changes to allergies? No   Changes to medications? No   New conditions or infections since last clinic visit No   Unplanned office visit, urgent care, ED, or hospital admission in the last 4 weeks  No   How does patient/caregiver feel medication is working? Good   Financial problems or insurance changes  No   Since the previous refill, were any specialty medication doses or scheduled injections missed or delayed?  Yes   If yes, please provide the amount Patient will take injection 2 days late   Why were doses missed? N/A   Does this patient require a clinical escalation to a pharmacist? No            Delivery Questions      Flowsheet Row Most Recent Value   Delivery method [employer]   Delivery address verified with patient/caregiver? Yes   Delivery address Home   Number of medications in delivery 2   Medication(s) being filled and delivered Erenumab-Aooe, Ubrogepant   Doses left of specialty medications Aimovig=0 Ubrelvy=prn medication   Copay verified? Yes   Copay amount Aimovig/Ubrelvy=$0   Copay form of payment No copayment ($0)   Ship Date 7/17   Delivery Date 7/18   Signature Required No                   Follow-up: 28 day(s)     Lacy North, Pharmacy Technician  Specialty Pharmacy Technician

## 2024-07-17 ENCOUNTER — TELEPHONE (OUTPATIENT)
Dept: INTERNAL MEDICINE | Facility: CLINIC | Age: 44
End: 2024-07-17
Payer: MEDICARE

## 2024-07-17 LAB
ALBUMIN SERPL-MCNC: 4.4 G/DL (ref 3.5–5.2)
ALBUMIN/GLOB SERPL: 1.4 G/DL
ALP SERPL-CCNC: 53 U/L (ref 39–117)
ALT SERPL W P-5'-P-CCNC: 24 U/L (ref 1–33)
ANION GAP SERPL CALCULATED.3IONS-SCNC: 16.8 MMOL/L (ref 5–15)
AST SERPL-CCNC: 23 U/L (ref 1–32)
BASOPHILS # BLD AUTO: 0.04 10*3/MM3 (ref 0–0.2)
BASOPHILS NFR BLD AUTO: 0.6 % (ref 0–1.5)
BILIRUB SERPL-MCNC: <0.2 MG/DL (ref 0–1.2)
BUN SERPL-MCNC: 16 MG/DL (ref 6–20)
BUN/CREAT SERPL: 20.5 (ref 7–25)
CALCIUM SPEC-SCNC: 10.2 MG/DL (ref 8.6–10.5)
CHLORIDE SERPL-SCNC: 102 MMOL/L (ref 98–107)
CHOLEST SERPL-MCNC: 231 MG/DL (ref 0–200)
CO2 SERPL-SCNC: 20.2 MMOL/L (ref 22–29)
CREAT SERPL-MCNC: 0.78 MG/DL (ref 0.57–1)
DEPRECATED RDW RBC AUTO: 39.7 FL (ref 37–54)
EGFRCR SERPLBLD CKD-EPI 2021: 97.4 ML/MIN/1.73
EOSINOPHIL # BLD AUTO: 0.22 10*3/MM3 (ref 0–0.4)
EOSINOPHIL NFR BLD AUTO: 3.4 % (ref 0.3–6.2)
ERYTHROCYTE [DISTWIDTH] IN BLOOD BY AUTOMATED COUNT: 12.6 % (ref 12.3–15.4)
GLOBULIN UR ELPH-MCNC: 3.1 GM/DL
GLUCOSE SERPL-MCNC: 70 MG/DL (ref 65–99)
HBA1C MFR BLD: 5.2 % (ref 4.8–5.6)
HCT VFR BLD AUTO: 41.3 % (ref 34–46.6)
HDLC SERPL-MCNC: 51 MG/DL (ref 40–60)
HGB BLD-MCNC: 13.5 G/DL (ref 12–15.9)
IMM GRANULOCYTES # BLD AUTO: 0.03 10*3/MM3 (ref 0–0.05)
IMM GRANULOCYTES NFR BLD AUTO: 0.5 % (ref 0–0.5)
LDLC SERPL CALC-MCNC: 142 MG/DL (ref 0–100)
LDLC/HDLC SERPL: 2.69 {RATIO}
LYMPHOCYTES # BLD AUTO: 1.93 10*3/MM3 (ref 0.7–3.1)
LYMPHOCYTES NFR BLD AUTO: 29.6 % (ref 19.6–45.3)
MCH RBC QN AUTO: 28.7 PG (ref 26.6–33)
MCHC RBC AUTO-ENTMCNC: 32.7 G/DL (ref 31.5–35.7)
MCV RBC AUTO: 87.7 FL (ref 79–97)
MONOCYTES # BLD AUTO: 0.33 10*3/MM3 (ref 0.1–0.9)
MONOCYTES NFR BLD AUTO: 5.1 % (ref 5–12)
NEUTROPHILS NFR BLD AUTO: 3.96 10*3/MM3 (ref 1.7–7)
NEUTROPHILS NFR BLD AUTO: 60.8 % (ref 42.7–76)
NRBC BLD AUTO-RTO: 0 /100 WBC (ref 0–0.2)
PLATELET # BLD AUTO: 315 10*3/MM3 (ref 140–450)
PMV BLD AUTO: 10.8 FL (ref 6–12)
POTASSIUM SERPL-SCNC: 4.7 MMOL/L (ref 3.5–5.2)
PROT SERPL-MCNC: 7.5 G/DL (ref 6–8.5)
RBC # BLD AUTO: 4.71 10*6/MM3 (ref 3.77–5.28)
SODIUM SERPL-SCNC: 139 MMOL/L (ref 136–145)
TRIGL SERPL-MCNC: 213 MG/DL (ref 0–150)
TSH SERPL DL<=0.05 MIU/L-ACNC: 1.84 UIU/ML (ref 0.27–4.2)
VLDLC SERPL-MCNC: 38 MG/DL (ref 5–40)
WBC NRBC COR # BLD AUTO: 6.51 10*3/MM3 (ref 3.4–10.8)

## 2024-07-17 NOTE — TELEPHONE ENCOUNTER
Called and left message with office number for patient to return call.   HUB CAN RELAY AND SCHEDULE    ----- Message from Zara Bender sent at 7/17/2024 11:50 AM EDT -----  Dear Ms. Post,    Dr. Pandey is currently out of the office, but I wanted to let you know your laboratory results.     Your blood counts, thyroid test, and electrolytes are within normal limits.  This indicates no anemia, as well as normal thyroid, liver, and kidney function. Your hemoglobin A1C, the 3-month average of sugars, is normal at 5.2 (normal < 5.7).    Your cholesterol profile is  improved but still abnormal, showing a total cholesterol of 231 (goal < 200).  Your triglycerides elevated at 213 with a goal < 150.  Your HDL, the good cholesterol, is 51.  HDL is heart protective, and the goal is > 50 in women.  Your LDL, the bad cholesterol and the most important value of the profile, is elevated at 142.  Goal for LDL is < 130, ideally < 100. Triglycerides are improved by decreasing the fried foods and saturated fats in what you eat. LDL is improved with both improving what you eat as well as regular aerobic exercise.     I would recommend making a 6-month follow-up with Dr. Pandey, and repeating the cholesterol profile at that time. Please call the office to have this scheduled.      Sincerely,  Zara Bender MD, FACP

## 2024-07-19 ENCOUNTER — PROCEDURE VISIT (OUTPATIENT)
Dept: NEUROLOGY | Facility: CLINIC | Age: 44
End: 2024-07-19
Payer: MEDICARE

## 2024-07-19 DIAGNOSIS — G43.709 CHRONIC MIGRAINE WITHOUT AURA WITHOUT STATUS MIGRAINOSUS, NOT INTRACTABLE: Primary | ICD-10-CM

## 2024-07-21 NOTE — PROGRESS NOTES
Botox Procedure Note       Patient Name: Megan Post  : 1980   MRN: [id number]     Chief Complaint:    Chief Complaint   Patient presents with    Botulinum Toxin Injection     Patient Supplied-Do not Bill (200 UNITS)       History of Present Illness: Megan Post is a 44 y.o. female who is here today for Botox injections for migraines.     We have discussed risk and benefits of this Botox procedure and common side effects including headache, bleeding, infection, worsening of pain, neck pain, neck stiffness or weakness, damage to the areas being injected, weakness of muscles, loss of muscle control, ptosis, flu-like symptoms as well as more serious possible adverse effects including possible dysphagia, facial droop if injecting the facial area, painful injection, respiratory distress or even death (death has only been reported once with adults for Botox for migraines in another state when mixed with lidocaine solution which we do not use lidocaine solution in our practice for mixing Botox). The patient verbally understands and accepts risks and agrees with moving forward with Botox injections for chronic migraine prevention.    Verbal and written consent has been obtained from the patient prior to beginning treatment injections.     Pertinent Medical History:  Response to treatment has reduced headaches at least 7 fewer days a month and / or 100 hours fewer each month.       Subjective      Review of Systems:   Review of Systems    The following portions of the patient's history were reviewed an updated as appropriate: past family history, past medical history, past social history, past surgical history.     Medications:     Current Outpatient Medications:     albuterol (PROVENTIL) (2.5 MG/3ML) 0.083% nebulizer solution, Take 2.5 mg by nebulization 4 (Four) Times a Day As Needed for Wheezing., Disp: 90 each, Rfl: 5    albuterol sulfate  (90 Base) MCG/ACT inhaler, Inhale 2 puffs Every 4 (Four)  Hours As Needed for Wheezing., Disp: 18 g, Rfl: 5    Allergy Relief 180 MG tablet, Take 1 tablet by mouth Every Morning., Disp: , Rfl:     azelaic acid (AZELEX) 15 % gel, Apply 1 Application topically to the appropriate area as directed 2 (Two) Times a Day As Needed (facial reddness). Face for roscea, Disp: , Rfl:     baclofen (LIORESAL) 10 MG tablet, Take 1 tablet by mouth 2 (Two) Times a Day., Disp: 60 tablet, Rfl: 1    clindamycin (CLEOCIN) 300 MG capsule, Take 1 capsule by mouth 3 (Three) Times a Day. Sinus infection, Disp: , Rfl:     COLLAGEN PO, Take 1 tablet by mouth Daily., Disp: , Rfl:     cyclobenzaprine (FLEXERIL) 10 MG tablet, Take 0.5-1 tablets by mouth 3 (Three) Times a Day As Needed for Muscle Spasms., Disp: , Rfl:     doxycycline (MONODOX) 100 MG capsule, Take 1 capsule by mouth Every 12 (Twelve) Hours., Disp: , Rfl:     DULoxetine (CYMBALTA) 60 MG capsule, Take 1 capsule by mouth Daily., Disp: , Rfl:     EPIPEN 2-JAREN 0.3 MG/0.3ML solution auto-injector injection, As Needed (allergic reaction)., Disp: , Rfl: 6    Erenumab-aooe (Aimovig) 140 MG/ML auto-injector, Inject 1 mL under the skin into the appropriate area as directed Every 28 (Twenty-Eight) Days., Disp: 1 mL, Rfl: 11    estradiol (Estrace) 1 MG tablet, Take 1.5 tablets by mouth Daily. Take 1.5 tablets po qd., Disp: 45 tablet, Rfl: 2    Gentle Laxative 5 MG EC tablet, Take 1 tablet by mouth Daily As Needed for Constipation., Disp: , Rfl:     HYDROcodone-acetaminophen (NORCO) 5-325 MG per tablet, Take 1 tablet by mouth Every 8 (Eight) Hours As Needed for Moderate Pain (back and neck pain)., Disp: , Rfl:     hydrOXYzine (ATARAX) 25 MG tablet, Take 1 tablet by mouth Every 8 (Eight) Hours As Needed., Disp: , Rfl:     ipratropium-albuterol (DUO-NEB) 0.5-2.5 mg/3 ml nebulizer, Take 3 mL by nebulization 4 (Four) Times a Day. (Patient taking differently: Take 3 mL by nebulization 4 (Four) Times a Day As Needed for Wheezing or Shortness of Air.), Disp:  360 mL, Rfl: 5    montelukast (SINGULAIR) 10 MG tablet, TAKE 1 TABLET BY MOUTH EVERY NIGHT, Disp: 30 tablet, Rfl: 3    Multiple Vitamin (MULTI-VITAMIN DAILY PO), Take 1 tablet by mouth Daily., Disp: , Rfl:     omeprazole (priLOSEC) 40 MG capsule, Take 1 capsule by mouth 2 (two) times a day., Disp: , Rfl:     OnabotulinumtoxinA 200 units reconstituted solution, FOR . PHYSICIAN TO INJECT 155 UNITS INTRAMUSCULARLY INTO HEAD, NECK AND SHOULDERS EVERY 12 WEEKS Per FDA PROTOCOL, Disp: 1 each, Rfl: 3    ondansetron ODT (ZOFRAN-ODT) 4 MG disintegrating tablet, Place 1 tablet on the tongue Every 8 (Eight) Hours As Needed for Nausea or Vomiting., Disp: 15 tablet, Rfl: 0    promethazine (PHENERGAN) 12.5 MG tablet, Take 1 tablet by mouth Every 6 (Six) Hours As Needed for Nausea or Vomiting., Disp: 15 tablet, Rfl: 0    traZODone (DESYREL) 150 MG tablet, Take 3 tablets by mouth Every Night., Disp: , Rfl: 3    ubrogepant (Ubrelvy) 50 MG tablet, Take 1 tablet by mouth once daily as needed at onset of migraine; may repeat dose in 2 hours if needed.  MAX: 200 mg/24 hrs, Disp: 10 tablet, Rfl: 11    Xhance 93 MCG/ACT Exhaler Suspension, USE ONE SPRAY IN EACH NOSTRIL EVERY DAY AS NEEDED FOR CONGESTION (Patient taking differently: 2 sprays into the nostril(s) as directed by provider Daily As Needed (nasal congestion).), Disp: 16 mL, Rfl: 0    Current Facility-Administered Medications:     OnabotulinumtoxinA 200 Units, 200 Units, Intramuscular, Q3 Months, Nicolasa Gonzalez, MAGGIE, 200 Units at 07/19/24 1623    Allergies:   Allergies   Allergen Reactions    Adhesive Tape Hives    Latex Hives    Sulfa Antibiotics Hives    Biaxin [Clarithromycin] Nausea Only    Codeine Itching    Penicillins Nausea Only    Nuts Other (See Comments)     Red heated face       Objective     Physical Exam:  Vital Signs: There were no vitals filed for this visit.  There is no height or weight on file to calculate BMI.     Physical  Exam  Neurological:      Mental Status: She is oriented to person, place, and time.      Gait: Gait is intact.   Psychiatric:         Speech: Speech normal.         Neurologic Exam     Mental Status   Oriented to person, place, and time.   Speech: speech is normal   Level of consciousness: alert    Gait, Coordination, and Reflexes     Gait  Gait: normal      BOTOX PROCEDURE:     The patient was placed in a comfortable area and the sites to be treated were identified. A time out was called and performed. The area to be treated was prepped three times with alcohol and the alcohol allowed to dry. Next, a 30 gauge needle was used to inject the medication in the area to be treated.     Date of Last Injection: 4/24/2024  Response: Good  Onset of response: Immediate  Wearing off: 3 weeks  Side Effects: None  : Allergan  Lot #:  [id number]  Expiration Date: 06/2026  NDC: 32195 US 10    Botox was injected using FDA approved protocol for chronic migraine prevention.   200 unit vial Botox was re-constituted with 4 mL 0.9 NS to 5 unit/0.1 mL using standard techniques.  10 units were given at the  muscle in 2 divided sites  5 units were given at the Procerus muscle  20 units were given at the Frontalis muscle in 4 divided sites  40 units were given at the Temporalis muscle in 8 divided sites  30 units were given at the Occipitalis muscle in 6 divided sites  20 units were given at the Cervical paraspinal muscle in 4 divided sites  30 units were given at the Trapezius muscle in 6 divided sites    The total amount injected in units is 155  The total amount wasted in units is 45  The total amount submitted in units is 200.   Botox was administered by Nurse practitioner.     Patient tolerated procedure well with no immediate complications.     Assessment / Plan      Assessment/Plan:   Diagnoses and all orders for this visit:    1. Chronic migraine without aura without status migrainosus, not intractable  (Primary)  Comments:  Continue Aimovig, Botox, Ubrelvy         Patient Education:       Reviewed medications, potential side effects and signs and symptoms to report. Discussed risk versus benefits of treatment plan with patient and/or family-including medications, labs and radiology that may be ordered. Addressed questions and concerns during visit. Patient and/or family verbalized understanding and agree with plan. Instructed to call the office with any questions and report to ER with any life-threatening symptoms.     Follow Up:   It has been determined that Megan Post has chronic migraine headaches. We will proceed with a 12 week regimen of 200 units of Botox injections, to treat the patient's chronic migraines.  Return in about 3 months (around 10/19/2024).    During this visit the following were done:  Labs Reviewed []    Labs Ordered []    Radiology Reports Reviewed []    Radiology Ordered []    PCP Records Reviewed []    Referring Provider Records Reviewed []    ER Records Reviewed []    Hospital Records Reviewed []    History Obtained From Family []    Radiology Images Reviewed []    Other Reviewed []    Records Requested []        MAGGIE Wills  [facility]  [address]  [place] [address] [phone]

## 2024-07-23 ENCOUNTER — TELEPHONE (OUTPATIENT)
Dept: NEUROLOGY | Facility: CLINIC | Age: 44
End: 2024-07-23
Payer: MEDICARE

## 2024-07-23 NOTE — TELEPHONE ENCOUNTER
Spoke with the Pt to schedule their next BOTOX appt due to not being able to schedule the day they were here on 7/19/2024 because of the IT outage.    Pt has been scheduled for 10/15/24 at 1:30.    Pt verbalized understanding.

## 2024-07-26 ENCOUNTER — HOSPITAL ENCOUNTER (OUTPATIENT)
Dept: MAMMOGRAPHY | Facility: HOSPITAL | Age: 44
Discharge: HOME | End: 2024-07-26
Admitting: INTERNAL MEDICINE
Payer: MEDICARE

## 2024-07-26 ENCOUNTER — SPECIALTY PHARMACY (OUTPATIENT)
Dept: ONCOLOGY | Facility: HOSPITAL | Age: 44
End: 2024-07-26
Payer: MEDICARE

## 2024-07-26 DIAGNOSIS — Z12.31 VISIT FOR SCREENING MAMMOGRAM: ICD-10-CM

## 2024-07-26 PROCEDURE — 77067 SCR MAMMO BI INCL CAD: CPT

## 2024-07-26 PROCEDURE — 77063 BREAST TOMOSYNTHESIS BI: CPT

## 2024-07-26 NOTE — PROGRESS NOTES
Specialty Pharmacy Patient Management Program  Neurology Reassessment     Megan Post is a 44 y.o. female with chronic migraines seen by a Neurology provider and enrolled in the Neurology Patient Management program offered by [facility].  A follow-up outreach was conducted, including assessment of continued therapy appropriateness, medication adherence, and side effect incidence and management for aimovig 140 mg/mL, ubrelvy 50 mg prn, botox in clinic.     Changes to Insurance Coverage or Financial Support  none     Relevant Past Medical History and Comorbidities  Relevant medical history and concomitant health conditions were discussed with the patient. The patient's chart has been reviewed for relevant past medical history and comorbid health conditions and updated as necessary.   Past Medical History:   Diagnosis Date    Abdominal pain     Abnormal breast exam     Abnormal Pap smear of cervix     Achilles tendinitis     Acute sinusitis     Anxiety     Arthritis     Asthma     Tavera's syndrome     Bipolar 1 disorder     Bowel trouble     Breast cyst     Colon polyps     Constipation     Depression     Diverticulitis of colon     Encounter for cosmetic procedure 05/09/2024    Endometriosis     Eustachian tube dysfunction     Extremity pain     Fatty liver     Female pelvic pain     Fibromyalgia     Gastric ulcer     GERD (gastroesophageal reflux disease)     Glaucoma     H/O bladder infections     Headache     History of rashes as a child     Inflammatory bowel disease     Irritable bowel syndrome     Kidney stone     Low back pain     Lumbar radiculopathy     Menopausal symptoms     Muscle weakness     Obesity     Sexual problems     Spinal stenosis of lumbar region     Visual impairment      Social History     Socioeconomic History    Marital status: Single   Tobacco Use    Smoking status: Former     Current packs/day: 0.00     Average packs/day: 0.3 packs/day for 18.0 years (4.5  ttl pk-yrs)     Types: Cigarettes     Start date: 6/11/2003     Quit date: 6/5/2021     Years since quitting: 3.1     Passive exposure: Past    Smokeless tobacco: Never   Vaping Use    Vaping status: Never Used   Substance and Sexual Activity    Alcohol use: No    Drug use: Never    Sexual activity: Not Currently     Partners: Male     Birth control/protection: Surgical, None     Problem list reviewed by Mahendra Berger, PharmD on 7/26/2024 at  4:13 PM    Hospitalizations and Urgent Care Since Last Assessment  ED Visits, Admissions, or Hospitalizations: none   Urgent Office Visits: none     Allergies  Known allergies and reactions were discussed with the patient. The patient's chart has been reviewed for allergy information and updated as necessary.   Allergies   Allergen Reactions    Adhesive Tape Hives    Latex Hives    Sulfa Antibiotics Hives    Biaxin [Clarithromycin] Nausea Only    Codeine Itching    Penicillins Nausea Only    Nuts Other (See Comments)     Red heated face     Allergies reviewed by Mahendra Berger, PharmD on 7/26/2024 at  4:13 PM    Relevant Laboratory Values  Common labs          5/2/2024    11:48 6/13/2024    14:54 7/16/2024    10:48   Common Labs   Glucose 105  87  70    BUN 16  9  16    Creatinine 0.86  0.63  0.78    Sodium 136  138  139    Potassium 3.9  3.8  4.7    Chloride 100  104  102    Calcium 9.6  9.6  10.2    Albumin  4.7  4.4    Total Bilirubin  0.3  <0.2    Alkaline Phosphatase  50  53    AST (SGOT)  19  23    ALT (SGPT)  26  24    WBC 7.82  7.39  6.51    Hemoglobin 14.0  13.6  13.5    Hematocrit 44.1  42.0  41.3    Platelets 348  337  315    Total Cholesterol   231    Triglycerides   213    HDL Cholesterol   51    LDL Cholesterol    142    Hemoglobin A1C 5.10   5.20        Lab Assessment   The above labs have been reviewed. No dose adjustments are needed for the specialty medication(s) based on the labs.     Current Medication List  This medication list has been  reviewed with the patient and evaluated for any interactions or necessary modifications/recommendations, and updated to include all prescription medications, OTC medications, and supplements the patient is currently taking.  This list reflects what is contained in the patient's profile, which has also been marked as reviewed to communicate to other providers it is the most up to date version of the patient's current medication therapy.     Current Outpatient Medications:     albuterol (PROVENTIL) (2.5 MG/3ML) 0.083% nebulizer solution, Take 2.5 mg by nebulization 4 (Four) Times a Day As Needed for Wheezing., Disp: 90 each, Rfl: 5    albuterol sulfate  (90 Base) MCG/ACT inhaler, Inhale 2 puffs Every 4 (Four) Hours As Needed for Wheezing., Disp: 18 g, Rfl: 5    Allergy Relief 180 MG tablet, Take 1 tablet by mouth Every Morning., Disp: , Rfl:     azelaic acid (AZELEX) 15 % gel, Apply 1 Application topically to the appropriate area as directed 2 (Two) Times a Day As Needed (facial reddness). Face for roscea, Disp: , Rfl:     baclofen (LIORESAL) 10 MG tablet, Take 1 tablet by mouth 2 (Two) Times a Day., Disp: 60 tablet, Rfl: 1    clindamycin (CLEOCIN) 300 MG capsule, Take 1 capsule by mouth 3 (Three) Times a Day. Sinus infection, Disp: , Rfl:     COLLAGEN PO, Take 1 tablet by mouth Daily., Disp: , Rfl:     cyclobenzaprine (FLEXERIL) 10 MG tablet, Take 0.5-1 tablets by mouth 3 (Three) Times a Day As Needed for Muscle Spasms., Disp: , Rfl:     doxycycline (MONODOX) 100 MG capsule, Take 1 capsule by mouth Every 12 (Twelve) Hours., Disp: , Rfl:     DULoxetine (CYMBALTA) 60 MG capsule, Take 1 capsule by mouth Daily., Disp: , Rfl:     EPIPEN 2-JAREN 0.3 MG/0.3ML solution auto-injector injection, As Needed (allergic reaction)., Disp: , Rfl: 6    Erenumab-aooe (Aimovig) 140 MG/ML auto-injector, Inject 1 mL under the skin into the appropriate area as directed Every 28 (Twenty-Eight) Days., Disp: 1 mL, Rfl: 11    estradiol  (Estrace) 1 MG tablet, Take 1.5 tablets by mouth Daily. Take 1.5 tablets po qd., Disp: 45 tablet, Rfl: 2    Gentle Laxative 5 MG EC tablet, Take 1 tablet by mouth Daily As Needed for Constipation., Disp: , Rfl:     HYDROcodone-acetaminophen (NORCO) 5-325 MG per tablet, Take 1 tablet by mouth Every 8 (Eight) Hours As Needed for Moderate Pain (back and neck pain)., Disp: , Rfl:     hydrOXYzine (ATARAX) 25 MG tablet, Take 1 tablet by mouth Every 8 (Eight) Hours As Needed., Disp: , Rfl:     ipratropium-albuterol (DUO-NEB) 0.5-2.5 mg/3 ml nebulizer, Take 3 mL by nebulization 4 (Four) Times a Day. (Patient taking differently: Take 3 mL by nebulization 4 (Four) Times a Day As Needed for Wheezing or Shortness of Air.), Disp: 360 mL, Rfl: 5    montelukast (SINGULAIR) 10 MG tablet, TAKE 1 TABLET BY MOUTH EVERY NIGHT, Disp: 30 tablet, Rfl: 3    Multiple Vitamin (MULTI-VITAMIN DAILY PO), Take 1 tablet by mouth Daily., Disp: , Rfl:     omeprazole (priLOSEC) 40 MG capsule, Take 1 capsule by mouth 2 (two) times a day., Disp: , Rfl:     OnabotulinumtoxinA 200 units reconstituted solution, FOR . PHYSICIAN TO INJECT 155 UNITS INTRAMUSCULARLY INTO HEAD, NECK AND SHOULDERS EVERY 12 WEEKS Per FDA PROTOCOL, Disp: 1 each, Rfl: 3    ondansetron ODT (ZOFRAN-ODT) 4 MG disintegrating tablet, Place 1 tablet on the tongue Every 8 (Eight) Hours As Needed for Nausea or Vomiting., Disp: 15 tablet, Rfl: 0    promethazine (PHENERGAN) 12.5 MG tablet, Take 1 tablet by mouth Every 6 (Six) Hours As Needed for Nausea or Vomiting., Disp: 15 tablet, Rfl: 0    traZODone (DESYREL) 150 MG tablet, Take 3 tablets by mouth Every Night., Disp: , Rfl: 3    ubrogepant (Ubrelvy) 50 MG tablet, Take 1 tablet by mouth once daily as needed at onset of migraine; may repeat dose in 2 hours if needed.  MAX: 200 mg/24 hrs, Disp: 10 tablet, Rfl: 11    Xhance 93 MCG/ACT Exhaler Suspension, USE ONE SPRAY IN EACH NOSTRIL EVERY DAY AS NEEDED FOR CONGESTION  (Patient taking differently: 2 sprays into the nostril(s) as directed by provider Daily As Needed (nasal congestion).), Disp: 16 mL, Rfl: 0    Current Facility-Administered Medications:     OnabotulinumtoxinA 200 Units, 200 Units, Intramuscular, Q3 Months, Nicolasa Gonzalez, MAGGIE, 200 Units at 07/19/24 1623    Medicines reviewed by Mahendra Berger, PharmD on 7/26/2024 at  4:13 PM    Drug Interactions  No relevant drug drug interactions with specialty medication.       Adverse Drug Reactions  Medication tolerability: Tolerating with no to minimal ADRs          Medication plan: Continue therapy with normal follow-up  Plan for ADR Management: sharon reports      Adherence, Self-Administration, and Current Therapy Problems  Adherence related patient's specialty therapy was discussed with the patient. The Adherence segment of this outreach has been reviewed and updated.   Adherence Questions  Linked Medication(s) Assessed: Erenumab-Aooe, Onabotulinumtoxina, Ubrogepant  On average, how many doses/injections does the patient miss per month?: 0 (she does delay and implies sometimes misses the aimovig altogether)  What are the identified reasons for non-adherence or missed doses? : knowledge deficit  What is the estimated medication adherence level?: %  Based on the patient/caregiver response and refill history, does this patient require an MTP to track adherence improvements?: no (recommended her to use the aimovig q28day for maximum effeciency,)    Additional Barriers to Patient Self-Administration: none, she doesn't always give th aimovig on timely/routine schedule  Methods for Supporting Patient Self-Administration: educated her for maxiumum efficiency to use aimovig q28 days    Recently Close Medication Therapy Problems  No medication therapy recommendations to display  Open Medication Therapy Problems  No medication therapy recommendations to display     Goals of Therapy  Goals related to the patient's  specialty therapy was discussed with the patient. The Patient Goals segment of this outreach has been reviewed and updated.    Goals Addressed Today        Specialty Pharmacy General Goal      Reduction in severity and frequency of migraines.               Quality of Life Assessment   Quality of Life related to the patient's enrollment in the patient management program and services provided was discussed with the patient. The QOL segment of this outreach has been reviewed and updated.   Quality of Life Improvement Scale: 8-Moderately better    Reassessment Plan & Follow-Up  Medication Therapy Changes: aimovig 140 mg/mL, ubrelvy 50 mg po prn, botox in clinic q3 months.  Related Plans, Therapy Recommendations, or Issues to Be Addressed: continue to check her adherence, and reeducate as needed for aimovig  Pharmacist to perform regular reassessments no more than (6) months from the previous assessment.  Care Coordinator to set up future refill outreaches, coordinate prescription delivery, and escalate clinical questions to pharmacist.     Attestation  Therapeutic appropriateness: Appropriate  I attest the patient was actively involved in and has agreed to the above plan of care. If the prescribed therapy is at any point deemed not appropriate based on the current or future assessments, a consultation will be initiated with the patient's specialty care provider to determine the best course of action. The revised plan of therapy will be documented along with any additional patient education provided. Discussed aforementioned material with patient in person.    Mahendra Berger, PharmD, [facility] Specialty Pharmacist, Neurology  7/26/2024  16:15 EDT

## 2024-08-05 ENCOUNTER — OFFICE VISIT (OUTPATIENT)
Dept: INTERNAL MEDICINE | Facility: CLINIC | Age: 44
End: 2024-08-05
Payer: MEDICARE

## 2024-08-05 VITALS
DIASTOLIC BLOOD PRESSURE: 86 MMHG | RESPIRATION RATE: 22 BRPM | OXYGEN SATURATION: 98 % | HEIGHT: 61 IN | TEMPERATURE: 97.7 F | WEIGHT: 156.4 LBS | HEART RATE: 115 BPM | SYSTOLIC BLOOD PRESSURE: 128 MMHG | BODY MASS INDEX: 29.53 KG/M2

## 2024-08-05 DIAGNOSIS — J02.9 SORE THROAT: ICD-10-CM

## 2024-08-05 DIAGNOSIS — R51.9 ACUTE NONINTRACTABLE HEADACHE, UNSPECIFIED HEADACHE TYPE: ICD-10-CM

## 2024-08-05 DIAGNOSIS — R50.9 FEVER AND CHILLS: Primary | ICD-10-CM

## 2024-08-05 DIAGNOSIS — R05.1 ACUTE COUGH: ICD-10-CM

## 2024-08-05 LAB
EXPIRATION DATE: NORMAL
EXPIRATION DATE: NORMAL
FLUAV AG UPPER RESP QL IA.RAPID: NOT DETECTED
FLUBV AG UPPER RESP QL IA.RAPID: NOT DETECTED
INTERNAL CONTROL: NORMAL
INTERNAL CONTROL: NORMAL
Lab: NORMAL
Lab: NORMAL
S PYO AG THROAT QL: NEGATIVE
SARS-COV-2 AG UPPER RESP QL IA.RAPID: NOT DETECTED

## 2024-08-05 PROCEDURE — 1159F MED LIST DOCD IN RCRD: CPT

## 2024-08-05 PROCEDURE — 96372 THER/PROPH/DIAG INJ SC/IM: CPT

## 2024-08-05 PROCEDURE — 1160F RVW MEDS BY RX/DR IN RCRD: CPT

## 2024-08-05 PROCEDURE — 87880 STREP A ASSAY W/OPTIC: CPT

## 2024-08-05 PROCEDURE — 87428 SARSCOV & INF VIR A&B AG IA: CPT

## 2024-08-05 PROCEDURE — 99213 OFFICE O/P EST LOW 20 MIN: CPT

## 2024-08-05 PROCEDURE — 1125F AMNT PAIN NOTED PAIN PRSNT: CPT

## 2024-08-05 RX ORDER — LURASIDONE HYDROCHLORIDE 20 MG/1
1 TABLET, FILM COATED ORAL DAILY
COMMUNITY
Start: 2024-07-23

## 2024-08-05 RX ORDER — KETOROLAC TROMETHAMINE 30 MG/ML
30 INJECTION, SOLUTION INTRAMUSCULAR; INTRAVENOUS EVERY 6 HOURS PRN
Status: SHIPPED | OUTPATIENT
Start: 2024-08-05 | End: 2024-08-10

## 2024-08-05 RX ORDER — ALBUTEROL SULFATE 2.5 MG/3ML
2.5 SOLUTION RESPIRATORY (INHALATION) 4 TIMES DAILY PRN
Qty: 90 EACH | Refills: 5 | Status: SHIPPED | OUTPATIENT
Start: 2024-08-05

## 2024-08-05 RX ORDER — BUSPIRONE HYDROCHLORIDE 7.5 MG/1
1 TABLET ORAL EVERY 12 HOURS SCHEDULED
COMMUNITY
Start: 2024-07-23

## 2024-08-05 RX ADMIN — KETOROLAC TROMETHAMINE 30 MG: 30 INJECTION, SOLUTION INTRAMUSCULAR; INTRAVENOUS at 10:17

## 2024-08-05 NOTE — PROGRESS NOTES
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 female who is here today for evaluation of body aches, congestion, nonproductive cough, sore throat, right earache and headache that onset 2-weeks ago. Patient has been afebrile until last night with a fever of 104. Patient reports her headache has been persistent despite tylenol use. She has not used her albuterol inhaler.  Daughter who patient lives with works at an assisted living facility and has also been sick with similar symptoms.  She has had multiple patients who have tested positive for COVID.  She has been using TheraFlu as needed.    Subjective      Review of Systems:   Review of Systems   Constitutional:  Positive for chills, fatigue and fever.   HENT:  Positive for congestion, ear pain and sore throat.    Respiratory:  Positive for cough and shortness of breath. Negative for chest tightness and wheezing.    Cardiovascular:  Negative for chest pain, palpitations and leg swelling.   Gastrointestinal:  Positive for nausea. Negative for abdominal pain, constipation, diarrhea and vomiting.   Musculoskeletal:  Positive for myalgias.   Neurological:  Positive for headache. Negative for dizziness, weakness, numbness and confusion.       Medications:     Current Outpatient Medications:     albuterol (PROVENTIL) (2.5 MG/3ML) 0.083% nebulizer solution, Take 2.5 mg by nebulization 4 (Four) Times a Day As Needed for Wheezing., Disp: 90 each, Rfl: 5    albuterol sulfate  (90 Base) MCG/ACT inhaler, Inhale 2 puffs Every 4 (Four) Hours As Needed for Wheezing., Disp: 18 g, Rfl: 5     Allergy Relief 180 MG tablet, Take 1 tablet by mouth Every Morning., Disp: , Rfl:     azelaic acid (AZELEX) 15 % gel, Apply 1 Application topically to the appropriate area as directed 2 (Two) Times a Day As Needed (facial reddness). Face for roscea, Disp: , Rfl:     baclofen (LIORESAL) 10 MG tablet, Take 1 tablet by mouth 2 (Two) Times a Day., Disp: 60 tablet, Rfl: 1    busPIRone (BUSPAR) 7.5 MG tablet, Take 1 tablet by mouth Every 12 (Twelve) Hours., Disp: , Rfl:     clindamycin (CLEOCIN) 300 MG capsule, Take 1 capsule by mouth 3 (Three) Times a Day. Sinus infection, Disp: , Rfl:     COLLAGEN PO, Take 1 tablet by mouth Daily., Disp: , Rfl:     cyclobenzaprine (FLEXERIL) 10 MG tablet, Take 0.5-1 tablets by mouth 3 (Three) Times a Day As Needed for Muscle Spasms., Disp: , Rfl:     doxycycline (MONODOX) 100 MG capsule, Take 1 capsule by mouth Every 12 (Twelve) Hours., Disp: , Rfl:     DULoxetine (CYMBALTA) 60 MG capsule, Take 1 capsule by mouth Daily., Disp: , Rfl:     EPIPEN 2-JAREN 0.3 MG/0.3ML solution auto-injector injection, As Needed (allergic reaction)., Disp: , Rfl: 6    Erenumab-aooe (Aimovig) 140 MG/ML auto-injector, Inject 1 mL under the skin into the appropriate area as directed Every 28 (Twenty-Eight) Days., Disp: 1 mL, Rfl: 11    estradiol (Estrace) 1 MG tablet, Take 1.5 tablets by mouth Daily. Take 1.5 tablets po qd., Disp: 45 tablet, Rfl: 2    Gentle Laxative 5 MG EC tablet, Take 1 tablet by mouth Daily As Needed for Constipation., Disp: , Rfl:     HYDROcodone-acetaminophen (NORCO) 5-325 MG per tablet, Take 1 tablet by mouth Every 8 (Eight) Hours As Needed for Moderate Pain (back and neck pain)., Disp: , Rfl:     hydrOXYzine (ATARAX) 25 MG tablet, Take 1 tablet by mouth Every 8 (Eight) Hours As Needed., Disp: , Rfl:     ipratropium-albuterol (DUO-NEB) 0.5-2.5 mg/3 ml nebulizer, Take 3 mL by nebulization 4 (Four) Times a Day. (Patient taking differently: Take 3 mL by nebulization 4 (Four) Times a Day As  Needed for Wheezing or Shortness of Air.), Disp: 360 mL, Rfl: 5    Lurasidone HCl (LATUDA) 20 MG tablet tablet, Take 1 tablet by mouth Daily., Disp: , Rfl:     montelukast (SINGULAIR) 10 MG tablet, TAKE 1 TABLET BY MOUTH EVERY NIGHT, Disp: 30 tablet, Rfl: 3    Multiple Vitamin (MULTI-VITAMIN DAILY PO), Take 1 tablet by mouth Daily., Disp: , Rfl:     omeprazole (priLOSEC) 40 MG capsule, Take 1 capsule by mouth 2 (two) times a day., Disp: , Rfl:     OnabotulinumtoxinA 200 units reconstituted solution, FOR . PHYSICIAN TO INJECT 155 UNITS INTRAMUSCULARLY INTO HEAD, NECK AND SHOULDERS EVERY 12 WEEKS Per FDA PROTOCOL, Disp: 1 each, Rfl: 3    ondansetron ODT (ZOFRAN-ODT) 4 MG disintegrating tablet, Place 1 tablet on the tongue Every 8 (Eight) Hours As Needed for Nausea or Vomiting., Disp: 15 tablet, Rfl: 0    promethazine (PHENERGAN) 12.5 MG tablet, Take 1 tablet by mouth Every 6 (Six) Hours As Needed for Nausea or Vomiting., Disp: 15 tablet, Rfl: 0    traZODone (DESYREL) 150 MG tablet, Take 3 tablets by mouth Every Night., Disp: , Rfl: 3    ubrogepant (Ubrelvy) 50 MG tablet, Take 1 tablet by mouth once daily as needed at onset of migraine; may repeat dose in 2 hours if needed.  MAX: 200 mg/24 hrs, Disp: 10 tablet, Rfl: 11    Xhance 93 MCG/ACT Exhaler Suspension, USE ONE SPRAY IN EACH NOSTRIL EVERY DAY AS NEEDED FOR CONGESTION (Patient taking differently: 2 sprays into the nostril(s) as directed by provider Daily As Needed (nasal congestion).), Disp: 16 mL, Rfl: 0    Current Facility-Administered Medications:     ketorolac (TORADOL) injection 30 mg, 30 mg, Intramuscular, Q6H PRN, Joseline Keyes PA-C, 30 mg at 08/05/24 1017    OnabotulinumtoxinA 200 Units, 200 Units, Intramuscular, Q3 Months, Nicolasa Gonzalez APRN, 200 Units at 07/19/24 1623    Allergies:   Allergies   Allergen Reactions    Adhesive Tape Hives    Latex Hives    Sulfa Antibiotics Hives    Biaxin [Clarithromycin] Nausea Only     "Codeine Itching    Penicillins Nausea Only    Nuts Other (See Comments)     Red heated face       Objective     Physical Exam:  Vital Signs:   Vitals:    08/05/24 0929   BP: 128/86   Pulse: 115   Resp: 22   Temp: 97.7 °F (36.5 °C)   TempSrc: Temporal   SpO2: 98%   Weight: 70.9 kg (156 lb 6.4 oz)   Height: 154.9 cm (60.98\")   PainSc:   6   PainLoc: Generalized     Body mass index is 29.57 kg/m².   Facility age limit for growth %radha is 20 years.          Physical Exam  Vitals and nursing note reviewed.   Constitutional:       General: She is not in acute distress.     Appearance: Normal appearance.   HENT:      Right Ear: Tympanic membrane, ear canal and external ear normal. There is no impacted cerumen.      Left Ear: Tympanic membrane, ear canal and external ear normal. There is no impacted cerumen.      Nose: Congestion present.      Mouth/Throat:      Mouth: Mucous membranes are moist.      Pharynx: Oropharynx is clear. No oropharyngeal exudate or posterior oropharyngeal erythema.   Eyes:      General:         Right eye: No discharge.         Left eye: No discharge.      Extraocular Movements: Extraocular movements intact.      Conjunctiva/sclera: Conjunctivae normal.      Pupils: Pupils are equal, round, and reactive to light.   Cardiovascular:      Rate and Rhythm: Normal rate and regular rhythm.      Pulses: Normal pulses.      Heart sounds: Normal heart sounds.   Pulmonary:      Effort: Pulmonary effort is normal. No respiratory distress.      Breath sounds: Normal breath sounds. No wheezing, rhonchi or rales.   Musculoskeletal:      Cervical back: Normal range of motion and neck supple.   Neurological:      General: No focal deficit present.      Mental Status: She is alert and oriented to person, place, and time. Mental status is at baseline.   Psychiatric:         Mood and Affect: Mood normal.         Behavior: Behavior normal.         POCT Results (if applicable):   Results for orders placed or performed " in visit on 08/05/24   POCT SARS-CoV-2 Antigen HELIO + Flu    Specimen: Swab   Result Value Ref Range    SARS Antigen Not Detected Not Detected, Presumptive Negative    Influenza A Antigen HELIO Not Detected Not Detected    Influenza B Antigen HELIO Not Detected Not Detected    Internal Control Passed Passed    Lot Number [id number]     Expiration Date 02-15-25      *Note: Due to a large number of results and/or encounters for the requested time period, some results have not been displayed. A complete set of results can be found in Results Review.         Assessment / Plan      Assessment/Plan:   Diagnoses and all orders for this visit:    1. Fever and chills (Primary)  -     POCT SARS-CoV-2 Antigen HELIO + Flu  -     POCT rapid strep A    2. Sore throat  -     POCT SARS-CoV-2 Antigen HELIO + Flu  -     POCT rapid strep A    3. Acute cough  -     albuterol (PROVENTIL) (2.5 MG/3ML) 0.083% nebulizer solution; Take 2.5 mg by nebulization 4 (Four) Times a Day As Needed for Wheezing.  Dispense: 90 each; Refill: 5    4. Acute nonintractable headache, unspecified headache type  -     ketorolac (TORADOL) injection 30 mg      PLAN:  - COVID, flu, strep negative in office today  - It is important to treat your symptoms aggressively; this includes antihistamine (claritin, zyrtec, or allegra) for runny nose, flonase and nasal saline rinses for nasal congestion, mucinex DM for cough and chest congestion, tylenol as needed for fever or headaches, lots of rest, and lots of water.    - Will provide patient with refill of albuterol solution for nebulizer  - Return to the office if symptoms worsen or fail to improve with current plan  - Strict return precautions discussed at length     Follow Up:   Return for Next scheduled follow up.      Joseline Keyes PA-C    Internal [facility] [place]

## 2024-08-07 ENCOUNTER — TELEMEDICINE (OUTPATIENT)
Dept: INTERNAL MEDICINE | Facility: CLINIC | Age: 44
End: 2024-08-07
Payer: MEDICARE

## 2024-08-07 DIAGNOSIS — R19.7 DIARRHEA, UNSPECIFIED TYPE: ICD-10-CM

## 2024-08-07 DIAGNOSIS — K52.9 GASTROENTERITIS: Primary | ICD-10-CM

## 2024-08-07 DIAGNOSIS — R10.30 LOWER ABDOMINAL PAIN: ICD-10-CM

## 2024-08-07 DIAGNOSIS — R11.0 NAUSEA: ICD-10-CM

## 2024-08-07 PROCEDURE — 99213 OFFICE O/P EST LOW 20 MIN: CPT | Performed by: NURSE PRACTITIONER

## 2024-08-07 PROCEDURE — 1125F AMNT PAIN NOTED PAIN PRSNT: CPT | Performed by: NURSE PRACTITIONER

## 2024-08-07 RX ORDER — LOPERAMIDE HYDROCHLORIDE 2 MG/1
2 TABLET ORAL 3 TIMES DAILY PRN
Qty: 15 TABLET | Refills: 1 | Status: SHIPPED | OUTPATIENT
Start: 2024-08-07

## 2024-08-07 RX ORDER — PROMETHAZINE HYDROCHLORIDE 12.5 MG/1
12.5 TABLET ORAL EVERY 6 HOURS PRN
Qty: 15 TABLET | Refills: 1 | Status: SHIPPED | OUTPATIENT
Start: 2024-08-07
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
## 2024-08-09 ENCOUNTER — SPECIALTY PHARMACY (OUTPATIENT)
Dept: ONCOLOGY | Facility: HOSPITAL | Age: 44
End: 2024-08-09
Payer: MEDICARE

## 2024-08-09 NOTE — PROGRESS NOTES
Specialty Pharmacy Refill Coordination Note     Megan is a 44 y.o. female contacted today regarding refills of Aimovig and Ubrelvy specialty medication(s).Patient is due for injection on 08/18.    Reviewed and verified with patient:       Specialty medication(s) and dose(s) confirmed: yes    Refill Questions      Flowsheet Row Most Recent Value   Changes to allergies? No   Changes to medications? No   New conditions or infections since last clinic visit No   Unplanned office visit, urgent care, ED, or hospital admission in the last 4 weeks  No   How does patient/caregiver feel medication is working? Good   Financial problems or insurance changes  No   Since the previous refill, were any specialty medication doses or scheduled injections missed or delayed?  No   If yes, please provide the amount N/A   Why were doses missed? N/A   Does this patient require a clinical escalation to a pharmacist? No            Delivery Questions      Flowsheet Row Most Recent Value   Delivery method [employer]   Delivery address verified with patient/caregiver? Yes   Delivery address Home   Number of medications in delivery 2   Medication(s) being filled and delivered Erenumab-Aooe, Ubrogepant   Doses left of specialty medications Ubrelvy 4 tablets left as of 08/09   Copay verified? Yes   Copay amount Aimovig and Ubrelvy =$0   Copay form of payment No copayment ($0)   Ship Date 08/12   Delivery Date 08/13   Signature Required No                   Follow-up: 28 day(s)     Cecelia Anderson  Specialty Pharmacy Technician

## 2024-08-27 ENCOUNTER — OFFICE VISIT (OUTPATIENT)
Dept: OBSTETRICS AND GYNECOLOGY | Facility: CLINIC | Age: 44
End: 2024-08-27
Payer: MEDICARE

## 2024-08-27 VITALS
BODY MASS INDEX: 29.27 KG/M2 | WEIGHT: 155 LBS | SYSTOLIC BLOOD PRESSURE: 108 MMHG | DIASTOLIC BLOOD PRESSURE: 68 MMHG | HEIGHT: 61 IN

## 2024-08-27 DIAGNOSIS — N95.1 HOT FLASH, MENOPAUSAL: ICD-10-CM

## 2024-08-27 DIAGNOSIS — Z01.419 PAP TEST, AS PART OF ROUTINE GYNECOLOGICAL EXAMINATION: Primary | ICD-10-CM

## 2024-08-27 DIAGNOSIS — Z79.890 HORMONE REPLACEMENT THERAPY (HRT): ICD-10-CM

## 2024-08-27 DIAGNOSIS — Z12.31 BREAST CANCER SCREENING BY MAMMOGRAM: ICD-10-CM

## 2024-08-27 DIAGNOSIS — Z01.419 ENCOUNTER FOR GYNECOLOGICAL EXAMINATION WITHOUT ABNORMAL FINDING: ICD-10-CM

## 2024-08-27 RX ORDER — ESTRADIOL 1 MG/1
1.5 TABLET ORAL DAILY
Qty: 45 TABLET | Refills: 3 | Status: SHIPPED | OUTPATIENT
Start: 2024-08-27

## 2024-08-27 NOTE — PROGRESS NOTES
Chief Complaint   Patient presents with    Gynecologic Exam    Establish Care       Subjective   HPI  Megan Post is a 44 y.o. female, , who presents for new GYN appointment and HRT.     Her last LMP was No LMP recorded (lmp unknown). Patient has had a hysterectomy..  Her periods are absent.. She reports dysmenorrhea is none. Patient reports problems with:  hot flashes, night sweats, insomnia,  .  Partner Status: {Marital Status:80463}.  New Partners since last visit: {YES:67522}.  Desires STD Screening: {YES:74079}.    Additional OB/GYN History   Current contraception: {contraceptive methods:88289}  Desires to: {start_stop_continue_change:} contraception  Last Pap :   Last Completed Pap Smear            Discontinued - PAP SMEAR  Discontinued      2020  Done    2020  SCANNED - PAP SMEAR    2017  Done    2017  SCANNED - PAP SMEAR                  History of abnormal Pap smear: {yes***/no:62742}  Last mammogram:   Last Completed Mammogram            MAMMOGRAM (Yearly) Next due on 2024  Mammo Screening Digital Tomosynthesis Bilateral With CAD    2023  Mammo Screening Digital Tomosynthesis Bilateral With CAD    10/28/2020  Mammo Diagnostic Digital Tomosynthesis Bilateral With CAD    2018  Mammo Diagnostic Digital Tomosynthesis Bilateral With CAD    2015  MAMMOGRAPHY DIAGNOSTIC BILATERAL    Only the first 5 history entries have been loaded, but more history exists.                  Tobacco Usage?: {Tobacco Usage Optional:89351}   OB History          2    Para   2    Term   2       0    AB   0    Living   2         SAB   0    IAB   0    Ectopic   0    Molar        Multiple   0    Live Births                    Health Maintenance   Topic Date Due    Annual Gynecologic Pelvic and Breast Exam  2023    INFLUENZA VACCINE  2024    Pneumococcal Vaccine 0-64 (2 of 2 - PCV) 2025 (Originally 2021)     "ANNUAL WELLNESS VISIT  07/16/2025    LIPID PANEL  07/16/2025    BMI FOLLOWUP  07/16/2025    MAMMOGRAM  07/26/2025    DXA SCAN  01/03/2026    TDAP/TD VACCINES (2 - Td or Tdap) 06/16/2030    HEPATITIS C SCREENING  Completed    COVID-19 Vaccine  Discontinued    PAP SMEAR  Discontinued       The additional following portions of the patient's history were reviewed and updated as appropriate: {history reviewed:20406::\"allergies\",\"current medications\",\"past family history\",\"past medical history\",\"past social history\",\"past surgical history\",\"problem list\"}.    Review of Systems    I have reviewed and agree with the HPI, ROS, and historical information as entered above. ***    Objective   Ht 154.9 cm (61\")   LMP  (LMP Unknown)   BMI 29.55 kg/m²     Physical Exam    Assessment & Plan     Assessment     Problem List Items Addressed This Visit    None      ***    Plan     No follow-ups on file.  ***      Violet Ortiz RN  08/27/2024  "
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
## 2024-08-28 ENCOUNTER — PATIENT ROUNDING (BHMG ONLY) (OUTPATIENT)
Dept: OBSTETRICS AND GYNECOLOGY | Facility: CLINIC | Age: 44
End: 2024-08-28
Payer: MEDICARE

## 2024-08-28 NOTE — PROGRESS NOTES
August 28, 2024    Hello, may I speak with Megan Post?    My name is DAYRON      I am  with [facility]   [facility]  [address]  [facility] [address] [phone].    Before we get started may I verify your date of birth? 1980    I am calling to officially welcome you to our practice and ask about your recent visit. Is this a good time to talk? yes    Tell me about your visit with us. What things went well?  EVERYTHING WENT GREAT, AJ WAS WONDERFUL AND SO WAS THE NURSE       We're always looking for ways to make our patients' experiences even better. Do you have recommendations on ways we may improve?  no    Overall were you satisfied with your first visit to our practice? yes       I appreciate you taking the time to speak with me today. Is there anything else I can do for you? no      Thank you, and have a great day.

## 2024-08-30 ENCOUNTER — TELEMEDICINE (OUTPATIENT)
Dept: INTERNAL MEDICINE | Facility: CLINIC | Age: 44
End: 2024-08-30
Payer: MEDICARE

## 2024-08-30 ENCOUNTER — LAB (OUTPATIENT)
Dept: LAB | Facility: HOSPITAL | Age: 44
End: 2024-08-30
Payer: MEDICARE

## 2024-08-30 DIAGNOSIS — Z86.2 HISTORY OF ANEMIA: ICD-10-CM

## 2024-08-30 DIAGNOSIS — Z13.0 SCREENING FOR IRON DEFICIENCY ANEMIA: ICD-10-CM

## 2024-08-30 DIAGNOSIS — R23.3 EASY BRUISING: Primary | ICD-10-CM

## 2024-08-30 LAB
ALBUMIN SERPL-MCNC: 4.7 G/DL (ref 3.5–5.2)
ALBUMIN/GLOB SERPL: 1.6 G/DL
ALP SERPL-CCNC: 58 U/L (ref 39–117)
ALT SERPL W P-5'-P-CCNC: 35 U/L (ref 1–33)
ANION GAP SERPL CALCULATED.3IONS-SCNC: 13.1 MMOL/L (ref 5–15)
APTT PPP: 26.8 SECONDS (ref 22–39)
AST SERPL-CCNC: 23 U/L (ref 1–32)
BILIRUB SERPL-MCNC: 0.3 MG/DL (ref 0–1.2)
BUN SERPL-MCNC: 10 MG/DL (ref 6–20)
BUN/CREAT SERPL: 13.5 (ref 7–25)
CALCIUM SPEC-SCNC: 9.8 MG/DL (ref 8.6–10.5)
CHLORIDE SERPL-SCNC: 102 MMOL/L (ref 98–107)
CO2 SERPL-SCNC: 23.9 MMOL/L (ref 22–29)
CREAT SERPL-MCNC: 0.74 MG/DL (ref 0.57–1)
EGFRCR SERPLBLD CKD-EPI 2021: 103.7 ML/MIN/1.73
FERRITIN SERPL-MCNC: 68.5 NG/ML (ref 13–150)
GLOBULIN UR ELPH-MCNC: 3 GM/DL
GLUCOSE SERPL-MCNC: 109 MG/DL (ref 65–99)
INR PPP: 0.93 (ref 0.89–1.12)
IRON 24H UR-MRATE: 89 MCG/DL (ref 37–145)
IRON SATN MFR SERPL: 20 % (ref 20–50)
POTASSIUM SERPL-SCNC: 3.7 MMOL/L (ref 3.5–5.2)
PROT SERPL-MCNC: 7.7 G/DL (ref 6–8.5)
PROTHROMBIN TIME: 12.6 SECONDS (ref 12.2–14.5)
SODIUM SERPL-SCNC: 139 MMOL/L (ref 136–145)
TIBC SERPL-MCNC: 438 MCG/DL (ref 298–536)
TRANSFERRIN SERPL-MCNC: 294 MG/DL (ref 200–360)

## 2024-08-30 PROCEDURE — 80053 COMPREHEN METABOLIC PANEL: CPT | Performed by: NURSE PRACTITIONER

## 2024-08-30 PROCEDURE — 99214 OFFICE O/P EST MOD 30 MIN: CPT | Performed by: NURSE PRACTITIONER

## 2024-08-30 PROCEDURE — 83540 ASSAY OF IRON: CPT | Performed by: NURSE PRACTITIONER

## 2024-08-30 PROCEDURE — 84466 ASSAY OF TRANSFERRIN: CPT | Performed by: NURSE PRACTITIONER

## 2024-08-30 PROCEDURE — 36415 COLL VENOUS BLD VENIPUNCTURE: CPT | Performed by: NURSE PRACTITIONER

## 2024-08-30 PROCEDURE — 1125F AMNT PAIN NOTED PAIN PRSNT: CPT | Performed by: NURSE PRACTITIONER

## 2024-08-30 PROCEDURE — 85730 THROMBOPLASTIN TIME PARTIAL: CPT | Performed by: NURSE PRACTITIONER

## 2024-08-30 PROCEDURE — 85027 COMPLETE CBC AUTOMATED: CPT | Performed by: NURSE PRACTITIONER

## 2024-08-30 PROCEDURE — 85610 PROTHROMBIN TIME: CPT | Performed by: NURSE PRACTITIONER

## 2024-08-30 PROCEDURE — 82728 ASSAY OF FERRITIN: CPT | Performed by: NURSE PRACTITIONER

## 2024-08-31 LAB
DEPRECATED RDW RBC AUTO: 39.9 FL (ref 37–54)
ERYTHROCYTE [DISTWIDTH] IN BLOOD BY AUTOMATED COUNT: 12.7 % (ref 12.3–15.4)
HCT VFR BLD AUTO: 42.9 % (ref 34–46.6)
HGB BLD-MCNC: 13.8 G/DL (ref 12–15.9)
MCH RBC QN AUTO: 27.9 PG (ref 26.6–33)
MCHC RBC AUTO-ENTMCNC: 32.2 G/DL (ref 31.5–35.7)
MCV RBC AUTO: 86.7 FL (ref 79–97)
PATHOLOGY REVIEW: YES
PLATELET # BLD AUTO: 360 10*3/MM3 (ref 140–450)
PMV BLD AUTO: 10.7 FL (ref 6–12)
RBC # BLD AUTO: 4.95 10*6/MM3 (ref 3.77–5.28)
WBC NRBC COR # BLD AUTO: 8.78 10*3/MM3 (ref 3.4–10.8)

## 2024-09-02 ENCOUNTER — APPOINTMENT (OUTPATIENT)
Dept: CT IMAGING | Facility: HOSPITAL | Age: 44
End: 2024-09-02
Payer: MEDICARE

## 2024-09-02 ENCOUNTER — APPOINTMENT (OUTPATIENT)
Dept: GENERAL RADIOLOGY | Facility: HOSPITAL | Age: 44
End: 2024-09-02
Payer: MEDICARE

## 2024-09-02 ENCOUNTER — HOSPITAL ENCOUNTER (EMERGENCY)
Facility: HOSPITAL | Age: 44
Discharge: HOME | End: 2024-09-02
Attending: EMERGENCY MEDICINE
Payer: MEDICARE

## 2024-09-02 VITALS
BODY MASS INDEX: 28.7 KG/M2 | HEART RATE: 108 BPM | RESPIRATION RATE: 20 BRPM | HEIGHT: 61 IN | OXYGEN SATURATION: 96 % | WEIGHT: 152 LBS | DIASTOLIC BLOOD PRESSURE: 88 MMHG | TEMPERATURE: 99.2 F | SYSTOLIC BLOOD PRESSURE: 118 MMHG

## 2024-09-02 DIAGNOSIS — E86.0 ACUTE DEHYDRATION: ICD-10-CM

## 2024-09-02 DIAGNOSIS — R10.9 ACUTE ABDOMINAL PAIN: Primary | ICD-10-CM

## 2024-09-02 LAB
ALBUMIN SERPL-MCNC: 4.7 G/DL (ref 3.5–5.2)
ALBUMIN/GLOB SERPL: 1.4 G/DL
ALP SERPL-CCNC: 65 U/L (ref 39–117)
ALT SERPL W P-5'-P-CCNC: 26 U/L (ref 1–33)
ANION GAP SERPL CALCULATED.3IONS-SCNC: 15 MMOL/L (ref 5–15)
AST SERPL-CCNC: 23 U/L (ref 1–32)
B PARAPERT DNA SPEC QL NAA+PROBE: NOT DETECTED
B PERT DNA SPEC QL NAA+PROBE: NOT DETECTED
BACTERIA UR QL AUTO: ABNORMAL /HPF
BASOPHILS # BLD AUTO: 0.04 10*3/MM3 (ref 0–0.2)
BASOPHILS NFR BLD AUTO: 0.5 % (ref 0–1.5)
BILIRUB SERPL-MCNC: 0.2 MG/DL (ref 0–1.2)
BILIRUB UR QL STRIP: NEGATIVE
BUN SERPL-MCNC: 15 MG/DL (ref 6–20)
BUN/CREAT SERPL: 18.8 (ref 7–25)
C PNEUM DNA NPH QL NAA+NON-PROBE: NOT DETECTED
CALCIUM SPEC-SCNC: 9.9 MG/DL (ref 8.6–10.5)
CHLORIDE SERPL-SCNC: 103 MMOL/L (ref 98–107)
CLARITY UR: ABNORMAL
CO2 SERPL-SCNC: 21 MMOL/L (ref 22–29)
COLOR UR: ABNORMAL
CREAT SERPL-MCNC: 0.8 MG/DL (ref 0.57–1)
D-LACTATE SERPL-SCNC: 1.8 MMOL/L (ref 0.5–2)
DEPRECATED RDW RBC AUTO: 42.8 FL (ref 37–54)
EGFRCR SERPLBLD CKD-EPI 2021: 94.5 ML/MIN/1.73
EOSINOPHIL # BLD AUTO: 0.08 10*3/MM3 (ref 0–0.4)
EOSINOPHIL NFR BLD AUTO: 1 % (ref 0.3–6.2)
ERYTHROCYTE [DISTWIDTH] IN BLOOD BY AUTOMATED COUNT: 13.7 % (ref 12.3–15.4)
FLUAV SUBTYP SPEC NAA+PROBE: NOT DETECTED
FLUBV RNA ISLT QL NAA+PROBE: NOT DETECTED
GLOBULIN UR ELPH-MCNC: 3.4 GM/DL
GLUCOSE SERPL-MCNC: 107 MG/DL (ref 65–99)
GLUCOSE UR STRIP-MCNC: NEGATIVE MG/DL
HADV DNA SPEC NAA+PROBE: NOT DETECTED
HCOV 229E RNA SPEC QL NAA+PROBE: NOT DETECTED
HCOV HKU1 RNA SPEC QL NAA+PROBE: NOT DETECTED
HCOV NL63 RNA SPEC QL NAA+PROBE: NOT DETECTED
HCOV OC43 RNA SPEC QL NAA+PROBE: NOT DETECTED
HCT VFR BLD AUTO: 44.6 % (ref 34–46.6)
HGB BLD-MCNC: 14.6 G/DL (ref 12–15.9)
HGB UR QL STRIP.AUTO: NEGATIVE
HMPV RNA NPH QL NAA+NON-PROBE: NOT DETECTED
HOLD SPECIMEN: NORMAL
HPIV1 RNA ISLT QL NAA+PROBE: NOT DETECTED
HPIV2 RNA SPEC QL NAA+PROBE: NOT DETECTED
HPIV3 RNA NPH QL NAA+PROBE: NOT DETECTED
HPIV4 P GENE NPH QL NAA+PROBE: NOT DETECTED
HYALINE CASTS UR QL AUTO: ABNORMAL /LPF
IMM GRANULOCYTES # BLD AUTO: 0.03 10*3/MM3 (ref 0–0.05)
IMM GRANULOCYTES NFR BLD AUTO: 0.4 % (ref 0–0.5)
KETONES UR QL STRIP: ABNORMAL
LEUKOCYTE ESTERASE UR QL STRIP.AUTO: NEGATIVE
LIPASE SERPL-CCNC: 45 U/L (ref 13–60)
LYMPHOCYTES # BLD AUTO: 2.12 10*3/MM3 (ref 0.7–3.1)
LYMPHOCYTES NFR BLD AUTO: 25.6 % (ref 19.6–45.3)
M PNEUMO IGG SER IA-ACNC: NOT DETECTED
MCH RBC QN AUTO: 28.1 PG (ref 26.6–33)
MCHC RBC AUTO-ENTMCNC: 32.7 G/DL (ref 31.5–35.7)
MCV RBC AUTO: 85.9 FL (ref 79–97)
MONOCYTES # BLD AUTO: 0.44 10*3/MM3 (ref 0.1–0.9)
MONOCYTES NFR BLD AUTO: 5.3 % (ref 5–12)
NEUTROPHILS NFR BLD AUTO: 5.58 10*3/MM3 (ref 1.7–7)
NEUTROPHILS NFR BLD AUTO: 67.2 % (ref 42.7–76)
NITRITE UR QL STRIP: NEGATIVE
NRBC BLD AUTO-RTO: 0 /100 WBC (ref 0–0.2)
PH UR STRIP.AUTO: 5.5 [PH] (ref 5–8)
PLATELET # BLD AUTO: 344 10*3/MM3 (ref 140–450)
PMV BLD AUTO: 10.1 FL (ref 6–12)
POTASSIUM SERPL-SCNC: 4.3 MMOL/L (ref 3.5–5.2)
PROT SERPL-MCNC: 8.1 G/DL (ref 6–8.5)
PROT UR QL STRIP: ABNORMAL
RBC # BLD AUTO: 5.19 10*6/MM3 (ref 3.77–5.28)
RBC # UR STRIP: ABNORMAL /HPF
REF LAB TEST METHOD: ABNORMAL
RHINOVIRUS RNA SPEC NAA+PROBE: NOT DETECTED
RSV RNA NPH QL NAA+NON-PROBE: NOT DETECTED
SARS-COV-2 RNA NPH QL NAA+NON-PROBE: NOT DETECTED
SODIUM SERPL-SCNC: 139 MMOL/L (ref 136–145)
SP GR UR STRIP: 1.04 (ref 1–1.03)
SQUAMOUS #/AREA URNS HPF: ABNORMAL /HPF
TROPONIN T SERPL HS-MCNC: <6 NG/L
UROBILINOGEN UR QL STRIP: ABNORMAL
WBC # UR STRIP: ABNORMAL /HPF
WBC NRBC COR # BLD AUTO: 8.29 10*3/MM3 (ref 3.4–10.8)
WHOLE BLOOD HOLD COAG: NORMAL
WHOLE BLOOD HOLD SPECIMEN: NORMAL

## 2024-09-02 PROCEDURE — 83690 ASSAY OF LIPASE: CPT | Performed by: EMERGENCY MEDICINE

## 2024-09-02 PROCEDURE — 36415 COLL VENOUS BLD VENIPUNCTURE: CPT

## 2024-09-02 PROCEDURE — 71045 X-RAY EXAM CHEST 1 VIEW: CPT

## 2024-09-02 PROCEDURE — 74176 CT ABD & PELVIS W/O CONTRAST: CPT

## 2024-09-02 PROCEDURE — 25810000003 SODIUM CHLORIDE 0.9 % SOLUTION: Performed by: EMERGENCY MEDICINE

## 2024-09-02 PROCEDURE — 96374 THER/PROPH/DIAG INJ IV PUSH: CPT

## 2024-09-02 PROCEDURE — 93005 ELECTROCARDIOGRAM TRACING: CPT | Performed by: EMERGENCY MEDICINE

## 2024-09-02 PROCEDURE — 80053 COMPREHEN METABOLIC PANEL: CPT | Performed by: EMERGENCY MEDICINE

## 2024-09-02 PROCEDURE — 25010000002 ONDANSETRON PER 1 MG: Performed by: EMERGENCY MEDICINE

## 2024-09-02 PROCEDURE — 84484 ASSAY OF TROPONIN QUANT: CPT | Performed by: EMERGENCY MEDICINE

## 2024-09-02 PROCEDURE — 81001 URINALYSIS AUTO W/SCOPE: CPT | Performed by: EMERGENCY MEDICINE

## 2024-09-02 PROCEDURE — 0202U NFCT DS 22 TRGT SARS-COV-2: CPT | Performed by: EMERGENCY MEDICINE

## 2024-09-02 PROCEDURE — 85025 COMPLETE CBC W/AUTO DIFF WBC: CPT | Performed by: EMERGENCY MEDICINE

## 2024-09-02 PROCEDURE — 83605 ASSAY OF LACTIC ACID: CPT | Performed by: EMERGENCY MEDICINE

## 2024-09-02 PROCEDURE — 96375 TX/PRO/DX INJ NEW DRUG ADDON: CPT

## 2024-09-02 PROCEDURE — 25010000002 MORPHINE PER 10 MG: Performed by: EMERGENCY MEDICINE

## 2024-09-02 PROCEDURE — 87040 BLOOD CULTURE FOR BACTERIA: CPT | Performed by: EMERGENCY MEDICINE

## 2024-09-02 PROCEDURE — 99284 EMERGENCY DEPT VISIT MOD MDM: CPT

## 2024-09-02 RX ORDER — SODIUM CHLORIDE 0.9 % (FLUSH) 0.9 %
10 SYRINGE (ML) INJECTION AS NEEDED
Status: DISCONTINUED | OUTPATIENT
Start: 2024-09-02 | End: 2024-09-02 | Stop reason: HOSPADM

## 2024-09-02 RX ORDER — MORPHINE SULFATE 4 MG/ML
4 INJECTION, SOLUTION INTRAMUSCULAR; INTRAVENOUS ONCE
Status: COMPLETED | OUTPATIENT
Start: 2024-09-02 | End: 2024-09-02

## 2024-09-02 RX ORDER — ACETAMINOPHEN 500 MG
1000 TABLET ORAL ONCE
Status: COMPLETED | OUTPATIENT
Start: 2024-09-02 | End: 2024-09-02

## 2024-09-02 RX ORDER — SODIUM CHLORIDE 9 MG/ML
10 INJECTION, SOLUTION INTRAMUSCULAR; INTRAVENOUS; SUBCUTANEOUS AS NEEDED
Status: DISCONTINUED | OUTPATIENT
Start: 2024-09-02 | End: 2024-09-02 | Stop reason: HOSPADM

## 2024-09-02 RX ORDER — ONDANSETRON 2 MG/ML
4 INJECTION INTRAMUSCULAR; INTRAVENOUS ONCE
Status: COMPLETED | OUTPATIENT
Start: 2024-09-02 | End: 2024-09-02

## 2024-09-02 RX ADMIN — SODIUM CHLORIDE 1000 ML: 9 INJECTION, SOLUTION INTRAVENOUS at 14:33

## 2024-09-02 RX ADMIN — MORPHINE SULFATE 4 MG: 4 INJECTION, SOLUTION INTRAMUSCULAR; INTRAVENOUS at 13:28

## 2024-09-02 RX ADMIN — SODIUM CHLORIDE 1000 ML: 9 INJECTION, SOLUTION INTRAVENOUS at 12:58

## 2024-09-02 RX ADMIN — ACETAMINOPHEN 1000 MG: 500 TABLET ORAL at 12:54

## 2024-09-02 RX ADMIN — ONDANSETRON 4 MG: 2 INJECTION INTRAMUSCULAR; INTRAVENOUS at 13:28
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
## 2024-09-02 NOTE — DISCHARGE INSTRUCTIONS
Drink plenty of fluid.    Take already prescribed pain medication as prescribed.    Follow-up with gynecology for further outpatient evaluation.

## 2024-09-02 NOTE — Clinical Note
[facility] EMERGENCY DEPARTMENT  [address]  [facility] [address]  Phone: [phone]    Megan Post was seen and treated in our emergency department on 9/2/2024.  She may return to work on 09/04/2024.         Thank you for choosing [facility].    Bhakti Panday MD

## 2024-09-03 ENCOUNTER — TELEPHONE (OUTPATIENT)
Dept: INTERNAL MEDICINE | Facility: CLINIC | Age: 44
End: 2024-09-03

## 2024-09-03 ENCOUNTER — OFFICE VISIT (OUTPATIENT)
Dept: INTERNAL MEDICINE | Facility: CLINIC | Age: 44
End: 2024-09-03
Payer: MEDICARE

## 2024-09-03 VITALS
TEMPERATURE: 98 F | HEART RATE: 82 BPM | SYSTOLIC BLOOD PRESSURE: 132 MMHG | HEIGHT: 61 IN | BODY MASS INDEX: 29.68 KG/M2 | OXYGEN SATURATION: 98 % | WEIGHT: 157.2 LBS | DIASTOLIC BLOOD PRESSURE: 82 MMHG

## 2024-09-03 DIAGNOSIS — R35.0 URINE FREQUENCY: ICD-10-CM

## 2024-09-03 DIAGNOSIS — Z09 HOSPITAL DISCHARGE FOLLOW-UP: Primary | ICD-10-CM

## 2024-09-03 DIAGNOSIS — R10.32 LEFT LOWER QUADRANT ABDOMINAL PAIN: ICD-10-CM

## 2024-09-03 LAB
BILIRUB BLD-MCNC: NEGATIVE MG/DL
CLARITY, POC: CLEAR
COLOR UR: YELLOW
EXPIRATION DATE: NORMAL
GLUCOSE UR STRIP-MCNC: NEGATIVE MG/DL
KETONES UR QL: NEGATIVE
LAB AP CASE REPORT: NORMAL
LEUKOCYTE EST, POC: NEGATIVE
Lab: NORMAL
NITRITE UR-MCNC: NEGATIVE MG/ML
PATH REPORT.FINAL DX SPEC: NORMAL
PH UR: 6 [PH] (ref 5–8)
PROT UR STRIP-MCNC: NEGATIVE MG/DL
RBC # UR STRIP: NEGATIVE /UL
REF LAB TEST METHOD: NORMAL
SP GR UR: 1.01 (ref 1–1.03)
UROBILINOGEN UR QL: NORMAL

## 2024-09-03 PROCEDURE — 1159F MED LIST DOCD IN RCRD: CPT | Performed by: NURSE PRACTITIONER

## 2024-09-03 PROCEDURE — 99213 OFFICE O/P EST LOW 20 MIN: CPT | Performed by: NURSE PRACTITIONER

## 2024-09-03 PROCEDURE — 1160F RVW MEDS BY RX/DR IN RCRD: CPT | Performed by: NURSE PRACTITIONER

## 2024-09-03 PROCEDURE — 1111F DSCHRG MED/CURRENT MED MERGE: CPT | Performed by: NURSE PRACTITIONER

## 2024-09-03 PROCEDURE — 1125F AMNT PAIN NOTED PAIN PRSNT: CPT | Performed by: NURSE PRACTITIONER

## 2024-09-03 PROCEDURE — 81003 URINALYSIS AUTO W/O SCOPE: CPT | Performed by: NURSE PRACTITIONER

## 2024-09-03 PROCEDURE — 87086 URINE CULTURE/COLONY COUNT: CPT | Performed by: NURSE PRACTITIONER

## 2024-09-03 RX ORDER — NITROFURANTOIN 25; 75 MG/1; MG/1
100 CAPSULE ORAL 2 TIMES DAILY
Qty: 10 CAPSULE | Refills: 0 | Status: SHIPPED | OUTPATIENT
Start: 2024-09-03 | End: 2024-09-09

## 2024-09-03 NOTE — PROGRESS NOTES
Transitional Care Follow Up Visit  Subjective     Megan Post is a 44 y.o. female who presents for a transitional care management visit.    Within 48 business hours after discharge our office contacted her via telephone to coordinate her care and needs.      I reviewed and discussed the details of that call along with the discharge summary, hospital problems, inpatient lab results, inpatient diagnostic studies, and consultation reports with Megan.     Current outpatient and discharge medications have been reconciled for the patient.  Reviewed by: MAGGIE Faye           No data to display              Risk for Readmission (LACE) No data recorded    History of Present Illness  Course During Hospital Stay: Patient presents today for ER discharge follow-up.    Patient went to urgent care on Monday, September 2.  She does have known history of prolapsing bladder.  She went to OB/GYN on August 27 and believes that resulted in a prolapsed bladder.  She presented to urgent care for abdominal pain.  Recommended that she go to the ER.    Patient then presented to the ER at the recommendations of urgent care.  That HPI noted that she was having abdominal pain with dysuria as well as upper respiratory symptoms.  She does have history of tummy tuck about 3 months ago.  She presented to the ER with reports of vaginal bleeding with increased lower abdominal pain.  Additional abdominal surgeries include cholecystectomy, complete hysterectomy, abdominoplasty and liposuction, endometrial ablation.    Patient did have blood work completed at that time as well as an EKG, urinalysis, chest x-ray, respiratory panel, CT abdomen pelvis.    Overall labs had very few abnormalities other than urinalysis.  Blood cultures are still currently in process.  Respiratory panel is negative.  Chest x-ray with no significant results.  CT of the abdomen showed postsurgical changes related to hysterectomy. EKG was sinus  tachycardia    We did review her labs and further imaging.  She did remind me that she has had a total hysterectomy.  She has been told in the past that she does have a prolapsed bladder.  Overall since that time she states she continues to have some left lower quadrant pain.  She would like her urine reevaluated today as there were some noted abnormalities at her recent ER visit.           The following portions of the patient's history were reviewed and updated as appropriate: allergies, current medications, past family history, past medical history, past social history, past surgical history, and problem list.        Objective   Vitals:    09/03/24 1611   BP: 132/82   Pulse: 82   Temp: 98 °F (36.7 °C)   SpO2: 98%     Body mass index is 29.7 kg/m².        Physical Exam  Vitals and nursing note reviewed.   Constitutional:       Appearance: Normal appearance.   HENT:      Head: Normocephalic and atraumatic.   Eyes:      Extraocular Movements: Extraocular movements intact.      Pupils: Pupils are equal, round, and reactive to light.   Cardiovascular:      Rate and Rhythm: Normal rate and regular rhythm.      Heart sounds: Normal heart sounds.   Pulmonary:      Effort: Pulmonary effort is normal.      Breath sounds: Normal breath sounds.   Abdominal:      General: Bowel sounds are normal.      Palpations: Abdomen is soft.      Tenderness: There is no abdominal tenderness.   Musculoskeletal:         General: Normal range of motion.   Skin:     General: Skin is warm and dry.   Neurological:      Mental Status: She is alert and oriented to person, place, and time.   Psychiatric:         Mood and Affect: Mood normal.         Behavior: Behavior normal.        Latest Reference Range & Units 09/03/24 16:33   Color, UA Yellow, Straw, Dark Yellow, Janie  Yellow   Appearance, UA Clear  Clear   Specific Gravity, UA 1.005 - 1.030  1.015   pH, UA 5.0 - 8.0  6.0   Glucose Negative mg/dL Negative   Ketones, UA Negative  Negative    Blood, UA Negative  Negative   Nitrite, UA Negative  Negative   Leukocytes, UA Negative  Negative   Protein, UA Negative mg/dL Negative   Bilirubin, UA Negative  Negative   Urobilinogen, UA Normal, 0.2 E.U./dL  Normal   Expiration Date  01/14/2025   Lot Number  [id number]       Assessment & Plan   Diagnoses and all orders for this visit:    1. Hospital discharge follow-up (Primary)    2. Urine frequency  -     POCT urinalysis dipstick, automated  -     nitrofurantoin, macrocrystal-monohydrate, (Macrobid) 100 MG capsule; Take 1 capsule by mouth 2 (Two) Times a Day for 5 days.  Dispense: 10 capsule; Refill: 0  -     Urine Culture - Urine, Urine, Clean Catch; Future  -     Urine Culture - Urine, Urine, Clean Catch    3. Left lower quadrant abdominal pain    Plan  Discussed results of recent testing and imaging through the hospital  Will order updated urinalysis today.  We did discuss results of in office testing.  Will order urine culture.  Patient will be notified of results.  Continue to stay well-hydrated  Will continue to have regular nondraining bowel movements  Go to ER if any condition worsens or severe  Plan to follow-up as scheduled  Patient will be preemptively treated for a UTI.  She was instructed to not take the medication until urine cultures result.           Return for Next scheduled follow up.    MAGGIE Faye     Part of this note may be an electronic transcription/translation of spoken language to printed text using the Dragon Dictation System.

## 2024-09-04 ENCOUNTER — SPECIALTY PHARMACY (OUTPATIENT)
Dept: ONCOLOGY | Facility: HOSPITAL | Age: 44
End: 2024-09-04
Payer: MEDICARE

## 2024-09-04 LAB
BACTERIA SPEC AEROBE CULT: NORMAL
QT INTERVAL: 314 MS
QTC INTERVAL: 447 MS

## 2024-09-04 NOTE — PROGRESS NOTES
Specialty Pharmacy Refill Coordination Note     Megan is a 44 y.o. female contacted today regarding refills of Ubrelvy and Botox specialty medication(s).Patient is due for injection on 10/15.    Reviewed and verified with patient:       Specialty medication(s) and dose(s) confirmed: yes    Refill Questions      Flowsheet Row Most Recent Value   Changes to allergies? No   Changes to medications? No   New conditions or infections since last clinic visit No   Unplanned office visit, urgent care, ED, or hospital admission in the last 4 weeks  No   How does patient/caregiver feel medication is working? Good   Financial problems or insurance changes  No   Since the previous refill, were any specialty medication doses or scheduled injections missed or delayed?  No   If yes, please provide the amount N/A   Why were doses missed? N/A   Does this patient require a clinical escalation to a pharmacist? No            Delivery Questions      Flowsheet Row Most Recent Value   Delivery method UPS   Delivery address verified with patient/caregiver? Yes   Delivery address Home   Number of medications in delivery 2   Medication(s) being filled and delivered Onabotulinumtoxina, Ubrogepant   Doses left of specialty medications Ubrelvy 4 tablets left as of 09/04   Copay verified? Yes   Copay amount Ubrelvy =$0   Copay form of payment No copayment ($0)   Ship Date 09/05   Delivery Date 09/6   Signature Required Yes                   Follow-up: 30 day(s)     Cecelia Anderson  Specialty Pharmacy Technician

## 2024-09-05 ENCOUNTER — TELEPHONE (OUTPATIENT)
Dept: INTERNAL MEDICINE | Facility: CLINIC | Age: 44
End: 2024-09-05
Payer: MEDICARE

## 2024-09-05 NOTE — TELEPHONE ENCOUNTER
Spoke with patient regarding results and discontinuing medication from recent visit. Patient understanding and agreeable

## 2024-09-05 NOTE — TELEPHONE ENCOUNTER
----- Message from Rahel Duarte sent at 9/5/2024  8:12 AM EDT -----  Please let patient urine culture resulted.  There is no evidence of UTI.  Please do not take medication that was prescribed at recent visit

## 2024-09-07 LAB
BACTERIA SPEC AEROBE CULT: NORMAL
BACTERIA SPEC AEROBE CULT: NORMAL

## 2024-09-09 ENCOUNTER — TELEMEDICINE (OUTPATIENT)
Dept: INTERNAL MEDICINE | Facility: CLINIC | Age: 44
End: 2024-09-09
Payer: MEDICARE

## 2024-09-09 DIAGNOSIS — N76.0 BACTERIAL VAGINOSIS: ICD-10-CM

## 2024-09-09 DIAGNOSIS — R10.9 ABDOMINAL PAIN, UNSPECIFIED ABDOMINAL LOCATION: Primary | ICD-10-CM

## 2024-09-09 DIAGNOSIS — T74.21XA REPORTED SEXUAL ASSAULT OF ADULT: ICD-10-CM

## 2024-09-09 DIAGNOSIS — B96.89 BACTERIAL VAGINOSIS: ICD-10-CM

## 2024-09-09 PROCEDURE — 1125F AMNT PAIN NOTED PAIN PRSNT: CPT | Performed by: NURSE PRACTITIONER

## 2024-09-09 PROCEDURE — 1159F MED LIST DOCD IN RCRD: CPT | Performed by: NURSE PRACTITIONER

## 2024-09-09 PROCEDURE — 1160F RVW MEDS BY RX/DR IN RCRD: CPT | Performed by: NURSE PRACTITIONER

## 2024-09-09 PROCEDURE — 99213 OFFICE O/P EST LOW 20 MIN: CPT | Performed by: NURSE PRACTITIONER

## 2024-09-09 RX ORDER — METRONIDAZOLE 7.5 MG/G
1 GEL VAGINAL NIGHTLY
Qty: 1 EACH | Refills: 0 | Status: SHIPPED | OUTPATIENT
Start: 2024-09-09
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
## 2024-09-13 ENCOUNTER — OFFICE VISIT (OUTPATIENT)
Dept: INTERNAL MEDICINE | Facility: CLINIC | Age: 44
End: 2024-09-13
Payer: MEDICARE

## 2024-09-13 VITALS
OXYGEN SATURATION: 98 % | HEART RATE: 126 BPM | HEIGHT: 61 IN | DIASTOLIC BLOOD PRESSURE: 82 MMHG | SYSTOLIC BLOOD PRESSURE: 132 MMHG | WEIGHT: 157 LBS | TEMPERATURE: 98 F | BODY MASS INDEX: 29.64 KG/M2

## 2024-09-13 DIAGNOSIS — M54.6 ACUTE MIDLINE THORACIC BACK PAIN: ICD-10-CM

## 2024-09-13 DIAGNOSIS — M54.50 ACUTE MIDLINE LOW BACK PAIN WITHOUT SCIATICA: ICD-10-CM

## 2024-09-13 DIAGNOSIS — T74.21XA REPORTED SEXUAL ASSAULT OF ADULT: Primary | ICD-10-CM

## 2024-09-13 DIAGNOSIS — M54.2 NECK PAIN: ICD-10-CM

## 2024-09-13 PROCEDURE — 1125F AMNT PAIN NOTED PAIN PRSNT: CPT | Performed by: NURSE PRACTITIONER

## 2024-09-13 PROCEDURE — 99213 OFFICE O/P EST LOW 20 MIN: CPT | Performed by: NURSE PRACTITIONER

## 2024-09-13 PROCEDURE — 1160F RVW MEDS BY RX/DR IN RCRD: CPT | Performed by: NURSE PRACTITIONER

## 2024-09-13 PROCEDURE — 1159F MED LIST DOCD IN RCRD: CPT | Performed by: NURSE PRACTITIONER
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
## 2024-09-18 ENCOUNTER — HOSPITAL ENCOUNTER (OUTPATIENT)
Dept: GENERAL RADIOLOGY | Facility: HOSPITAL | Age: 44
Discharge: HOME | End: 2024-09-18
Admitting: NURSE PRACTITIONER
Payer: MEDICARE

## 2024-09-18 DIAGNOSIS — M54.2 NECK PAIN: ICD-10-CM

## 2024-09-18 DIAGNOSIS — M54.50 ACUTE MIDLINE LOW BACK PAIN WITHOUT SCIATICA: ICD-10-CM

## 2024-09-18 DIAGNOSIS — M54.6 ACUTE MIDLINE THORACIC BACK PAIN: ICD-10-CM

## 2024-09-18 PROCEDURE — 72070 X-RAY EXAM THORAC SPINE 2VWS: CPT

## 2024-09-18 PROCEDURE — 72100 X-RAY EXAM L-S SPINE 2/3 VWS: CPT

## 2024-09-18 PROCEDURE — 72040 X-RAY EXAM NECK SPINE 2-3 VW: CPT

## 2024-10-03 DIAGNOSIS — M51.360 LUMBAR DISCOGENIC PAIN SYNDROME: ICD-10-CM

## 2024-10-03 RX ORDER — BACLOFEN 10 MG/1
10 TABLET ORAL 2 TIMES DAILY
Qty: 60 TABLET | Refills: 1 | Status: SHIPPED | OUTPATIENT
Start: 2024-10-03

## 2024-10-03 NOTE — TELEPHONE ENCOUNTER
Last filled 6/13/24  baclofen (LIORESAL) 10 MG   60 w/ 1 refill     LOV 9/13/24  NOV 1/10/25    Sent in   baclofen (LIORESAL) 10 MG   60 w/ 1 refill   pm

## 2024-10-07 ENCOUNTER — SPECIALTY PHARMACY (OUTPATIENT)
Dept: ONCOLOGY | Facility: HOSPITAL | Age: 44
End: 2024-10-07
Payer: MEDICARE

## 2024-10-07 NOTE — PROGRESS NOTES
Specialty Pharmacy Refill Coordination Note     Megan is a 44 y.o. female contacted today regarding refills of Ubrelvy specialty medication(s).    Reviewed and verified with patient:       Specialty medication(s) and dose(s) confirmed: yes    Refill Questions      Flowsheet Row Most Recent Value   Changes to allergies? No   Changes to medications? No   New conditions or infections since last clinic visit No   Unplanned office visit, urgent care, ED, or hospital admission in the last 4 weeks  No   How does patient/caregiver feel medication is working? Good   Financial problems or insurance changes  No   Since the previous refill, were any specialty medication doses or scheduled injections missed or delayed?  No   If yes, please provide the amount N/A   Why were doses missed? N/A   Does this patient require a clinical escalation to a pharmacist? No            Delivery Questions      Flowsheet Row Most Recent Value   Delivery method UPS   Delivery address Home   Number of medications in delivery 1   Medication(s) being filled and delivered Ubrogepant   Doses left of specialty medications Ubrelvy 3 tablets left as of 10/07   Copay verified? Yes   Copay amount Ubrelvy =$0   Copay form of payment No copayment ($0)   Ship Date 10/07   Delivery Date 10/08   Signature Required No                   Follow-up: 30 day(s)     Cecelia Anderson  Specialty Pharmacy Technician

## 2024-10-15 ENCOUNTER — PROCEDURE VISIT (OUTPATIENT)
Dept: NEUROLOGY | Facility: CLINIC | Age: 44
End: 2024-10-15
Payer: MEDICARE

## 2024-10-15 DIAGNOSIS — G43.709 CHRONIC MIGRAINE WITHOUT AURA WITHOUT STATUS MIGRAINOSUS, NOT INTRACTABLE: Primary | ICD-10-CM

## 2024-10-15 NOTE — PROGRESS NOTES
Botox Procedure Note       Patient Name: Megan Post  : 1980   MRN: [id number]     Chief Complaint:    Chief Complaint   Patient presents with    Botulinum Toxin Injection     Patient supplied-Do not bill (200 UNITS)       History of Present Illness: Megan Post is a 44 y.o. female who is here today for Botox injections for migraines.     We have discussed risk and benefits of this Botox procedure and common side effects including headache, bleeding, infection, worsening of pain, neck pain, neck stiffness or weakness, damage to the areas being injected, weakness of muscles, loss of muscle control, ptosis, flu-like symptoms as well as more serious possible adverse effects including possible dysphagia, facial droop if injecting the facial area, painful injection, respiratory distress or even death (death has only been reported once with adults for Botox for migraines in another state when mixed with lidocaine solution which we do not use lidocaine solution in our practice for mixing Botox). The patient verbally understands and accepts risks and agrees with moving forward with Botox injections for chronic migraine prevention.    Verbal and written consent has been obtained from the patient prior to beginning treatment injections.     Pertinent Medical History:  Response to treatment has reduced headaches at least 7 fewer days a month and / or 100 hours fewer each month.       Subjective      Review of Systems:   Review of Systems    The following portions of the patient's history were reviewed an updated as appropriate: past family history, past medical history, past social history, past surgical history.     Medications:     Current Outpatient Medications:     albuterol (PROVENTIL) (2.5 MG/3ML) 0.083% nebulizer solution, Take 2.5 mg by nebulization 4 (Four) Times a Day As Needed for Wheezing., Disp: 90 each, Rfl: 5    albuterol sulfate  (90 Base) MCG/ACT inhaler, Inhale 2 puffs Every 4 (Four)  Hours As Needed for Wheezing., Disp: 18 g, Rfl: 5    Allergy Relief 180 MG tablet, Take 1 tablet by mouth Every Morning., Disp: , Rfl:     azelaic acid (AZELEX) 15 % gel, Apply 1 Application topically to the appropriate area as directed 2 (Two) Times a Day As Needed (facial reddness). Face for roscea, Disp: , Rfl:     baclofen (LIORESAL) 10 MG tablet, Take 1 tablet by mouth 2 (Two) Times a Day., Disp: 60 tablet, Rfl: 1    busPIRone (BUSPAR) 7.5 MG tablet, Take 1 tablet by mouth Every 12 (Twelve) Hours., Disp: , Rfl:     COLLAGEN PO, Take 1 tablet by mouth Daily., Disp: , Rfl:     cyclobenzaprine (FLEXERIL) 10 MG tablet, Take 0.5-1 tablets by mouth 3 (Three) Times a Day As Needed for Muscle Spasms., Disp: , Rfl:     DULoxetine (CYMBALTA) 20 MG capsule, , Disp: , Rfl:     DULoxetine (CYMBALTA) 60 MG capsule, Take 1 capsule by mouth Daily., Disp: , Rfl:     EPIPEN 2-JAREN 0.3 MG/0.3ML solution auto-injector injection, As Needed (allergic reaction)., Disp: , Rfl: 6    estradiol (Estrace) 1 MG tablet, Take 1.5 tablets by mouth Daily. Take 1.5 tablets po qd., Disp: 45 tablet, Rfl: 3    Gentle Laxative 5 MG EC tablet, Take 1 tablet by mouth Daily As Needed for Constipation., Disp: , Rfl:     HYDROcodone-acetaminophen (NORCO) 5-325 MG per tablet, Take 1 tablet by mouth Every 8 (Eight) Hours As Needed for Moderate Pain (back and neck pain)., Disp: , Rfl:     hydrOXYzine (ATARAX) 25 MG tablet, Take 1 tablet by mouth Every 8 (Eight) Hours As Needed., Disp: , Rfl:     ipratropium-albuterol (DUO-NEB) 0.5-2.5 mg/3 ml nebulizer, Take 3 mL by nebulization 4 (Four) Times a Day., Disp: 360 mL, Rfl: 5    loperamide (IMODIUM) 2 MG capsule, TAKE 1 CAPSULE BY MOUTH THREE TIMES DAILY AS NEEDED FOR DIARRHEA, Disp: , Rfl:     metroNIDAZOLE (METROGEL) 0.75 % vaginal gel, Insert 1 Applicatorful into the vagina Every Night., Disp: 1 each, Rfl: 0    montelukast (SINGULAIR) 10 MG tablet, TAKE 1 TABLET BY MOUTH EVERY NIGHT, Disp: 30 tablet, Rfl:  3    Multiple Vitamin (MULTI-VITAMIN DAILY PO), Take 1 tablet by mouth Daily., Disp: , Rfl:     omeprazole (priLOSEC) 40 MG capsule, Take 1 capsule by mouth 2 (two) times a day., Disp: , Rfl:     OnabotulinumtoxinA 200 units reconstituted solution, FOR . PHYSICIAN TO INJECT 155 UNITS INTRAMUSCULARLY INTO HEAD, NECK AND SHOULDERS EVERY 12 WEEKS Per FDA PROTOCOL, Disp: 1 each, Rfl: 3    ondansetron ODT (ZOFRAN-ODT) 4 MG disintegrating tablet, Place 1 tablet on the tongue Every 8 (Eight) Hours As Needed for Nausea or Vomiting., Disp: 15 tablet, Rfl: 0    promethazine (PHENERGAN) 12.5 MG tablet, Take 1 tablet by mouth Every 6 (Six) Hours As Needed for Nausea or Vomiting., Disp: 15 tablet, Rfl: 1    sennosides-docusate (PERICOLACE) 8.6-50 MG per tablet, Take 1 tablet by mouth Daily., Disp: , Rfl:     simethicone (MYLICON,GAS-X) 125 MG capsule capsule, Take 1 capsule by mouth Every 6 (Six) Hours As Needed., Disp: , Rfl:     traZODone (DESYREL) 150 MG tablet, Take 3 tablets by mouth Every Night., Disp: , Rfl: 3    ubrogepant (Ubrelvy) 50 MG tablet, Take 1 tablet by mouth as needed at onset of migraine; may repeat dose in 2 hours if needed.  MAX: 200 mg/24 hrs, Disp: 10 tablet, Rfl: 11    Xhance 93 MCG/ACT Exhaler Suspension, USE ONE SPRAY IN EACH NOSTRIL EVERY DAY AS NEEDED FOR CONGESTION (Patient taking differently: 2 sprays into the nostril(s) as directed by provider Daily As Needed (nasal congestion).), Disp: 16 mL, Rfl: 0    Current Facility-Administered Medications:     OnabotulinumtoxinA 200 Units, 200 Units, Intramuscular, Q3 Months, Nicolasa Gonzalez APRN, 200 Units at 07/19/24 1623    Allergies:   Allergies   Allergen Reactions    Adhesive Tape Hives    Latex Hives    Sulfa Antibiotics Hives    Biaxin [Clarithromycin] Nausea Only    Codeine Itching    Penicillins Nausea Only    Nuts Other (See Comments)     Red heated face       Objective     Physical Exam:  Vital Signs: There were no  vitals filed for this visit.  There is no height or weight on file to calculate BMI.     Physical Exam    Neurological Exam  Mental Status  Awake, alert and oriented to person, place and time.    Gait  Casual gait is normal including stance, stride, and arm swing.       BOTOX PROCEDURE:     The patient was placed in a comfortable area and the sites to be treated were identified. A time out was called and performed. The area to be treated was prepped three times with alcohol and the alcohol allowed to dry. Next, a 30 gauge needle was used to inject the medication in the area to be treated.     Date of Last Injection: 7/19/2024  Response: Good  Onset of response: Immediate  Wearing off: 3 weeks  Side Effects: None  : Allergan  Lot #:  [id number]  Expiration Date: 02/2027  NDC: 04738  10    Botox was injected using FDA approved protocol for chronic migraine prevention.   200 unit vial Botox was re-constituted with 4 mL 0.9 NS to 5 unit/0.1 mL using standard techniques.  10 units were given at the  muscle in 2 divided sites  5 units were given at the Procerus muscle  20 units were given at the Frontalis muscle in 4 divided sites  40 units were given at the Temporalis muscle in 8 divided sites  30 units were given at the Occipitalis muscle in 6 divided sites  20 units were given at the Cervical paraspinal muscle in 4 divided sites  30 units were given at the Trapezius muscle in 6 divided sites    The total amount injected in units is 155  The total amount wasted in units is 45  The total amount submitted in units is 200.   Botox was administered by Nurse practitioner.     Patient tolerated procedure well with no immediate complications.     Assessment / Plan      Assessment/Plan:   Diagnoses and all orders for this visit:    1. Chronic migraine without aura without status migrainosus, not intractable (Primary)  Comments:  Continue Aimovig, Botox, Ubrelvy         Patient Education:       Reviewed  medications, potential side effects and signs and symptoms to report. Discussed risk versus benefits of treatment plan with patient and/or family-including medications, labs and radiology that may be ordered. Addressed questions and concerns during visit. Patient and/or family verbalized understanding and agree with plan. Instructed to call the office with any questions and report to ER with any life-threatening symptoms.     Follow Up:   It has been determined that Megan Post has chronic migraine headaches. We will proceed with a 12 week regimen of 200 units of Botox injections, to treat the patient's chronic migraines.  Return in about 3 months (around 1/15/2025).    During this visit the following were done:  Labs Reviewed []    Labs Ordered []    Radiology Reports Reviewed []    Radiology Ordered []    PCP Records Reviewed []    Referring Provider Records Reviewed []    ER Records Reviewed []    Hospital Records Reviewed []    History Obtained From Family []    Radiology Images Reviewed []    Other Reviewed []    Records Requested []        MAGGIE Wills  [facility]  [address] [place] [id number] [phone]   74

## 2024-10-22 ENCOUNTER — OFFICE VISIT (OUTPATIENT)
Dept: INTERNAL MEDICINE | Facility: CLINIC | Age: 44
End: 2024-10-22
Payer: MEDICARE

## 2024-10-22 VITALS
BODY MASS INDEX: 28.89 KG/M2 | WEIGHT: 153 LBS | HEIGHT: 61 IN | OXYGEN SATURATION: 96 % | SYSTOLIC BLOOD PRESSURE: 128 MMHG | DIASTOLIC BLOOD PRESSURE: 74 MMHG | HEART RATE: 96 BPM

## 2024-10-22 DIAGNOSIS — J30.89 NON-SEASONAL ALLERGIC RHINITIS DUE TO OTHER ALLERGIC TRIGGER: ICD-10-CM

## 2024-10-22 DIAGNOSIS — F41.9 ANXIETY: Primary | ICD-10-CM

## 2024-10-22 PROCEDURE — 1160F RVW MEDS BY RX/DR IN RCRD: CPT | Performed by: NURSE PRACTITIONER

## 2024-10-22 PROCEDURE — 1159F MED LIST DOCD IN RCRD: CPT | Performed by: NURSE PRACTITIONER

## 2024-10-22 PROCEDURE — 99213 OFFICE O/P EST LOW 20 MIN: CPT | Performed by: NURSE PRACTITIONER

## 2024-10-22 PROCEDURE — 1125F AMNT PAIN NOTED PAIN PRSNT: CPT | Performed by: NURSE PRACTITIONER

## 2024-10-22 RX ORDER — CETIRIZINE HYDROCHLORIDE 10 MG/1
10 TABLET ORAL DAILY
Qty: 90 TABLET | Refills: 0 | Status: SHIPPED | OUTPATIENT
Start: 2024-10-22

## 2024-10-22 RX ORDER — QUETIAPINE FUMARATE 25 MG/1
50 TABLET, FILM COATED ORAL NIGHTLY
Qty: 60 TABLET | Refills: 0 | Status: SHIPPED | OUTPATIENT
Start: 2024-10-22
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
## 2024-11-14 ENCOUNTER — SPECIALTY PHARMACY (OUTPATIENT)
Dept: ONCOLOGY | Facility: HOSPITAL | Age: 44
End: 2024-11-14
Payer: MEDICARE

## 2024-11-14 DIAGNOSIS — G43.709 CHRONIC MIGRAINE WITHOUT AURA WITHOUT STATUS MIGRAINOSUS, NOT INTRACTABLE: ICD-10-CM

## 2024-11-14 NOTE — PROGRESS NOTES
Specialty Pharmacy Refill Coordination Note     Megan is a 44 y.o. female contacted today regarding refills of  Ubrelvy specialty medication(s).    Reviewed and verified with patient:       Specialty medication(s) and dose(s) confirmed: yes    Refill Questions      Flowsheet Row Most Recent Value   Changes to allergies? No   Changes to medications? No   New conditions or infections since last clinic visit No   Unplanned office visit, urgent care, ED, or hospital admission in the last 4 weeks  No   How does patient/caregiver feel medication is working? Good   Financial problems or insurance changes  No   Since the previous refill, were any specialty medication doses or scheduled injections missed or delayed?  No  [prn medication]   If yes, please provide the amount N/A   Why were doses missed? N/A   Does this patient require a clinical escalation to a pharmacist? No            Delivery Questions      Flowsheet Row Most Recent Value   Delivery method UPS   Delivery address verified with patient/caregiver? Yes   Delivery address Home   Number of medications in delivery 1   Medication(s) being filled and delivered Ubrogepant (UBRELVY)   Doses left of specialty medications Ubrely-prn medication   Copay verified? Yes   Copay amount Ubrelvy =$0   Copay form of payment No copayment ($0)   Ship Date 11/18   Delivery Date 11/19   Signature Required No                   Follow-up: 1 month(s)     Lacy North, Pharmacy Technician  Specialty Pharmacy Technician

## 2024-12-12 ENCOUNTER — SPECIALTY PHARMACY (OUTPATIENT)
Dept: ONCOLOGY | Facility: HOSPITAL | Age: 44
End: 2024-12-12
Payer: MEDICARE

## 2024-12-12 NOTE — PROGRESS NOTES
Specialty Pharmacy Refill Coordination Note     Megan is a 44 y.o. female contacted today regarding refills of Botox  specialty medication(s).Patient is due for injection on 01/08.    Reviewed and verified with patient:       Specialty medication(s) and dose(s) confirmed: yes    Refill Questions      Flowsheet Row Most Recent Value   Changes to allergies? No   Changes to medications? No   New conditions or infections since last clinic visit No   Unplanned office visit, urgent care, ED, or hospital admission in the last 4 weeks  No   How does patient/caregiver feel medication is working? Good   Financial problems or insurance changes  No   Since the previous refill, were any specialty medication doses or scheduled injections missed or delayed?  Yes   If yes, please provide the amount Ubrelvy have plenty   Why were doses missed? Ubrelvy prn   Does this patient require a clinical escalation to a pharmacist? No            Delivery Questions      Flowsheet Row Most Recent Value   Delivery method UPS   Delivery address verified with patient/caregiver? Yes   Delivery address Home   Number of medications in delivery 1   Medication(s) being filled and delivered OnabotulinumtoxinA   Doses left of specialty medications Ubrelvy have plenty   Copay verified? Yes   Copay amount Botox =$0   Copay form of payment No copayment ($0)   Ship Date 12/18   Delivery Date 12/19   Signature Required No                   Follow-up: 90 day(s)     Cecelia Anderson  Specialty Pharmacy Technician

## 2025-01-02 ENCOUNTER — OFFICE VISIT (OUTPATIENT)
Age: 45
End: 2025-01-02
Payer: MEDICARE

## 2025-01-02 VITALS
OXYGEN SATURATION: 96 % | HEART RATE: 117 BPM | SYSTOLIC BLOOD PRESSURE: 134 MMHG | BODY MASS INDEX: 28.26 KG/M2 | DIASTOLIC BLOOD PRESSURE: 84 MMHG | HEIGHT: 61 IN | WEIGHT: 149.7 LBS

## 2025-01-02 DIAGNOSIS — F41.1 GENERALIZED ANXIETY DISORDER: ICD-10-CM

## 2025-01-02 DIAGNOSIS — F43.10 POST TRAUMATIC STRESS DISORDER (PTSD): Chronic | ICD-10-CM

## 2025-01-02 DIAGNOSIS — Z51.81 ENCOUNTER FOR THERAPEUTIC DRUG MONITORING: Chronic | ICD-10-CM

## 2025-01-02 DIAGNOSIS — F31.60 BIPOLAR AFFECTIVE DISORDER, MIXED: Primary | Chronic | ICD-10-CM

## 2025-01-02 RX ORDER — BUSPIRONE HYDROCHLORIDE 7.5 MG/1
7.5 TABLET ORAL EVERY 12 HOURS SCHEDULED
Qty: 60 TABLET | Refills: 1 | Status: SHIPPED | OUTPATIENT
Start: 2025-01-02

## 2025-01-02 RX ORDER — LUMATEPERONE 21 MG/1
21 CAPSULE ORAL DAILY
Qty: 30 CAPSULE | Refills: 0 | Status: SHIPPED | OUTPATIENT
Start: 2025-01-02

## 2025-01-02 RX ORDER — LUMATEPERONE 10.5 MG/1
10.5 CAPSULE ORAL DAILY
Qty: 14 CAPSULE | Refills: 0 | COMMUNITY
Start: 2025-01-02

## 2025-01-02 RX ORDER — PROPRANOLOL HYDROCHLORIDE 10 MG/1
TABLET ORAL
Qty: 120 TABLET | Refills: 0 | Status: SHIPPED | OUTPATIENT
Start: 2025-01-02

## 2025-01-02 RX ORDER — TRAZODONE HYDROCHLORIDE 150 MG/1
450 TABLET ORAL NIGHTLY
Qty: 90 TABLET | Refills: 0 | Status: SHIPPED | OUTPATIENT
Start: 2025-01-02

## 2025-01-02 RX ORDER — DULOXETIN HYDROCHLORIDE 60 MG/1
60 CAPSULE, DELAYED RELEASE ORAL DAILY
Qty: 30 CAPSULE | Refills: 1 | Status: SHIPPED | OUTPATIENT
Start: 2025-01-02
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
## 2025-01-09 ENCOUNTER — PROCEDURE VISIT (OUTPATIENT)
Dept: NEUROLOGY | Facility: CLINIC | Age: 45
End: 2025-01-09
Payer: MEDICARE

## 2025-01-09 DIAGNOSIS — G43.709 CHRONIC MIGRAINE WITHOUT AURA WITHOUT STATUS MIGRAINOSUS, NOT INTRACTABLE: Primary | ICD-10-CM

## 2025-01-09 NOTE — PROGRESS NOTES
Botox Procedure Note       Patient Name: Megan Post  : 1980   MRN: [id number]     Chief Complaint:    Chief Complaint   Patient presents with    Botulinum Toxin Injection     Patient supplied-Do not bill (200 UNITS)       History of Present Illness: Megan Post is a 44 y.o. female who is here today for Botox injections for migraines.     We have discussed risk and benefits of this Botox procedure and common side effects including headache, bleeding, infection, worsening of pain, neck pain, neck stiffness or weakness, damage to the areas being injected, weakness of muscles, loss of muscle control, ptosis, flu-like symptoms as well as more serious possible adverse effects including possible dysphagia, facial droop if injecting the facial area, painful injection, respiratory distress or even death (death has only been reported once with adults for Botox for migraines in another state when mixed with lidocaine solution which we do not use lidocaine solution in our practice for mixing Botox). The patient verbally understands and accepts risks and agrees with moving forward with Botox injections for chronic migraine prevention.    Verbal and written consent has been obtained from the patient prior to beginning treatment injections.     Pertinent Medical History:  Response to treatment has reduced headaches at least 7 fewer days a month and / or 100 hours fewer each month.       Subjective      Review of Systems:   Review of Systems    The following portions of the patient's history were reviewed an updated as appropriate: past family history, past medical history, past social history, past surgical history.     Medications:     Current Outpatient Medications:     albuterol (PROVENTIL) (2.5 MG/3ML) 0.083% nebulizer solution, Take 2.5 mg by nebulization 4 (Four) Times a Day As Needed for Wheezing., Disp: 90 each, Rfl: 5    albuterol sulfate  (90 Base) MCG/ACT inhaler, Inhale 2 puffs Every 4 (Four)  Hours As Needed for Wheezing., Disp: 18 g, Rfl: 5    Allergy Relief 180 MG tablet, Take 1 tablet by mouth Every Morning., Disp: , Rfl:     azelaic acid (AZELEX) 15 % gel, Apply 1 Application topically to the appropriate area as directed 2 (Two) Times a Day As Needed (facial reddness). Face for roscea, Disp: , Rfl:     baclofen (LIORESAL) 10 MG tablet, Take 1 tablet by mouth 2 (Two) Times a Day. (Patient taking differently: Take 1 tablet by mouth 2 (Two) Times a Day. PRN), Disp: 60 tablet, Rfl: 1    busPIRone (BUSPAR) 7.5 MG tablet, Take 1 tablet by mouth Every 12 (Twelve) Hours., Disp: 60 tablet, Rfl: 1    cetirizine (zyrTEC) 10 MG tablet, Take 1 tablet by mouth Daily., Disp: 90 tablet, Rfl: 0    COLLAGEN PO, Take 1 tablet by mouth Daily., Disp: , Rfl:     cyclobenzaprine (FLEXERIL) 10 MG tablet, Take 0.5-1 tablets by mouth 3 (Three) Times a Day As Needed for Muscle Spasms., Disp: , Rfl:     DULoxetine (CYMBALTA) 60 MG capsule, Take 1 capsule by mouth Daily., Disp: 30 capsule, Rfl: 1    EPIPEN 2-JAREN 0.3 MG/0.3ML solution auto-injector injection, As Needed (allergic reaction)., Disp: , Rfl: 6    estradiol (Estrace) 1 MG tablet, Take 1.5 tablets by mouth Daily. Take 1.5 tablets po qd., Disp: 45 tablet, Rfl: 3    Gentle Laxative 5 MG EC tablet, Take 1 tablet by mouth Daily As Needed for Constipation., Disp: , Rfl:     HYDROcodone-acetaminophen (NORCO) 5-325 MG per tablet, Take 1 tablet by mouth Every 8 (Eight) Hours As Needed for Moderate Pain (back and neck pain)., Disp: , Rfl:     hydrOXYzine (ATARAX) 25 MG tablet, Take 1 tablet by mouth Every 8 (Eight) Hours As Needed., Disp: , Rfl:     ipratropium-albuterol (DUO-NEB) 0.5-2.5 mg/3 ml nebulizer, Take 3 mL by nebulization 4 (Four) Times a Day., Disp: 360 mL, Rfl: 5    loperamide (IMODIUM) 2 MG capsule, TAKE 1 CAPSULE BY MOUTH THREE TIMES DAILY AS NEEDED FOR DIARRHEA, Disp: , Rfl:     Lumateperone Tosylate (Caplyta) 10.5 MG capsule, Take 1 capsule by mouth Daily.,  Disp: 14 capsule, Rfl: 0    Lumateperone Tosylate (Caplyta) 21 MG capsule, Take 1 capsule by mouth Daily., Disp: 30 capsule, Rfl: 0    metroNIDAZOLE (METROGEL) 0.75 % vaginal gel, Insert 1 Applicatorful into the vagina Every Night., Disp: 1 each, Rfl: 0    montelukast (SINGULAIR) 10 MG tablet, TAKE 1 TABLET BY MOUTH EVERY NIGHT, Disp: 30 tablet, Rfl: 3    Multiple Vitamin (MULTI-VITAMIN DAILY PO), Take 1 tablet by mouth Daily., Disp: , Rfl:     omeprazole (priLOSEC) 40 MG capsule, Take 1 capsule by mouth 2 (two) times a day., Disp: , Rfl:     OnabotulinumtoxinA 200 units reconstituted solution, FOR . PHYSICIAN TO INJECT 155 UNITS INTRAMUSCULARLY INTO HEAD, NECK AND SHOULDERS EVERY 12 WEEKS Per FDA PROTOCOL, Disp: 1 each, Rfl: 3    ondansetron ODT (ZOFRAN-ODT) 4 MG disintegrating tablet, Place 1 tablet on the tongue Every 8 (Eight) Hours As Needed for Nausea or Vomiting., Disp: 15 tablet, Rfl: 0    promethazine (PHENERGAN) 12.5 MG tablet, Take 1 tablet by mouth Every 6 (Six) Hours As Needed for Nausea or Vomiting., Disp: 15 tablet, Rfl: 1    propranolol (INDERAL) 10 MG tablet, Take 1-2 tablets up to twice a day for anxiety, Disp: 120 tablet, Rfl: 0    sennosides-docusate (PERICOLACE) 8.6-50 MG per tablet, Take 1 tablet by mouth Daily. PRN, Disp: , Rfl:     simethicone (MYLICON,GAS-X) 125 MG capsule capsule, Take 1 capsule by mouth Every 6 (Six) Hours As Needed., Disp: , Rfl:     traZODone (DESYREL) 150 MG tablet, Take 3 tablets by mouth Every Night., Disp: 90 tablet, Rfl: 0    ubrogepant (Ubrelvy) 50 MG tablet, Take 1 tablet by mouth as needed at onset of migraine; may repeat dose in 2 hours if needed.  MAX: 100 mg/24 hrs, Disp: 10 tablet, Rfl: 11    Xhance 93 MCG/ACT Exhaler Suspension, USE ONE SPRAY IN EACH NOSTRIL EVERY DAY AS NEEDED FOR CONGESTION (Patient taking differently: Administer 2 sprays into the nostril(s) as directed by provider Daily As Needed (nasal congestion).), Disp: 16 mL,  Rfl: 0    Current Facility-Administered Medications:     OnabotulinumtoxinA 200 Units, 200 Units, Intramuscular, Q3 Months, Nicolasa Gonzalez, APRN, 200 Units at 10/15/24 1559    Allergies:   Allergies   Allergen Reactions    Adhesive Tape Hives    Latex Hives    Sulfa Antibiotics Hives    Biaxin [Clarithromycin] Nausea Only    Codeine Itching    Penicillins Nausea Only    Nuts Other (See Comments)     Red heated face       Objective     Physical Exam:  Vital Signs: There were no vitals filed for this visit.  There is no height or weight on file to calculate BMI.     Physical Exam    Neurological Exam  Mental Status  Awake, alert and oriented to person, place and time.       BOTOX PROCEDURE:     The patient was placed in a comfortable area and the sites to be treated were identified. A time out was called and performed. The area to be treated was prepped three times with alcohol and the alcohol allowed to dry. Next, a 30 gauge needle was used to inject the medication in the area to be treated.     Date of Last Injection: 10/15/2024  Response: Good  Onset of response: Immediate  Wearing off: 3 weeks  Side Effects: None  : [employer]  Lot #:  [id number]  Expiration Date: 03/2027  NDC: 43543  10    Botox was injected using FDA approved protocol for chronic migraine prevention.   200 unit vial Botox was re-constituted with 4 mL 0.9 NS to 5 unit/0.1 mL using standard techniques.  10 units were given at the  muscle in 2 divided sites  5 units were given at the Procerus muscle  20 units were given at the Frontalis muscle in 4 divided sites  40 units were given at the Temporalis muscle in 8 divided sites  30 units were given at the Occipitalis muscle in 6 divided sites  20 units were given at the Cervical paraspinal muscle in 4 divided sites  30 units were given at the Trapezius muscle in 6 divided sites    The total amount injected in units is 155  The total amount wasted in units is 45  The total  amount submitted in units is 200.   Botox was administered by Nurse practitioner.     Patient tolerated procedure well with no immediate complications.     Assessment / Plan      Assessment/Plan:   Diagnoses and all orders for this visit:    1. Chronic migraine without aura without status migrainosus, not intractable (Primary)  Comments:  Continue Aimovig, Botox, Ubrelvy         Patient Education:       Reviewed medications, potential side effects and signs and symptoms to report. Discussed risk versus benefits of treatment plan with patient and/or family-including medications, labs and radiology that may be ordered. Addressed questions and concerns during visit. Patient and/or family verbalized understanding and agree with plan. Instructed to call the office with any questions and report to ER with any life-threatening symptoms.     Follow Up:   It has been determined that Megan Post has chronic migraine headaches. We will proceed with a 12 week regimen of 200 units of Botox injections, to treat the patient's chronic migraines.  No follow-ups on file.    During this visit the following were done:  Labs Reviewed []    Labs Ordered []    Radiology Reports Reviewed []    Radiology Ordered []    PCP Records Reviewed []    Referring Provider Records Reviewed []    ER Records Reviewed []    Hospital Records Reviewed []    History Obtained From Family []    Radiology Images Reviewed []    Other Reviewed []    Records Requested []        MAGGIE Wills  [facility]  [address]  [place] [address] [phone]

## 2025-01-10 ENCOUNTER — SPECIALTY PHARMACY (OUTPATIENT)
Dept: ONCOLOGY | Facility: HOSPITAL | Age: 45
End: 2025-01-10
Payer: MEDICARE

## 2025-01-10 NOTE — PROGRESS NOTES
Specialty Pharmacy Patient Management Program  Neurology Reassessment     Megan Post is a 44 y.o. female with migraines seen by a Neurology provider and enrolled in the Neurology Patient Management program offered by [facility].  A follow-up outreach was conducted, including assessment of continued therapy appropriateness, medication adherence, and side effect incidence and management for Botox and Ubrelvy.     Changes to Insurance Coverage or Financial Support  Rx [employer], [place] Medicaid     Relevant Past Medical History and Comorbidities  Relevant medical history and concomitant health conditions were discussed with the patient. The patient's chart has been reviewed for relevant past medical history and comorbid health conditions and updated as necessary.   Past Medical History:   Diagnosis Date    Abdominal pain     Abnormal breast exam     Abnormal Pap smear of cervix     Achilles tendinitis     Acute sinusitis     Anxiety     Arthritis     Asthma     Tavera's syndrome     Bipolar 1 disorder     Bowel trouble     Breast cyst     Colon polyps     Constipation     Depression     Diverticulitis of colon     Encounter for cosmetic procedure 05/09/2024    Endometriosis     Eustachian tube dysfunction     Extremity pain     Fatty liver     Female pelvic pain     Fibromyalgia     Gastric ulcer     GERD (gastroesophageal reflux disease)     Gestational diabetes     Glaucoma     H/O bladder infections     Headache     History of rashes as a child     Inflammatory bowel disease     Irritable bowel syndrome     Kidney stone     Low back pain     Lumbar radiculopathy     Menopausal symptoms     Migraine     Muscle weakness     Obesity     Osteopenia     Pelvic prolapse     Sexual problems     Spinal stenosis of lumbar region     Visual impairment      Social History     Socioeconomic History    Marital status: Single   Tobacco Use    Smoking status: Former     Current packs/day: 0.00      Average packs/day: 0.3 packs/day for 18.0 years (4.5 ttl pk-yrs)     Types: Cigarettes     Start date: 6/11/2003     Quit date: 6/5/2021     Years since quitting: 3.6     Passive exposure: Past    Smokeless tobacco: Never    Tobacco comments:     Half pack(maybe)   Vaping Use    Vaping status: Never Used   Substance and Sexual Activity    Alcohol use: No    Drug use: Never    Sexual activity: Not Currently     Partners: Male     Birth control/protection: None, Surgical     Problem list reviewed by Deanna Kingsley, PharmD on 1/10/2025 at  9:20 AM    Hospitalizations and Urgent Care Since Last Assessment  ED Visits, Admissions, or Hospitalizations: none   Urgent Office Visits: none     Allergies  Known allergies and reactions were discussed with the patient. The patient's chart has been reviewed for allergy information and updated as necessary.   Allergies   Allergen Reactions    Adhesive Tape Hives    Latex Hives    Sulfa Antibiotics Hives    Biaxin [Clarithromycin] Nausea Only    Codeine Itching    Penicillins Nausea Only    Nuts Other (See Comments)     Red heated face     Allergies reviewed by Deanna Kingsley, PharmD on 1/10/2025 at  9:19 AM    Relevant Laboratory Values  Common labs          7/16/2024    10:48 8/30/2024    14:20 9/2/2024    12:20   Common Labs   Glucose 70  109  107    BUN 16  10  15    Creatinine 0.78  0.74  0.80    Sodium 139  139  139    Potassium 4.7  3.7  4.3    Chloride 102  102  103    Calcium 10.2  9.8  9.9    Albumin 4.4  4.7  4.7    Total Bilirubin <0.2  0.3  0.2    Alkaline Phosphatase 53  58  65    AST (SGOT) 23  23  23    ALT (SGPT) 24  35  26    WBC 6.51  8.78  8.29    Hemoglobin 13.5  13.8  14.6    Hematocrit 41.3  42.9  44.6    Platelets 315  360  344    Total Cholesterol 231      Triglycerides 213      HDL Cholesterol 51      LDL Cholesterol  142      Hemoglobin A1C 5.20          Lab Assessment  The above labs have been reviewed. No dose adjustments are needed for the specialty  medication(s) based on the labs.     Current Medication List  This medication list has been reviewed with the patient and evaluated for any interactions or necessary modifications/recommendations, and updated to include all prescription medications, OTC medications, and supplements the patient is currently taking.  This list reflects what is contained in the patient's profile, which has also been marked as reviewed to communicate to other providers it is the most up to date version of the patient's current medication therapy.     Current Outpatient Medications:     albuterol (PROVENTIL) (2.5 MG/3ML) 0.083% nebulizer solution, Take 2.5 mg by nebulization 4 (Four) Times a Day As Needed for Wheezing., Disp: 90 each, Rfl: 5    albuterol sulfate  (90 Base) MCG/ACT inhaler, Inhale 2 puffs Every 4 (Four) Hours As Needed for Wheezing., Disp: 18 g, Rfl: 5    Allergy Relief 180 MG tablet, Take 1 tablet by mouth Every Morning., Disp: , Rfl:     azelaic acid (AZELEX) 15 % gel, Apply 1 Application topically to the appropriate area as directed 2 (Two) Times a Day As Needed (facial reddness). Face for roscea, Disp: , Rfl:     baclofen (LIORESAL) 10 MG tablet, Take 1 tablet by mouth 2 (Two) Times a Day. (Patient taking differently: Take 1 tablet by mouth 2 (Two) Times a Day. PRN), Disp: 60 tablet, Rfl: 1    busPIRone (BUSPAR) 7.5 MG tablet, Take 1 tablet by mouth Every 12 (Twelve) Hours., Disp: 60 tablet, Rfl: 1    cetirizine (zyrTEC) 10 MG tablet, Take 1 tablet by mouth Daily., Disp: 90 tablet, Rfl: 0    COLLAGEN PO, Take 1 tablet by mouth Daily., Disp: , Rfl:     cyclobenzaprine (FLEXERIL) 10 MG tablet, Take 0.5-1 tablets by mouth 3 (Three) Times a Day As Needed for Muscle Spasms., Disp: , Rfl:     DULoxetine (CYMBALTA) 60 MG capsule, Take 1 capsule by mouth Daily., Disp: 30 capsule, Rfl: 1    EPIPEN 2-JAREN 0.3 MG/0.3ML solution auto-injector injection, As Needed (allergic reaction)., Disp: , Rfl: 6    estradiol (Estrace) 1  MG tablet, Take 1.5 tablets by mouth Daily. Take 1.5 tablets po qd., Disp: 45 tablet, Rfl: 3    Gentle Laxative 5 MG EC tablet, Take 1 tablet by mouth Daily As Needed for Constipation., Disp: , Rfl:     HYDROcodone-acetaminophen (NORCO) 5-325 MG per tablet, Take 1 tablet by mouth Every 8 (Eight) Hours As Needed for Moderate Pain (back and neck pain)., Disp: , Rfl:     hydrOXYzine (ATARAX) 25 MG tablet, Take 1 tablet by mouth Every 8 (Eight) Hours As Needed., Disp: , Rfl:     ipratropium-albuterol (DUO-NEB) 0.5-2.5 mg/3 ml nebulizer, Take 3 mL by nebulization 4 (Four) Times a Day., Disp: 360 mL, Rfl: 5    loperamide (IMODIUM) 2 MG capsule, TAKE 1 CAPSULE BY MOUTH THREE TIMES DAILY AS NEEDED FOR DIARRHEA, Disp: , Rfl:     Lumateperone Tosylate (Caplyta) 10.5 MG capsule, Take 1 capsule by mouth Daily., Disp: 14 capsule, Rfl: 0    Lumateperone Tosylate (Caplyta) 21 MG capsule, Take 1 capsule by mouth Daily., Disp: 30 capsule, Rfl: 0    metroNIDAZOLE (METROGEL) 0.75 % vaginal gel, Insert 1 Applicatorful into the vagina Every Night., Disp: 1 each, Rfl: 0    montelukast (SINGULAIR) 10 MG tablet, TAKE 1 TABLET BY MOUTH EVERY NIGHT, Disp: 30 tablet, Rfl: 3    Multiple Vitamin (MULTI-VITAMIN DAILY PO), Take 1 tablet by mouth Daily., Disp: , Rfl:     omeprazole (priLOSEC) 40 MG capsule, Take 1 capsule by mouth 2 (two) times a day., Disp: , Rfl:     OnabotulinumtoxinA 200 units reconstituted solution, FOR . PHYSICIAN TO INJECT 155 UNITS INTRAMUSCULARLY INTO HEAD, NECK AND SHOULDERS EVERY 12 WEEKS Per FDA PROTOCOL, Disp: 1 each, Rfl: 3    ondansetron ODT (ZOFRAN-ODT) 4 MG disintegrating tablet, Place 1 tablet on the tongue Every 8 (Eight) Hours As Needed for Nausea or Vomiting., Disp: 15 tablet, Rfl: 0    promethazine (PHENERGAN) 12.5 MG tablet, Take 1 tablet by mouth Every 6 (Six) Hours As Needed for Nausea or Vomiting., Disp: 15 tablet, Rfl: 1    propranolol (INDERAL) 10 MG tablet, Take 1-2 tablets up to  twice a day for anxiety, Disp: 120 tablet, Rfl: 0    sennosides-docusate (PERICOLACE) 8.6-50 MG per tablet, Take 1 tablet by mouth Daily. PRN, Disp: , Rfl:     simethicone (MYLICON,GAS-X) 125 MG capsule capsule, Take 1 capsule by mouth Every 6 (Six) Hours As Needed., Disp: , Rfl:     traZODone (DESYREL) 150 MG tablet, Take 3 tablets by mouth Every Night., Disp: 90 tablet, Rfl: 0    ubrogepant (Ubrelvy) 50 MG tablet, Take 1 tablet by mouth as needed at onset of migraine; may repeat dose in 2 hours if needed.  MAX: 100 mg/24 hrs, Disp: 10 tablet, Rfl: 11    Xhance 93 MCG/ACT Exhaler Suspension, USE ONE SPRAY IN EACH NOSTRIL EVERY DAY AS NEEDED FOR CONGESTION (Patient taking differently: Administer 2 sprays into the nostril(s) as directed by provider Daily As Needed (nasal congestion).), Disp: 16 mL, Rfl: 0    Current Facility-Administered Medications:     OnabotulinumtoxinA 200 Units, 200 Units, Intramuscular, Q3 Months, Nicolasa Gonzalez, APRN, 200 Units at 01/09/25 1609    Medicines reviewed by Deanna Kingsley, PharmD on 1/10/2025 at  9:20 AM    Drug Interactions  No relevant drug-drug interactions with specialty medication(s):  Botox and Ubelvy.        Adverse Drug Reactions  Medication tolerability: Tolerating with no to minimal ADRs          Medication plan: Continue therapy with normal follow-up  Plan for ADR Management: not required      Adherence, Self-Administration, and Current Therapy Problems  Adherence related patient's specialty therapy was discussed with the patient. The Adherence segment of this outreach has been reviewed and updated.   Adherence Questions  Linked Medication(s) Assessed: Ubrogepant (UBRELVY), OnabotulinumtoxinA  On average, how many doses/injections does the patient miss per month?: 0  What are the identified reasons for non-adherence or missed doses? : no problems identified  What is the estimated medication adherence level?: %  Based on the patient/caregiver response and  refill history, does this patient require an MTP to track adherence improvements?: no    Additional Barriers to Patient Self-Administration: none  Methods for Supporting Patient Self-Administration: n/a    Recently Close Medication Therapy Problems  No medication therapy recommendations to display  Open Medication Therapy Problems  No medication therapy recommendations to display     Goals of Therapy  Goals related to the patient's specialty therapy was discussed with the patient. The Patient Goals segment of this outreach has been reviewed and updated.    Goals Addressed Today        Specialty Pharmacy General Goal      Reduction in severity and frequency of migraines.  1/9/25 dw :  Response to Botox treatment has reduced headaches at least 7 fewer days a month and / or 100 hours fewer each month. Ubrelvy effective to treat acute migraines.               Quality of Life Assessment   Quality of Life related to the patient's enrollment in the patient management program and services provided was discussed with the patient. The QOL segment of this outreach has been reviewed and updated.   Quality of Life Improvement Scale: 8-Moderately better    Reassessment Plan & Follow-Up  Medication Therapy Changes: continue Botox 155 units IM administered per provider every 3 months and Ubrelvy 100 mg po once daily as needed for migraines - Take at onset of headache - May repeat x 1 in 2 hours if needed (max of 200 mg/ 24 hrs)  Related Plans, Therapy Recommendations, or Issues to Be Addressed: none  Pharmacist to perform regular reassessments no more than (6) months from the previous assessment.  Care Coordinator to set up future refill outreaches, coordinate prescription delivery, and escalate clinical questions to pharmacist.     Attestation  Therapeutic appropriateness: Appropriate  I attest the patient was actively involved in and has agreed to the above plan of care. If the prescribed therapy is at any point deemed not  appropriate based on the current or future assessments, a consultation will be initiated with the patient's specialty care provider to determine the best course of action. The revised plan of therapy will be documented along with any additional patient education provided. Discussed aforementioned material with patient in person.    Deanna Kingsley, PharmD, [place] [facility]  Clinic Specialty Pharmacist, Neurology  1/10/2025  09:24 EST

## 2025-01-29 ENCOUNTER — TELEPHONE (OUTPATIENT)
Dept: INTERNAL MEDICINE | Facility: CLINIC | Age: 45
End: 2025-01-29
Payer: MEDICARE

## 2025-01-29 NOTE — TELEPHONE ENCOUNTER
Has been vomiting bile with blood and coffee ground stools for a couple days.  Pt was instructed to go to ER, she resisted at first  but then agrees to go.

## 2025-01-29 NOTE — TELEPHONE ENCOUNTER
Notified patient that she needs to be seen at the ED for symptoms, patient states she is not going to ER to have to wait but if symptoms continue or worsen she will call an ambulance for herself. Advised patient to notify us if she needs anything at all. Patient verbalized understanding and has no further questions or concerns at this time.

## 2025-02-12 ENCOUNTER — SPECIALTY PHARMACY (OUTPATIENT)
Dept: ONCOLOGY | Facility: HOSPITAL | Age: 45
End: 2025-02-12
Payer: MEDICARE

## 2025-02-12 ENCOUNTER — TRANSCRIBE ORDERS (OUTPATIENT)
Dept: LAB | Facility: HOSPITAL | Age: 45
End: 2025-02-12
Payer: MEDICARE

## 2025-02-12 DIAGNOSIS — K76.0 FATTY METAMORPHOSIS OF LIVER: Primary | ICD-10-CM

## 2025-02-12 NOTE — PROGRESS NOTES
Specialty Pharmacy Patient Management Program  Refill Outreach     Crystal was contacted today regarding refills of their medication(s).    Refill Questions      Flowsheet Row Most Recent Value   Changes to allergies? No   Changes to medications? No   New conditions or infections since last clinic visit No   Unplanned office visit, urgent care, ED, or hospital admission in the last 4 weeks  No   How does patient/caregiver feel medication is working? Good   Financial problems or insurance changes  No   Since the previous refill, were any specialty medication doses or scheduled injections missed or delayed?  No   If yes, please provide the amount N/A   Why were doses missed? N/A   Does this patient require a clinical escalation to a pharmacist? No            Delivery Questions      Flowsheet Row Most Recent Value   Delivery method UPS   Delivery address verified with patient/caregiver? Yes   Delivery address Home   Number of medications in delivery 1   Medication(s) being filled and delivered Ubrogepant (UBRELVY)   Doses left of specialty medications Ubrelvy 2 tablets left as of 02/12   Copay verified? Yes   Copay amount Ubrelvy =$0   Copay form of payment No copayment ($0)   Ship Date 02/12   Delivery Date Selection 02/13/25   Signature Required No                 Follow-up: 30 day(s)     Cecelia Anderson  2/12/2025  08:04 EST

## 2025-02-14 ENCOUNTER — LAB (OUTPATIENT)
Dept: LAB | Facility: HOSPITAL | Age: 45
End: 2025-02-14
Payer: MEDICARE

## 2025-02-14 DIAGNOSIS — K76.0 FATTY METAMORPHOSIS OF LIVER: ICD-10-CM

## 2025-02-14 LAB
ALBUMIN SERPL-MCNC: 4.3 G/DL (ref 3.5–5.2)
ALBUMIN/GLOB SERPL: 1.2 G/DL
ALP SERPL-CCNC: 51 U/L (ref 39–117)
ALT SERPL W P-5'-P-CCNC: 18 U/L (ref 1–33)
ANION GAP SERPL CALCULATED.3IONS-SCNC: 15.1 MMOL/L (ref 5–15)
AST SERPL-CCNC: 20 U/L (ref 1–32)
BASOPHILS # BLD AUTO: 0.04 10*3/MM3 (ref 0–0.2)
BASOPHILS NFR BLD AUTO: 0.5 % (ref 0–1.5)
BILIRUB SERPL-MCNC: 0.3 MG/DL (ref 0–1.2)
BUN SERPL-MCNC: 17 MG/DL (ref 6–20)
BUN/CREAT SERPL: 14.4 (ref 7–25)
CALCIUM SPEC-SCNC: 10.2 MG/DL (ref 8.6–10.5)
CHLORIDE SERPL-SCNC: 102 MMOL/L (ref 98–107)
CO2 SERPL-SCNC: 21.9 MMOL/L (ref 22–29)
CREAT SERPL-MCNC: 1.18 MG/DL (ref 0.57–1)
DEPRECATED RDW RBC AUTO: 40.9 FL (ref 37–54)
EGFRCR SERPLBLD CKD-EPI 2021: 59.3 ML/MIN/1.73
EOSINOPHIL # BLD AUTO: 0.13 10*3/MM3 (ref 0–0.4)
EOSINOPHIL NFR BLD AUTO: 1.8 % (ref 0.3–6.2)
ERYTHROCYTE [DISTWIDTH] IN BLOOD BY AUTOMATED COUNT: 12.8 % (ref 12.3–15.4)
GLOBULIN UR ELPH-MCNC: 3.7 GM/DL
GLUCOSE SERPL-MCNC: 96 MG/DL (ref 65–99)
HCT VFR BLD AUTO: 44.9 % (ref 34–46.6)
HGB BLD-MCNC: 14.5 G/DL (ref 12–15.9)
IMM GRANULOCYTES # BLD AUTO: 0.02 10*3/MM3 (ref 0–0.05)
IMM GRANULOCYTES NFR BLD AUTO: 0.3 % (ref 0–0.5)
LYMPHOCYTES # BLD AUTO: 2.43 10*3/MM3 (ref 0.7–3.1)
LYMPHOCYTES NFR BLD AUTO: 33.2 % (ref 19.6–45.3)
MCH RBC QN AUTO: 28.4 PG (ref 26.6–33)
MCHC RBC AUTO-ENTMCNC: 32.3 G/DL (ref 31.5–35.7)
MCV RBC AUTO: 87.9 FL (ref 79–97)
MONOCYTES # BLD AUTO: 0.32 10*3/MM3 (ref 0.1–0.9)
MONOCYTES NFR BLD AUTO: 4.4 % (ref 5–12)
NEUTROPHILS NFR BLD AUTO: 4.38 10*3/MM3 (ref 1.7–7)
NEUTROPHILS NFR BLD AUTO: 59.8 % (ref 42.7–76)
NRBC BLD AUTO-RTO: 0 /100 WBC (ref 0–0.2)
PLATELET # BLD AUTO: 378 10*3/MM3 (ref 140–450)
PMV BLD AUTO: 10.6 FL (ref 6–12)
POTASSIUM SERPL-SCNC: 3.9 MMOL/L (ref 3.5–5.2)
PROT SERPL-MCNC: 8 G/DL (ref 6–8.5)
RBC # BLD AUTO: 5.11 10*6/MM3 (ref 3.77–5.28)
SODIUM SERPL-SCNC: 139 MMOL/L (ref 136–145)
WBC NRBC COR # BLD AUTO: 7.32 10*3/MM3 (ref 3.4–10.8)

## 2025-02-14 PROCEDURE — 85025 COMPLETE CBC W/AUTO DIFF WBC: CPT

## 2025-02-14 PROCEDURE — 36415 COLL VENOUS BLD VENIPUNCTURE: CPT

## 2025-02-14 PROCEDURE — 80053 COMPREHEN METABOLIC PANEL: CPT

## 2025-02-25 ENCOUNTER — OFFICE VISIT (OUTPATIENT)
Age: 45
End: 2025-02-25
Payer: MEDICARE

## 2025-02-25 DIAGNOSIS — F31.60 BIPOLAR AFFECTIVE DISORDER, MIXED: Primary | ICD-10-CM

## 2025-02-25 DIAGNOSIS — F43.10 POST TRAUMATIC STRESS DISORDER (PTSD): ICD-10-CM

## 2025-02-25 RX ORDER — BUSPIRONE HYDROCHLORIDE 10 MG/1
10 TABLET ORAL 2 TIMES DAILY
Qty: 180 TABLET | Refills: 1 | Status: SHIPPED | OUTPATIENT
Start: 2025-02-25

## 2025-02-25 RX ORDER — PROPRANOLOL HYDROCHLORIDE 10 MG/1
TABLET ORAL
Qty: 120 TABLET | Refills: 3 | Status: SHIPPED | OUTPATIENT
Start: 2025-02-25

## 2025-02-25 RX ORDER — DULOXETIN HYDROCHLORIDE 60 MG/1
CAPSULE, DELAYED RELEASE ORAL
Qty: 90 CAPSULE | Refills: 1 | Status: SHIPPED | OUTPATIENT
Start: 2025-02-25

## 2025-02-25 RX ORDER — DULOXETIN HYDROCHLORIDE 30 MG/1
CAPSULE, DELAYED RELEASE ORAL
Qty: 90 CAPSULE | Refills: 1 | Status: SHIPPED | OUTPATIENT
Start: 2025-02-25

## 2025-02-25 RX ORDER — TRAZODONE HYDROCHLORIDE 150 MG/1
300 TABLET ORAL NIGHTLY
Qty: 180 TABLET | Refills: 1 | Status: SHIPPED | OUTPATIENT
Start: 2025-02-25

## 2025-02-25 NOTE — PROGRESS NOTES
[facility]             Follow Up Office Visit      Patient Name: Megan Post  : 1980   MRN: [id number]     Referring Provider: Kalpana Pandey MD    Chief Complaint:      ICD-10-CM ICD-9-CM   1. Bipolar affective disorder, mixed  F31.60 296.60   2. Post traumatic stress disorder (PTSD)  F43.10 309.81      History of Present Illness:   Megan Post is a 44 y.o. female   History of Present Illness  The patient presents for evaluation. She experienced persistent anger and anxiety after starting Caplyta so she discontinued this. She is currently on Cymbalta, buspar, and trazodone. She is considering the use of propranolol as a temporary measure to manage her anxiety, as advised by her  (was provided by writer last visit but not obtained from pharmacy). She reports chest tightness and pain, which she believes are exacerbated by public exposure. She is currently engaged in therapy and is working with an advocate. She has obtained an Emergency Protective Order (EPO) and is awaiting a hearing, which has been delayed for approximately 4 months. She was advised to discuss EMDR therapy with her therapist. We discussed treatment options and decided to increase cymbalta to 90 mg daily as she has been tolerating this well and it may help more with mood/anxiety symptoms. We will also increase dose of buspar to 10 mg po bid and start use of propranolol. We reviewed r/b/se of medication adjustments and she is agreeable. She denies any SI/HI at this time. We will follow up in 4 mo.     Previous Medication Trials:  abilify, risperdal, seroquel, ativan, zoloft, prozac, lexapro, effexor, vraylar, caplyta, topamax. lamictal    Subjective      Review of Systems:   Review of Systems   Constitutional:  Negative for chills, fatigue and fever.   HENT:  Negative for congestion, hearing loss, sore throat and trouble swallowing.    Eyes:  Negative for blurred vision and double vision.    Respiratory:  Negative for cough, chest tightness and shortness of breath.    Cardiovascular:  Negative for chest pain and palpitations.   Gastrointestinal:  Negative for abdominal pain, constipation, diarrhea, nausea and vomiting.   Endocrine: Negative for polydipsia and polyuria.   Genitourinary:  Negative for hematuria and urgency.   Musculoskeletal:  Negative for arthralgias.   Skin:  Negative for skin lesions and bruise.   Neurological:  Negative for dizziness, tremors, seizures and light-headedness.   Hematological:  Negative for adenopathy.     Screening Scores:   PHQ-9 : 9  HANNAH-7 : 20    Medications:     Current Outpatient Medications:     albuterol (PROVENTIL) (2.5 MG/3ML) 0.083% nebulizer solution, Take 2.5 mg by nebulization 4 (Four) Times a Day As Needed for Wheezing., Disp: 90 each, Rfl: 5    albuterol sulfate  (90 Base) MCG/ACT inhaler, Inhale 2 puffs Every 4 (Four) Hours As Needed for Wheezing., Disp: 18 g, Rfl: 5    Allergy Relief 180 MG tablet, Take 1 tablet by mouth Every Morning., Disp: , Rfl:     azelaic acid (AZELEX) 15 % gel, Apply 1 Application topically to the appropriate area as directed 2 (Two) Times a Day As Needed (facial reddness). Face for roscea, Disp: , Rfl:     baclofen (LIORESAL) 10 MG tablet, Take 1 tablet by mouth 2 (Two) Times a Day. (Patient taking differently: Take 1 tablet by mouth 2 (Two) Times a Day. PRN), Disp: 60 tablet, Rfl: 1    cetirizine (zyrTEC) 10 MG tablet, Take 1 tablet by mouth Daily., Disp: 90 tablet, Rfl: 0    COLLAGEN PO, Take 1 tablet by mouth Daily., Disp: , Rfl:     EPIPEN 2-JAREN 0.3 MG/0.3ML solution auto-injector injection, As Needed (allergic reaction)., Disp: , Rfl: 6    estradiol (Estrace) 1 MG tablet, Take 1.5 tablets by mouth Daily. Take 1.5 tablets po qd., Disp: 45 tablet, Rfl: 3    Gentle Laxative 5 MG EC tablet, Take 1 tablet by mouth Daily As Needed for Constipation., Disp: , Rfl:     HYDROcodone-acetaminophen (NORCO) 5-325 MG per tablet,  Take 1 tablet by mouth Every 8 (Eight) Hours As Needed for Moderate Pain (back and neck pain)., Disp: , Rfl:     ipratropium-albuterol (DUO-NEB) 0.5-2.5 mg/3 ml nebulizer, Take 3 mL by nebulization 4 (Four) Times a Day., Disp: 360 mL, Rfl: 5    loperamide (IMODIUM) 2 MG capsule, TAKE 1 CAPSULE BY MOUTH THREE TIMES DAILY AS NEEDED FOR DIARRHEA, Disp: , Rfl:     Multiple Vitamin (MULTI-VITAMIN DAILY PO), Take 1 tablet by mouth Daily., Disp: , Rfl:     omeprazole (priLOSEC) 40 MG capsule, Take 1 capsule by mouth 2 (two) times a day., Disp: , Rfl:     OnabotulinumtoxinA 200 units reconstituted solution, FOR . PHYSICIAN TO INJECT 155 UNITS INTRAMUSCULARLY INTO HEAD, NECK AND SHOULDERS EVERY 12 WEEKS Per FDA PROTOCOL, Disp: 1 each, Rfl: 3    ondansetron ODT (ZOFRAN-ODT) 4 MG disintegrating tablet, Place 1 tablet on the tongue Every 8 (Eight) Hours As Needed for Nausea or Vomiting., Disp: 15 tablet, Rfl: 0    promethazine (PHENERGAN) 12.5 MG tablet, Take 1 tablet by mouth Every 6 (Six) Hours As Needed for Nausea or Vomiting., Disp: 15 tablet, Rfl: 1    sennosides-docusate (PERICOLACE) 8.6-50 MG per tablet, Take 1 tablet by mouth Daily. PRN, Disp: , Rfl:     simethicone (MYLICON,GAS-X) 125 MG capsule capsule, Take 1 capsule by mouth Every 6 (Six) Hours As Needed., Disp: , Rfl:     ubrogepant (Ubrelvy) 50 MG tablet, Take 1 tablet by mouth as needed at onset of migraine; may repeat dose in 2 hours if needed.  MAX: 100 mg/24 hrs, Disp: 10 tablet, Rfl: 11    Xhance 93 MCG/ACT Exhaler Suspension, USE ONE SPRAY IN EACH NOSTRIL EVERY DAY AS NEEDED FOR CONGESTION (Patient taking differently: Administer 2 sprays into the nostril(s) as directed by provider Daily As Needed (nasal congestion).), Disp: 16 mL, Rfl: 0    busPIRone (BUSPAR) 10 MG tablet, Take 1 tablet by mouth 2 (Two) Times a Day., Disp: 180 tablet, Rfl: 1    DULoxetine (Cymbalta) 30 MG capsule, Take one 30 mg tablet and one 60 mg tablet for 90 mg  total daily, Disp: 90 capsule, Rfl: 1    DULoxetine (CYMBALTA) 60 MG capsule, Take one 30 mg tablet and one 60 mg tablet for 90 mg total daily, Disp: 90 capsule, Rfl: 1    montelukast (SINGULAIR) 10 MG tablet, TAKE 1 TABLET BY MOUTH EVERY NIGHT, Disp: 30 tablet, Rfl: 3    propranolol (INDERAL) 10 MG tablet, Take 1-2 tablets up to twice a day for anxiety, Disp: 120 tablet, Rfl: 3    traZODone (DESYREL) 150 MG tablet, Take 2 tablets by mouth Every Night., Disp: 180 tablet, Rfl: 1    Current Facility-Administered Medications:     OnabotulinumtoxinA 200 Units, 200 Units, Intramuscular, Q3 Months, Nicolasa Gonzalez, MAGGIE, 200 Units at 01/09/25 1609    Medication Considerations:  JOSELYN reviewed and appropriate.     Allergies:   Allergies   Allergen Reactions    Adhesive Tape Hives    Latex Hives    Sulfa Antibiotics Hives    Biaxin [Clarithromycin] Nausea Only    Codeine Itching    Penicillins Nausea Only    Other Hives    Nuts Other (See Comments)     Red heated face     Objective     Vital Signs: There were no vitals filed for this visit.  There is no height or weight on file to calculate BMI.     Mental Status Exam:   MENTAL STATUS EXAM   General Appearance:  Cleanly groomed and dressed  Eye Contact:  Good eye contact  Attitude:  Other  Other Comment:  Aggressive initially and becomes more cooperative throughout appointment  Motor Activity:  Normal gait, posture  Muscle Strength:  Normal  Speech:  Loud  Language:  Spontaneous  Mood and affect:  Irritable  Hopelessness:  4  Thought Process:  Logical and goal-directed  Associations/ Thought Content:  No delusions  Hallucinations:  None  Suicidal Ideations:  Not present  Homicidal Ideation:  Not present  Sensorium:  Alert  Orientation:  Person, place, time and situation  Immediate Recall, Recent, and Remote Memory:  Intact  Attention Span/ Concentration:  Good  Fund of Knowledge:  Appropriate for age and educational level  Intellectual Functioning:  Average  range  Insight:  Fair  Judgement:  Fair  Reliability:  Fair  Impulse Control:  Fair      SUICIDE RISK ASSESSMENT/CSSRS:  1. Does patient have thoughts of suicide? denies  2. Does patient have intent for suicide? denies  3. Does patient have a current plan for suicide? denies  4. History of suicide attempts: one past SA in her teens  5. Family history of suicide or attempts: denies  6. History of violent behaviors towards others or property or thoughts of committing suicide: denies  7. History of sexual aggression toward others: denies  8. Access to firearms or weapons: denies    Assessment / Plan      Visit Diagnosis/Orders Placed This Visit:  Diagnoses and all orders for this visit:  Assessment & Plan  1. Anxiety.  Her anxiety was exacerbated by caplyta, leading to persistent anger and anxiety so she discontinued it. She is currently on Cymbalta, buspar, and trazodone. She is considering the use of propranolol as a temporary measure to manage her anxiety (provided at last visit but was not picked up), as advised by her . She reports chest tightness and pain, which she believes are exacerbated by public exposure. She is currently engaged in therapy and is working with an advocate. She was advised to discuss EMDR therapy with her therapist. She was informed about the possibility of increasing her BuSpar dosage from 7.5 mg twice daily to 10 mg twice daily for enhanced anxiety control. Another prescription for propranolol 10 mg tablets was provided, with instructions to take one tablet once or twice daily as needed for anxiety. If this proves insufficient, she may increase the dosage to two tablets up to twice daily. She was cautioned about potential side effects such as dizziness and lightheadedness due to decreased blood pressure. Her Cymbalta dosage will be increased from 60 mg to 90 mg to better manage her irritability and mood symptoms. She was advised to contact the office if she wishes to revert to the 60  mg dosage. She will continue her current trazodone regimen of 300 mg nightly. If her sleep improves, a reduction in her trazodone dosage may be considered.    Follow-up  The patient is scheduled for a follow-up visit in July 2025.     1. Bipolar affective disorder, mixed (Primary)  2. Post traumatic stress disorder (PTSD)  3. Generalized anxiety disorder  - Continue Trazodone 300 mg po qpm: will try to titrate down on this in the future due to risk to QTC  - Start Propranolol 10-20 mg po up to BID for anxiety  - Increase Cymbalta to 90 mg po daily  - Increase Buspar to 10 mg po BID  - Continue therapy  - Follow up with writer in 4 mo  Labs Reviewed : labs from 9/2/24 reviewed  EKG Reviewed : EKG frm 9/2/24 reviewed:   Chart Reviewed      Functional Status: Mild impairment      Prognosis: Fair with Ongoing Treatment     Impression/Formulation:  Patient appeared alert and oriented. Patient is receptive to assistance with maintaining a stable lifestyle.  Patient presents with history of     ICD-10-CM ICD-9-CM   1. Bipolar affective disorder, mixed  F31.60 296.60   2. Post traumatic stress disorder (PTSD)  F43.10 309.81     Treatment Plan:     Patient will continue supportive psychotherapy efforts and medications as indicated. Clinic will obtain release of information for current treatment team for continuity of care as needed. Patient will contact this office, call 911 or present to the nearest emergency room should suicidal or homicidal ideations occur.  Discussed medication options and treatment plan of prescribed medication(s) as well as the risks, benefits, and potential side effects. Patient ackowledged and verbally consented to continue with current treatment plan and was educated on the importance of compliance with treatment and follow-up appointments.     Quality Measures:  Tobacco: Megan Post  reports that she quit smoking about 3 years ago. Her smoking use included cigarettes. She started smoking  about 21 years ago. She has a 4.5 pack-year smoking history. She has been exposed to tobacco smoke. She has never used smokeless tobacco. I have educated her on the risk of diseases from using tobacco products such as cancer, COPD, and heart disease.       Depression (PHQ >11): Patient screened positive for depression based on a PHQ-9 score of 9 on 2/25/2025. Follow-up recommendations include:  follow up with writer in 4 mo, continue medications as prescribed, continue therapy .     Follow Up:   Return in about 4 months (around 6/25/2025) for Medication Management, follow up with therapy.    Short-term goals: Patient will adhere to medication regimen and experience continued improvement in symptoms over the next 3 months.   Long-term goals: Patient will adhere to medication treatment plan and report improvement in symptoms over the next 6 months    Janie Martinez MD  [facility] [address]     This is electronically signed by Janie Martinez MD     Patient or patient representative verbalized consent for the use of Ambient Listening during the visit with  Janie Martinez MD for chart documentation. 2/25/2025  09:54 EST

## 2025-02-28 ENCOUNTER — OFFICE VISIT (OUTPATIENT)
Dept: INTERNAL MEDICINE | Facility: CLINIC | Age: 45
End: 2025-02-28
Payer: MEDICARE

## 2025-02-28 VITALS
TEMPERATURE: 98.2 F | HEART RATE: 80 BPM | BODY MASS INDEX: 27.87 KG/M2 | OXYGEN SATURATION: 96 % | HEIGHT: 61 IN | DIASTOLIC BLOOD PRESSURE: 70 MMHG | SYSTOLIC BLOOD PRESSURE: 116 MMHG | WEIGHT: 147.6 LBS

## 2025-02-28 DIAGNOSIS — B37.31 CANDIDAL VAGINITIS: ICD-10-CM

## 2025-02-28 DIAGNOSIS — J30.89 NON-SEASONAL ALLERGIC RHINITIS DUE TO OTHER ALLERGIC TRIGGER: ICD-10-CM

## 2025-02-28 DIAGNOSIS — J01.40 ACUTE NON-RECURRENT PANSINUSITIS: Primary | ICD-10-CM

## 2025-02-28 DIAGNOSIS — N17.9 AKI (ACUTE KIDNEY INJURY): ICD-10-CM

## 2025-02-28 RX ORDER — CETIRIZINE HYDROCHLORIDE 10 MG/1
10 TABLET ORAL DAILY
Qty: 90 TABLET | Refills: 3 | Status: SHIPPED | OUTPATIENT
Start: 2025-02-28

## 2025-02-28 RX ORDER — MONTELUKAST SODIUM 10 MG/1
10 TABLET ORAL NIGHTLY
Qty: 90 TABLET | Refills: 3 | Status: SHIPPED | OUTPATIENT
Start: 2025-02-28

## 2025-02-28 RX ORDER — DOXYCYCLINE 100 MG/1
100 TABLET ORAL 2 TIMES DAILY
Qty: 14 TABLET | Refills: 0 | Status: SHIPPED | OUTPATIENT
Start: 2025-02-28 | End: 2025-03-07

## 2025-02-28 RX ORDER — FLUCONAZOLE 150 MG/1
150 TABLET ORAL ONCE
Qty: 2 TABLET | Refills: 0 | Status: SHIPPED | OUTPATIENT
Start: 2025-02-28 | End: 2025-02-28

## 2025-02-28 NOTE — PROGRESS NOTES
Internal Medicine Follow Up    Chief Complaint  Megan Post is a 44 y.o. female who presents today for follow up of chronic medical conditions outlined below.    Chief Complaint   Patient presents with    Allergies     Worsening allergies to weather changes.     Cough     Sxs onset 1 week. Clear production    Nasal Congestion        HPI  Ms. Post comes in today for sinusitis. She is here with Angélica. She is Angélica's caregiver. She reports 1 week of maxillary pain, sinus congestion, post nasal drip, cough worst at night. No fever. Does not feel this is flu or COVID and declines testing. She is taking allergy medication, nasal sprays, inhalers. She states that she needs an antibiotic. Notably she also recently saw  hepatology and had labs. This showed IVORY. She reports potential dehydration related to illness. No NSAID use. She also notes receiving IV hydration and energy boost at a [facility] in [facility]. This included 1000cc of IVF, amino acid blend, magnesium, B12 and B6. She notes inability to sleep for 24h following. She has been started on propranolol for anxiety and tachycardia and notes benefit.         Review of Systems  Review of Systems   Constitutional:  Positive for fatigue. Negative for fever.   HENT:  Positive for congestion, postnasal drip, rhinorrhea and sinus pressure. Negative for ear pain and sore throat.    Respiratory:  Positive for cough. Negative for shortness of breath.    Allergic/Immunologic: Positive for environmental allergies.   Psychiatric/Behavioral:  The patient is nervous/anxious.         Current Medications  Current Outpatient Medications on File Prior to Visit   Medication Sig Dispense Refill    albuterol (PROVENTIL) (2.5 MG/3ML) 0.083% nebulizer solution Take 2.5 mg by nebulization 4 (Four) Times a Day As Needed for Wheezing. 90 each 5    albuterol sulfate  (90 Base) MCG/ACT inhaler Inhale 2 puffs Every 4 (Four) Hours As Needed for Wheezing. 18 g 5    azelaic acid (AZELEX) 15 %  gel Apply 1 Application topically to the appropriate area as directed 2 (Two) Times a Day As Needed (facial reddness). Face for roscea      baclofen (LIORESAL) 10 MG tablet Take 1 tablet by mouth 2 (Two) Times a Day. (Patient taking differently: Take 1 tablet by mouth 2 (Two) Times a Day. PRN) 60 tablet 1    busPIRone (BUSPAR) 10 MG tablet Take 1 tablet by mouth 2 (Two) Times a Day. 180 tablet 1    cetirizine (zyrTEC) 10 MG tablet Take 1 tablet by mouth Daily. 90 tablet 0    COLLAGEN PO Take 1 tablet by mouth Daily.      DULoxetine (Cymbalta) 30 MG capsule Take one 30 mg tablet and one 60 mg tablet for 90 mg total daily 90 capsule 1    DULoxetine (CYMBALTA) 60 MG capsule Take one 30 mg tablet and one 60 mg tablet for 90 mg total daily 90 capsule 1    EPIPEN 2-JAREN 0.3 MG/0.3ML solution auto-injector injection As Needed (allergic reaction).  6    estradiol (Estrace) 1 MG tablet Take 1.5 tablets by mouth Daily. Take 1.5 tablets po qd. 45 tablet 3    Gentle Laxative 5 MG EC tablet Take 1 tablet by mouth Daily As Needed for Constipation.      HYDROcodone-acetaminophen (NORCO) 5-325 MG per tablet Take 1 tablet by mouth Every 8 (Eight) Hours As Needed for Moderate Pain (back and neck pain).      ipratropium-albuterol (DUO-NEB) 0.5-2.5 mg/3 ml nebulizer Take 3 mL by nebulization 4 (Four) Times a Day. 360 mL 5    loperamide (IMODIUM) 2 MG capsule TAKE 1 CAPSULE BY MOUTH THREE TIMES DAILY AS NEEDED FOR DIARRHEA      montelukast (SINGULAIR) 10 MG tablet TAKE 1 TABLET BY MOUTH EVERY NIGHT 30 tablet 3    Multiple Vitamin (MULTI-VITAMIN DAILY PO) Take 1 tablet by mouth Daily.      omeprazole (priLOSEC) 40 MG capsule Take 1 capsule by mouth 2 (two) times a day.      OnabotulinumtoxinA 200 units reconstituted solution FOR . PHYSICIAN TO INJECT 155 UNITS INTRAMUSCULARLY INTO HEAD, NECK AND SHOULDERS EVERY 12 WEEKS Per FDA PROTOCOL 1 each 3    ondansetron ODT (ZOFRAN-ODT) 4 MG disintegrating tablet Place 1 tablet  "on the tongue Every 8 (Eight) Hours As Needed for Nausea or Vomiting. 15 tablet 0    promethazine (PHENERGAN) 12.5 MG tablet Take 1 tablet by mouth Every 6 (Six) Hours As Needed for Nausea or Vomiting. 15 tablet 1    propranolol (INDERAL) 10 MG tablet Take 1-2 tablets up to twice a day for anxiety 120 tablet 3    sennosides-docusate (PERICOLACE) 8.6-50 MG per tablet Take 1 tablet by mouth Daily. PRN      simethicone (MYLICON,GAS-X) 125 MG capsule capsule Take 1 capsule by mouth Every 6 (Six) Hours As Needed.      traZODone (DESYREL) 150 MG tablet Take 2 tablets by mouth Every Night. 180 tablet 1    ubrogepant (Ubrelvy) 50 MG tablet Take 1 tablet by mouth as needed at onset of migraine; may repeat dose in 2 hours if needed.  MAX: 100 mg/24 hrs 10 tablet 11    Xhance 93 MCG/ACT Exhaler Suspension USE ONE SPRAY IN EACH NOSTRIL EVERY DAY AS NEEDED FOR CONGESTION (Patient taking differently: Administer 2 sprays into the nostril(s) as directed by provider Daily As Needed (nasal congestion).) 16 mL 0    Allergy Relief 180 MG tablet Take 1 tablet by mouth Every Morning.       Current Facility-Administered Medications on File Prior to Visit   Medication Dose Route Frequency Provider Last Rate Last Admin    OnabotulinumtoxinA 200 Units  200 Units Intramuscular Q3 Months Nicolasa Gonzalez APRN   200 Units at 01/09/25 1609       Allergies  Allergies   Allergen Reactions    Adhesive Tape Hives    Latex Hives    Sulfa Antibiotics Hives    Biaxin [Clarithromycin] Nausea Only    Codeine Itching    Penicillins Nausea Only    Other Hives    Nuts Other (See Comments)     Red heated face       Objective  Visit Vitals  /70 (BP Location: Left arm, Patient Position: Sitting)   Pulse 80   Temp 98.2 °F (36.8 °C) (Temporal)   Ht 154.9 cm (61\")   Wt 67 kg (147 lb 9.6 oz)   LMP  (LMP Unknown)   SpO2 96%   BMI 27.89 kg/m²        Physical Exam  Physical Exam  Vitals and nursing note reviewed.   Constitutional:       General: She is not " in acute distress.     Appearance: She is well-developed. She is not ill-appearing or toxic-appearing.   HENT:      Head: Normocephalic and atraumatic.      Right Ear: Tympanic membrane, ear canal and external ear normal.      Left Ear: Tympanic membrane, ear canal and external ear normal.      Nose: Congestion present. No rhinorrhea.      Mouth/Throat:      Mouth: Mucous membranes are moist.      Pharynx: Oropharynx is clear. No oropharyngeal exudate or posterior oropharyngeal erythema.   Eyes:      Conjunctiva/sclera: Conjunctivae normal.   Neck:      Comments: Small cyst near L collarbone  Cardiovascular:      Rate and Rhythm: Normal rate and regular rhythm.      Heart sounds: Normal heart sounds.   Pulmonary:      Effort: Pulmonary effort is normal. No respiratory distress.      Breath sounds: Normal breath sounds.   Musculoskeletal:      Cervical back: Neck supple.   Lymphadenopathy:      Cervical: No cervical adenopathy.   Skin:     General: Skin is warm and dry.   Neurological:      Mental Status: She is alert and oriented to person, place, and time. Mental status is at baseline.         Results  Results for orders placed or performed in visit on 02/14/25   Comprehensive Metabolic Panel    Collection Time: 02/14/25  1:55 PM    Specimen: Blood   Result Value Ref Range    Glucose 96 65 - 99 mg/dL    BUN 17 6 - 20 mg/dL    Creatinine 1.18 (H) 0.57 - 1.00 mg/dL    Sodium 139 136 - 145 mmol/L    Potassium 3.9 3.5 - 5.2 mmol/L    Chloride 102 98 - 107 mmol/L    CO2 21.9 (L) 22.0 - 29.0 mmol/L    Calcium 10.2 8.6 - 10.5 mg/dL    Total Protein 8.0 6.0 - 8.5 g/dL    Albumin 4.3 3.5 - 5.2 g/dL    ALT (SGPT) 18 1 - 33 U/L    AST (SGOT) 20 1 - 32 U/L    Alkaline Phosphatase 51 39 - 117 U/L    Total Bilirubin 0.3 0.0 - 1.2 mg/dL    Globulin 3.7 gm/dL    A/G Ratio 1.2 g/dL    BUN/Creatinine Ratio 14.4 7.0 - 25.0    Anion Gap 15.1 (H) 5.0 - 15.0 mmol/L    eGFR 59.3 (L) >60.0 mL/min/1.73   CBC Auto Differential    Collection  Time: 02/14/25  1:55 PM    Specimen: Blood   Result Value Ref Range    WBC 7.32 3.40 - 10.80 10*3/mm3    RBC 5.11 3.77 - 5.28 10*6/mm3    Hemoglobin 14.5 12.0 - 15.9 g/dL    Hematocrit 44.9 34.0 - 46.6 %    MCV 87.9 79.0 - 97.0 fL    MCH 28.4 26.6 - 33.0 pg    MCHC 32.3 31.5 - 35.7 g/dL    RDW 12.8 12.3 - 15.4 %    RDW-SD 40.9 37.0 - 54.0 fl    MPV 10.6 6.0 - 12.0 fL    Platelets 378 140 - 450 10*3/mm3    Neutrophil % 59.8 42.7 - 76.0 %    Lymphocyte % 33.2 19.6 - 45.3 %    Monocyte % 4.4 (L) 5.0 - 12.0 %    Eosinophil % 1.8 0.3 - 6.2 %    Basophil % 0.5 0.0 - 1.5 %    Immature Grans % 0.3 0.0 - 0.5 %    Neutrophils, Absolute 4.38 1.70 - 7.00 10*3/mm3    Lymphocytes, Absolute 2.43 0.70 - 3.10 10*3/mm3    Monocytes, Absolute 0.32 0.10 - 0.90 10*3/mm3    Eosinophils, Absolute 0.13 0.00 - 0.40 10*3/mm3    Basophils, Absolute 0.04 0.00 - 0.20 10*3/mm3    Immature Grans, Absolute 0.02 0.00 - 0.05 10*3/mm3    nRBC 0.0 0.0 - 0.2 /100 WBC     *Note: Due to a large number of results and/or encounters for the requested time period, some results have not been displayed. A complete set of results can be found in Results Review.        Assessment and Plan  Diagnoses and all orders for this visit:    Acute non-recurrent pansinusitis  - declines COVID and flu testing today  - already on several prescribed and OTC medications for symptoms with no relief  - due to penicillin allergy will treat with doxycycline 100mg BID x7d    IVORY (acute kidney injury)  - most recent labs by hepatologist with decline in gfr rom  to 59.  - not using NSAIDs, possible dehydration and has increased hydration, unable to say if IV mixture she received is implicated because I do not know contents of amino acid blend but advised her to avoid further use.  - she is planning repeat labs 3/10 with hepatology    Candidal vaginitis  - diflucan to pharmacy due to yeast infections with abx    Non-seasonal allergic rhinitis due to other allergic trigger  - on  zyrtec, Singulair, xhance nasal spray. Will refill zyrtec and singulair today.     Health Maintenance  - Pap smear: s/p hysterectomy, following with GYN  - Mammogram: 7/2024 BIRADS 1  - Colonoscopy: Start screening at age 47.  - HCV: negative  - Immunizations: COVID and flu declined. Tdap 6/2020.   - Depression screening: Phq9 = 10 2/2025    Return for Next scheduled follow up.   Clear bilaterally, pupils equal, round and reactive to light.

## 2025-03-01 LAB
1OH-MIDAZOLAM UR QL SCN: NOT DETECTED NG/MG CREAT
6MAM UR QL SCN: NEGATIVE NG/ML
7AMINOCLONAZEPAM/CREAT UR: NOT DETECTED NG/MG CREAT
A-OH ALPRAZ/CREAT UR: NOT DETECTED NG/MG CREAT
A-OH-TRIAZOLAM/CREAT UR CFM: NOT DETECTED NG/MG CREAT
ACP UR QL CFM: NOT DETECTED
ALPRAZ/CREAT UR CFM: NOT DETECTED NG/MG CREAT
AMPHET UR CFM-MCNC: NOT DETECTED NG/MG CREAT
AMPHETAMINES UR QL SCN: NORMAL NG/ML
AMPHETAMINES UR QL: NEGATIVE
APAP UR QL SCN: NORMAL UG/ML
APAP UR QL: NORMAL
APAP UR-MCNC: PRESENT UG/ML
BARBITURATES UR QL SCN: NEGATIVE NG/ML
BENZODIAZ SCN METH UR: NEGATIVE
BUPRENORPHINE UR QL SCN: NEGATIVE
BUPRENORPHINE/CREAT UR: NOT DETECTED NG/MG CREAT
CANNABINOIDS UR QL CFM: NORMAL
CANNABINOIDS UR QL SCN: NORMAL NG/ML
CARBOXYTHC UR CFM-MCNC: 55 NG/MG CREAT
CARISOPRODOL UR QL: NEGATIVE NG/ML
CLONAZEPAM/CREAT UR CFM: NOT DETECTED NG/MG CREAT
COCAINE+BZE UR QL SCN: NEGATIVE NG/ML
CODEINE UR CFM-MCNC: NOT DETECTED NG/MG CREAT
CREAT UR-MCNC: 222 MG/DL
D-METHORPHAN UR-MCNC: NOT DETECTED NG/ML
D-METHORPHAN+LEVORPHANOL UR QL: NOT DETECTED
DESALKYLFLURAZ/CREAT UR: NOT DETECTED NG/MG CREAT
DHC/CREAT UR: 191 NG/MG CREAT
DIAZEPAM/CREAT UR: NOT DETECTED NG/MG CREAT
ETHANOL UR QL SCN: NEGATIVE G/DL
ETHANOL UR QL SCN: NEGATIVE NG/ML
FENTANYL CTO UR SCN-MCNC: NEGATIVE NG/ML
FENTANYL/CREAT UR: NOT DETECTED NG/MG CREAT
FLUNITRAZEPAM UR QL SCN: NOT DETECTED NG/MG CREAT
GABAPENTIN UR-MCNC: NEGATIVE UG/ML
HALLUCINOGENS UR: NEGATIVE
HYDROCODONE UR CFM-MCNC: 937 NG/MG CREAT
HYDROMORPHONE UR CFM-MCNC: 90 NG/MG CREAT
HYPNOTICS UR QL SCN: NEGATIVE
KETAMINE UR QL: NOT DETECTED
LORAZEPAM/CREAT UR: NOT DETECTED NG/MG CREAT
MDA UR CFM-MCNC: NOT DETECTED NG/MG CREAT
MDMA UR CFM-MCNC: NOT DETECTED NG/MG CREAT
MEPERIDINE UR QL SCN: NEGATIVE NG/ML
METHADONE UR QL SCN: NEGATIVE NG/ML
METHADONE+METAB UR QL SCN: NEGATIVE NG/ML
METHAMPHET UR CFM-MCNC: NOT DETECTED NG/MG CREAT
MIDAZOLAM/CREAT UR CFM: NOT DETECTED NG/MG CREAT
MISCELLANEOUS, UR: NEGATIVE
MORPHINE UR CFM-MCNC: NOT DETECTED NG/MG CREAT
NORBUPRENORPHINE/CREAT UR: NOT DETECTED NG/MG CREAT
NORCODEINE/CREAT UR CFM: NOT DETECTED NG/MG CREAT
NORDIAZEPAM/CREAT UR: NOT DETECTED NG/MG CREAT
NORFENTANYL/CREAT UR: NOT DETECTED NG/MG CREAT
NORFLUNITRAZEPAM UR-MCNC: NOT DETECTED NG/MG CREAT
NORHYDROCODONE UR CFM-MCNC: 1671 NG/MG CREAT
NORKETAMINE UR-MCNC: NOT DETECTED UG/ML
NORMORPHINE UR-MCNC: NOT DETECTED NG/MG CREAT
OPIATES UR QL CFM: NORMAL
OPIATES UR SCN-MCNC: NORMAL NG/ML
OXAZEPAM/CREAT UR: NOT DETECTED NG/MG CREAT
OXYCODONE CTO UR SCN-MCNC: NEGATIVE NG/ML
PCP UR QL SCN: NEGATIVE NG/ML
PRESCRIBED MEDICATIONS: NORMAL
PROPOXYPH UR QL SCN: NEGATIVE NG/ML
TAPENTADOL CTO UR SCN-MCNC: NEGATIVE NG/ML
TEMAZEPAM/CREAT UR: NOT DETECTED NG/MG CREAT
TRAMADOL UR QL SCN: NEGATIVE NG/ML
ZALEPLON UR-MCNC: NOT DETECTED NG/ML
ZOLPIDEM PHENYL-4-CARB UR QL SCN: NOT DETECTED
ZOLPIDEM UR QL SCN: NOT DETECTED
ZOPICLONE-N-OXIDE UR-MCNC: NOT DETECTED NG/ML

## 2025-03-05 ENCOUNTER — TELEPHONE (OUTPATIENT)
Dept: INTERNAL MEDICINE | Facility: CLINIC | Age: 45
End: 2025-03-05

## 2025-03-05 NOTE — TELEPHONE ENCOUNTER
I did not order either of the labs from 2/14. Both were ordered by [facility] so should have been forwarded to them. Please fax results.

## 2025-03-05 NOTE — TELEPHONE ENCOUNTER
CHECK ONBASE FOR SCAN     Caller: JIAN    Relationship: Other [facility] DR RODRIGUEZ    Best call back number:  [phone]    What form or medical record are you requesting: NEEDING LAB WORK  RESULTS FROM RECENT CBC AND CMP    Who is requesting this form or medical record from you: [facility]    How would you like to receive the form or medical records (pick-up, mail, fax):   If fax, what is the fax number:  [phone]       Timeframe paperwork needed: ASAP

## 2025-03-12 ENCOUNTER — SPECIALTY PHARMACY (OUTPATIENT)
Dept: ONCOLOGY | Facility: HOSPITAL | Age: 45
End: 2025-03-12
Payer: MEDICARE

## 2025-03-12 NOTE — PROGRESS NOTES
Specialty Pharmacy Patient Management Program  Refill Outreach     Megan was contacted today regarding refills of their medication(s).    Refill Questions      Flowsheet Row Most Recent Value   Changes to allergies? No   Changes to medications? No   New conditions or infections since last clinic visit No   Unplanned office visit, urgent care, ED, or hospital admission in the last 4 weeks  No   How does patient/caregiver feel medication is working? Good   Financial problems or insurance changes  No   Since the previous refill, were any specialty medication doses or scheduled injections missed or delayed?  No   If yes, please provide the amount N/A   Why were doses missed? N/A   Does this patient require a clinical escalation to a pharmacist? No            Delivery Questions      Flowsheet Row Most Recent Value   Delivery method UPS   Delivery address verified with patient/caregiver? Yes   Delivery address Home   Other address preferred Botox shipped to office   Number of medications in delivery 2   Medication(s) being filled and delivered OnabotulinumtoxinA, Ubrogepant (UBRELVY)   Doses left of specialty medications Botox=0 Ubrelvy=prn medication   Copay verified? Yes   Copay amount Botox/Ubrelvy =$0   Copay form of payment No copayment ($0)   Delivery Date Selection 03/13/25   Signature Required No                 Follow-up: 1 month(s)     Lacy North, Pharmacy Technician  3/12/2025  14:59 EDT

## 2025-03-14 DIAGNOSIS — M51.360 LUMBAR DISCOGENIC PAIN SYNDROME: ICD-10-CM

## 2025-03-14 RX ORDER — BACLOFEN 10 MG/1
10 TABLET ORAL 2 TIMES DAILY
Qty: 60 TABLET | Refills: 1 | Status: SHIPPED | OUTPATIENT
Start: 2025-03-14

## 2025-03-28 ENCOUNTER — TELEMEDICINE (OUTPATIENT)
Dept: INTERNAL MEDICINE | Facility: CLINIC | Age: 45
End: 2025-03-28
Payer: MEDICARE

## 2025-03-28 DIAGNOSIS — R09.81 NASAL CONGESTION: ICD-10-CM

## 2025-03-28 DIAGNOSIS — B37.9 ANTIBIOTIC-INDUCED YEAST INFECTION: ICD-10-CM

## 2025-03-28 DIAGNOSIS — R11.2 NAUSEA AND VOMITING, UNSPECIFIED VOMITING TYPE: ICD-10-CM

## 2025-03-28 DIAGNOSIS — R50.9 FEVER, UNSPECIFIED FEVER CAUSE: ICD-10-CM

## 2025-03-28 DIAGNOSIS — T36.95XA ANTIBIOTIC-INDUCED YEAST INFECTION: ICD-10-CM

## 2025-03-28 DIAGNOSIS — J01.40 SUBACUTE PANSINUSITIS: Primary | ICD-10-CM

## 2025-03-28 DIAGNOSIS — R05.1 ACUTE COUGH: ICD-10-CM

## 2025-03-28 RX ORDER — DEXTROMETHORPHAN HYDROBROMIDE AND PROMETHAZINE HYDROCHLORIDE 15; 6.25 MG/5ML; MG/5ML
5 SYRUP ORAL NIGHTLY PRN
Qty: 120 ML | Refills: 0 | Status: SHIPPED | OUTPATIENT
Start: 2025-03-28

## 2025-03-28 RX ORDER — CEFDINIR 300 MG/1
300 CAPSULE ORAL 2 TIMES DAILY
Qty: 10 CAPSULE | Refills: 0 | Status: SHIPPED | OUTPATIENT
Start: 2025-03-28 | End: 2025-04-02

## 2025-03-28 RX ORDER — ONDANSETRON 4 MG/1
4 TABLET, ORALLY DISINTEGRATING ORAL EVERY 8 HOURS PRN
Qty: 15 TABLET | Refills: 0 | Status: SHIPPED | OUTPATIENT
Start: 2025-03-28

## 2025-03-28 RX ORDER — FLUCONAZOLE 150 MG/1
150 TABLET ORAL ONCE
Qty: 1 TABLET | Refills: 0 | Status: SHIPPED | OUTPATIENT
Start: 2025-03-28 | End: 2025-03-28
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
## 2025-04-03 ENCOUNTER — TELEPHONE (OUTPATIENT)
Dept: INTERNAL MEDICINE | Facility: CLINIC | Age: 45
End: 2025-04-03

## 2025-04-03 DIAGNOSIS — Z11.3 SCREENING EXAMINATION FOR STI: Primary | ICD-10-CM

## 2025-04-03 DIAGNOSIS — N17.9 AKI (ACUTE KIDNEY INJURY): ICD-10-CM

## 2025-04-03 NOTE — TELEPHONE ENCOUNTER
Caller: Megan Post    Relationship: Self    Best call back number: [phone]     What orders are you requesting (i.e. lab or imaging): AIDS TEST, KIDNEY FUNCTION    In what timeframe would the patient need to come in: AS SOON AS POSSIBLE    Where will you receive your lab/imaging services: HERE    Additional notes:

## 2025-04-04 ENCOUNTER — PROCEDURE VISIT (OUTPATIENT)
Dept: NEUROLOGY | Facility: CLINIC | Age: 45
End: 2025-04-04
Payer: MEDICARE

## 2025-04-04 DIAGNOSIS — G43.709 CHRONIC MIGRAINE WITHOUT AURA WITHOUT STATUS MIGRAINOSUS, NOT INTRACTABLE: Primary | ICD-10-CM

## 2025-04-04 NOTE — PROGRESS NOTES
Botox Procedure Note       Patient Name: Megan Post  : 1980   MRN: [id number]     Chief Complaint:    Chief Complaint   Patient presents with    Botulinum Toxin Injection       History of Present Illness: Megan Post is a 45 y.o. female who is here today for Botox injections for migraines.     We have discussed risk and benefits of this Botox procedure and common side effects including headache, bleeding, infection, worsening of pain, neck pain, neck stiffness or weakness, damage to the areas being injected, weakness of muscles, loss of muscle control, ptosis, flu-like symptoms as well as more serious possible adverse effects including possible dysphagia, facial droop if injecting the facial area, painful injection, respiratory distress or even death (death has only been reported once with adults for Botox for migraines in another state when mixed with lidocaine solution which we do not use lidocaine solution in our practice for mixing Botox). The patient verbally understands and accepts risks and agrees with moving forward with Botox injections for chronic migraine prevention.    Verbal and written consent has been obtained from the patient prior to beginning treatment injections.     Pertinent Medical History:  Response to treatment has reduced headaches at least 7 fewer days a month and / or 100 hours fewer each month.       Subjective      Review of Systems:   Review of Systems    The following portions of the patient's history were reviewed an updated as appropriate: past family history, past medical history, past social history, past surgical history.     Medications:     Current Outpatient Medications:     albuterol (PROVENTIL) (2.5 MG/3ML) 0.083% nebulizer solution, Take 2.5 mg by nebulization 4 (Four) Times a Day As Needed for Wheezing., Disp: 90 each, Rfl: 5    albuterol sulfate  (90 Base) MCG/ACT inhaler, Inhale 2 puffs Every 4 (Four) Hours As Needed for Wheezing., Disp: 18 g,  Rfl: 5    azelaic acid (AZELEX) 15 % gel, Apply 1 Application topically to the appropriate area as directed 2 (Two) Times a Day As Needed (facial reddness). Face for roscea, Disp: , Rfl:     baclofen (LIORESAL) 10 MG tablet, Take 1 tablet by mouth 2 (Two) Times a Day., Disp: 60 tablet, Rfl: 1    busPIRone (BUSPAR) 10 MG tablet, Take 1 tablet by mouth 2 (Two) Times a Day., Disp: 180 tablet, Rfl: 1    cetirizine (zyrTEC) 10 MG tablet, Take 1 tablet by mouth Daily., Disp: 90 tablet, Rfl: 3    COLLAGEN PO, Take 1 tablet by mouth Daily., Disp: , Rfl:     DULoxetine (Cymbalta) 30 MG capsule, Take one 30 mg tablet and one 60 mg tablet for 90 mg total daily, Disp: 90 capsule, Rfl: 1    DULoxetine (CYMBALTA) 60 MG capsule, Take one 30 mg tablet and one 60 mg tablet for 90 mg total daily, Disp: 90 capsule, Rfl: 1    EPIPEN 2-JAREN 0.3 MG/0.3ML solution auto-injector injection, As Needed (allergic reaction)., Disp: , Rfl: 6    estradiol (Estrace) 1 MG tablet, Take 1.5 tablets by mouth Daily. Take 1.5 tablets po qd., Disp: 45 tablet, Rfl: 3    Gentle Laxative 5 MG EC tablet, Take 1 tablet by mouth Daily As Needed for Constipation., Disp: , Rfl:     HYDROcodone-acetaminophen (NORCO) 5-325 MG per tablet, Take 1 tablet by mouth Every 8 (Eight) Hours As Needed for Moderate Pain (back and neck pain)., Disp: , Rfl:     ipratropium-albuterol (DUO-NEB) 0.5-2.5 mg/3 ml nebulizer, Take 3 mL by nebulization 4 (Four) Times a Day., Disp: 360 mL, Rfl: 5    loperamide (IMODIUM) 2 MG capsule, TAKE 1 CAPSULE BY MOUTH THREE TIMES DAILY AS NEEDED FOR DIARRHEA, Disp: , Rfl:     montelukast (SINGULAIR) 10 MG tablet, Take 1 tablet by mouth Every Night., Disp: 90 tablet, Rfl: 3    Multiple Vitamin (MULTI-VITAMIN DAILY PO), Take 1 tablet by mouth Daily., Disp: , Rfl:     omeprazole (priLOSEC) 40 MG capsule, Take 1 capsule by mouth 2 (two) times a day., Disp: , Rfl:     OnabotulinumtoxinA 200 units reconstituted solution, FOR .  PHYSICIAN TO INJECT 155 UNITS INTRAMUSCULARLY INTO HEAD, NECK AND SHOULDERS EVERY 12 WEEKS Per FDA PROTOCOL, Disp: 1 each, Rfl: 3    ondansetron ODT (ZOFRAN-ODT) 4 MG disintegrating tablet, Place 1 tablet on the tongue Every 8 (Eight) Hours As Needed for Nausea or Vomiting., Disp: 15 tablet, Rfl: 0    promethazine (PHENERGAN) 12.5 MG tablet, Take 1 tablet by mouth Every 6 (Six) Hours As Needed for Nausea or Vomiting., Disp: 15 tablet, Rfl: 1    promethazine-dextromethorphan (PROMETHAZINE-DM) 6.25-15 MG/5ML syrup, Take 5 mL by mouth At Night As Needed for Cough., Disp: 120 mL, Rfl: 0    propranolol (INDERAL) 10 MG tablet, Take 1-2 tablets up to twice a day for anxiety, Disp: 120 tablet, Rfl: 3    sennosides-docusate (PERICOLACE) 8.6-50 MG per tablet, Take 1 tablet by mouth Daily. PRN, Disp: , Rfl:     simethicone (MYLICON,GAS-X) 125 MG capsule capsule, Take 1 capsule by mouth Every 6 (Six) Hours As Needed., Disp: , Rfl:     traZODone (DESYREL) 150 MG tablet, Take 2 tablets by mouth Every Night., Disp: 180 tablet, Rfl: 1    ubrogepant (Ubrelvy) 50 MG tablet, Take 1 tablet by mouth as needed at onset of migraine; may repeat dose in 2 hours if needed.  MAX: 100 mg/24 hrs, Disp: 10 tablet, Rfl: 11    Xhance 93 MCG/ACT Exhaler Suspension, USE ONE SPRAY IN EACH NOSTRIL EVERY DAY AS NEEDED FOR CONGESTION (Patient taking differently: Administer 2 sprays into the nostril(s) as directed by provider Daily As Needed (nasal congestion).), Disp: 16 mL, Rfl: 0    Current Facility-Administered Medications:     OnabotulinumtoxinA 200 Units, 200 Units, Intramuscular, Q3 Months, Nicolasa Gonzalez APRN, 200 Units at 01/09/25 8220    Allergies:   Allergies   Allergen Reactions    Adhesive Tape Hives    Latex Hives    Sulfa Antibiotics Hives    Biaxin [Clarithromycin] Nausea Only    Codeine Itching    Penicillins Nausea Only    Other Hives    Nuts Other (See Comments)     Red heated face       Objective     Physical Exam:  Vital  Signs: There were no vitals filed for this visit.  There is no height or weight on file to calculate BMI.     Physical Exam    Neurological Exam  Mental Status  Awake, alert and oriented to person, place and time.    Gait  Casual gait is normal including stance, stride, and arm swing.       BOTOX PROCEDURE:     The patient was placed in a comfortable area and the sites to be treated were identified. A time out was called and performed. The area to be treated was prepped three times with alcohol and the alcohol allowed to dry. Next, a 30 gauge needle was used to inject the medication in the area to be treated.     Date of Last Injection: 1/9/2025  Response: Good  Onset of response: Immediate  Wearing off: 3 weeks  Side Effects: None  : Allergan  Lot #:  [id number]  Expiration Date: 04/2027  NDC: 08731  10    Botox was injected using FDA approved protocol for chronic migraine prevention.   200 unit vial Botox was re-constituted with 4 mL 0.9 NS to 5 unit/0.1 mL using standard techniques.  10 units were given at the  muscle in 2 divided sites  5 units were given at the Procerus muscle  20 units were given at the Frontalis muscle in 4 divided sites  40 units were given at the Temporalis muscle in 8 divided sites  30 units were given at the Occipitalis muscle in 6 divided sites  20 units were given at the Cervical paraspinal muscle in 4 divided sites  30 units were given at the Trapezius muscle in 6 divided sites    The total amount injected in units is 155  The total amount wasted in units is 45  The total amount submitted in units is 200.   Botox was administered by Nurse practitioner.     Patient tolerated procedure well with no immediate complications.     Assessment / Plan      Assessment/Plan:   Diagnoses and all orders for this visit:    1. Chronic migraine without aura without status migrainosus, not intractable (Primary)  Comments:  Continue Aimovig, Botox, Ubrelvy         Patient Education:        Reviewed medications, potential side effects and signs and symptoms to report. Discussed risk versus benefits of treatment plan with patient and/or family-including medications, labs and radiology that may be ordered. Addressed questions and concerns during visit. Patient and/or family verbalized understanding and agree with plan. Instructed to call the office with any questions and report to ER with any life-threatening symptoms.     Follow Up:   It has been determined that Megan Post has chronic migraine headaches. We will proceed with a 12 week regimen of 200 units of Botox injections, to treat the patient's chronic migraines.  Return in about 3 months (around 7/4/2025).    During this visit the following were done:  Labs Reviewed []    Labs Ordered []    Radiology Reports Reviewed []    Radiology Ordered []    PCP Records Reviewed []    Referring Provider Records Reviewed []    ER Records Reviewed []    Hospital Records Reviewed []    History Obtained From Family []    Radiology Images Reviewed []    Other Reviewed []    Records Requested []        MAGGIE Wills  [facility]  [address] [place] [id number] [phone]

## 2025-04-04 NOTE — TELEPHONE ENCOUNTER
She was seen relatively recently. I can order labs but first need clarification as to why she is requesting HIV/AIDs testing.
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
## 2025-04-08 ENCOUNTER — TELEPHONE (OUTPATIENT)
Dept: INTERNAL MEDICINE | Facility: CLINIC | Age: 45
End: 2025-04-08
Payer: MEDICARE

## 2025-04-08 NOTE — TELEPHONE ENCOUNTER
Caller: Megan Post    Relationship: Self    Best call back number:   Telephone Information:   Mobile [phone]       What is the best time to reach you: ANY    Who are you requesting to speak with (clinical staff, provider,  specific staff member): CLINICAL      What was the call regarding: PATIENT IS WANTING TO SPEAK TO SOMEONE CONCERNING IF THERE ARE TESTS TO CHECK FOR EXPOSURE TO PARASITES OR VIRUSES THAT COULD BE PRESENT IN RODENT FECES AND URINE. PATIENT REPORTS SHE HAS BEEN EXPOSED TO THEM AND HAVE BEEN SICK ON AND OFF FOR A WHILE. PLEASE CALL BACK TO DISCUSS.

## 2025-04-08 NOTE — TELEPHONE ENCOUNTER
Called and spoke to pt.   Living with mice infestation.   Having diarrhea and vomiting off and on.   Concern for Hantavirus. Was wondering if PCP  could order a test to see if she has been exposed to parasites or viruses carried by rodents.   Please advise.

## 2025-05-15 ENCOUNTER — SPECIALTY PHARMACY (OUTPATIENT)
Dept: ONCOLOGY | Facility: HOSPITAL | Age: 45
End: 2025-05-15
Payer: MEDICARE

## 2025-05-15 NOTE — PROGRESS NOTES
Specialty Pharmacy Patient Management Program  Refill Outreach     Crystal was contacted today regarding refills of their medication(s).    Refill Questions      Flowsheet Row Most Recent Value   Changes to allergies? No   Changes to medications? No   New conditions or infections since last clinic visit No   Unplanned office visit, urgent care, ED, or hospital admission in the last 4 weeks  No   How does patient/caregiver feel medication is working? Good   Financial problems or insurance changes  No   Since the previous refill, were any specialty medication doses or scheduled injections missed or delayed?  No            Delivery Questions      Flowsheet Row Most Recent Value   Delivery method UPS   Delivery address verified with patient/caregiver? Yes   Delivery address Home   Other address preferred n/a   Number of medications in delivery 1   Medication(s) being filled and delivered Ubrogepant (UBRELVY)   Doses left of specialty medications Ubrelvy = PRN   Copay verified? Yes   Copay amount no copay   Copay form of payment No copayment ($0)   Delivery Date Selection 05/16/25   Signature Required No                 Follow-up: 30 day(s)     Dmitry Ontiveros, Pharmacy Technician  5/15/2025  09:37 EDT

## 2025-05-29 ENCOUNTER — SPECIALTY PHARMACY (OUTPATIENT)
Dept: ONCOLOGY | Facility: HOSPITAL | Age: 45
End: 2025-05-29
Payer: MEDICARE

## 2025-05-29 NOTE — PROGRESS NOTES
Specialty Pharmacy Patient Management Program  Refill Outreach     Crystal was contacted today regarding refills of their medication(s).    Refill Questions      Flowsheet Row Most Recent Value   Changes to allergies? No   Changes to medications? No   New conditions or infections since last clinic visit No   Unplanned office visit, urgent care, ED, or hospital admission in the last 4 weeks  No   How does patient/caregiver feel medication is working? Good   Financial problems or insurance changes  No   Since the previous refill, were any specialty medication doses or scheduled injections missed or delayed?  No   If yes, please provide the amount N/A   Why were doses missed? N/A   Does this patient require a clinical escalation to a pharmacist? No            Delivery Questions      Flowsheet Row Most Recent Value   Delivery method UPS   Delivery address verified with patient/caregiver? Yes   Delivery address [facility]   Other address preferred N/A   Number of medications in delivery 1   Medication(s) being filled and delivered OnabotulinumtoxinA   Doses left of specialty medications N/A   Copay verified? Yes   Copay amount Botox =$0   Copay form of payment No copayment ($0)   Delivery Date Selection 06/06/25   Signature Required No   Do you consent to receive electronic handouts?  Yes                 Follow-up: 90 day(s)     Cecelia Anderson  5/29/2025  15:01 EDT

## 2025-06-04 ENCOUNTER — LAB (OUTPATIENT)
Dept: LAB | Facility: HOSPITAL | Age: 45
End: 2025-06-04
Payer: MEDICARE

## 2025-06-04 DIAGNOSIS — N17.9 AKI (ACUTE KIDNEY INJURY): ICD-10-CM

## 2025-06-04 DIAGNOSIS — Z11.3 SCREENING EXAMINATION FOR STI: ICD-10-CM

## 2025-06-04 LAB
ALBUMIN SERPL-MCNC: 4.5 G/DL (ref 3.5–5.2)
ANION GAP SERPL CALCULATED.3IONS-SCNC: 11.7 MMOL/L (ref 5–15)
BUN SERPL-MCNC: 18 MG/DL (ref 6–20)
BUN/CREAT SERPL: 24.3 (ref 7–25)
CALCIUM SPEC-SCNC: 10 MG/DL (ref 8.6–10.5)
CHLORIDE SERPL-SCNC: 102 MMOL/L (ref 98–107)
CO2 SERPL-SCNC: 25.3 MMOL/L (ref 22–29)
CREAT SERPL-MCNC: 0.74 MG/DL (ref 0.57–1)
EGFRCR SERPLBLD CKD-EPI 2021: 103.1 ML/MIN/1.73
GLUCOSE SERPL-MCNC: 84 MG/DL (ref 65–99)
HIV 1+2 AB+HIV1 P24 AG SERPL QL IA: NORMAL
PHOSPHATE SERPL-MCNC: 3.6 MG/DL (ref 2.5–4.5)
POTASSIUM SERPL-SCNC: 4.2 MMOL/L (ref 3.5–5.2)
SODIUM SERPL-SCNC: 139 MMOL/L (ref 136–145)

## 2025-06-04 PROCEDURE — G0432 EIA HIV-1/HIV-2 SCREEN: HCPCS

## 2025-06-04 PROCEDURE — 80069 RENAL FUNCTION PANEL: CPT

## 2025-06-27 DIAGNOSIS — M51.360 LUMBAR DISCOGENIC PAIN SYNDROME: ICD-10-CM

## 2025-06-27 RX ORDER — BACLOFEN 10 MG/1
10 TABLET ORAL 2 TIMES DAILY
Qty: 60 TABLET | Refills: 1 | Status: SHIPPED | OUTPATIENT
Start: 2025-06-27

## 2025-06-30 ENCOUNTER — SPECIALTY PHARMACY (OUTPATIENT)
Dept: ONCOLOGY | Facility: HOSPITAL | Age: 45
End: 2025-06-30
Payer: MEDICARE

## 2025-06-30 ENCOUNTER — PROCEDURE VISIT (OUTPATIENT)
Dept: NEUROLOGY | Facility: CLINIC | Age: 45
End: 2025-06-30
Payer: MEDICARE

## 2025-06-30 DIAGNOSIS — G43.709 CHRONIC MIGRAINE WITHOUT AURA WITHOUT STATUS MIGRAINOSUS, NOT INTRACTABLE: Primary | ICD-10-CM
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
## 2025-06-30 NOTE — PROGRESS NOTES
Botox Procedure Note       Patient Name: Megan Post  : 1980   MRN: [id number]     Chief Complaint:    Chief Complaint   Patient presents with    Botulinum Toxin Injection     Patient supplied-Do not bill (200 units)       History of Present Illness: Megan Post is a 45 y.o. female who is here today for Botox injections for migraines.     We have discussed risk and benefits of this Botox procedure and common side effects including headache, bleeding, infection, worsening of pain, neck pain, neck stiffness or weakness, damage to the areas being injected, weakness of muscles, loss of muscle control, ptosis, flu-like symptoms as well as more serious possible adverse effects including possible dysphagia, facial droop if injecting the facial area, painful injection, respiratory distress or even death (death has only been reported once with adults for Botox for migraines in another state when mixed with lidocaine solution which we do not use lidocaine solution in our practice for mixing Botox). The patient verbally understands and accepts risks and agrees with moving forward with Botox injections for chronic migraine prevention.    Verbal and written consent has been obtained from the patient prior to beginning treatment injections.     Pertinent Medical History:  Response to treatment has reduced headaches at least 7 fewer days a month and / or 100 hours fewer each month.       Subjective      Review of Systems:   Review of Systems    The following portions of the patient's history were reviewed an updated as appropriate: past family history, past medical history, past social history, past surgical history.     Medications:     Current Outpatient Medications:     albuterol (PROVENTIL) (2.5 MG/3ML) 0.083% nebulizer solution, Take 2.5 mg by nebulization 4 (Four) Times a Day As Needed for Wheezing., Disp: 90 each, Rfl: 5    albuterol sulfate  (90 Base) MCG/ACT inhaler, Inhale 2 puffs Every 4 (Four)  Hours As Needed for Wheezing., Disp: 18 g, Rfl: 5    azelaic acid (AZELEX) 15 % gel, Apply 1 Application topically to the appropriate area as directed 2 (Two) Times a Day As Needed (facial reddness). Face for roscea, Disp: , Rfl:     baclofen (LIORESAL) 10 MG tablet, Take 1 tablet by mouth 2 (Two) Times a Day., Disp: 60 tablet, Rfl: 1    busPIRone (BUSPAR) 10 MG tablet, Take 1 tablet by mouth 2 (Two) Times a Day., Disp: 180 tablet, Rfl: 1    cetirizine (zyrTEC) 10 MG tablet, Take 1 tablet by mouth Daily., Disp: 90 tablet, Rfl: 3    COLLAGEN PO, Take 1 tablet by mouth Daily., Disp: , Rfl:     EPIPEN 2-JAREN 0.3 MG/0.3ML solution auto-injector injection, As Needed (allergic reaction)., Disp: , Rfl: 6    estradiol (Estrace) 1 MG tablet, Take 1.5 tablets by mouth Daily. Take 1.5 tablets po qd., Disp: 45 tablet, Rfl: 3    Gentle Laxative 5 MG EC tablet, Take 1 tablet by mouth Daily As Needed for Constipation., Disp: , Rfl:     HYDROcodone-acetaminophen (NORCO) 5-325 MG per tablet, Take 1 tablet by mouth Every 8 (Eight) Hours As Needed for Moderate Pain (back and neck pain)., Disp: , Rfl:     ipratropium-albuterol (DUO-NEB) 0.5-2.5 mg/3 ml nebulizer, Take 3 mL by nebulization 4 (Four) Times a Day., Disp: 360 mL, Rfl: 5    loperamide (IMODIUM) 2 MG capsule, TAKE 1 CAPSULE BY MOUTH THREE TIMES DAILY AS NEEDED FOR DIARRHEA, Disp: , Rfl:     montelukast (SINGULAIR) 10 MG tablet, Take 1 tablet by mouth Every Night., Disp: 90 tablet, Rfl: 3    Multiple Vitamin (MULTI-VITAMIN DAILY PO), Take 1 tablet by mouth Daily., Disp: , Rfl:     omeprazole (priLOSEC) 40 MG capsule, Take 1 capsule by mouth 2 (two) times a day., Disp: , Rfl:     OnabotulinumtoxinA 200 units reconstituted solution, FOR . PHYSICIAN TO INJECT 155 UNITS INTRAMUSCULARLY INTO HEAD, NECK AND SHOULDERS EVERY 12 WEEKS Per FDA PROTOCOL, Disp: 1 each, Rfl: 3    ondansetron ODT (ZOFRAN-ODT) 4 MG disintegrating tablet, Place 1 tablet on the tongue Every  8 (Eight) Hours As Needed for Nausea or Vomiting., Disp: 15 tablet, Rfl: 0    propranolol (INDERAL) 10 MG tablet, Take 1-2 tablets up to twice a day for anxiety, Disp: 120 tablet, Rfl: 3    sennosides-docusate (PERICOLACE) 8.6-50 MG per tablet, Take 1 tablet by mouth Daily. PRN, Disp: , Rfl:     simethicone (MYLICON,GAS-X) 125 MG capsule capsule, Take 1 capsule by mouth Every 6 (Six) Hours As Needed., Disp: , Rfl:     traZODone (DESYREL) 150 MG tablet, Take 2 tablets by mouth Every Night., Disp: 180 tablet, Rfl: 1    ubrogepant (Ubrelvy) 50 MG tablet, Take 1 tablet by mouth as needed at onset of migraine; may repeat dose in 2 hours if needed.  MAX: 100 mg/24 hrs, Disp: 10 tablet, Rfl: 11    Xhance 93 MCG/ACT Exhaler Suspension, USE ONE SPRAY IN EACH NOSTRIL EVERY DAY AS NEEDED FOR CONGESTION (Patient taking differently: Administer 2 sprays into the nostril(s) as directed by provider Daily As Needed (nasal congestion).), Disp: 16 mL, Rfl: 0    Current Facility-Administered Medications:     OnabotulinumtoxinA 155 Units, 155 Units, Intramuscular, Q3 Months, Nicolasa Gonzalez APRN, 155 Units at 04/04/25 1558    Allergies:   Allergies   Allergen Reactions    Adhesive Tape Hives    Latex Hives    Sulfa Antibiotics Hives    Biaxin [Clarithromycin] Nausea Only    Codeine Itching    Penicillins Nausea Only    Other Hives    Nuts Other (See Comments)     Red heated face       Objective     Physical Exam:  Vital Signs: There were no vitals filed for this visit.  There is no height or weight on file to calculate BMI.     Physical Exam    Neurological Exam     BOTOX PROCEDURE:     The patient was placed in a comfortable area and the sites to be treated were identified. A time out was called and performed. The area to be treated was prepped three times with alcohol and the alcohol allowed to dry. Next, a 30 gauge needle was used to inject the medication in the area to be treated.     Date of Last Injection:  4/4/2025  Response: Good  Onset of response: Immediate   Wearing off: 2 weeks  Side Effects: None  : Huddlebuyan  Lot #:  [id number]  Expiration Date: 10/2027  NDC: 200 56331    Botox was injected using FDA approved protocol for chronic migraine prevention.   200 unit vial Botox was re-constituted with 4 mL 0.9 NS to 5 unit/0.1 mL using standard techniques.  10 units were given at the  muscle in 2 divided sites  5 units were given at the Procerus muscle  20 units were given at the Frontalis muscle in 4 divided sites  40 units were given at the Temporalis muscle in 8 divided sites  30 units were given at the Occipitalis muscle in 6 divided sites  20 units were given at the Cervical paraspinal muscle in 4 divided sites  30 units were given at the Trapezius muscle in 6 divided sites    The total amount injected in units is 155  The total amount wasted in units is 45  The total amount submitted in units is 200.   Botox was administered by Nurse practitioner.     Patient tolerated procedure well with no immediate complications.     Assessment / Plan      Assessment/Plan:   Diagnoses and all orders for this visit:    1. Chronic migraine without aura without status migrainosus, not intractable (Primary)  Comments:  Continue Aimovig, Botox, Ubrelvy  Nurtec samples, start with every other day dosing 2 weeks prior to next Botox injection         Patient Education:       Reviewed medications, potential side effects and signs and symptoms to report. Discussed risk versus benefits of treatment plan with patient and/or family-including medications, labs and radiology that may be ordered. Addressed questions and concerns during visit. Patient and/or family verbalized understanding and agree with plan. Instructed to call the office with any questions and report to ER with any life-threatening symptoms.     Follow Up:   It has been determined that Megan Post  has chronic migraine headaches. We will proceed with  a 12 week regimen of 200 units of Botox injections, to treat the patient's chronic migraines.  Return in about 3 months (around 9/30/2025).    During this visit the following were done:  Labs Reviewed []    Labs Ordered []    Radiology Reports Reviewed []    Radiology Ordered []    PCP Records Reviewed []    Referring Provider Records Reviewed []    ER Records Reviewed []    Hospital Records Reviewed []    History Obtained From Family []    Radiology Images Reviewed []    Other Reviewed []    Records Requested []        MAGGIE Wills  [facility]  [address] [address] [phone]

## 2025-07-25 ENCOUNTER — TELEMEDICINE (OUTPATIENT)
Age: 45
End: 2025-07-25
Payer: MEDICARE

## 2025-07-25 DIAGNOSIS — F43.10 POST TRAUMATIC STRESS DISORDER (PTSD): ICD-10-CM

## 2025-07-25 DIAGNOSIS — F41.1 GENERALIZED ANXIETY DISORDER: ICD-10-CM

## 2025-07-25 DIAGNOSIS — F31.60 BIPOLAR AFFECTIVE DISORDER, MIXED: Primary | Chronic | ICD-10-CM

## 2025-07-25 RX ORDER — TRAZODONE HYDROCHLORIDE 150 MG/1
300 TABLET ORAL NIGHTLY
Qty: 180 TABLET | Refills: 1 | Status: SHIPPED | OUTPATIENT
Start: 2025-07-25

## 2025-07-25 RX ORDER — LORAZEPAM 0.5 MG/1
0.5 TABLET ORAL EVERY 8 HOURS PRN
Qty: 10 TABLET | Refills: 0 | Status: SHIPPED | OUTPATIENT
Start: 2025-07-25
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
## 2025-07-30 ENCOUNTER — TELEPHONE (OUTPATIENT)
Dept: INTERNAL MEDICINE | Facility: CLINIC | Age: 45
End: 2025-07-30
Payer: MEDICARE

## 2025-07-30 NOTE — TELEPHONE ENCOUNTER
HUB TO RELAY      CALLED PATIENT SINCE SHE CANCELED HER WELLNESS APPOINTMENT,  PATIENT IS REQUESTING TO BE SEEN AROUND 10 AM.

## 2025-08-22 ENCOUNTER — TELEMEDICINE (OUTPATIENT)
Age: 45
End: 2025-08-22
Payer: MEDICARE

## 2025-08-22 DIAGNOSIS — F41.1 GENERALIZED ANXIETY DISORDER: ICD-10-CM

## 2025-08-22 DIAGNOSIS — F31.60 BIPOLAR AFFECTIVE DISORDER, MIXED: Primary | ICD-10-CM

## 2025-08-22 DIAGNOSIS — F43.10 POST TRAUMATIC STRESS DISORDER (PTSD): ICD-10-CM

## 2025-08-22 RX ORDER — LORAZEPAM 0.5 MG/1
0.5 TABLET ORAL EVERY 8 HOURS PRN
Qty: 10 TABLET | Refills: 0 | Status: SHIPPED | OUTPATIENT
Start: 2025-08-22

## 2025-08-22 RX ORDER — TOPIRAMATE 50 MG/1
50 TABLET, FILM COATED ORAL DAILY
Qty: 30 TABLET | Refills: 1 | Status: SHIPPED | OUTPATIENT
Start: 2025-08-22

## 2025-08-25 ENCOUNTER — SPECIALTY PHARMACY (OUTPATIENT)
Facility: HOSPITAL | Age: 45
End: 2025-08-25
Payer: MEDICARE

## (undated) DEVICE — DRSNG SURESITE WNDW 4X4.5

## (undated) DEVICE — NEEDLE,16GX1.5",DSP,REG,BEVEL: Brand: MEDLINE

## (undated) DEVICE — PK MAJ ENT 10

## (undated) DEVICE — GLOVE,SURG,SENSICARE,ALOE,LF,PF,7: Brand: MEDLINE

## (undated) DEVICE — SUT ETHLN 5/0 P3 18IN 698G

## (undated) DEVICE — STPLR SKIN SUBCUTICULAR INSORB 2030

## (undated) DEVICE — ELECTRD BLD EZ CLN MOD XLNG 2.75IN

## (undated) DEVICE — SUT GUT PLN FAST ABS 5/0 PC1 18IN 1915G

## (undated) DEVICE — SYR CONTRL PRESS/LO FIX/M/LL W/THMB/RNG 10ML

## (undated) DEVICE — BNDR ABD PREMIUM/UNIV 10IN 27TO48IN

## (undated) DEVICE — BIOPATCH™ ANTIMICROBIAL DRESSING WITH CHLORHEXIDINE GLUCONATE IS A HYDROPHILLIC POLYURETHANE ABSORPTIVE FOAM WITH CHLORHEXIDINE GLUCONATE (CHG) WHICH INHIBITS BACTERIAL GROWTH UNDER THE DRESSING. THE DRESSING IS INTENDED TO BE USED TO ABSORB EXUDATE, COVER A WOUND CAUSED BY VASCULAR AND NONVASCULAR PERCUTANEOUS MEDICAL DEVICES DURING SURGERY, AS WELL AS REDUCE LOCAL INFECTION AND COLONIZATION OF MICROORGANISMS.: Brand: BIOPATCH

## (undated) DEVICE — INTENDED USE FOR SURGICAL MARKING ON INTACT SKIN, ALSO PROVIDES A PERMANENT METHOD OF IDENTIFYING OBJECTS IN THE OPERATING ROOM: Brand: WRITESITE® REGULAR TIP SKIN MARKER

## (undated) DEVICE — PK CYSTO-TUR BASIC 10

## (undated) DEVICE — ANTIBACTERIAL UNDYED BRAIDED (POLYGLACTIN 910), SYNTHETIC ABSORBABLE SUTURE: Brand: COATED VICRYL

## (undated) DEVICE — BND FACIAL UNIV

## (undated) DEVICE — CATH URETRL FLXITP POLLACK STD 5F 70CM

## (undated) DEVICE — BLANKT WARM UNDER/BDY FUL/ACC A/ 90X206CM

## (undated) DEVICE — PREMIUM DRY TRAY LF: Brand: MEDLINE INDUSTRIES, INC.

## (undated) DEVICE — APPL CHLORAPREP TINTED 26ML TEAL

## (undated) DEVICE — PROXIMATE RH ROTATING HEAD SKIN STAPLERS (35 WIDE) CONTAINS 35 STAINLESS STEEL STAPLES: Brand: PROXIMATE

## (undated) DEVICE — GOWN,NON-REINFORCED,SIRUS,SET IN SLV,XXL: Brand: MEDLINE

## (undated) DEVICE — TBG SXN LIPECTOMY 108IN

## (undated) DEVICE — PK CHST BRST 10

## (undated) DEVICE — SUT ETHLN 3/0 PC5 18IN 1893G

## (undated) DEVICE — TOTAL TRAY, 16FR 10ML SIL FOLEY, URN: Brand: MEDLINE

## (undated) DEVICE — DRSNG PAD ABD 8X10IN STRL

## (undated) DEVICE — SUT ETHLN 0 LP XLH 72IN D5854

## (undated) DEVICE — HYPODERMIC SAFETY NEEDLE: Brand: MONOJECT

## (undated) DEVICE — UNDERGLV SURG BIOGEL INDICATOR LF PF 7.5

## (undated) DEVICE — INTENDED FOR TISSUE SEPARATION, AND OTHER PROCEDURES THAT REQUIRE A SHARP SURGICAL BLADE TO PUNCTURE OR CUT.: Brand: BARD-PARKER ® STAINLESS STEEL BLADES

## (undated) DEVICE — NITINOL WIRE WITH HYDROPHILIC TIP: Brand: SENSOR

## (undated) DEVICE — Device

## (undated) DEVICE — LINER CANSTR SXN HERCULES

## (undated) DEVICE — CLTH CLENS READYCLEANSE PERI CARE PK/5

## (undated) DEVICE — TRAP FLD MINIVAC MEGADYNE 100ML

## (undated) DEVICE — CVR HNDL LIGHT RIGID

## (undated) DEVICE — ADHS SKIN PREMIERPRO EXOFIN TOPICAL HI/VISC .5ML

## (undated) DEVICE — GLV SURG BIOGEL LTX PF 8

## (undated) DEVICE — PATIENT RETURN ELECTRODE, SINGLE-USE, CONTACT QUALITY MONITORING, ADULT, WITH 9FT CORD, FOR PATIENTS WEIGING OVER 33LBS. (15KG): Brand: MEGADYNE

## (undated) DEVICE — SUT ETHIB 0/0 MO6 I8IN CX45D

## (undated) DEVICE — SUT MNCRYL PLS ANTIB UD 3/0 PS2 27IN

## (undated) DEVICE — BLANKT WARM LOWR/BDY 100X120CM